# Patient Record
Sex: FEMALE | Race: WHITE | Employment: OTHER | URBAN - METROPOLITAN AREA
[De-identification: names, ages, dates, MRNs, and addresses within clinical notes are randomized per-mention and may not be internally consistent; named-entity substitution may affect disease eponyms.]

---

## 2017-01-05 ENCOUNTER — TELEPHONE (OUTPATIENT)
Dept: FAMILY MEDICINE | Facility: CLINIC | Age: 82
End: 2017-01-05

## 2017-01-05 NOTE — TELEPHONE ENCOUNTER
lasix      Last Written Prescription Date:  9/27/16  Last Fill Quantity: 30,   # refills: 1  Last Office Visit with [facility], [facility] or [facility] prescribing provider: 12/2/16  Future Office visit:    Next 5 appointments (look out 90 days)     Mar 21, 2017  1:00 PM   Return Visit with DEVICE CHECK RN CARD   [facility] PHYSICIANS HEART AT [facility] ([facility])    [address] [place] [id number] [phone]                   Routing refill request to provider for review/approval because:  Drug not active on patient's medication list

## 2017-01-05 NOTE — TELEPHONE ENCOUNTER
Please call patient and find out how/why she is needing this medication  I do not see it active on her med list and know that she just saw cardiology on 12/6/16 and they also made no mention that she was on it.   Kelly

## 2017-01-05 NOTE — TELEPHONE ENCOUNTER
PAtient thought that she was suppose to be on this medication. She was prescribed this on 9/27/16 and DC'd on 10/3/16. She did not know that it was dc'd.   Charmaine Kerr RN

## 2017-01-09 ENCOUNTER — TRANSFERRED RECORDS (OUTPATIENT)
Dept: HEALTH INFORMATION MANAGEMENT | Facility: CLINIC | Age: 82
End: 2017-01-09

## 2017-01-10 DIAGNOSIS — I25.10 CAD S/P PERCUTANEOUS CORONARY ANGIOPLASTY: Primary | ICD-10-CM

## 2017-01-10 DIAGNOSIS — Z98.61 CAD S/P PERCUTANEOUS CORONARY ANGIOPLASTY: Primary | ICD-10-CM

## 2017-01-10 RX ORDER — ATORVASTATIN CALCIUM 40 MG/1
40 TABLET, FILM COATED ORAL DAILY
Qty: 90 TABLET | Refills: 3 | Status: SHIPPED | OUTPATIENT
Start: 2017-01-10 | End: 2017-12-28

## 2017-01-10 NOTE — TELEPHONE ENCOUNTER
atorvastatin     Last Written Prescription Date: 11/28/16  Last Fill Quantity: 30, # refills: 0  Last Office Visit with [facility], [facility] or [facility] prescribing provider: 12/2/16   Next 5 appointments (look out 90 days)     Mar 21, 2017  1:00 PM   Return Visit with DEVICE CHECK RN CARD   [facility] PHYSICIANS HEART AT [facility] ([facility])    [address] [place] [id number] [phone]                   CHOL      162   12/9/2016  HDL       57   12/9/2016  LDL       92   12/9/2016  TRIG       63   12/9/2016  CHOLHDLRATIO      3.7   3/17/2014

## 2017-01-16 DIAGNOSIS — I25.10 CAD S/P PERCUTANEOUS CORONARY ANGIOPLASTY: Primary | ICD-10-CM

## 2017-01-16 DIAGNOSIS — Z98.61 CAD S/P PERCUTANEOUS CORONARY ANGIOPLASTY: Primary | ICD-10-CM

## 2017-01-25 DIAGNOSIS — I25.10 CAD S/P PERCUTANEOUS CORONARY ANGIOPLASTY: Primary | ICD-10-CM

## 2017-01-25 DIAGNOSIS — Z98.61 CAD S/P PERCUTANEOUS CORONARY ANGIOPLASTY: Primary | ICD-10-CM

## 2017-01-31 ENCOUNTER — OFFICE VISIT (OUTPATIENT)
Dept: FAMILY MEDICINE | Facility: CLINIC | Age: 82
End: 2017-01-31
Payer: COMMERCIAL

## 2017-01-31 VITALS
SYSTOLIC BLOOD PRESSURE: 134 MMHG | BODY MASS INDEX: 27.48 KG/M2 | WEIGHT: 171 LBS | DIASTOLIC BLOOD PRESSURE: 72 MMHG | HEIGHT: 66 IN | TEMPERATURE: 97.9 F | HEART RATE: 70 BPM

## 2017-01-31 DIAGNOSIS — M76.891 ENTHESOPATHY OF RIGHT HIP REGION: ICD-10-CM

## 2017-01-31 DIAGNOSIS — K21.9 LPRD (LARYNGOPHARYNGEAL REFLUX DISEASE): ICD-10-CM

## 2017-01-31 DIAGNOSIS — I10 ESSENTIAL HYPERTENSION, BENIGN: Primary | ICD-10-CM

## 2017-01-31 DIAGNOSIS — E03.9 HYPOTHYROIDISM, UNSPECIFIED TYPE: ICD-10-CM

## 2017-01-31 DIAGNOSIS — R63.5 WEIGHT GAIN: ICD-10-CM

## 2017-01-31 PROCEDURE — 99214 OFFICE O/P EST MOD 30 MIN: CPT | Mod: 24 | Performed by: PHYSICIAN ASSISTANT

## 2017-01-31 RX ORDER — LEVOTHYROXINE SODIUM 75 UG/1
75 TABLET ORAL DAILY
Qty: 90 TABLET | Refills: 1 | Status: SHIPPED | OUTPATIENT
Start: 2017-01-31 | End: 2017-03-14

## 2017-01-31 RX ORDER — LANSOPRAZOLE 30 MG/1
30 CAPSULE, DELAYED RELEASE ORAL DAILY
Qty: 90 CAPSULE | Refills: 1 | Status: SHIPPED | OUTPATIENT
Start: 2017-01-31 | End: 2017-07-24

## 2017-01-31 NOTE — PROGRESS NOTES
"  SUBJECTIVE:                                                    Christiana Sal is a 85 year old female who presents to clinic today for the following health issues:      Patient is here today with concerns of high blood pressure. She has been checking her blood pressure over the past 2 weeks. All results have been high.   She is also having right leg swelling. Also pain in her hip too. She has been having trouble with it and wants to figure out what she can do to make it better.  She is concerned abut weight gain also. She said she has never weighed this much before and is wondering if it is from all the medications he takes.     Last visit in early December   Was having right foot pain with unclear etiology  Was given referral to ortho but says the day after her appt the pain resolved so when they called to schedule the appt she said she didn't need it  About a week later the pain returned but was gone again within a week  Now in the last 2 days she has been having some pain in her right hip  Said it was bad enough last night that she couldn't get comfortable and tylenol was not helping  Pain over the outside of her left hip    Also notes occasionally swelling in both lower legs  Not daily  Never swollen in the morning - notices it more later in the day  Does seem to improve again with elevation  Not painful   No cough or SOB  Not taking lasix anymore  Does not have any ROSIE or compression stockings - was given DME order awhile ago but was still very expensive    Gaining weight  Says she is not eating healthy because the facility she is at has a lot of high calorie foods  She has tried to eat more salads but will still have a big dinner and if there are snack foods or pastries around she will eat them  Does not think they have a \"healthy or low rola\" menu   Admits it has been a stressful year with her  and she feels she is a \"Stress eater\"     Blood pressures she has been checking with her cuff have " been running high  She brings with her a list today - numbers consistently 160-170's/80's  She did haver her cuff tested several weeks ago and it was supposedly accurate  Denies any symptoms with the high blood pressure but the numbers do concern her      Problem list and histories reviewed & adjusted, as indicated.  Additional history: as documented    Current Outpatient Prescriptions   Medication Sig Dispense Refill     LANsoprazole (PREVACID) 30 MG CR capsule Take 1 capsule (30 mg) by mouth daily Take 30-60 minutes before a meal. 90 capsule 1     levothyroxine (SYNTHROID/LEVOTHROID) 75 MCG tablet Take 1 tablet (75 mcg) by mouth daily 90 tablet 1     atorvastatin (LIPITOR) 40 MG tablet Take 1 tablet (40 mg) by mouth daily 90 tablet 3     Caffeine (NO DOZ OR) Take by mouth daily as needed       diclofenac (VOLTAREN) 1 % GEL topical gel Apply 2 grams to foot up to four times daily using enclosed dosing card. 100 g 1     diclofenac (VOLTAREN) 1 % GEL topical gel Apply  2 grams to foot up to four times daily using enclosed dosing card. 100 g 1     acetaminophen-codeine (TYLENOL #3) 300-30 MG per tablet Take 1-2 tablets by mouth every 4 hours as needed for other (mild or moderate pain) 10 tablet 0     acetaminophen-codeine (TYLENOL W/CODEINE NO. 3) 300-30 MG per tablet Take 1-2 tablets by mouth every 4 hours as needed for mild pain (mild or moderate pain) 30 tablet 0     clopidogrel (PLAVIX) 75 MG tablet Take 1 tablet (75 mg) by mouth daily 90 tablet 3     losartan (COZAAR) 25 MG tablet Take 1 tablet (25 mg) by mouth daily 90 tablet 3     metoprolol (TOPROL-XL) 25 MG 24 hr tablet Take 0.5 tablets (12.5 mg) by mouth daily 90 tablet 3     RaNITidine HCl (RANITIDINE 75 PO)        meclizine (ANTIVERT) 25 MG tablet Take 1 tablet (25 mg) by mouth every 6 hours as needed for dizziness 30 tablet 3     ondansetron (ZOFRAN) 4 MG tablet Take 1 tablet (4 mg) by mouth every 6 hours as needed for nausea 18 tablet 3     nitroglycerin  "(NITROSTAT) 0.4 MG SL tablet Place 1 tablet (0.4 mg) under the tongue every 5 minutes as needed for chest pain (call your doctor if you take a third dose) 25 tablet 3     citalopram (CELEXA) 10 MG tablet Take 1 tablet (10 mg) by mouth daily for depression. This medication should be taken every day to be effective. 90 tablet 1     Multiple Vitamins-Minerals (OCUVITE PO) Take 1 tablet by mouth daily.       ASPIRIN 81 MG PO TABS Take 2 tablets by mouth every evening. *.        CALCIUM 600 + D OR 1 tablet 2 times per day       FISH OIL OR 1 daily       [DISCONTINUED] levothyroxine (SYNTHROID, LEVOTHROID) 75 MCG tablet Take 1 tablet (75 mcg) by mouth daily 90 tablet 1     Allergies   Allergen Reactions     Demerol      Nausea     Lisinopril Cough     Sulfa Drugs      Questionable itching reported by patient       ROS:  Remainder of ROS obtained and found to be negative other than that which was documented above      OBJECTIVE:                                                    /72 mmHg  Pulse 70  Temp(Src) 97.9  F (36.6  C) (Tympanic)  Ht 5' 6\" (1.676 m)  Wt 171 lb (77.565 kg)  BMI 27.61 kg/m2  Body mass index is 27.61 kg/(m^2).  GENERAL: healthy, alert and no distress  EYES: Eyes grossly normal to inspection, PERRL and conjunctivae and sclerae normal  RESP: lungs clear to auscultation - no rales, rhonchi or wheezes  CV: regular rates and rhythm, normal S1 S2, no S3 or S4, no murmur, click or rub and no peripheral edema noted on exam today  HIP, right:   No pain in groin. Full ROM in hip  Tender to touch with applied pressure over greater trochanter to area just below     Diagnostic Test Results:  none      ASSESSMENT/PLAN:                                                    (I10) Essential hypertension, benign  (primary encounter diagnosis)  Comment: blood pressures in clinic well controlled but readings outside of clinic elevated. Patient assymptomatic  Plan: No change in dosages of medications today  Would " like her to bring her cuff back to clinic so we can again compare readings to verify true elevations  If cuff seems accurate and she continues to get elevated readings would then consider a 24 or 48 hour blood pressure monitor  Do not want to change medications or dosages right now and risk dropping her too low    (M76.891) Enthesopathy of right hip region  Comment: normal ROM and no pain in actual hip joint but rather over outer hip. Suspect bursitis or IT band irritation  Plan: Discussed stretching and icing/conservative therapies she could try first  If not improvement could consider steroid injection    (J38.7) LPRD (laryngopharyngeal reflux disease)  Comment: refill needed  Plan: LANsoprazole (PREVACID) 30 MG CR capsule            (E03.9) Hypothyroidism, unspecified type  Comment: refill needed  Plan: levothyroxine (SYNTHROID/LEVOTHROID) 75 MCG         tablet            (R63.5) Weight gain  Comment: gradual increase in weight consistent with diet changes and decreased exercise  Plan: Suggested she talk with the manager at her facility to see if they offer a modified or healthier menu option  Work on smaller portion sizes and limiting the butters/gravys/dressings or asking for them on the side  Set a goal to get back to exercising or trying some of the exercise classes they offer there     For rao swelling (which was not present today) will have patient get some compressions stockings at the pharmacy and see if that helps minimize or reduce the swelling        Kelly Matthew PA-C  [facility]

## 2017-01-31 NOTE — MR AVS SNAPSHOT
After Visit Summary   1/31/2017    Christiana Sal    MRN: [id number]           Patient Information     Date Of Birth          10/25/1931        Visit Information        Provider Department      1/31/2017 9:00 AM Kelly Matthew PA-C [facility]        Today's Diagnoses     Cough    -  1     LPRD (laryngopharyngeal reflux disease)         Hypothyroidism, unspecified type            Follow-ups after your visit        Your next 10 appointments already scheduled     Mar 21, 2017  1:00 PM   Return Visit with DEVICE CHECK RN CARD   [facility] PHYSICIANS HEART AT [facility] ([facility])    [address] [place] [id number] [phone]              Who to contact     Normal or non-critical lab and imaging results will be communicated to you by [employer], letter or phone within 4 business days after the clinic has received the results. If you do not hear from us within 7 days, please contact the clinic through [employer] or phone. If you have a critical or abnormal lab result, we will notify you by phone as soon as possible.  Submit refill requests through [employer] or call your pharmacy and they will forward the refill request to us. Please allow 3 business days for your refill to be completed.          If you need to speak with a  for additional information , please call: [phone]             Additional Information About Your Visit        G[employer]Time Information     G[employer] gives you secure access to your electronic health record. If you see a primary care provider, you can also send messages to your care team and make appointments. If you have questions, please call your primary care clinic.  If you do not have a primary care provider, please call [phone] and they will assist you.        Care EveryWhere ID     This is your Care EveryWhere ID. This could be used by other organizations to access your [facility]  "records  [id number]        Your Vitals Were     Pulse Height BMI (Body Mass Index)             70 5' 6\" (1.676 m) 27.61 kg/m2          Blood Pressure from Last 3 Encounters:   01/31/17 134/72   12/06/16 134/83   12/02/16 136/72    Weight from Last 3 Encounters:   01/31/17 171 lb (77.565 kg)   12/06/16 165 lb 6 oz (75.014 kg)   12/02/16 169 lb (76.658 kg)              Today, you had the following     No orders found for display         Where to get your medicines      These medications were sent to [id number] - [place], [place] - [address]  [address] [facility] [address]     Phone:  [phone]    - LANsoprazole 30 MG CR capsule  - levothyroxine 75 MCG tablet       Primary Care Provider Office Phone # Fax #    Mrinalini Balbinaa [phone]       [facility] [address] [place] [facility] [address]        Thank you!     Thank you for choosing [facility]  for your care. Our goal is always to provide you with excellent care. Hearing back from our patients is one way we can continue to improve our services. Please take a few minutes to complete the written survey that you may receive in the mail after your visit with us. Thank you!             Your Updated Medication List - Protect others around you: Learn how to safely use, store and throw away your medicines at www.disposemymeds.org.          This list is accurate as of: 1/31/17  9:25 AM.  Always use your most recent med list.                   Brand Name Dispense Instructions for use    * acetaminophen-codeine 300-30 MG per tablet    TYLENOL w/CODEINE No. 3    30 tablet    Take 1-2 tablets by mouth every 4 hours as needed for mild pain (mild or moderate pain)       * acetaminophen-codeine 300-30 MG per tablet    TYLENOL #3    10 tablet    Take 1-2 tablets by mouth every 4 hours as needed for other (mild or moderate pain)       aspirin 81 MG tablet      Take 2 tablets by mouth every evening. *. "       atorvastatin 40 MG tablet    LIPITOR    90 tablet    Take 1 tablet (40 mg) by mouth daily       CALCIUM 600 + D PO      1 tablet 2 times per day       citalopram 10 MG tablet    celeXA    90 tablet    Take 1 tablet (10 mg) by mouth daily for depression. This medication should be taken every day to be effective.       clopidogrel 75 MG tablet    PLAVIX    90 tablet    Take 1 tablet (75 mg) by mouth daily       * diclofenac 1 % Gel topical gel    VOLTAREN    100 g    Apply 2 grams to foot up to four times daily using enclosed dosing card.       * diclofenac 1 % Gel topical gel    VOLTAREN    100 g    Apply  2 grams to foot up to four times daily using enclosed dosing card.       FISH OIL PO      1 daily       LANsoprazole 30 MG CR capsule    PREVACID    90 capsule    Take 1 capsule (30 mg) by mouth daily Take 30-60 minutes before a meal.       levothyroxine 75 MCG tablet    SYNTHROID/LEVOTHROID    90 tablet    Take 1 tablet (75 mcg) by mouth daily       losartan 25 MG tablet    COZAAR    90 tablet    Take 1 tablet (25 mg) by mouth daily       meclizine 25 MG tablet    ANTIVERT    30 tablet    Take 1 tablet (25 mg) by mouth every 6 hours as needed for dizziness       metoprolol 25 MG 24 hr tablet    TOPROL-XL    90 tablet    Take 0.5 tablets (12.5 mg) by mouth daily       nitroglycerin 0.4 MG sublingual tablet    NITROSTAT    25 tablet    Place 1 tablet (0.4 mg) under the tongue every 5 minutes as needed for chest pain (call your doctor if you take a third dose)       NO DOZ OR      Take by mouth daily as needed       OCUVITE PO      Take 1 tablet by mouth daily.       ondansetron 4 MG tablet    ZOFRAN    18 tablet    Take 1 tablet (4 mg) by mouth every 6 hours as needed for nausea       RANITIDINE 75 PO          * Notice:  This list has 4 medication(s) that are the same as other medications prescribed for you. Read the directions carefully, and ask your doctor or other care provider to review them with you.

## 2017-01-31 NOTE — NURSING NOTE
"Chief Complaint   Patient presents with     Leg Swelling     Other     High BP        Initial /72 mmHg  Pulse 70  Ht 5' 6\" (1.676 m)  Wt 171 lb (77.565 kg)  BMI 27.61 kg/m2 Estimated body mass index is 27.61 kg/(m^2) as calculated from the following:    Height as of this encounter: 5' 6\" (1.676 m).    Weight as of this encounter: 171 lb (77.565 kg).  BP completed using cuff size: evangelist Lofton CMA    "

## 2017-02-01 DIAGNOSIS — I10 ESSENTIAL HYPERTENSION WITH GOAL BLOOD PRESSURE LESS THAN 140/90: Primary | ICD-10-CM

## 2017-02-01 RX ORDER — LOSARTAN POTASSIUM 25 MG/1
25 TABLET ORAL DAILY
Qty: 90 TABLET | Refills: 3 | Status: SHIPPED | OUTPATIENT
Start: 2017-02-01 | End: 2018-02-01

## 2017-02-01 NOTE — TELEPHONE ENCOUNTER
losartan      Last Written Prescription Date:  10/11/16  Last Fill Quantity: 90, # refills: 3  Last Office Visit with [facility], [facility] or [facility] prescribing provider: 1/31/17     Next 5 appointments (look out 90 days)     Mar 21, 2017  1:00 PM   Return Visit with DEVICE CHECK RN CARD   [facility] PHYSICIANS HEART AT [facility] ([facility])    [address] [place] [id number] [phone]                   POTASSIUM   Date Value Ref Range Status   11/09/2016 3.8 3.4 - 5.3 mmol/L Final     CREATININE   Date Value Ref Range Status   11/09/2016 0.77 0.52 - 1.04 mg/dL Final     BP Readings from Last 3 Encounters:   01/31/17 134/72   12/06/16 134/83   12/02/16 136/72

## 2017-02-03 ENCOUNTER — TELEPHONE (OUTPATIENT)
Dept: FAMILY MEDICINE | Facility: CLINIC | Age: 82
End: 2017-02-03

## 2017-02-03 DIAGNOSIS — K21.9 GASTROESOPHAGEAL REFLUX DISEASE, ESOPHAGITIS PRESENCE NOT SPECIFIED: Primary | ICD-10-CM

## 2017-02-03 NOTE — TELEPHONE ENCOUNTER
Kelly -  This medication has not been ordered by you before. Please advise. Thank you.  Charmaine Kerr RN

## 2017-02-03 NOTE — TELEPHONE ENCOUNTER
Christiana calling for her refill on ranitidine.  Has not been ordered by us previously.    ranitidine      Last Written Prescription Date: reported by patient  Last Fill Quantity: reported by patient,  # refills: reported by patient   Last Office Visit with [facility] or [facility] prescribing provider: 1/31/17                                         Next 5 appointments (look out 90 days)     Mar 21, 2017  1:00 PM   Return Visit with DEVICE CHECK RN CARD   [facility] PHYSICIANS HEART AT [facility] ([facility])    [address] [place] [id number] [phone]

## 2017-02-10 ENCOUNTER — TELEPHONE (OUTPATIENT)
Dept: FAMILY MEDICINE | Facility: CLINIC | Age: 82
End: 2017-02-10

## 2017-02-10 NOTE — TELEPHONE ENCOUNTER
"Christiana states that she need a \"shot\" for her right leg and would like to know the procedure how to schedule. Had appointment on 1/31/17 and she had right leg swelling so not sure if that is what she is referring to.  Please call and assess.  Thank you..Consuelo Hawthorne    "

## 2017-02-10 NOTE — TELEPHONE ENCOUNTER
Have her rest over the weekend, elevate the leg as much as possible   If she would like to return next week we can recheck things and consider an injection at that time  If she makes an appt for Monday please allow for 40 min  Kelly

## 2017-02-10 NOTE — TELEPHONE ENCOUNTER
Patient is still having a hard time walking. She states that her right leg is still slightly swollen. She has not been able to get the compression stockings yet. She is not sleeping well due to the pain. She is wondering if she can get the cortisone injection that was discussed at 1/31/17 visit. Advised patient to get the stocking and elevate legs when she can. Please advise. Thank you.  Charmaine Kerr RN

## 2017-02-14 NOTE — TELEPHONE ENCOUNTER
Christiana notified and she is going to put her legs up for the next few days and get back to us how they are doing..Consuelo Hawthorne

## 2017-02-20 ENCOUNTER — TELEPHONE (OUTPATIENT)
Dept: FAMILY MEDICINE | Facility: CLINIC | Age: 82
End: 2017-02-20

## 2017-02-20 NOTE — TELEPHONE ENCOUNTER
Christiana LEFT MESSAGE:    She states that she has had a cold / URI and she wanted to come in to have her lungs checked as she doesn't want it to go into her chest/lungs.  Please call and assess. Thank you..Consuelo Hawthorne

## 2017-03-02 ENCOUNTER — OFFICE VISIT (OUTPATIENT)
Dept: FAMILY MEDICINE | Facility: CLINIC | Age: 82
End: 2017-03-02
Payer: COMMERCIAL

## 2017-03-02 ENCOUNTER — TELEPHONE (OUTPATIENT)
Dept: CARDIOLOGY | Facility: CLINIC | Age: 82
End: 2017-03-02

## 2017-03-02 ENCOUNTER — ALLIED HEALTH/NURSE VISIT (OUTPATIENT)
Dept: CARDIOLOGY | Facility: CLINIC | Age: 82
End: 2017-03-02
Attending: INTERNAL MEDICINE
Payer: MEDICARE

## 2017-03-02 VITALS
DIASTOLIC BLOOD PRESSURE: 66 MMHG | OXYGEN SATURATION: 93 % | BODY MASS INDEX: 26.84 KG/M2 | WEIGHT: 167 LBS | TEMPERATURE: 98 F | HEART RATE: 71 BPM | HEIGHT: 66 IN | SYSTOLIC BLOOD PRESSURE: 128 MMHG

## 2017-03-02 DIAGNOSIS — I49.5 SINOATRIAL NODE DYSFUNCTION (H): Primary | ICD-10-CM

## 2017-03-02 DIAGNOSIS — M70.61 TROCHANTERIC BURSITIS OF RIGHT HIP: ICD-10-CM

## 2017-03-02 DIAGNOSIS — J20.9 ACUTE BRONCHITIS WITH SYMPTOMS > 10 DAYS: Primary | ICD-10-CM

## 2017-03-02 PROBLEM — Z95.0 CARDIAC PACEMAKER IN SITU: Status: ACTIVE | Noted: 2017-03-02

## 2017-03-02 PROCEDURE — 99213 OFFICE O/P EST LOW 20 MIN: CPT | Performed by: PHYSICIAN ASSISTANT

## 2017-03-02 PROCEDURE — 93296 REM INTERROG EVL PM/IDS: CPT | Mod: ZF

## 2017-03-02 PROCEDURE — 93294 REM INTERROG EVL PM/LDLS PM: CPT | Performed by: INTERNAL MEDICINE

## 2017-03-02 RX ORDER — ALBUTEROL SULFATE 90 UG/1
2 AEROSOL, METERED RESPIRATORY (INHALATION) EVERY 6 HOURS PRN
Qty: 1 INHALER | Refills: 0 | Status: SHIPPED | OUTPATIENT
Start: 2017-03-02 | End: 2018-09-06

## 2017-03-02 RX ORDER — ALBUTEROL SULFATE 90 UG/1
2 AEROSOL, METERED RESPIRATORY (INHALATION) EVERY 6 HOURS PRN
Qty: 1 INHALER | Refills: 0 | Status: SHIPPED | OUTPATIENT
Start: 2017-03-02 | End: 2019-04-12

## 2017-03-02 ASSESSMENT — ANXIETY QUESTIONNAIRES
GAD7 TOTAL SCORE: 2
IF YOU CHECKED OFF ANY PROBLEMS ON THIS QUESTIONNAIRE, HOW DIFFICULT HAVE THESE PROBLEMS MADE IT FOR YOU TO DO YOUR WORK, TAKE CARE OF THINGS AT HOME, OR GET ALONG WITH OTHER PEOPLE: NOT DIFFICULT AT ALL
6. BECOMING EASILY ANNOYED OR IRRITABLE: NOT AT ALL
1. FEELING NERVOUS, ANXIOUS, OR ON EDGE: SEVERAL DAYS
7. FEELING AFRAID AS IF SOMETHING AWFUL MIGHT HAPPEN: NOT AT ALL
2. NOT BEING ABLE TO STOP OR CONTROL WORRYING: NOT AT ALL
3. WORRYING TOO MUCH ABOUT DIFFERENT THINGS: NOT AT ALL
5. BEING SO RESTLESS THAT IT IS HARD TO SIT STILL: SEVERAL DAYS

## 2017-03-02 ASSESSMENT — PATIENT HEALTH QUESTIONNAIRE - PHQ9: 5. POOR APPETITE OR OVEREATING: NOT AT ALL

## 2017-03-02 NOTE — NURSING NOTE
"Chief Complaint   Patient presents with     Cough       Initial /66 (BP Location: Right arm, Cuff Size: Adult Regular)  Pulse 71  Temp 98  F (36.7  C) (Tympanic)  Ht 5' 6\" (1.676 m)  Wt 167 lb (75.8 kg)  SpO2 93%  BMI 26.95 kg/m2 Estimated body mass index is 26.95 kg/(m^2) as calculated from the following:    Height as of this encounter: 5' 6\" (1.676 m).    Weight as of this encounter: 167 lb (75.8 kg).  Medication Reconciliation: complete     Viraj Lofton, SISSY    "

## 2017-03-02 NOTE — PROGRESS NOTES
"  SUBJECTIVE:                                                    Christiana Sal is a 85 year old female who presents to clinic today for the following health issues:      Patient is here today to get an injection in her right thigh/hip area. She said the pain is getting better through out the day and is wondering if she should still get it. It is only bad at night.     She also has concerns with the bronchitis that she has, it is no going away. She was seen in [facility] in [place] on 2/20/17. She is feeling very fatigued and lightheaded at times.     Was seen lats week in urgent care and dx with bronchitis  CXR at that time was negative  Given prednisone which she took  Does feel like cough is better but still feeling very fatigued and \"worn out\"   Denies feeling like she is going to pass out    Hip pain on right has gotten better  No longer bothering her during the day  Does notice it at night if trying to lie on that side or turn over in bed    Problem list and histories reviewed & adjusted, as indicated.  Additional history: as documented    Current Outpatient Prescriptions   Medication Sig Dispense Refill     ranitidine (ZANTAC) 150 MG tablet Take 1 tablet (150 mg) by mouth 2 times daily 60 tablet 1     losartan (COZAAR) 25 MG tablet Take 1 tablet (25 mg) by mouth daily 90 tablet 3     LANsoprazole (PREVACID) 30 MG CR capsule Take 1 capsule (30 mg) by mouth daily Take 30-60 minutes before a meal. 90 capsule 1     levothyroxine (SYNTHROID/LEVOTHROID) 75 MCG tablet Take 1 tablet (75 mcg) by mouth daily 90 tablet 1     atorvastatin (LIPITOR) 40 MG tablet Take 1 tablet (40 mg) by mouth daily 90 tablet 3     Caffeine (NO DOZ OR) Take by mouth daily as needed       diclofenac (VOLTAREN) 1 % GEL topical gel Apply 2 grams to foot up to four times daily using enclosed dosing card. 100 g 1     diclofenac (VOLTAREN) 1 % GEL topical gel Apply  2 grams to foot up to four times daily using enclosed " "dosing card. 100 g 1     acetaminophen-codeine (TYLENOL #3) 300-30 MG per tablet Take 1-2 tablets by mouth every 4 hours as needed for other (mild or moderate pain) 10 tablet 0     acetaminophen-codeine (TYLENOL W/CODEINE NO. 3) 300-30 MG per tablet Take 1-2 tablets by mouth every 4 hours as needed for mild pain (mild or moderate pain) 30 tablet 0     clopidogrel (PLAVIX) 75 MG tablet Take 1 tablet (75 mg) by mouth daily 90 tablet 3     metoprolol (TOPROL-XL) 25 MG 24 hr tablet Take 0.5 tablets (12.5 mg) by mouth daily 90 tablet 3     RaNITidine HCl (RANITIDINE 75 PO)        meclizine (ANTIVERT) 25 MG tablet Take 1 tablet (25 mg) by mouth every 6 hours as needed for dizziness 30 tablet 3     ondansetron (ZOFRAN) 4 MG tablet Take 1 tablet (4 mg) by mouth every 6 hours as needed for nausea 18 tablet 3     nitroglycerin (NITROSTAT) 0.4 MG SL tablet Place 1 tablet (0.4 mg) under the tongue every 5 minutes as needed for chest pain (call your doctor if you take a third dose) 25 tablet 3     citalopram (CELEXA) 10 MG tablet Take 1 tablet (10 mg) by mouth daily for depression. This medication should be taken every day to be effective. 90 tablet 1     Multiple Vitamins-Minerals (OCUVITE PO) Take 1 tablet by mouth daily.       ASPIRIN 81 MG PO TABS Take 2 tablets by mouth every evening. *.        CALCIUM 600 + D OR 1 tablet 2 times per day       FISH OIL OR 1 daily       Allergies   Allergen Reactions     Demerol      Nausea     Lisinopril Cough     Sulfa Drugs      Questionable itching reported by patient       Reviewed and updated as needed this visit by clinical staff       Reviewed and updated as needed this visit by Provider         ROS:  Remainder of ROS obtained and found to be negative other than that which was documented above      OBJECTIVE:                                                    /66 (BP Location: Right arm, Cuff Size: Adult Regular)  Pulse 71  Temp 98  F (36.7  C) (Tympanic)  Ht 5' 6\" (1.676 m)  " Wt 167 lb (75.8 kg)  SpO2 93%  BMI 26.95 kg/m2  Body mass index is 26.95 kg/(m^2).  GENERAL: healthy, alert and no distress  EYES: Eyes grossly normal to inspection  RESP: lungs clear to auscultation - no rales, rhonchi or wheezes  CV: regular rates and rhythm, normal S1 S2, no S3 or S4, no murmur, click or rub, peripheral pulses strong and no peripheral edema    Diagnostic Test Results:  none      ASSESSMENT/PLAN:                                                    (J20.9) Acute bronchitis with symptoms > 10 days  (primary encounter diagnosis)  Comment: Overall symptosm are improving. Lungs sound okay on exam. Will have her use an inhaler over the next few days. Discussed that in terms of fatigue it can take some time to recover but would expect things gradually to improve. If any worsening or failure to improve furthe rI would like to see her again  Plan: albuterol (PROAIR HFA/PROVENTIL HFA/VENTOLIN         HFA) 108 (90 BASE) MCG/ACT Inhaler, albuterol         (PROAIR HFA/PROVENTIL HFA/VENTOLIN HFA) 108 (90        BASE) MCG/ACT Inhaler            (M70.61) Trochanteric bursitis of right hip  Comment: has improved  Plan: Will hold of on an injection at this time, have her get through the above noted cough, and if pain worsens or continues to bother her at night will have her come back for an injection        Kelly Matthew PA-C  [facility]

## 2017-03-02 NOTE — MR AVS SNAPSHOT
After Visit Summary   3/2/2017    Christiana Sal    MRN: [id number]           Patient Information     Date Of Birth          10/25/1931        Visit Information        Provider Department      3/2/2017 2:00 PM Kelly Matthew PA-C [facility]        Today's Diagnoses     Acute bronchitis with symptoms > 10 days    -  1      Care Instructions    Use the inhaler two times daily (morning and night)     -- Can use up to every 4 hours if needed      Come back next week if not any better, or sooner if things get worse        Follow-ups after your visit        Your next 10 appointments already scheduled     Mar 21, 2017  1:00 PM CDT   Return Visit with DEVICE CHECK RN CARD   [facility] PHYS[facility] AT [facility] ([facility])    [address] [place] [id number] [phone]              Who to contact     Normal or non-critical lab and imaging results will be communicated to you by Bon-Priv[employer]©hart, letter or phone within 4 business days after the clinic has received the results. If you do not hear from us within 7 days, please contact the clinic through Bon-Priv[employer]Â©hart or phone. If you have a critical or abnormal lab result, we will notify you by phone as soon as possible.  Submit refill requests through ShareMagnet or call your pharmacy and they will forward the refill request to us. Please allow 3 business days for your refill to be completed.          If you need to speak with a  for additional information , please call: [phone]             Additional Information About Your Visit        Bon-PrivÃ[employer]Â©harEasiaid Information     ShareMagnet gives you secure access to your electronic health record. If you see a primary care provider, you can also send messages to your care team and make appointments. If you have questions, please call your primary care clinic.  If you do not have a primary care provider, please call [phone] and they will  "assist you.        Care EveryWhere ID     This is your Care EveryWhere ID. This could be used by other organizations to access your [place] medical records  [id number]        Your Vitals Were     Pulse Temperature Height Pulse Oximetry BMI (Body Mass Index)       71 98  F (36.7  C) (Tympanic) 5' 6\" (1.676 m) 93% 26.95 kg/m2        Blood Pressure from Last 3 Encounters:   03/02/17 128/66   01/31/17 134/72   12/06/16 134/83    Weight from Last 3 Encounters:   03/02/17 167 lb (75.8 kg)   01/31/17 171 lb (77.6 kg)   12/06/16 165 lb 6 oz (75 kg)              Today, you had the following     No orders found for display         Today's Medication Changes          These changes are accurate as of: 3/2/17  2:52 PM.  If you have any questions, ask your nurse or doctor.               Start taking these medicines.        Dose/Directions    albuterol 108 (90 BASE) MCG/ACT Inhaler   Commonly known as:  PROAIR HFA/PROVENTIL HFA/VENTOLIN HFA   Used for:  Acute bronchitis with symptoms > 10 days   Started by:  Kelly Matthew PA-C        Dose:  2 puff   Inhale 2 puffs into the lungs every 6 hours as needed for shortness of breath / dyspnea or wheezing   Quantity:  1 Inhaler   Refills:  0            Where to get your medicines      These medications were sent to [facility] [address] [place] [address] [address]   [address] [facility] [address]     Phone:  [phone]     albuterol 108 (90 BASE) MCG/ACT Inhaler                Primary Care Provider Office Phone # Fax #    Mrinalini Mudkanna [phone]       [facility] [address] [place] [address]        Thank you!     Thank you for choosing [facility]  for your care. Our goal is always to provide you with excellent care. Hearing back from our patients is one way we can continue to improve our services. Please take a few minutes to complete the written survey that you may receive in the mail after " your visit with us. Thank you!             Your Updated Medication List - Protect others around you: Learn how to safely use, store and throw away your medicines at www.disposemymeds.org.          This list is accurate as of: 3/2/17  2:52 PM.  Always use your most recent med list.                   Brand Name Dispense Instructions for use    * acetaminophen-codeine 300-30 MG per tablet    TYLENOL w/CODEINE No. 3    30 tablet    Take 1-2 tablets by mouth every 4 hours as needed for mild pain (mild or moderate pain)       * acetaminophen-codeine 300-30 MG per tablet    TYLENOL #3    10 tablet    Take 1-2 tablets by mouth every 4 hours as needed for other (mild or moderate pain)       albuterol 108 (90 BASE) MCG/ACT Inhaler    PROAIR HFA/PROVENTIL HFA/VENTOLIN HFA    1 Inhaler    Inhale 2 puffs into the lungs every 6 hours as needed for shortness of breath / dyspnea or wheezing       aspirin 81 MG tablet      Take 2 tablets by mouth every evening. *.       atorvastatin 40 MG tablet    LIPITOR    90 tablet    Take 1 tablet (40 mg) by mouth daily       CALCIUM 600 + D PO      1 tablet 2 times per day       citalopram 10 MG tablet    celeXA    90 tablet    Take 1 tablet (10 mg) by mouth daily for depression. This medication should be taken every day to be effective.       clopidogrel 75 MG tablet    PLAVIX    90 tablet    Take 1 tablet (75 mg) by mouth daily       * diclofenac 1 % Gel topical gel    VOLTAREN    100 g    Apply 2 grams to foot up to four times daily using enclosed dosing card.       * diclofenac 1 % Gel topical gel    VOLTAREN    100 g    Apply  2 grams to foot up to four times daily using enclosed dosing card.       FISH OIL PO      1 daily       LANsoprazole 30 MG CR capsule    PREVACID    90 capsule    Take 1 capsule (30 mg) by mouth daily Take 30-60 minutes before a meal.       levothyroxine 75 MCG tablet    SYNTHROID/LEVOTHROID    90 tablet    Take 1 tablet (75 mcg) by mouth daily       losartan 25 MG  tablet    COZAAR    90 tablet    Take 1 tablet (25 mg) by mouth daily       meclizine 25 MG tablet    ANTIVERT    30 tablet    Take 1 tablet (25 mg) by mouth every 6 hours as needed for dizziness       metoprolol 25 MG 24 hr tablet    TOPROL-XL    90 tablet    Take 0.5 tablets (12.5 mg) by mouth daily       nitroglycerin 0.4 MG sublingual tablet    NITROSTAT    25 tablet    Place 1 tablet (0.4 mg) under the tongue every 5 minutes as needed for chest pain (call your doctor if you take a third dose)       NO DOZ OR      Take by mouth daily as needed       OCUVITE PO      Take 1 tablet by mouth daily.       ondansetron 4 MG tablet    ZOFRAN    18 tablet    Take 1 tablet (4 mg) by mouth every 6 hours as needed for nausea       * RANITIDINE 75 PO          * ranitidine 150 MG tablet    ZANTAC    60 tablet    Take 1 tablet (150 mg) by mouth 2 times daily       * Notice:  This list has 6 medication(s) that are the same as other medications prescribed for you. Read the directions carefully, and ask your doctor or other care provider to review them with you.

## 2017-03-02 NOTE — PROGRESS NOTES
Madelaine Marcano, device technician spoke with patient who reports that she was standing in the hallway yesterday afternoon around 3 pm and started to feel faint and unsteady.  She reports that the episode lasted ~ 10-15 minutes.  After the episode she felt sleepy.  Patient reports that she has a MD appt this afternoon.  Remote pacemaker transmission received and reviewed.  Device transmission sent per MD orders.  Patient has a [employer] dual lead pacemaker.  No arrhythmias recorded on 3/1/17 at ~ 1500.  Normal pacemaker function.  5 ATR episodes recorded - 1 sec - 1 min 13 sec in duration.  Presenting EGM = NSR @ 62 bpm.  AP = 2%.   = 3%.  Estimated battery longevity to MIGDALIA = 15 years.  Patient notified of interrogation results via voicemail.  Requested that patient call device RN with update.  Plan for patient to return to clinic in 1 month as scheduled.    Remote pacemaker transmission

## 2017-03-02 NOTE — TELEPHONE ENCOUNTER
Per device clinic - pacemaker check ok, showed no arrhythmias.  Pt is also seeing her primary today so she can discuss w/ him/her at that time also.

## 2017-03-02 NOTE — MR AVS SNAPSHOT
After Visit Summary   3/2/2017    Christiana Sal    MRN: [id number]           Patient Information     Date Of Birth          10/25/1931        Visit Information        Provider Department      3/2/2017 6:00 AM  ICD REMOTE [facility]        Today's Diagnoses     Sinoatrial node dysfunction (H)    -  1       Follow-ups after your visit        Your next 10 appointments already scheduled     Mar 21, 2017  1:00 PM CDT   Return Visit with DEVICE CHECK RN CARD   [facility] PHYSICIANS HEART AT [facility] ([facility])    [address] [place] [id number] [phone]              Who to contact     If you have questions or need follow up information about today's clinic visit or your schedule please contact [facility] directly at [phone].  Normal or non-critical lab and imaging results will be communicated to you by DrawQuesthart, letter or phone within 4 business days after the clinic has received the results. If you do not hear from us within 7 days, please contact the clinic through DrawQuesthart or phone. If you have a critical or abnormal lab result, we will notify you by phone as soon as possible.  Submit refill requests through Contently or call your pharmacy and they will forward the refill request to us. Please allow 3 business days for your refill to be completed.          Additional Information About Your Visit        MyChart Information     Contently gives you secure access to your electronic health record. If you see a primary care provider, you can also send messages to your care team and make appointments. If you have questions, please call your primary care clinic.  If you do not have a primary care provider, please call [phone] and they will assist you.        Care EveryWhere ID     This is your Care EveryWhere ID. This could be used by other organizations to access your [place] medical records  [id number]         Blood  Pressure from Last 3 Encounters:   03/02/17 128/66   01/31/17 134/72   12/06/16 134/83    Weight from Last 3 Encounters:   03/02/17 75.8 kg (167 lb)   01/31/17 77.6 kg (171 lb)   12/06/16 75 kg (165 lb 6 oz)              We Performed the Following     INTERROGATION DEVICE EVAL REMOTE, PACER/ICD          Today's Medication Changes          These changes are accurate as of: 3/2/17  4:49 PM.  If you have any questions, ask your nurse or doctor.               Start taking these medicines.        Dose/Directions    * albuterol 108 (90 BASE) MCG/ACT Inhaler   Commonly known as:  PROAIR HFA/PROVENTIL HFA/VENTOLIN HFA   Used for:  Acute bronchitis with symptoms > 10 days   Started by:  Kelly Matthew PA-C        Dose:  2 puff   Inhale 2 puffs into the lungs every 6 hours as needed for shortness of breath / dyspnea or wheezing   Quantity:  1 Inhaler   Refills:  0       * albuterol 108 (90 BASE) MCG/ACT Inhaler   Commonly known as:  PROAIR HFA/PROVENTIL HFA/VENTOLIN HFA   Used for:  Acute bronchitis with symptoms > 10 days   Started by:  Kelly Matthew PA-C        Dose:  2 puff   Inhale 2 puffs into the lungs every 6 hours as needed for shortness of breath / dyspnea or wheezing   Quantity:  1 Inhaler   Refills:  0       * Notice:  This list has 2 medication(s) that are the same as other medications prescribed for you. Read the directions carefully, and ask your doctor or other care provider to review them with you.         Where to get your medicines      These medications were sent to [place], [place] - [address] [facility]  [address] [facility] [place] [facility] [address]     Phone:  [phone]     albuterol 108 (90 BASE) MCG/ACT Inhaler         These medications were sent to [facility] [place], [place] - [address]  [address] [facility] [address]     Phone:  [phone]     albuterol 108 (90 BASE) MCG/ACT Inhaler                Primary Care Provider Office  Phone # Fax #    Mrinalini Otilia [phone]       [facility] [address] [place] [address]        Thank you!     Thank you for choosing [facility]  for your care. Our goal is always to provide you with excellent care. Hearing back from our patients is one way we can continue to improve our services. Please take a few minutes to complete the written survey that you may receive in the mail after your visit with us. Thank you!             Your Updated Medication List - Protect others around you: Learn how to safely use, store and throw away your medicines at www.disposemymeds.org.          This list is accurate as of: 3/2/17  4:49 PM.  Always use your most recent med list.                   Brand Name Dispense Instructions for use    * acetaminophen-codeine 300-30 MG per tablet    TYLENOL w/CODEINE No. 3    30 tablet    Take 1-2 tablets by mouth every 4 hours as needed for mild pain (mild or moderate pain)       * acetaminophen-codeine 300-30 MG per tablet    TYLENOL #3    10 tablet    Take 1-2 tablets by mouth every 4 hours as needed for other (mild or moderate pain)       * albuterol 108 (90 BASE) MCG/ACT Inhaler    PROAIR HFA/PROVENTIL HFA/VENTOLIN HFA    1 Inhaler    Inhale 2 puffs into the lungs every 6 hours as needed for shortness of breath / dyspnea or wheezing       * albuterol 108 (90 BASE) MCG/ACT Inhaler    PROAIR HFA/PROVENTIL HFA/VENTOLIN HFA    1 Inhaler    Inhale 2 puffs into the lungs every 6 hours as needed for shortness of breath / dyspnea or wheezing       aspirin 81 MG tablet      Take 2 tablets by mouth every evening. *.       atorvastatin 40 MG tablet    LIPITOR    90 tablet    Take 1 tablet (40 mg) by mouth daily       CALCIUM 600 + D PO      1 tablet 2 times per day       citalopram 10 MG tablet    celeXA    90 tablet    Take 1 tablet (10 mg) by mouth daily for depression. This medication should be taken every day to be effective.        clopidogrel 75 MG tablet    PLAVIX    90 tablet    Take 1 tablet (75 mg) by mouth daily       * diclofenac 1 % Gel topical gel    VOLTAREN    100 g    Apply 2 grams to foot up to four times daily using enclosed dosing card.       * diclofenac 1 % Gel topical gel    VOLTAREN    100 g    Apply  2 grams to foot up to four times daily using enclosed dosing card.       FISH OIL PO      1 daily       LANsoprazole 30 MG CR capsule    PREVACID    90 capsule    Take 1 capsule (30 mg) by mouth daily Take 30-60 minutes before a meal.       levothyroxine 75 MCG tablet    SYNTHROID/LEVOTHROID    90 tablet    Take 1 tablet (75 mcg) by mouth daily       losartan 25 MG tablet    COZAAR    90 tablet    Take 1 tablet (25 mg) by mouth daily       meclizine 25 MG tablet    ANTIVERT    30 tablet    Take 1 tablet (25 mg) by mouth every 6 hours as needed for dizziness       metoprolol 25 MG 24 hr tablet    TOPROL-XL    90 tablet    Take 0.5 tablets (12.5 mg) by mouth daily       nitroglycerin 0.4 MG sublingual tablet    NITROSTAT    25 tablet    Place 1 tablet (0.4 mg) under the tongue every 5 minutes as needed for chest pain (call your doctor if you take a third dose)       NO DOZ OR      Take by mouth daily as needed       OCUVITE PO      Take 1 tablet by mouth daily.       ondansetron 4 MG tablet    ZOFRAN    18 tablet    Take 1 tablet (4 mg) by mouth every 6 hours as needed for nausea       * RANITIDINE 75 PO          * ranitidine 150 MG tablet    ZANTAC    60 tablet    Take 1 tablet (150 mg) by mouth 2 times daily       * Notice:  This list has 8 medication(s) that are the same as other medications prescribed for you. Read the directions carefully, and ask your doctor or other care provider to review them with you.

## 2017-03-02 NOTE — PATIENT INSTRUCTIONS
Use the inhaler two times daily (morning and night)     -- Can use up to every 4 hours if needed      Come back next week if not any better, or sooner if things get worse

## 2017-03-02 NOTE — TELEPHONE ENCOUNTER
Reason for call:  Patient reporting a symptom    Symptom or request: Fainted.    Duration (how long have symptoms been present): Yesterday.    Have you been treated for this before? No    Additional comments: Patient states she had an episode yesterday , while she was walking in her hallway , she felt dizzy and fainted. States she is feeling ok this morning, just little tired. Please advise.     Phone Number patient can be reached at:  Home number on file [phone] (home)    Best Time:  any    Can we leave a detailed message on this number:  YES    Call taken on 3/2/2017 at 8:40 AM by Dee Dee Lott

## 2017-03-03 ASSESSMENT — ANXIETY QUESTIONNAIRES: GAD7 TOTAL SCORE: 2

## 2017-03-03 ASSESSMENT — PATIENT HEALTH QUESTIONNAIRE - PHQ9: SUM OF ALL RESPONSES TO PHQ QUESTIONS 1-9: 6

## 2017-03-06 ENCOUNTER — TELEPHONE (OUTPATIENT)
Dept: CARDIOLOGY | Facility: CLINIC | Age: 82
End: 2017-03-06

## 2017-03-06 NOTE — TELEPHONE ENCOUNTER
Reason for Call:  Other call back    Detailed comments: patient states she has started t have Shortness of breath this morning while standing up, when sitting down she does not have this issue.     Phone Number Patient can be reached at: Home number on file [phone] (home)    Best Time: today    Can we leave a detailed message on this number? YES    Call taken on 3/6/2017 at 10:22 AM by Role Mendiola

## 2017-03-06 NOTE — TELEPHONE ENCOUNTER
Called pt and left message for her to call clinic back to discuss symptoms.    Lindsey Walters RN

## 2017-03-06 NOTE — TELEPHONE ENCOUNTER
Spoke to Patient, she reports some SOB with exertion this am while on her way to get the bus to the grocery store. She decided not to got as it was getting to hard to breathe. She feels better when she is sitting down, does not have the SOB.  She said she had some tightness in her chest that did not require Nitroglycerin (Nitrostat).  Denies dizziness, nausea, vomiting.  States some fluttering that is not too unusual and some Left leg edema that is not too unusual either but had been a little swollen lately.\    Please call Patient to discuss at [phone].    Thank you,  Mary Juan CMA.

## 2017-03-08 ENCOUNTER — TELEPHONE (OUTPATIENT)
Dept: FAMILY MEDICINE | Facility: CLINIC | Age: 82
End: 2017-03-08

## 2017-03-08 ENCOUNTER — HOSPITAL ENCOUNTER (EMERGENCY)
Facility: CLINIC | Age: 82
Discharge: HOME | End: 2017-03-08
Attending: EMERGENCY MEDICINE | Admitting: EMERGENCY MEDICINE
Payer: MEDICARE

## 2017-03-08 ENCOUNTER — APPOINTMENT (OUTPATIENT)
Dept: GENERAL RADIOLOGY | Facility: CLINIC | Age: 82
End: 2017-03-08
Attending: EMERGENCY MEDICINE
Payer: MEDICARE

## 2017-03-08 VITALS
WEIGHT: 164 LBS | DIASTOLIC BLOOD PRESSURE: 69 MMHG | HEIGHT: 66 IN | SYSTOLIC BLOOD PRESSURE: 142 MMHG | BODY MASS INDEX: 26.36 KG/M2 | TEMPERATURE: 97.7 F | RESPIRATION RATE: 10 BRPM | OXYGEN SATURATION: 97 %

## 2017-03-08 DIAGNOSIS — R53.83 FATIGUE, UNSPECIFIED TYPE: ICD-10-CM

## 2017-03-08 DIAGNOSIS — R06.02 SHORTNESS OF BREATH: ICD-10-CM

## 2017-03-08 LAB
ALBUMIN SERPL-MCNC: 3.4 G/DL (ref 3.4–5)
ALP SERPL-CCNC: 75 U/L (ref 40–150)
ALT SERPL W P-5'-P-CCNC: 21 U/L (ref 0–50)
ANION GAP SERPL CALCULATED.3IONS-SCNC: 9 MMOL/L (ref 3–14)
AST SERPL W P-5'-P-CCNC: 18 U/L (ref 0–45)
BASOPHILS # BLD AUTO: 0 10E9/L (ref 0–0.2)
BASOPHILS NFR BLD AUTO: 0.7 %
BILIRUB SERPL-MCNC: 0.7 MG/DL (ref 0.2–1.3)
BUN SERPL-MCNC: 15 MG/DL (ref 7–30)
CALCIUM SERPL-MCNC: 8.5 MG/DL (ref 8.5–10.1)
CHLORIDE SERPL-SCNC: 106 MMOL/L (ref 94–109)
CO2 SERPL-SCNC: 25 MMOL/L (ref 20–32)
CREAT SERPL-MCNC: 0.8 MG/DL (ref 0.52–1.04)
DIFFERENTIAL METHOD BLD: NORMAL
EOSINOPHIL # BLD AUTO: 0.4 10E9/L (ref 0–0.7)
EOSINOPHIL NFR BLD AUTO: 6.5 %
ERYTHROCYTE [DISTWIDTH] IN BLOOD BY AUTOMATED COUNT: 14.6 % (ref 10–15)
GFR SERPL CREATININE-BSD FRML MDRD: 68 ML/MIN/1.7M2
GLUCOSE SERPL-MCNC: 107 MG/DL (ref 70–99)
HCT VFR BLD AUTO: 37.7 % (ref 35–47)
HGB BLD-MCNC: 12.3 G/DL (ref 11.7–15.7)
IMM GRANULOCYTES # BLD: 0 10E9/L (ref 0–0.4)
IMM GRANULOCYTES NFR BLD: 0.2 %
LYMPHOCYTES # BLD AUTO: 1.1 10E9/L (ref 0.8–5.3)
LYMPHOCYTES NFR BLD AUTO: 18.2 %
MCH RBC QN AUTO: 30 PG (ref 26.5–33)
MCHC RBC AUTO-ENTMCNC: 32.6 G/DL (ref 31.5–36.5)
MCV RBC AUTO: 92 FL (ref 78–100)
MONOCYTES # BLD AUTO: 0.7 10E9/L (ref 0–1.3)
MONOCYTES NFR BLD AUTO: 12.6 %
NEUTROPHILS # BLD AUTO: 3.6 10E9/L (ref 1.6–8.3)
NEUTROPHILS NFR BLD AUTO: 61.8 %
NT-PROBNP SERPL-MCNC: 460 PG/ML (ref 0–1800)
PLATELET # BLD AUTO: 265 10E9/L (ref 150–450)
POTASSIUM SERPL-SCNC: 3.9 MMOL/L (ref 3.4–5.3)
PROT SERPL-MCNC: 6.7 G/DL (ref 6.8–8.8)
RBC # BLD AUTO: 4.1 10E12/L (ref 3.8–5.2)
SODIUM SERPL-SCNC: 140 MMOL/L (ref 133–144)
TROPONIN I SERPL-MCNC: NORMAL UG/L (ref 0–0.04)
TSH SERPL DL<=0.005 MIU/L-ACNC: 0.67 MU/L (ref 0.4–4)
WBC # BLD AUTO: 5.9 10E9/L (ref 4–11)

## 2017-03-08 PROCEDURE — 83880 ASSAY OF NATRIURETIC PEPTIDE: CPT | Performed by: EMERGENCY MEDICINE

## 2017-03-08 PROCEDURE — 71020 XR CHEST 2 VW: CPT

## 2017-03-08 PROCEDURE — 93010 ELECTROCARDIOGRAM REPORT: CPT | Performed by: EMERGENCY MEDICINE

## 2017-03-08 PROCEDURE — 99285 EMERGENCY DEPT VISIT HI MDM: CPT | Mod: 25

## 2017-03-08 PROCEDURE — 80053 COMPREHEN METABOLIC PANEL: CPT | Performed by: EMERGENCY MEDICINE

## 2017-03-08 PROCEDURE — 84484 ASSAY OF TROPONIN QUANT: CPT | Performed by: EMERGENCY MEDICINE

## 2017-03-08 PROCEDURE — 99284 EMERGENCY DEPT VISIT MOD MDM: CPT | Mod: 25 | Performed by: EMERGENCY MEDICINE

## 2017-03-08 PROCEDURE — 93005 ELECTROCARDIOGRAM TRACING: CPT

## 2017-03-08 PROCEDURE — 84443 ASSAY THYROID STIM HORMONE: CPT | Performed by: EMERGENCY MEDICINE

## 2017-03-08 PROCEDURE — 85025 COMPLETE CBC W/AUTO DIFF WBC: CPT | Performed by: EMERGENCY MEDICINE

## 2017-03-08 NOTE — ED AVS SNAPSHOT
[facility] Emergency Department    [address] [address]    Phone:  [phone]    Fax:  [phone]                                       Christiana Sal   MRN: [id number]    Department:  [facility] Emergency Department   Date of Visit:  3/8/2017           After Visit Summary Signature Page     I have received my discharge instructions, and my questions have been answered. I have discussed any challenges I see with this plan with the nurse or doctor.    ..........................................................................................................................................  Patient/Patient Representative Signature      ..........................................................................................................................................  Patient Representative Print Name and Relationship to Patient    ..................................................               ................................................  Date                                            Time    ..........................................................................................................................................  Reviewed by Signature/Title    ...................................................              ..............................................  Date                                                            Time

## 2017-03-08 NOTE — ED PROVIDER NOTES
History     Chief Complaint   Patient presents with     Shortness of Breath     short of breath this am, has pacemaker, noted increase rate this am 95     HPI  Christiana Sal is a 85 year old female with a history of hyperlipidemia, HTN, CAD, status post coronary artery stenting and cardiac pacemaker, who presents to the ED for shortness of breath and fatigue. Review of previous records shows that she was seen recently, 2/20/17, at [facility] where chest x-ray was negative and patient was diagnosed with bronchitis. She was prescribed prednisone 20 mg bid x 5 days. She then called 3/1/17 and reported that she was feeling faint and unsteady and had a pacemaker interrogation which was unremarkable. She was seen in clinic 3/2/17 for continuing cough and shortness of breath, and was prescribed an albuterol inhaler. Today patient states she has felt increasingly short of breath off and on over the last couple days, usually with exertion but not at rest. Two days ago she noticed some chest tightness as well but this resolved on its own. This morning while taking a shower she felt so short of breath that she had to sit down. She did use her albuterol inhaler but with minimal relief. She also felt that her heart rate was elevated, but it was noted to be 95 bpm at that time. Patient states her cough and URI sx are  improving, essentially resolved. No hemoptysis or leg pain or acute leg swelling.    Previous Records Reviewed:  XR CHEST 2 VIEWS 2/20/2017 10:29 AM  INDICATION: Cough. Wheezing.  COMPARISON: None.    FINDINGS: The lung fields are hyperinflated. Heart size is normal. Biapical   scarring. No confluent infiltrates or effusions. Subclavian pacer with   leads in the right atrium and right ventricle. Atherosclerotic change of   the aorta. Bony demineralization with degenerative change in the spine and   both shoulders.    Nuclear Stress Test 11/28/2016  Remarkable for hypodynamic LV function  EF of 82%  Otherwise  unremarkable.     I have reviewed the Medications, Allergies, Past Medical and Surgical History, and Social History in the Epic system.    Patient Active Problem List   Diagnosis     Coronary atherosclerosis of native coronary artery     Hyperlipidemia LDL goal <100     Essential hypertension, benign     Hypothyroidism     GERD (gastroesophageal reflux disease)     Family history of colon cancer     Hypertension goal BP (blood pressure) < 140/90     Major depressive disorder, recurrent episode, mild (H)     Advanced directives, counseling/discussion     Angina pectoris (H)     Vulvar pruritus     Lichen planus     JENIFER (obstructive sleep apnea)     CAD S/P percutaneous coronary angioplasty     S/P cardiac pacemaker procedure     Sinoatrial node dysfunction (H)     Cardiac pacemaker - [employer] dual lead - Not Dependent       Current Outpatient Prescriptions   Medication Sig Dispense Refill     ranitidine (ZANTAC) 150 MG tablet Take 1 tablet (150 mg) by mouth 2 times daily 60 tablet 1     losartan (COZAAR) 25 MG tablet Take 1 tablet (25 mg) by mouth daily 90 tablet 3     levothyroxine (SYNTHROID/LEVOTHROID) 75 MCG tablet Take 1 tablet (75 mcg) by mouth daily 90 tablet 1     atorvastatin (LIPITOR) 40 MG tablet Take 1 tablet (40 mg) by mouth daily 90 tablet 3     clopidogrel (PLAVIX) 75 MG tablet Take 1 tablet (75 mg) by mouth daily 90 tablet 3     metoprolol (TOPROL-XL) 25 MG 24 hr tablet Take 0.5 tablets (12.5 mg) by mouth daily 90 tablet 3     citalopram (CELEXA) 10 MG tablet Take 1 tablet (10 mg) by mouth daily for depression. This medication should be taken every day to be effective. 90 tablet 1     Multiple Vitamins-Minerals (OCUVITE PO) Take 1 tablet by mouth daily.       ASPIRIN 81 MG PO TABS Take 2 tablets by mouth every evening. *.        CALCIUM 600 + D OR 1 tablet 2 times per day       FISH OIL OR 1 daily       albuterol (PROAIR HFA/PROVENTIL HFA/VENTOLIN HFA) 108 (90 BASE) MCG/ACT Inhaler Inhale 2  puffs into the lungs every 6 hours as needed for shortness of breath / dyspnea or wheezing 1 Inhaler 0     albuterol (PROAIR HFA/PROVENTIL HFA/VENTOLIN HFA) 108 (90 BASE) MCG/ACT Inhaler Inhale 2 puffs into the lungs every 6 hours as needed for shortness of breath / dyspnea or wheezing 1 Inhaler 0     LANsoprazole (PREVACID) 30 MG CR capsule Take 1 capsule (30 mg) by mouth daily Take 30-60 minutes before a meal. 90 capsule 1     Caffeine (NO DOZ OR) Take by mouth daily as needed       diclofenac (VOLTAREN) 1 % GEL topical gel Apply 2 grams to foot up to four times daily using enclosed dosing card. 100 g 1     diclofenac (VOLTAREN) 1 % GEL topical gel Apply  2 grams to foot up to four times daily using enclosed dosing card. 100 g 1     acetaminophen-codeine (TYLENOL #3) 300-30 MG per tablet Take 1-2 tablets by mouth every 4 hours as needed for other (mild or moderate pain) 10 tablet 0     acetaminophen-codeine (TYLENOL W/CODEINE NO. 3) 300-30 MG per tablet Take 1-2 tablets by mouth every 4 hours as needed for mild pain (mild or moderate pain) 30 tablet 0     meclizine (ANTIVERT) 25 MG tablet Take 1 tablet (25 mg) by mouth every 6 hours as needed for dizziness 30 tablet 3     ondansetron (ZOFRAN) 4 MG tablet Take 1 tablet (4 mg) by mouth every 6 hours as needed for nausea 18 tablet 3     nitroglycerin (NITROSTAT) 0.4 MG SL tablet Place 1 tablet (0.4 mg) under the tongue every 5 minutes as needed for chest pain (call your doctor if you take a third dose) 25 tablet 3       Allergies   Allergen Reactions     Demerol      Nausea     Lisinopril Cough     Sulfa Drugs      Questionable itching reported by patient       Past Surgical History   Procedure Laterality Date     Hc transcath stent init vessel,percut       x 2     Appendectomy       Cholecystectomy, open       Fracture tx, ankle rt/lt       LT, 2 screws remain     Hc laryngoscopy direct w vocal cord injection       vocal cord polypectomy     Hc removal of ovarian  "cyst(s)       Colonoscopy  2008     [facility]     Hc excise hand/foot neuroma       RT     Repair hammer toe  9/13/10     multiple + RT great toe tendon release, Dr. Sanchez DPM     Cataract iol, rt/lt  1/26/12     RT, Dr. Wynne     Cardiac catherization  1/15/04     drug-eluding stent RCA, Dr. Pérez, [facility]     Cardiac catherization  2/16/12     diffuse mild/mod disease, no new stent     Stent  5-2016     Cardiac stents 6 placed.       Social History   Substance Use Topics     Smoking status: Never Smoker     Smokeless tobacco: Never Used      Comment: Never smoker; no secondhand smoke exposure     Alcohol use Yes      Comment: social       Review of Systems  As mentioned above in the history present illness. All other systems were reviewed and are negative.    Physical Exam   BP: 113/62  Heart Rate: 94  Temp: 97.7  F (36.5  C)  Height: 167.6 cm (5' 6\")  Weight: 74.4 kg (164 lb)  O2 Sats: 92-97% on RA.  Physical Exam   Constitutional: She is oriented to person, place, and time. She appears well-developed and well-nourished. No distress.   HENT:   Head: Normocephalic and atraumatic.   Mouth/Throat: Oropharynx is clear and moist.   Eyes: Conjunctivae and EOM are normal. No scleral icterus.   Neck: Normal range of motion. Neck supple. No JVD present. No tracheal deviation present. No thyromegaly present.   Cardiovascular: Normal rate, regular rhythm, normal heart sounds and intact distal pulses.  Exam reveals no gallop and no friction rub.    No murmur heard.  Pulmonary/Chest: Effort normal and breath sounds normal. No respiratory distress. She has no wheezes. She has no rales.   Abdominal: Soft. Bowel sounds are normal. She exhibits no distension. There is no tenderness.   Musculoskeletal: Normal range of motion. She exhibits no edema or tenderness.   Neurological: She is alert and oriented to person, place, and time.   Skin: Skin is warm and dry. No rash noted. She is not diaphoretic. No erythema. No " pallor.   Psychiatric: Her behavior is normal.   Anxious affect.   Nursing note and vitals reviewed.      ED Course     ED Course     Procedures             EKG Interpretation:      Interpreted by Beau Allen MD  Time reviewed: 10:02 AM  Symptoms at time of EKG: shortness of breath   Rhythm: normal sinus. First degree A-V block.   Rate: normal, 70 bpm  Axis: left axis  Ectopy: none  Conduction: left bundle branch block  ST Segments/ T Waves: No ST-T wave changes  Q Waves: none  Comparison to prior: Unchanged from 11/09/2016  Clinical Impression: Left axis and left bundle branch block. No significant change from 11/09/2016.         Results for orders placed or performed during the hospital encounter of 03/08/17   XR Chest 2 Views    Narrative    XR CHEST 2 VW 3/8/2017 11:19 AM    HISTORY: Short of breath.    COMPARISON: 11/10/2016.      Impression    IMPRESSION: 2 views of the chest show no acute cardiopulmonary  disease. The transvenous pacer is stable.     RUT HAYS MD   CBC with platelets, differential   Result Value Ref Range    WBC 5.9 4.0 - 11.0 10e9/L    RBC Count 4.10 3.8 - 5.2 10e12/L    Hemoglobin 12.3 11.7 - 15.7 g/dL    Hematocrit 37.7 35.0 - 47.0 %    MCV 92 78 - 100 fl    MCH 30.0 26.5 - 33.0 pg    MCHC 32.6 31.5 - 36.5 g/dL    RDW 14.6 10.0 - 15.0 %    Platelet Count 265 150 - 450 10e9/L    Diff Method Automated Method     % Neutrophils 61.8 %    % Lymphocytes 18.2 %    % Monocytes 12.6 %    % Eosinophils 6.5 %    % Basophils 0.7 %    % Immature Granulocytes 0.2 %    Absolute Neutrophil 3.6 1.6 - 8.3 10e9/L    Absolute Lymphocytes 1.1 0.8 - 5.3 10e9/L    Absolute Monocytes 0.7 0.0 - 1.3 10e9/L    Absolute Eosinophils 0.4 0.0 - 0.7 10e9/L    Absolute Basophils 0.0 0.0 - 0.2 10e9/L    Abs Immature Granulocytes 0.0 0 - 0.4 10e9/L   Comprehensive metabolic panel   Result Value Ref Range    Sodium 140 133 - 144 mmol/L    Potassium 3.9 3.4 - 5.3 mmol/L    Chloride 106 94 - 109 mmol/L    Carbon Dioxide  25 20 - 32 mmol/L    Anion Gap 9 3 - 14 mmol/L    Glucose 107 (H) 70 - 99 mg/dL    Urea Nitrogen 15 7 - 30 mg/dL    Creatinine 0.80 0.52 - 1.04 mg/dL    GFR Estimate 68 >60 mL/min/1.7m2    GFR Estimate If Black 82 >60 mL/min/1.7m2    Calcium 8.5 8.5 - 10.1 mg/dL    Bilirubin Total 0.7 0.2 - 1.3 mg/dL    Albumin 3.4 3.4 - 5.0 g/dL    Protein Total 6.7 (L) 6.8 - 8.8 g/dL    Alkaline Phosphatase 75 40 - 150 U/L    ALT 21 0 - 50 U/L    AST 18 0 - 45 U/L   NT pro BNP   Result Value Ref Range    N-Terminal Pro BNP Inpatient 460 0 - 1800 pg/mL   Troponin I   Result Value Ref Range    Troponin I ES  0.000 - 0.045 ug/L     <0.015  The 99th percentile for upper reference range is 0.045 ug/L.  Troponin values in   the range of 0.045 - 0.120 ug/L may be associated with risks of adverse   clinical events.     TSH with free T4 reflex   Result Value Ref Range    TSH 0.67 0.40 - 4.00 mU/L         I independently reviewed the X-rays: Agree with the Radiologist's interpretation.    Medications - No data to display      Assessments & Plan (with Medical Decision Making)   Elderly female with SOB/dyspnea and fatigue which appears of benign etio. Recent pacer interrogation was unremarkable and CXR recently and today are unremarkable. EKG and labs today are unremarkable. No evid of CHF, PE, MI or pneumonia. Normal TSH. Anxious woman who was concerned about cardiac etio of her sx, which I doubt. Doubt dysrhhymias with unremarkable pacemaker interrogation recently. Will have her recheck in clinic soon. Patient was provided instructions for supportive care and will return as needed for worsened condition or worsening symptoms, or new problems or concerns.      This document serves as a record of the services and decisions personally performed and made by Beau Allen MD. It was created on HIS/HER behalf by Stacie Golden, a trained medical scribe. The creation of this document is based the provider's statements to the medical  rani.  Stacie Golden 10:33 AM 3/8/2017  Provider:   The information in this document, created by the medical scribe for me, accurately reflects the services I personally performed and the decisions made by me. I have reviewed and approved this document for accuracy prior to leaving the patient care area.  Beau Allen MD 10:33 AM 3/8/2017    3/8/2017   [facility] EMERGENCY DEPARTMENT     Beau Allen MD  03/12/17 1714

## 2017-03-08 NOTE — TELEPHONE ENCOUNTER
S-(situation): rapid heart rate, lightheaded, shortness of breath    B-(background): symptoms started an hour ago, patient has extensive heat history.    A-(assessment): Received a warm pass call from   this morning to triage patient:  Patient stated was extremely weak and could not  the shower an hour ago due to shortness of breath and had to sit down. Patient is feeling she has a rapid heart rate.  Has a pacemaker and this morning her B/P was 171/71, pulse 95. Patient is lightheaded now and feels weak.    R-(recommendations):Instructed to go to the ER in [place] now. Patient at first stated she did not want to go in to the ED, but stated to patient that symptoms need evaluation now. Patient agrees and will call her daughter now and go in within the hour. Offered to call an ambulance for patient, but patient declines and will have daughter instead.  Saloni

## 2017-03-08 NOTE — ED AVS SNAPSHOT
[facility] Emergency Department    [address]    [facility] [address]    Phone:  [phone]    Fax:  [phone]                                       Christiana Sal   MRN: [id number]    Department:  [facility] Emergency Department   Date of Visit:  3/8/2017           Patient Information     Date Of Birth          10/25/1931        Your diagnoses for this visit were:     Shortness of breath     Fatigue, unspecified type        You were seen by Beau Allen MD.      Follow-up Information     Schedule an appointment as soon as possible for a visit with Rolando Bingham.    Specialty:  Family Practice    Why:  Follow-up in primary care clinic for recheck next week    Contact information:    [facility]  [address] [place] [address]  [phone]        Future Appointments        Provider Department Dept Phone Center    3/21/2017 1:00 PM Device Check RN Card [facility] AT [facility] [phone] [facility]      24 Hour Appointment Hotline       To make an appointment at any [facility] clinic, call [phone] ([phone]). If you don't have a family doctor or clinic, we will help you find one. [facility] clinics are conveniently located to serve the needs of you and your family.             Review of your medicines      Our records show that you are taking the medicines listed below. If these are incorrect, please call your family doctor or clinic.        Dose / Directions Last dose taken    * acetaminophen-codeine 300-30 MG per tablet   Commonly known as:  TYLENOL w/CODEINE No. 3   Dose:  1-2 tablet   Quantity:  30 tablet        Take 1-2 tablets by mouth every 4 hours as needed for mild pain (mild or moderate pain)   Refills:  0        * acetaminophen-codeine 300-30 MG per tablet   Commonly known as:  TYLENOL #3   Dose:  1-2 tablet   Quantity:  10 tablet        Take 1-2 tablets by mouth every 4 hours as needed for  other (mild or moderate pain)   Refills:  0        * albuterol 108 (90 BASE) MCG/ACT Inhaler   Commonly known as:  PROAIR HFA/PROVENTIL HFA/VENTOLIN HFA   Dose:  2 puff   Quantity:  1 Inhaler        Inhale 2 puffs into the lungs every 6 hours as needed for shortness of breath / dyspnea or wheezing   Refills:  0        * albuterol 108 (90 BASE) MCG/ACT Inhaler   Commonly known as:  PROAIR HFA/PROVENTIL HFA/VENTOLIN HFA   Dose:  2 puff   Quantity:  1 Inhaler        Inhale 2 puffs into the lungs every 6 hours as needed for shortness of breath / dyspnea or wheezing   Refills:  0        aspirin 81 MG tablet   Dose:  2 tablet        Take 2 tablets by mouth every evening. *.   Refills:  0        atorvastatin 40 MG tablet   Commonly known as:  LIPITOR   Dose:  40 mg   Quantity:  90 tablet        Take 1 tablet (40 mg) by mouth daily   Refills:  3        CALCIUM 600 + D PO        1 tablet 2 times per day   Refills:  0        citalopram 10 MG tablet   Commonly known as:  celeXA   Dose:  10 mg   Quantity:  90 tablet        Take 1 tablet (10 mg) by mouth daily for depression. This medication should be taken every day to be effective.   Refills:  1        clopidogrel 75 MG tablet   Commonly known as:  PLAVIX   Dose:  75 mg   Quantity:  90 tablet        Take 1 tablet (75 mg) by mouth daily   Refills:  3        * diclofenac 1 % Gel topical gel   Commonly known as:  VOLTAREN   Quantity:  100 g        Apply 2 grams to foot up to four times daily using enclosed dosing card.   Refills:  1        * diclofenac 1 % Gel topical gel   Commonly known as:  VOLTAREN   Quantity:  100 g        Apply  2 grams to foot up to four times daily using enclosed dosing card.   Refills:  1        FISH OIL PO        1 daily   Refills:  0        LANsoprazole 30 MG CR capsule   Commonly known as:  PREVACID   Dose:  30 mg   Quantity:  90 capsule        Take 1 capsule (30 mg) by mouth daily Take 30-60 minutes before a meal.   Refills:  1        levothyroxine  75 MCG tablet   Commonly known as:  SYNTHROID/LEVOTHROID   Dose:  75 mcg   Quantity:  90 tablet        Take 1 tablet (75 mcg) by mouth daily   Refills:  1        losartan 25 MG tablet   Commonly known as:  COZAAR   Dose:  25 mg   Quantity:  90 tablet        Take 1 tablet (25 mg) by mouth daily   Refills:  3        meclizine 25 MG tablet   Commonly known as:  ANTIVERT   Dose:  25 mg   Quantity:  30 tablet        Take 1 tablet (25 mg) by mouth every 6 hours as needed for dizziness   Refills:  3        metoprolol 25 MG 24 hr tablet   Commonly known as:  TOPROL-XL   Dose:  12.5 mg   Quantity:  90 tablet        Take 0.5 tablets (12.5 mg) by mouth daily   Refills:  3        nitroglycerin 0.4 MG sublingual tablet   Commonly known as:  NITROSTAT   Dose:  0.4 mg   Quantity:  25 tablet        Place 1 tablet (0.4 mg) under the tongue every 5 minutes as needed for chest pain (call your doctor if you take a third dose)   Refills:  3        NO DOZ OR        Take by mouth daily as needed   Refills:  0        OCUVITE PO   Dose:  1 tablet        Take 1 tablet by mouth daily.   Refills:  0        ondansetron 4 MG tablet   Commonly known as:  ZOFRAN   Dose:  4 mg   Quantity:  18 tablet        Take 1 tablet (4 mg) by mouth every 6 hours as needed for nausea   Refills:  3        ranitidine 150 MG tablet   Commonly known as:  ZANTAC   Dose:  150 mg   Quantity:  60 tablet        Take 1 tablet (150 mg) by mouth 2 times daily   Refills:  1        * Notice:  This list has 6 medication(s) that are the same as other medications prescribed for you. Read the directions carefully, and ask your doctor or other care provider to review them with you.            Procedures and tests performed during your visit     CBC with platelets, differential    Comprehensive metabolic panel    EKG 12-lead, tracing only    NT pro BNP    TSH with free T4 reflex    Troponin I    XR Chest 2 Views      Orders Needing Specimen Collection     None      Pending Results      Date and Time Order Name Status Description    3/8/2017 1220 TSH with free T4 reflex In process             Pending Culture Results     No orders found from 3/6/2017 to 3/9/2017.             Test Results from your hospital stay     3/8/2017 11:02 AM - Interface, Flexilab Results      Component Results     Component Value Ref Range & Units Status    WBC 5.9 4.0 - 11.0 10e9/L Final    RBC Count 4.10 3.8 - 5.2 10e12/L Final    Hemoglobin 12.3 11.7 - 15.7 g/dL Final    Hematocrit 37.7 35.0 - 47.0 % Final    MCV 92 78 - 100 fl Final    MCH 30.0 26.5 - 33.0 pg Final    MCHC 32.6 31.5 - 36.5 g/dL Final    RDW 14.6 10.0 - 15.0 % Final    Platelet Count 265 150 - 450 10e9/L Final    Diff Method Automated Method  Final    % Neutrophils 61.8 % Final    % Lymphocytes 18.2 % Final    % Monocytes 12.6 % Final    % Eosinophils 6.5 % Final    % Basophils 0.7 % Final    % Immature Granulocytes 0.2 % Final    Absolute Neutrophil 3.6 1.6 - 8.3 10e9/L Final    Absolute Lymphocytes 1.1 0.8 - 5.3 10e9/L Final    Absolute Monocytes 0.7 0.0 - 1.3 10e9/L Final    Absolute Eosinophils 0.4 0.0 - 0.7 10e9/L Final    Absolute Basophils 0.0 0.0 - 0.2 10e9/L Final    Abs Immature Granulocytes 0.0 0 - 0.4 10e9/L Final         3/8/2017 11:18 AM - Interface, Flexilab Results      Component Results     Component Value Ref Range & Units Status    Sodium 140 133 - 144 mmol/L Final    Potassium 3.9 3.4 - 5.3 mmol/L Final    Chloride 106 94 - 109 mmol/L Final    Carbon Dioxide 25 20 - 32 mmol/L Final    Anion Gap 9 3 - 14 mmol/L Final    Glucose 107 (H) 70 - 99 mg/dL Final    Urea Nitrogen 15 7 - 30 mg/dL Final    Creatinine 0.80 0.52 - 1.04 mg/dL Final    GFR Estimate 68 >60 mL/min/1.7m2 Final    Non  GFR Calc    GFR Estimate If Black 82 >60 mL/min/1.7m2 Final    African American GFR Calc    Calcium 8.5 8.5 - 10.1 mg/dL Final    Bilirubin Total 0.7 0.2 - 1.3 mg/dL Final    Albumin 3.4 3.4 - 5.0 g/dL Final    Protein Total 6.7 (L) 6.8  - 8.8 g/dL Final    Alkaline Phosphatase 75 40 - 150 U/L Final    ALT 21 0 - 50 U/L Final    AST 18 0 - 45 U/L Final         3/8/2017 11:18 AM - Interface, Flexilab Results      Component Results     Component Value Ref Range & Units Status    N-Terminal Pro BNP Inpatient 460 0 - 1800 pg/mL Final    Reference range shown and results flagged as abnormal are suggested inpatient   cut points for confirming diagnosis if CHF in an acute setting. Establishing   a   baseline value for each individual patient is useful for follow-up. An   inpatient or emergency department NT-proPBNP <300 pg/mL effectively rules out   acute CHF, with 99% negative predictive value.  The outpatient non-acute reference range for ruling out CHF is:   0-125 pg/mL (age 18 to less than 75)   0-450 pg/mL (age 75 yrs and older)           3/8/2017 11:18 AM - Interface, Flexilab Results      Component Results     Component Value Ref Range & Units Status    Troponin I ES  0.000 - 0.045 ug/L Final    <0.015  The 99th percentile for upper reference range is 0.045 ug/L.  Troponin values in   the range of 0.045 - 0.120 ug/L may be associated with risks of adverse   clinical events.           3/8/2017 11:31 AM - Interface, Radiant Ib      Narrative     XR CHEST 2 VW 3/8/2017 11:19 AM    HISTORY: Short of breath.    COMPARISON: 11/10/2016.        Impression     IMPRESSION: 2 views of the chest show no acute cardiopulmonary  disease. The transvenous pacer is stable.     RTU HAYS MD         3/8/2017 12:31 PM - Interface, Flexilab Results                Thank you for choosing [facility]       Thank you for choosing [facility] for your care. Our goal is always to provide you with excellent care. Hearing back from our patients is one way we can continue to improve our services. Please take a few minutes to complete the written survey that you may receive in the mail after you visit with us. Thank you!        [facility] Information     [facility] gives you secure access  to your electronic health record. If you see a primary care provider, you can also send messages to your care team and make appointments. If you have questions, please call your primary care clinic.  If you do not have a primary care provider, please call [phone] and they will assist you.        Care EveryWhere ID     This is your Care EveryWhere ID. This could be used by other organizations to access your [facility] medical records  [id number]        After Visit Summary       This is your record. Keep this with you and show to your community pharmacist(s) and doctor(s) at your next visit.

## 2017-03-13 ENCOUNTER — MYC MEDICAL ADVICE (OUTPATIENT)
Dept: FAMILY MEDICINE | Facility: CLINIC | Age: 82
End: 2017-03-13

## 2017-03-13 ENCOUNTER — MYC MEDICAL ADVICE (OUTPATIENT)
Dept: CARDIOLOGY | Facility: CLINIC | Age: 82
End: 2017-03-13

## 2017-03-13 DIAGNOSIS — G25.81 RESTLESS LEG SYNDROME: Primary | ICD-10-CM

## 2017-03-13 DIAGNOSIS — E03.9 HYPOTHYROIDISM, UNSPECIFIED TYPE: ICD-10-CM

## 2017-03-14 NOTE — TELEPHONE ENCOUNTER
Christiana calling for refills on levothyroxine and pramipexole.  Pramipexole not seen on current medication list.  Please review and order if appropriate.    levothyroxine     Last Written Prescription Date: 1/31/17  Last Quantity: 90, # refills: 1  Last Office Visit with [facility] or [facility] prescribing provider: 3/2/17     Next 5 appointments (look out 90 days)     Mar 21, 2017  1:00 PM CDT   Return Visit with DEVICE CHECK RN CARD   [facility] HEART AT [facility] ([facility] Clin[facility])    [address] [place] [id number]   [phone]                   TSH   Date Value Ref Range Status   03/08/2017 0.67 0.40 - 4.00 mU/L Final     pramipexole     Last Written Prescription Date: unknown  Last Fill Quantity: unknown, # refills: unknown  Last Office Visit with [facility] or [facility] prescribing provider: 3/2/17     Next 5 appointments (look out 90 days)     Mar 21, 2017  1:00 PM CDT   Return Visit with DEVICE CHECK RN CARD   [facility] HEART AT [facility] ([facility])    [address] [place] [id number]   [phone]                   BP Readings from Last 3 Encounters:   03/08/17 142/69   03/02/17 128/66   01/31/17 134/72

## 2017-03-16 RX ORDER — PRAMIPEXOLE DIHYDROCHLORIDE 0.12 MG/1
0.5 TABLET ORAL
Qty: 90 TABLET | Status: CANCELLED | OUTPATIENT
Start: 2017-03-16

## 2017-03-16 RX ORDER — LEVOTHYROXINE SODIUM 75 UG/1
75 TABLET ORAL DAILY
Qty: 90 TABLET | Refills: 1 | Status: SHIPPED | OUTPATIENT
Start: 2017-03-16 | End: 2017-06-30

## 2017-03-16 RX ORDER — PRAMIPEXOLE DIHYDROCHLORIDE 0.5 MG/1
0.5 TABLET ORAL AT BEDTIME
Qty: 90 TABLET | Refills: 3 | Status: SHIPPED | OUTPATIENT
Start: 2017-03-16 | End: 2018-02-14

## 2017-03-16 NOTE — TELEPHONE ENCOUNTER
Patient reports that dose is 0.5 mg. She takes 1 tablet every night. On nights when her legs are really bad, she will take 2 tablet. She tries not to though.  Charmaine Kerr RN

## 2017-03-16 NOTE — TELEPHONE ENCOUNTER
Please call patient in regards to the pramipexole and find out what dose she is on. We don't have this on her current medication list so I want to make sure we get this entered in correctly .  Levothyroxine was refilled  Kelly

## 2017-03-16 NOTE — TELEPHONE ENCOUNTER
"Sorry - can we call one more time and verify that she really has 0.5mg tablets? Also, has she been on this for awhile and who (and why) was she started on this?     The typical dose for restless legs is 0.125mg with a max daily dose of 0.5mg (so if she is taking 2 tablets some nights that would be 1mg)    It's not that higher doses are unheard of and for other conditions are considered \"normal\" I just want to make sure one more time that is really the dose she is on since we have not prescribed that for her.     Thanks  Kelly  "

## 2017-03-16 NOTE — TELEPHONE ENCOUNTER
Patient reports that she takes 2 pills one night and then 1 pill the next night. Then she repeats. She did not have the dosing but she will call back when she gets to her room.  Charmaine Kerr RN

## 2017-03-16 NOTE — TELEPHONE ENCOUNTER
Christiana calling back and states that she takes .5ml - 2 tablets @ night.  Sometimes she takes only 1 tablet so she doesn't run out of them.  Please call and assess. Thank you..Consuelo Hawthorne

## 2017-03-21 ENCOUNTER — OFFICE VISIT (OUTPATIENT)
Dept: CARDIOLOGY | Facility: CLINIC | Age: 82
End: 2017-03-21
Payer: COMMERCIAL

## 2017-03-21 DIAGNOSIS — I49.5 SINOATRIAL NODE DYSFUNCTION (H): Primary | ICD-10-CM

## 2017-03-21 PROCEDURE — 93280 PM DEVICE PROGR EVAL DUAL: CPT | Performed by: INTERNAL MEDICINE

## 2017-03-21 NOTE — PATIENT INSTRUCTIONS
It was a pleasure to see you in clinic today. Please do not hesitate to call with any questions or concerns. You are scheduled for a remote pacemaker transmission on 6/26/17. This will happen automatically in the night. We will call in 1-2 business days to discuss the results with you.     Orly Nichols, RN  Electrophysiology Nurse Clinician  [facility]  During business hours please call [facility]:  [phone]  After business hours please call:  [phone]- select option #4 and ask for job code [phone]  Appointment lines  [facility]:  [phone]  [facility]:  [phone]

## 2017-03-21 NOTE — MR AVS SNAPSHOT
After Visit Summary   3/21/2017    Christiana Sal    MRN: [id number]           Patient Information     Date Of Birth          10/25/1931        Visit Information        Provider Department      3/21/2017 1:00 PM DEVICE CHECK RN CARD [facility] AT [facility]        Today's Diagnoses     Sinoatrial node dysfunction (H)    -  1      Care Instructions    It was a pleasure to see you in clinic today. Please do not hesitate to call with any questions or concerns. You are scheduled for a remote pacemaker transmission on 6/26/17. This will happen automatically in the night. We will call in 1-2 business days to discuss the results with you.     Orly Nichols, RN  Electrophysiology Nurse Clinician  [facility]  During business hours please call [facility]:  [phone]  After business hours please call:  [phone]- select option #4 and ask for job code [phone]  Appointment lines  [facility]:  [phone]  [facility]:  [phone]          Follow-ups after your visit        Your next 10 appointments already scheduled     Mar 23, 2017  9:00 AM CDT   Office Visit with Kelly Matthew PA-C   [facility]    [address] [address] [phone]           Bring a current list of meds and any records pertaining to this visit.  For Physicals, please bring immunization records and any forms needing to be filled out.  Please arrive 10 minutes early to complete paperwork.              Who to contact     If you have questions or need follow up information about today's clinic visit or your schedule please contact [facility] directly at [phone].  Normal or non-critical lab and imaging results will be communicated to you by MyChart, letter or phone within 4 business days after the clinic has received the results. If  you do not hear from us within 7 days, please contact the clinic through [facility] [employer] or phone. If you have a critical or abnormal lab result, we will notify you by phone as soon as possible.  Submit refill requests through [employer] or call your pharmacy and they will forward the refill request to us. Please allow 3 business days for your refill to be completed.          Additional Information About Your Visit        [facility] Information     [employer] gives you secure access to your electronic health record. If you see a primary care provider, you can also send messages to your care team and make appointments. If you have questions, please call your primary care clinic.  If you do not have a primary care provider, please call [phone] and they will assist you.        Care EveryWhere ID     This is your Care EveryWhere ID. This could be used by other organizations to access your [facility] medical records  [id number]         Blood Pressure from Last 3 Encounters:   03/08/17 142/69   03/02/17 128/66   01/31/17 134/72    Weight from Last 3 Encounters:   03/08/17 74.4 kg (164 lb)   03/02/17 75.8 kg (167 lb)   01/31/17 77.6 kg (171 lb)              We Performed the Following     PM DEVICE PROGRAMMING EVAL, DUAL LEAD PACER        Primary Care Provider Office Phone # Fax #    Mrinalini Otilia [phone]       [facility] [place] [address] [place] [address]        Thank you!     Thank you for choosing [facility] AT [facility]  for your care. Our goal is always to provide you with excellent care. Hearing back from our patients is one way we can continue to improve our services. Please take a few minutes to complete the written survey that you may receive in the mail after your visit with us. Thank you!             Your Updated Medication List - Protect others around you: Learn how to safely use, store and throw away your medicines at www.disposemymeds.org.           This list is accurate as of: 3/21/17  1:22 PM.  Always use your most recent med list.                   Brand Name Dispense Instructions for use    * acetaminophen-codeine 300-30 MG per tablet    TYLENOL w/CODEINE No. 3    30 tablet    Take 1-2 tablets by mouth every 4 hours as needed for mild pain (mild or moderate pain)       * acetaminophen-codeine 300-30 MG per tablet    TYLENOL #3    10 tablet    Take 1-2 tablets by mouth every 4 hours as needed for other (mild or moderate pain)       * albuterol 108 (90 BASE) MCG/ACT Inhaler    PROAIR HFA/PROVENTIL HFA/VENTOLIN HFA    1 Inhaler    Inhale 2 puffs into the lungs every 6 hours as needed for shortness of breath / dyspnea or wheezing       * albuterol 108 (90 BASE) MCG/ACT Inhaler    PROAIR HFA/PROVENTIL HFA/VENTOLIN HFA    1 Inhaler    Inhale 2 puffs into the lungs every 6 hours as needed for shortness of breath / dyspnea or wheezing       aspirin 81 MG tablet      Take 2 tablets by mouth every evening. *.       atorvastatin 40 MG tablet    LIPITOR    90 tablet    Take 1 tablet (40 mg) by mouth daily       CALCIUM 600 + D PO      1 tablet 2 times per day       citalopram 10 MG tablet    celeXA    90 tablet    Take 1 tablet (10 mg) by mouth daily for depression. This medication should be taken every day to be effective.       clopidogrel 75 MG tablet    PLAVIX    90 tablet    Take 1 tablet (75 mg) by mouth daily       * diclofenac 1 % Gel topical gel    VOLTAREN    100 g    Apply 2 grams to foot up to four times daily using enclosed dosing card.       * diclofenac 1 % Gel topical gel    VOLTAREN    100 g    Apply  2 grams to foot up to four times daily using enclosed dosing card.       FISH OIL PO      1 daily       LANsoprazole 30 MG CR capsule    PREVACID    90 capsule    Take 1 capsule (30 mg) by mouth daily Take 30-60 minutes before a meal.       levothyroxine 75 MCG tablet    SYNTHROID/LEVOTHROID    90 tablet    Take 1 tablet (75 mcg) by mouth daily        losartan 25 MG tablet    COZAAR    90 tablet    Take 1 tablet (25 mg) by mouth daily       meclizine 25 MG tablet    ANTIVERT    30 tablet    Take 1 tablet (25 mg) by mouth every 6 hours as needed for dizziness       metoprolol 25 MG 24 hr tablet    TOPROL-XL    90 tablet    Take 0.5 tablets (12.5 mg) by mouth daily       nitroglycerin 0.4 MG sublingual tablet    NITROSTAT    25 tablet    Place 1 tablet (0.4 mg) under the tongue every 5 minutes as needed for chest pain (call your doctor if you take a third dose)       NO DOZ OR      Take by mouth daily as needed       OCUVITE PO      Take 1 tablet by mouth daily.       ondansetron 4 MG tablet    ZOFRAN    18 tablet    Take 1 tablet (4 mg) by mouth every 6 hours as needed for nausea       pramipexole 0.5 MG tablet    MIRAPEX    90 tablet    Take 1 tablet (0.5 mg) by mouth At Bedtime       ranitidine 150 MG tablet    ZANTAC    60 tablet    Take 1 tablet (150 mg) by mouth 2 times daily       * Notice:  This list has 6 medication(s) that are the same as other medications prescribed for you. Read the directions carefully, and ask your doctor or other care provider to review them with you.

## 2017-03-22 NOTE — PROGRESS NOTES
"Pt seen in the [facility] for evaluation and iterative programming of a [employer], dual lead pacemaker, per MD orders. Today her intrinsic rhythm is SR 64 bpm. Normal pacemaker function. Numerous \"sudden kamlesh episodes\" recorded. Device does not give a total. Pt states she has been having SOB with and without exertion. Today the accelerometer was turned on at nominal settings. AP= 2% and = 3%. Battery estimates 15 years to MIGDALIA. Plan for pt to send a remote transmission on 6/26/17 and RTC in 6 months.      "

## 2017-03-23 ENCOUNTER — OFFICE VISIT (OUTPATIENT)
Dept: FAMILY MEDICINE | Facility: CLINIC | Age: 82
End: 2017-03-23
Payer: COMMERCIAL

## 2017-03-23 VITALS
SYSTOLIC BLOOD PRESSURE: 130 MMHG | HEART RATE: 60 BPM | HEIGHT: 66 IN | BODY MASS INDEX: 26.84 KG/M2 | TEMPERATURE: 98 F | WEIGHT: 167 LBS | DIASTOLIC BLOOD PRESSURE: 72 MMHG | OXYGEN SATURATION: 97 %

## 2017-03-23 DIAGNOSIS — K21.9 GASTROESOPHAGEAL REFLUX DISEASE, ESOPHAGITIS PRESENCE NOT SPECIFIED: Primary | ICD-10-CM

## 2017-03-23 DIAGNOSIS — Z98.61 CAD S/P PERCUTANEOUS CORONARY ANGIOPLASTY: ICD-10-CM

## 2017-03-23 DIAGNOSIS — I49.5 SINOATRIAL NODE DYSFUNCTION (H): ICD-10-CM

## 2017-03-23 DIAGNOSIS — I25.10 CAD S/P PERCUTANEOUS CORONARY ANGIOPLASTY: ICD-10-CM

## 2017-03-23 PROCEDURE — 99214 OFFICE O/P EST MOD 30 MIN: CPT | Performed by: PHYSICIAN ASSISTANT

## 2017-03-23 RX ORDER — PANTOPRAZOLE SODIUM 20 MG/1
TABLET, DELAYED RELEASE ORAL
Qty: 90 TABLET | Refills: 1 | Status: SHIPPED | OUTPATIENT
Start: 2017-03-23 | End: 2017-09-28

## 2017-03-23 NOTE — MR AVS SNAPSHOT
After Visit Summary   3/23/2017    Christiana Sal    MRN: [id number]           Patient Information     Date Of Birth          10/25/1931        Visit Information        Provider Department      3/23/2017 9:00 AM Kelly Matthew PA-C [facility]        Today's Diagnoses     Gastroesophageal reflux disease, esophagitis presence not specified    -  1      Care Instructions    Go ahead and schedule your yearly follow up with Dr. Meza     When he retires, we can get you established with Dr. Prince who sees patients in [place]      For the cough -     Let's get your restarted on a medication for reflux/heartburn as this can cause a cough  We will switch to protonix as it looks like this will be covered under your insurance  We cannot use prilosec while on the plavix    If in the next week, the cough seems to get more irritating,  some flonase nasal spray at any drugstore and start using this      If the weakness returns or you continue to note episodes of weakness, I want you to notify me right away        Follow-ups after your visit        Who to contact     Normal or non-critical lab and imaging results will be communicated to you by [employer], letter or phone within 4 business days after the clinic has received the results. If you do not hear from us within 7 days, please contact the clinic through [employer] or phone. If you have a critical or abnormal lab result, we will notify you by phone as soon as possible.  Submit refill requests through [employer] or call your pharmacy and they will forward the refill request to us. Please allow 3 business days for your refill to be completed.          If you need to speak with a  for additional information , please call: [phone]             Additional Information About Your Visit        [employer] Information     [employer] gives you secure access to your electronic health record. If you see a primary care provider,  "you can also send messages to your care team and make appointments. If you have questions, please call your primary care clinic.  If you do not have a primary care provider, please call [phone] and they will assist you.        Care EveryWhere ID     This is your Care EveryWhere ID. This could be used by other organizations to access your [place] medical records  [id number]        Your Vitals Were     Pulse Temperature Height Pulse Oximetry BMI (Body Mass Index)       60 98  F (36.7  C) 5' 6\" (1.676 m) 97% 26.95 kg/m2        Blood Pressure from Last 3 Encounters:   03/23/17 130/72   03/08/17 142/69   03/02/17 128/66    Weight from Last 3 Encounters:   03/23/17 167 lb (75.8 kg)   03/08/17 164 lb (74.4 kg)   03/02/17 167 lb (75.8 kg)              Today, you had the following     No orders found for display         Today's Medication Changes          These changes are accurate as of: 3/23/17  9:32 AM.  If you have any questions, ask your nurse or doctor.               Start taking these medicines.        Dose/Directions    pantoprazole 20 MG EC tablet   Commonly known as:  PROTONIX   Used for:  Gastroesophageal reflux disease, esophagitis presence not specified   Started by:  Kelly Matthew PA-C        Take by mouth 30-60 minutes before a meal.   Quantity:  90 tablet   Refills:  1            Where to get your medicines      These medications were sent to [facility] [address] [facility]  [address] [facility] [address]     Phone:  [phone]     pantoprazole 20 MG EC tablet                Primary Care Provider Office Phone # Fax #    Mrinalini Mudkanna [phone]       [facility] [address] [place] [address]        Thank you!     Thank you for choosing [facility]  for your care. Our goal is always to provide you with excellent care. Hearing back from our patients is one way we can continue to improve our " services. Please take a few minutes to complete the written survey that you may receive in the mail after your visit with us. Thank you!             Your Updated Medication List - Protect others around you: Learn how to safely use, store and throw away your medicines at www.disposemymeds.org.          This list is accurate as of: 3/23/17  9:32 AM.  Always use your most recent med list.                   Brand Name Dispense Instructions for use    * acetaminophen-codeine 300-30 MG per tablet    TYLENOL w/CODEINE No. 3    30 tablet    Take 1-2 tablets by mouth every 4 hours as needed for mild pain (mild or moderate pain)       * acetaminophen-codeine 300-30 MG per tablet    TYLENOL #3    10 tablet    Take 1-2 tablets by mouth every 4 hours as needed for other (mild or moderate pain)       * albuterol 108 (90 BASE) MCG/ACT Inhaler    PROAIR HFA/PROVENTIL HFA/VENTOLIN HFA    1 Inhaler    Inhale 2 puffs into the lungs every 6 hours as needed for shortness of breath / dyspnea or wheezing       * albuterol 108 (90 BASE) MCG/ACT Inhaler    PROAIR HFA/PROVENTIL HFA/VENTOLIN HFA    1 Inhaler    Inhale 2 puffs into the lungs every 6 hours as needed for shortness of breath / dyspnea or wheezing       aspirin 81 MG tablet      Take 2 tablets by mouth every evening. *.       atorvastatin 40 MG tablet    LIPITOR    90 tablet    Take 1 tablet (40 mg) by mouth daily       CALCIUM 600 + D PO      1 tablet 2 times per day       citalopram 10 MG tablet    celeXA    90 tablet    Take 1 tablet (10 mg) by mouth daily for depression. This medication should be taken every day to be effective.       clopidogrel 75 MG tablet    PLAVIX    90 tablet    Take 1 tablet (75 mg) by mouth daily       * diclofenac 1 % Gel topical gel    VOLTAREN    100 g    Apply 2 grams to foot up to four times daily using enclosed dosing card.       * diclofenac 1 % Gel topical gel    VOLTAREN    100 g    Apply  2 grams to foot up to four times daily using enclosed  dosing card.       FISH OIL PO      1 daily       LANsoprazole 30 MG CR capsule    PREVACID    90 capsule    Take 1 capsule (30 mg) by mouth daily Take 30-60 minutes before a meal.       levothyroxine 75 MCG tablet    SYNTHROID/LEVOTHROID    90 tablet    Take 1 tablet (75 mcg) by mouth daily       losartan 25 MG tablet    COZAAR    90 tablet    Take 1 tablet (25 mg) by mouth daily       meclizine 25 MG tablet    ANTIVERT    30 tablet    Take 1 tablet (25 mg) by mouth every 6 hours as needed for dizziness       metoprolol 25 MG 24 hr tablet    TOPROL-XL    90 tablet    Take 0.5 tablets (12.5 mg) by mouth daily       nitroglycerin 0.4 MG sublingual tablet    NITROSTAT    25 tablet    Place 1 tablet (0.4 mg) under the tongue every 5 minutes as needed for chest pain (call your doctor if you take a third dose)       NO DOZ OR      Take by mouth daily as needed       OCUVITE PO      Take 1 tablet by mouth daily.       ondansetron 4 MG tablet    ZOFRAN    18 tablet    Take 1 tablet (4 mg) by mouth every 6 hours as needed for nausea       pantoprazole 20 MG EC tablet    PROTONIX    90 tablet    Take by mouth 30-60 minutes before a meal.       pramipexole 0.5 MG tablet    MIRAPEX    90 tablet    Take 1 tablet (0.5 mg) by mouth At Bedtime       ranitidine 150 MG tablet    ZANTAC    60 tablet    Take 1 tablet (150 mg) by mouth 2 times daily       * Notice:  This list has 6 medication(s) that are the same as other medications prescribed for you. Read the directions carefully, and ask your doctor or other care provider to review them with you.

## 2017-03-23 NOTE — PROGRESS NOTES
SUBJECTIVE:                                                    Christiana Sal is a 85 year old female who presents to clinic today for the following health issues:      Patient is here for an overall check up today. Just wants to make sure everything is going okay. She does have a cough but it has gotten better recently. She was seen in the ER on 3/8/17. She was feeling faint and very tired. She is doing much better.     Overall does feel better  No longer having the episodes of weakness that she was prior to her ED visit  Cough was getting better/was nearly gone until this last week where she noticed it has returned slightly  On/off throughout the day, not consistent  Hasn't noticed it is worse in the morning or at night  Still gets a little more easily short of breath - has noticed when she is walking over to visit her  in the memory care unit she will sometimes have to stop and sit on the couch for a minute to catch her breath  Hasn't been happening as much lately  Denies any chest pain or pressure    Scheduled for her yearly cardiology f/u in May with Dr. Meza  She says he will be retiring so she is going to have to find someone else    Taking all medications as listed other than the lansoprazole  Says her insurance stopped covering it so she has been out of it for the last 1-2 months  Has noticed more symptoms of reflux lately    Problem list and histories reviewed & adjusted, as indicated.  Additional history: as documented    Current Outpatient Prescriptions   Medication Sig Dispense Refill     levothyroxine (SYNTHROID/LEVOTHROID) 75 MCG tablet Take 1 tablet (75 mcg) by mouth daily 90 tablet 1     pramipexole (MIRAPEX) 0.5 MG tablet Take 1 tablet (0.5 mg) by mouth At Bedtime 90 tablet 3     albuterol (PROAIR HFA/PROVENTIL HFA/VENTOLIN HFA) 108 (90 BASE) MCG/ACT Inhaler Inhale 2 puffs into the lungs every 6 hours as needed for shortness of breath / dyspnea or wheezing 1 Inhaler 0      albuterol (PROAIR HFA/PROVENTIL HFA/VENTOLIN HFA) 108 (90 BASE) MCG/ACT Inhaler Inhale 2 puffs into the lungs every 6 hours as needed for shortness of breath / dyspnea or wheezing 1 Inhaler 0     ranitidine (ZANTAC) 150 MG tablet Take 1 tablet (150 mg) by mouth 2 times daily 60 tablet 1     losartan (COZAAR) 25 MG tablet Take 1 tablet (25 mg) by mouth daily 90 tablet 3     LANsoprazole (PREVACID) 30 MG CR capsule Take 1 capsule (30 mg) by mouth daily Take 30-60 minutes before a meal. 90 capsule 1     atorvastatin (LIPITOR) 40 MG tablet Take 1 tablet (40 mg) by mouth daily 90 tablet 3     Caffeine (NO DOZ OR) Take by mouth daily as needed       diclofenac (VOLTAREN) 1 % GEL topical gel Apply 2 grams to foot up to four times daily using enclosed dosing card. 100 g 1     diclofenac (VOLTAREN) 1 % GEL topical gel Apply  2 grams to foot up to four times daily using enclosed dosing card. 100 g 1     acetaminophen-codeine (TYLENOL #3) 300-30 MG per tablet Take 1-2 tablets by mouth every 4 hours as needed for other (mild or moderate pain) 10 tablet 0     acetaminophen-codeine (TYLENOL W/CODEINE NO. 3) 300-30 MG per tablet Take 1-2 tablets by mouth every 4 hours as needed for mild pain (mild or moderate pain) 30 tablet 0     clopidogrel (PLAVIX) 75 MG tablet Take 1 tablet (75 mg) by mouth daily 90 tablet 3     metoprolol (TOPROL-XL) 25 MG 24 hr tablet Take 0.5 tablets (12.5 mg) by mouth daily 90 tablet 3     meclizine (ANTIVERT) 25 MG tablet Take 1 tablet (25 mg) by mouth every 6 hours as needed for dizziness 30 tablet 3     ondansetron (ZOFRAN) 4 MG tablet Take 1 tablet (4 mg) by mouth every 6 hours as needed for nausea 18 tablet 3     nitroglycerin (NITROSTAT) 0.4 MG SL tablet Place 1 tablet (0.4 mg) under the tongue every 5 minutes as needed for chest pain (call your doctor if you take a third dose) 25 tablet 3     citalopram (CELEXA) 10 MG tablet Take 1 tablet (10 mg) by mouth daily for depression. This medication should  "be taken every day to be effective. 90 tablet 1     Multiple Vitamins-Minerals (OCUVITE PO) Take 1 tablet by mouth daily.       ASPIRIN 81 MG PO TABS Take 2 tablets by mouth every evening. *.        CALCIUM 600 + D OR 1 tablet 2 times per day       FISH OIL OR 1 daily       Allergies   Allergen Reactions     Demerol      Nausea     Lisinopril Cough     Sulfa Drugs      Questionable itching reported by patient       Reviewed and updated as needed this visit by clinical staff       Reviewed and updated as needed this visit by Provider         ROS:  Remainder of ROS obtained and found to be negative other than that which was documented above      OBJECTIVE:                                                    /72 (BP Location: Right arm, Patient Position: Chair, Cuff Size: Adult Regular)  Pulse 60  Temp 98  F (36.7  C)  Ht 5' 6\" (1.676 m)  Wt 167 lb (75.8 kg)  SpO2 97%  BMI 26.95 kg/m2  Body mass index is 26.95 kg/(m^2).  GENERAL: healthy, alert and no distress  EYES: Eyes grossly normal to inspection  HENT: ear canals and TM's normal, nose and mouth without ulcers or lesions  RESP: lungs clear to auscultation - no rales, rhonchi or wheezes  CV: regular rates and rhythm, normal S1 S2, no S3 or S4, no murmur, click or rub, peripheral pulses strong and no peripheral edema  SKIN: no suspicious lesions or rashes    Diagnostic Test Results:  none      ASSESSMENT/PLAN:                                                    (K21.9) Gastroesophageal reflux disease, esophagitis presence not specified  (primary encounter diagnosis)  Comment: patient has had issues with cough before that improved with initiation of PPI. Unable to use prilosec due to plavix. Had switched to lansoprazole but now not covered. Will try protonix and monitor cough  Plan: pantoprazole (PROTONIX) 20 MG EC tablet            (I49.5) Sinoatrial node dysfunction (H)  Comment: had pacemaker checked yesterday  Plan: recheck in 6 months, sooner if " needed    (I25.10,  Z98.61) CAD S/P percutaneous coronary angioplasty  Comment: followed by Dr. Meza  Plan: Due for yearly f/u in the next couple of months. If SOB persists I would like her to notify me. Last ECHO with nuclear test in 11/2016, okay but would consider repeating. Patient will monitor when if any episodes occur and let me know      Kelly Matthew PA-C  [facility]        Patient Instructions   Go ahead and schedule your yearly follow up with Dr. Meaz     When he retires, we can get you established with Dr. Prince who sees patients in [place]      For the cough -     Let's get your restarted on a medication for reflux/heartburn as this can cause a cough  We will switch to protonix as it looks like this will be covered under your insurance  We cannot use prilosec while on the plavix    If in the next week, the cough seems to get more irritating,  some flonase nasal spray at any drugstore and start using this      If the weakness returns or you continue to note episodes of weakness, I want you to notify me right away

## 2017-03-23 NOTE — PATIENT INSTRUCTIONS
Go ahead and schedule your yearly follow up with Dr. Meza     When he retires, we can get you established with Dr. Prince who sees patients in [place]      For the cough -     Let's get your restarted on a medication for reflux/heartburn as this can cause a cough  We will switch to protonix as it looks like this will be covered under your insurance  We cannot use prilosec while on the plavix    If in the next week, the cough seems to get more irritating,  some flonase nasal spray at any drugstore and start using this      If the weakness returns or you continue to note episodes of weakness, I want you to notify me right away

## 2017-03-23 NOTE — NURSING NOTE
"Chief Complaint   Patient presents with     RECHECK       Initial /72 (BP Location: Right arm, Patient Position: Chair, Cuff Size: Adult Regular)  Pulse 60  Temp 98  F (36.7  C)  Ht 5' 6\" (1.676 m)  Wt 167 lb (75.8 kg)  SpO2 97%  BMI 26.95 kg/m2 Estimated body mass index is 26.95 kg/(m^2) as calculated from the following:    Height as of this encounter: 5' 6\" (1.676 m).    Weight as of this encounter: 167 lb (75.8 kg).  Medication Reconciliation: complete     Viraj Lofton CMA    "

## 2017-03-30 ENCOUNTER — MYC MEDICAL ADVICE (OUTPATIENT)
Dept: CARDIOLOGY | Facility: CLINIC | Age: 82
End: 2017-03-30

## 2017-04-17 ENCOUNTER — OFFICE VISIT (OUTPATIENT)
Dept: FAMILY MEDICINE | Facility: CLINIC | Age: 82
End: 2017-04-17
Payer: COMMERCIAL

## 2017-04-17 VITALS
BODY MASS INDEX: 26.36 KG/M2 | HEART RATE: 74 BPM | DIASTOLIC BLOOD PRESSURE: 74 MMHG | SYSTOLIC BLOOD PRESSURE: 130 MMHG | HEIGHT: 66 IN | OXYGEN SATURATION: 96 % | WEIGHT: 164 LBS | TEMPERATURE: 98 F

## 2017-04-17 DIAGNOSIS — J06.9 UPPER RESPIRATORY TRACT INFECTION, UNSPECIFIED TYPE: Primary | ICD-10-CM

## 2017-04-17 DIAGNOSIS — Z95.0 S/P CARDIAC PACEMAKER PROCEDURE: ICD-10-CM

## 2017-04-17 PROCEDURE — 99213 OFFICE O/P EST LOW 20 MIN: CPT | Performed by: PHYSICIAN ASSISTANT

## 2017-04-17 NOTE — MR AVS SNAPSHOT
After Visit Summary   4/17/2017    Christiana Sal    MRN: [id number]           Patient Information     Date Of Birth          10/25/1931        Visit Information        Provider Department      4/17/2017 9:00 AM Kelly Matthew PA-C [facility]         Follow-ups after your visit        Your next 10 appointments already scheduled     May 16, 2017 11:00 AM CDT   Return Visit with Chuck Meza MD   [facility] ([facility])    [address] [place] [id number] [phone]              Who to contact     Normal or non-critical lab and imaging results will be communicated to you by [employer], letter or phone within 4 business days after the clinic has received the results. If you do not hear from us within 7 days, please contact the clinic through MyChart or phone. If you have a critical or abnormal lab result, we will notify you by phone as soon as possible.  Submit refill requests through [employer] or call your pharmacy and they will forward the refill request to us. Please allow 3 business days for your refill to be completed.          If you need to speak with a  for additional information , please call: [phone]             Additional Information About Your Visit        [employer] Information     [employer] gives you secure access to your electronic health record. If you see a primary care provider, you can also send messages to your care team and make appointments. If you have questions, please call your primary care clinic.  If you do not have a primary care provider, please call [phone] and they will assist you.        Care EveryWhere ID     This is your Care EveryWhere ID. This could be used by other organizations to access your [place] medical records  [id number]        Your Vitals Were     Pulse Temperature Height Pulse Oximetry BMI (Body Mass Index)       74 98  " F (36.7  C) (Tympanic) 5' 6\" (1.676 m) 96% 26.47 kg/m2        Blood Pressure from Last 3 Encounters:   04/17/17 130/74   03/23/17 130/72   03/08/17 142/69    Weight from Last 3 Encounters:   04/17/17 164 lb (74.4 kg)   03/23/17 167 lb (75.8 kg)   03/08/17 164 lb (74.4 kg)              Today, you had the following     No orders found for display       Primary Care Provider Office Phone # Fax #    Mrinalini Balbinaa [phone]       [facility] [address] [place] [address]        Thank you!     Thank you for choosing [facility]  for your care. Our goal is always to provide you with excellent care. Hearing back from our patients is one way we can continue to improve our services. Please take a few minutes to complete the written survey that you may receive in the mail after your visit with us. Thank you!             Your Updated Medication List - Protect others around you: Learn how to safely use, store and throw away your medicines at www.disposemymeds.org.          This list is accurate as of: 4/17/17  9:32 AM.  Always use your most recent med list.                   Brand Name Dispense Instructions for use    * acetaminophen-codeine 300-30 MG per tablet    TYLENOL w/CODEINE No. 3    30 tablet    Take 1-2 tablets by mouth every 4 hours as needed for mild pain (mild or moderate pain)       * acetaminophen-codeine 300-30 MG per tablet    TYLENOL #3    10 tablet    Take 1-2 tablets by mouth every 4 hours as needed for other (mild or moderate pain)       * albuterol 108 (90 BASE) MCG/ACT Inhaler    PROAIR HFA/PROVENTIL HFA/VENTOLIN HFA    1 Inhaler    Inhale 2 puffs into the lungs every 6 hours as needed for shortness of breath / dyspnea or wheezing       * albuterol 108 (90 BASE) MCG/ACT Inhaler    PROAIR HFA/PROVENTIL HFA/VENTOLIN HFA    1 Inhaler    Inhale 2 puffs into the lungs every 6 hours as needed for shortness of breath / dyspnea or wheezing       aspirin " 81 MG tablet      Take 2 tablets by mouth every evening. *.       atorvastatin 40 MG tablet    LIPITOR    90 tablet    Take 1 tablet (40 mg) by mouth daily       CALCIUM 600 + D PO      1 tablet 2 times per day       citalopram 10 MG tablet    celeXA    90 tablet    Take 1 tablet (10 mg) by mouth daily for depression. This medication should be taken every day to be effective.       clopidogrel 75 MG tablet    PLAVIX    90 tablet    Take 1 tablet (75 mg) by mouth daily       * diclofenac 1 % Gel topical gel    VOLTAREN    100 g    Apply 2 grams to foot up to four times daily using enclosed dosing card.       * diclofenac 1 % Gel topical gel    VOLTAREN    100 g    Apply  2 grams to foot up to four times daily using enclosed dosing card.       FISH OIL PO      1 daily       LANsoprazole 30 MG CR capsule    PREVACID    90 capsule    Take 1 capsule (30 mg) by mouth daily Take 30-60 minutes before a meal.       levothyroxine 75 MCG tablet    SYNTHROID/LEVOTHROID    90 tablet    Take 1 tablet (75 mcg) by mouth daily       losartan 25 MG tablet    COZAAR    90 tablet    Take 1 tablet (25 mg) by mouth daily       meclizine 25 MG tablet    ANTIVERT    30 tablet    Take 1 tablet (25 mg) by mouth every 6 hours as needed for dizziness       metoprolol 25 MG 24 hr tablet    TOPROL-XL    90 tablet    Take 0.5 tablets (12.5 mg) by mouth daily       nitroglycerin 0.4 MG sublingual tablet    NITROSTAT    25 tablet    Place 1 tablet (0.4 mg) under the tongue every 5 minutes as needed for chest pain (call your doctor if you take a third dose)       NO DOZ OR      Take by mouth daily as needed       OCUVITE PO      Take 1 tablet by mouth daily.       ondansetron 4 MG tablet    ZOFRAN    18 tablet    Take 1 tablet (4 mg) by mouth every 6 hours as needed for nausea       pantoprazole 20 MG EC tablet    PROTONIX    90 tablet    Take by mouth 30-60 minutes before a meal.       pramipexole 0.5 MG tablet    MIRAPEX    90 tablet    Take 1  tablet (0.5 mg) by mouth At Bedtime       ranitidine 150 MG tablet    ZANTAC    60 tablet    Take 1 tablet (150 mg) by mouth 2 times daily       * Notice:  This list has 6 medication(s) that are the same as other medications prescribed for you. Read the directions carefully, and ask your doctor or other care provider to review them with you.

## 2017-04-17 NOTE — NURSING NOTE
"Chief Complaint   Patient presents with     Sinus Problem     Eye Problem       Initial /74 (BP Location: Right arm, Patient Position: Chair, Cuff Size: Adult Regular)  Pulse 74  Temp 98  F (36.7  C) (Tympanic)  Ht 5' 6\" (1.676 m)  Wt 164 lb (74.4 kg)  SpO2 96%  BMI 26.47 kg/m2 Estimated body mass index is 26.47 kg/(m^2) as calculated from the following:    Height as of this encounter: 5' 6\" (1.676 m).    Weight as of this encounter: 164 lb (74.4 kg).  Medication Reconciliation: complete     Viraj Lofton CMA    "

## 2017-04-17 NOTE — PROGRESS NOTES
"  SUBJECTIVE:                                                    Christiana Sal is a 85 year old female who presents to clinic today for the following health issues:      ENT Symptoms             Symptoms: cc Present Absent Comment   Fever/Chills  x  Mild fever on Saturday    Fatigue  x     Muscle Aches   x    Eye Irritation  x  Right eye started first with being crusty and now the left eye has been itching too   Sneezing  x     Nasal Trav/Drg   x    Sinus Pressure/Pain   x    Loss of smell   x    Dental pain   x    Sore Throat  x  Mild    Swollen Glands   x    Ear Pain/Fullness   x    Cough  x     Wheeze   x    Chest Pain   x    Shortness of breath   x    Rash   x    Other  x  Nausea, yesterday morning, throwing up after drinking a sip of coffee. After throwing up she felt fine and was able to keep food and drink down.      Symptom duration:  Wednesday 4/12/17   Symptom severity:  moderate    Treatments tried:  Essential oils   Contacts:  None       She would like to know if there is anything she can be eating to build up her immune system so she isn't getting sick so much.     Cold/sinus symptoms started last Wednesday  By Saturday, her eyes were red/itchy and \"mattery\"   She started some over the counter allergy eye drops and eucalyptus oil and her symptoms have been improving  Eyes are just a little itchy today but otherwise much better - she just needs to make sure she is not contagious  Sinus pressure also improved  No cough or SOB  No chest pain    She did have 1 episode of vomiting yesterday morning  Woke up and was a little nauseated - took her morning pills and then took one sip of coffee but promptly threw it back up   Felt fine the rest of the day and no issues this morning  Just wanted to mention it because that was how she presented before she had her pacemaker placed 1 year ago  She does have her yearly follow up scheduled with Dr. Meza on 5/16      Problem list and histories reviewed & " adjusted, as indicated.  Additional history: as documented    Current Outpatient Prescriptions   Medication Sig Dispense Refill     pantoprazole (PROTONIX) 20 MG EC tablet Take by mouth 30-60 minutes before a meal. 90 tablet 1     levothyroxine (SYNTHROID/LEVOTHROID) 75 MCG tablet Take 1 tablet (75 mcg) by mouth daily 90 tablet 1     pramipexole (MIRAPEX) 0.5 MG tablet Take 1 tablet (0.5 mg) by mouth At Bedtime 90 tablet 3     albuterol (PROAIR HFA/PROVENTIL HFA/VENTOLIN HFA) 108 (90 BASE) MCG/ACT Inhaler Inhale 2 puffs into the lungs every 6 hours as needed for shortness of breath / dyspnea or wheezing 1 Inhaler 0     albuterol (PROAIR HFA/PROVENTIL HFA/VENTOLIN HFA) 108 (90 BASE) MCG/ACT Inhaler Inhale 2 puffs into the lungs every 6 hours as needed for shortness of breath / dyspnea or wheezing 1 Inhaler 0     ranitidine (ZANTAC) 150 MG tablet Take 1 tablet (150 mg) by mouth 2 times daily 60 tablet 1     losartan (COZAAR) 25 MG tablet Take 1 tablet (25 mg) by mouth daily 90 tablet 3     LANsoprazole (PREVACID) 30 MG CR capsule Take 1 capsule (30 mg) by mouth daily Take 30-60 minutes before a meal. 90 capsule 1     atorvastatin (LIPITOR) 40 MG tablet Take 1 tablet (40 mg) by mouth daily 90 tablet 3     Caffeine (NO DOZ OR) Take by mouth daily as needed       diclofenac (VOLTAREN) 1 % GEL topical gel Apply 2 grams to foot up to four times daily using enclosed dosing card. 100 g 1     diclofenac (VOLTAREN) 1 % GEL topical gel Apply  2 grams to foot up to four times daily using enclosed dosing card. 100 g 1     clopidogrel (PLAVIX) 75 MG tablet Take 1 tablet (75 mg) by mouth daily 90 tablet 3     metoprolol (TOPROL-XL) 25 MG 24 hr tablet Take 0.5 tablets (12.5 mg) by mouth daily 90 tablet 3     meclizine (ANTIVERT) 25 MG tablet Take 1 tablet (25 mg) by mouth every 6 hours as needed for dizziness 30 tablet 3     ondansetron (ZOFRAN) 4 MG tablet Take 1 tablet (4 mg) by mouth every 6 hours as needed for nausea 18 tablet 3  "    nitroglycerin (NITROSTAT) 0.4 MG SL tablet Place 1 tablet (0.4 mg) under the tongue every 5 minutes as needed for chest pain (call your doctor if you take a third dose) 25 tablet 3     citalopram (CELEXA) 10 MG tablet Take 1 tablet (10 mg) by mouth daily for depression. This medication should be taken every day to be effective. 90 tablet 1     Multiple Vitamins-Minerals (OCUVITE PO) Take 1 tablet by mouth daily.       ASPIRIN 81 MG PO TABS Take 2 tablets by mouth every evening. *.        CALCIUM 600 + D OR 1 tablet 2 times per day       FISH OIL OR 1 daily       acetaminophen-codeine (TYLENOL #3) 300-30 MG per tablet Take 1-2 tablets by mouth every 4 hours as needed for other (mild or moderate pain) 10 tablet 0     acetaminophen-codeine (TYLENOL W/CODEINE NO. 3) 300-30 MG per tablet Take 1-2 tablets by mouth every 4 hours as needed for mild pain (mild or moderate pain) 30 tablet 0     Allergies   Allergen Reactions     Demerol      Nausea     Lisinopril Cough     Sulfa Drugs      Questionable itching reported by patient       Reviewed and updated as needed this visit by clinical staff       Reviewed and updated as needed this visit by Provider         ROS:  Remainder of ROS obtained and found to be negative other than that which was documented above      OBJECTIVE:                                                    /74 (BP Location: Right arm, Patient Position: Chair, Cuff Size: Adult Regular)  Pulse 74  Temp 98  F (36.7  C) (Tympanic)  Ht 5' 6\" (1.676 m)  Wt 164 lb (74.4 kg)  SpO2 96%  BMI 26.47 kg/m2  Body mass index is 26.47 kg/(m^2).  GENERAL: healthy, alert and no distress  EYES: Eyes grossly normal to inspection, PERRL and conjunctivae and sclerae normal  HENT: normal cephalic/atraumatic, ear canals and TM's normal, nose and mouth without ulcers or lesions, oropharynx clear and oral mucous membranes moist  NECK: no adenopathy, no asymmetry, masses, or scars and thyroid normal to " palpation  RESP: lungs clear to auscultation - no rales, rhonchi or wheezes  CV: regular rate and rhythm, normal S1 S2, no S3 or S4, no murmur, click or rub, no peripheral edema     Diagnostic Test Results:  none      ASSESSMENT/PLAN:                                                        ICD-10-CM    1. Upper respiratory tract infection, unspecified type J06.9    2. S/P cardiac pacemaker procedure Z95.0      Cold/congestion symptoms that have been improving. Eyes also improved. Continue over the counter treatments as needed. Monitor vomiting episode - if it occurs again, I would like her to let me know. Follow up if symptoms do not improve or if they get worse    Kelly Matthew PA-C  [facility]

## 2017-04-24 DIAGNOSIS — F33.0 MAJOR DEPRESSIVE DISORDER, RECURRENT EPISODE, MILD (H): ICD-10-CM

## 2017-04-24 NOTE — TELEPHONE ENCOUNTER
Citalopram     Last Written Prescription Date: 5/12/14  Last Fill Quantity: 90, # refills: 1  Last Office Visit with [facility] primary care provider:  4/17/17   Next 5 appointments (look out 90 days)     May 16, 2017 11:00 AM CDT   Return Visit with Chuck Meza MD   [facility] ([facility])    [address] [place] [id number] [phone]                   Last PHQ-9 score on record=   PHQ-9 SCORE 3/2/2017   Total Score -   Total Score 6

## 2017-04-25 RX ORDER — CITALOPRAM HYDROBROMIDE 10 MG/1
10 TABLET ORAL DAILY
Qty: 90 TABLET | Refills: 0 | Status: SHIPPED | OUTPATIENT
Start: 2017-04-25 | End: 2017-08-31

## 2017-05-01 ENCOUNTER — RADIANT APPOINTMENT (OUTPATIENT)
Dept: GENERAL RADIOLOGY | Facility: CLINIC | Age: 82
End: 2017-05-01
Attending: PHYSICIAN ASSISTANT
Payer: COMMERCIAL

## 2017-05-01 ENCOUNTER — OFFICE VISIT (OUTPATIENT)
Dept: FAMILY MEDICINE | Facility: CLINIC | Age: 82
End: 2017-05-01
Payer: COMMERCIAL

## 2017-05-01 VITALS
OXYGEN SATURATION: 94 % | WEIGHT: 164 LBS | SYSTOLIC BLOOD PRESSURE: 122 MMHG | TEMPERATURE: 98.2 F | DIASTOLIC BLOOD PRESSURE: 73 MMHG | HEIGHT: 66 IN | HEART RATE: 67 BPM | BODY MASS INDEX: 26.36 KG/M2

## 2017-05-01 DIAGNOSIS — Z01.818 PREOP GENERAL PHYSICAL EXAM: Primary | ICD-10-CM

## 2017-05-01 DIAGNOSIS — R05.9 COUGH: ICD-10-CM

## 2017-05-01 DIAGNOSIS — H02.409 PTOSIS OF EYELID, UNSPECIFIED LATERALITY: ICD-10-CM

## 2017-05-01 PROCEDURE — 99214 OFFICE O/P EST MOD 30 MIN: CPT | Performed by: PHYSICIAN ASSISTANT

## 2017-05-01 PROCEDURE — 71020 XR CHEST 2 VW: CPT

## 2017-05-01 NOTE — MR AVS SNAPSHOT
After Visit Summary   5/1/2017    Christiana Sal    MRN: [id number]           Patient Information     Date Of Birth          10/25/1931        Visit Information        Provider Department      5/1/2017 11:40 AM Kelly Matthew PA-C [facility]        Today's Diagnoses     Preop general physical exam    -  1    Cough          Care Instructions      Before Your Surgery      Call your surgeon if there is any change in your health. This includes signs of a cold or flu (such as a sore throat, runny nose, cough, rash or fever).    Do not smoke, drink alcohol or take over the counter medicine (unless your surgeon or primary care doctor tells you to) for the 24 hours before and after surgery.    If you take prescribed drugs: Follow your doctor s orders about which medicines to take and which to stop until after surgery.    Eating and drinking prior to surgery: follow the instructions from your surgeon    Take a shower or bath the night before surgery. Use the soap your surgeon gave you to gently clean your skin. If you do not have soap from your surgeon, use your regular soap. Do not shave or scrub the surgery site.  Wear clean pajamas and have clean sheets on your bed.         Follow-ups after your visit        Your next 10 appointments already scheduled     May 16, 2017 11:00 AM CDT   Return Visit with Chuck Meza MD   [facility] ([facility])    [address] [place] [id number] [phone]              Who to contact     Normal or non-critical lab and imaging results will be communicated to you by MyChart, letter or phone within 4 business days after the clinic has received the results. If you do not hear from us within 7 days, please contact the clinic through MyChart or phone. If you have a critical or abnormal lab result, we will notify you by phone as soon as possible.  Submit refill  "requests through [employer] or call your pharmacy and they will forward the refill request to us. Please allow 3 business days for your refill to be completed.          If you need to speak with a  for additional information , please call: [phone]             Additional Information About Your Visit        [facility] [employer] Information     [facility] gives you secure access to your electronic health record. If you see a primary care provider, you can also send messages to your care team and make appointments. If you have questions, please call your primary care clinic.  If you do not have a primary care provider, please call [phone] and they will assist you.        Care EveryWhere ID     This is your Care EveryWhere ID. This could be used by other organizations to access your [facility] medical records  [id number]        Your Vitals Were     Pulse Temperature Height Pulse Oximetry BMI (Body Mass Index)       67 98.2  F (36.8  C) (Tympanic) 5' 6\" (1.676 m) 94% 26.47 kg/m2        Blood Pressure from Last 3 Encounters:   05/01/17 122/73   04/17/17 130/74   03/23/17 130/72    Weight from Last 3 Encounters:   05/01/17 164 lb (74.4 kg)   04/17/17 164 lb (74.4 kg)   03/23/17 167 lb (75.8 kg)               Primary Care Provider Office Phone # Fax #    Mrinalini Mudkanna [phone]       [facility] [address] [place] [address]        Thank you!     Thank you for choosing [facility]  for your care. Our goal is always to provide you with excellent care. Hearing back from our patients is one way we can continue to improve our services. Please take a few minutes to complete the written survey that you may receive in the mail after your visit with us. Thank you!             Your Updated Medication List - Protect others around you: Learn how to safely use, store and throw away your medicines at www.disposemymeds.org.          This list is accurate as of: 5/1/17 " 12:14 PM.  Always use your most recent med list.                   Brand Name Dispense Instructions for use    * acetaminophen-codeine 300-30 MG per tablet    TYLENOL w/CODEINE No. 3    30 tablet    Take 1-2 tablets by mouth every 4 hours as needed for mild pain (mild or moderate pain)       * acetaminophen-codeine 300-30 MG per tablet    TYLENOL #3    10 tablet    Take 1-2 tablets by mouth every 4 hours as needed for other (mild or moderate pain)       * albuterol 108 (90 BASE) MCG/ACT Inhaler    PROAIR HFA/PROVENTIL HFA/VENTOLIN HFA    1 Inhaler    Inhale 2 puffs into the lungs every 6 hours as needed for shortness of breath / dyspnea or wheezing       * albuterol 108 (90 BASE) MCG/ACT Inhaler    PROAIR HFA/PROVENTIL HFA/VENTOLIN HFA    1 Inhaler    Inhale 2 puffs into the lungs every 6 hours as needed for shortness of breath / dyspnea or wheezing       aspirin 81 MG tablet      Take 2 tablets by mouth every evening. *.       atorvastatin 40 MG tablet    LIPITOR    90 tablet    Take 1 tablet (40 mg) by mouth daily       CALCIUM 600 + D PO      1 tablet 2 times per day       citalopram 10 MG tablet    celeXA    90 tablet    Take 1 tablet (10 mg) by mouth daily for depression. This medication should be taken every day to be effective.       clopidogrel 75 MG tablet    PLAVIX    90 tablet    Take 1 tablet (75 mg) by mouth daily       * diclofenac 1 % Gel topical gel    VOLTAREN    100 g    Apply 2 grams to foot up to four times daily using enclosed dosing card.       * diclofenac 1 % Gel topical gel    VOLTAREN    100 g    Apply  2 grams to foot up to four times daily using enclosed dosing card.       FISH OIL PO      1 daily       LANsoprazole 30 MG CR capsule    PREVACID    90 capsule    Take 1 capsule (30 mg) by mouth daily Take 30-60 minutes before a meal.       levothyroxine 75 MCG tablet    SYNTHROID/LEVOTHROID    90 tablet    Take 1 tablet (75 mcg) by mouth daily       losartan 25 MG tablet    COZAAR    90  tablet    Take 1 tablet (25 mg) by mouth daily       meclizine 25 MG tablet    ANTIVERT    30 tablet    Take 1 tablet (25 mg) by mouth every 6 hours as needed for dizziness       metoprolol 25 MG 24 hr tablet    TOPROL-XL    90 tablet    Take 0.5 tablets (12.5 mg) by mouth daily       nitroglycerin 0.4 MG sublingual tablet    NITROSTAT    25 tablet    Place 1 tablet (0.4 mg) under the tongue every 5 minutes as needed for chest pain (call your doctor if you take a third dose)       NO DOZ OR      Take by mouth daily as needed       OCUVITE PO      Take 1 tablet by mouth daily.       ondansetron 4 MG tablet    ZOFRAN    18 tablet    Take 1 tablet (4 mg) by mouth every 6 hours as needed for nausea       pantoprazole 20 MG EC tablet    PROTONIX    90 tablet    Take by mouth 30-60 minutes before a meal.       pramipexole 0.5 MG tablet    MIRAPEX    90 tablet    Take 1 tablet (0.5 mg) by mouth At Bedtime       ranitidine 150 MG tablet    ZANTAC    60 tablet    Take 1 tablet (150 mg) by mouth 2 times daily       * Notice:  This list has 6 medication(s) that are the same as other medications prescribed for you. Read the directions carefully, and ask your doctor or other care provider to review them with you.

## 2017-05-01 NOTE — PROGRESS NOTES
[facility]  [address] [facility] [address]  [phone]  Dept: [phone]    PRE-OP EVALUATION:  Today's date: 2017    Christiana Sal (: 10/25/1931) presents for pre-operative evaluation assessment as requested by Dr. Geovanny Grover. He requires evaluation and anesthesia risk assessment prior to undergoing surgery/procedure for treatment of eyelid ptosis .  Proposed procedure: Upper Eyelid Surgery     Date of Surgery/ Procedure: 17   Time of Surgery/ Procedure: 7:30am   Hospital/Surgical Facility: [facility]  Fax number for surgical facility: [phone]   Primary Physician: Rolando Bingham  Type of Anesthesia Anticipated: to be determined    Patient has a Health Care Directive or Living Will: YES     1. YES - Do you have a history of heart attack, stroke, stent, bypass or surgery on an artery in the head, neck, heart or legs? CAD status post RCA PCI in  at [facility] and PCI to the LAD and D1 on 16   2. NO - Do you ever have any pain or discomfort in your chest?  3. NO - Do you have a history of  Heart Failure?  4. YES - Are you troubled by shortness of breath when: walking on the level, up a slight hill or at night? Stable/mild LEIGH  5. YES - Do you currently have a cold, bronchitis or other respiratory infection? Cough and Cold  6. YES - Do you have a cough, shortness of breath or wheezing? Cough   7. NO - Do you sometimes get pains in the calves of your legs when you walk?  8. NO - Do you or anyone in your family have previous history of blood clots?  9. NO - Do you or does anyone in your family have a serious bleeding problem such as prolonged bleeding following surgeries or cuts?  10. NO - Have you ever had problems with anemia or been told to take iron pills?  11. NO - Have you had any abnormal blood loss such as black, tarry or bloody stools, or abnormal vaginal bleeding?  12. YES - Have you ever had a blood  transfusion?  13. NO - Have you or any of your relatives ever had problems with anesthesia?  14. YES - Do you have sleep apnea, excessive snoring or daytime drowsiness?  15. NO - Do you have any prosthetic heart valves?  16. NO - Do you have prosthetic joints?  17. NO - Is there any chance that you may be pregnant?      HPI:                                                      Brief HPI related to upcoming procedure: Edema and drooping of eyelid      See problem list for active medical problems.  Problems all longstanding and stable, except as noted/documented.  See ROS for pertinent symptoms related to these conditions.                                                                                                  .    MEDICAL HISTORY:                                                      Patient Active Problem List    Diagnosis Date Noted     Cardiac pacemaker - [employer] dual lead - Not Dependent 03/02/2017     Priority: Medium     Sinoatrial node dysfunction (H) 11/10/2016     Priority: Medium     S/P cardiac pacemaker procedure 11/09/2016     Priority: Medium     CAD S/P percutaneous coronary angioplasty 05/11/2016     Priority: Medium     Lichen planus 11/19/2013     Priority: Medium     Vulvar pruritus 11/12/2013     Priority: Medium     JENIFER (obstructive sleep apnea) 11/17/2014     Angina pectoris (H) 02/08/2012     Advanced directives, counseling/discussion 06/16/2011     Patient will bring a copy. Chart r/q to make sure no copy.    GUSTAVO Cunningham MA    Advance Directive Problem List Overview:   Name Relationship Phone    Primary Health Care Agent Albaro Sal  [phone]         Alternative Health Care Agent Esther Mulligan  [phone]     Reviewed Health Care Directive dated 05/13/2005, scanned into EPIC 01/2012.  Marivel Villarreal CMA                Hypertension goal BP (blood pressure) < 140/90 03/04/2011     Major depressive disorder, recurrent episode, mild (H) 11/02/2010     Family history of colon  cancer 05/04/2010     Coronary atherosclerosis of native coronary artery      s/p MI ([facility])       Hyperlipidemia LDL goal <100      Essential hypertension, benign      Hypothyroidism      GERD (gastroesophageal reflux disease)       Past Medical History:   Diagnosis Date     Coronary atherosclerosis of native coronary artery     s/p MI ([facility])     Essential hypertension, benign      GERD (gastroesophageal reflux disease)      History of transient ischemic attack (TIA)      Hyperlipidemia LDL goal <100      Hypothyroidism      Major depressive disorder, recurrent episode, mild (H) 5/18/2011     Peptic ulcer disease with hemorrhage     Remote history of stomach ulcer, requiring transfusion after chronic bleeding     Past Surgical History:   Procedure Laterality Date     APPENDECTOMY       CARDIAC CATHERIZATION  1/15/04    drug-eluding stent RCA, Dr. Pérez, [facility]     CARDIAC CATHERIZATION  2/16/12    diffuse mild/mod disease, no new stent     CATARACT IOL, RT/LT  1/26/12    RT, Dr. Wynne     CHOLECYSTECTOMY, OPEN       COLONOSCOPY  2008    [facility]     FRACTURE TX, ANKLE RT/LT      LT, 2 screws remain     HC EXCISE HAND/FOOT NEUROMA      RT     HC LARYNGOSCOPY DIRECT W VOCAL CORD INJECTION      vocal cord polypectomy     HC REMOVAL OF OVARIAN CYST(S)       HC TRANSCATH STENT INIT VESSEL,PERCUT      x 2     REPAIR HAMMER TOE  9/13/10    multiple + RT great toe tendon release, Dr. Sanchez DPM     STENT  5-2016    Cardiac stents 6 placed.     Current Outpatient Prescriptions   Medication Sig Dispense Refill     citalopram (CELEXA) 10 MG tablet Take 1 tablet (10 mg) by mouth daily for depression. This medication should be taken every day to be effective. 90 tablet 0     pantoprazole (PROTONIX) 20 MG EC tablet Take by mouth 30-60 minutes before a meal. 90 tablet 1     levothyroxine (SYNTHROID/LEVOTHROID) 75 MCG tablet Take 1 tablet (75 mcg) by mouth daily 90 tablet 1     pramipexole  (MIRAPEX) 0.5 MG tablet Take 1 tablet (0.5 mg) by mouth At Bedtime 90 tablet 3     albuterol (PROAIR HFA/PROVENTIL HFA/VENTOLIN HFA) 108 (90 BASE) MCG/ACT Inhaler Inhale 2 puffs into the lungs every 6 hours as needed for shortness of breath / dyspnea or wheezing 1 Inhaler 0     albuterol (PROAIR HFA/PROVENTIL HFA/VENTOLIN HFA) 108 (90 BASE) MCG/ACT Inhaler Inhale 2 puffs into the lungs every 6 hours as needed for shortness of breath / dyspnea or wheezing 1 Inhaler 0     ranitidine (ZANTAC) 150 MG tablet Take 1 tablet (150 mg) by mouth 2 times daily 60 tablet 1     losartan (COZAAR) 25 MG tablet Take 1 tablet (25 mg) by mouth daily 90 tablet 3     LANsoprazole (PREVACID) 30 MG CR capsule Take 1 capsule (30 mg) by mouth daily Take 30-60 minutes before a meal. 90 capsule 1     atorvastatin (LIPITOR) 40 MG tablet Take 1 tablet (40 mg) by mouth daily 90 tablet 3     Caffeine (NO DOZ OR) Take by mouth daily as needed       diclofenac (VOLTAREN) 1 % GEL topical gel Apply 2 grams to foot up to four times daily using enclosed dosing card. 100 g 1     diclofenac (VOLTAREN) 1 % GEL topical gel Apply  2 grams to foot up to four times daily using enclosed dosing card. 100 g 1     acetaminophen-codeine (TYLENOL #3) 300-30 MG per tablet Take 1-2 tablets by mouth every 4 hours as needed for other (mild or moderate pain) 10 tablet 0     acetaminophen-codeine (TYLENOL W/CODEINE NO. 3) 300-30 MG per tablet Take 1-2 tablets by mouth every 4 hours as needed for mild pain (mild or moderate pain) 30 tablet 0     clopidogrel (PLAVIX) 75 MG tablet Take 1 tablet (75 mg) by mouth daily 90 tablet 3     metoprolol (TOPROL-XL) 25 MG 24 hr tablet Take 0.5 tablets (12.5 mg) by mouth daily 90 tablet 3     meclizine (ANTIVERT) 25 MG tablet Take 1 tablet (25 mg) by mouth every 6 hours as needed for dizziness 30 tablet 3     ondansetron (ZOFRAN) 4 MG tablet Take 1 tablet (4 mg) by mouth every 6 hours as needed for nausea 18 tablet 3     nitroglycerin  "(NITROSTAT) 0.4 MG SL tablet Place 1 tablet (0.4 mg) under the tongue every 5 minutes as needed for chest pain (call your doctor if you take a third dose) 25 tablet 3     Multiple Vitamins-Minerals (OCUVITE PO) Take 1 tablet by mouth daily.       ASPIRIN 81 MG PO TABS Take 2 tablets by mouth every evening. *.        CALCIUM 600 + D OR 1 tablet 2 times per day       FISH OIL OR 1 daily       OTC products: None, except as noted above    Allergies   Allergen Reactions     Demerol      Nausea     Lisinopril Cough     Sulfa Drugs      Questionable itching reported by patient      Latex Allergy: NO    Social History   Substance Use Topics     Smoking status: Never Smoker     Smokeless tobacco: Never Used      Comment: Never smoker; no secondhand smoke exposure     Alcohol use Yes      Comment: social     History   Drug Use No       REVIEW OF SYSTEMS:                                                    C: NEGATIVE for fever, chills, change in weight  I: NEGATIVE for worrisome rashes, moles or lesions  E: NEGATIVE for vision changes or irritation  E/M: NEGATIVE for ear, mouth and throat problems  R: POSITIVE for cough, improving since it started 3 days ago. No worsening SOB.   B: NEGATIVE for masses, tenderness or discharge  CV: NEGATIVE for chest pain, palpitations or peripheral edema  GI: NEGATIVE for nausea, abdominal pain, heartburn, or change in bowel habits  : NEGATIVE for frequency, dysuria, or hematuria  M: NEGATIVE for significant arthralgias or myalgia  N: NEGATIVE for weakness, dizziness or paresthesias  E: NEGATIVE for temperature intolerance, skin/hair changes  H: NEGATIVE for bleeding problems  P: NEGATIVE for changes in mood or affect    EXAM:                                                    /73 (BP Location: Right arm, Patient Position: Chair, Cuff Size: Adult Regular)  Pulse 67  Temp 98.2  F (36.8  C) (Tympanic)  Ht 5' 6\" (1.676 m)  Wt 164 lb (74.4 kg)  SpO2 94%  BMI 26.47 kg/m2    GENERAL " APPEARANCE: healthy, alert and no distress     EYES: EOMI, PERRL     HENT: ear canals and TM's normal and nose and mouth without ulcers or lesions     NECK: no adenopathy, no asymmetry, masses, or scars and thyroid normal to palpation     RESP: lungs clear to auscultation - no rales, rhonchi or wheezes     CV: regular rates and rhythm, normal S1 S2, no S3 or S4 and no murmur, click or rub     ABDOMEN:  soft, nontender, no HSM or masses and bowel sounds normal     MS: extremities normal- no gross deformities noted, no evidence of inflammation in joints, FROM in all extremities.     SKIN: no suspicious lesions or rashes     NEURO: Normal strength and tone, sensory exam grossly normal, mentation intact and speech normal     PSYCH: mentation appears normal. and affect normal/bright     LYMPHATICS: No axillary, cervical, or supraclavicular nodes    DIAGNOSTICS:                                                    No labs or EKG required for low risk surgery (cataract, skin procedure, breast biopsy, etc)    Recent Labs   Lab Test  03/08/17   0955  11/09/16   0810   05/10/16   1109   02/16/12   1010   HGB  12.3  12.2   < >  12.5   < >   --    PLT  265  249   < >  284   < >   --    INR   --    --    --   0.90   --   0.98   NA  140  140   < >  135   < >   --    POTASSIUM  3.9  3.8   < >  4.0   < >   --    CR  0.80  0.77   < >  0.88   < >   --     < > = values in this interval not displayed.      CHEST TWO VIEWS 5/1/2017 12:10 PM      HISTORY: Cough.     COMPARISON: Chest x-ray 3/8/2017.         IMPRESSION: PA and lateral views of the chest. Lungs are clear. Heart  is normal in size. No effusions are evident. No pneumothorax.  Implantable cardiac device and both leads appear unchanged in  position. Aorta is calcified but otherwise unremarkable.     SHRUTHI ALFARO MD  IMPRESSION:                                                    Reason for surgery/procedure: eyelid ptosis  Diagnosis/reason for consult: pre op    The proposed  surgical procedure is considered LOW risk.    REVISED CARDIAC RISK INDEX  The patient has the following serious cardiovascular risks for perioperative complications such as (MI, PE, VFib and 3  AV Block):  Coronary Artery Disease (MI, positive stress test, angina, Qs on EKG)  INTERPRETATION: 1 risks: Class II (low risk - 0.9% complication rate)    The patient has the following additional risks for perioperative complications:  No identified additional risks      ICD-10-CM    1. Preop general physical exam Z01.818    2. Ptosis of eyelid, unspecified laterality H02.409    3. Cough R05 XR Chest 2 Views       RECOMMENDATIONS:                                                        Cardiovascular Risk  Patient is already on a Beta Blocker. Continue  Scheduled to see cardiology on 5/16 - will be one year after stent so plavix will be discontinued    Cough  CXR negative  URI symptoms improving slowly. Patient understands to follow up between now and surgery if cough/cold symptoms persist or do not improve    APPROVAL GIVEN to proceed with proposed procedure, without further diagnostic evaluation       Signed Electronically by: Kelly Matthew PA-C    Copy of this evaluation report is provided to requesting physician.    [place] Preop Guidelines

## 2017-05-01 NOTE — NURSING NOTE
"Chief Complaint   Patient presents with     Pre-Op Exam       Initial /73 (BP Location: Right arm, Patient Position: Chair, Cuff Size: Adult Regular)  Pulse 67  Temp 98.2  F (36.8  C) (Tympanic)  Ht 5' 6\" (1.676 m)  Wt 164 lb (74.4 kg)  SpO2 94%  BMI 26.47 kg/m2 Estimated body mass index is 26.47 kg/(m^2) as calculated from the following:    Height as of this encounter: 5' 6\" (1.676 m).    Weight as of this encounter: 164 lb (74.4 kg).  Medication Reconciliation: complete     Viraj Lofton CMA    "

## 2017-05-05 ENCOUNTER — TELEPHONE (OUTPATIENT)
Dept: FAMILY MEDICINE | Facility: CLINIC | Age: 82
End: 2017-05-05

## 2017-05-05 NOTE — TELEPHONE ENCOUNTER
Patient reports that she still has a loose cough. Every now and then she has a coughing spell and cannot quit. Advised that she needs to increase fluids, tea with honey and lemon. If cough worsens or she develops other symptoms, she will need to call back and will reassess.  Charmaine Kerr RN

## 2017-05-05 NOTE — TELEPHONE ENCOUNTER
Reason for call:  Patient reporting a symptom    Symptom or request: cough    Duration (how long have symptoms been present): 5/1/17    Have you been treated for this before? No    Additional comments: Christiana was seen on 5/1/17 for pre-op and cough.  She was asked to call at end of week to report how her cough is doing.  She states that her cough is loosing up, but the cough is still there.  She states it's embarrassing for her to go out anywhere because all she does is cough.  Please call and assess. Thank you..Consuelo Hawthorne      Phone Number patient can be reached at:  Home number on file [phone] (home)    Best Time:  anytime    Can we leave a detailed message on this number:  YES    Call taken on 5/5/2017 at 7:46 AM by Consuelo Hawthorne

## 2017-05-09 ENCOUNTER — PRE VISIT (OUTPATIENT)
Dept: CARDIOLOGY | Facility: CLINIC | Age: 82
End: 2017-05-09

## 2017-05-09 NOTE — TELEPHONE ENCOUNTER
5 month cardiac follow up for Hyperlipidemia LDL goal <70, CAD S/P percutaneous coronary angioplasty, Essential hypertension with goal blood pressure less than 140/90 and Coronary artery disease involving native coronary artery of native heart without angina pectoris;     Last Assessment & Plan:     1. CAD s/p PCI to the LAD and D1. No angina; normal LV systolic function              -Lexiscan shows normal LV function with no ischemia  -metoprolol XL 25 mg/day              -NTG 0.4 mg prn for chest pain; may repeat up to 3 times  -ASA 81 mg/day  -may discontinue Plavix after a total of 1 year (on 5/11/17)     2. Syncope; s/p pacemaker with no further symptoms; normal pacer function  -continue regular pacer checks     3. Hypertension - blood pressure controlled                -losartan 25 mg/day     4. Dyslipidemia                -atorvastatin 40 mg/day  -lipids with ALT; she prefers to get this done at her primary clinic ([facility])        Recheck: 1 year for CAD and pacemaker    Chart prep completed; patient is up to date on requested cardiac/lab testing. No further information or testing needed at this time.   Patricia Florez, R.M.A.

## 2017-05-12 ENCOUNTER — OFFICE VISIT (OUTPATIENT)
Dept: FAMILY MEDICINE | Facility: CLINIC | Age: 82
End: 2017-05-12
Payer: COMMERCIAL

## 2017-05-12 ENCOUNTER — RADIANT APPOINTMENT (OUTPATIENT)
Dept: GENERAL RADIOLOGY | Facility: CLINIC | Age: 82
End: 2017-05-12
Attending: FAMILY MEDICINE
Payer: COMMERCIAL

## 2017-05-12 VITALS
WEIGHT: 167.1 LBS | SYSTOLIC BLOOD PRESSURE: 145 MMHG | DIASTOLIC BLOOD PRESSURE: 71 MMHG | HEART RATE: 63 BPM | HEIGHT: 66 IN | OXYGEN SATURATION: 94 % | BODY MASS INDEX: 26.86 KG/M2 | TEMPERATURE: 98.1 F

## 2017-05-12 DIAGNOSIS — M79.602 PAIN OF LEFT UPPER EXTREMITY: ICD-10-CM

## 2017-05-12 DIAGNOSIS — S52.121A CLOSED FRACTURE OF HEAD OF RIGHT RADIUS, INITIAL ENCOUNTER: Primary | ICD-10-CM

## 2017-05-12 PROCEDURE — 99213 OFFICE O/P EST LOW 20 MIN: CPT | Performed by: FAMILY MEDICINE

## 2017-05-12 PROCEDURE — 73060 X-RAY EXAM OF HUMERUS: CPT | Mod: LT

## 2017-05-12 PROCEDURE — 73070 X-RAY EXAM OF ELBOW: CPT | Mod: LT

## 2017-05-12 NOTE — PROGRESS NOTES
"  SUBJECTIVE:                                                    Christiana Sal is a 85 year old female who presents to clinic today for the following health issues:      Chief Complaint   Patient presents with     Fall     Fell last night going into a restaurant last night on the side walk. Left elbow, swelling and limited movement. Bruising on right side of face.    Today she noted left elbow area is sore.  She cannot fully extend or flex at the elbow.  Feels the arm is weak.  No pain at the shoulder or upper arm.  No wrist pain.   No loss of consciousness.  She tripped on the ramp edge and fell backward.  She hit above her eyebrow and had a small gash there which bled.  Today she has bruising around the orbit area.  Eye is not swollen or bothersome.  Vision is normal.  She denies headache.      Problem list and histories reviewed & adjusted, as indicated.  Additional history: as documented      Reviewed and updated as needed this visit by clinical staff  Allergies  Meds       Reviewed and updated as needed this visit by Provider         ROS:  Constitutional, HEENT, cardiovascular, pulmonary, gi and gu systems are negative, except as otherwise noted.    OBJECTIVE:                                                    /71 (BP Location: Right arm, Patient Position: Chair, Cuff Size: Adult Regular)  Pulse 63  Temp 98.1  F (36.7  C) (Tympanic)  Ht 5' 6\" (1.676 m)  Wt 167 lb 1.6 oz (75.8 kg)  SpO2 94%  BMI 26.97 kg/m2  Body mass index is 26.97 kg/(m^2).  GENERAL: healthy, alert and no distress  EYES: PERRL, EOMI, visual fields normal, conjunctivae and sclerae normal and bruising noted around the orbit with small laceration (2cm) with eschar above right eyebrow.  Edema along zygomatic arch on the right.  No step off or crepitice.  Nontender. Eye/eyelids not edematous or bruised  HENT: ear canals and TM's normal, nose and mouth without ulcers or lesions. No hemotympanum.  NECK: no adenopathy, no " asymmetry, masses, or scars and thyroid normal to palpation  RESP: lungs clear to auscultation - no rales, rhonchi or wheezes  CV: regular rate and rhythm, normal S1 S2, no S3 or S4, no murmur, click or rub, no peripheral edema and peripheral pulses strong  ABDOMEN: soft, nontender, no hepatosplenomegaly, no masses and bowel sounds normal  MS: normal muscle tone, peripheral pulses normal and LUE exam shows no deformities, no erythema, induration, or nodules and not able to full extend or flex at the elbow on left.  Tender with palpation at the radial head.  nontender olecranon.  Shoulder with FROM.   NEURO: Normal strength and tone, mentation intact and speech normal    Diagnostic Test Results:  Xray - nondisplaced radial head fracture which is intra-articular     ASSESSMENT/PLAN:                                                        1. Closed fracture of head of right radius, initial encounter  Reviewed with ortho.  Recommend sling x 1 week then PT.  Discussed with patient via phone.  She has a sling.  - XR Elbow Left 2 Views; Future  - XR Humerus Left G/E 2 Views; Future    F/u with Kelly Matthew on 5/16 at 8:20am.    Jade Nguyen MD  [facility]

## 2017-05-12 NOTE — NURSING NOTE
"Chief Complaint   Patient presents with     Fall     Fell last night going into a restaurant last night on the side walk. Left elbow, swelling and limited movement. Brusing on right side of face.        Initial /71 (BP Location: Right arm, Patient Position: Chair, Cuff Size: Adult Regular)  Pulse 63  Temp 98.1  F (36.7  C) (Tympanic)  Ht 5' 6\" (1.676 m)  Wt 167 lb 1.6 oz (75.8 kg)  SpO2 94%  BMI 26.97 kg/m2 Estimated body mass index is 26.97 kg/(m^2) as calculated from the following:    Height as of this encounter: 5' 6\" (1.676 m).    Weight as of this encounter: 167 lb 1.6 oz (75.8 kg).  Medication Reconciliation: complete   Sofi Newell CMA    "

## 2017-05-12 NOTE — PATIENT INSTRUCTIONS
Radial head fracture    Wear sling for 1 week then plan to start physical therapy.  Tylenol for discomfort.     Appt with Kelly Matthew 5/16/17    Return sooner if numbness, weakness, new issues arise

## 2017-05-12 NOTE — MR AVS SNAPSHOT
After Visit Summary   5/12/2017    Christiana Sal    MRN: [id number]           Patient Information     Date Of Birth          10/25/1931        Visit Information        Provider Department      5/12/2017 10:40 AM Jade Nguyen MD [facility]        Today's Diagnoses     Pain of left upper extremity    -  1      Care Instructions    This looks like a muscle strain or possible tear.  Treatment for this is supportive.  Ice packs can be applied 3 times daily for the first few days.  You may take tylenol for discomfort.  Wear a sling for comfort.  Ok to take arm out of sling for gentle range of motion.    If this does not improve over the next week please return for a recheck and further assessment.        Follow-ups after your visit        Who to contact     Normal or non-critical lab and imaging results will be communicated to you by AVdirecthart, letter or phone within 4 business days after the clinic has received the results. If you do not hear from us within 7 days, please contact the clinic through AVdirecthart or phone. If you have a critical or abnormal lab result, we will notify you by phone as soon as possible.  Submit refill requests through [employer] or call your pharmacy and they will forward the refill request to us. Please allow 3 business days for your refill to be completed.          If you need to speak with a  for additional information , please call: [phone]             Additional Information About Your Visit        AVdirectharTarget Data Information     [employer] gives you secure access to your electronic health record. If you see a primary care provider, you can also send messages to your care team and make appointments. If you have questions, please call your primary care clinic.  If you do not have a primary care provider, please call [phone] and they will assist you.        Care EveryWhere ID     This is your Care EveryWhere ID. This could be used by other  "organizations to access your [place] medical records  [id number]        Your Vitals Were     Pulse Temperature Height Pulse Oximetry BMI (Body Mass Index)       63 98.1  F (36.7  C) (Tympanic) 5' 6\" (1.676 m) 94% 26.97 kg/m2        Blood Pressure from Last 3 Encounters:   05/12/17 145/71   05/01/17 122/73   04/17/17 130/74    Weight from Last 3 Encounters:   05/12/17 167 lb 1.6 oz (75.8 kg)   05/01/17 164 lb (74.4 kg)   04/17/17 164 lb (74.4 kg)               Primary Care Provider Office Phone # Fax #    Kelly Matthew PA-C [phone]       [facility] [address] [facility] [address]        Thank you!     Thank you for choosing [facility]  for your care. Our goal is always to provide you with excellent care. Hearing back from our patients is one way we can continue to improve our services. Please take a few minutes to complete the written survey that you may receive in the mail after your visit with us. Thank you!             Your Updated Medication List - Protect others around you: Learn how to safely use, store and throw away your medicines at www.disposemymeds.org.          This list is accurate as of: 5/12/17 11:43 AM.  Always use your most recent med list.                   Brand Name Dispense Instructions for use    * acetaminophen-codeine 300-30 MG per tablet    TYLENOL w/CODEINE No. 3    30 tablet    Take 1-2 tablets by mouth every 4 hours as needed for mild pain (mild or moderate pain)       * acetaminophen-codeine 300-30 MG per tablet    TYLENOL #3    10 tablet    Take 1-2 tablets by mouth every 4 hours as needed for other (mild or moderate pain)       * albuterol 108 (90 BASE) MCG/ACT Inhaler    PROAIR HFA/PROVENTIL HFA/VENTOLIN HFA    1 Inhaler    Inhale 2 puffs into the lungs every 6 hours as needed for shortness of breath / dyspnea or wheezing       * albuterol 108 (90 BASE) MCG/ACT Inhaler    PROAIR HFA/PROVENTIL HFA/VENTOLIN HFA    1 Inhaler "    Inhale 2 puffs into the lungs every 6 hours as needed for shortness of breath / dyspnea or wheezing       aspirin 81 MG tablet      Take 2 tablets by mouth every evening. *.       atorvastatin 40 MG tablet    LIPITOR    90 tablet    Take 1 tablet (40 mg) by mouth daily       CALCIUM 600 + D PO      1 tablet 2 times per day       citalopram 10 MG tablet    celeXA    90 tablet    Take 1 tablet (10 mg) by mouth daily for depression. This medication should be taken every day to be effective.       clopidogrel 75 MG tablet    PLAVIX    90 tablet    Take 1 tablet (75 mg) by mouth daily       * diclofenac 1 % Gel topical gel    VOLTAREN    100 g    Apply 2 grams to foot up to four times daily using enclosed dosing card.       * diclofenac 1 % Gel topical gel    VOLTAREN    100 g    Apply  2 grams to foot up to four times daily using enclosed dosing card.       FISH OIL PO      1 daily       LANsoprazole 30 MG CR capsule    PREVACID    90 capsule    Take 1 capsule (30 mg) by mouth daily Take 30-60 minutes before a meal.       levothyroxine 75 MCG tablet    SYNTHROID/LEVOTHROID    90 tablet    Take 1 tablet (75 mcg) by mouth daily       losartan 25 MG tablet    COZAAR    90 tablet    Take 1 tablet (25 mg) by mouth daily       meclizine 25 MG tablet    ANTIVERT    30 tablet    Take 1 tablet (25 mg) by mouth every 6 hours as needed for dizziness       metoprolol 25 MG 24 hr tablet    TOPROL-XL    90 tablet    Take 0.5 tablets (12.5 mg) by mouth daily       nitroglycerin 0.4 MG sublingual tablet    NITROSTAT    25 tablet    Place 1 tablet (0.4 mg) under the tongue every 5 minutes as needed for chest pain (call your doctor if you take a third dose)       NO DOZ OR      Take by mouth daily as needed       OCUVITE PO      Take 1 tablet by mouth daily.       ondansetron 4 MG tablet    ZOFRAN    18 tablet    Take 1 tablet (4 mg) by mouth every 6 hours as needed for nausea       pantoprazole 20 MG EC tablet    PROTONIX    90  tablet    Take by mouth 30-60 minutes before a meal.       pramipexole 0.5 MG tablet    MIRAPEX    90 tablet    Take 1 tablet (0.5 mg) by mouth At Bedtime       ranitidine 150 MG tablet    ZANTAC    60 tablet    Take 1 tablet (150 mg) by mouth 2 times daily       * Notice:  This list has 6 medication(s) that are the same as other medications prescribed for you. Read the directions carefully, and ask your doctor or other care provider to review them with you.

## 2017-05-16 ENCOUNTER — OFFICE VISIT (OUTPATIENT)
Dept: FAMILY MEDICINE | Facility: CLINIC | Age: 82
End: 2017-05-16
Payer: COMMERCIAL

## 2017-05-16 VITALS
HEART RATE: 70 BPM | DIASTOLIC BLOOD PRESSURE: 76 MMHG | HEIGHT: 66 IN | WEIGHT: 169 LBS | BODY MASS INDEX: 27.16 KG/M2 | SYSTOLIC BLOOD PRESSURE: 134 MMHG

## 2017-05-16 DIAGNOSIS — S52.125D CLOSED NONDISPLACED FRACTURE OF HEAD OF LEFT RADIUS WITH ROUTINE HEALING, SUBSEQUENT ENCOUNTER: Primary | ICD-10-CM

## 2017-05-16 PROCEDURE — 99213 OFFICE O/P EST LOW 20 MIN: CPT | Performed by: PHYSICIAN ASSISTANT

## 2017-05-16 NOTE — PROGRESS NOTES
"  SUBJECTIVE:                                                    Christiana Sal is a 85 year old female who presents to clinic today for the following health issues:      Joint Pain     Onset: 5/11/17     Description:   Location: left elbow  Character: Dull ache, more pain with movement     Intensity: moderate    Progression of Symptoms: better, the pain has gotten better and her range of motion has come back a little bit     Accompanying Signs & Symptoms:  Other symptoms: none, swelling has subsided    History:   Previous similar pain: no       Precipitating factors:   Trauma or overuse: YES- fell on a sidewalk     Alleviating factors:  Improved by: rest/inactivity and immobilization       Therapies Tried and outcome: Wearing a sling helps a lot, tylenol       Fell on 5/11 - denies feeling dizzy or lightheaded prior to falling. No LOC  Says she had actually talked to her daughter that morning and was telling her how well she was feeling and was finally feeling like she was regaining her strength    Was seen in clinic on 5/12 - xray or left arm showed radial head fracture  Ortho phone consult advised to wear sling for a week and then start PT  She has been keeping the sling on, at night as well, and has noticed an improvement  Swelling down and movement is improving   in certain spots but overall better    Also has some bruising on the top of her right shoulder and over her right knee  Denies pain and decreased ROM in those areas    Significant bruising over her right eye - swelling has decreased substantially per patient  Broke the glasses she was wearing at the time so has an old pair on  No pain with eye movements  Vision okay  Can smile, talk without pain although  to touch above right eyebrow and over right cheekbone  She says it has improved \"tremendously\"    Over the weekend on Saturday and Sunday she reported she was very fatigued and by afternoon was feeling nauseated. Also " had a dull headache  She laid down to rest/nap both days and symptoms improved  She is feeling okay this morning    She did have a dentist appt yesterday and they did xrays of her teeth/jaw that were normal per patient    Was scheduled for an eye procedure at the end of the month but plans to postpone this so eye can heal fully    Off plavix per cardiology - was scheduled for appt today but office called and said she didn't need to come in until December.     In terms of follow up from our last visit - cough has improved  As noted above she was feeling much better until this fall    Problem list and histories reviewed & adjusted, as indicated.  Additional history: as documented    Current Outpatient Prescriptions   Medication Sig Dispense Refill     citalopram (CELEXA) 10 MG tablet Take 1 tablet (10 mg) by mouth daily for depression. This medication should be taken every day to be effective. 90 tablet 0     pantoprazole (PROTONIX) 20 MG EC tablet Take by mouth 30-60 minutes before a meal. 90 tablet 1     levothyroxine (SYNTHROID/LEVOTHROID) 75 MCG tablet Take 1 tablet (75 mcg) by mouth daily 90 tablet 1     pramipexole (MIRAPEX) 0.5 MG tablet Take 1 tablet (0.5 mg) by mouth At Bedtime 90 tablet 3     albuterol (PROAIR HFA/PROVENTIL HFA/VENTOLIN HFA) 108 (90 BASE) MCG/ACT Inhaler Inhale 2 puffs into the lungs every 6 hours as needed for shortness of breath / dyspnea or wheezing 1 Inhaler 0     albuterol (PROAIR HFA/PROVENTIL HFA/VENTOLIN HFA) 108 (90 BASE) MCG/ACT Inhaler Inhale 2 puffs into the lungs every 6 hours as needed for shortness of breath / dyspnea or wheezing 1 Inhaler 0     ranitidine (ZANTAC) 150 MG tablet Take 1 tablet (150 mg) by mouth 2 times daily 60 tablet 1     losartan (COZAAR) 25 MG tablet Take 1 tablet (25 mg) by mouth daily 90 tablet 3     LANsoprazole (PREVACID) 30 MG CR capsule Take 1 capsule (30 mg) by mouth daily Take 30-60 minutes before a meal. 90 capsule 1     atorvastatin (LIPITOR) 40  MG tablet Take 1 tablet (40 mg) by mouth daily 90 tablet 3     Caffeine (NO DOZ OR) Take by mouth daily as needed       diclofenac (VOLTAREN) 1 % GEL topical gel Apply 2 grams to foot up to four times daily using enclosed dosing card. 100 g 1     diclofenac (VOLTAREN) 1 % GEL topical gel Apply  2 grams to foot up to four times daily using enclosed dosing card. 100 g 1     acetaminophen-codeine (TYLENOL #3) 300-30 MG per tablet Take 1-2 tablets by mouth every 4 hours as needed for other (mild or moderate pain) 10 tablet 0     acetaminophen-codeine (TYLENOL W/CODEINE NO. 3) 300-30 MG per tablet Take 1-2 tablets by mouth every 4 hours as needed for mild pain (mild or moderate pain) 30 tablet 0     metoprolol (TOPROL-XL) 25 MG 24 hr tablet Take 0.5 tablets (12.5 mg) by mouth daily 90 tablet 3     meclizine (ANTIVERT) 25 MG tablet Take 1 tablet (25 mg) by mouth every 6 hours as needed for dizziness 30 tablet 3     ondansetron (ZOFRAN) 4 MG tablet Take 1 tablet (4 mg) by mouth every 6 hours as needed for nausea 18 tablet 3     nitroglycerin (NITROSTAT) 0.4 MG SL tablet Place 1 tablet (0.4 mg) under the tongue every 5 minutes as needed for chest pain (call your doctor if you take a third dose) 25 tablet 3     Multiple Vitamins-Minerals (OCUVITE PO) Take 1 tablet by mouth daily.       ASPIRIN 81 MG PO TABS Take 2 tablets by mouth every evening. *.        CALCIUM 600 + D OR 1 tablet 2 times per day       FISH OIL OR 1 daily       clopidogrel (PLAVIX) 75 MG tablet Take 1 tablet (75 mg) by mouth daily (Patient not taking: Reported on 5/16/2017) 90 tablet 3     Allergies   Allergen Reactions     Demerol      Nausea     Lisinopril Cough     Sulfa Drugs      Questionable itching reported by patient       Reviewed and updated as needed this visit by clinical staff       Reviewed and updated as needed this visit by Provider         ROS:  Remainder of ROS obtained and found to be negative other than that which was documented  "above      OBJECTIVE:                                                    /76 (BP Location: Right arm, Patient Position: Chair, Cuff Size: Adult Regular)  Pulse 70  Ht 5' 6\" (1.676 m)  Wt 169 lb (76.7 kg)  BMI 27.28 kg/m2  Body mass index is 27.28 kg/(m^2).  GENERAL: pleasant, alert and no distress  EYES: PERRL. EOMI without pain Yellow/purple bruising noted over the right eyelid and underneath the right eye. No step off over cheekbone but tender to touch. nontender over nose  SHOULDER, right: bruising over top of shoulder. Normal ROM without pain  KNEE, right: bruising over top of right knee but full ROM and normal gait  ELBOW, left: trace residual swelling. No bruising  Tender at radial head.   CV: RRR  RESP: CTA b/l      Diagnostic Test Results:  none      ASSESSMENT/PLAN:                                                    (S52.125D) Closed nondisplaced fracture of head of left radius with routine healing, subsequent encounter  (primary encounter diagnosis)  Comment: Overall improving. Continue sling for a full week. Referral to start PT early next week.   Plan: [facility] REFERRAL        Sling for full week (through Thursday of this week)  Start physical therapy Friday or early next week  Follow up prn        Kelly Matthew PA-C  [facility]    "

## 2017-05-16 NOTE — MR AVS SNAPSHOT
After Visit Summary   5/16/2017    Christiana Sal    MRN: [id number]           Patient Information     Date Of Birth          10/25/1931        Visit Information        Provider Department      5/16/2017 8:20 AM Kelly Matthew PA-C [facility]        Today's Diagnoses     Closed nondisplaced fracture of head of left radius with routine healing, subsequent encounter    -  1       Follow-ups after your visit        Additional Services     [facility] HAND, AND CHIR[facility]RACTIC REFERRAL       **This order will print in the [facility] Scheduling Office**    Physical Therapy, Hand Therapy and Chiropractic Care are available through:    *[facility]  *[facility]  *[facility]    Call one number to schedule at any of the above locations: [phone].    Your provider has referred you to: Physical Therapy at [facility] or [facility]    Indication/Reason for Referral: radial head fracture - per ortho, to wear sling for 1 week and then start PT. Earliest PT date would be 5/19, no sooner so time to heal  Onset of Illness: 5/11/17  Therapy Orders: Evaluate and Treat  Special Programs: None  Special Request: None    Ember Pa      Additional Comments for the Therapist or Chiropractor:   Fall on 5/11 - bruise to face and right arm, right knee; fracture of radial head of left arm. Physician that saw her 5/12 spoke with ortho who advised wearing sling for 1 week then starting physical therapy. Patient hoping to meet with PT for 1 or 2 visits and then continue therapy on her own given it is a lot for her to come frequently for visits. She had done therapy on her own in the past and done well    Please be aware that coverage of these services is subject to the terms and limitations of your health insurance plan.  Call member services at your health plan with any benefit or coverage questions.      Please bring the following to your appointment:    *Your  "personal calendar for scheduling future appointments  *Comfortable clothing                  Who to contact     Normal or non-critical lab and imaging results will be communicated to you by MyChart, letter or phone within 4 business days after the clinic has received the results. If you do not hear from us within 7 days, please contact the clinic through MyChart or phone. If you have a critical or abnormal lab result, we will notify you by phone as soon as possible.  Submit refill requests through [employer] or call your pharmacy and they will forward the refill request to us. Please allow 3 business days for your refill to be completed.          If you need to speak with a  for additional information , please call: [phone]             Additional Information About Your Visit        [employer] Information     [employer] gives you secure access to your electronic health record. If you see a primary care provider, you can also send messages to your care team and make appointments. If you have questions, please call your primary care clinic.  If you do not have a primary care provider, please call [phone] and they will assist you.        Care EveryWhere ID     This is your Care EveryWhere ID. This could be used by other organizations to access your [place] medical records  [id number]        Your Vitals Were     Pulse Height BMI (Body Mass Index)             70 5' 6\" (1.676 m) 27.28 kg/m2          Blood Pressure from Last 3 Encounters:   05/16/17 134/76   05/12/17 145/71   05/01/17 122/73    Weight from Last 3 Encounters:   05/16/17 169 lb (76.7 kg)   05/12/17 167 lb 1.6 oz (75.8 kg)   05/01/17 164 lb (74.4 kg)              We Performed the Following     [facility] REFERRAL        Primary Care Provider Office Phone # Fax #    Kelly Matthew PA-C [phone]       [facility] [address] [facility] [address]        Thank you!     Thank you " for choosing [facility]  for your care. Our goal is always to provide you with excellent care. Hearing back from our patients is one way we can continue to improve our services. Please take a few minutes to complete the written survey that you may receive in the mail after your visit with us. Thank you!             Your Updated Medication List - Protect others around you: Learn how to safely use, store and throw away your medicines at www.disposemymeds.org.          This list is accurate as of: 5/16/17  8:51 AM.  Always use your most recent med list.                   Brand Name Dispense Instructions for use    * acetaminophen-codeine 300-30 MG per tablet    TYLENOL w/CODEINE No. 3    30 tablet    Take 1-2 tablets by mouth every 4 hours as needed for mild pain (mild or moderate pain)       * acetaminophen-codeine 300-30 MG per tablet    TYLENOL #3    10 tablet    Take 1-2 tablets by mouth every 4 hours as needed for other (mild or moderate pain)       * albuterol 108 (90 BASE) MCG/ACT Inhaler    PROAIR HFA/PROVENTIL HFA/VENTOLIN HFA    1 Inhaler    Inhale 2 puffs into the lungs every 6 hours as needed for shortness of breath / dyspnea or wheezing       * albuterol 108 (90 BASE) MCG/ACT Inhaler    PROAIR HFA/PROVENTIL HFA/VENTOLIN HFA    1 Inhaler    Inhale 2 puffs into the lungs every 6 hours as needed for shortness of breath / dyspnea or wheezing       aspirin 81 MG tablet      Take 2 tablets by mouth every evening. *.       atorvastatin 40 MG tablet    LIPITOR    90 tablet    Take 1 tablet (40 mg) by mouth daily       CALCIUM 600 + D PO      1 tablet 2 times per day       citalopram 10 MG tablet    celeXA    90 tablet    Take 1 tablet (10 mg) by mouth daily for depression. This medication should be taken every day to be effective.       clopidogrel 75 MG tablet    PLAVIX    90 tablet    Take 1 tablet (75 mg) by mouth daily       * diclofenac 1 % Gel topical gel    VOLTAREN    100 g    Apply 2 grams to  foot up to four times daily using enclosed dosing card.       * diclofenac 1 % Gel topical gel    VOLTAREN    100 g    Apply  2 grams to foot up to four times daily using enclosed dosing card.       FISH OIL PO      1 daily       LANsoprazole 30 MG CR capsule    PREVACID    90 capsule    Take 1 capsule (30 mg) by mouth daily Take 30-60 minutes before a meal.       levothyroxine 75 MCG tablet    SYNTHROID/LEVOTHROID    90 tablet    Take 1 tablet (75 mcg) by mouth daily       losartan 25 MG tablet    COZAAR    90 tablet    Take 1 tablet (25 mg) by mouth daily       meclizine 25 MG tablet    ANTIVERT    30 tablet    Take 1 tablet (25 mg) by mouth every 6 hours as needed for dizziness       metoprolol 25 MG 24 hr tablet    TOPROL-XL    90 tablet    Take 0.5 tablets (12.5 mg) by mouth daily       nitroglycerin 0.4 MG sublingual tablet    NITROSTAT    25 tablet    Place 1 tablet (0.4 mg) under the tongue every 5 minutes as needed for chest pain (call your doctor if you take a third dose)       NO DOZ OR      Take by mouth daily as needed       OCUVITE PO      Take 1 tablet by mouth daily.       ondansetron 4 MG tablet    ZOFRAN    18 tablet    Take 1 tablet (4 mg) by mouth every 6 hours as needed for nausea       pantoprazole 20 MG EC tablet    PROTONIX    90 tablet    Take by mouth 30-60 minutes before a meal.       pramipexole 0.5 MG tablet    MIRAPEX    90 tablet    Take 1 tablet (0.5 mg) by mouth At Bedtime       ranitidine 150 MG tablet    ZANTAC    60 tablet    Take 1 tablet (150 mg) by mouth 2 times daily       * Notice:  This list has 6 medication(s) that are the same as other medications prescribed for you. Read the directions carefully, and ask your doctor or other care provider to review them with you.

## 2017-05-16 NOTE — NURSING NOTE
"Chief Complaint   Patient presents with     Elbow Pain       Initial /76 (BP Location: Right arm, Patient Position: Chair, Cuff Size: Adult Regular)  Pulse 70  Ht 5' 6\" (1.676 m)  Wt 169 lb (76.7 kg)  BMI 27.28 kg/m2 Estimated body mass index is 27.28 kg/(m^2) as calculated from the following:    Height as of this encounter: 5' 6\" (1.676 m).    Weight as of this encounter: 169 lb (76.7 kg).  Medication Reconciliation: complete     Viraj Lofton CMA    "

## 2017-05-25 ENCOUNTER — OFFICE VISIT (OUTPATIENT)
Dept: FAMILY MEDICINE | Facility: CLINIC | Age: 82
End: 2017-05-25
Payer: COMMERCIAL

## 2017-05-25 ENCOUNTER — HOSPITAL ENCOUNTER (OUTPATIENT)
Dept: CT IMAGING | Facility: CLINIC | Age: 82
End: 2017-05-25
Attending: PHYSICIAN ASSISTANT
Payer: MEDICARE

## 2017-05-25 ENCOUNTER — HOSPITAL ENCOUNTER (OUTPATIENT)
Dept: CT IMAGING | Facility: CLINIC | Age: 82
Discharge: HOME | End: 2017-05-25
Attending: PHYSICIAN ASSISTANT | Admitting: PHYSICIAN ASSISTANT
Payer: MEDICARE

## 2017-05-25 VITALS
HEIGHT: 66 IN | WEIGHT: 166 LBS | DIASTOLIC BLOOD PRESSURE: 85 MMHG | TEMPERATURE: 98.1 F | BODY MASS INDEX: 26.68 KG/M2 | SYSTOLIC BLOOD PRESSURE: 125 MMHG | HEART RATE: 58 BPM

## 2017-05-25 DIAGNOSIS — S09.93XD FACIAL INJURY, SUBSEQUENT ENCOUNTER: ICD-10-CM

## 2017-05-25 DIAGNOSIS — R42 DIZZINESS: ICD-10-CM

## 2017-05-25 DIAGNOSIS — R51.9 PRESSURE IN HEAD: ICD-10-CM

## 2017-05-25 DIAGNOSIS — R42 DIZZINESS: Primary | ICD-10-CM

## 2017-05-25 LAB
ALBUMIN SERPL-MCNC: 3.5 G/DL (ref 3.4–5)
ALP SERPL-CCNC: 91 U/L (ref 40–150)
ALT SERPL W P-5'-P-CCNC: 24 U/L (ref 0–50)
ANION GAP SERPL CALCULATED.3IONS-SCNC: 8 MMOL/L (ref 3–14)
AST SERPL W P-5'-P-CCNC: 24 U/L (ref 0–45)
BASOPHILS # BLD AUTO: 0 10E9/L (ref 0–0.2)
BASOPHILS NFR BLD AUTO: 0.4 %
BILIRUB SERPL-MCNC: 0.4 MG/DL (ref 0.2–1.3)
BUN SERPL-MCNC: 20 MG/DL (ref 7–30)
CALCIUM SERPL-MCNC: 8.9 MG/DL (ref 8.5–10.1)
CHLORIDE SERPL-SCNC: 105 MMOL/L (ref 94–109)
CO2 SERPL-SCNC: 24 MMOL/L (ref 20–32)
CREAT SERPL-MCNC: 0.84 MG/DL (ref 0.52–1.04)
DIFFERENTIAL METHOD BLD: NORMAL
EOSINOPHIL # BLD AUTO: 0.6 10E9/L (ref 0–0.7)
EOSINOPHIL NFR BLD AUTO: 7 %
ERYTHROCYTE [DISTWIDTH] IN BLOOD BY AUTOMATED COUNT: 13.6 % (ref 10–15)
GFR SERPL CREATININE-BSD FRML MDRD: 64 ML/MIN/1.7M2
GLUCOSE SERPL-MCNC: 102 MG/DL (ref 70–99)
HCT VFR BLD AUTO: 38.3 % (ref 35–47)
HGB BLD-MCNC: 12.9 G/DL (ref 11.7–15.7)
LYMPHOCYTES # BLD AUTO: 2 10E9/L (ref 0.8–5.3)
LYMPHOCYTES NFR BLD AUTO: 25.1 %
MCH RBC QN AUTO: 30.7 PG (ref 26.5–33)
MCHC RBC AUTO-ENTMCNC: 33.7 G/DL (ref 31.5–36.5)
MCV RBC AUTO: 91 FL (ref 78–100)
MONOCYTES # BLD AUTO: 1 10E9/L (ref 0–1.3)
MONOCYTES NFR BLD AUTO: 12.9 %
NEUTROPHILS # BLD AUTO: 4.4 10E9/L (ref 1.6–8.3)
NEUTROPHILS NFR BLD AUTO: 54.6 %
PLATELET # BLD AUTO: 247 10E9/L (ref 150–450)
POTASSIUM SERPL-SCNC: 4 MMOL/L (ref 3.4–5.3)
PROT SERPL-MCNC: 6.6 G/DL (ref 6.8–8.8)
RBC # BLD AUTO: 4.2 10E12/L (ref 3.8–5.2)
SODIUM SERPL-SCNC: 137 MMOL/L (ref 133–144)
WBC # BLD AUTO: 8.1 10E9/L (ref 4–11)

## 2017-05-25 PROCEDURE — 36415 COLL VENOUS BLD VENIPUNCTURE: CPT | Performed by: PHYSICIAN ASSISTANT

## 2017-05-25 PROCEDURE — 80053 COMPREHEN METABOLIC PANEL: CPT | Performed by: PHYSICIAN ASSISTANT

## 2017-05-25 PROCEDURE — 70450 CT HEAD/BRAIN W/O DYE: CPT

## 2017-05-25 PROCEDURE — 70486 CT MAXILLOFACIAL W/O DYE: CPT

## 2017-05-25 PROCEDURE — 99214 OFFICE O/P EST MOD 30 MIN: CPT | Performed by: PHYSICIAN ASSISTANT

## 2017-05-25 PROCEDURE — 85025 COMPLETE CBC W/AUTO DIFF WBC: CPT | Performed by: PHYSICIAN ASSISTANT

## 2017-05-25 NOTE — NURSING NOTE
"Chief Complaint   Patient presents with     Dizziness       Initial /85 (BP Location: Right arm, Patient Position: Chair, Cuff Size: Adult Regular)  Pulse 58  Temp 98.1  F (36.7  C)  Ht 5' 6\" (1.676 m)  Wt 166 lb (75.3 kg)  BMI 26.79 kg/m2 Estimated body mass index is 26.79 kg/(m^2) as calculated from the following:    Height as of this encounter: 5' 6\" (1.676 m).    Weight as of this encounter: 166 lb (75.3 kg).  Medication Reconciliation: complete     Viraj Lofton, SISSY    "

## 2017-05-25 NOTE — PROGRESS NOTES
"  SUBJECTIVE:                                                    Christiana Sal is a 85 year old female who presents to clinic today for the following health issues:      Dizziness     Onset: 5/19/17    Description:   Do you feel faint:  no   Does it feel like the surroundings (bed, room) are moving: YES- when laying down and moving in bed   Unsteady/off balance: YES, unsteady   Have you passed out or fallen: no     Intensity: moderate    Progression of Symptoms:  intermittent    Accompanying Signs & Symptoms:  Heart palpitations: no   Nausea, vomiting: YES- nauseas   Weakness in arms or legs: YES- sometimes   Fatigue: YES- very tired   Vision or speech changes: no   Ringing in ears (Tinnitus): no   Hearing Loss: no    History:   Head trauma/concussion hx: no, she did fall the other weekend wondering if this could be causing the dizziness    Previous similar symptoms: YES  Recent bleeding history: no     Precipitating factors:   Worse with activity or head movement: YES- at times   Any new medications (BP?): no   Alcohol/drug abuse/withdrawal: no     Alleviating factors:   Does staying in a fixed position give relief:  YES- sometimes        Therapies Tried and outcome: Antivert, is not helping     Her head feels foggy and not right.    She has struggled with similar dizziness in the past  Has been seen by ENT who was told everything was \"okay\"   During one episode went to PT and was instructed on the epley maneuver but she states that is very hard for her to do at her age    Denies \"blacking out\" during the episodes  Seem to occur most often when she is changing positions or turning her head  Does get relief with keeping her head in a fixed position  Antivert doesn't seem to be helping    Is feeling a little better today but over the weekend it was really bad  Also felt nauseated and felt like her head was \"in a fog\"       Problem list and histories reviewed & adjusted, as indicated.  Additional history: as " documented    Current Outpatient Prescriptions   Medication Sig Dispense Refill     citalopram (CELEXA) 10 MG tablet Take 1 tablet (10 mg) by mouth daily for depression. This medication should be taken every day to be effective. 90 tablet 0     pantoprazole (PROTONIX) 20 MG EC tablet Take by mouth 30-60 minutes before a meal. 90 tablet 1     levothyroxine (SYNTHROID/LEVOTHROID) 75 MCG tablet Take 1 tablet (75 mcg) by mouth daily 90 tablet 1     pramipexole (MIRAPEX) 0.5 MG tablet Take 1 tablet (0.5 mg) by mouth At Bedtime 90 tablet 3     albuterol (PROAIR HFA/PROVENTIL HFA/VENTOLIN HFA) 108 (90 BASE) MCG/ACT Inhaler Inhale 2 puffs into the lungs every 6 hours as needed for shortness of breath / dyspnea or wheezing 1 Inhaler 0     albuterol (PROAIR HFA/PROVENTIL HFA/VENTOLIN HFA) 108 (90 BASE) MCG/ACT Inhaler Inhale 2 puffs into the lungs every 6 hours as needed for shortness of breath / dyspnea or wheezing 1 Inhaler 0     ranitidine (ZANTAC) 150 MG tablet Take 1 tablet (150 mg) by mouth 2 times daily 60 tablet 1     losartan (COZAAR) 25 MG tablet Take 1 tablet (25 mg) by mouth daily 90 tablet 3     LANsoprazole (PREVACID) 30 MG CR capsule Take 1 capsule (30 mg) by mouth daily Take 30-60 minutes before a meal. 90 capsule 1     atorvastatin (LIPITOR) 40 MG tablet Take 1 tablet (40 mg) by mouth daily 90 tablet 3     Caffeine (NO DOZ OR) Take by mouth daily as needed       diclofenac (VOLTAREN) 1 % GEL topical gel Apply 2 grams to foot up to four times daily using enclosed dosing card. 100 g 1     diclofenac (VOLTAREN) 1 % GEL topical gel Apply  2 grams to foot up to four times daily using enclosed dosing card. 100 g 1     acetaminophen-codeine (TYLENOL #3) 300-30 MG per tablet Take 1-2 tablets by mouth every 4 hours as needed for other (mild or moderate pain) 10 tablet 0     acetaminophen-codeine (TYLENOL W/CODEINE NO. 3) 300-30 MG per tablet Take 1-2 tablets by mouth every 4 hours as needed for mild pain (mild or  "moderate pain) 30 tablet 0     metoprolol (TOPROL-XL) 25 MG 24 hr tablet Take 0.5 tablets (12.5 mg) by mouth daily 90 tablet 3     meclizine (ANTIVERT) 25 MG tablet Take 1 tablet (25 mg) by mouth every 6 hours as needed for dizziness 30 tablet 3     ondansetron (ZOFRAN) 4 MG tablet Take 1 tablet (4 mg) by mouth every 6 hours as needed for nausea 18 tablet 3     nitroglycerin (NITROSTAT) 0.4 MG SL tablet Place 1 tablet (0.4 mg) under the tongue every 5 minutes as needed for chest pain (call your doctor if you take a third dose) 25 tablet 3     Multiple Vitamins-Minerals (OCUVITE PO) Take 1 tablet by mouth daily.       ASPIRIN 81 MG PO TABS Take 2 tablets by mouth every evening. *.        CALCIUM 600 + D OR 1 tablet 2 times per day       FISH OIL OR 1 daily       clopidogrel (PLAVIX) 75 MG tablet Take 1 tablet (75 mg) by mouth daily (Patient not taking: Reported on 5/16/2017) 90 tablet 3     Allergies   Allergen Reactions     Demerol      Nausea     Lisinopril Cough     Sulfa Drugs      Questionable itching reported by patient       Reviewed and updated as needed this visit by clinical staff       Reviewed and updated as needed this visit by Provider         ROS:  Remainder of ROS obtained and found to be negative other than that which was documented above      OBJECTIVE:                                                    /85 (BP Location: Right arm, Patient Position: Chair, Cuff Size: Adult Regular)  Pulse 58  Temp 98.1  F (36.7  C)  Ht 5' 6\" (1.676 m)  Wt 166 lb (75.3 kg)  BMI 26.79 kg/m2  Body mass index is 26.79 kg/(m^2).  GENERAL: healthy, alert and no distress  EYES: Eyes grossly normal to inspection, PERRL and conjunctivae and sclerae normal  HENT: ear canals and TM's normal, nose and mouth without ulcers or lesions  NECK: no adenopathy, no asymmetry, masses, or scars and thyroid normal to palpation  RESP: lungs clear to auscultation - no rales, rhonchi or wheezes  CV: regular rates and rhythm, " normal S1 S2, no S3 or S4, no murmur, click or rub, peripheral pulses strong and no peripheral edema  NEURO: Normal strength and tone, sensory exam grossly normal, mentation intact, speech normal, cranial nerves 2-12 intact, DTR's normal and symmetric and gait normal     Diagnostic Test Results:  Imaging ordered     ASSESSMENT/PLAN:                                                    (R42) Dizziness  (primary encounter diagnosis)  Comment: 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 Feel it is necessary to rule out any issue in the head before deciding this is BPPV. If imaging and labs all return normal we did discuss referral to the [facility] which she was interested in  Plan: CT Head w/o Contrast, CT Maxillofacial w/o         Contrast, CBC with platelets and differential,         Comprehensive metabolic panel (BMP + Alb, Alk         Phos, ALT, AST, Total. Bili, TP), NEUROLOGY         ADULT REFERRAL            (R51) Pressure in head  Comment: see above  Plan: CT Head w/o Contrast, CT Maxillofacial w/o         Contrast, CBC with platelets and differential,         Comprehensive metabolic panel (BMP + Alb, Alk         Phos, ALT, AST, Total. Bili, TP)            (S09.93XD) Facial injury, subsequent encounter  Comment: outwardly, improving well .Still very tender over the right maxillary bone and just above the right eyebrow  Plan: CT Head w/o Contrast, CT Maxillofacial w/o         Contrast, CBC with platelets and differential,         Comprehensive metabolic panel (BMP + Alb, Alk         Phos, ALT, AST, Total. Bili, TP)                Kelly Matthew PA-C  [facility]

## 2017-05-25 NOTE — MR AVS SNAPSHOT
After Visit Summary   5/25/2017    Christiana Sal    MRN: [id number]           Patient Information     Date Of Birth          10/25/1931        Visit Information        Provider Department      5/25/2017 3:00 PM Kelly Matthew PA-C [facility]        Today's Diagnoses     Dizziness    -  1    Pressure in head        Facial injury, subsequent encounter          Care Instructions    CT today at 4:30 in [place]          Follow-ups after your visit        Additional Services     NEUROLOGY ADULT REFERRAL       Your provider has referred you to: [facility] [phone]   [email or web address]    Reason for Referral: Consult    Please be aware that coverage of these services is subject to the terms and limitations of your health insurance plan.  Call member services at your health plan with any benefit or coverage questions.      Please bring the following with you to your appointment:    (1) Any X-Rays, CTs or MRIs which have been performed.  Contact the facility where they were done to arrange for  prior to your scheduled appointment.    (2) List of current medications  (3) This referral request   (4) Any documents/labs given to you for this referral                  Your next 10 appointments already scheduled     May 25, 2017  4:30 PM CDT   CT HEAD W/O CONTRAST with [place]CT[place]   [facility] CT ([facility])    [address] [address] [phone]           Please bring any scans or X-rays taken at other hospitals, if similar tests were done. Also bring a list of your medicines, including vitamins, minerals and over-the-counter drugs. It is safest to leave personal items at home.  Be sure to tell your doctor:   If you have any allergies.   If there s any chance you are pregnant.   If you are breastfeeding.   If you have any special needs.  You do not need to do anything special  to prepare.  Please wear loose clothing, such as a sweat suit or jogging clothes. Avoid snaps, zippers and other metal. We may ask you to undress and put on a hospital gown.            May 25, 2017  4:45 PM CDT   CT MAXILLOFACIAL W/O CONTRAST with [place]CT1   [facility] CT ([facility])    [address] [place] [address]   [phone]           Please bring any scans or X-rays taken at other hospitals, if similar tests were done. Also bring a list of your medicines, including vitamins, minerals and over-the-counter drugs. It is safest to leave personal items at home.  Be sure to tell your doctor:   If you have any allergies.   If there s any chance you are pregnant.   If you are breastfeeding.   If you have any special needs.  You do not need to do anything special to prepare.  Please wear loose clothing, such as a sweat suit or jogging clothes. Avoid snaps, zippers and other metal. We may ask you to undress and put on a hospital gown.            May 30, 2017 10:10 AM CDT   MARY KAY Extremity with Candice Perez, [facility] ([facility])    [address] [id number] [phone]              Future tests that were ordered for you today     Open Future Orders        Priority Expected Expires Ordered    CT Maxillofacial w/o Contrast Routine  5/25/2018 5/25/2017    CT Head w/o Contrast ASAP  5/25/2018 5/25/2017            Who to contact     Normal or non-critical lab and imaging results will be communicated to you by MyChart, letter or phone within 4 business days after the clinic has received the results. If you do not hear from us within 7 days, please contact the clinic through [employer] or phone. If you have a critical or abnormal lab result, we will notify you by phone as soon as possible.  Submit refill requests through [employer] or call your pharmacy and they will forward the refill request to us. Please allow 3 business days for your refill to be  "completed.          If you need to speak with a  for additional information , please call: [phone]             Additional Information About Your Visit        Glintsharappssavvy Information     [employer] gives you secure access to your electronic health record. If you see a primary care provider, you can also send messages to your care team and make appointments. If you have questions, please call your primary care clinic.  If you do not have a primary care provider, please call [phone] and they will assist you.        Care EveryWhere ID     This is your Care EveryWhere ID. This could be used by other organizations to access your [place] medical records  [id number]        Your Vitals Were     Pulse Temperature Height BMI (Body Mass Index)          58 98.1  F (36.7  C) 5' 6\" (1.676 m) 26.79 kg/m2         Blood Pressure from Last 3 Encounters:   05/25/17 125/85   05/16/17 134/76   05/12/17 145/71    Weight from Last 3 Encounters:   05/25/17 166 lb (75.3 kg)   05/16/17 169 lb (76.7 kg)   05/12/17 167 lb 1.6 oz (75.8 kg)              We Performed the Following     CBC with platelets and differential     Comprehensive metabolic panel (BMP + Alb, Alk Phos, ALT, AST, Total. Bili, TP)     NEUROLOGY ADULT REFERRAL        Primary Care Provider Office Phone # Fax #    Kelly Matthew PA-C [phone]       [facility] [address] [address] [address]        Thank you!     Thank you for choosing [facility]  for your care. Our goal is always to provide you with excellent care. Hearing back from our patients is one way we can continue to improve our services. Please take a few minutes to complete the written survey that you may receive in the mail after your visit with us. Thank you!             Your Updated Medication List - Protect others around you: Learn how to safely use, store and throw away your medicines at www.disposemymeds.org.          This list is " accurate as of: 5/25/17  3:39 PM.  Always use your most recent med list.                   Brand Name Dispense Instructions for use    * acetaminophen-codeine 300-30 MG per tablet    TYLENOL w/CODEINE No. 3    30 tablet    Take 1-2 tablets by mouth every 4 hours as needed for mild pain (mild or moderate pain)       * acetaminophen-codeine 300-30 MG per tablet    TYLENOL #3    10 tablet    Take 1-2 tablets by mouth every 4 hours as needed for other (mild or moderate pain)       * albuterol 108 (90 BASE) MCG/ACT Inhaler    PROAIR HFA/PROVENTIL HFA/VENTOLIN HFA    1 Inhaler    Inhale 2 puffs into the lungs every 6 hours as needed for shortness of breath / dyspnea or wheezing       * albuterol 108 (90 BASE) MCG/ACT Inhaler    PROAIR HFA/PROVENTIL HFA/VENTOLIN HFA    1 Inhaler    Inhale 2 puffs into the lungs every 6 hours as needed for shortness of breath / dyspnea or wheezing       aspirin 81 MG tablet      Take 2 tablets by mouth every evening. *.       atorvastatin 40 MG tablet    LIPITOR    90 tablet    Take 1 tablet (40 mg) by mouth daily       CALCIUM 600 + D PO      1 tablet 2 times per day       citalopram 10 MG tablet    celeXA    90 tablet    Take 1 tablet (10 mg) by mouth daily for depression. This medication should be taken every day to be effective.       clopidogrel 75 MG tablet    PLAVIX    90 tablet    Take 1 tablet (75 mg) by mouth daily       * diclofenac 1 % Gel topical gel    VOLTAREN    100 g    Apply 2 grams to foot up to four times daily using enclosed dosing card.       * diclofenac 1 % Gel topical gel    VOLTAREN    100 g    Apply  2 grams to foot up to four times daily using enclosed dosing card.       FISH OIL PO      1 daily       LANsoprazole 30 MG CR capsule    PREVACID    90 capsule    Take 1 capsule (30 mg) by mouth daily Take 30-60 minutes before a meal.       levothyroxine 75 MCG tablet    SYNTHROID/LEVOTHROID    90 tablet    Take 1 tablet (75 mcg) by mouth daily       losartan 25 MG  tablet    COZAAR    90 tablet    Take 1 tablet (25 mg) by mouth daily       meclizine 25 MG tablet    ANTIVERT    30 tablet    Take 1 tablet (25 mg) by mouth every 6 hours as needed for dizziness       metoprolol 25 MG 24 hr tablet    TOPROL-XL    90 tablet    Take 0.5 tablets (12.5 mg) by mouth daily       nitroglycerin 0.4 MG sublingual tablet    NITROSTAT    25 tablet    Place 1 tablet (0.4 mg) under the tongue every 5 minutes as needed for chest pain (call your doctor if you take a third dose)       NO DOZ OR      Take by mouth daily as needed       OCUVITE PO      Take 1 tablet by mouth daily.       ondansetron 4 MG tablet    ZOFRAN    18 tablet    Take 1 tablet (4 mg) by mouth every 6 hours as needed for nausea       pantoprazole 20 MG EC tablet    PROTONIX    90 tablet    Take by mouth 30-60 minutes before a meal.       pramipexole 0.5 MG tablet    MIRAPEX    90 tablet    Take 1 tablet (0.5 mg) by mouth At Bedtime       ranitidine 150 MG tablet    ZANTAC    60 tablet    Take 1 tablet (150 mg) by mouth 2 times daily       * Notice:  This list has 6 medication(s) that are the same as other medications prescribed for you. Read the directions carefully, and ask your doctor or other care provider to review them with you.

## 2017-05-30 ENCOUNTER — THERAPY VISIT (OUTPATIENT)
Dept: PHYSICAL THERAPY | Facility: CLINIC | Age: 82
End: 2017-05-30
Payer: COMMERCIAL

## 2017-05-30 DIAGNOSIS — S42.409A: Primary | ICD-10-CM

## 2017-05-30 PROCEDURE — 97161 PT EVAL LOW COMPLEX 20 MIN: CPT | Mod: GP | Performed by: PHYSICAL THERAPIST

## 2017-05-30 PROCEDURE — 97110 THERAPEUTIC EXERCISES: CPT | Mod: GP | Performed by: PHYSICAL THERAPIST

## 2017-05-30 NOTE — MR AVS SNAPSHOT
After Visit Summary   5/30/2017    Christiana Sal    MRN: [id number]           Patient Information     Date Of Birth          10/25/1931        Visit Information        Provider Department      5/30/2017 10:10 AM Candice Perez, PT [facility]        Today's Diagnoses     Fracture of elbow    -  1       Follow-ups after your visit        Who to contact     If you have questions or need follow up information about today's clinic visit or your schedule please contact [facility] directly at [phone].  Normal or non-critical lab and imaging results will be communicated to you by [employer], letter or phone within 4 business days after the clinic has received the results. If you do not hear from us within 7 days, please contact the clinic through [employer] or phone. If you have a critical or abnormal lab result, we will notify you by phone as soon as possible.  Submit refill requests through [employer] or call your pharmacy and they will forward the refill request to us. Please allow 3 business days for your refill to be completed.          Additional Information About Your Visit        MyChart Information     [employer] gives you secure access to your electronic health record. If you see a primary care provider, you can also send messages to your care team and make appointments. If you have questions, please call your primary care clinic.  If you do not have a primary care provider, please call [phone] and they will assist you.        Care EveryWhere ID     This is your Care EveryWhere ID. This could be used by other organizations to access your [place] medical records  [id number]         Blood Pressure from Last 3 Encounters:   05/25/17 125/85   05/16/17 134/76   05/12/17 145/71    Weight from Last 3 Encounters:   05/25/17 75.3 kg (166 lb)   05/16/17 76.7 kg (169 lb)   05/12/17 75.8 kg (167 lb 1.6 oz)              We Performed the Following     HC PT  EVAL, LOW COMPLEXITY     MARY KAY INITIAL EVAL REPORT     THERAPEUTIC EXERCISES        Primary Care Provider Office Phone # Fax #    Kelly Matthew PA-C [phone]       [place] [address] [facility] [address]        Thank you!     Thank you for choosing [facility]  for your care. Our goal is always to provide you with excellent care. Hearing back from our patients is one way we can continue to improve our services. Please take a few minutes to complete the written survey that you may receive in the mail after your visit with us. Thank you!             Your Updated Medication List - Protect others around you: Learn how to safely use, store and throw away your medicines at www.disposemymeds.org.          This list is accurate as of: 5/30/17 11:58 AM.  Always use your most recent med list.                   Brand Name Dispense Instructions for use    * acetaminophen-codeine 300-30 MG per tablet    TYLENOL w/CODEINE No. 3    30 tablet    Take 1-2 tablets by mouth every 4 hours as needed for mild pain (mild or moderate pain)       * acetaminophen-codeine 300-30 MG per tablet    TYLENOL #3    10 tablet    Take 1-2 tablets by mouth every 4 hours as needed for other (mild or moderate pain)       * albuterol 108 (90 BASE) MCG/ACT Inhaler    PROAIR HFA/PROVENTIL HFA/VENTOLIN HFA    1 Inhaler    Inhale 2 puffs into the lungs every 6 hours as needed for shortness of breath / dyspnea or wheezing       * albuterol 108 (90 BASE) MCG/ACT Inhaler    PROAIR HFA/PROVENTIL HFA/VENTOLIN HFA    1 Inhaler    Inhale 2 puffs into the lungs every 6 hours as needed for shortness of breath / dyspnea or wheezing       aspirin 81 MG tablet      Take 2 tablets by mouth every evening. *.       atorvastatin 40 MG tablet    LIPITOR    90 tablet    Take 1 tablet (40 mg) by mouth daily       CALCIUM 600 + D PO      1 tablet 2 times per day       citalopram 10 MG tablet    celeXA    90  tablet    Take 1 tablet (10 mg) by mouth daily for depression. This medication should be taken every day to be effective.       clopidogrel 75 MG tablet    PLAVIX    90 tablet    Take 1 tablet (75 mg) by mouth daily       * diclofenac 1 % Gel topical gel    VOLTAREN    100 g    Apply 2 grams to foot up to four times daily using enclosed dosing card.       * diclofenac 1 % Gel topical gel    VOLTAREN    100 g    Apply  2 grams to foot up to four times daily using enclosed dosing card.       FISH OIL PO      1 daily       LANsoprazole 30 MG CR capsule    PREVACID    90 capsule    Take 1 capsule (30 mg) by mouth daily Take 30-60 minutes before a meal.       levothyroxine 75 MCG tablet    SYNTHROID/LEVOTHROID    90 tablet    Take 1 tablet (75 mcg) by mouth daily       losartan 25 MG tablet    COZAAR    90 tablet    Take 1 tablet (25 mg) by mouth daily       meclizine 25 MG tablet    ANTIVERT    30 tablet    Take 1 tablet (25 mg) by mouth every 6 hours as needed for dizziness       metoprolol 25 MG 24 hr tablet    TOPROL-XL    90 tablet    Take 0.5 tablets (12.5 mg) by mouth daily       nitroglycerin 0.4 MG sublingual tablet    NITROSTAT    25 tablet    Place 1 tablet (0.4 mg) under the tongue every 5 minutes as needed for chest pain (call your doctor if you take a third dose)       NO DOZ OR      Take by mouth daily as needed       OCUVITE PO      Take 1 tablet by mouth daily.       ondansetron 4 MG tablet    ZOFRAN    18 tablet    Take 1 tablet (4 mg) by mouth every 6 hours as needed for nausea       pantoprazole 20 MG EC tablet    PROTONIX    90 tablet    Take by mouth 30-60 minutes before a meal.       pramipexole 0.5 MG tablet    MIRAPEX    90 tablet    Take 1 tablet (0.5 mg) by mouth At Bedtime       ranitidine 150 MG tablet    ZANTAC    60 tablet    Take 1 tablet (150 mg) by mouth 2 times daily       * Notice:  This list has 6 medication(s) that are the same as other medications prescribed for you. Read the  directions carefully, and ask your doctor or other care provider to review them with you.

## 2017-05-30 NOTE — PROGRESS NOTES
Subjective:    Patient is a 85 year old female presenting with rehab left elbow hpi.   Christiana Sal is a 85 year old female with a left elbow condition.  Condition occurred with:  A fall.  Condition occurred: in the community.  This is a new condition  May 10. 2017.    Patient reports pain:  Lateral and posterior.    Pain is described as sharp and aching and is intermittent and reported as 5/10 (with movement).  Associated symptoms:  Edema and loss of motion/stiffness. Worse during: activity dependent.  Symptoms are exacerbated by activity and lifting and relieved by rest.  Since onset symptoms are gradually improving.  Special tests:  X-ray (radial head simple fracture).      General health as reported by patient is fair.                                              Objective:    System                        ROM:  AROM:      Flexion Elbow:  Left:125     Extension Elbow:  Left: 15 from neutral      Flexion Wrist:  Left: wnl      Extension Wrist:  Left: wnl      Supination Elbow/Wrist:  Left: 75%  Pronation Elbow/Wrist:  Left: wnl          PROM:      Flexion Elbow:  Left: 135  Extension Elbow: Left: 0      Supination Elbow/Wrist: Left: 95%              Pain: pain at end range    Strength:  not assessed                    Ligament Testing:not assessed (recent fracture)        Special Testing:  not assessed      Palpation:  not assessed      Mobility:  not assessed                                          General     ROS    Assessment/Plan:      Patient is a 85 year old female with left side elbow complaints.    Patient has the following significant findings with corresponding treatment plan.                Diagnosis 1:  Fractured radial head 5/10/17  Pain -  self management, directional preference exercise and home program  Decreased ROM/flexibility - manual therapy and therapeutic exercise    Therapy Evaluation Codes:   1) History comprised of:  2)    Personal factors that impact the plan of care:       Time since onset of symptoms.    Comorbidity factors that impact the plan of care are:      Depression, Dizziness, Heart problems and Sleep disorder/apnea.     Medications impacting care: Anti-depressant, Cardiac and High blood pressure.  3) Examination of Body Systems comprised of:   Body structures and functions that impact the plan of care:      Elbow.   Activity limitations that impact the plan of care are:      Bathing, lifting, carrying,   4) Clinical presentation characteristics are:   Stable/Uncomplicated.  5) Decision-Making    Low complexity using standardized patient assessment instrument and/or measureable assessment of functional outcome.  Cumulative Therapy Evaluation is: Low complexity.    Previous and current functional limitations:  (See Goal Flow Sheet for this information)    Short term and Long term goals: (See Goal Flow Sheet for this information)     Communication ability:  Patient appears to be able to clearly communicate and understand verbal and written communication and follow directions correctly.  Treatment Explanation - The following has been discussed with the patient:   RX ordered/plan of care  Anticipated outcomes  Possible risks and side effects  This patient would benefit from PT intervention to resume normal activities.   Rehab potential is excellent.    Frequency:  1 X week, once daily  Duration:  for 3 weeks  Discharge Plan:  Achieve all LTG.  Independent in home treatment program.  Reach maximal therapeutic benefit.    Please refer to the daily flowsheet for treatment today, total treatment time and time spent performing 1:1 timed codes.

## 2017-06-08 ENCOUNTER — OFFICE VISIT (OUTPATIENT)
Dept: FAMILY MEDICINE | Facility: CLINIC | Age: 82
End: 2017-06-08
Payer: COMMERCIAL

## 2017-06-08 ENCOUNTER — THERAPY VISIT (OUTPATIENT)
Dept: PHYSICAL THERAPY | Facility: CLINIC | Age: 82
End: 2017-06-08
Payer: COMMERCIAL

## 2017-06-08 VITALS
SYSTOLIC BLOOD PRESSURE: 136 MMHG | TEMPERATURE: 97.9 F | HEIGHT: 66 IN | WEIGHT: 162 LBS | DIASTOLIC BLOOD PRESSURE: 68 MMHG | BODY MASS INDEX: 26.03 KG/M2 | HEART RATE: 72 BPM

## 2017-06-08 DIAGNOSIS — Z01.818 PREOP GENERAL PHYSICAL EXAM: Primary | ICD-10-CM

## 2017-06-08 DIAGNOSIS — H02.403 PTOSIS OF EYELID, BILATERAL: ICD-10-CM

## 2017-06-08 DIAGNOSIS — S42.402D: ICD-10-CM

## 2017-06-08 PROCEDURE — 99214 OFFICE O/P EST MOD 30 MIN: CPT | Performed by: PHYSICIAN ASSISTANT

## 2017-06-08 PROCEDURE — 97110 THERAPEUTIC EXERCISES: CPT | Mod: GP | Performed by: PHYSICAL THERAPIST

## 2017-06-08 NOTE — PROGRESS NOTES
[facility]  [address] [address]  [phone]  Dept: [phone]    PRE-OP EVALUATION:  Today's date: 2017    Christiana Sal (: 10/25/1931) presents for pre-operative evaluation assessment as requested by Dr. Geovanny Grover.  She requires evaluation and anesthesia risk assessment prior to undergoing surgery/procedure for treatment of eyelid ptosis .  Proposed procedure: Upper Eyelid Surgery     Date of Surgery/ Procedure: 6/15/17   Time of Surgery/ Procedure: 12:30pm   Hospital/Surgical Facility: [facility]   Fax number for surgical facility: [phone]  Primary Physician: Kelly Matthew  Type of Anesthesia Anticipated: to be determined    Patient has a Health Care Directive or Living Will: YES    1. YES - Do you have a history of heart attack, stroke, stent, bypass or surgery on an artery in the head, neck, heart or legs? CAD S/p RCA PCI in  at [facility] and PCI to LAD and D1 on 16. Was on plavix for 1 year - completed 2017  2. NO - Do you ever have any pain or discomfort in your chest?  3. NO - Do you have a history of  Heart Failure?  4. NO - Are you troubled by shortness of breath when: walking on the level, up a slight hill or at night?  5. NO - Do you currently have a cold, bronchitis or other respiratory infection?  6. NO - Do you have a cough, shortness of breath or wheezing?  7. NO - Do you sometimes get pains in the calves of your legs when you walk?  8. NO - Do you or anyone in your family have previous history of blood clots?  9. NO - Do you or does anyone in your family have a serious bleeding problem such as prolonged bleeding following surgeries or cuts?  10. NO - Have you ever had problems with anemia or been told to take iron pills?  11. NO - Have you had any abnormal blood loss such as black, tarry or bloody stools, or abnormal vaginal bleeding?  12. YES - Have you ever had a blood  transfusion?   13. NO - Have you or any of your relatives ever had problems with anesthesia?  14. YES - Do you have sleep apnea, excessive snoring or daytime drowsiness?  15. NO - Do you have any prosthetic heart valves?  16. NO - Do you have prosthetic joints?  17. NO - Is there any chance that you may be pregnant?      HPI:                                                      Brief HPI related to upcoming procedure: Edema/drooping of eyelid      See problem list for active medical problems.  Problems all longstanding and stable, except as noted/documented.  See ROS for pertinent symptoms related to these conditions.                                                                                                  .    MEDICAL HISTORY:                                                      Patient Active Problem List    Diagnosis Date Noted     Fracture of elbow 05/30/2017     Priority: Medium     Cardiac pacemaker - [employer] dual lead - Not Dependent 03/02/2017     Priority: Medium     Sinoatrial node dysfunction (H) 11/10/2016     Priority: Medium     S/P cardiac pacemaker procedure 11/09/2016     Priority: Medium     CAD S/P percutaneous coronary angioplasty 05/11/2016     Priority: Medium     JENIFER (obstructive sleep apnea) 11/17/2014     Priority: Medium     Lichen planus 11/19/2013     Priority: Medium     Vulvar pruritus 11/12/2013     Priority: Medium     Angina pectoris (H) 02/08/2012     Priority: Medium     Advanced directives, counseling/discussion 06/16/2011     Priority: Medium     Patient will bring a copy. Chart r/q to make sure no copy.    GUSTAVO Cunningham MA    Advance Directive Problem List Overview:   Name Relationship Phone    Primary Health Care Agent Albaro Jonelle  [phone]         Alternative Health Care Agent Esther Aragonon  [phone]     Reviewed Health Care Directive dated 05/13/2005, scanned into EPIC 01/2012.  Marivel Villarreal CMA                Hypertension goal BP (blood pressure) <  140/90 03/04/2011     Priority: Medium     Major depressive disorder, recurrent episode, mild (H) 11/02/2010     Priority: Medium     Family history of colon cancer 05/04/2010     Priority: Medium     Coronary atherosclerosis of native coronary artery      Priority: Medium     s/p MI ([facility])       Hyperlipidemia LDL goal <100      Priority: Medium     Essential hypertension, benign      Priority: Medium     Hypothyroidism      Priority: Medium     GERD (gastroesophageal reflux disease)      Priority: Medium      Past Medical History:   Diagnosis Date     Coronary atherosclerosis of native coronary artery     s/p MI ([facility])     Essential hypertension, benign      GERD (gastroesophageal reflux disease)      History of transient ischemic attack (TIA)      Hyperlipidemia LDL goal <100      Hypothyroidism      Major depressive disorder, recurrent episode, mild (H) 5/18/2011     Peptic ulcer disease with hemorrhage     Remote history of stomach ulcer, requiring transfusion after chronic bleeding     Past Surgical History:   Procedure Laterality Date     APPENDECTOMY       CARDIAC CATHERIZATION  1/15/04    drug-eluding stent RCA, Dr. Pérez, [facility]     CARDIAC CATHERIZATION  2/16/12    diffuse mild/mod disease, no new stent     CATARACT IOL, RT/LT  1/26/12    RT, Dr. Wynne     CHOLECYSTECTOMY, OPEN       COLONOSCOPY  2008    [facility]     FRACTURE TX, ANKLE RT/LT      LT, 2 screws remain     HC EXCISE HAND/FOOT NEUROMA      RT     HC LARYNGOSCOPY DIRECT W VOCAL CORD INJECTION      vocal cord polypectomy     HC REMOVAL OF OVARIAN CYST(S)       HC TRANSCATH STENT INIT VESSEL,PERCUT      x 2     REPAIR HAMMER TOE  9/13/10    multiple + RT great toe tendon release, Dr. Sanchez DPM     STENT  5-2016    Cardiac stents 6 placed.     Current Outpatient Prescriptions   Medication Sig Dispense Refill     citalopram (CELEXA) 10 MG tablet Take 1 tablet (10 mg) by mouth daily for depression. This  medication should be taken every day to be effective. 90 tablet 0     pantoprazole (PROTONIX) 20 MG EC tablet Take by mouth 30-60 minutes before a meal. 90 tablet 1     levothyroxine (SYNTHROID/LEVOTHROID) 75 MCG tablet Take 1 tablet (75 mcg) by mouth daily 90 tablet 1     pramipexole (MIRAPEX) 0.5 MG tablet Take 1 tablet (0.5 mg) by mouth At Bedtime 90 tablet 3     albuterol (PROAIR HFA/PROVENTIL HFA/VENTOLIN HFA) 108 (90 BASE) MCG/ACT Inhaler Inhale 2 puffs into the lungs every 6 hours as needed for shortness of breath / dyspnea or wheezing 1 Inhaler 0     albuterol (PROAIR HFA/PROVENTIL HFA/VENTOLIN HFA) 108 (90 BASE) MCG/ACT Inhaler Inhale 2 puffs into the lungs every 6 hours as needed for shortness of breath / dyspnea or wheezing 1 Inhaler 0     ranitidine (ZANTAC) 150 MG tablet Take 1 tablet (150 mg) by mouth 2 times daily 60 tablet 1     losartan (COZAAR) 25 MG tablet Take 1 tablet (25 mg) by mouth daily 90 tablet 3     LANsoprazole (PREVACID) 30 MG CR capsule Take 1 capsule (30 mg) by mouth daily Take 30-60 minutes before a meal. 90 capsule 1     atorvastatin (LIPITOR) 40 MG tablet Take 1 tablet (40 mg) by mouth daily 90 tablet 3     Caffeine (NO DOZ OR) Take by mouth daily as needed       diclofenac (VOLTAREN) 1 % GEL topical gel Apply 2 grams to foot up to four times daily using enclosed dosing card. 100 g 1     diclofenac (VOLTAREN) 1 % GEL topical gel Apply  2 grams to foot up to four times daily using enclosed dosing card. 100 g 1     acetaminophen-codeine (TYLENOL #3) 300-30 MG per tablet Take 1-2 tablets by mouth every 4 hours as needed for other (mild or moderate pain) 10 tablet 0     acetaminophen-codeine (TYLENOL W/CODEINE NO. 3) 300-30 MG per tablet Take 1-2 tablets by mouth every 4 hours as needed for mild pain (mild or moderate pain) 30 tablet 0     metoprolol (TOPROL-XL) 25 MG 24 hr tablet Take 0.5 tablets (12.5 mg) by mouth daily 90 tablet 3     meclizine (ANTIVERT) 25 MG tablet Take 1 tablet  (25 mg) by mouth every 6 hours as needed for dizziness 30 tablet 3     ondansetron (ZOFRAN) 4 MG tablet Take 1 tablet (4 mg) by mouth every 6 hours as needed for nausea 18 tablet 3     nitroglycerin (NITROSTAT) 0.4 MG SL tablet Place 1 tablet (0.4 mg) under the tongue every 5 minutes as needed for chest pain (call your doctor if you take a third dose) 25 tablet 3     Multiple Vitamins-Minerals (OCUVITE PO) Take 1 tablet by mouth daily.       ASPIRIN 81 MG PO TABS Take 2 tablets by mouth every evening. *.        CALCIUM 600 + D OR 1 tablet 2 times per day       FISH OIL OR 1 daily       OTC products: None, except as noted above    Allergies   Allergen Reactions     Demerol      Nausea     Lisinopril Cough     Sulfa Drugs      Questionable itching reported by patient      Latex Allergy: NO    Social History   Substance Use Topics     Smoking status: Never Smoker     Smokeless tobacco: Never Used      Comment: Never smoker; no secondhand smoke exposure     Alcohol use Yes      Comment: social     History   Drug Use No       REVIEW OF SYSTEMS:                                                    C: NEGATIVE for fever, chills, change in weight  I: NEGATIVE for worrisome rashes, moles or lesions  E: NEGATIVE for vision changes or irritation  E/M: NEGATIVE for ear, mouth and throat problems  R: NEGATIVE for significant cough or SOB  B: NEGATIVE for masses, tenderness or discharge  CV: NEGATIVE for chest pain, palpitations or peripheral edema  GI: NEGATIVE for nausea, abdominal pain, heartburn, or change in bowel habits  : NEGATIVE for frequency, dysuria, or hematuria  M: NEGATIVE for significant arthralgias or myalgia  N: NEGATIVE for weakness, dizziness or paresthesias  E: NEGATIVE for temperature intolerance, skin/hair changes  H: NEGATIVE for bleeding problems  P: NEGATIVE for changes in mood or affect    EXAM:                                                    /68 (BP Location: Right arm, Patient Position: Chair,  "Cuff Size: Adult Regular)  Pulse 72  Temp 97.9  F (36.6  C)  Ht 5' 6\" (1.676 m)  Wt 162 lb (73.5 kg)  BMI 26.15 kg/m2    GENERAL APPEARANCE: healthy, alert and no distress     EYES: EOMI, PERRL     HENT: ear canals and TM's normal and nose and mouth without ulcers or lesions     NECK: no adenopathy, no asymmetry, masses, or scars and thyroid normal to palpation     RESP: lungs clear to auscultation - no rales, rhonchi or wheezes     CV: regular rates and rhythm, normal S1 S2, no S3 or S4 and no murmur, click or rub     ABDOMEN:  soft, nontender, no HSM or masses and bowel sounds normal     MS: extremities normal- no gross deformities noted, no evidence of inflammation in joints, FROM in all extremities.     SKIN: no suspicious lesions or rashes     NEURO: Normal strength and tone, sensory exam grossly normal, mentation intact and speech normal     PSYCH: mentation appears normal. and affect normal/bright     LYMPHATICS: No axillary, cervical, or supraclavicular nodes    DIAGNOSTICS:                                                    No labs or EKG required for low risk surgery (cataract, skin procedure, breast biopsy, etc)    Recent Labs   Lab Test  05/25/17   1540  03/08/17   0955   05/10/16   1109   02/16/12   1010   HGB  12.9  12.3   < >  12.5   < >   --    PLT  247  265   < >  284   < >   --    INR   --    --    --   0.90   --   0.98   NA  137  140   < >  135   < >   --    POTASSIUM  4.0  3.9   < >  4.0   < >   --    CR  0.84  0.80   < >  0.88   < >   --     < > = values in this interval not displayed.        IMPRESSION:                                                    Reason for surgery/procedure: eyelid ptosis  Diagnosis/reason for consult: pre op    The proposed surgical procedure is considered LOW risk.    REVISED CARDIAC RISK INDEX  The patient has the following serious cardiovascular risks for perioperative complications such as (MI, PE, VFib and 3  AV Block):  Coronary Artery Disease (MI, positive " stress test, angina, Qs on EKG)  INTERPRETATION: 1 risks: Class II (low risk - 0.9% complication rate)    The patient has the following additional risks for perioperative complications:  No identified additional risks      ICD-10-CM    1. Preop general physical exam Z01.818    2. 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 Continue  - off plavix 1 year post last stent    APPROVAL GIVEN to proceed with proposed procedure, without further diagnostic evaluation       Signed Electronically by: Kelly Matthew PA-C    Copy of this evaluation report is provided to requesting physician.    [facility] Preop Guidelines

## 2017-06-08 NOTE — NURSING NOTE
"Chief Complaint   Patient presents with     Pre-Op Exam       Initial /68 (BP Location: Right arm, Patient Position: Chair, Cuff Size: Adult Regular)  Pulse 72  Temp 97.9  F (36.6  C)  Ht 5' 6\" (1.676 m)  Wt 162 lb (73.5 kg)  BMI 26.15 kg/m2 Estimated body mass index is 26.15 kg/(m^2) as calculated from the following:    Height as of this encounter: 5' 6\" (1.676 m).    Weight as of this encounter: 162 lb (73.5 kg).  Medication Reconciliation: complete       Viraj Lofton CMA    "

## 2017-06-08 NOTE — MR AVS SNAPSHOT
After Visit Summary   6/8/2017    Christiana Sal    MRN: [id number]           Patient Information     Date Of Birth          10/25/1931        Visit Information        Provider Department      6/8/2017 8:20 AM Kelly Matthew PA-C [facility]        Today's Diagnoses     Preop general physical exam    -  1      Care Instructions      Before Your Surgery      Call your surgeon if there is any change in your health. This includes signs of a cold or flu (such as a sore throat, runny nose, cough, rash or fever).    Do not smoke, drink alcohol or take over the counter medicine (unless your surgeon or primary care doctor tells you to) for the 24 hours before and after surgery.    If you take prescribed drugs: Follow your doctor s orders about which medicines to take and which to stop until after surgery.    Eating and drinking prior to surgery: follow the instructions from your surgeon    Take a shower or bath the night before surgery. Use the soap your surgeon gave you to gently clean your skin. If you do not have soap from your surgeon, use your regular soap. Do not shave or scrub the surgery site.  Wear clean pajamas and have clean sheets on your bed.           Follow-ups after your visit        Your next 10 appointments already scheduled     Jun 08, 2017 10:10 AM CDT   MARY KAY Extremity with Scarlet Preciado, MARIA TERESA   [facility] ([facility])    [address] [address] [phone]              Who to contact     Normal or non-critical lab and imaging results will be communicated to you by MyChart, letter or phone within 4 business days after the clinic has received the results. If you do not hear from us within 7 days, please contact the clinic through MyChart or phone. If you have a critical or abnormal lab result, we will notify you by phone as soon as possible.  Submit refill requests through [employer] or call your pharmacy and they will  "forward the refill request to us. Please allow 3 business days for your refill to be completed.          If you need to speak with a  for additional information , please call: [phone]             Additional Information About Your Visit        [employer] Information     [place] [employer] gives you secure access to your electronic health record. If you see a primary care provider, you can also send messages to your care team and make appointments. If you have questions, please call your primary care clinic.  If you do not have a primary care provider, please call [phone] and they will assist you.        Care EveryWhere ID     This is your Care EveryWhere ID. This could be used by other organizations to access your [facility] medical records  [id number]        Your Vitals Were     Pulse Temperature Height BMI (Body Mass Index)          72 97.9  F (36.6  C) 5' 6\" (1.676 m) 26.15 kg/m2         Blood Pressure from Last 3 Encounters:   06/08/17 136/68   05/25/17 125/85   05/16/17 134/76    Weight from Last 3 Encounters:   06/08/17 162 lb (73.5 kg)   05/25/17 166 lb (75.3 kg)   05/16/17 169 lb (76.7 kg)              Today, you had the following     No orders found for display       Primary Care Provider Office Phone # Fax #    Kelly Matthew PA-C [phone]       [facility] [address] [address]        Thank you!     Thank you for choosing [facility]  for your care. Our goal is always to provide you with excellent care. Hearing back from our patients is one way we can continue to improve our services. Please take a few minutes to complete the written survey that you may receive in the mail after your visit with us. Thank you!             Your Updated Medication List - Protect others around you: Learn how to safely use, store and throw away your medicines at www.disposemymeds.org.          This list is accurate as of: 6/8/17  8:34 AM.  Always " use your most recent med list.                   Brand Name Dispense Instructions for use    * acetaminophen-codeine 300-30 MG per tablet    TYLENOL w/CODEINE No. 3    30 tablet    Take 1-2 tablets by mouth every 4 hours as needed for mild pain (mild or moderate pain)       * acetaminophen-codeine 300-30 MG per tablet    TYLENOL #3    10 tablet    Take 1-2 tablets by mouth every 4 hours as needed for other (mild or moderate pain)       * albuterol 108 (90 BASE) MCG/ACT Inhaler    PROAIR HFA/PROVENTIL HFA/VENTOLIN HFA    1 Inhaler    Inhale 2 puffs into the lungs every 6 hours as needed for shortness of breath / dyspnea or wheezing       * albuterol 108 (90 BASE) MCG/ACT Inhaler    PROAIR HFA/PROVENTIL HFA/VENTOLIN HFA    1 Inhaler    Inhale 2 puffs into the lungs every 6 hours as needed for shortness of breath / dyspnea or wheezing       aspirin 81 MG tablet      Take 2 tablets by mouth every evening. *.       atorvastatin 40 MG tablet    LIPITOR    90 tablet    Take 1 tablet (40 mg) by mouth daily       CALCIUM 600 + D PO      1 tablet 2 times per day       citalopram 10 MG tablet    celeXA    90 tablet    Take 1 tablet (10 mg) by mouth daily for depression. This medication should be taken every day to be effective.       * diclofenac 1 % Gel topical gel    VOLTAREN    100 g    Apply 2 grams to foot up to four times daily using enclosed dosing card.       * diclofenac 1 % Gel topical gel    VOLTAREN    100 g    Apply  2 grams to foot up to four times daily using enclosed dosing card.       FISH OIL PO      1 daily       LANsoprazole 30 MG CR capsule    PREVACID    90 capsule    Take 1 capsule (30 mg) by mouth daily Take 30-60 minutes before a meal.       levothyroxine 75 MCG tablet    SYNTHROID/LEVOTHROID    90 tablet    Take 1 tablet (75 mcg) by mouth daily       losartan 25 MG tablet    COZAAR    90 tablet    Take 1 tablet (25 mg) by mouth daily       meclizine 25 MG tablet    ANTIVERT    30 tablet    Take 1  tablet (25 mg) by mouth every 6 hours as needed for dizziness       metoprolol 25 MG 24 hr tablet    TOPROL-XL    90 tablet    Take 0.5 tablets (12.5 mg) by mouth daily       nitroglycerin 0.4 MG sublingual tablet    NITROSTAT    25 tablet    Place 1 tablet (0.4 mg) under the tongue every 5 minutes as needed for chest pain (call your doctor if you take a third dose)       NO DOZ OR      Take by mouth daily as needed       OCUVITE PO      Take 1 tablet by mouth daily.       ondansetron 4 MG tablet    ZOFRAN    18 tablet    Take 1 tablet (4 mg) by mouth every 6 hours as needed for nausea       pantoprazole 20 MG EC tablet    PROTONIX    90 tablet    Take by mouth 30-60 minutes before a meal.       pramipexole 0.5 MG tablet    MIRAPEX    90 tablet    Take 1 tablet (0.5 mg) by mouth At Bedtime       ranitidine 150 MG tablet    ZANTAC    60 tablet    Take 1 tablet (150 mg) by mouth 2 times daily       * Notice:  This list has 6 medication(s) that are the same as other medications prescribed for you. Read the directions carefully, and ask your doctor or other care provider to review them with you.

## 2017-06-08 NOTE — MR AVS SNAPSHOT
After Visit Summary   6/8/2017    Christiana Sal    MRN: [id number]           Patient Information     Date Of Birth          10/25/1931        Visit Information        Provider Department      6/8/2017 10:10 AM Scarlet Preciado PT [facility]        Today's Diagnoses     Fracture of elbow, left, with routine healing, subsequent encounter           Follow-ups after your visit        Who to contact     If you have questions or need follow up information about today's clinic visit or your schedule please contact [facility] directly at [phone].  Normal or non-critical lab and imaging results will be communicated to you by Voicebasehart, letter or phone within 4 business days after the clinic has received the results. If you do not hear from us within 7 days, please contact the clinic through [employer] or phone. If you have a critical or abnormal lab result, we will notify you by phone as soon as possible.  Submit refill requests through [employer] or call your pharmacy and they will forward the refill request to us. Please allow 3 business days for your refill to be completed.          Additional Information About Your Visit        MyChart Information     [employer] gives you secure access to your electronic health record. If you see a primary care provider, you can also send messages to your care team and make appointments. If you have questions, please call your primary care clinic.  If you do not have a primary care provider, please call [phone] and they will assist you.        Care EveryWhere ID     This is your Care EveryWhere ID. This could be used by other organizations to access your [facility] medical records  [id number]         Blood Pressure from Last 3 Encounters:   06/08/17 136/68   05/25/17 125/85   05/16/17 134/76    Weight from Last 3 Encounters:   06/08/17 73.5 kg (162 lb)   05/25/17 75.3 kg (166 lb)   05/16/17 76.7 kg (169 lb)              We  Performed the Following     THERAPEUTIC EXERCISES        Primary Care Provider Office Phone # Fax #    Kelly Matthew PA-C [phone]       [address] [address]        Thank you!     Thank you for choosing [facility]  for your care. Our goal is always to provide you with excellent care. Hearing back from our patients is one way we can continue to improve our services. Please take a few minutes to complete the written survey that you may receive in the mail after your visit with us. Thank you!             Your Updated Medication List - Protect others around you: Learn how to safely use, store and throw away your medicines at www.disposemymeds.org.          This list is accurate as of: 6/8/17 10:54 AM.  Always use your most recent med list.                   Brand Name Dispense Instructions for use    * acetaminophen-codeine 300-30 MG per tablet    TYLENOL w/CODEINE No. 3    30 tablet    Take 1-2 tablets by mouth every 4 hours as needed for mild pain (mild or moderate pain)       * acetaminophen-codeine 300-30 MG per tablet    TYLENOL #3    10 tablet    Take 1-2 tablets by mouth every 4 hours as needed for other (mild or moderate pain)       * albuterol 108 (90 BASE) MCG/ACT Inhaler    PROAIR HFA/PROVENTIL HFA/VENTOLIN HFA    1 Inhaler    Inhale 2 puffs into the lungs every 6 hours as needed for shortness of breath / dyspnea or wheezing       * albuterol 108 (90 BASE) MCG/ACT Inhaler    PROAIR HFA/PROVENTIL HFA/VENTOLIN HFA    1 Inhaler    Inhale 2 puffs into the lungs every 6 hours as needed for shortness of breath / dyspnea or wheezing       aspirin 81 MG tablet      Take 2 tablets by mouth every evening. *.       atorvastatin 40 MG tablet    LIPITOR    90 tablet    Take 1 tablet (40 mg) by mouth daily       CALCIUM 600 + D PO      1 tablet 2 times per day       citalopram 10 MG tablet    celeXA    90 tablet    Take 1 tablet (10  mg) by mouth daily for depression. This medication should be taken every day to be effective.       * diclofenac 1 % Gel topical gel    VOLTAREN    100 g    Apply 2 grams to foot up to four times daily using enclosed dosing card.       * diclofenac 1 % Gel topical gel    VOLTAREN    100 g    Apply  2 grams to foot up to four times daily using enclosed dosing card.       FISH OIL PO      1 daily       LANsoprazole 30 MG CR capsule    PREVACID    90 capsule    Take 1 capsule (30 mg) by mouth daily Take 30-60 minutes before a meal.       levothyroxine 75 MCG tablet    SYNTHROID/LEVOTHROID    90 tablet    Take 1 tablet (75 mcg) by mouth daily       losartan 25 MG tablet    COZAAR    90 tablet    Take 1 tablet (25 mg) by mouth daily       meclizine 25 MG tablet    ANTIVERT    30 tablet    Take 1 tablet (25 mg) by mouth every 6 hours as needed for dizziness       metoprolol 25 MG 24 hr tablet    TOPROL-XL    90 tablet    Take 0.5 tablets (12.5 mg) by mouth daily       nitroglycerin 0.4 MG sublingual tablet    NITROSTAT    25 tablet    Place 1 tablet (0.4 mg) under the tongue every 5 minutes as needed for chest pain (call your doctor if you take a third dose)       NO DOZ OR      Take by mouth daily as needed       OCUVITE PO      Take 1 tablet by mouth daily.       ondansetron 4 MG tablet    ZOFRAN    18 tablet    Take 1 tablet (4 mg) by mouth every 6 hours as needed for nausea       pantoprazole 20 MG EC tablet    PROTONIX    90 tablet    Take by mouth 30-60 minutes before a meal.       pramipexole 0.5 MG tablet    MIRAPEX    90 tablet    Take 1 tablet (0.5 mg) by mouth At Bedtime       ranitidine 150 MG tablet    ZANTAC    60 tablet    Take 1 tablet (150 mg) by mouth 2 times daily       * Notice:  This list has 6 medication(s) that are the same as other medications prescribed for you. Read the directions carefully, and ask your doctor or other care provider to review them with you.

## 2017-06-26 ENCOUNTER — ALLIED HEALTH/NURSE VISIT (OUTPATIENT)
Dept: CARDIOLOGY | Facility: CLINIC | Age: 82
End: 2017-06-26
Attending: INTERNAL MEDICINE
Payer: MEDICARE

## 2017-06-26 DIAGNOSIS — I49.5 SINOATRIAL NODE DYSFUNCTION (H): Primary | ICD-10-CM

## 2017-06-26 PROCEDURE — 93294 REM INTERROG EVL PM/LDLS PM: CPT | Performed by: INTERNAL MEDICINE

## 2017-06-26 PROCEDURE — 93296 REM INTERROG EVL PM/IDS: CPT | Mod: ZF

## 2017-06-26 NOTE — MR AVS SNAPSHOT
After Visit Summary   6/26/2017    Christiana Sal    MRN: [id number]           Patient Information     Date Of Birth          10/25/1931        Visit Information        Provider Department      6/26/2017 6:00 AM THADDEUS ICD REMOTE [facility]        Today's Diagnoses     Sinoatrial node dysfunction (H)    -  1       Follow-ups after your visit        Who to contact     If you have questions or need follow up information about today's clinic visit or your schedule please contact [facility] directly at [phone].  Normal or non-critical lab and imaging results will be communicated to you by ESILLAGEhart, letter or phone within 4 business days after the clinic has received the results. If you do not hear from us within 7 days, please contact the clinic through [employer] or phone. If you have a critical or abnormal lab result, we will notify you by phone as soon as possible.  Submit refill requests through [employer] or call your pharmacy and they will forward the refill request to us. Please allow 3 business days for your refill to be completed.          Additional Information About Your Visit        MyChart Information     [employer] gives you secure access to your electronic health record. If you see a primary care provider, you can also send messages to your care team and make appointments. If you have questions, please call your primary care clinic.  If you do not have a primary care provider, please call [phone] and they will assist you.        Care EveryWhere ID     This is your Care EveryWhere ID. This could be used by other organizations to access your [facility] medical records  [id number]         Blood Pressure from Last 3 Encounters:   06/08/17 136/68   05/25/17 125/85   05/16/17 134/76    Weight from Last 3 Encounters:   06/08/17 73.5 kg (162 lb)   05/25/17 75.3 kg (166 lb)   05/16/17 76.7 kg (169 lb)              We Performed the Following     INTERROGATION DEVICE ORLIN POPE,  PACER/ICD        Primary Care Provider Office Phone # Fax #    Kelly Zeinab Matthew PA-C [phone]       [address] [address]        Equal Access to Services     SOFIA EDUARDO : Hadii aad ku hadmansio Soomaali, waaxda luqadaha, qaybta kaalmada adeegyada, danya dejesusn ken stanton lashiralaura shanti. So [facility] [phone].    ATENCIÓN: Si habla español, tiene a ozuna disposición servicios gratuitos de asistencia lingüística. [facility] [phone].    We comply with applicable federal civil rights laws and [place] laws. We do not discriminate on the basis of race, color, national origin, age, disability sex, sexual orientation or gender identity.            Thank you!     Thank you for choosing [facility]  for your care. Our goal is always to provide you with excellent care. Hearing back from our patients is one way we can continue to improve our services. Please take a few minutes to complete the written survey that you may receive in the mail after your visit with us. Thank you!             Your Updated Medication List - Protect others around you: Learn how to safely use, store and throw away your medicines at www.disposemymeds.org.          This list is accurate as of: 6/26/17 11:59 PM.  Always use your most recent med list.                   Brand Name Dispense Instructions for use Diagnosis    * acetaminophen-codeine 300-30 MG per tablet    TYLENOL w/CODEINE No. 3    30 tablet    Take 1-2 tablets by mouth every 4 hours as needed for mild pain (mild or moderate pain)    S/P cardiac pacemaker procedure       * acetaminophen-codeine 300-30 MG per tablet    TYLENOL #3    10 tablet    Take 1-2 tablets by mouth every 4 hours as needed for other (mild or moderate pain)    S/P cardiac pacemaker procedure       * albuterol 108 (90 BASE) MCG/ACT Inhaler    PROAIR HFA/PROVENTIL HFA/VENTOLIN HFA    1 Inhaler    Inhale 2 puffs into the lungs every 6 hours as  needed for shortness of breath / dyspnea or wheezing    Acute bronchitis with symptoms > 10 days       * albuterol 108 (90 BASE) MCG/ACT Inhaler    PROAIR HFA/PROVENTIL HFA/VENTOLIN HFA    1 Inhaler    Inhale 2 puffs into the lungs every 6 hours as needed for shortness of breath / dyspnea or wheezing    Acute bronchitis with symptoms > 10 days       aspirin 81 MG tablet      Take 2 tablets by mouth every evening. *.        atorvastatin 40 MG tablet    LIPITOR    90 tablet    Take 1 tablet (40 mg) by mouth daily    CAD S/P percutaneous coronary angioplasty       CALCIUM 600 + D PO      1 tablet 2 times per day        citalopram 10 MG tablet    celeXA    90 tablet    Take 1 tablet (10 mg) by mouth daily for depression. This medication should be taken every day to be effective.    Major depressive disorder, recurrent episode, mild (H)       * diclofenac 1 % Gel topical gel    VOLTAREN    100 g    Apply 2 grams to foot up to four times daily using enclosed dosing card.    Right foot pain       * diclofenac 1 % Gel topical gel    VOLTAREN    100 g    Apply  2 grams to foot up to four times daily using enclosed dosing card.    Right foot pain       FISH OIL PO      1 daily        LANsoprazole 30 MG CR capsule    PREVACID    90 capsule    Take 1 capsule (30 mg) by mouth daily Take 30-60 minutes before a meal.    LPRD (laryngopharyngeal reflux disease)       levothyroxine 75 MCG tablet    SYNTHROID/LEVOTHROID    90 tablet    Take 1 tablet (75 mcg) by mouth daily    Hypothyroidism, unspecified type       losartan 25 MG tablet    COZAAR    90 tablet    Take 1 tablet (25 mg) by mouth daily    Essential hypertension with goal blood pressure less than 140/90       meclizine 25 MG tablet    ANTIVERT    30 tablet    Take 1 tablet (25 mg) by mouth every 6 hours as needed for dizziness    Vertigo       metoprolol 25 MG 24 hr tablet    TOPROL-XL    90 tablet    Take 0.5 tablets (12.5 mg) by mouth daily    Essential hypertension with  goal blood pressure less than 140/90       nitroGLYcerin 0.4 MG sublingual tablet    NITROSTAT    25 tablet    Place 1 tablet (0.4 mg) under the tongue every 5 minutes as needed for chest pain (call your doctor if you take a third dose)    Atherosclerosis of native coronary artery of native heart without angina pectoris       NO DOZ OR      Take by mouth daily as needed        OCUVITE PO      Take 1 tablet by mouth daily.        ondansetron 4 MG tablet    ZOFRAN    18 tablet    Take 1 tablet (4 mg) by mouth every 6 hours as needed for nausea    Nausea       pantoprazole 20 MG EC tablet    PROTONIX    90 tablet    Take by mouth 30-60 minutes before a meal.    Gastroesophageal reflux disease, esophagitis presence not specified       pramipexole 0.5 MG tablet    MIRAPEX    90 tablet    Take 1 tablet (0.5 mg) by mouth At Bedtime    Restless leg syndrome       ranitidine 150 MG tablet    ZANTAC    60 tablet    Take 1 tablet (150 mg) by mouth 2 times daily    Gastroesophageal reflux disease, esophagitis presence not specified       * Notice:  This list has 6 medication(s) that are the same as other medications prescribed for you. Read the directions carefully, and ask your doctor or other care provider to review them with you.

## 2017-06-28 ENCOUNTER — ALLIED HEALTH/NURSE VISIT (OUTPATIENT)
Dept: FAMILY MEDICINE | Facility: CLINIC | Age: 82
End: 2017-06-28
Payer: COMMERCIAL

## 2017-06-28 ENCOUNTER — TELEPHONE (OUTPATIENT)
Dept: FAMILY MEDICINE | Facility: CLINIC | Age: 82
End: 2017-06-28
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
## 2017-06-28 PROCEDURE — 99207 ZZC NO CHARGE NURSE ONLY: CPT

## 2017-06-28 NOTE — MR AVS SNAPSHOT
After Visit Summary   6/28/2017    Christiana Sal    MRN: [id number]           Patient Information     Date Of Birth          10/25/1931        Visit Information        Provider Department      6/28/2017 11:00 AM [place] HU RN [facility]        Today's Diagnoses     Insect bite    -  1       Follow-ups after your visit        Your next 10 appointments already scheduled     Jun 28, 2017 11:00 AM CDT   Nurse Only with [place] HU RN   [facility]    [address] [facility] [id number] [phone]              Who to contact     Normal or non-critical lab and imaging results will be communicated to you by MyChart, letter or phone within 4 business days after the clinic has received the results. If you do not hear from us within 7 days, please contact the clinic through [employer] or phone. If you have a critical or abnormal lab result, we will notify you by phone as soon as possible.  Submit refill requests through [employer] or call your pharmacy and they will forward the refill request to us. Please allow 3 business days for your refill to be completed.          If you need to speak with a  for additional information , please call: [phone]             Additional Information About Your Visit        My[employer] Information     [employer] gives you secure access to your electronic health record. If you see a primary care provider, you can also send messages to your care team and make appointments. If you have questions, please call your primary care clinic.  If you do not have a primary care provider, please call [phone] and they will assist you.        Care EveryWhere ID     This is your Care EveryWhere ID. This could be used by other organizations to access your [facility] medical records  [id number]         Blood Pressure from Last 3 Encounters:   06/08/17 136/68   05/25/17 125/85   05/16/17 134/76    Weight from Last 3 Encounters:    06/08/17 162 lb (73.5 kg)   05/25/17 166 lb (75.3 kg)   05/16/17 169 lb (76.7 kg)              Today, you had the following     No orders found for display       Primary Care Provider Office Phone # Fax #    Kelly Matthew PA-C [phone]       [facility] [id number] [place] [address]        Equal Access to Services     SOFIA EDUARDO : Hadii aad ku hadasho Soomaali, waaxda luqadaha, qaybta kaalmada adeegyada, waxay idiin hayaan adeeg kharacody la'margaret . So [facility] [phone].    ATENCIÓN: Si jorge martin, tiene a ozuna disposición servicios gratuitos de asistencia lingüística. [facility] [phone].    We comply with applicable federal civil rights laws and [place] laws. We do not discriminate on the basis of race, color, national origin, age, disability sex, sexual orientation or gender identity.            Thank you!     Thank you for choosing [facility]  for your care. Our goal is always to provide you with excellent care. Hearing back from our patients is one way we can continue to improve our services. Please take a few minutes to complete the written survey that you may receive in the mail after your visit with us. Thank you!             Your Updated Medication List - Protect others around you: Learn how to safely use, store and throw away your medicines at www.disposemymeds.org.          This list is accurate as of: 6/28/17 10:57 AM.  Always use your most recent med list.                   Brand Name Dispense Instructions for use Diagnosis    * acetaminophen-codeine 300-30 MG per tablet    TYLENOL w/CODEINE No. 3    30 tablet    Take 1-2 tablets by mouth every 4 hours as needed for mild pain (mild or moderate pain)    S/P cardiac pacemaker procedure       * acetaminophen-codeine 300-30 MG per tablet    TYLENOL #3    10 tablet    Take 1-2 tablets by mouth every 4 hours as needed for other (mild or moderate pain)    S/P cardiac pacemaker procedure       *  albuterol 108 (90 BASE) MCG/ACT Inhaler    PROAIR HFA/PROVENTIL HFA/VENTOLIN HFA    1 Inhaler    Inhale 2 puffs into the lungs every 6 hours as needed for shortness of breath / dyspnea or wheezing    Acute bronchitis with symptoms > 10 days       * albuterol 108 (90 BASE) MCG/ACT Inhaler    PROAIR HFA/PROVENTIL HFA/VENTOLIN HFA    1 Inhaler    Inhale 2 puffs into the lungs every 6 hours as needed for shortness of breath / dyspnea or wheezing    Acute bronchitis with symptoms > 10 days       aspirin 81 MG tablet      Take 2 tablets by mouth every evening. *.        atorvastatin 40 MG tablet    LIPITOR    90 tablet    Take 1 tablet (40 mg) by mouth daily    CAD S/P percutaneous coronary angioplasty       CALCIUM 600 + D PO      1 tablet 2 times per day        citalopram 10 MG tablet    celeXA    90 tablet    Take 1 tablet (10 mg) by mouth daily for depression. This medication should be taken every day to be effective.    Major depressive disorder, recurrent episode, mild (H)       * diclofenac 1 % Gel topical gel    VOLTAREN    100 g    Apply 2 grams to foot up to four times daily using enclosed dosing card.    Right foot pain       * diclofenac 1 % Gel topical gel    VOLTAREN    100 g    Apply  2 grams to foot up to four times daily using enclosed dosing card.    Right foot pain       FISH OIL PO      1 daily        LANsoprazole 30 MG CR capsule    PREVACID    90 capsule    Take 1 capsule (30 mg) by mouth daily Take 30-60 minutes before a meal.    LPRD (laryngopharyngeal reflux disease)       levothyroxine 75 MCG tablet    SYNTHROID/LEVOTHROID    90 tablet    Take 1 tablet (75 mcg) by mouth daily    Hypothyroidism, unspecified type       losartan 25 MG tablet    COZAAR    90 tablet    Take 1 tablet (25 mg) by mouth daily    Essential hypertension with goal blood pressure less than 140/90       meclizine 25 MG tablet    ANTIVERT    30 tablet    Take 1 tablet (25 mg) by mouth every 6 hours as needed for dizziness     Vertigo       metoprolol 25 MG 24 hr tablet    TOPROL-XL    90 tablet    Take 0.5 tablets (12.5 mg) by mouth daily    Essential hypertension with goal blood pressure less than 140/90       nitroGLYcerin 0.4 MG sublingual tablet    NITROSTAT    25 tablet    Place 1 tablet (0.4 mg) under the tongue every 5 minutes as needed for chest pain (call your doctor if you take a third dose)    Atherosclerosis of native coronary artery of native heart without angina pectoris       NO DOZ OR      Take by mouth daily as needed        OCUVITE PO      Take 1 tablet by mouth daily.        ondansetron 4 MG tablet    ZOFRAN    18 tablet    Take 1 tablet (4 mg) by mouth every 6 hours as needed for nausea    Nausea       pantoprazole 20 MG EC tablet    PROTONIX    90 tablet    Take by mouth 30-60 minutes before a meal.    Gastroesophageal reflux disease, esophagitis presence not specified       pramipexole 0.5 MG tablet    MIRAPEX    90 tablet    Take 1 tablet (0.5 mg) by mouth At Bedtime    Restless leg syndrome       ranitidine 150 MG tablet    ZANTAC    60 tablet    Take 1 tablet (150 mg) by mouth 2 times daily    Gastroesophageal reflux disease, esophagitis presence not specified       * Notice:  This list has 6 medication(s) that are the same as other medications prescribed for you. Read the directions carefully, and ask your doctor or other care provider to review them with you.

## 2017-06-28 NOTE — TELEPHONE ENCOUNTER
"IZABEL Mendoza:Patient here as a walk in; she said her chiropractor told her to come here. She has a 1.5 cm by 1.5 cm round red flat area with center scab on lateral side of right upper arm.  Looks like an insect bite. She said that she feels\"fine\". Noticed it 2 days ago. She said it looks the same as it did then.  She does not have any spots elsewhere.  Has not seen any ticks. I marked area with a permanent marker and advised her to keep an eye on it. If it grows or worsens or if she develops a fever, achiness, fatigue, to call. Patient agreed with plan.   Roel Garza RN    "

## 2017-06-28 NOTE — PROGRESS NOTES
"Patient here as a walk in; she said her chiropractor told her to come here. She has a 1.5 cm by 1.5 cm round red flat area with center scab on lateral side of right upper arm.  Looks like an insect bite. She said that she feels\"fine\". Noticed it 2 days ago. She does not have any spots elsewhere.  Has not seen any ticks. I marked area with a permanent marker and advised her to keep an eye on it. If it grows or worsens or if she develops a fever, achiness, fatigue, to call. Advised to casey the date on her calendar of when she noticed the bite. Patient agreed with plan. FYI telephone encounter sent to Kelly.     Roel Garza RN    "

## 2017-06-28 NOTE — PROGRESS NOTES
Scheduled [employer] [facility] remote pacemaker transmission received and reviewed. Device transmission sent per MD orders. Her presenting rhythm is SB 58 bpm. 12 AT/AF episodes recorded over the last 3 months, lasting up to 2 hours. AF burden is <1%. Normal device function. AP= 5% and = 6%. Lead trends appear stable. Battery estimates 15 years to MIGDALIA. Pt notified of transmission results. Plan for pt to send another transmission in 3 months as scheduled.  Remote pacemaker transmission

## 2017-06-30 DIAGNOSIS — E03.9 HYPOTHYROIDISM, UNSPECIFIED TYPE: ICD-10-CM

## 2017-06-30 RX ORDER — LEVOTHYROXINE SODIUM 75 UG/1
75 TABLET ORAL DAILY
Qty: 90 TABLET | Refills: 1 | Status: SHIPPED | OUTPATIENT
Start: 2017-06-30 | End: 2018-04-20

## 2017-06-30 NOTE — TELEPHONE ENCOUNTER
Christiana is calling reporting that she accidentally threw away her current bottle of levothyroxine.  She states that she just picked it up 2 days ago.  Could you please order another refill for her?  I did tell her that insurance may not cover and might have to pay out of pocket.  She is ok with that.  Thank you..Consuelo Hawthorne    levothyroxine     Last Written Prescription Date: 3/16/17  Last Quantity: 90, # refills: 1  Last Office Visit with [facility], [facility] or [facility] prescribing provider: 6/8/17        TSH   Date Value Ref Range Status   03/08/2017 0.67 0.40 - 4.00 mU/L Final

## 2017-07-24 ENCOUNTER — OFFICE VISIT (OUTPATIENT)
Dept: FAMILY MEDICINE | Facility: CLINIC | Age: 82
End: 2017-07-24
Payer: COMMERCIAL

## 2017-07-24 VITALS
WEIGHT: 165 LBS | HEART RATE: 76 BPM | BODY MASS INDEX: 26.52 KG/M2 | DIASTOLIC BLOOD PRESSURE: 70 MMHG | HEIGHT: 66 IN | SYSTOLIC BLOOD PRESSURE: 132 MMHG | TEMPERATURE: 98 F

## 2017-07-24 DIAGNOSIS — M25.562 ACUTE PAIN OF LEFT KNEE: Primary | ICD-10-CM

## 2017-07-24 DIAGNOSIS — R51.9 FACIAL PAIN: ICD-10-CM

## 2017-07-24 DIAGNOSIS — K21.9 GASTROESOPHAGEAL REFLUX DISEASE, ESOPHAGITIS PRESENCE NOT SPECIFIED: ICD-10-CM

## 2017-07-24 DIAGNOSIS — N32.81 OVERACTIVE BLADDER: ICD-10-CM

## 2017-07-24 PROCEDURE — 99214 OFFICE O/P EST MOD 30 MIN: CPT | Performed by: PHYSICIAN ASSISTANT

## 2017-07-24 RX ORDER — OXYBUTYNIN CHLORIDE 5 MG/1
5 TABLET, EXTENDED RELEASE ORAL DAILY
Qty: 30 TABLET | Refills: 1 | Status: SHIPPED | OUTPATIENT
Start: 2017-07-24 | End: 2017-09-01

## 2017-07-24 NOTE — PROGRESS NOTES
"  SUBJECTIVE:                                                    Christiana Sal is a 85 year old female who presents to clinic today for the following health issues:      Patient is here today with multiple concerns:     1. She has been having problems with her left leg recently, it will give out randomly and then it will go away. It has been getting increasingly worse, painful and unable to walk. The worst was Saturday, better yesterday and today.   - not always painful but when it does \"ache\" she feels it over the outside of her left knee  - will sometimes \"ache\" at rest  - on Saturday she couldn't even use her cane to get around it was so bad but yesterday and today it has felt fine and doesn't need her cane at all  - denies any injury or fall  - no pain in her calf or foot    2. She has concerns with bladder control. At night she is getting up a lot to go.   - has been dx with overactive bladder in the past  - remembers being on a medication in the past but stopped it due to side effects (she thinks it was dry mouth)  - gets up every 1-2 hours  - is able to get back to sleep right away  - sometimes will not be able to make it in time and will leak a little    3. She has been having trouble with acid reflux in the last few weeks. She will drink water mixed with baking soda and it helps.   - had been doing a lot better on the protonix  - sx worse in the last few weeks  - was on vacation in [place] so not sure if it is due to travel and diet changes  - will drink some water with baking soda and symptoms will resolve immediately    4. Her right eyebrow area is  from her fall back in May.   - tender of she presses directly on it  - vision okay  - no headaches  - had MRI post fall given ongoing issues with dizziness that was negative for fracture      Problem list and histories reviewed & adjusted, as indicated.  Additional history: as documented    Current Outpatient Prescriptions   Medication " Sig Dispense Refill     oxybutynin (DITROPAN XL) 5 MG 24 hr tablet Take 1 tablet (5 mg) by mouth daily 30 tablet 1     levothyroxine (SYNTHROID/LEVOTHROID) 75 MCG tablet Take 1 tablet (75 mcg) by mouth daily 90 tablet 1     citalopram (CELEXA) 10 MG tablet Take 1 tablet (10 mg) by mouth daily for depression. This medication should be taken every day to be effective. 90 tablet 0     pantoprazole (PROTONIX) 20 MG EC tablet Take by mouth 30-60 minutes before a meal. 90 tablet 1     pramipexole (MIRAPEX) 0.5 MG tablet Take 1 tablet (0.5 mg) by mouth At Bedtime 90 tablet 3     albuterol (PROAIR HFA/PROVENTIL HFA/VENTOLIN HFA) 108 (90 BASE) MCG/ACT Inhaler Inhale 2 puffs into the lungs every 6 hours as needed for shortness of breath / dyspnea or wheezing 1 Inhaler 0     albuterol (PROAIR HFA/PROVENTIL HFA/VENTOLIN HFA) 108 (90 BASE) MCG/ACT Inhaler Inhale 2 puffs into the lungs every 6 hours as needed for shortness of breath / dyspnea or wheezing 1 Inhaler 0     losartan (COZAAR) 25 MG tablet Take 1 tablet (25 mg) by mouth daily 90 tablet 3     atorvastatin (LIPITOR) 40 MG tablet Take 1 tablet (40 mg) by mouth daily 90 tablet 3     Caffeine (NO DOZ OR) Take by mouth daily as needed       diclofenac (VOLTAREN) 1 % GEL topical gel Apply 2 grams to foot up to four times daily using enclosed dosing card. 100 g 1     diclofenac (VOLTAREN) 1 % GEL topical gel Apply  2 grams to foot up to four times daily using enclosed dosing card. 100 g 1     acetaminophen-codeine (TYLENOL #3) 300-30 MG per tablet Take 1-2 tablets by mouth every 4 hours as needed for other (mild or moderate pain) 10 tablet 0     acetaminophen-codeine (TYLENOL W/CODEINE NO. 3) 300-30 MG per tablet Take 1-2 tablets by mouth every 4 hours as needed for mild pain (mild or moderate pain) 30 tablet 0     metoprolol (TOPROL-XL) 25 MG 24 hr tablet Take 0.5 tablets (12.5 mg) by mouth daily 90 tablet 3     meclizine (ANTIVERT) 25 MG tablet Take 1 tablet (25 mg) by mouth  "every 6 hours as needed for dizziness 30 tablet 3     ondansetron (ZOFRAN) 4 MG tablet Take 1 tablet (4 mg) by mouth every 6 hours as needed for nausea 18 tablet 3     nitroglycerin (NITROSTAT) 0.4 MG SL tablet Place 1 tablet (0.4 mg) under the tongue every 5 minutes as needed for chest pain (call your doctor if you take a third dose) 25 tablet 3     Multiple Vitamins-Minerals (OCUVITE PO) Take 1 tablet by mouth daily.       ASPIRIN 81 MG PO TABS Take 2 tablets by mouth every evening. *.        CALCIUM 600 + D OR 1 tablet 2 times per day       FISH OIL OR 1 daily       ranitidine (ZANTAC) 150 MG tablet Take 1 tablet (150 mg) by mouth 2 times daily 60 tablet 1     Allergies   Allergen Reactions     Demerol      Nausea     Lisinopril Cough     Sulfa Drugs      Questionable itching reported by patient       Reviewed and updated as needed this visit by clinical staff       Reviewed and updated as needed this visit by Provider         ROS:  Remainder of ROS obtained and found to be negative other than that which was documented above      OBJECTIVE:     /70 (BP Location: Right arm, Patient Position: Chair, Cuff Size: Adult Regular)  Pulse 76  Temp 98  F (36.7  C) (Tympanic)  Ht 5' 6\" (1.676 m)  Wt 165 lb (74.8 kg)  BMI 26.63 kg/m2  Body mass index is 26.63 kg/(m^2).  GENERAL: healthy, alert and no distress  EYES: Eyes grossly normal to inspection. No redness but mild/trace edema over right lateral eyebrow. Tender to pressure over bone. No fluctuance. No crepitus  RESP: lungs clear to auscultation - no rales, rhonchi or wheezes  CV: regular rates and rhythm, normal S1 S2, no S3 or S4, no murmur, click or rub, peripheral pulses strong and no peripheral edema  KNEE, left:   No swelling or redness  Normal ROM today, not limited by pain  Tender to palpation over path of IT band around knee and up bottom 1/3 or leg  nontender in hip area  No popliteal mass or lump  Calf without swelling or tenderness    Diagnostic " "Test Results:  none     ASSESSMENT/PLAN:     (M25.562) Acute pain of left knee  (primary encounter diagnosis)  Comment: Normal gait and knee exam today in clinic but + tenderness over lower IT band just above and around knee. Suspect IT tightness/inflammation causing intermittent pain episodes. Referral to PT. If no improvement or worsening, consider imaging and other work up  Plan: [facility]            (N32.81) Overactive bladder  Comment: has been on ditropan in the past - can't remember if it helped or why she stopped. Remembers being on another medication as well (thought it started with a \"t\" but didn't like the side effects. Trial of ditropan again today.   Plan: oxybutynin (DITROPAN XL) 5 MG 24 hr tablet            (K21.9) Gastroesophageal reflux disease, esophagitis presence not specified  Comment:   Plan: Continue protonix  If episodes of heartburn become more frequent or worsen, I asked that she notify me  Consider adding back zantac (she says she has been off)    (R51) Facial pain  Comment:   Plan: slow healing but no signs of infection or other abnormality.   Continue to monitor for now        Kelly Matthew PA-C  [facility]        Patient Instructions   Give the eye some more time to heal - if pain worsens, let me know      For reflux/heartburn:     Continue the daily protonix    Okay to use baking soda/ water combo for breakthrough symptoms if needed    If, however, symptoms occur with more frequency or the baking soda/water combo doesn't work, let me know      For the leg weakness:     Schedule visit with physical therapy      For the overactive bladder:     Try ditropan - if you have side effects (dry mouth being the most common) let me know          "

## 2017-07-24 NOTE — NURSING NOTE
"Chief Complaint   Patient presents with     Extremity Weakness     Bladder Problems       Initial /70 (BP Location: Right arm, Patient Position: Chair, Cuff Size: Adult Regular)  Pulse 76  Temp 98  F (36.7  C) (Tympanic)  Ht 5' 6\" (1.676 m)  Wt 165 lb (74.8 kg)  BMI 26.63 kg/m2 Estimated body mass index is 26.63 kg/(m^2) as calculated from the following:    Height as of this encounter: 5' 6\" (1.676 m).    Weight as of this encounter: 165 lb (74.8 kg).  Medication Reconciliation: complete     Viraj Lofton CMA    "

## 2017-07-24 NOTE — PATIENT INSTRUCTIONS
Give the eye some more time to heal - if pain worsens, let me know      For reflux/heartburn:     Continue the daily protonix    Okay to use baking soda/ water combo for breakthrough symptoms if needed    If, however, symptoms occur with more frequency or the baking soda/water combo doesn't work, let me know      For the leg weakness:     Schedule visit with physical therapy      For the overactive bladder:     Try ditropan - if you have side effects (dry mouth being the most common) let me know

## 2017-07-24 NOTE — MR AVS SNAPSHOT
"              After Visit Summary   7/24/2017    Christiana Sal    MRN: [id number]           Patient Information     Date Of Birth          10/25/1931        Visit Information        Provider Department      7/24/2017 1:00 PM Kelly Matthew PA-C [facility]        Today's Diagnoses     Overactive bladder    -  1    Acute pain of left knee          Care Instructions    Give the eye some more time to heal - if pain worsens, let me know      For reflux/heartburn:     Continue the daily protonix    Okay to use baking soda/ water combo for breakthrough symptoms if needed    If, however, symptoms occur with more frequency or the baking soda/water combo doesn't work, let me know      For the leg weakness:     Schedule visit with physical therapy      For the overactive bladder:     Try ditropan - if you have side effects (dry mouth being the most common) let me know          Follow-ups after your visit        Additional Services     [facility] REFERR[facility]       **This order will print in the [facility] Scheduling Office**    Physical Therapy, Hand Therapy and Chiropractic Care are available through:    *[facility]  *[facility]  *[facility]    Call one number to schedule at any of the above locations: [phone].    Your provider has referred you to: Physical Therapy at [facility] or [facility]    Indication/Reason for Referral: left leg weakness, pain over outside of left knee - suspect IT band  Onset of Illness: worse in last month  Therapy Orders: Evaluate and Treat  Special Programs: None  Special Request: None    [facility]      Additional Comments for the Therapist or Chiropractor: has had issues with IT band pain in past around hip; now with intermittent days of increased pain and feeling like her knee \"gives out\". Tender over IT band around knee     Please be aware that coverage of these services is subject to the terms and " "limitations of your health insurance plan.  Call member services at your health plan with any benefit or coverage questions.      Please bring the following to your appointment:    *Your personal calendar for scheduling future appointments  *Comfortable clothing                  Who to contact     Normal or non-critical lab and imaging results will be communicated to you by Weizoomhart, letter or phone within 4 business days after the clinic has received the results. If you do not hear from us within 7 days, please contact the clinic through Weizoomhart or phone. If you have a critical or abnormal lab result, we will notify you by phone as soon as possible.  Submit refill requests through [employer] or call your pharmacy and they will forward the refill request to us. Please allow 3 business days for your refill to be completed.          If you need to speak with a  for additional information , please call: [phone]             Additional Information About Your Visit        [employer] Information     [employer] gives you secure access to your electronic health record. If you see a primary care provider, you can also send messages to your care team and make appointments. If you have questions, please call your primary care clinic.  If you do not have a primary care provider, please call [phone] and they will assist you.        Care EveryWhere ID     This is your Care EveryWhere ID. This could be used by other organizations to access your [facility] medical records  [id number]        Your Vitals Were     Pulse Temperature Height BMI (Body Mass Index)          76 98  F (36.7  C) (Tympanic) 5' 6\" (1.676 m) 26.63 kg/m2         Blood Pressure from Last 3 Encounters:   07/24/17 132/70   06/08/17 136/68   05/25/17 125/85    Weight from Last 3 Encounters:   07/24/17 165 lb (74.8 kg)   06/08/17 162 lb (73.5 kg)   05/25/17 166 lb (75.3 kg)              We Performed the Following     [facility] " REFERRAL          Today's Medication Changes          These changes are accurate as of: 7/24/17  1:47 PM.  If you have any questions, ask your nurse or doctor.               Start taking these medicines.        Dose/Directions    oxybutynin 5 MG 24 hr tablet   Commonly known as:  DITROPAN XL   Used for:  Overactive bladder   Started by:  Kelly Matthew PA-C        Dose:  5 mg   Take 1 tablet (5 mg) by mouth daily   Quantity:  30 tablet   Refills:  1         Stop taking these medicines if you haven't already. Please contact your care team if you have questions.     LANsoprazole 30 MG CR capsule   Commonly known as:  PREVACID   Stopped by:  Kelly Matthew PA-C                Where to get your medicines      These medications were sent to [facility] [place] [id number] [facility]  [address] [facility] [address]     Phone:  [phone]     oxybutynin 5 MG 24 hr tablet                Primary Care Provider Office Phone # Fax #    Kelly Matthew PA-C [phone]       [facility] [id number] [place] [address]        Equal Access to Services     [facility] AH: Hadii aad ku hadasho Soomaali, waaxda luqadaha, qaybta kaalmada adeegyada, waxay idiin hayaan adeyocasta nettlesaracody brian . So [facility] [phone].    ATENCIÓN: Si habla español, tiene a ozuna disposición servicios gratuitos de asistencia lingüística. Llame al [phone].    We comply with applicable federal civil rights laws and [place] laws. We do not discriminate on the basis of race, color, national origin, age, disability sex, sexual orientation or gender identity.            Thank you!     Thank you for choosing [facility]  for your care. Our goal is always to provide you with excellent care. Hearing back from our patients is one way we can continue to improve our services. Please take a few minutes to complete the written survey that you may receive in the  mail after your visit with us. Thank you!             Your Updated Medication List - Protect others around you: Learn how to safely use, store and throw away your medicines at www.disposemymeds.org.          This list is accurate as of: 7/24/17  1:47 PM.  Always use your most recent med list.                   Brand Name Dispense Instructions for use Diagnosis    * acetaminophen-codeine 300-30 MG per tablet    TYLENOL w/CODEINE No. 3    30 tablet    Take 1-2 tablets by mouth every 4 hours as needed for mild pain (mild or moderate pain)    S/P cardiac pacemaker procedure       * acetaminophen-codeine 300-30 MG per tablet    TYLENOL #3    10 tablet    Take 1-2 tablets by mouth every 4 hours as needed for other (mild or moderate pain)    S/P cardiac pacemaker procedure       * albuterol 108 (90 BASE) MCG/ACT Inhaler    PROAIR HFA/PROVENTIL HFA/VENTOLIN HFA    1 Inhaler    Inhale 2 puffs into the lungs every 6 hours as needed for shortness of breath / dyspnea or wheezing    Acute bronchitis with symptoms > 10 days       * albuterol 108 (90 BASE) MCG/ACT Inhaler    PROAIR HFA/PROVENTIL HFA/VENTOLIN HFA    1 Inhaler    Inhale 2 puffs into the lungs every 6 hours as needed for shortness of breath / dyspnea or wheezing    Acute bronchitis with symptoms > 10 days       aspirin 81 MG tablet      Take 2 tablets by mouth every evening. *.        atorvastatin 40 MG tablet    LIPITOR    90 tablet    Take 1 tablet (40 mg) by mouth daily    CAD S/P percutaneous coronary angioplasty       CALCIUM 600 + D PO      1 tablet 2 times per day        citalopram 10 MG tablet    celeXA    90 tablet    Take 1 tablet (10 mg) by mouth daily for depression. This medication should be taken every day to be effective.    Major depressive disorder, recurrent episode, mild (H)       * diclofenac 1 % Gel topical gel    VOLTAREN    100 g    Apply 2 grams to foot up to four times daily using enclosed dosing card.    Right foot pain       * diclofenac 1  % Gel topical gel    VOLTAREN    100 g    Apply  2 grams to foot up to four times daily using enclosed dosing card.    Right foot pain       FISH OIL PO      1 daily        levothyroxine 75 MCG tablet    SYNTHROID/LEVOTHROID    90 tablet    Take 1 tablet (75 mcg) by mouth daily    Hypothyroidism, unspecified type       losartan 25 MG tablet    COZAAR    90 tablet    Take 1 tablet (25 mg) by mouth daily    Essential hypertension with goal blood pressure less than 140/90       meclizine 25 MG tablet    ANTIVERT    30 tablet    Take 1 tablet (25 mg) by mouth every 6 hours as needed for dizziness    Vertigo       metoprolol 25 MG 24 hr tablet    TOPROL-XL    90 tablet    Take 0.5 tablets (12.5 mg) by mouth daily    Essential hypertension with goal blood pressure less than 140/90       nitroGLYcerin 0.4 MG sublingual tablet    NITROSTAT    25 tablet    Place 1 tablet (0.4 mg) under the tongue every 5 minutes as needed for chest pain (call your doctor if you take a third dose)    Atherosclerosis of native coronary artery of native heart without angina pectoris       NO DOZ OR      Take by mouth daily as needed        OCUVITE PO      Take 1 tablet by mouth daily.        ondansetron 4 MG tablet    ZOFRAN    18 tablet    Take 1 tablet (4 mg) by mouth every 6 hours as needed for nausea    Nausea       oxybutynin 5 MG 24 hr tablet    DITROPAN XL    30 tablet    Take 1 tablet (5 mg) by mouth daily    Overactive bladder       pantoprazole 20 MG EC tablet    PROTONIX    90 tablet    Take by mouth 30-60 minutes before a meal.    Gastroesophageal reflux disease, esophagitis presence not specified       pramipexole 0.5 MG tablet    MIRAPEX    90 tablet    Take 1 tablet (0.5 mg) by mouth At Bedtime    Restless leg syndrome       ranitidine 150 MG tablet    ZANTAC    60 tablet    Take 1 tablet (150 mg) by mouth 2 times daily    Gastroesophageal reflux disease, esophagitis presence not specified       * Notice:  This list has 6  medication(s) that are the same as other medications prescribed for you. Read the directions carefully, and ask your doctor or other care provider to review them with you.

## 2017-08-02 ENCOUNTER — THERAPY VISIT (OUTPATIENT)
Dept: PHYSICAL THERAPY | Facility: CLINIC | Age: 82
End: 2017-08-02
Payer: COMMERCIAL

## 2017-08-02 DIAGNOSIS — M79.605 PAIN OF LEFT LOWER EXTREMITY: Primary | ICD-10-CM

## 2017-08-02 PROCEDURE — 97162 PT EVAL MOD COMPLEX 30 MIN: CPT | Mod: GP | Performed by: PHYSICAL THERAPIST

## 2017-08-02 PROCEDURE — 97140 MANUAL THERAPY 1/> REGIONS: CPT | Mod: GP | Performed by: PHYSICAL THERAPIST

## 2017-08-02 PROCEDURE — 97110 THERAPEUTIC EXERCISES: CPT | Mod: GP | Performed by: PHYSICAL THERAPIST

## 2017-08-02 ASSESSMENT — ACTIVITIES OF DAILY LIVING (ADL)
STIFFNESS: THE SYMPTOM AFFECTS MY ACTIVITY SLIGHTLY
SWELLING: I HAVE THE SYMPTOM BUT IT DOES NOT AFFECT MY ACTIVITY
KNEEL ON THE FRONT OF YOUR KNEE: ACTIVITY IS FAIRLY DIFFICULT
KNEE_ACTIVITY_OF_DAILY_LIVING_SCORE: 62.86
LIMPING: THE SYMPTOM AFFECTS MY ACTIVITY MODERATELY
WALK: ACTIVITY IS SOMEWHAT DIFFICULT
PAIN: THE SYMPTOM AFFECTS MY ACTIVITY SLIGHTLY
GO UP STAIRS: ACTIVITY IS FAIRLY DIFFICULT
HOW_WOULD_YOU_RATE_THE_OVERALL_FUNCTION_OF_YOUR_KNEE_DURING_YOUR_USUAL_DAILY_ACTIVITIES?: NEARLY NORMAL
GIVING WAY, BUCKLING OR SHIFTING OF KNEE: THE SYMPTOM AFFECTS MY ACTIVITY MODERATELY
RAW_SCORE: 44
KNEE_ACTIVITY_OF_DAILY_LIVING_SUM: 44
RISE FROM A CHAIR: ACTIVITY IS MINIMALLY DIFFICULT
SIT WITH YOUR KNEE BENT: ACTIVITY IS SOMEWHAT DIFFICULT
HOW_WOULD_YOU_RATE_THE_CURRENT_FUNCTION_OF_YOUR_KNEE_DURING_YOUR_USUAL_DAILY_ACTIVITIES_ON_A_SCALE_FROM_0_TO_100_WITH_100_BEING_YOUR_LEVEL_OF_KNEE_FUNCTION_PRIOR_TO_YOUR_INJURY_AND_0_BEING_THE_INABILITY_TO_PERFORM_ANY_OF_YOUR_USUAL_DAILY_ACTIVITIES?: 75
AS_A_RESULT_OF_YOUR_KNEE_INJURY,_HOW_WOULD_YOU_RATE_YOUR_CURRENT_LEVEL_OF_DAILY_ACTIVITY?: NEARLY NORMAL
SQUAT: ACTIVITY IS SOMEWHAT DIFFICULT
STAND: ACTIVITY IS NOT DIFFICULT
WEAKNESS: THE SYMPTOM AFFECTS MY ACTIVITY SLIGHTLY
GO DOWN STAIRS: ACTIVITY IS NOT DIFFICULT

## 2017-08-02 NOTE — MR AVS SNAPSHOT
After Visit Summary   8/2/2017    Christiana Sal    MRN: [id number]           Patient Information     Date Of Birth          10/25/1931        Visit Information        Provider Department      8/2/2017 3:50 PM Tara Montoya, PT [facility]        Today's Diagnoses     Pain of left lower extremity    -  1       Follow-ups after your visit        Your next 10 appointments already scheduled     Aug 09, 2017  3:10 PM CDT   MARY KAY Extremity with Tara Montoya PT   [facility] ([facility])    [address] [facility] [address] [phone]            Aug 16, 2017  8:10 AM CDT   MARY KAY Extremity with Candice Perez PT   [facility]    [address] [facility] [address] [phone]              Who to contact     If you have questions or need follow up information about today's clinic visit or your schedule please contact [facility] directly at [phone].  Normal or non-critical lab and imaging results will be communicated to you by [employer], letter or phone within 4 business days after the clinic has received the results. If you do not hear from us within 7 days, please contact the clinic through [employer]un[employer] or phone. If you have a critical or abnormal lab result, we will notify you by phone as soon as possible.  Submit refill requests through [employer] or call your pharmacy and they will forward the refill request to us. Please allow 3 business days for your refill to be completed.          Additional Information About Your Visit        K[employer]unhart Information     [employer] gives you secure access to your electronic health record. If you see a primary care provider, you can also send messages to your care team and make appointments. If you have questions, please call your primary care clinic.  If you do not have a primary care provider, please call [phone] and they will assist you.        Care  EveryWhere ID     This is your Care EveryWhere ID. This could be used by other organizations to access your [facility] medical records  [id number]         Blood Pressure from Last 3 Encounters:   07/24/17 132/70   06/08/17 136/68   05/25/17 125/85    Weight from Last 3 Encounters:   07/24/17 74.8 kg (165 lb)   06/08/17 73.5 kg (162 lb)   05/25/17 75.3 kg (166 lb)              We Performed the Following     Manual Ther Tech, 1+Regions, EA 15 min     PT Eval, Moderate Complexity (24628)     Therapeutic Exercises        Primary Care Provider Office Phone # Fax #    Kelly Matthew PA-C [phone]       [address] [address]        Equal Access to Services     SOFIA EDUARDO : Hadii aad ku hadasho Soomaali, waaxda luqadaha, qaybta kaalmada adeegyada, danya brian . So [facility] [phone].    ATENCIÓN: Si habla español, tiene a ozuna disposición servicios gratuitos de asistencia lingüística. Moisés al [phone].    We comply with applicable federal civil rights laws and [place] laws. We do not discriminate on the basis of race, color, national origin, age, disability sex, sexual orientation or gender identity.            Thank you!     Thank you for choosing [facility]  for your care. Our goal is always to provide you with excellent care. Hearing back from our patients is one way we can continue to improve our services. Please take a few minutes to complete the written survey that you may receive in the mail after your visit with us. Thank you!             Your Updated Medication List - Protect others around you: Learn how to safely use, store and throw away your medicines at www.disposemymeds.org.          This list is accurate as of: 8/2/17 11:59 PM.  Always use your most recent med list.                   Brand Name Dispense Instructions for use Diagnosis    * acetaminophen-codeine 300-30 MG per tablet     TYLENOL w/CODEINE No. 3    30 tablet    Take 1-2 tablets by mouth every 4 hours as needed for mild pain (mild or moderate pain)    S/P cardiac pacemaker procedure       * acetaminophen-codeine 300-30 MG per tablet    TYLENOL #3    10 tablet    Take 1-2 tablets by mouth every 4 hours as needed for other (mild or moderate pain)    S/P cardiac pacemaker procedure       * albuterol 108 (90 BASE) MCG/ACT Inhaler    PROAIR HFA/PROVENTIL HFA/VENTOLIN HFA    1 Inhaler    Inhale 2 puffs into the lungs every 6 hours as needed for shortness of breath / dyspnea or wheezing    Acute bronchitis with symptoms > 10 days       * albuterol 108 (90 BASE) MCG/ACT Inhaler    PROAIR HFA/PROVENTIL HFA/VENTOLIN HFA    1 Inhaler    Inhale 2 puffs into the lungs every 6 hours as needed for shortness of breath / dyspnea or wheezing    Acute bronchitis with symptoms > 10 days       aspirin 81 MG tablet      Take 2 tablets by mouth every evening. *.        atorvastatin 40 MG tablet    LIPITOR    90 tablet    Take 1 tablet (40 mg) by mouth daily    CAD S/P percutaneous coronary angioplasty       CALCIUM 600 + D PO      1 tablet 2 times per day        citalopram 10 MG tablet    celeXA    90 tablet    Take 1 tablet (10 mg) by mouth daily for depression. This medication should be taken every day to be effective.    Major depressive disorder, recurrent episode, mild (H)       * diclofenac 1 % Gel topical gel    VOLTAREN    100 g    Apply 2 grams to foot up to four times daily using enclosed dosing card.    Right foot pain       * diclofenac 1 % Gel topical gel    VOLTAREN    100 g    Apply  2 grams to foot up to four times daily using enclosed dosing card.    Right foot pain       FISH OIL PO      1 daily        levothyroxine 75 MCG tablet    SYNTHROID/LEVOTHROID    90 tablet    Take 1 tablet (75 mcg) by mouth daily    Hypothyroidism, unspecified type       losartan 25 MG tablet    COZAAR    90 tablet    Take 1 tablet (25 mg) by mouth daily     Essential hypertension with goal blood pressure less than 140/90       meclizine 25 MG tablet    ANTIVERT    30 tablet    Take 1 tablet (25 mg) by mouth every 6 hours as needed for dizziness    Vertigo       metoprolol 25 MG 24 hr tablet    TOPROL-XL    90 tablet    Take 0.5 tablets (12.5 mg) by mouth daily    Essential hypertension with goal blood pressure less than 140/90       nitroGLYcerin 0.4 MG sublingual tablet    NITROSTAT    25 tablet    Place 1 tablet (0.4 mg) under the tongue every 5 minutes as needed for chest pain (call your doctor if you take a third dose)    Atherosclerosis of native coronary artery of native heart without angina pectoris       NO DOZ OR      Take by mouth daily as needed        OCUVITE PO      Take 1 tablet by mouth daily.        ondansetron 4 MG tablet    ZOFRAN    18 tablet    Take 1 tablet (4 mg) by mouth every 6 hours as needed for nausea    Nausea       oxybutynin 5 MG 24 hr tablet    DITROPAN XL    30 tablet    Take 1 tablet (5 mg) by mouth daily    Overactive bladder       pantoprazole 20 MG EC tablet    PROTONIX    90 tablet    Take by mouth 30-60 minutes before a meal.    Gastroesophageal reflux disease, esophagitis presence not specified       pramipexole 0.5 MG tablet    MIRAPEX    90 tablet    Take 1 tablet (0.5 mg) by mouth At Bedtime    Restless leg syndrome       ranitidine 150 MG tablet    ZANTAC    60 tablet    Take 1 tablet (150 mg) by mouth 2 times daily    Gastroesophageal reflux disease, esophagitis presence not specified       * Notice:  This list has 6 medication(s) that are the same as other medications prescribed for you. Read the directions carefully, and ask your doctor or other care provider to review them with you.

## 2017-08-02 NOTE — PROGRESS NOTES
[facility] Initial Evaluation  Subjective:    Patient is a 85 year old female presenting with rehab left knee hpi.   Christiana Sal is a 85 year old female with a left knee (whole left leg) condition.  Condition occurred with:  Insidious onset.  Condition occurred: for unknown reasons.  This is a chronic condition  A few months ago pain worsened, no injury, but tends to intermittently give out with increased pain.  She saw MD for this on 7/24/17..    Patient reports pain:  Lateral.    Pain is described as sharp and aching and is intermittent (constant ache) and reported as 3/10.  Associated symptoms:  Buckling/giving out and loss of strength. Pain is worse during the day.  Symptoms are exacerbated by walking and certain positions and relieved by rest.  Since onset symptoms are unchanged.    Previous treatment includes chiropractic (for low back).  There was moderate improvement following previous treatment.  General health as reported by patient is fair.  Pertinent medical history includes:  Osteoarthritis, high blood pressure, heart problems, implanted device and sleep disorder/apnea.    Other surgeries include:  Heart surgery and orthopedic surgery.  Current medications:  Cardiac medication, high blood pressure medication and anti-depressants.  Current occupation is Retired, does walk for exercise but now cannot walk very far.                                    Objective:    Standing Alignment:        Lumbar:  Convex scoliosis R  Pelvic:  Iliac crest high L              Flexibility/Screens:       Lower Extremity:  Decreased left lower extremity flexibility:IT Band and Quadriceps    Decreased right lower extremity flexibility:  IT Band and Quadriceps                                                      Knee Evaluation:  ROM:  AROM: normal      PROM    Hyperextension: Left: 0   Right:     Flexion: Left: 140   Right:       Strength:     Extension:  Left: 5/5    Pain:-      Right: 5/5     Pain:-  Flexion:  Left: 5/5    Pain:-      Right: 5/5    Pain:-    Quad Set Left: Fair    Pain:   Quad Set Right: Fair    Pain:      Palpation:    Left knee tenderness present at:  IT Band (and quad, SLR is difficult to do)              General     ROS    Assessment/Plan:      Patient is a 85 year old female with left side knee complaints.    Patient has the following significant findings with corresponding treatment plan.                Diagnosis 1:  Left leg pain, ITB syndrome  Pain -  manual therapy  Decreased ROM/flexibility - manual therapy and therapeutic exercise  Impaired muscle performance - neuro re-education    Therapy Evaluation Codes:   1) History comprised of:   Personal factors that impact the plan of care:      Age and Past/current experiences.    Comorbidity factors that impact the plan of care are:      High blood pressure and Osteoarthritis.     Medications impacting care: Anti-depressant, Cardiac and High blood pressure.  2) Examination of Body Systems comprised of:   Body structures and functions that impact the plan of care:      Knee, Lumbar spine and Pelvis.   Activity limitations that impact the plan of care are:      Squatting/kneeling, Stairs, Standing and Walking.  3) Clinical presentation characteristics are:   Unstable/Unpredictable.  4) Decision-Making    Moderate complexity using standardized patient assessment instrument and/or measureable assessment of functional outcome.  Cumulative Therapy Evaluation is: Moderate complexity.    Previous and current functional limitations:  (See Goal Flow Sheet for this information)    Short term and Long term goals: (See Goal Flow Sheet for this information)     Communication ability:  Patient appears to be able to clearly communicate and understand verbal and written communication and follow directions correctly.  Treatment Explanation - The following has been discussed with the patient:   RX ordered/plan of care  Anticipated outcomes  Possible  risks and side effects  This patient would benefit from PT intervention to resume normal activities.   Rehab potential is good.    Frequency:  2 X week, once daily  Duration:  for 4 weeks  Discharge Plan:  Achieve all LTG.  Independent in home treatment program.  Reach maximal therapeutic benefit.    Please refer to the daily flowsheet for treatment today, total treatment time and time spent performing 1:1 timed codes.

## 2017-08-03 PROBLEM — M79.605 PAIN OF LEFT LOWER EXTREMITY: Status: ACTIVE | Noted: 2017-08-03

## 2017-08-10 ENCOUNTER — TELEPHONE (OUTPATIENT)
Dept: FAMILY MEDICINE | Facility: CLINIC | Age: 82
End: 2017-08-10

## 2017-08-10 ENCOUNTER — THERAPY VISIT (OUTPATIENT)
Dept: PHYSICAL THERAPY | Facility: CLINIC | Age: 82
End: 2017-08-10
Payer: COMMERCIAL

## 2017-08-10 DIAGNOSIS — M79.605 PAIN OF LEFT LOWER EXTREMITY: ICD-10-CM

## 2017-08-10 PROCEDURE — 97110 THERAPEUTIC EXERCISES: CPT | Mod: GP | Performed by: PHYSICAL THERAPY ASSISTANT

## 2017-08-10 PROCEDURE — 97140 MANUAL THERAPY 1/> REGIONS: CPT | Mod: GP | Performed by: PHYSICAL THERAPY ASSISTANT

## 2017-08-10 NOTE — TELEPHONE ENCOUNTER
Patient started ditropan 5 mg on 7/24/17. She has not notice much of a difference. She is still up every 1.5 hours during the night. She is wondering if she should be switched to a different medication or should she give it more time. Also she wanted to let you know her cough is starting to come back and thought that it may be related to reflux. Advised patient to drink plenty of water. How else do you advise?    Charmaine Kerr RN

## 2017-08-10 NOTE — TELEPHONE ENCOUNTER
Reason for call:  Patient reporting a symptom    Symptom or request: Christiana stopped @ .  She states that her incontinence medication is not working for her.  She states also that her coughing is returning.  Please call and assess. Thank you..Consuelo Hawthorne    Duration (how long have symptoms been present): did not specify     Have you been treated for this before? Yes    Additional comments: none    Phone Number patient can be reached at:  Home number on file [phone] (home)    Best Time:  anytime    Can we leave a detailed message on this number:  YES    Call taken on 8/10/2017 at 3:13 PM by Consuelo Hawthorne

## 2017-08-13 NOTE — TELEPHONE ENCOUNTER
She is at a very low dose of the ditropan so she can increase to 2 tablets (10mg) and see if that helps. I can also refer her to urology if she would like to discuss with them.     Has she changed any of her medications for reflux to make her think that the cough is from that?     Kelly

## 2017-08-14 NOTE — TELEPHONE ENCOUNTER
"Christiana notified of Kelly's advice to take 2 ditropan. She agreed with plan. She said that she takes the same reflux medications of pantaprazole and ranitidine. She denies fever. She reports that she occasionally coughs up phlegm. She denies \"cold symptoms\". She does report that she takes nasonex to keep her sinuses clear.  Roel Garza RN    "

## 2017-08-16 ENCOUNTER — THERAPY VISIT (OUTPATIENT)
Dept: PHYSICAL THERAPY | Facility: CLINIC | Age: 82
End: 2017-08-16
Payer: COMMERCIAL

## 2017-08-16 DIAGNOSIS — M79.605 PAIN OF LEFT LOWER EXTREMITY: ICD-10-CM

## 2017-08-16 PROCEDURE — 97110 THERAPEUTIC EXERCISES: CPT | Mod: GP | Performed by: PHYSICAL THERAPIST

## 2017-08-16 PROCEDURE — 97140 MANUAL THERAPY 1/> REGIONS: CPT | Mod: GP | Performed by: PHYSICAL THERAPIST

## 2017-08-16 PROCEDURE — 97112 NEUROMUSCULAR REEDUCATION: CPT | Mod: GP | Performed by: PHYSICAL THERAPIST

## 2017-08-16 NOTE — TELEPHONE ENCOUNTER
Message left on answering machine to call the [facility] RN back. We had connected so we had started a conversation and then we got disconnected so I tried calling her again and left her a message to call back.   Roel Garza RN

## 2017-08-16 NOTE — TELEPHONE ENCOUNTER
Please have patient schedule a follow up in 1-2 weeks so we can see if there has been improvement on the increased dose of ditropan.   Also, I would like to follow up with her cough during the visit. If the cough seems to be getting worse, have her follow up sooner.   Thanks  Kelly

## 2017-08-16 NOTE — TELEPHONE ENCOUNTER
"IZABEL Mendoza: Patient said that her cough is \"a little better.\" she started the increase to 2 ditropan yesterday. She said that she would look at her calendar and call back to schedule an appointment in 2 weeks.  Roel Garza RN    "

## 2017-08-17 PROBLEM — S42.409A FRACTURE OF ELBOW: Status: RESOLVED | Noted: 2017-05-30 | Resolved: 2017-08-17

## 2017-08-18 NOTE — PROGRESS NOTES
Discharge Note    Progress reporting period is from initial eval to Jun 8, 2017.     Christiana failed to return for next follow up visit and current status is unknown.  Please see information below for last relevant information on current status.  Patient seen for Rxs Used: 2 visits.  SUBJECTIVE  Subjective changes noted by patient:  Subjective: Patient states things are getting better.  Continues to have occasional pain with certain reaching motions, lifting objects and opening doors.  Able to perform self cares and normal ADLs however.   Would like to trial some time on her own to cont progresing.  Will return if she cont to have pain or limitations  .  Current pain level is Current Pain level: 1/10.     Previous pain level was  Initial Pain level: 5/10.   Changes in function:  Yes (See Goal flowsheet attached for changes in current functional level)  Adverse reaction to treatment or activity: None    OBJECTIVE  Changes noted in objective findings: Objective: elbow AROM:  flex: 143 (min loss compared to (R), ext:  15 from neutral, supination/pronatioin:  wnl, wrist flex/Ext: wnl      ASSESSMENT/PLAN  Diagnosis: fx radial head   DIAGP:  The encounter diagnosis was Fracture of elbow, left, with routine healing, subsequent encounter.  Updated problem list and treatment plan:   unknown  STG/LTGs have been met or progress has been made towards goals:  Yes, please see goal flowsheet for most current information  Assessment of Progress: current status is unknown.  Last current status: Assessment of progress: Pt is progressing well, Pt is progressing as expected   Self Management Plans:  HEP  I have re-evaluated this patient and find that the nature, scope, duration and intensity of the therapy is appropriate for the medical condition of the patient.  Christiana continues to require the following intervention to meet STG and LTG's:  HEP.    Recommendations:  Discharge with current home program.  Patient to follow up with MD  as needed.    Please refer to the daily flowsheet for treatment today, total treatment time and time spent performing 1:1 timed codes.

## 2017-08-24 ENCOUNTER — THERAPY VISIT (OUTPATIENT)
Dept: PHYSICAL THERAPY | Facility: CLINIC | Age: 82
End: 2017-08-24
Payer: COMMERCIAL

## 2017-08-24 DIAGNOSIS — M79.605 PAIN OF LEFT LOWER EXTREMITY: ICD-10-CM

## 2017-08-24 PROCEDURE — 97110 THERAPEUTIC EXERCISES: CPT | Mod: GP | Performed by: PHYSICAL THERAPY ASSISTANT

## 2017-08-24 PROCEDURE — 97140 MANUAL THERAPY 1/> REGIONS: CPT | Mod: GP | Performed by: PHYSICAL THERAPY ASSISTANT

## 2017-08-28 ENCOUNTER — HOSPITAL ENCOUNTER (EMERGENCY)
Facility: CLINIC | Age: 82
Discharge: HOME | End: 2017-08-28
Attending: EMERGENCY MEDICINE | Admitting: EMERGENCY MEDICINE
Payer: MEDICARE

## 2017-08-28 ENCOUNTER — OFFICE VISIT (OUTPATIENT)
Dept: FAMILY MEDICINE | Facility: CLINIC | Age: 82
End: 2017-08-28
Payer: COMMERCIAL

## 2017-08-28 VITALS
HEART RATE: 56 BPM | WEIGHT: 165.3 LBS | TEMPERATURE: 97.8 F | HEIGHT: 66 IN | SYSTOLIC BLOOD PRESSURE: 160 MMHG | BODY MASS INDEX: 26.57 KG/M2 | DIASTOLIC BLOOD PRESSURE: 81 MMHG

## 2017-08-28 VITALS
WEIGHT: 165 LBS | BODY MASS INDEX: 26.63 KG/M2 | RESPIRATION RATE: 17 BRPM | TEMPERATURE: 98.4 F | OXYGEN SATURATION: 96 % | SYSTOLIC BLOOD PRESSURE: 152 MMHG | HEART RATE: 63 BPM | DIASTOLIC BLOOD PRESSURE: 80 MMHG

## 2017-08-28 DIAGNOSIS — I25.10 CAD S/P PERCUTANEOUS CORONARY ANGIOPLASTY: ICD-10-CM

## 2017-08-28 DIAGNOSIS — I25.119 ATHEROSCLEROSIS OF NATIVE CORONARY ARTERY OF NATIVE HEART WITH ANGINA PECTORIS (H): ICD-10-CM

## 2017-08-28 DIAGNOSIS — I25.10 ATHEROSCLEROSIS OF NATIVE CORONARY ARTERY OF NATIVE HEART, ANGINA PRESENCE UNSPECIFIED: ICD-10-CM

## 2017-08-28 DIAGNOSIS — Z98.61 CAD S/P PERCUTANEOUS CORONARY ANGIOPLASTY: ICD-10-CM

## 2017-08-28 DIAGNOSIS — R07.2 PRECORDIAL CHEST PAIN: ICD-10-CM

## 2017-08-28 DIAGNOSIS — R42 DIZZINESS: Primary | ICD-10-CM

## 2017-08-28 DIAGNOSIS — I49.5 SINOATRIAL NODE DYSFUNCTION (H): ICD-10-CM

## 2017-08-28 DIAGNOSIS — R73.09 ELEVATED GLUCOSE LEVEL: ICD-10-CM

## 2017-08-28 DIAGNOSIS — R07.89 ATYPICAL CHEST PAIN: ICD-10-CM

## 2017-08-28 DIAGNOSIS — Z98.61 POSTSURGICAL PERCUTANEOUS TRANSLUMINAL CORONARY ANGIOPLASTY STATUS: ICD-10-CM

## 2017-08-28 DIAGNOSIS — I10 ESSENTIAL HYPERTENSION, BENIGN: ICD-10-CM

## 2017-08-28 LAB
ALBUMIN SERPL-MCNC: 3.5 G/DL (ref 3.4–5)
ALP SERPL-CCNC: 103 U/L (ref 40–150)
ALT SERPL W P-5'-P-CCNC: 25 U/L (ref 0–50)
ANION GAP SERPL CALCULATED.3IONS-SCNC: 8 MMOL/L (ref 3–14)
AST SERPL W P-5'-P-CCNC: 19 U/L (ref 0–45)
BASOPHILS # BLD AUTO: 0 10E9/L (ref 0–0.2)
BASOPHILS NFR BLD AUTO: 0.3 %
BILIRUB SERPL-MCNC: 0.4 MG/DL (ref 0.2–1.3)
BUN SERPL-MCNC: 16 MG/DL (ref 7–30)
CALCIUM SERPL-MCNC: 8.4 MG/DL (ref 8.5–10.1)
CHLORIDE SERPL-SCNC: 107 MMOL/L (ref 94–109)
CO2 SERPL-SCNC: 23 MMOL/L (ref 20–32)
CREAT SERPL-MCNC: 0.9 MG/DL (ref 0.52–1.04)
DIFFERENTIAL METHOD BLD: NORMAL
EOSINOPHIL # BLD AUTO: 0.4 10E9/L (ref 0–0.7)
EOSINOPHIL NFR BLD AUTO: 5.3 %
ERYTHROCYTE [DISTWIDTH] IN BLOOD BY AUTOMATED COUNT: 13.9 % (ref 10–15)
GFR SERPL CREATININE-BSD FRML MDRD: 59 ML/MIN/1.7M2
GLUCOSE SERPL-MCNC: 144 MG/DL (ref 70–99)
HCT VFR BLD AUTO: 39.2 % (ref 35–47)
HGB BLD-MCNC: 13.1 G/DL (ref 11.7–15.7)
IMM GRANULOCYTES # BLD: 0 10E9/L (ref 0–0.4)
IMM GRANULOCYTES NFR BLD: 0.1 %
LYMPHOCYTES # BLD AUTO: 1.1 10E9/L (ref 0.8–5.3)
LYMPHOCYTES NFR BLD AUTO: 15.9 %
MCH RBC QN AUTO: 30.1 PG (ref 26.5–33)
MCHC RBC AUTO-ENTMCNC: 33.4 G/DL (ref 31.5–36.5)
MCV RBC AUTO: 90 FL (ref 78–100)
MONOCYTES # BLD AUTO: 1.1 10E9/L (ref 0–1.3)
MONOCYTES NFR BLD AUTO: 16.6 %
NEUTROPHILS # BLD AUTO: 4.2 10E9/L (ref 1.6–8.3)
NEUTROPHILS NFR BLD AUTO: 61.8 %
PLATELET # BLD AUTO: 229 10E9/L (ref 150–450)
POTASSIUM SERPL-SCNC: 3.4 MMOL/L (ref 3.4–5.3)
PROT SERPL-MCNC: 6.7 G/DL (ref 6.8–8.8)
RBC # BLD AUTO: 4.35 10E12/L (ref 3.8–5.2)
SODIUM SERPL-SCNC: 138 MMOL/L (ref 133–144)
TROPONIN I SERPL-MCNC: <0.015 UG/L (ref 0–0.04)
WBC # BLD AUTO: 6.8 10E9/L (ref 4–11)

## 2017-08-28 PROCEDURE — 93010 ELECTROCARDIOGRAM REPORT: CPT | Mod: Z6 | Performed by: EMERGENCY MEDICINE

## 2017-08-28 PROCEDURE — 84484 ASSAY OF TROPONIN QUANT: CPT | Performed by: EMERGENCY MEDICINE

## 2017-08-28 PROCEDURE — 99215 OFFICE O/P EST HI 40 MIN: CPT | Performed by: PHYSICIAN ASSISTANT

## 2017-08-28 PROCEDURE — 99284 EMERGENCY DEPT VISIT MOD MDM: CPT | Mod: 25

## 2017-08-28 PROCEDURE — 80053 COMPREHEN METABOLIC PANEL: CPT | Performed by: EMERGENCY MEDICINE

## 2017-08-28 PROCEDURE — 93005 ELECTROCARDIOGRAM TRACING: CPT

## 2017-08-28 PROCEDURE — 85025 COMPLETE CBC W/AUTO DIFF WBC: CPT | Performed by: EMERGENCY MEDICINE

## 2017-08-28 PROCEDURE — 99284 EMERGENCY DEPT VISIT MOD MDM: CPT

## 2017-08-28 PROCEDURE — 99284 EMERGENCY DEPT VISIT MOD MDM: CPT | Mod: 25 | Performed by: EMERGENCY MEDICINE

## 2017-08-28 PROCEDURE — 93000 ELECTROCARDIOGRAM COMPLETE: CPT | Performed by: PHYSICIAN ASSISTANT

## 2017-08-28 NOTE — ED PROVIDER NOTES
"  History     Chief Complaint   Patient presents with     Headache     and dizziness     HPI  Christiana Sal is a 85 year old female with 2nd past medical history for known coronary artery disease, deployment of MICHAEL's, prior history of TIA, hypertension, hyperlipidemia, GERD, depression, PUD with prior bleeding, hypothyroidism and sick sinus syndrome requiring pacemaker in situ([employer] dual lead-on-demand).  Presents complaints of fatigue, mild headache, intermittent dizziness-primarily with exertion, some SOB with exertion and substernal chest discomfort which she refers to as her \"reflux pain\".  The patient reports discomfort in his substernal region sometimes radiates up into the neck.  She's had discomfort radiating to her left shoulder and into the left posterior thoracic/left scapular area.  Some correlation with exertion.  Presents symptom-free at this time with her daughter.  Is followed by cardiology at [facility].  Was seen in May 2017.  Interrogation of pacemaker that time showed normal functionality.  November 2016 patient had Lexiscan which showed a favorable ejection fraction with no reversible ischemia.  Patient reports fatigue and decreased stamina as well.  Has had no near-syncope or syncope events.    I have reviewed the Medications, Allergies, Past Medical and Surgical History, and Social History in the Epic system.    Allergies:   Allergies   Allergen Reactions     Demerol Nausea     Lisinopril Cough     Sulfa Drugs Other (See Comments)     Questionable itching reported by patient         No current facility-administered medications on file prior to encounter.   Current Outpatient Prescriptions on File Prior to Encounter:  oxybutynin (DITROPAN XL) 5 MG 24 hr tablet Take 1 tablet (5 mg) by mouth daily   levothyroxine (SYNTHROID/LEVOTHROID) 75 MCG tablet Take 1 tablet (75 mcg) by mouth daily   citalopram (CELEXA) 10 MG tablet Take 1 tablet (10 mg) by mouth daily for depression. This " medication should be taken every day to be effective.   pantoprazole (PROTONIX) 20 MG EC tablet Take by mouth 30-60 minutes before a meal. (Patient taking differently: Take 20 mg by mouth daily Take by mouth 30-60 minutes before a meal.)   pramipexole (MIRAPEX) 0.5 MG tablet Take 1 tablet (0.5 mg) by mouth At Bedtime   albuterol (PROAIR HFA/PROVENTIL HFA/VENTOLIN HFA) 108 (90 BASE) MCG/ACT Inhaler Inhale 2 puffs into the lungs every 6 hours as needed for shortness of breath / dyspnea or wheezing   ranitidine (ZANTAC) 150 MG tablet Take 1 tablet (150 mg) by mouth 2 times daily   losartan (COZAAR) 25 MG tablet Take 1 tablet (25 mg) by mouth daily   atorvastatin (LIPITOR) 40 MG tablet Take 1 tablet (40 mg) by mouth daily   metoprolol (TOPROL-XL) 25 MG 24 hr tablet Take 0.5 tablets (12.5 mg) by mouth daily   ondansetron (ZOFRAN) 4 MG tablet Take 1 tablet (4 mg) by mouth every 6 hours as needed for nausea   nitroglycerin (NITROSTAT) 0.4 MG SL tablet Place 1 tablet (0.4 mg) under the tongue every 5 minutes as needed for chest pain (call your doctor if you take a third dose)   Multiple Vitamins-Minerals (OCUVITE PO) Take 1 tablet by mouth daily.   ASPIRIN 81 MG PO TABS Take 2 tablets by mouth every evening. *.    CALCIUM 600 + D OR 1 tablet by mouth 2 times per day   FISH OIL OR 1 daily   albuterol (PROAIR HFA/PROVENTIL HFA/VENTOLIN HFA) 108 (90 BASE) MCG/ACT Inhaler Inhale 2 puffs into the lungs every 6 hours as needed for shortness of breath / dyspnea or wheezing       Patient Active Problem List   Diagnosis     Coronary atherosclerosis of native coronary artery     Hyperlipidemia LDL goal <100     Essential hypertension, benign     Hypothyroidism     GERD (gastroesophageal reflux disease)     Family history of colon cancer     Hypertension goal BP (blood pressure) < 140/90     Major depressive disorder, recurrent episode, mild (H)     Advanced directives, counseling/discussion     Angina pectoris (H)     Vulvar pruritus  "    Lichen planus     JENIFER (obstructive sleep apnea)     CAD S/P percutaneous coronary angioplasty     S/P cardiac pacemaker procedure     Sinoatrial node dysfunction (H)     Cardiac pacemaker - [employer] dual lead - Not Dependent     Insect bite     Pain of left lower extremity       Past Surgical History:   Procedure Laterality Date     APPENDECTOMY       CARDIAC CATHERIZATION  1/15/04    drug-eluding stent RCA, Dr. Pérez, [facility]     CARDIAC CATHERIZATION  2/16/12    diffuse mild/mod disease, no new stent     CATARACT IOL, RT/LT  1/26/12    RT, Dr. Wynne     CHOLECYSTECTOMY, OPEN       COLONOSCOPY  2008    [facility]     FRACTURE TX, ANKLE RT/LT      LT, 2 screws remain     HC EXCISE HAND/FOOT NEUROMA      RT     HC LARYNGOSCOPY DIRECT W VOCAL CORD INJECTION      vocal cord polypectomy     HC REMOVAL OF OVARIAN CYST(S)       HC TRANSCATH STENT INIT VESSEL,PERCUT      x 2     REPAIR HAMMER TOE  9/13/10    multiple + RT great toe tendon release, Dr. Sanchez DPM     STENT  5-2016    Cardiac stents 6 placed.       Social History   Substance Use Topics     Smoking status: Never Smoker     Smokeless tobacco: Never Used      Comment: Never smoker; no secondhand smoke exposure     Alcohol use Yes      Comment: social       Most Recent Immunizations   Administered Date(s) Administered     Influenza (High Dose) 3 valent vaccine 10/21/2016     Influenza (IIV3) 09/15/2015     Pneumococcal (PCV 13) 12/23/2015     Pneumococcal 23 valent 05/04/2010     TD (ADULT, 7+) 05/04/2010     Zoster vaccine, live 01/15/2013       BMI: Estimated body mass index is 26.63 kg/(m^2) as calculated from the following:    Height as of an earlier encounter on 8/28/17: 1.676 m (5' 6\").    Weight as of this encounter: 74.8 kg (165 lb).      Review of Systems   Constitutional: Positive for activity change and fatigue. Negative for appetite change, fever and unexpected weight change.   HENT: Negative.    Eyes: Negative.  "   Respiratory: Positive for shortness of breath.    Cardiovascular: Positive for chest pain. Negative for palpitations and leg swelling.   Gastrointestinal: Negative.    Endocrine: Negative.    Genitourinary: Positive for frequency.   Musculoskeletal: Positive for neck pain.   Neurological: Positive for dizziness, weakness and light-headedness.   Hematological: Does not bruise/bleed easily.   Psychiatric/Behavioral: Negative.    All other systems reviewed and are negative.      Physical Exam   BP: 163/82  Pulse: 63  Heart Rate: 62  Temp: 97.4  F (36.3  C)  Resp: 18  Weight: 74.8 kg (165 lb)  SpO2: 94 %  Physical Exam   Constitutional: She appears well-developed and well-nourished. No distress.   HENT:   Head: Normocephalic and atraumatic.   Mouth/Throat: No oropharyngeal exudate.   Eyes: Conjunctivae and EOM are normal. Pupils are equal, round, and reactive to light. No scleral icterus.   Mild [place]   Neck: Normal range of motion. Neck supple. No JVD present. No thyromegaly present.   No carotid bruit   Cardiovascular: Normal rate, intact distal pulses and normal pulses.  An irregular rhythm present.   Pulmonary/Chest: Effort normal and breath sounds normal. No respiratory distress. She has no wheezes. She has no rales.   Abdominal: Soft. Bowel sounds are normal. She exhibits no distension. There is no tenderness.   Musculoskeletal: Normal range of motion. She exhibits no edema.   Lymphadenopathy:     She has no cervical adenopathy.   Neurological: She is alert.   Skin: Skin is warm and dry. No erythema.   Psychiatric: Thought content normal.   Nursing note and vitals reviewed.      ED Course     ED Course     Procedures        EKG:   Interpretation by me  Sinus  Rhythm  -First degree A-V block  - occasional PAC   irate signs of scabies per minute  Yang = 288   # PACs = 1.  -Left bundle branch block and left axis.     Cardiac monitor:  Majority of patients underlying rhythm is negative at times pacemaker spikes are  noted following a short episode of bradycardia with a demand pacemaker activity.    ABNORMAL EKG             Results for orders placed or performed during the hospital encounter of 08/28/17   CBC with platelets differential   Result Value Ref Range    WBC 6.8 4.0 - 11.0 10e9/L    RBC Count 4.35 3.8 - 5.2 10e12/L    Hemoglobin 13.1 11.7 - 15.7 g/dL    Hematocrit 39.2 35.0 - 47.0 %    MCV 90 78 - 100 fl    MCH 30.1 26.5 - 33.0 pg    MCHC 33.4 31.5 - 36.5 g/dL    RDW 13.9 10.0 - 15.0 %    Platelet Count 229 150 - 450 10e9/L    Diff Method Automated Method     % Neutrophils 61.8 %    % Lymphocytes 15.9 %    % Monocytes 16.6 %    % Eosinophils 5.3 %    % Basophils 0.3 %    % Immature Granulocytes 0.1 %    Absolute Neutrophil 4.2 1.6 - 8.3 10e9/L    Absolute Lymphocytes 1.1 0.8 - 5.3 10e9/L    Absolute Monocytes 1.1 0.0 - 1.3 10e9/L    Absolute Eosinophils 0.4 0.0 - 0.7 10e9/L    Absolute Basophils 0.0 0.0 - 0.2 10e9/L    Abs Immature Granulocytes 0.0 0 - 0.4 10e9/L   Comprehensive metabolic panel   Result Value Ref Range    Sodium 138 133 - 144 mmol/L    Potassium 3.4 3.4 - 5.3 mmol/L    Chloride 107 94 - 109 mmol/L    Carbon Dioxide 23 20 - 32 mmol/L    Anion Gap 8 3 - 14 mmol/L    Glucose 144 (H) 70 - 99 mg/dL    Urea Nitrogen 16 7 - 30 mg/dL    Creatinine 0.90 0.52 - 1.04 mg/dL    GFR Estimate 59 (L) >60 mL/min/1.7m2    GFR Estimate If Black 72 >60 mL/min/1.7m2    Calcium 8.4 (L) 8.5 - 10.1 mg/dL    Bilirubin Total 0.4 0.2 - 1.3 mg/dL    Albumin 3.5 3.4 - 5.0 g/dL    Protein Total 6.7 (L) 6.8 - 8.8 g/dL    Alkaline Phosphatase 103 40 - 150 U/L    ALT 25 0 - 50 U/L    AST 19 0 - 45 U/L   Troponin I   Result Value Ref Range    Troponin I ES <0.015 0.000 - 0.045 ug/L         Assessments & Plan (with Medical Decision Making)  85-year-old female with known coronary disease presents with fatigue, increased dyspnea along with lightheadedness with exertion.  She has typically correlated the symptoms with reflux though they  are more suggestive for angina and possible advancement of her coronary disease.  EKG is difficult to interpret for any acute repolarization changes due to her left bundle branch block and intermittent pacemaker activity.  Troponin was normal.  Patient presented and remained asymptomatic throughout emergency department stay.  Patient was discharged with instructions to follow-up with cardiology.  She would like to transition her care to the cardiologist at [facility].  Her primary cardiologist at [facility] has retired.  Patient scheduled for Lexiscan.  Requested the patient have her pacemaker interrogated.  Continue current medications.  Patient also noted to have elevated blood glucose = 144 though nonfasting state.  Advised following up with her primary care provider for a fasting glucose or obtaining a hemoglobin A1c.       I have reviewed the nursing notes.    I have reviewed the findings, diagnosis, plan and need for follow up with the patient.      New Prescriptions    No medications on file       Final diagnoses:   CAD S/P percutaneous coronary angioplasty   Precordial chest pain       8/28/2017   [facility] EMERGENCY DEPARTMENT     Victor Hugo Queen, DO  08/29/17 0608

## 2017-08-28 NOTE — MR AVS SNAPSHOT
After Visit Summary   8/28/2017    Christiana Sal    MRN: [id number]           Patient Information     Date Of Birth          10/25/1931        Visit Information        Provider Department      8/28/2017 2:00 PM Kelly Matthew PA-C [facility]        Today's Diagnoses     Dizziness    -  1    Atypical chest pain        Essential hypertension, benign        Atherosclerosis of native coronary artery of native heart with angina pectoris (H)        CAD S/P percutaneous coronary angioplasty        Sinoatrial node dysfunction (H)           Follow-ups after your visit        Your next 10 appointments already scheduled     Sep 06, 2017  8:40 AM CDT   MARY KAY Extremity with Tara Montoya [facility] ([facility])    [address] [facility] [address]   [phone]            Sep 13, 2017  8:40 AM CDT   MARY KAY Extremity with Tara Montoya [facility] ([address] [address]   [phone]            Sep 21, 2017 12:40 PM CDT   MARY KAY Extremity with Cboy Schwartz [facility] ([facility])    [address] [place] [address]   [phone]            Sep 27, 2017  8:40 AM CDT   MARY KAY Extremity with Tara Montoya [facility] ([address] [address]   [phone]              Who to contact     Normal or non-critical lab and imaging results will be communicated to you by MyChart, letter or phone within 4 business days after the clinic has received the results. If you do not hear from us within 7 days, please contact the clinic through MyChart or phone. If you have a critical or abnormal lab result, we will notify you by phone as soon as possible.  Submit refill requests through [employer] or call your pharmacy and they will forward the refill request to us. Please allow 3 business days for your refill to be completed.  "         If you need to speak with a  for additional information , please call: [phone]             Additional Information About Your Visit        [employer] Information     [employer] gives you secure access to your electronic health record. If you see a primary care provider, you can also send messages to your care team and make appointments. If you have questions, please call your primary care clinic.  If you do not have a primary care provider, please call [phone] and they will assist you.        Care EveryWhere ID     This is your Care EveryWhere ID. This could be used by other organizations to access your [facility] medical records  [id number]        Your Vitals Were     Pulse Temperature Height BMI (Body Mass Index)          56 97.8  F (36.6  C) (Tympanic) 5' 6\" (1.676 m) 26.68 kg/m2         Blood Pressure from Last 3 Encounters:   08/28/17 163/82   08/28/17 160/81   07/24/17 132/70    Weight from Last 3 Encounters:   08/28/17 165 lb (74.8 kg)   08/28/17 165 lb 4.8 oz (75 kg)   07/24/17 165 lb (74.8 kg)              We Performed the Following     EKG 12-lead complete w/read - Clinics          Today's Medication Changes          These changes are accurate as of: 8/28/17  3:32 PM.  If you have any questions, ask your nurse or doctor.               Stop taking these medicines if you haven't already. Please contact your care team if you have questions.     meclizine 25 MG tablet   Commonly known as:  ANTIVERT                    Primary Care Provider Office Phone # Fax #    Kelly Matthew PA-C [phone]       [address]  [facility] [address]        Equal Access to Services     [facility]: Hadii shailesh myers Soalvino, waaxda luqadaha, qaybta kaalmada polina, dnaya moser. So [facility] [phone].    ATENCIÓN: Si habla español, tiene a ozuna disposición servicios gratuitos de asistencia lingüística. Llame al [phone].    We " comply with applicable federal civil rights laws and [place] laws. We do not discriminate on the basis of race, color, national origin, age, disability sex, sexual orientation or gender identity.            Thank you!     Thank you for choosing [facility]  for your care. Our goal is always to provide you with excellent care. Hearing back from our patients is one way we can continue to improve our services. Please take a few minutes to complete the written survey that you may receive in the mail after your visit with us. Thank you!             Your Updated Medication List - Protect others around you: Learn how to safely use, store and throw away your medicines at www.disposemymeds.org.          This list is accurate as of: 8/28/17  3:32 PM.  Always use your most recent med list.                   Brand Name Dispense Instructions for use Diagnosis    * albuterol 108 (90 BASE) MCG/ACT Inhaler    PROAIR HFA/PROVENTIL HFA/VENTOLIN HFA    1 Inhaler    Inhale 2 puffs into the lungs every 6 hours as needed for shortness of breath / dyspnea or wheezing    Acute bronchitis with symptoms > 10 days       * albuterol 108 (90 BASE) MCG/ACT Inhaler    PROAIR HFA/PROVENTIL HFA/VENTOLIN HFA    1 Inhaler    Inhale 2 puffs into the lungs every 6 hours as needed for shortness of breath / dyspnea or wheezing    Acute bronchitis with symptoms > 10 days       aspirin 81 MG tablet      Take 2 tablets by mouth every evening. *.        atorvastatin 40 MG tablet    LIPITOR    90 tablet    Take 1 tablet (40 mg) by mouth daily    CAD S/P percutaneous coronary angioplasty       CALCIUM 600 + D PO      1 tablet 2 times per day        citalopram 10 MG tablet    celeXA    90 tablet    Take 1 tablet (10 mg) by mouth daily for depression. This medication should be taken every day to be effective.    Major depressive disorder, recurrent episode, mild (H)       FISH OIL PO      1 daily        levothyroxine 75 MCG tablet     SYNTHROID/LEVOTHROID    90 tablet    Take 1 tablet (75 mcg) by mouth daily    Hypothyroidism, unspecified type       losartan 25 MG tablet    COZAAR    90 tablet    Take 1 tablet (25 mg) by mouth daily    Essential hypertension with goal blood pressure less than 140/90       metoprolol 25 MG 24 hr tablet    TOPROL-XL    90 tablet    Take 0.5 tablets (12.5 mg) by mouth daily    Essential hypertension with goal blood pressure less than 140/90       nitroGLYcerin 0.4 MG sublingual tablet    NITROSTAT    25 tablet    Place 1 tablet (0.4 mg) under the tongue every 5 minutes as needed for chest pain (call your doctor if you take a third dose)    Atherosclerosis of native coronary artery of native heart without angina pectoris       OCUVITE PO      Take 1 tablet by mouth daily.        ondansetron 4 MG tablet    ZOFRAN    18 tablet    Take 1 tablet (4 mg) by mouth every 6 hours as needed for nausea    Nausea       oxybutynin 5 MG 24 hr tablet    DITROPAN XL    30 tablet    Take 1 tablet (5 mg) by mouth daily    Overactive bladder       pantoprazole 20 MG EC tablet    PROTONIX    90 tablet    Take by mouth 30-60 minutes before a meal.    Gastroesophageal reflux disease, esophagitis presence not specified       pramipexole 0.5 MG tablet    MIRAPEX    90 tablet    Take 1 tablet (0.5 mg) by mouth At Bedtime    Restless leg syndrome       ranitidine 150 MG tablet    ZANTAC    60 tablet    Take 1 tablet (150 mg) by mouth 2 times daily    Gastroesophageal reflux disease, esophagitis presence not specified       * Notice:  This list has 2 medication(s) that are the same as other medications prescribed for you. Read the directions carefully, and ask your doctor or other care provider to review them with you.

## 2017-08-28 NOTE — ED NOTES
First contact with PT.   All D/C home info given and all questions answered. PT verbalized understanding. Taken out of department VIA wheelchair.

## 2017-08-28 NOTE — ED NOTES
Bed: IN01  Expected date: 8/28/17  Expected time: 3:30 PM  Means of arrival:   Comments:  Patient name: Christiana Sal  MRN:[id number]    85-year-old female who was referred for concern for burning chest pain and discomfort as concerning for acute coronary syndrome exclude dissection as well as complaint of dizziness and vertigo. Patient was seen by Kristina Matthew in the [facility].  Patient is known to have a history of GERD, she also has a known history of coronary artery disease   and has had 2 prior stents.  Last stent was about a year ago.  She does have a history of BPPV and does have a pacemaker in place.  She has had one-week history of atypical chest pain and discomfort and burning sensation anterior chest radiating to her neck and back.  She's tried over-the-counter remedies including Protonix and Xanax and does not feel any significant improvement.  She did have jimmy  tiginous symptoms last week.  EKG obtained in clinic is reported to not be significant change from comparison EKG.  Due to patient's age, and patient's history of not being a complainer with atypical symptoms of the last week she is referred to the emergency department for further workup for concern for excluding ACS versus dissection.

## 2017-08-28 NOTE — DISCHARGE INSTRUCTIONS
Please call [phone].  This will connected to the cardiac Department scheduling Center.  Please schedule a consultation with cardiologist located at [facility].Mio Richards MD or Amelia Salguero MD  In addition ask scheduling team to facilitate appointment for cardiac stress test/Lexiscan.  This is the same test that she had completed approximately 9 months ago.  Order has been placed but appointment date needs to be confirmed.  Continue with current medications  I contacted the cardiac department to facilitate arranging for pacemaker interrogation.  When you call they will also assist in scheduling-year pacemaker as a [employer] dual lead.    Please follow up with your primary clinic provider to arrange for a fasting blood glucose.

## 2017-08-28 NOTE — PROGRESS NOTES
"SUBJECTIVE:                                                    Christiana Sal is a 85 year old female who presents to clinic today for the following health issues:    Chief Complaint   Patient presents with     RECHECK     acid reflex, home remedies are no longer helping(baking soda & water). 3 days last week the acid reflux increased. Pains in the neck/chest and into the back. Thursday night dizziness started, this morning the dizziness has subsided but still lightheaded.     BP Readings from Last 6 Encounters:   08/28/17 160/81   07/24/17 132/70   06/08/17 136/68   05/25/17 125/85   05/16/17 134/76   05/12/17 145/71     Problem list and histories reviewed & adjusted, as indicated.  Additional history:     H/o reflux - on zantac and protonix  Last week had several episodes of chest burning and tightness - one episode was very severe and went to her back and shoulder   Tried eating a piece of toast and drinking baking soda which has helped before but it didn't seem to make a difference. Eventually the symptoms improved  Still feels like she has a hard time swallowing at times    On Thursday she woke up overnight to go to the bathroom and became very dizzy  Says the dizziness lasted all weekend where she didn't get out of bed much at all  She tried the medication for dizziness but it didn't seem to help  Finally this morning it seemed to pass  Previously when she had episodes of dizziness it would go away as quickly as it started. This time she continues to feel \"off\" - reports ongoing lightheadedness and \"just not feeling right\"     Also developed diarrhea in the last 48 hours  Not getting much warning  No blood that she has noticed    +fatigue. Feeling \"run down\" after this last week  No further chest burning/tightness since Thursday  + 30 min episode of SOB last week. Tried inhalers without improvement or change. Eventually passed  + nausea      ROS:  Remainder of ROS obtained and found to be negative " "other than that which was documented above      OBJECTIVE:                                                    /81 (BP Location: Left arm, Patient Position: Chair, Cuff Size: Adult Regular)  Pulse 56  Temp 97.8  F (36.6  C) (Tympanic)  Ht 5' 6\" (1.676 m)  Wt 165 lb 4.8 oz (75 kg)  BMI 26.68 kg/m2 Body mass index is 26.68 kg/(m^2).   GENERAL: alert, no acute distress but tired appearing  NECK: nontender, no palpable lymphadenopathy  RESP: lungs clear to auscultation - no rales, no rhonchi, no wheezes  CV: regular rates and rhythm, normal S1 S2, no S3 or S4 and no murmur, no click or rub -  ABDOMEN: soft, no tenderness, normal bowel sounds    **patient became extremely dizzy when laying flat on table. Recovered after laying on her right side for awhile but continued to feel even more unsteady afterwards     ASSESSMENT/PLAN:                                                        ICD-10-CM    1. Dizziness R42 EKG 12-lead complete w/read - Clinics   2. Atypical chest pain R07.89 EKG 12-lead complete w/read - Clinics   3. Essential hypertension, benign I10    4. Atherosclerosis of native coronary artery of native heart with angina pectoris (H) I25.119    5. CAD S/P percutaneous coronary angioplasty I25.10     Z98.61    6. Sinoatrial node dysfunction (H) I49.5      Patient does not seem her normal self today on exam. She attributes recent sx to heartburn and h/o dizziness, which is possible but I discussed that I am not comfortable based upon her history and description of symptoms that there isn't something more serious going on. Diff dx includes carotid disease vs dissection vs other cardiac cause. EKG in clinic today unchanged when compared to previous.   Patient also voiced that she is feeling \"uneasy\" and doesn't feel safe going back home which is something she typically does not say.   After discussing my concerns with her and then her daughter (who drove her to clinic today) we agreed upon further " evaluation in the ED.   I called and spoke with ED provider to alert them of patient.   Patient sent to ED, daughter okay to drive    Kelly Matthew PA-C  [facility]

## 2017-08-28 NOTE — ED AVS SNAPSHOT
[facility] Emergency Department    [address] [address]    Phone:  [phone]    Fax:  [phone]                                       Christiana Sal   MRN: [id number]    Department:  [facility] Emergency Department   Date of Visit:  8/28/2017           Patient Information     Date Of Birth          10/25/1931        Your diagnoses for this visit were:     CAD S/P percutaneous coronary angioplasty     Precordial chest pain        You were seen by Victor Hugo Queen DO.        Discharge Instructions       Please call [phone].  This will connected to the cardiac Department scheduling Center.  Please schedule a consultation with cardiologist located at [facility].Mio Richards MD or Amelia Salguero MD  In addition ask scheduling team to facilitate appointment for cardiac stress test/Lexiscan.  This is the same test that she had completed approximately 9 months ago.  Order has been placed but appointment date needs to be confirmed.  Continue with current medications  I contacted the cardiac department to facilitate arranging for pacemaker interrogation.  When you call they will also assist in scheduling-year pacemaker as a [place] scientific dual lead.    Please follow up with your primary clinic provider to arrange for a fasting blood glucose.        Future Appointments        Provider Department Dept Phone Center    9/6/2017 8:40 AM Tara Montoya  [facility] [phone] [facility]    9/13/2017 8:40 AM Tara Montoya PT [facility] [phone] [facility]    9/21/2017 12:40 PM [facility] [phone] [facility]    9/27/2017 8:40 AM Tara Montoya PT [facility] [phone] [facility]      24 Hour Appointment Hotline       To make an appointment at any [facility], call [phone] ([phone]). If you don't have a family doctor or clinic, we will help  you find one. [facility] are conveniently located to serve the needs of you and your family.          ED Discharge Orders     NM Lexiscan stress test       The type of stress to be performed (pharmacologic or physical) will be at the discretion of the supervising physician as per department protocol.                     Review of your medicines      Our records show that you are taking the medicines listed below. If these are incorrect, please call your family doctor or clinic.        Dose / Directions Last dose taken    * albuterol 108 (90 BASE) MCG/ACT Inhaler   Commonly known as:  PROAIR HFA/PROVENTIL HFA/VENTOLIN HFA   Dose:  2 puff   Quantity:  1 Inhaler        Inhale 2 puffs into the lungs every 6 hours as needed for shortness of breath / dyspnea or wheezing   Refills:  0        * albuterol 108 (90 BASE) MCG/ACT Inhaler   Commonly known as:  PROAIR HFA/PROVENTIL HFA/VENTOLIN HFA   Dose:  2 puff   Quantity:  1 Inhaler        Inhale 2 puffs into the lungs every 6 hours as needed for shortness of breath / dyspnea or wheezing   Refills:  0        aspirin 81 MG tablet   Dose:  2 tablet        Take 2 tablets by mouth every evening. *.   Refills:  0        atorvastatin 40 MG tablet   Commonly known as:  LIPITOR   Dose:  40 mg   Quantity:  90 tablet        Take 1 tablet (40 mg) by mouth daily   Refills:  3        CALCIUM 600 + D PO        1 tablet by mouth 2 times per day   Refills:  0        citalopram 10 MG tablet   Commonly known as:  celeXA   Dose:  10 mg   Quantity:  90 tablet        Take 1 tablet (10 mg) by mouth daily for depression. This medication should be taken every day to be effective.   Refills:  0        FISH OIL PO        1 daily   Refills:  0        levothyroxine 75 MCG tablet   Commonly known as:  SYNTHROID/LEVOTHROID   Dose:  75 mcg   Quantity:  90 tablet        Take 1 tablet (75 mcg) by mouth daily   Refills:  1        losartan 25 MG tablet   Commonly known as:  COZAAR   Dose:  25 mg    Quantity:  90 tablet        Take 1 tablet (25 mg) by mouth daily   Refills:  3        metoprolol 25 MG 24 hr tablet   Commonly known as:  TOPROL-XL   Dose:  12.5 mg   Quantity:  90 tablet        Take 0.5 tablets (12.5 mg) by mouth daily   Refills:  3        nitroGLYcerin 0.4 MG sublingual tablet   Commonly known as:  NITROSTAT   Dose:  0.4 mg   Quantity:  25 tablet        Place 1 tablet (0.4 mg) under the tongue every 5 minutes as needed for chest pain (call your doctor if you take a third dose)   Refills:  3        OCUVITE PO   Dose:  1 tablet        Take 1 tablet by mouth daily.   Refills:  0        ondansetron 4 MG tablet   Commonly known as:  ZOFRAN   Dose:  4 mg   Quantity:  18 tablet        Take 1 tablet (4 mg) by mouth every 6 hours as needed for nausea   Refills:  3        oxybutynin 5 MG 24 hr tablet   Commonly known as:  DITROPAN XL   Dose:  5 mg   Quantity:  30 tablet        Take 1 tablet (5 mg) by mouth daily   Refills:  1        pantoprazole 20 MG EC tablet   Commonly known as:  PROTONIX   Quantity:  90 tablet        Take by mouth 30-60 minutes before a meal.   Refills:  1        pramipexole 0.5 MG tablet   Commonly known as:  MIRAPEX   Dose:  0.5 mg   Quantity:  90 tablet        Take 1 tablet (0.5 mg) by mouth At Bedtime   Refills:  3        ranitidine 150 MG tablet   Commonly known as:  ZANTAC   Dose:  150 mg   Quantity:  60 tablet        Take 1 tablet (150 mg) by mouth 2 times daily   Refills:  1        * Notice:  This list has 2 medication(s) that are the same as other medications prescribed for you. Read the directions carefully, and ask your doctor or other care provider to review them with you.            Procedures and tests performed during your visit     CBC with platelets differential    Comprehensive metabolic panel    EKG 12 lead    EKG 12-lead, tracing only    Peripheral IV catheter    Troponin I      Orders Needing Specimen Collection     None      Pending Results     No orders found  from 8/26/2017 to 8/29/2017.            Pending Culture Results     No orders found from 8/26/2017 to 8/29/2017.            Pending Results Instructions     If you had any lab results that were not finalized at the time of your Discharge, you can call the ED Lab Result RN at [phone]. You will be contacted by this team for any positive Lab results or changes in treatment. The nurses are available 7 days a week from 10A to 6:30P.  You can leave a message 24 hours per day and they will return your call.        Test Results From Your Hospital Stay        8/28/2017  3:59 PM      Component Results     Component Value Ref Range & Units Status    WBC 6.8 4.0 - 11.0 10e9/L Final    RBC Count 4.35 3.8 - 5.2 10e12/L Final    Hemoglobin 13.1 11.7 - 15.7 g/dL Final    Hematocrit 39.2 35.0 - 47.0 % Final    MCV 90 78 - 100 fl Final    MCH 30.1 26.5 - 33.0 pg Final    MCHC 33.4 31.5 - 36.5 g/dL Final    RDW 13.9 10.0 - 15.0 % Final    Platelet Count 229 150 - 450 10e9/L Final    Diff Method Automated Method  Final    % Neutrophils 61.8 % Final    % Lymphocytes 15.9 % Final    % Monocytes 16.6 % Final    % Eosinophils 5.3 % Final    % Basophils 0.3 % Final    % Immature Granulocytes 0.1 % Final    Absolute Neutrophil 4.2 1.6 - 8.3 10e9/L Final    Absolute Lymphocytes 1.1 0.8 - 5.3 10e9/L Final    Absolute Monocytes 1.1 0.0 - 1.3 10e9/L Final    Absolute Eosinophils 0.4 0.0 - 0.7 10e9/L Final    Absolute Basophils 0.0 0.0 - 0.2 10e9/L Final    Abs Immature Granulocytes 0.0 0 - 0.4 10e9/L Final         8/28/2017  4:27 PM      Component Results     Component Value Ref Range & Units Status    Sodium 138 133 - 144 mmol/L Final    Potassium 3.4 3.4 - 5.3 mmol/L Final    Chloride 107 94 - 109 mmol/L Final    Carbon Dioxide 23 20 - 32 mmol/L Final    Anion Gap 8 3 - 14 mmol/L Final    Glucose 144 (H) 70 - 99 mg/dL Final    Urea Nitrogen 16 7 - 30 mg/dL Final    Creatinine 0.90 0.52 - 1.04 mg/dL Final    GFR Estimate 59 (L) >60  mL/min/1.7m2 Final    Non  GFR Calc    GFR Estimate If Black 72 >60 mL/min/1.7m2 Final    African American GFR Calc    Calcium 8.4 (L) 8.5 - 10.1 mg/dL Final    Bilirubin Total 0.4 0.2 - 1.3 mg/dL Final    Albumin 3.5 3.4 - 5.0 g/dL Final    Protein Total 6.7 (L) 6.8 - 8.8 g/dL Final    Alkaline Phosphatase 103 40 - 150 U/L Final    ALT 25 0 - 50 U/L Final    AST 19 0 - 45 U/L Final         8/28/2017  4:27 PM      Component Results     Component Value Ref Range & Units Status    Troponin I ES <0.015 0.000 - 0.045 ug/L Final    The 99th percentile for upper reference range is 0.045 ug/L.  Troponin values   in the range of 0.045 - 0.120 ug/L may be associated with risks of adverse   clinical events.                  Thank you for choosing [facility]       Thank you for choosing [facility] for your care. Our goal is always to provide you with excellent care. Hearing back from our patients is one way we can continue to improve our services. Please take a few minutes to complete the written survey that you may receive in the mail after you visit with us. Thank you!        Join [facility] Information     [employer] gives you secure access to your electronic health record. If you see a primary care provider, you can also send messages to your care team and make appointments. If you have questions, please call your primary care clinic.  If you do not have a primary care provider, please call [phone] and they will assist you.        Care EveryWhere ID     This is your Care EveryWhere ID. This could be used by other organizations to access your [facility] medical records  [id number]        Equal Access to Services     SOFIA EDUARDO : Jefferson Davis, rubia hester, danya park. So [facility] [phone].    ATENCIÓN: Si habla español, tiene a ozuna disposición servicios gratuitos de asistencia lingüística. Llame al [phone].    We comply with applicable  federal civil rights laws and [place] laws. We do not discriminate on the basis of race, color, national origin, age, disability sex, sexual orientation or gender identity.            After Visit Summary       This is your record. Keep this with you and show to your community pharmacist(s) and doctor(s) at your next visit.

## 2017-08-28 NOTE — ED NOTES
Patient stated since last Thursday c/o GERD and dizziness.  Dizziness improving but still feels off balanced. No numbness or tingling to extremities.  C/o headache. Mild nausea. No vomiting.  No chest pain.  No SOA.

## 2017-08-28 NOTE — ED AVS SNAPSHOT
[facility] Emergency Department    [address] [address]    Phone:  [phone]    Fax:  [phone]                                       Christiana Sal   MRN: [id number]    Department:  [facility] Emergency Department   Date of Visit:  8/28/2017           After Visit Summary Signature Page     I have received my discharge instructions, and my questions have been answered. I have discussed any challenges I see with this plan with the nurse or doctor.    ..........................................................................................................................................  Patient/Patient Representative Signature      ..........................................................................................................................................  Patient Representative Print Name and Relationship to Patient    ..................................................               ................................................  Date                                            Time    ..........................................................................................................................................  Reviewed by Signature/Title    ...................................................              ..............................................  Date                                                            Time

## 2017-08-29 ASSESSMENT — ENCOUNTER SYMPTOMS
PSYCHIATRIC NEGATIVE: 1
FREQUENCY: 1
PALPITATIONS: 0
WEAKNESS: 1
BRUISES/BLEEDS EASILY: 0
DIZZINESS: 1
FATIGUE: 1
NECK PAIN: 1
APPETITE CHANGE: 0
SHORTNESS OF BREATH: 1
EYES NEGATIVE: 1
UNEXPECTED WEIGHT CHANGE: 0
LIGHT-HEADEDNESS: 1
GASTROINTESTINAL NEGATIVE: 1
ACTIVITY CHANGE: 1
FEVER: 0
ENDOCRINE NEGATIVE: 1

## 2017-08-31 DIAGNOSIS — F33.0 MAJOR DEPRESSIVE DISORDER, RECURRENT EPISODE, MILD (H): ICD-10-CM

## 2017-08-31 RX ORDER — CITALOPRAM HYDROBROMIDE 10 MG/1
TABLET ORAL
Qty: 90 TABLET | Refills: 0 | Status: SHIPPED | OUTPATIENT
Start: 2017-08-31 | End: 2017-12-02

## 2017-08-31 NOTE — TELEPHONE ENCOUNTER
Prescription approved per [facility] Refill Protocol.  Charmaine Kerr RN

## 2017-08-31 NOTE — TELEPHONE ENCOUNTER
CITALOPRAM 10MG TAB       Last Written Prescription Date: 4/25/17  Last Fill Quantity: 90, # refills: 0  Last Office Visit with [facility] primary care provider:  8/28/17        Last PHQ-9 score on record=   PHQ-9 SCORE 3/2/2017   Total Score -   Total Score 6

## 2017-09-01 DIAGNOSIS — N32.81 OVERACTIVE BLADDER: ICD-10-CM

## 2017-09-01 NOTE — TELEPHONE ENCOUNTER
oxybutynin (DITROPAN XL) 5 MG 24 hr tablet      Last Written Prescription Date: 7/24/17  Last Fill Quantity: 30,  # refills: 1   Last Office Visit with [facility], [facility] or [facility] prescribing provider: 8/28/17

## 2017-09-05 DIAGNOSIS — E78.5 HYPERLIPIDEMIA LDL GOAL <100: ICD-10-CM

## 2017-09-05 DIAGNOSIS — I10 ESSENTIAL HYPERTENSION, BENIGN: ICD-10-CM

## 2017-09-05 DIAGNOSIS — K21.9 GASTROESOPHAGEAL REFLUX DISEASE, ESOPHAGITIS PRESENCE NOT SPECIFIED: ICD-10-CM

## 2017-09-05 LAB
CHOLEST SERPL-MCNC: 126 MG/DL
CREAT UR-MCNC: 153 MG/DL
HDLC SERPL-MCNC: 44 MG/DL
LDLC SERPL CALC-MCNC: 57 MG/DL
MICROALBUMIN UR-MCNC: 20 MG/L
MICROALBUMIN/CREAT UR: 13.2 MG/G CR (ref 0–25)
NONHDLC SERPL-MCNC: 82 MG/DL
TRIGL SERPL-MCNC: 123 MG/DL

## 2017-09-05 PROCEDURE — 80061 LIPID PANEL: CPT | Performed by: PHYSICIAN ASSISTANT

## 2017-09-05 PROCEDURE — 82043 UR ALBUMIN QUANTITATIVE: CPT | Performed by: PHYSICIAN ASSISTANT

## 2017-09-05 PROCEDURE — 36415 COLL VENOUS BLD VENIPUNCTURE: CPT | Performed by: PHYSICIAN ASSISTANT

## 2017-09-05 NOTE — TELEPHONE ENCOUNTER
zantac      Last Written Prescription Date: 2/3/17  Last Fill Quantity: 60,  # refills: 1   Last Office Visit with [facility], [facility] or [facility] prescribing provider: 8/28/17  Last date of pharmacy refill:  6/7/17

## 2017-09-06 ENCOUNTER — THERAPY VISIT (OUTPATIENT)
Dept: PHYSICAL THERAPY | Facility: CLINIC | Age: 82
End: 2017-09-06
Payer: COMMERCIAL

## 2017-09-06 ENCOUNTER — HOSPITAL ENCOUNTER (OUTPATIENT)
Dept: NUCLEAR MEDICINE | Facility: CLINIC | Age: 82
Setting detail: NUCLEAR MEDICINE
Discharge: HOME | End: 2017-09-06
Attending: EMERGENCY MEDICINE | Admitting: EMERGENCY MEDICINE
Payer: MEDICARE

## 2017-09-06 DIAGNOSIS — I25.10 CAD S/P PERCUTANEOUS CORONARY ANGIOPLASTY: ICD-10-CM

## 2017-09-06 DIAGNOSIS — R07.2 PRECORDIAL CHEST PAIN: ICD-10-CM

## 2017-09-06 DIAGNOSIS — Z98.61 CAD S/P PERCUTANEOUS CORONARY ANGIOPLASTY: ICD-10-CM

## 2017-09-06 DIAGNOSIS — M79.605 PAIN OF LEFT LOWER EXTREMITY: ICD-10-CM

## 2017-09-06 PROCEDURE — 78452 HT MUSCLE IMAGE SPECT MULT: CPT

## 2017-09-06 PROCEDURE — 97110 THERAPEUTIC EXERCISES: CPT | Mod: GP | Performed by: PHYSICAL THERAPIST

## 2017-09-06 PROCEDURE — 34300033 ZZH RX 343: Performed by: INTERNAL MEDICINE

## 2017-09-06 PROCEDURE — 93018 CV STRESS TEST I&R ONLY: CPT | Performed by: INTERNAL MEDICINE

## 2017-09-06 PROCEDURE — 93017 CV STRESS TEST TRACING ONLY: CPT

## 2017-09-06 PROCEDURE — A9502 TC99M TETROFOSMIN: HCPCS | Performed by: INTERNAL MEDICINE

## 2017-09-06 PROCEDURE — 25000128 H RX IP 250 OP 636: Performed by: EMERGENCY MEDICINE

## 2017-09-06 PROCEDURE — 78452 HT MUSCLE IMAGE SPECT MULT: CPT | Mod: 26 | Performed by: INTERNAL MEDICINE

## 2017-09-06 PROCEDURE — 97140 MANUAL THERAPY 1/> REGIONS: CPT | Mod: GP | Performed by: PHYSICAL THERAPIST

## 2017-09-06 PROCEDURE — 93016 CV STRESS TEST SUPVJ ONLY: CPT | Performed by: INTERNAL MEDICINE

## 2017-09-06 RX ORDER — OXYBUTYNIN CHLORIDE 5 MG/1
TABLET, EXTENDED RELEASE ORAL
Qty: 30 TABLET | Refills: 0 | Status: SHIPPED | OUTPATIENT
Start: 2017-09-06 | End: 2017-09-28

## 2017-09-06 RX ORDER — REGADENOSON 0.08 MG/ML
0.4 INJECTION, SOLUTION INTRAVENOUS ONCE
Status: COMPLETED | OUTPATIENT
Start: 2017-09-06 | End: 2017-09-06

## 2017-09-06 RX ADMIN — REGADENOSON 0.4 MG: 0.08 INJECTION, SOLUTION INTRAVENOUS at 13:45

## 2017-09-06 RX ADMIN — TETROFOSMIN 9.8 MCI.: 1.38 INJECTION, POWDER, LYOPHILIZED, FOR SOLUTION INTRAVENOUS at 12:30

## 2017-09-06 RX ADMIN — TETROFOSMIN 31.5 MCI.: 1.38 INJECTION, POWDER, LYOPHILIZED, FOR SOLUTION INTRAVENOUS at 13:51

## 2017-09-06 NOTE — TELEPHONE ENCOUNTER
Prescription approved per [facility] Refill Protocol.  Charmaine Kerr RN

## 2017-09-06 NOTE — TELEPHONE ENCOUNTER
Prescription approved per [facility] Refill Protocol.  Charmaine Kerr RN

## 2017-09-14 ENCOUNTER — TELEPHONE (OUTPATIENT)
Dept: FAMILY MEDICINE | Facility: CLINIC | Age: 82
End: 2017-09-14

## 2017-09-14 DIAGNOSIS — M25.562 ACUTE PAIN OF LEFT KNEE: Primary | ICD-10-CM

## 2017-09-22 ENCOUNTER — RADIANT APPOINTMENT (OUTPATIENT)
Dept: GENERAL RADIOLOGY | Facility: CLINIC | Age: 82
End: 2017-09-22
Attending: PEDIATRICS
Payer: COMMERCIAL

## 2017-09-22 ENCOUNTER — OFFICE VISIT (OUTPATIENT)
Dept: ORTHOPEDICS | Facility: CLINIC | Age: 82
End: 2017-09-22
Payer: COMMERCIAL

## 2017-09-22 VITALS
BODY MASS INDEX: 26.57 KG/M2 | DIASTOLIC BLOOD PRESSURE: 72 MMHG | SYSTOLIC BLOOD PRESSURE: 142 MMHG | HEIGHT: 66 IN | WEIGHT: 165.3 LBS | HEART RATE: 68 BPM

## 2017-09-22 DIAGNOSIS — M79.605 LEFT LEG PAIN: ICD-10-CM

## 2017-09-22 DIAGNOSIS — M79.605 LEFT LEG PAIN: Primary | ICD-10-CM

## 2017-09-22 DIAGNOSIS — M16.12 PRIMARY OSTEOARTHRITIS OF LEFT HIP: ICD-10-CM

## 2017-09-22 PROCEDURE — 73502 X-RAY EXAM HIP UNI 2-3 VIEWS: CPT

## 2017-09-22 PROCEDURE — 99214 OFFICE O/P EST MOD 30 MIN: CPT | Performed by: PEDIATRICS

## 2017-09-22 PROCEDURE — 73562 X-RAY EXAM OF KNEE 3: CPT

## 2017-09-22 PROCEDURE — 72100 X-RAY EXAM L-S SPINE 2/3 VWS: CPT

## 2017-09-22 NOTE — PROGRESS NOTES
Sports Medicine Clinic Visit    PCP: Kelly Matthew Kim Sal is a 85 year old female who is seen  in consultation at the request of  Kelly Matthew PA-C presenting with left leg pain.    Injury: Patient reports left leg pain for the past 3 months. It started out as trouble walking and has been gradually worsening. She reports that her leg will give out. She has been using a cane within the last month. Her pain is variable, at times in her buttock, thigh, and will extend to her knee and her calf. She denies numbness or tingling.    Location of Pain: buttock, groin, inner thigh, posterior knee, shin  Duration of Pain: 3 month(s)  Rating of Pain at worst: 8/10  Rating of Pain Currently: 3/10  Symptoms are better with: biofreeze, massage lotion, tylenol  Symptoms are worse with: sitting and walking, lying on left side  Additional Features:   Positive: instability of the knee   Negative: swelling, bruising, popping, grinding, catching, locking, paresthesias, numbness, weakness, pain in other joints and systemic symptoms  Other evaluation and/or treatments so far consists of: chiropractor, PT, referred by primary care  Prior History of related problems: low back pain chronically    Social History: retired    Review of Systems  Skin: no bruising, no swelling  Musculoskeletal: as above  Neurologic: no numbness, paresthesias  Remainder of review of systems is negative including constitutional, CV, pulmonary, GI, except as noted in HPI or medical history.    Patient's current problem list, past medical and surgical history, and family history were reviewed.    Patient Active Problem List   Diagnosis     Coronary atherosclerosis of native coronary artery     Hyperlipidemia LDL goal <100     Essential hypertension, benign     Hypothyroidism     GERD (gastroesophageal reflux disease)     Family history of colon cancer     Hypertension goal BP (blood pressure) < 140/90     Major depressive  disorder, recurrent episode, mild (H)     Advanced directives, counseling/discussion     Angina pectoris (H)     Vulvar pruritus     Lichen planus     JENIFER (obstructive sleep apnea)     CAD S/P percutaneous coronary angioplasty     S/P cardiac pacemaker procedure     Sinoatrial node dysfunction (H)     Cardiac pacemaker - [employer] dual lead - Not Dependent     Insect bite     Pain of left lower extremity     Past Medical History:   Diagnosis Date     Coronary atherosclerosis of native coronary artery     s/p MI ([facility])     Essential hypertension, benign      GERD (gastroesophageal reflux disease)      History of transient ischemic attack (TIA)      Hyperlipidemia LDL goal <100      Hypothyroidism      Major depressive disorder, recurrent episode, mild (H) 2011     Peptic ulcer disease with hemorrhage     Remote history of stomach ulcer, requiring transfusion after chronic bleeding     Past Surgical History:   Procedure Laterality Date     APPENDECTOMY       CARDIAC CATHERIZATION  1/15/04    drug-eluding stent RCA, Dr. Pérez, [facility]     CARDIAC CATHERIZATION  12    diffuse mild/mod disease, no new stent     CATARACT IOL, RT/LT  12    RT, Dr. Wynne     CHOLECYSTECTOMY, OPEN       COLONOSCOPY      [facility]     FRACTURE TX, ANKLE RT/LT      LT, 2 screws remain     HC EXCISE HAND/FOOT NEUROMA      RT     HC LARYNGOSCOPY DIRECT W VOCAL CORD INJECTION      vocal cord polypectomy     HC REMOVAL OF OVARIAN CYST(S)       HC TRANSCATH STENT INIT VESSEL,PERCUT      x 2     REPAIR HAMMER TOE  9/13/10    multiple + RT great toe tendon release, Dr. Sanchez DPM     STENT  -    Cardiac stents 6 placed.     Family History   Problem Relation Age of Onset     C.A.D. Father      Hypertension Father      Myocardial Infarction Father 66      from MI     C.A.D. Brother      Myocardial Infarction Brother      CANCER Brother      skin ca     Breast Cancer Sister      Cancer -  "colorectal Sister      HEART DISEASE Sister      Neurologic Disorder Mother      migraine     C.A.D. Mother      Hypertension Mother      Heart Failure Mother      Thyroid Disease Son      cretinism     Hypertension Son      HEART DISEASE Son      Psychotic Disorder Sister      Hypertension Sister      HEART DISEASE Sister      CABG     Hypertension Brother      C.A.D. Brother      Myocardial Infarction Brother      Arthritis Daughter      RA     Hypertension Sister      HEART DISEASE Sister      CANCER Sister      Blood Disease Sister      Neurologic Disorder Child      migraine (2 children)         Objective  /72  Pulse 68  Ht 5' 6\" (1.676 m)  Wt 165 lb 4.8 oz (75 kg)  BMI 26.68 kg/m2    GENERAL APPEARANCE: healthy, alert and no distress   GAIT: antalgic and cane  SKIN: no suspicious lesions or rashes  HEENT: Sclera clear, anicteric  CV: good peripheral pulses  RESP: Breathing not labored  NEURO: Normal strength and tone, mentation intact and speech normal  PSYCH:  mentation appears normal and affect normal/bright    Low back exam:    Inspection:     no visible deformity in the low back       normal skin       normal vascular       normal lymphatic    Posture:      rounded shoulders and upper back       lumbar lordosis diminished    Foot Inspection:     pes planus    Tender:     paraspinal muscles - mild    Non Tender:     remainder of lumbar spine    ROM:      full flexion       limited extension but no pain    Strength:     hip flexion 5/5 bilateral       knee extension 5/5 bilateral       ankle dorsiflexion 5/5 bilateral       ankle plantarflexion 5/5 bilateral       dorsiflexion of the great toe 5/5 bilateral       able to heel and toe walk    Reflexes:     patellar (L3, L4) symmetric normal       achilles tendons (S1) symmetric normal    Sensation:    grossly intact throughout lower extremities    Special tests:      straight leg raise left negative        straight leg raise right " negative    Bilateral hip exam    Inspection:      no edema or ecchymosis in hip area    Tender:      Mild throughout upper leg    Non Tender:      remainder of the hip area bilateral    ROM:     Range of motion limited by pain left    Strength:      flexion 4/5 left       abduction 5/5 bilateral       adduction 5/5 bilateral    Special Tests:      positive (+) CYNDY left in groin       positive (+) FADIR left in groin    Bilateral Knee exam    Inspection:      no effusion, swelling of bruising bilateral    Patella:      Normal patellar tracking noted through range of motion bilateral       Crepitus noted in the patellofemoral joint bilateral    Tender:      none    Knee ROM:      Full active and passive ROM with flexion and extension bilateral    Hip ROM:     Full active and passive ROM bilateral    Strength:      5/5 with knee extension bilateral    Special Tests:     neg (-) Nicky left       neg (-) Lachmans left       neg (-) anterior drawer left       neg (-) posterior drawer left       neg (-) varus at 0 deg and 30 deg left       neg (-) valgus at 0 deg and 30 deg left    Gait:      antalgic gait       using assisted device    Neurovascular:      2+ peripheral pulses bilaterally and brisk capillary refill       sensation grossly intact    Radiology  I ordered, visualized and reviewed these images with the patient  LEFT HIP TWO TO THREE VIEWS   9/22/2017 8:44 AM   HISTORY: Evaluate arthritis. Pain in left leg.  COMPARISON: None.  IMPRESSION: Severe bilateral osteoarthritis of both hips is present.  There is no evidence for fracture or dislocation. Degenerative changes  also noted in spine.    LUMBAR SPINE 2 VIEWS  9/22/2017 8:24 AM   HISTORY: Pain in left leg  COMPARISON: None.  IMPRESSION : Multilevel degenerative change L2-S1 with disc space  narrowing at these levels and anterior L34 spondylolisthesis. There is  posterior facet degenerative change and lumbar scoliosis convex right.  No vertebral  compression or acute appearing fractures. There is  bilateral medial hip joint space narrowing.    KNEE STANDING AP BILATERAL SUNRISE BILATERAL LATERAL LEFT 9/22/2017  8:24 AM  HISTORY: Pain in left leg.  COMPARISON: None.  IMPRESSION:   Right knee; On standing view there is joint space narrowing of the  right lateral compartment. Medial compartment is maintained.  Left knee; Both medial and lateral compartment joint spaces are  normal. No evidence for suprapatellar effusion on the left. There is  mild spurring at the lateral patellofemoral joint on the left.  Bilateral knees;  No acute-appearing abnormality, worrisome focal bone  lesion.    Assessment:  1. Left leg pain    2. Primary osteoarthritis of left hip      Left leg pain. A large portion of the patient's pain is likely from osteoarthritis of the left hip. She may have a lumbar component as well given her calf pain. We discussed a trial of left hip steroid injection along with continued physical therapy. Unlikely knee related given today's exam.  Would consider further workup and treatment pending clinical course.    Discussed nature of degenerative arthritis of the hip. Discussed symptom treatment with over-the-counter medications and rest if needed. Discussed benefits of weight loss and possible physical therapy. Discussed injection therapy. Also briefly discussed future consideration of referral to orthopedic surgery for further evaluation and discussion of arthroplasty.    Plan:  - Today's Plan of Care:  Rehab: [phone]  Steroid injection of the  left hip: intra-articular  performed via ultrasound with Dr. Palafox    Follow Up: 2 weeks after injection    -We also discussed other future treatment options:  Referral to spine surgeon or MRI    Concerning signs and symptoms were reviewed.  The patient expressed understanding of this management plan and all questions were answered at this time.    Thanks for the  opportunity to participate in the care of this patient, I will keep you updated on their progress.    CC: Kelly Page MD CAQ  Primary Care Sports Medicine  [facility]

## 2017-09-22 NOTE — PATIENT INSTRUCTIONS
Plan:  - Today's Plan of Care:  Rehab: Physical [facility]: [facility] - [phone]  Steroid injection of the  left hip: intra-articular  performed via ultrasound with Dr. Palafox    Follow Up: 2 weeks after injection    -We also discussed other future treatment options:  Referral to spine surgeon or MRI

## 2017-09-22 NOTE — MR AVS SNAPSHOT
"              After Visit Summary   9/22/2017    Christiana Sal    MRN: [id number]           Patient Information     Date Of Birth          10/25/1931        Visit Information        Provider Department      9/22/2017 8:00 AM Lu Page MD [facility]        Today's Diagnoses     Primary osteoarthritis of left hip    -  1    Lumbago          Care Instructions    Plan:  - Today's Plan of Care:  Rehab: Physical Therapy: [facility] - [phone]  Steroid injection of the  left hip: intra-articular  performed via ultrasound with Dr. Palafox    Follow Up: 2 weeks after injection    -We also discussed other future treatment options:  Referral to spine surgeon or MRI            Follow-ups after your visit        Additional Services     PHYSICAL THERAPY REFERRAL       *This therapy referral will be filtered to a centralized scheduling office at [facility] and the patient will receive a call to schedule an appointment at a [facility] location most convenient for them. *     [facility] provides Physical Therapy evaluation and treatment and many specialty services across the [facility] system.  If requesting a specialty program, please choose from the list below.    If you have not heard from the scheduling office within 2 business days, please call [phone] for all locations, with the exception of [place], please call [phone].  Treatment: Evaluation & Treatment  Special Instructions/Modalities: eval and treat  Special Programs: None    Please be aware that coverage of these services is subject to the terms and limitations of your health insurance plan.  Call member services at your health plan with any benefit or coverage questions.      **Note to Provider:  If you are referring outside of [facility] for the therapy appointment, please list the name of the location in the \"special instructions\" above, print the referral and give " to the patient to schedule the appointment.                  Your next 10 appointments already scheduled     Sep 27, 2017  8:40 AM CDT   MARY KAY Extremity with Tara Montoya PT   [facility] ([facility] [place])    [address] [place] [address]   [phone]            Oct 03, 2017 10:30 AM CDT   EP NEW with Alex Beverly MD   [facility] PHYSICIAN HEART AT [facility] ([facility])    [address] [address] [phone]            Oct 03, 2017 11:20 AM CDT   Pacemaker Check with Wy Device Rn   [facility] ([facility])    [address] [address] [phone]              Who to contact     If you have questions or need follow up information about today's clinic visit or your schedule please contact [facility] directly at [phone].  Normal or non-critical lab and imaging results will be communicated to you by Mist.iohart, letter or phone within 4 business days after the clinic has received the results. If you do not hear from us within 7 days, please contact the clinic through Yatownt or phone. If you have a critical or abnormal lab result, we will notify you by phone as soon as possible.  Submit refill requests through Apollidon or call your pharmacy and they will forward the refill request to us. Please allow 3 business days for your refill to be completed.          Additional Information About Your Visit        Apollidon Information     Apollidon gives you secure access to your electronic health record. If you see a primary care provider, you can also send messages to your care team and make appointments. If you have questions, please call your primary care clinic.  If you do not have a primary care provider, please call [phone] and they will assist you.        Care EveryWhere ID     This is your Care EveryWhere ID. This could be used by other organizations  "to access your [facility] medical records  [id number]        Your Vitals Were     Pulse Height BMI (Body Mass Index)             68 5' 6\" (1.676 m) 26.68 kg/m2          Blood Pressure from Last 3 Encounters:   09/22/17 142/72   08/28/17 152/80   08/28/17 160/81    Weight from Last 3 Encounters:   09/22/17 165 lb 4.8 oz (75 kg)   08/28/17 165 lb (74.8 kg)   08/28/17 165 lb 4.8 oz (75 kg)              We Performed the Following     PHYSICAL THERAPY REFERRAL          Today's Medication Changes          These changes are accurate as of: 9/22/17  9:08 AM.  If you have any questions, ask your nurse or doctor.               These medicines have changed or have updated prescriptions.        Dose/Directions    pantoprazole 20 MG EC tablet   Commonly known as:  PROTONIX   This may have changed:    - how much to take  - how to take this  - when to take this  - additional instructions   Used for:  Gastroesophageal reflux disease, esophagitis presence not specified        Take by mouth 30-60 minutes before a meal.   Quantity:  90 tablet   Refills:  1                Primary Care Provider Office Phone # Fax #    Kelly Matthew PA-C [phone] [address] [facility] [address]        Equal Access to Services     SOFIA EDUARDO AH: Jefferson mono Soalvino, waaxda luqadaha, qaybta kaalmada adeegyada, danya moser. So [facility] [phone].    ATENCIÓN: Si habla español, tiene a ozuna disposición servicios gratuitos de asistencia lingüística. Llame al [phone].    We comply with applicable federal civil rights laws and [place] laws. We do not discriminate on the basis of race, color, national origin, age, disability sex, sexual orientation or gender identity.            Thank you!     Thank you for choosing [facility]  for your care. Our goal is always to provide you with excellent care. Hearing back from our patients is one way we can " continue to improve our services. Please take a few minutes to complete the written survey that you may receive in the mail after your visit with us. Thank you!             Your Updated Medication List - Protect others around you: Learn how to safely use, store and throw away your medicines at www.disposemymeds.org.          This list is accurate as of: 9/22/17  9:08 AM.  Always use your most recent med list.                   Brand Name Dispense Instructions for use Diagnosis    * albuterol 108 (90 BASE) MCG/ACT Inhaler    PROAIR HFA/PROVENTIL HFA/VENTOLIN HFA    1 Inhaler    Inhale 2 puffs into the lungs every 6 hours as needed for shortness of breath / dyspnea or wheezing    Acute bronchitis with symptoms > 10 days       * albuterol 108 (90 BASE) MCG/ACT Inhaler    PROAIR HFA/PROVENTIL HFA/VENTOLIN HFA    1 Inhaler    Inhale 2 puffs into the lungs every 6 hours as needed for shortness of breath / dyspnea or wheezing    Acute bronchitis with symptoms > 10 days       aspirin 81 MG tablet      Take 2 tablets by mouth every evening. *.        atorvastatin 40 MG tablet    LIPITOR    90 tablet    Take 1 tablet (40 mg) by mouth daily    CAD S/P percutaneous coronary angioplasty       CALCIUM 600 + D PO      1 tablet by mouth 2 times per day        citalopram 10 MG tablet    celeXA    90 tablet    TAKE ONE TABLET BY MOUTH ONCE DAILY FOR DEPRESSION. THIS MEDICATION SHOULD BE TAKEN EVERY DAY TO BE EFFECTIVE    Major depressive disorder, recurrent episode, mild (H)       FISH OIL PO      1 daily        levothyroxine 75 MCG tablet    SYNTHROID/LEVOTHROID    90 tablet    Take 1 tablet (75 mcg) by mouth daily    Hypothyroidism, unspecified type       losartan 25 MG tablet    COZAAR    90 tablet    Take 1 tablet (25 mg) by mouth daily    Essential hypertension with goal blood pressure less than 140/90       metoprolol 25 MG 24 hr tablet    TOPROL-XL    90 tablet    Take 0.5 tablets (12.5 mg) by mouth daily    Essential  hypertension with goal blood pressure less than 140/90       nitroGLYcerin 0.4 MG sublingual tablet    NITROSTAT    25 tablet    Place 1 tablet (0.4 mg) under the tongue every 5 minutes as needed for chest pain (call your doctor if you take a third dose)    Atherosclerosis of native coronary artery of native heart without angina pectoris       OCUVITE PO      Take 1 tablet by mouth daily.        ondansetron 4 MG tablet    ZOFRAN    18 tablet    Take 1 tablet (4 mg) by mouth every 6 hours as needed for nausea    Nausea       oxybutynin 5 MG 24 hr tablet    DITROPAN-XL    30 tablet    TAKE ONE TABLET BY MOUTH EVERY DAY    Overactive bladder       pantoprazole 20 MG EC tablet    PROTONIX    90 tablet    Take by mouth 30-60 minutes before a meal.    Gastroesophageal reflux disease, esophagitis presence not specified       pramipexole 0.5 MG tablet    MIRAPEX    90 tablet    Take 1 tablet (0.5 mg) by mouth At Bedtime    Restless leg syndrome       ranitidine 150 MG tablet    ZANTAC    60 tablet    Take 1 tablet (150 mg) by mouth 2 times daily    Gastroesophageal reflux disease, esophagitis presence not specified       * Notice:  This list has 2 medication(s) that are the same as other medications prescribed for you. Read the directions carefully, and ask your doctor or other care provider to review them with you.

## 2017-09-27 ENCOUNTER — TELEPHONE (OUTPATIENT)
Dept: ORTHOPEDICS | Facility: CLINIC | Age: 82
End: 2017-09-27

## 2017-09-27 ENCOUNTER — OFFICE VISIT (OUTPATIENT)
Dept: ORTHOPEDICS | Facility: CLINIC | Age: 82
End: 2017-09-27
Payer: COMMERCIAL

## 2017-09-27 VITALS
HEIGHT: 66 IN | WEIGHT: 165 LBS | BODY MASS INDEX: 26.52 KG/M2 | SYSTOLIC BLOOD PRESSURE: 140 MMHG | DIASTOLIC BLOOD PRESSURE: 75 MMHG

## 2017-09-27 DIAGNOSIS — M16.12 PRIMARY OSTEOARTHRITIS OF LEFT HIP: Primary | ICD-10-CM

## 2017-09-27 PROCEDURE — 20611 DRAIN/INJ JOINT/BURSA W/US: CPT | Mod: LT | Performed by: FAMILY MEDICINE

## 2017-09-27 NOTE — PROGRESS NOTES
Christiana Sal  :  10/25/1931  DOS: 2017  MRN: [id number]    Sports Medicine Clinic Procedure    Ultrasound Guided Left Intra-Articular Hip Injection    Clinical History: Patient reports left leg pain for the past 3 months. It started out as trouble walking and has been gradually worsening. She reports that her leg will give out. She has been using a cane within the last month. Her pain is variable, at times in her buttock, thigh, and will extend to her knee and her calf. She denies numbness or tingling.    Diagnosis:   1. Primary osteoarthritis of left hip      Referring Physician: Lu Page MD  Technique: The risks of the procedure were explained to the patient.  A consent was signed for the intra-articular hip injection.  The patient was evaluated with a [employer] ultrasound machine using a 12 MHz linear probe.     4 ml of 1% lidocaine was used for local anesthesia. The Left hip was prepped and draped in a sterile manner.  Ultrasound identification of the acetabulum, femoral head, and femoral neck in both long and short axis.  The location of the femoral vessels were noted and marked.  The probe was placed in a oblique-sagittal axis to the Left femoral neck.  A 3.5 inch 22 gauge needle was placed under ultrasound guidance into the anterior hip capsule.  A mixture of 3 ml's of 0.2% ropivacaine and 1 ml kenalog (40mg/ml) was injected without difficulty on oblique-sagittal axis view.  The needle was removed and there was good hemostasis without complications.  There was ultrasound documentation of needle placement and injection.  Pre-procedural pain 7/10.  Post procedural pain 2/10.    Impression:  Successful Left intra-articular hip injection.    Plan:  Follow up PRN as directed by Dr Page  Give update in 2 weeks, especially if getting minimal relief  Expectations and goals of CSI reviewed  Often 2-3 days for steroid effect, and can take up to two weeks for maximum effect  We discussed  modified progressive pain-free activity as tolerated  Do not overuse in first two weeks if feeling better due to concern for vulnerability while steroid is working  Supportive care reviewed  All questions were answered today  Contact us with additional questions or concerns  Signs and sx of concern reviewed      Ricardo Palafox DO, SAMUEL  Primary Care Sports Medicine  [facility]

## 2017-09-27 NOTE — TELEPHONE ENCOUNTER
Reason for Call:  Other appointment    Detailed comments: pt calling stating she is having a lot of hip pain and would like to get in before the weekend for hip injection. She is currently scheduled one Tues 10/3    Phone Number Patient can be reached at: Home number on file [phone] (home)    Best Time: any     Can we leave a detailed message on this number? YES    Call taken on 9/27/2017 at 8:32 AM by Leeanne Madison

## 2017-09-27 NOTE — NURSING NOTE
"Chief Complaint   Patient presents with     Musculoskeletal Problem     left hip - US injection       Initial /75  Ht 5' 6\" (1.676 m)  Wt 165 lb (74.8 kg)  BMI 26.63 kg/m2 Estimated body mass index is 26.63 kg/(m^2) as calculated from the following:    Height as of this encounter: 5' 6\" (1.676 m).    Weight as of this encounter: 165 lb (74.8 kg).  Medication Reconciliation: complete     Antione Stoddard ATC  "

## 2017-09-28 DIAGNOSIS — K21.9 GASTROESOPHAGEAL REFLUX DISEASE, ESOPHAGITIS PRESENCE NOT SPECIFIED: ICD-10-CM

## 2017-09-28 DIAGNOSIS — N32.81 OVERACTIVE BLADDER: ICD-10-CM

## 2017-09-28 RX ORDER — OXYBUTYNIN CHLORIDE 5 MG/1
TABLET, EXTENDED RELEASE ORAL
Qty: 30 TABLET | Refills: 4 | Status: SHIPPED | OUTPATIENT
Start: 2017-09-28 | End: 2017-11-15

## 2017-09-28 RX ORDER — PANTOPRAZOLE SODIUM 20 MG/1
TABLET, DELAYED RELEASE ORAL
Qty: 90 TABLET | Refills: 0 | Status: SHIPPED | OUTPATIENT
Start: 2017-09-28 | End: 2017-10-06

## 2017-09-28 NOTE — TELEPHONE ENCOUNTER
OXYBUTYNIN CHLORIDE ER 5MG TB24        Last Written Prescription Date: 9/6/17  Last Fill Quantity: 30,  # refills: 0   Last Office Visit with [facility] or [facility] prescribing provider: 8/28/17                                         Next 5 appointments (look out 90 days)     Oct 20, 2017  9:00 AM CDT   Return Visit with Lu Page MD   [facility] ([facility])    [address]  [address]  [place] [address]   [phone]                   pantoprazole (PROTONIX) 20 MG EC tablet      Last Written Prescription Date: 3/23/17  Last Fill Quantity: 90,  # refills: 1   Last Office Visit with [facility] or [facility] prescribing provider: 8/28/17                                         Next 5 appointments (look out 90 days)     Oct 20, 2017  9:00 AM CDT   Return Visit with Lu Page MD   [facility] ([facility])    [address]  [address]  [place] [address]   [phone]

## 2017-10-03 ENCOUNTER — HOSPITAL ENCOUNTER (OUTPATIENT)
Dept: CARDIOLOGY | Facility: CLINIC | Age: 82
Discharge: HOME | End: 2017-10-03
Attending: PHYSICIAN ASSISTANT | Admitting: PHYSICIAN ASSISTANT
Payer: MEDICARE

## 2017-10-03 ENCOUNTER — OFFICE VISIT (OUTPATIENT)
Dept: CARDIOLOGY | Facility: CLINIC | Age: 82
End: 2017-10-03
Payer: COMMERCIAL

## 2017-10-03 ENCOUNTER — DOCUMENTATION ONLY (OUTPATIENT)
Dept: CARDIOLOGY | Facility: CLINIC | Age: 82
End: 2017-10-03

## 2017-10-03 VITALS
BODY MASS INDEX: 26.83 KG/M2 | SYSTOLIC BLOOD PRESSURE: 125 MMHG | HEART RATE: 68 BPM | OXYGEN SATURATION: 94 % | DIASTOLIC BLOOD PRESSURE: 72 MMHG | WEIGHT: 166.2 LBS

## 2017-10-03 DIAGNOSIS — I49.5 SINOATRIAL NODE DYSFUNCTION (H): Primary | ICD-10-CM

## 2017-10-03 DIAGNOSIS — I10 ESSENTIAL HYPERTENSION WITH GOAL BLOOD PRESSURE LESS THAN 140/90: ICD-10-CM

## 2017-10-03 DIAGNOSIS — I25.10 CORONARY ARTERY DISEASE INVOLVING NATIVE CORONARY ARTERY OF NATIVE HEART WITHOUT ANGINA PECTORIS: ICD-10-CM

## 2017-10-03 PROCEDURE — 99204 OFFICE O/P NEW MOD 45 MIN: CPT | Mod: 25 | Performed by: INTERNAL MEDICINE

## 2017-10-03 PROCEDURE — 93288 INTERROG EVL PM/LDLS PM IP: CPT

## 2017-10-03 PROCEDURE — 93288 INTERROG EVL PM/LDLS PM IP: CPT | Mod: 26 | Performed by: INTERNAL MEDICINE

## 2017-10-03 NOTE — PROGRESS NOTES
Leonardo Ash DR/NATY Pacemaker Device Check/ Dr Beverly/ Yi  AP: 4 % : 6 %  Mode: DDDR        Underlying Rhythm: SR  Heart Rate: Stable and adequate HR histogram  Sensing: Stable    Pacing Threshold: Stable   Impedance: WNL  Battery Status: 14.5 years estimated longevity    Atrial Arrhythmia: 13 mode switch episodes or <1%. Longest was 1 hour 39 minutes. Max VR was 109 bpm. Most episodes < 1 minute. Tracing shows A>V, suggesting atrial flutter.  Pt is not on OAC.  Ventricular Arrhythmia: NONE  Setting Change: NONE    Care Plan: Remote in 3 months.

## 2017-10-03 NOTE — PROGRESS NOTES
Electrophysiology/ Clinic Note         H&P and Plan:     [id number]  Alex Beverly MD    Physical Exam:  Vitals: /72 (BP Location: Right arm, Patient Position: Sitting, Cuff Size: Adult Regular)  Pulse 68  Wt 75.4 kg (166 lb 3.2 oz)  SpO2 94%  BMI 26.83 kg/m2    Constitutional:  AAO x3.  Pt is in NAD.  HEAD: normocephalic.  SKIN: Skin normal color, texture and turgor with no lesions or eruptions.  Eyes: PERRL, EOMI.  ENT:  Supple, normal JVP. No lymphadenopathy or thyroid enlargement.  Chest:  CTAB.  Cardiac: RRR, normal  S1 and S2.  No murmurs rubs or gallop.    Abdomen:  Normal BS.  Soft, non-tender and non-distended.  No rebound or guarding.    Extremities:  Pedious pulses palpable B/L.  Trace LE edema noticed.   Neurological: Strength and sensation grossly symmetric and intact throughout.       CURRENT MEDICATIONS:  Current Outpatient Prescriptions   Medication Sig Dispense Refill     oxybutynin (DITROPAN-XL) 5 MG 24 hr tablet TAKE ONE TABLET BY MOUTH EVERY DAY 30 tablet 4     pantoprazole (PROTONIX) 20 MG EC tablet TAKE 1 TABLET BY MOUTH DAILY, 30 TO 60 MINUTES BEFORE A MEAL. 90 tablet 0     ranitidine (ZANTAC) 150 MG tablet Take 1 tablet (150 mg) by mouth 2 times daily 60 tablet 0     citalopram (CELEXA) 10 MG tablet TAKE ONE TABLET BY MOUTH ONCE DAILY FOR DEPRESSION. THIS MEDICATION SHOULD BE TAKEN EVERY DAY TO BE EFFECTIVE 90 tablet 0     levothyroxine (SYNTHROID/LEVOTHROID) 75 MCG tablet Take 1 tablet (75 mcg) by mouth daily 90 tablet 1     pramipexole (MIRAPEX) 0.5 MG tablet Take 1 tablet (0.5 mg) by mouth At Bedtime 90 tablet 3     albuterol (PROAIR HFA/PROVENTIL HFA/VENTOLIN HFA) 108 (90 BASE) MCG/ACT Inhaler Inhale 2 puffs into the lungs every 6 hours as needed for shortness of breath / dyspnea or wheezing 1 Inhaler 0     losartan (COZAAR) 25 MG tablet Take 1 tablet (25 mg) by mouth daily 90 tablet 3     atorvastatin (LIPITOR) 40 MG tablet Take 1 tablet (40 mg) by mouth daily 90 tablet 3      metoprolol (TOPROL-XL) 25 MG 24 hr tablet Take 0.5 tablets (12.5 mg) by mouth daily 90 tablet 3     ondansetron (ZOFRAN) 4 MG tablet Take 1 tablet (4 mg) by mouth every 6 hours as needed for nausea 18 tablet 3     nitroglycerin (NITROSTAT) 0.4 MG SL tablet Place 1 tablet (0.4 mg) under the tongue every 5 minutes as needed for chest pain (call your doctor if you take a third dose) 25 tablet 3     Multiple Vitamins-Minerals (OCUVITE PO) Take 1 tablet by mouth daily.       ASPIRIN 81 MG PO TABS Take 2 tablets by mouth every evening. *.        CALCIUM 600 + D OR 1 tablet by mouth 2 times per day       FISH OIL OR 1 daily       albuterol (PROAIR HFA/PROVENTIL HFA/VENTOLIN HFA) 108 (90 BASE) MCG/ACT Inhaler Inhale 2 puffs into the lungs every 6 hours as needed for shortness of breath / dyspnea or wheezing 1 Inhaler 0       ALLERGIES     Allergies   Allergen Reactions     Demerol Nausea     Lisinopril Cough     Sulfa Drugs Other (See Comments)     Questionable itching reported by patient       PAST MEDICAL HISTORY:  Past Medical History:   Diagnosis Date     Coronary atherosclerosis of native coronary artery     s/p MI ([facility])     Essential hypertension, benign      GERD (gastroesophageal reflux disease)      History of transient ischemic attack (TIA)      Hyperlipidemia LDL goal <100      Hypothyroidism      Major depressive disorder, recurrent episode, mild (H) 5/18/2011     Peptic ulcer disease with hemorrhage     Remote history of stomach ulcer, requiring transfusion after chronic bleeding       PAST SURGICAL HISTORY:  Past Surgical History:   Procedure Laterality Date     APPENDECTOMY       CARDIAC CATHERIZATION  1/15/04    drug-eluding stent RCA, Dr. Pérez, [facility]     CARDIAC CATHERIZATION  2/16/12    diffuse mild/mod disease, no new stent     CATARACT IOL, RT/LT  1/26/12    RT, Dr. Wynne     CHOLECYSTECTOMY, OPEN       COLONOSCOPY  2008    [facility]     FRACTURE TX, ANKLE RT/LT      LT, 2  screws remain     HC EXCISE HAND/FOOT NEUROMA      RT     HC LARYNGOSCOPY DIRECT W VOCAL CORD INJECTION      vocal cord polypectomy     HC REMOVAL OF OVARIAN CYST(S)       HC TRANSCATH STENT INIT VESSEL,PERCUT      x 2     REPAIR HAMMER TOE  9/13/10    multiple + RT great toe tendon release, Dr. Sanchez DPM     STENT  -    Cardiac stents 6 placed.       FAMILY HISTORY:  Family History   Problem Relation Age of Onset     C.A.D. Father      Hypertension Father      Myocardial Infarction Father 66      from MI     C.A.D. Brother      Myocardial Infarction Brother      CANCER Brother      skin ca     Breast Cancer Sister      Cancer - colorectal Sister      HEART DISEASE Sister      Neurologic Disorder Mother      migraine     C.A.D. Mother      Hypertension Mother      Heart Failure Mother      Thyroid Disease Son      cretinism     Hypertension Son      HEART DISEASE Son      Psychotic Disorder Sister      Hypertension Sister      HEART DISEASE Sister      CABG     Hypertension Brother      C.A.D. Brother      Myocardial Infarction Brother      Arthritis Daughter      RA     Hypertension Sister      HEART DISEASE Sister      CANCER Sister      Blood Disease Sister      Neurologic Disorder Child      migraine (2 children)       SOCIAL HISTORY:  Social History     Social History     Marital status:      Spouse name: N/A     Number of children: 4     Years of education: some adry     Occupational History      Retired     Social History Main Topics     Smoking status: Never Smoker     Smokeless tobacco: Never Used      Comment: Never smoker; no secondhand smoke exposure     Alcohol use Yes      Comment: social     Drug use: No     Sexual activity: Not Currently     Other Topics Concern     Parent/Sibling W/ Cabg, Mi Or Angioplasty Before 65f 55m? No     Social History Narrative     has Alzheimer's       Review of Systems:  Skin:  Negative     Eyes:  Positive for glasses  ENT:  Negative    Respiratory:   Negative    Cardiovascular:    fatigue;Positive for  Gastroenterology: Negative    Genitourinary:  Negative    Musculoskeletal:  Positive for arthritis;joint pain;joint swelling;joint stiffness  Neurologic:  Negative    Psychiatric:  Positive for depression  Heme/Lymph/Imm:  Negative    Endocrine:  Negative        Recent Lab Results:  LIPID RESULTS:  Lab Results   Component Value Date    CHOL 126 09/05/2017    HDL 44 (L) 09/05/2017    LDL 57 09/05/2017    TRIG 123 09/05/2017    CHOLHDLRATIO 3.7 03/17/2014       LIVER ENZYME RESULTS:  Lab Results   Component Value Date    AST 19 08/28/2017    ALT 25 08/28/2017       CBC RESULTS:  Lab Results   Component Value Date    WBC 6.8 08/28/2017    RBC 4.35 08/28/2017    HGB 13.1 08/28/2017    HCT 39.2 08/28/2017    MCV 90 08/28/2017    MCH 30.1 08/28/2017    MCHC 33.4 08/28/2017    RDW 13.9 08/28/2017     08/28/2017       BMP RESULTS:  Lab Results   Component Value Date     08/28/2017    POTASSIUM 3.4 08/28/2017    CHLORIDE 107 08/28/2017    CO2 23 08/28/2017    ANIONGAP 8 08/28/2017     (H) 08/28/2017    BUN 16 08/28/2017    CR 0.90 08/28/2017    GFRESTIMATED 59 (L) 08/28/2017    GFRESTBLACK 72 08/28/2017    YVONNE 8.4 (L) 08/28/2017        A1C RESULTS:  No results found for: A1C    INR RESULTS:  Lab Results   Component Value Date    INR 0.90 05/10/2016    INR 0.98 02/16/2012         ECHOCARDIOGRAM  No results found for this or any previous visit (from the past 8760 hour(s)).      No orders of the defined types were placed in this encounter.    No orders of the defined types were placed in this encounter.    There are no discontinued medications.      No diagnosis found.      CC  No referring provider defined for this encounter.

## 2017-10-03 NOTE — LETTER
10/3/2017    Kelly Matthew PA-C  [address] [facility] [address]    RE: Christiana Sal       Dear Colleague,    [facility] the [facility] in [facility].    REASON FOR VISIT:  Evaluation of tachybrady syndrome and chest pain.      Ms. Sal is a delightful 85-year-old lady with a history of hypertension, previous TIA (2002), GERD and coronary disease (PCI to RCA in 2004 and PCI to LAD and first diagonal in 05/11/2016 complicated by coronary dissection requiring additional drug-eluting stents) and tachybrady syndrome (pacemaker implantation 11/2016) who is here to establish care.      The patient informs that in August she had several episodes of intermittent chest discomfort associated with shortness of breath.  She informs that some of the episodes she thought were related to GERD.  Others were more midsternal.  The episodes may last for several hours.  She sought medical attention with her PCP and was recommended to have a nuclear stress test and followup with me.      At the moment, she is doing well.  She informs that the episodes of chest discomfort have completely abolished in the last month or so.  She denies any other symptoms such as shortness of breath, lightheadedness, near syncope or syncopal episode.      She had a nuclear stress test done 09/06 which showed small fixed defect in the median basal anterior/anteroseptal territory.  There was normal LV function with EF around 76%.  No reversible defects suggestive of ischemia were seen.  Baseline EKG shows a left bundle branch block.  She is scheduled to have a device check today.     Outpatient Encounter Prescriptions as of 10/3/2017   Medication Sig Dispense Refill     oxybutynin (DITROPAN-XL) 5 MG 24 hr tablet TAKE ONE TABLET BY MOUTH EVERY DAY 30 tablet 4     [DISCONTINUED] pantoprazole (PROTONIX) 20 MG EC tablet TAKE 1 TABLET BY MOUTH DAILY, 30 TO 60 MINUTES BEFORE A  MEAL. 90 tablet 0     [DISCONTINUED] ranitidine (ZANTAC) 150 MG tablet Take 1 tablet (150 mg) by mouth 2 times daily 60 tablet 0     citalopram (CELEXA) 10 MG tablet TAKE ONE TABLET BY MOUTH ONCE DAILY FOR DEPRESSION. THIS MEDICATION SHOULD BE TAKEN EVERY DAY TO BE EFFECTIVE 90 tablet 0     levothyroxine (SYNTHROID/LEVOTHROID) 75 MCG tablet Take 1 tablet (75 mcg) by mouth daily 90 tablet 1     pramipexole (MIRAPEX) 0.5 MG tablet Take 1 tablet (0.5 mg) by mouth At Bedtime 90 tablet 3     albuterol (PROAIR HFA/PROVENTIL HFA/VENTOLIN HFA) 108 (90 BASE) MCG/ACT Inhaler Inhale 2 puffs into the lungs every 6 hours as needed for shortness of breath / dyspnea or wheezing 1 Inhaler 0     losartan (COZAAR) 25 MG tablet Take 1 tablet (25 mg) by mouth daily 90 tablet 3     atorvastatin (LIPITOR) 40 MG tablet Take 1 tablet (40 mg) by mouth daily 90 tablet 3     metoprolol (TOPROL-XL) 25 MG 24 hr tablet Take 0.5 tablets (12.5 mg) by mouth daily 90 tablet 3     ondansetron (ZOFRAN) 4 MG tablet Take 1 tablet (4 mg) by mouth every 6 hours as needed for nausea 18 tablet 3     nitroglycerin (NITROSTAT) 0.4 MG SL tablet Place 1 tablet (0.4 mg) under the tongue every 5 minutes as needed for chest pain (call your doctor if you take a third dose) 25 tablet 3     Multiple Vitamins-Minerals (OCUVITE PO) Take 1 tablet by mouth daily.       ASPIRIN 81 MG PO TABS Take 2 tablets by mouth every evening. *.        CALCIUM 600 + D OR 1 tablet by mouth 2 times per day       FISH OIL OR 1 daily       albuterol (PROAIR HFA/PROVENTIL HFA/VENTOLIN HFA) 108 (90 BASE) MCG/ACT Inhaler Inhale 2 puffs into the lungs every 6 hours as needed for shortness of breath / dyspnea or wheezing 1 Inhaler 0     No facility-administered encounter medications on file as of 10/3/2017.       ASSESSMENT AND PLAN:     1.  Coronary artery disease/chest discomfort.  Nuclear stress test was essentially negative for ischemia and she is asymptomatic at this time.  Her blood  pressure is well controlled.  I recommend continuing current medical therapy with metoprolol, Lipitor, losartan and aspirin.      She was instructed to contact us if she has recurrent symptoms.  At that time, we will consider coronary angiography.        2.  Tachybrady syndrome.  We will check device today.      3.  Hypertension.  Blood pressure is well controlled.        4.  Followup care.  Follow up in clinic in 1 year or earlier as needed.     Again, thank you for allowing me to participate in the care of your patient.      Sincerely,    Alex Beverly MD     [facility]

## 2017-10-03 NOTE — MR AVS SNAPSHOT
After Visit Summary   10/3/2017    Christiana Sal    MRN: [id number]           Patient Information     Date Of Birth          10/25/1931        Visit Information        Provider Department      10/3/2017 10:30 AM Alex Beverly MD [facility] PHYSICIAN HEART AT [facility]        Today's Diagnoses     Sinoatrial node dysfunction (H)    -  1    Essential hypertension with goal blood pressure less than 140/90        Coronary artery disease involving native coronary artery of native heart without angina pectoris           Follow-ups after your visit        Your next 10 appointments already scheduled     Oct 03, 2017 11:20 AM CDT   Pacemaker Check with Wy Device Rn   [facility] ([facility])    [address] [address]   [phone]            Oct 20, 2017  9:00 AM CDT   Return Visit with Lu Page MD   [facility] ([facility])    [address]  [address]  [facility] [place] [address]   [phone]              Who to contact     If you have questions or need follow up information about today's clinic visit or your schedule please contact [facility] PHYSICIAN HEART AT [facility] directly at [phone].  Normal or non-critical lab and imaging results will be communicated to you by MyChart, letter or phone within 4 business days after the clinic has received the results. If you do not hear from us within 7 days, please contact the clinic through MyChart or phone. If you have a critical or abnormal lab result, we will notify you by phone as soon as possible.  Submit refill requests through [facility] or call your pharmacy and they will forward the refill request to us. Please allow 3 business days for your refill to be completed.          Additional Information About Your Visit        [employer] Information     [facility] gives you secure access to your electronic  health record. If you see a primary care provider, you can also send messages to your care team and make appointments. If you have questions, please call your primary care clinic.  If you do not have a primary care provider, please call [phone] and they will assist you.        Care EveryWhere ID     This is your Care EveryWhere ID. This could be used by other organizations to access your [facility] medical records  [id number]        Your Vitals Were     Pulse Pulse Oximetry BMI (Body Mass Index)             68 94% 26.83 kg/m2          Blood Pressure from Last 3 Encounters:   10/03/17 125/72   09/27/17 140/75   09/22/17 142/72    Weight from Last 3 Encounters:   10/03/17 75.4 kg (166 lb 3.2 oz)   09/27/17 74.8 kg (165 lb)   09/22/17 75 kg (165 lb 4.8 oz)              Today, you had the following     No orders found for display       Primary Care Provider Office Phone # Fax #    Kelly Matthew PA-C [phone]       [address] [facility] [address]        Equal Access to Services     [facility] Hospital[facility] : Hadii shailesh mono Soalvino, waaxda lujuan manueladaha, qaybta kaalmada polina, danya brian . So [facility] [phone].    ATENCIÓN: Si habla español, tiene a ozuna disposición servicios gratuitos de asistencia lingüística. [facility] [phone].    We comply with applicable federal civil rights laws and [place] laws. We do not discriminate on the basis of race, color, national origin, age, disability, sex, sexual orientation, or gender identity.            Thank you!     Thank you for choosing [facility] PHYSICIAN HEART AT [facility]  for your care. Our goal is always to provide you with excellent care. Hearing back from our patients is one way we can continue to improve our services. Please take a few minutes to complete the written survey that you may receive in the mail after your visit with us. Thank you!             Your Updated Medication List -  Protect others around you: Learn how to safely use, store and throw away your medicines at www.disposemymeds.org.          This list is accurate as of: 10/3/17 10:32 AM.  Always use your most recent med list.                   Brand Name Dispense Instructions for use Diagnosis    * albuterol 108 (90 BASE) MCG/ACT Inhaler    PROAIR HFA/PROVENTIL HFA/VENTOLIN HFA    1 Inhaler    Inhale 2 puffs into the lungs every 6 hours as needed for shortness of breath / dyspnea or wheezing    Acute bronchitis with symptoms > 10 days       * albuterol 108 (90 BASE) MCG/ACT Inhaler    PROAIR HFA/PROVENTIL HFA/VENTOLIN HFA    1 Inhaler    Inhale 2 puffs into the lungs every 6 hours as needed for shortness of breath / dyspnea or wheezing    Acute bronchitis with symptoms > 10 days       aspirin 81 MG tablet      Take 2 tablets by mouth every evening. *.        atorvastatin 40 MG tablet    LIPITOR    90 tablet    Take 1 tablet (40 mg) by mouth daily    CAD S/P percutaneous coronary angioplasty       CALCIUM 600 + D PO      1 tablet by mouth 2 times per day        citalopram 10 MG tablet    celeXA    90 tablet    TAKE ONE TABLET BY MOUTH ONCE DAILY FOR DEPRESSION. THIS MEDICATION SHOULD BE TAKEN EVERY DAY TO BE EFFECTIVE    Major depressive disorder, recurrent episode, mild (H)       FISH OIL PO      1 daily        levothyroxine 75 MCG tablet    SYNTHROID/LEVOTHROID    90 tablet    Take 1 tablet (75 mcg) by mouth daily    Hypothyroidism, unspecified type       losartan 25 MG tablet    COZAAR    90 tablet    Take 1 tablet (25 mg) by mouth daily    Essential hypertension with goal blood pressure less than 140/90       metoprolol 25 MG 24 hr tablet    TOPROL-XL    90 tablet    Take 0.5 tablets (12.5 mg) by mouth daily    Essential hypertension with goal blood pressure less than 140/90       nitroGLYcerin 0.4 MG sublingual tablet    NITROSTAT    25 tablet    Place 1 tablet (0.4 mg) under the tongue every 5 minutes as needed for chest pain  (call your doctor if you take a third dose)    Atherosclerosis of native coronary artery of native heart without angina pectoris       OCUVITE PO      Take 1 tablet by mouth daily.        ondansetron 4 MG tablet    ZOFRAN    18 tablet    Take 1 tablet (4 mg) by mouth every 6 hours as needed for nausea    Nausea       oxybutynin 5 MG 24 hr tablet    DITROPAN-XL    30 tablet    TAKE ONE TABLET BY MOUTH EVERY DAY    Overactive bladder       pantoprazole 20 MG EC tablet    PROTONIX    90 tablet    TAKE 1 TABLET BY MOUTH DAILY, 30 TO 60 MINUTES BEFORE A MEAL.    Gastroesophageal reflux disease, esophagitis presence not specified       pramipexole 0.5 MG tablet    MIRAPEX    90 tablet    Take 1 tablet (0.5 mg) by mouth At Bedtime    Restless leg syndrome       ranitidine 150 MG tablet    ZANTAC    60 tablet    Take 1 tablet (150 mg) by mouth 2 times daily    Gastroesophageal reflux disease, esophagitis presence not specified       * Notice:  This list has 2 medication(s) that are the same as other medications prescribed for you. Read the directions carefully, and ask your doctor or other care provider to review them with you.

## 2017-10-03 NOTE — PROGRESS NOTES
REASON FOR VISIT:  Evaluation of tachybrady syndrome and chest pain.      HISTORY OF PRESENT ILLNESS:  Ms. Sal is a delightful 85-year-old lady with a history of hypertension, previous TIA (2002), GERD and coronary disease (PCI to RCA in 2004 and PCI to LAD and first diagonal in 05/11/2016 complicated by coronary dissection requiring additional drug-eluting stents) and tachybrady syndrome (pacemaker implantation 11/2016) who is here to establish care.      The patient informs that in August she had several episodes of intermittent chest discomfort associated with shortness of breath.  She informs that some of the episodes she thought were related to GERD.  Others were more midsternal.  The episodes may last for several hours.  She sought medical attention with her PCP and was recommended to have a nuclear stress test and followup with me.      At the moment, she is doing well.  She informs that the episodes of chest discomfort have completely abolished in the last month or so.  She denies any other symptoms such as shortness of breath, lightheadedness, near syncope or syncopal episode.      She had a nuclear stress test done 09/06 which showed small fixed defect in the median basal anterior/anteroseptal territory.  There was normal LV function with EF around 76%.  No reversible defects suggestive of ischemia were seen.  Baseline EKG shows a left bundle branch block.  She is scheduled to have a device check today.      ASSESSMENT AND PLAN:     1.  Coronary artery disease/chest discomfort.  Nuclear stress test was essentially negative for ischemia and she is asymptomatic at this time.  Her blood pressure is well controlled.  I recommend continuing current medical therapy with metoprolol, Lipitor, losartan and aspirin.      She was instructed to contact us if she has recurrent symptoms.  At that time, we will consider coronary angiography.        2.  Tachybrady syndrome.  We will check device today.      3.   Hypertension.  Blood pressure is well controlled.        4.  Followup care.  Follow up in clinic in 1 year or earlier as needed.         JAMES VILLEDA MD             D: 10/03/2017 10:30   T: 10/03/2017 12:27   MT: SALOMON      Name:     SANDIE BERGER   MRN:      [id number]        Account:      [id number]   :      10/25/1931           Service Date: 10/03/2017      Document: [id number]

## 2017-10-06 ENCOUNTER — OFFICE VISIT (OUTPATIENT)
Dept: FAMILY MEDICINE | Facility: CLINIC | Age: 82
End: 2017-10-06
Payer: COMMERCIAL

## 2017-10-06 VITALS
WEIGHT: 167 LBS | DIASTOLIC BLOOD PRESSURE: 78 MMHG | HEIGHT: 66 IN | SYSTOLIC BLOOD PRESSURE: 132 MMHG | HEART RATE: 72 BPM | BODY MASS INDEX: 26.84 KG/M2

## 2017-10-06 DIAGNOSIS — K21.9 GASTROESOPHAGEAL REFLUX DISEASE, ESOPHAGITIS PRESENCE NOT SPECIFIED: ICD-10-CM

## 2017-10-06 PROCEDURE — 99213 OFFICE O/P EST LOW 20 MIN: CPT | Performed by: PHYSICIAN ASSISTANT

## 2017-10-06 RX ORDER — PANTOPRAZOLE SODIUM 20 MG/1
40 TABLET, DELAYED RELEASE ORAL DAILY
Qty: 90 TABLET | Refills: 0 | COMMUNITY
Start: 2017-10-06 | End: 2017-11-15

## 2017-10-06 NOTE — MR AVS SNAPSHOT
After Visit Summary   10/6/2017    Christiana Sal    MRN: [id number]           Patient Information     Date Of Birth          10/25/1931        Visit Information        Provider Department      10/6/2017 2:00 PM Kelly Matthew PA-C [facility]        Today's Diagnoses     Gastroesophageal reflux disease, esophagitis presence not specified          Care Instructions    Increase the ranitidine (also called zantac) to 300mg (2 tablets) at bedtime    Increase the protonix (also known as pantoprazole) to 2 tablets (40mg) in the morning    Okay to take TUMS as needed          Follow-ups after your visit        Your next 10 appointments already scheduled     Oct 20, 2017  9:00 AM CDT   Return Visit with Lu Page MD   [facility] ([facility])    [address]  [address]  [place] [address]   [phone]            Jan 22, 2018 10:00 AM CST   Remote PPM Check with [facility] [facility]   [facility] ([facility])    [address] [address]   [phone]           This appointment is for a remote check of your pacemaker.  This is not an appointment at the office.              Who to contact     Normal or non-critical lab and imaging results will be communicated to you by MyChart, letter or phone within 4 business days after the clinic has received the results. If you do not hear from us within 7 days, please contact the clinic through MyChart or phone. If you have a critical or abnormal lab result, we will notify you by phone as soon as possible.  Submit refill requests through [employer] or call your pharmacy and they will forward the refill request to us. Please allow 3 business days for your refill to be completed.          If you need to speak with a  for additional information , please call: [phone]             Additional Information About Your  "Visit        [employer] Information     [facility] gives you secure access to your electronic health record. If you see a primary care provider, you can also send messages to your care team and make appointments. If you have questions, please call your primary care clinic.  If you do not have a primary care provider, please call [phone] and they will assist you.        Care EveryWhere ID     This is your Care EveryWhere ID. This could be used by other organizations to access your [facility] medical records  [id number]        Your Vitals Were     Pulse Height BMI (Body Mass Index)             72 5' 6\" (1.676 m) 26.95 kg/m2          Blood Pressure from Last 3 Encounters:   10/06/17 132/78   10/03/17 125/72   09/27/17 140/75    Weight from Last 3 Encounters:   10/06/17 167 lb (75.8 kg)   10/03/17 166 lb 3.2 oz (75.4 kg)   09/27/17 165 lb (74.8 kg)              Today, you had the following     No orders found for display         Today's Medication Changes          These changes are accurate as of: 10/6/17  2:48 PM.  If you have any questions, ask your nurse or doctor.               These medicines have changed or have updated prescriptions.        Dose/Directions    pantoprazole 20 MG EC tablet   Commonly known as:  PROTONIX   This may have changed:  See the new instructions.   Used for:  Gastroesophageal reflux disease, esophagitis presence not specified   Changed by:  Kelly Matthew PA-C        Dose:  40 mg   Take 2 tablets (40 mg) by mouth daily   Quantity:  90 tablet   Refills:  0       ranitidine 150 MG tablet   Commonly known as:  ZANTAC   This may have changed:    - how much to take  - when to take this   Used for:  Gastroesophageal reflux disease, esophagitis presence not specified   Changed by:  Kelly Matthew PA-C        Dose:  300 mg   Take 2 tablets (300 mg) by mouth At Bedtime   Quantity:  60 tablet   Refills:  0                Primary Care Provider Office Phone # Fax #    Kelly " Zeinab Matthew PA-C [phone]       [address] [facility] [address]        Equal Access to Services     SOFIA EDUARDO : Jefferson shailesh delong lucia Davis, wasushilda luqlaurent, qabrianata kamaryda polina, danya soloriolaura shanti. So [facility] [phone].    ATENCIÓN: Si habla español, tiene a ozuna disposición servicios gratuitos de asistencia lingüística. Llame al [phone].    We comply with applicable federal civil rights laws and [place] laws. We do not discriminate on the basis of race, color, national origin, age, disability, sex, sexual orientation, or gender identity.            Thank you!     Thank you for choosing [facility]  for your care. Our goal is always to provide you with excellent care. Hearing back from our patients is one way we can continue to improve our services. Please take a few minutes to complete the written survey that you may receive in the mail after your visit with us. Thank you!             Your Updated Medication List - Protect others around you: Learn how to safely use, store and throw away your medicines at www.disposemymeds.org.          This list is accurate as of: 10/6/17  2:48 PM.  Always use your most recent med list.                   Brand Name Dispense Instructions for use Diagnosis    * albuterol 108 (90 BASE) MCG/ACT Inhaler    PROAIR HFA/PROVENTIL HFA/VENTOLIN HFA    1 Inhaler    Inhale 2 puffs into the lungs every 6 hours as needed for shortness of breath / dyspnea or wheezing    Acute bronchitis with symptoms > 10 days       * albuterol 108 (90 BASE) MCG/ACT Inhaler    PROAIR HFA/PROVENTIL HFA/VENTOLIN HFA    1 Inhaler    Inhale 2 puffs into the lungs every 6 hours as needed for shortness of breath / dyspnea or wheezing    Acute bronchitis with symptoms > 10 days       aspirin 81 MG tablet      Take 2 tablets by mouth every evening. *.        atorvastatin 40 MG tablet    LIPITOR    90 tablet    Take 1 tablet (40 mg) by mouth  daily    CAD S/P percutaneous coronary angioplasty       CALCIUM 600 + D PO      1 tablet by mouth 2 times per day        citalopram 10 MG tablet    celeXA    90 tablet    TAKE ONE TABLET BY MOUTH ONCE DAILY FOR DEPRESSION. THIS MEDICATION SHOULD BE TAKEN EVERY DAY TO BE EFFECTIVE    Major depressive disorder, recurrent episode, mild (H)       FISH OIL PO      1 daily        levothyroxine 75 MCG tablet    SYNTHROID/LEVOTHROID    90 tablet    Take 1 tablet (75 mcg) by mouth daily    Hypothyroidism, unspecified type       losartan 25 MG tablet    COZAAR    90 tablet    Take 1 tablet (25 mg) by mouth daily    Essential hypertension with goal blood pressure less than 140/90       metoprolol 25 MG 24 hr tablet    TOPROL-XL    90 tablet    Take 0.5 tablets (12.5 mg) by mouth daily    Essential hypertension with goal blood pressure less than 140/90       nitroGLYcerin 0.4 MG sublingual tablet    NITROSTAT    25 tablet    Place 1 tablet (0.4 mg) under the tongue every 5 minutes as needed for chest pain (call your doctor if you take a third dose)    Atherosclerosis of native coronary artery of native heart without angina pectoris       OCUVITE PO      Take 1 tablet by mouth daily.        ondansetron 4 MG tablet    ZOFRAN    18 tablet    Take 1 tablet (4 mg) by mouth every 6 hours as needed for nausea    Nausea       oxybutynin 5 MG 24 hr tablet    DITROPAN-XL    30 tablet    TAKE ONE TABLET BY MOUTH EVERY DAY    Overactive bladder       pantoprazole 20 MG EC tablet    PROTONIX    90 tablet    Take 2 tablets (40 mg) by mouth daily    Gastroesophageal reflux disease, esophagitis presence not specified       pramipexole 0.5 MG tablet    MIRAPEX    90 tablet    Take 1 tablet (0.5 mg) by mouth At Bedtime    Restless leg syndrome       ranitidine 150 MG tablet    ZANTAC    60 tablet    Take 2 tablets (300 mg) by mouth At Bedtime    Gastroesophageal reflux disease, esophagitis presence not specified       * Notice:  This list has 2  medication(s) that are the same as other medications prescribed for you. Read the directions carefully, and ask your doctor or other care provider to review them with you.

## 2017-10-06 NOTE — NURSING NOTE
"Chief Complaint   Patient presents with     Gastrophageal Reflux       Initial /78 (BP Location: Right arm, Patient Position: Chair, Cuff Size: Adult Regular)  Pulse 72  Ht 5' 6\" (1.676 m)  Wt 167 lb (75.8 kg)  BMI 26.95 kg/m2 Estimated body mass index is 26.95 kg/(m^2) as calculated from the following:    Height as of this encounter: 5' 6\" (1.676 m).    Weight as of this encounter: 167 lb (75.8 kg).  Medication Reconciliation: complete     Viraj Lofton CMA    "

## 2017-10-06 NOTE — PATIENT INSTRUCTIONS
Increase the ranitidine (also called zantac) to 300mg (2 tablets) at bedtime    Increase the protonix (also known as pantoprazole) to 2 tablets (40mg) in the morning    Okay to take TUMS as needed

## 2017-10-06 NOTE — PROGRESS NOTES
SUBJECTIVE:   Christiana Sal is a 85 year old female who presents to clinic today for the following health issues:      GERD/Heartburn  Onset: ongoing, has been bothering her over the last few days     Description:     Burning in chest: YES    Intensity: moderate    Progression of Symptoms: worsening    Accompanying Signs & Symptoms:  Does it feel like food gets stuck: no  Nausea: no  Vomiting (bloody?): no  Abdominal Pain: YES  Black-Tarry stools: no:  Bloody stools: no    History:   Previous ulcers: no    Precipitating factors:   Caffeine use: YES- she usually drinks coffee but she did not have any yesterday   Alcohol use: no  NSAID/Aspirin use: YES- baby aspirin everyday tylenol sometimes  Tobacco use: no  Worse with Coffee.    Alleviating factors:  None    Therapies Tried and outcome:Stopped coffee, wondering if she should change her diet     Still having reflux symptoms - not daily, but certain days are very bad  Has tried altering her diet but not sure what foods she should avoid  Read that bananas are okay to eat but she says bananas make her symptoms worse    Taking protonix 20mg daily  Also taking zantac 150mg once daily at bedtime    Cough comes and goes - worse when her reflux is worse      Problem list and histories reviewed & adjusted, as indicated.  Additional history: as documented    Current Outpatient Prescriptions   Medication Sig Dispense Refill     pantoprazole (PROTONIX) 20 MG EC tablet Take 2 tablets (40 mg) by mouth daily 90 tablet 0     ranitidine (ZANTAC) 150 MG tablet Take 2 tablets (300 mg) by mouth At Bedtime 60 tablet 0     oxybutynin (DITROPAN-XL) 5 MG 24 hr tablet TAKE ONE TABLET BY MOUTH EVERY DAY 30 tablet 4     citalopram (CELEXA) 10 MG tablet TAKE ONE TABLET BY MOUTH ONCE DAILY FOR DEPRESSION. THIS MEDICATION SHOULD BE TAKEN EVERY DAY TO BE EFFECTIVE 90 tablet 0     levothyroxine (SYNTHROID/LEVOTHROID) 75 MCG tablet Take 1 tablet (75 mcg) by mouth daily 90 tablet 1      "pramipexole (MIRAPEX) 0.5 MG tablet Take 1 tablet (0.5 mg) by mouth At Bedtime 90 tablet 3     albuterol (PROAIR HFA/PROVENTIL HFA/VENTOLIN HFA) 108 (90 BASE) MCG/ACT Inhaler Inhale 2 puffs into the lungs every 6 hours as needed for shortness of breath / dyspnea or wheezing 1 Inhaler 0     albuterol (PROAIR HFA/PROVENTIL HFA/VENTOLIN HFA) 108 (90 BASE) MCG/ACT Inhaler Inhale 2 puffs into the lungs every 6 hours as needed for shortness of breath / dyspnea or wheezing 1 Inhaler 0     losartan (COZAAR) 25 MG tablet Take 1 tablet (25 mg) by mouth daily 90 tablet 3     atorvastatin (LIPITOR) 40 MG tablet Take 1 tablet (40 mg) by mouth daily 90 tablet 3     metoprolol (TOPROL-XL) 25 MG 24 hr tablet Take 0.5 tablets (12.5 mg) by mouth daily 90 tablet 3     ondansetron (ZOFRAN) 4 MG tablet Take 1 tablet (4 mg) by mouth every 6 hours as needed for nausea 18 tablet 3     nitroglycerin (NITROSTAT) 0.4 MG SL tablet Place 1 tablet (0.4 mg) under the tongue every 5 minutes as needed for chest pain (call your doctor if you take a third dose) 25 tablet 3     Multiple Vitamins-Minerals (OCUVITE PO) Take 1 tablet by mouth daily.       ASPIRIN 81 MG PO TABS Take 2 tablets by mouth every evening. *.        CALCIUM 600 + D OR 1 tablet by mouth 2 times per day       FISH OIL OR 1 daily       Allergies   Allergen Reactions     Demerol Nausea     Lisinopril Cough     Sulfa Drugs Other (See Comments)     Questionable itching reported by patient       Reviewed and updated as needed this visit by clinical staff       Reviewed and updated as needed this visit by Provider         ROS:  Remainder of ROS obtained and found to be negative other than that which was documented above    OBJECTIVE:     /78 (BP Location: Right arm, Patient Position: Chair, Cuff Size: Adult Regular)  Pulse 72  Ht 5' 6\" (1.676 m)  Wt 167 lb (75.8 kg)  BMI 26.95 kg/m2  Body mass index is 26.95 kg/(m^2).  GENERAL: healthy, alert and no distress  EYES: Eyes " grossly normal to inspection  RESP: lungs clear to auscultation - no rales, rhonchi or wheezes  CV: regular rates and rhythm, normal S1 S2, no S3 or S4 and no murmur, click or rub    Diagnostic Test Results:  none     ASSESSMENT/PLAN:     (K21.9) Gastroesophageal reflux disease, esophagitis presence not specified  Comment: Discussed diet recommendations. Suggested that patient keep a food diary so that she can see what foods may be triggering her symptoms. Will also adjust medications - will increase the protonix to 40mg daily and would like her to increase the zantac to 300mg at bedtime  Plan: pantoprazole (PROTONIX) 20 MG EC tablet,         ranitidine (ZANTAC) 150 MG tablet                Kelly Matthew PA-C  [facility]

## 2017-10-09 RX ORDER — TRIAMCINOLONE ACETONIDE 40 MG/ML
40 INJECTION, SUSPENSION INTRA-ARTICULAR; INTRAMUSCULAR ONCE
Qty: 1 ML | Refills: 0 | OUTPATIENT
Start: 2017-10-09 | End: 2017-10-09

## 2017-10-17 DIAGNOSIS — J20.9 ACUTE BRONCHITIS WITH SYMPTOMS > 10 DAYS: ICD-10-CM

## 2017-10-18 NOTE — TELEPHONE ENCOUNTER
VENTOLIN HFA AER         Last Written Prescription Date: 3/2/17  Last Fill Quantity: 1, # refills: 0    Last Office Visit with G, P or [facility] prescribing provider:  10/6/17   Future Office Visit:    Next 5 appointments (look out 90 days)     Oct 20, 2017  9:00 AM CDT   Return Visit with Lu Page MD   [facility] ([facility])    [address]  [place] [facility] [place] [address]   [phone]                   Date of Last Asthma Action Plan Letter:   There are no preventive care reminders to display for this patient.   Asthma Control Test: No flowsheet data found.    Date of Last Spirometry Test:   No results found for this or any previous visit.

## 2017-10-19 RX ORDER — ALBUTEROL SULFATE 90 UG/1
AEROSOL, METERED RESPIRATORY (INHALATION)
Refills: 0 | OUTPATIENT
Start: 2017-10-19

## 2017-10-19 NOTE — TELEPHONE ENCOUNTER
Patient did not request this. She is doing well. She thinks that it might be an automatic refill from the pharmacy. Will decline request.  Charmaine Krer RN

## 2017-10-22 ENCOUNTER — TRANSFERRED RECORDS (OUTPATIENT)
Dept: HEALTH INFORMATION MANAGEMENT | Facility: CLINIC | Age: 82
End: 2017-10-22

## 2017-10-23 DIAGNOSIS — K21.9 GASTROESOPHAGEAL REFLUX DISEASE, ESOPHAGITIS PRESENCE NOT SPECIFIED: ICD-10-CM

## 2017-10-24 NOTE — TELEPHONE ENCOUNTER
Prescription approved per [facility] Refill Protocol.  Roel Garza RN

## 2017-11-01 ENCOUNTER — OFFICE VISIT (OUTPATIENT)
Dept: ORTHOPEDICS | Facility: CLINIC | Age: 82
End: 2017-11-01
Payer: COMMERCIAL

## 2017-11-01 VITALS
DIASTOLIC BLOOD PRESSURE: 85 MMHG | WEIGHT: 168 LBS | SYSTOLIC BLOOD PRESSURE: 142 MMHG | HEIGHT: 66 IN | BODY MASS INDEX: 27 KG/M2

## 2017-11-01 DIAGNOSIS — G89.29 CHRONIC BILATERAL LOW BACK PAIN, WITH SCIATICA PRESENCE UNSPECIFIED: ICD-10-CM

## 2017-11-01 DIAGNOSIS — M16.12 PRIMARY OSTEOARTHRITIS OF LEFT HIP: Primary | ICD-10-CM

## 2017-11-01 DIAGNOSIS — M54.5 CHRONIC BILATERAL LOW BACK PAIN, WITH SCIATICA PRESENCE UNSPECIFIED: ICD-10-CM

## 2017-11-01 PROCEDURE — 99213 OFFICE O/P EST LOW 20 MIN: CPT | Performed by: PEDIATRICS

## 2017-11-01 NOTE — NURSING NOTE
"Chief Complaint   Patient presents with     RECHECK     f/u L hip injection       Initial /85  Ht 5' 6\" (1.676 m)  Wt 168 lb (76.2 kg)  BMI 27.12 kg/m2 Estimated body mass index is 27.12 kg/(m^2) as calculated from the following:    Height as of this encounter: 5' 6\" (1.676 m).    Weight as of this encounter: 168 lb (76.2 kg).  Medication Reconciliation: complete     Manav Nicole, ATC    "

## 2017-11-01 NOTE — PATIENT INSTRUCTIONS
Plan:  - Today's Plan of Care:  Rehab: Physical Therapy: [facility] - [phone]    -We also discussed other future treatment options:  Surgical referral for hip  Physical medicine and rehab referral for back    Follow Up: as needed

## 2017-11-01 NOTE — PROGRESS NOTES
Sports Medicine Clinic Visit - Interim History November 1, 2017    Initial Visit Date 9/27/2017    PCP: Kelly Matthew Kim Sal is a 86 year old female who is seen in f/u up for left leg pain. Since last visit on 9/27/2017 patient has had ~ 60% improvement in hip pain from the ultrasound guided hip injection. She no longer has to use her walker. She is still having some low back pain and can still cannot lay on her left side.  She will have occasional pain into her legs, though this is not consistent (sometimes lateral leg, sometimes calf).  Overall happy where she's at.    Initial Injury History 9/22/2017: Patient reports left leg pain for the past 3 months. It started out as trouble walking and has been gradually worsening. She reports that her leg will give out. She has been using a cane within the last month. Her pain is variable, at times in her buttock, thigh, and will extend to her knee and her calf. She denies numbness or tingling.    Symptoms are better with: Tylenol  Symptoms are worse with: Stairs, prolonged sitting, laying on the left side  Additional Features:   Positive: pain in other joints--lower back   Negative: popping, grinding, catching, locking, instability, paresthesias, numbness and weakness    Social History: retired    Review of Systems  Skin: no bruising, no swelling  Musculoskeletal: as above  Neurologic: no numbness, paresthesias  Remainder of review of systems is negative including constitutional, CV, pulmonary, GI, except as noted in HPI or medical history.    Patient's current problem list, past medical and surgical history, and family history were reviewed.    Patient Active Problem List   Diagnosis     Coronary atherosclerosis of native coronary artery     Hyperlipidemia LDL goal <100     Essential hypertension, benign     Hypothyroidism     GERD (gastroesophageal reflux disease)     Family history of colon cancer     Hypertension goal BP (blood pressure)  < 140/90     Major depressive disorder, recurrent episode, mild (H)     Advanced directives, counseling/discussion     Angina pectoris (H)     Vulvar pruritus     Lichen planus     JENIFER (obstructive sleep apnea)     CAD S/P percutaneous coronary angioplasty     S/P cardiac pacemaker procedure     Sinoatrial node dysfunction (H)     Cardiac pacemaker - [employer] dual lead - Not Dependent     Insect bite     Pain of left lower extremity     Past Medical History:   Diagnosis Date     Coronary atherosclerosis of native coronary artery     s/p MI ([facility])     Essential hypertension, benign      GERD (gastroesophageal reflux disease)      History of transient ischemic attack (TIA)      Hyperlipidemia LDL goal <100      Hypothyroidism      Major depressive disorder, recurrent episode, mild (H) 2011     Peptic ulcer disease with hemorrhage     Remote history of stomach ulcer, requiring transfusion after chronic bleeding     Past Surgical History:   Procedure Laterality Date     APPENDECTOMY       CARDIAC CATHERIZATION  1/15/04    drug-eluding stent RCA, Dr. Pérez, [facility]     CARDIAC CATHERIZATION  12    diffuse mild/mod disease, no new stent     CATARACT IOL, RT/LT  12    RT, Dr. Wynne     CHOLECYSTECTOMY, OPEN       COLONOSCOPY      [facility]     FRACTURE TX, ANKLE RT/LT      LT, 2 screws remain     HC EXCISE HAND/FOOT NEUROMA      RT     HC LARYNGOSCOPY DIRECT W VOCAL CORD INJECTION      vocal cord polypectomy     HC REMOVAL OF OVARIAN CYST(S)       HC TRANSCATH STENT INIT VESSEL,PERCUT      x 2     REPAIR HAMMER TOE  9/13/10    multiple + RT great toe tendon release, Dr. Sanchez DPM     STENT  -    Cardiac stents 6 placed.     Family History   Problem Relation Age of Onset     C.A.D. Father      Hypertension Father      Myocardial Infarction Father 66      from [place]     C.A.D. Brother      Myocardial Infarction Brother      CANCER Brother      skin ca     Breast  "Cancer Sister      Cancer - colorectal Sister      HEART DISEASE Sister      Neurologic Disorder Mother      migraine     C.A.D. Mother      Hypertension Mother      Heart Failure Mother      Thyroid Disease Son      cretinism     Hypertension Son      HEART DISEASE Son      Psychotic Disorder Sister      Hypertension Sister      HEART DISEASE Sister      CABG     Hypertension Brother      C.A.D. Brother      Myocardial Infarction Brother      Arthritis Daughter      RA     Hypertension Sister      HEART DISEASE Sister      CANCER Sister      Blood Disease Sister      Neurologic Disorder Child      migraine (2 children)       Objective  /85  Ht 5' 6\" (1.676 m)  Wt 168 lb (76.2 kg)  BMI 27.12 kg/m2    GENERAL APPEARANCE: healthy, alert and no distress   GAIT: NORMAL  SKIN: no suspicious lesions or rashes  HEENT: Sclera clear, anicteric  CV: good peripheral pulses  RESP: Breathing not labored  NEURO: Normal strength and tone, mentation intact and speech normal  PSYCH:  mentation appears normal and affect normal/bright    Low back exam:  Inspection:     no visible deformity in the low back       normal skin       normal vascular       normal lymphatic     Posture:      rounded shoulders and upper back       lumbar lordosis diminished     Foot Inspection:     pes planus     Tender:     paraspinal muscles - mild     Non Tender:     remainder of lumbar spine     ROM:      full flexion       limited extension but no pain     Strength:     hip flexion 5/5 bilateral       knee extension 5/5 bilateral       ankle dorsiflexion 5/5 bilateral       ankle plantarflexion 5/5 bilateral       dorsiflexion of the great toe 5/5 bilateral       able to heel and toe walk     Reflexes:     patellar (L3, L4) symmetric normal       achilles tendons (S1) symmetric normal     Sensation:    grossly intact throughout lower extremities     Special tests:      straight leg raise left negative        straight leg raise right " negative     Bilateral hip exam  Inspection:      no edema or ecchymosis in hip area     Tender:      none currently     Non Tender:      remainder of the hip area bilateral     ROM:     Range of motion limited by pain left     Strength:      flexion 4/5 left       abduction 5/5 bilateral       adduction 5/5 bilateral     Special Tests:      positive (+) CYNDY left in groin       positive (+) FADIR left in groin       Positive (+) log roll    Radiology  I visualized and reviewed these images with the patient  LEFT HIP TWO TO THREE VIEWS   9/22/2017 8:44 AM   HISTORY: Evaluate arthritis. Pain in left leg.  COMPARISON: None.  IMPRESSION: Severe bilateral osteoarthritis of both hips is present.  There is no evidence for fracture or dislocation. Degenerative changes  also noted in spine.     LUMBAR SPINE 2 VIEWS  9/22/2017 8:24 AM   HISTORY: Pain in left leg  COMPARISON: None.  IMPRESSION : Multilevel degenerative change L2-S1 with disc space  narrowing at these levels and anterior L34 spondylolisthesis. There is  posterior facet degenerative change and lumbar scoliosis convex right.  No vertebral compression or acute appearing fractures. There is  bilateral medial hip joint space narrowing.     KNEE STANDING AP BILATERAL SUNRISE BILATERAL LATERAL LEFT 9/22/2017  8:24 AM  HISTORY: Pain in left leg.  COMPARISON: None.  IMPRESSION:   Right knee; On standing view there is joint space narrowing of the  right lateral compartment. Medial compartment is maintained.  Left knee; Both medial and lateral compartment joint spaces are  normal. No evidence for suprapatellar effusion on the left. There is  mild spurring at the lateral patellofemoral joint on the left.  Bilateral knees;  No acute-appearing abnormality, worrisome focal bone  lesion.    Assessment:  1. Primary osteoarthritis of left hip    2. Chronic bilateral low back pain, with sciatica presence unspecified      Left leg pain. A large portion of the patient's pain is  likely from osteoarthritis of the left hip and has improved after her injection.  She likely still has a lumbar component as well given her calf pain. We discussed further treatment for her pain including PT - which she was in favor of, surgical referral for hip or PM&R referral for low back.  She will consider these other options in the future, however, is happy with her progress thus far.     Discussed nature of degenerative arthritis of the hip. Discussed symptom treatment with over-the-counter medications and rest if needed. Discussed benefits of weight loss and possible physical therapy. Discussed injection therapy. Also briefly discussed future consideration of referral to orthopedic surgery for further evaluation and discussion of arthroplasty.    Plan:  - Today's Plan of Care:  Rehab: Physical Therapy: [facility] - [phone]    -We also discussed other future treatment options:  Surgical referral for hip  Physical medicine and rehab referral for back    Follow Up: as needed    Concerning signs and symptoms were reviewed.  The patient expressed understanding of this management plan and all questions were answered at this time.    Lu Page MD CAQ  Primary Care Sports Medicine  [facility]

## 2017-11-01 NOTE — LETTER
11/1/2017         RE: Christiana Sal  [id number] [address] [id number]        Dear Colleague,    Thank you for referring your patient, Christiana Sal, to the [facility]. Please see a copy of my visit note below.    Sports Medicine Clinic Visit - Interim History November 1, 2017    Initial Visit Date 9/27/2017    PCP: Kelly Matthew    Christiana Sal is a 86 year old female who is seen in f/u up for left leg pain. Since last visit on 9/27/2017 patient has had ~ 60% improvement in hip pain from the ultrasound guided hip injection. She no longer has to use her walker. She is still having some low back pain and can still cannot lay on her left side.  She will have occasional pain into her legs, though this is not consistent (sometimes lateral leg, sometimes calf).  Overall happy where she's at.    Initial Injury History 9/22/2017: Patient reports left leg pain for the past 3 months. It started out as trouble walking and has been gradually worsening. She reports that her leg will give out. She has been using a cane within the last month. Her pain is variable, at times in her buttock, thigh, and will extend to her knee and her calf. She denies numbness or tingling.    Symptoms are better with: Tylenol  Symptoms are worse with: Stairs, prolonged sitting, laying on the left side  Additional Features:   Positive: pain in other joints--lower back   Negative: popping, grinding, catching, locking, instability, paresthesias, numbness and weakness    Social History: retired    Review of Systems  Skin: no bruising, no swelling  Musculoskeletal: as above  Neurologic: no numbness, paresthesias  Remainder of review of systems is negative including constitutional, CV, pulmonary, GI, except as noted in HPI or medical history.    Patient's current problem list, past medical and surgical history, and family history were reviewed.    Patient Active  Problem List   Diagnosis     Coronary atherosclerosis of native coronary artery     Hyperlipidemia LDL goal <100     Essential hypertension, benign     Hypothyroidism     GERD (gastroesophageal reflux disease)     Family history of colon cancer     Hypertension goal BP (blood pressure) < 140/90     Major depressive disorder, recurrent episode, mild (H)     Advanced directives, counseling/discussion     Angina pectoris (H)     Vulvar pruritus     Lichen planus     JENIFER (obstructive sleep apnea)     CAD S/P percutaneous coronary angioplasty     S/P cardiac pacemaker procedure     Sinoatrial node dysfunction (H)     Cardiac pacemaker - [employer] dual lead - Not Dependent     Insect bite     Pain of left lower extremity     Past Medical History:   Diagnosis Date     Coronary atherosclerosis of native coronary artery     s/p MI ([facility])     Essential hypertension, benign      GERD (gastroesophageal reflux disease)      History of transient ischemic attack (TIA)      Hyperlipidemia LDL goal <100      Hypothyroidism      Major depressive disorder, recurrent episode, mild (H) 5/18/2011     Peptic ulcer disease with hemorrhage     Remote history of stomach ulcer, requiring transfusion after chronic bleeding     Past Surgical History:   Procedure Laterality Date     APPENDECTOMY       CARDIAC CATHERIZATION  1/15/04    drug-eluding stent RCA, Dr. Pérez, [facility]     CARDIAC CATHERIZATION  2/16/12    diffuse mild/mod disease, no new stent     CATARACT IOL, RT/LT  1/26/12    RT, Dr. Wynne     CHOLECYSTECTOMY, OPEN       COLONOSCOPY  2008    [facility]     FRACTURE TX, ANKLE RT/LT      LT, 2 screws remain     HC EXCISE HAND/FOOT NEUROMA      RT     HC LARYNGOSCOPY DIRECT W VOCAL CORD INJECTION      vocal cord polypectomy     HC REMOVAL OF OVARIAN CYST(S)       HC TRANSCATH STENT INIT VESSEL,PERCUT      x 2     REPAIR HAMMER TOE  9/13/10    multiple + RT great toe tendon release, Dr. Sanchez DPM      "STENT  -    Cardiac stents 6 placed.     Family History   Problem Relation Age of Onset     C.A.D. Father      Hypertension Father      Myocardial Infarction Father 66      from [place]     C.A.D. Brother      Myocardial Infarction Brother      CANCER Brother      skin ca     Breast Cancer Sister      Cancer - colorectal Sister      HEART DISEASE Sister      Neurologic Disorder Mother      migraine     C.A.D. Mother      Hypertension Mother      Heart Failure Mother      Thyroid Disease Son      cretinism     Hypertension Son      HEART DISEASE Son      Psychotic Disorder Sister      Hypertension Sister      HEART DISEASE Sister      CABG     Hypertension Brother      C.A.D. Brother      Myocardial Infarction Brother      Arthritis Daughter      RA     Hypertension Sister      HEART DISEASE Sister      CANCER Sister      Blood Disease Sister      Neurologic Disorder Child      migraine (2 children)       Objective  /85  Ht 5' 6\" (1.676 m)  Wt 168 lb (76.2 kg)  BMI 27.12 kg/m2    GENERAL APPEARANCE: healthy, alert and no distress   GAIT: NORMAL  SKIN: no suspicious lesions or rashes  HEENT: Sclera clear, anicteric  CV: good peripheral pulses  RESP: Breathing not labored  NEURO: Normal strength and tone, mentation intact and speech normal  PSYCH:  mentation appears normal and affect normal/bright    Low back exam:  Inspection:     no visible deformity in the low back       normal skin       normal vascular       normal lymphatic     Posture:      rounded shoulders and upper back       lumbar lordosis diminished     Foot Inspection:     pes planus     Tender:     paraspinal muscles - mild     Non Tender:     remainder of lumbar spine     ROM:      full flexion       limited extension but no pain     Strength:     hip flexion 5/5 bilateral       knee extension 5/5 bilateral       ankle dorsiflexion 5/5 bilateral       ankle plantarflexion 5/5 bilateral       dorsiflexion of the great toe 5/5 bilateral       " able to heel and toe walk     Reflexes:     patellar (L3, L4) symmetric normal       achilles tendons (S1) symmetric normal     Sensation:    grossly intact throughout lower extremities     Special tests:      straight leg raise left negative        straight leg raise right negative     Bilateral hip exam  Inspection:      no edema or ecchymosis in hip area     Tender:       none currently     Non Tender:      remainder of the hip area bilateral     ROM:     Range of motion limited by pain left     Strength:      flexion 4/5 left       abduction 5/5 bilateral       adduction 5/5 bilateral     Special Tests:      positive (+) CYNDY left in groin       positive (+) FADIR left in groin       Positive (+) log roll    Radiology  I visualized and reviewed these images with the patient  LEFT HIP TWO TO THREE VIEWS   9/22/2017 8:44 AM   HISTORY: Evaluate arthritis. Pain in left leg.  COMPARISON: None.  IMPRESSION: Severe bilateral osteoarthritis of both hips is present.  There is no evidence for fracture or dislocation. Degenerative changes  also noted in spine.     LUMBAR SPINE 2 VIEWS  9/22/2017 8:24 AM   HISTORY: Pain in left leg  COMPARISON: None.  IMPRESSION : Multilevel degenerative change L2-S1 with disc space  narrowing at these levels and anterior L34 spondylolisthesis. There is  posterior facet degenerative change and lumbar scoliosis convex right.  No vertebral compression or acute appearing fractures. There is  bilateral medial hip joint space narrowing.     KNEE STANDING AP BILATERAL SUNRISE BILATERAL LATERAL LEFT 9/22/2017  8:24 AM  HISTORY: Pain in left leg.  COMPARISON: None.  IMPRESSION:   Right knee; On standing view there is joint space narrowing of the  right lateral compartment. Medial compartment is maintained.  Left knee; Both medial and lateral compartment joint spaces are  normal. No evidence for suprapatellar effusion on the left. There is  mild spurring at the lateral patellofemoral joint on the  left.  Bilateral knees;  No acute-appearing abnormality, worrisome focal bone  lesion.    Assessment:  1. Primary osteoarthritis of left hip    2. Chronic bilateral low back pain, with sciatica presence unspecified      Left leg pain. A large portion of the patient's pain is likely from osteoarthritis of the left hip and has improved after her injection.  She likely still has a lumbar component as well given her calf pain. We discussed further treatment for her pain including PT - which she was in favor of, surgical referral for hip or PM&R referral for low back.  She will consider these other options in the future, however, is happy with her progress thus far.     Discussed nature of degenerative arthritis of the hip. Discussed symptom treatment with over-the-counter medications and rest if needed. Discussed benefits of weight loss and possible physical therapy. Discussed injection therapy. Also briefly discussed future consideration of referral to orthopedic surgery for further evaluation and discussion of arthroplasty.    Plan:  - Today's Plan of Care:  Rehab: Physical Therapy: [facility] - [phone]    -We also discussed other future treatment options:  Surgical referral for hip  Physical medicine and rehab referral for back    Follow Up: as needed    Concerning signs and symptoms were reviewed.  The patient expressed understanding of this management plan and all questions were answered at this time.    Lu Page MD [facility]  Primary Care Sports Medicine  [facility]    Again, thank you for allowing me to participate in the care of your patient.        Sincerely,        Lu Page MD

## 2017-11-01 NOTE — MR AVS SNAPSHOT
After Visit Summary   11/1/2017    Christiana Sal    MRN: [id number]           Patient Information     Date Of Birth          10/25/1931        Visit Information        Provider Department      11/1/2017 10:20 AM Lu Page MD [facility] [place]        Today's Diagnoses     Primary osteoarthritis of left hip    -  1    Chronic bilateral low back pain, with sciatica presence unspecified          Care Instructions    Plan:  - Today's Plan of Care:  Rehab: Physical Therapy: [facility] - [phone]    -We also discussed other future treatment options:  Surgical referral for hip  Physical medicine and rehab referral for back    Follow Up: as needed              Follow-ups after your visit        Additional Services     MARY KAY PT, HAND, AND CHIROPRACTIC REFERRAL       **This order will print in the [facility] Scheduling Office**    Physical Therapy, Hand Therapy and Chiropractic Care are available through:    *[facility]  *[facility]  *[facility]    Call one number to schedule at any of the above locations: [phone].    Your provider has referred you to: Physical Therapy at [facility] or [facility]    Indication/Reason for Referral: Hip Pain and Low Back Pain  Therapy Orders: Evaluate and Treat  Special Programs: None  Special Request: None    Please be aware that coverage of these services is subject to the terms and limitations of your health insurance plan.  Call member services at your health plan with any benefit or coverage questions.      Please bring the following to your appointment:    *Your personal calendar for scheduling future appointments  *Comfortable clothing                  Your next 10 appointments already scheduled     Jan 22, 2018 10:00 AM CST   Remote PPM Check with [place] [facility]   [facility] ([facility])    [address]  "[id number] [phone]           This appointment is for a remote check of your pacemaker.  This is not an appointment at the office.              Who to contact     If you have questions or need follow up information about today's clinic visit or your schedule please contact [facility] directly at [phone].  Normal or non-critical lab and imaging results will be communicated to you by MyChart, letter or phone within 4 business days after the clinic has received the results. If you do not hear from us within 7 days, please contact the clinic through [employer] or phone. If you have a critical or abnormal lab result, we will notify you by phone as soon as possible.  Submit refill requests through [employer] or call your pharmacy and they will forward the refill request to us. Please allow 3 business days for your refill to be completed.          Additional Information About Your Visit        [employer] Information     [employer] gives you secure access to your electronic health record. If you see a primary care provider, you can also send messages to your care team and make appointments. If you have questions, please call your primary care clinic.  If you do not have a primary care provider, please call [phone] and they will assist you.        Care EveryWhere ID     This is your Care EveryWhere ID. This could be used by other organizations to access your [facility] medical records  [id number]        Your Vitals Were     Height BMI (Body Mass Index)                5' 6\" (1.676 m) 27.12 kg/m2           Blood Pressure from Last 3 Encounters:   11/01/17 142/85   10/06/17 132/78   10/03/17 125/72    Weight from Last 3 Encounters:   11/01/17 168 lb (76.2 kg)   10/06/17 167 lb (75.8 kg)   10/03/17 166 lb 3.2 oz (75.4 kg)              We Performed the Following     MARY KAY PT, HAND, AND CHIROPRACTIC REFERRAL        Primary Care Provider Office Phone # Fax #    Kelly Matthew PA-C " [phone]       [address]  [facility] [address]        Equal Access to Services     SOFIA EDUARDO : Hadii shailesh delong lucia Davis, wasushilda luqadaha, qaybta kaalmada polina, danya porras dianyocasta stanton lashiralaura moser. So [facility] [phone].    ATENCIÓN: Si habla español, tiene a ozuna disposición servicios gratuitos de asistencia lingüística. Moisés al [phone].    We comply with applicable federal civil rights laws and [place] laws. We do not discriminate on the basis of race, color, national origin, age, disability, sex, sexual orientation, or gender identity.            Thank you!     Thank you for choosing [facility]  for your care. Our goal is always to provide you with excellent care. Hearing back from our patients is one way we can continue to improve our services. Please take a few minutes to complete the written survey that you may receive in the mail after your visit with us. Thank you!             Your Updated Medication List - Protect others around you: Learn how to safely use, store and throw away your medicines at www.disposemymeds.org.          This list is accurate as of: 11/1/17 10:31 AM.  Always use your most recent med list.                   Brand Name Dispense Instructions for use Diagnosis    * albuterol 108 (90 BASE) MCG/ACT Inhaler    PROAIR HFA/PROVENTIL HFA/VENTOLIN HFA    1 Inhaler    Inhale 2 puffs into the lungs every 6 hours as needed for shortness of breath / dyspnea or wheezing    Acute bronchitis with symptoms > 10 days       * albuterol 108 (90 BASE) MCG/ACT Inhaler    PROAIR HFA/PROVENTIL HFA/VENTOLIN HFA    1 Inhaler    Inhale 2 puffs into the lungs every 6 hours as needed for shortness of breath / dyspnea or wheezing    Acute bronchitis with symptoms > 10 days       aspirin 81 MG tablet      Take 2 tablets by mouth every evening. *.        atorvastatin 40 MG tablet    LIPITOR    90 tablet    Take 1 tablet (40 mg) by mouth  daily    CAD S/P percutaneous coronary angioplasty       CALCIUM 600 + D PO      1 tablet by mouth 2 times per day        citalopram 10 MG tablet    celeXA    90 tablet    TAKE ONE TABLET BY MOUTH ONCE DAILY FOR DEPRESSION. THIS MEDICATION SHOULD BE TAKEN EVERY DAY TO BE EFFECTIVE    Major depressive disorder, recurrent episode, mild (H)       FISH OIL PO      1 daily        levothyroxine 75 MCG tablet    SYNTHROID/LEVOTHROID    90 tablet    Take 1 tablet (75 mcg) by mouth daily    Hypothyroidism, unspecified type       losartan 25 MG tablet    COZAAR    90 tablet    Take 1 tablet (25 mg) by mouth daily    Essential hypertension with goal blood pressure less than 140/90       metoprolol 25 MG 24 hr tablet    TOPROL-XL    90 tablet    Take 0.5 tablets (12.5 mg) by mouth daily    Essential hypertension with goal blood pressure less than 140/90       nitroGLYcerin 0.4 MG sublingual tablet    NITROSTAT    25 tablet    Place 1 tablet (0.4 mg) under the tongue every 5 minutes as needed for chest pain (call your doctor if you take a third dose)    Atherosclerosis of native coronary artery of native heart without angina pectoris       OCUVITE PO      Take 1 tablet by mouth daily.        ondansetron 4 MG tablet    ZOFRAN    18 tablet    Take 1 tablet (4 mg) by mouth every 6 hours as needed for nausea    Nausea       oxybutynin 5 MG 24 hr tablet    DITROPAN-XL    30 tablet    TAKE ONE TABLET BY MOUTH EVERY DAY    Overactive bladder       pantoprazole 20 MG EC tablet    PROTONIX    90 tablet    Take 2 tablets (40 mg) by mouth daily    Gastroesophageal reflux disease, esophagitis presence not specified       pramipexole 0.5 MG tablet    MIRAPEX    90 tablet    Take 1 tablet (0.5 mg) by mouth At Bedtime    Restless leg syndrome       * ranitidine 150 MG tablet    ZANTAC    60 tablet    Take 2 tablets (300 mg) by mouth At Bedtime    Gastroesophageal reflux disease, esophagitis presence not specified       * ranitidine 150 MG  tablet    ZANTAC    180 tablet    Take 2 tablets (300 mg) by mouth At Bedtime    Gastroesophageal reflux disease, esophagitis presence not specified       * Notice:  This list has 4 medication(s) that are the same as other medications prescribed for you. Read the directions carefully, and ask your doctor or other care provider to review them with you.

## 2017-11-09 ENCOUNTER — THERAPY VISIT (OUTPATIENT)
Dept: PHYSICAL THERAPY | Facility: CLINIC | Age: 82
End: 2017-11-09
Payer: COMMERCIAL

## 2017-11-09 DIAGNOSIS — M54.50 BILATERAL LOW BACK PAIN WITHOUT SCIATICA: Primary | ICD-10-CM

## 2017-11-09 PROCEDURE — 97161 PT EVAL LOW COMPLEX 20 MIN: CPT | Mod: GP | Performed by: PHYSICAL THERAPIST

## 2017-11-09 PROCEDURE — 97110 THERAPEUTIC EXERCISES: CPT | Mod: GP | Performed by: PHYSICAL THERAPIST

## 2017-11-09 NOTE — PROGRESS NOTES
Subjective:    Patient is a 86 year old female presenting with rehab back hpi. The history is provided by the patient.   Christiana Sal is a 86 year old female with a lumbar condition.  Condition occurred with:  A fall/slip.  Condition occurred: at home.  This is a new condition     Patient reports pain:  Lower lumbar spine.  Radiates to:  Gluteals right.  Pain is described as shooting and aching and is constant and reported as 5/10.  Associated symptoms:  Fatigue, loss of strength, loss of motion and loss of motion/stiffness. Pain is the same all the time.  Symptoms are exacerbated by standing, sitting and walking and relieved by rest and ice.  Since onset symptoms are unchanged.  Special tests:  X-ray (negative for fx).  Previous treatment: none.    General health as reported by patient is good.  Pertinent medical history includes:  High blood pressure, overweight, osteoarthritis, implanted device and heart problems.    Surgical history: gall bladder, pacemaker.  Current medications:  Anti-depressants and cardiac medication.  Current occupation is retired.        Barriers include:  None as reported by the patient.    Red flags:  None as reported by the patient.                        Objective:    System         Lumbar/SI Evaluation  ROM:  AROM Lumbar: normal      Lumbar Myotomes:  normal                Lumbar Dermtomes:  normal                                                            Hip Evaluation  HIP AROM:  AROM:    Left Hip:     Normal    Right Hip:   Normal                    Hip Strength:  : Global 4/5 hip/quad/hams/calf strength B.                                         General     ROS    Assessment/Plan:      Patient is a 86 year old female with lumbar complaints.    Patient has the following significant findings with corresponding treatment plan.                Diagnosis 1:  LBP  Pain -  hot/cold therapy, US, manual therapy, splint/taping/bracing/orthotics, self management, education,  directional preference exercise and home program  Decreased ROM/flexibility - manual therapy and therapeutic exercise  Decreased strength - therapeutic exercise and therapeutic activities  Impaired balance - neuro re-education and therapeutic activities  Impaired gait - gait training  Impaired posture - neuro re-education    Therapy Evaluation Codes:   1) History comprised of:   Personal factors that impact the plan of care:      Age.    Comorbidity factors that impact the plan of care are:      High blood pressure, Implanted device, Osteoarthritis and Overweight.     Medications impacting care: Cardiac and Pain.  2) Examination of Body Systems comprised of:   Body structures and functions that impact the plan of care:      Lumbar spine.   Activity limitations that impact the plan of care are:      Bending, Dressing, Stairs, Walking and Sleeping.  3) Clinical presentation characteristics are:   Stable/Uncomplicated.  4) Decision-Making    Low complexity using standardized patient assessment instrument and/or measureable assessment of functional outcome.  Cumulative Therapy Evaluation is: Low complexity.    Previous and current functional limitations:  (See Goal Flow Sheet for this information)    Short term and Long term goals: (See Goal Flow Sheet for this information)     Communication ability:  Patient appears to be able to clearly communicate and understand verbal and written communication and follow directions correctly.  Treatment Explanation - The following has been discussed with the patient:   RX ordered/plan of care  Anticipated outcomes  Possible risks and side effects  This patient would benefit from PT intervention to resume normal activities.   Rehab potential is fair.    Frequency:  1 X week, once daily  Duration:  for 6 weeks  Discharge Plan:  Achieve all LTG.  Independent in home treatment program.  Reach maximal therapeutic benefit.    Please refer to the daily flowsheet for treatment today, total  treatment time and time spent performing 1:1 timed codes.

## 2017-11-09 NOTE — MR AVS SNAPSHOT
After Visit Summary   11/9/2017    Christiana Sal    MRN: [id number]           Patient Information     Date Of Birth          10/25/1931        Visit Information        Provider Department      11/9/2017 9:30 AM Jas Mendenhall PT [facility]        Today's Diagnoses     Bilateral low back pain without sciatica    -  1       Follow-ups after your visit        Your next 10 appointments already scheduled     Nov 13, 2017  9:00 AM CST   MARY KAY Spine with Tara Montoya PT   [facility] ([facility])    [address] [id number] [phone]            Nov 20, 2017  9:00 AM CST   MARY KAY Spine with Tara Montoya PT   [facility] ([facility])    [address] [id number] [phone]            Jan 22, 2018 10:00 AM CST   Remote PPM Check with [place] [facility]   [facility] ([facility])    [address] [address] [phone]           This appointment is for a remote check of your pacemaker.  This is not an appointment at the office.              Who to contact     If you have questions or need follow up information about today's clinic visit or your schedule please contact [facility] directly at [phone].  Normal or non-critical lab and imaging results will be communicated to you by Mertadohart, letter or phone within 4 business days after the clinic has received the results. If you do not hear from us within 7 days, please contact the clinic through MyChart or phone. If you have a critical or abnormal lab result, we will notify you by phone as soon as possible.  Submit refill requests through [employer] or call your pharmacy and they will forward the refill request to us. Please allow 3 business days for your refill to be completed.          Additional Information About Your Visit        [employer] Information     [employer] gives you secure  access to your electronic health record. If you see a primary care provider, you can also send messages to your care team and make appointments. If you have questions, please call your primary care clinic.  If you do not have a primary care provider, please call [phone] and they will assist you.        Care EveryWhere ID     This is your Care EveryWhere ID. This could be used by other organizations to access your [facility] medical records  [id number]         Blood Pressure from Last 3 Encounters:   11/01/17 142/85   10/06/17 132/78   10/03/17 125/72    Weight from Last 3 Encounters:   11/01/17 76.2 kg (168 lb)   10/06/17 75.8 kg (167 lb)   10/03/17 75.4 kg (166 lb 3.2 oz)              We Performed the Following     HC PT EVAL, LOW COMPLEXITY     MARY KAY INITIAL EVAL REPORT     THERAPEUTIC EXERCISES        Primary Care Provider Office Phone # Fax #    Kelly Matthew PA-C [phone]       [address] [facility] [address]        Equal Access to Services     [facility]: Hadii aad ku hadasho Soomaali, waaxda luqadaha, qaybta kaalmada adeegyada, waxay sudarshan haymargaret brian . So [facility] [phone].    ATENCIÓN: Si habla español, tiene a ozuna disposición servicios gratuitos de asistencia lingüística. [facility] [phone].    We comply with applicable federal civil rights laws and [place] laws. We do not discriminate on the basis of race, color, national origin, age, disability, sex, sexual orientation, or gender identity.            Thank you!     Thank you for choosing [facility]  for your care. Our goal is always to provide you with excellent care. Hearing back from our patients is one way we can continue to improve our services. Please take a few minutes to complete the written survey that you may receive in the mail after your visit with us. Thank you!             Your Updated Medication List - Protect others around you: Learn how to safely use,  store and throw away your medicines at www.disposemymeds.org.          This list is accurate as of: 11/9/17 11:59 PM.  Always use your most recent med list.                   Brand Name Dispense Instructions for use Diagnosis    * albuterol 108 (90 BASE) MCG/ACT Inhaler    PROAIR HFA/PROVENTIL HFA/VENTOLIN HFA    1 Inhaler    Inhale 2 puffs into the lungs every 6 hours as needed for shortness of breath / dyspnea or wheezing    Acute bronchitis with symptoms > 10 days       * albuterol 108 (90 BASE) MCG/ACT Inhaler    PROAIR HFA/PROVENTIL HFA/VENTOLIN HFA    1 Inhaler    Inhale 2 puffs into the lungs every 6 hours as needed for shortness of breath / dyspnea or wheezing    Acute bronchitis with symptoms > 10 days       aspirin 81 MG tablet      Take 2 tablets by mouth every evening. *.        atorvastatin 40 MG tablet    LIPITOR    90 tablet    Take 1 tablet (40 mg) by mouth daily    CAD S/P percutaneous coronary angioplasty       CALCIUM 600 + D PO      1 tablet by mouth 2 times per day        citalopram 10 MG tablet    celeXA    90 tablet    TAKE ONE TABLET BY MOUTH ONCE DAILY FOR DEPRESSION. THIS MEDICATION SHOULD BE TAKEN EVERY DAY TO BE EFFECTIVE    Major depressive disorder, recurrent episode, mild (H)       FISH OIL PO      1 daily        levothyroxine 75 MCG tablet    SYNTHROID/LEVOTHROID    90 tablet    Take 1 tablet (75 mcg) by mouth daily    Hypothyroidism, unspecified type       losartan 25 MG tablet    COZAAR    90 tablet    Take 1 tablet (25 mg) by mouth daily    Essential hypertension with goal blood pressure less than 140/90       metoprolol 25 MG 24 hr tablet    TOPROL-XL    90 tablet    Take 0.5 tablets (12.5 mg) by mouth daily    Essential hypertension with goal blood pressure less than 140/90       nitroGLYcerin 0.4 MG sublingual tablet    NITROSTAT    25 tablet    Place 1 tablet (0.4 mg) under the tongue every 5 minutes as needed for chest pain (call your doctor if you take a third dose)     Atherosclerosis of native coronary artery of native heart without angina pectoris       OCUVITE PO      Take 1 tablet by mouth daily.        ondansetron 4 MG tablet    ZOFRAN    18 tablet    Take 1 tablet (4 mg) by mouth every 6 hours as needed for nausea    Nausea       oxybutynin 5 MG 24 hr tablet    DITROPAN-XL    30 tablet    TAKE ONE TABLET BY MOUTH EVERY DAY    Overactive bladder       pantoprazole 20 MG EC tablet    PROTONIX    90 tablet    Take 2 tablets (40 mg) by mouth daily    Gastroesophageal reflux disease, esophagitis presence not specified       pramipexole 0.5 MG tablet    MIRAPEX    90 tablet    Take 1 tablet (0.5 mg) by mouth At Bedtime    Restless leg syndrome       * ranitidine 150 MG tablet    ZANTAC    60 tablet    Take 2 tablets (300 mg) by mouth At Bedtime    Gastroesophageal reflux disease, esophagitis presence not specified       * ranitidine 150 MG tablet    ZANTAC    180 tablet    Take 2 tablets (300 mg) by mouth At Bedtime    Gastroesophageal reflux disease, esophagitis presence not specified       * Notice:  This list has 4 medication(s) that are the same as other medications prescribed for you. Read the directions carefully, and ask your doctor or other care provider to review them with you.

## 2017-11-10 PROBLEM — M54.50 BILATERAL LOW BACK PAIN WITHOUT SCIATICA: Status: ACTIVE | Noted: 2017-11-10

## 2017-11-13 ENCOUNTER — THERAPY VISIT (OUTPATIENT)
Dept: PHYSICAL THERAPY | Facility: CLINIC | Age: 82
End: 2017-11-13
Payer: COMMERCIAL

## 2017-11-13 DIAGNOSIS — M54.50 ACUTE BILATERAL LOW BACK PAIN WITHOUT SCIATICA: ICD-10-CM

## 2017-11-13 PROCEDURE — 97112 NEUROMUSCULAR REEDUCATION: CPT | Mod: GP | Performed by: PHYSICAL THERAPIST

## 2017-11-13 PROCEDURE — 97110 THERAPEUTIC EXERCISES: CPT | Mod: GP | Performed by: PHYSICAL THERAPIST

## 2017-11-15 DIAGNOSIS — K21.9 GASTROESOPHAGEAL REFLUX DISEASE, ESOPHAGITIS PRESENCE NOT SPECIFIED: ICD-10-CM

## 2017-11-15 DIAGNOSIS — N32.81 OVERACTIVE BLADDER: ICD-10-CM

## 2017-11-15 RX ORDER — PANTOPRAZOLE SODIUM 20 MG/1
40 TABLET, DELAYED RELEASE ORAL DAILY
Qty: 180 TABLET | Refills: 2 | Status: SHIPPED | OUTPATIENT
Start: 2017-11-15 | End: 2018-10-01

## 2017-11-15 NOTE — TELEPHONE ENCOUNTER
Routing refill request to provider for review/approval because:  Please review 8/27/17 [facility] lab .  Thank you.  Roel Garza RN

## 2017-11-15 NOTE — TELEPHONE ENCOUNTER
Reason for Call:  Medication or medication refill:    Do you use a [facility]?  Name of the pharmacy and phone number for the current request:  [facility] - [phone]    Name of the medication requested: Pantoprazole; oxybutynin    Other [facility] [place]      Can we leave a detailed message on this number? YES    Phone number patient can be reached at: Home number on file [phone] (home)    Best Time: any time    Call taken on 11/15/2017 at 11:03 AM by Consuelo Hawthorne

## 2017-11-16 RX ORDER — OXYBUTYNIN CHLORIDE 5 MG/1
5 TABLET, EXTENDED RELEASE ORAL DAILY
Qty: 30 TABLET | Refills: 4 | Status: SHIPPED | OUTPATIENT
Start: 2017-11-16 | End: 2018-06-08

## 2017-11-20 ENCOUNTER — OFFICE VISIT (OUTPATIENT)
Dept: FAMILY MEDICINE | Facility: CLINIC | Age: 82
End: 2017-11-20
Payer: COMMERCIAL

## 2017-11-20 ENCOUNTER — RADIANT APPOINTMENT (OUTPATIENT)
Dept: GENERAL RADIOLOGY | Facility: CLINIC | Age: 82
End: 2017-11-20
Attending: PHYSICIAN ASSISTANT
Payer: COMMERCIAL

## 2017-11-20 VITALS
BODY MASS INDEX: 26.36 KG/M2 | WEIGHT: 164 LBS | HEIGHT: 66 IN | HEART RATE: 79 BPM | DIASTOLIC BLOOD PRESSURE: 76 MMHG | OXYGEN SATURATION: 92 % | TEMPERATURE: 101 F | SYSTOLIC BLOOD PRESSURE: 134 MMHG

## 2017-11-20 DIAGNOSIS — H10.33 ACUTE BACTERIAL CONJUNCTIVITIS OF BOTH EYES: ICD-10-CM

## 2017-11-20 DIAGNOSIS — Z98.61 CAD S/P PERCUTANEOUS CORONARY ANGIOPLASTY: ICD-10-CM

## 2017-11-20 DIAGNOSIS — R05.9 COUGH: ICD-10-CM

## 2017-11-20 DIAGNOSIS — R50.9 FEVER, UNSPECIFIED FEVER CAUSE: ICD-10-CM

## 2017-11-20 DIAGNOSIS — J01.90 ACUTE SINUSITIS WITH SYMPTOMS > 10 DAYS: Primary | ICD-10-CM

## 2017-11-20 DIAGNOSIS — I25.10 CAD S/P PERCUTANEOUS CORONARY ANGIOPLASTY: ICD-10-CM

## 2017-11-20 PROCEDURE — 71020 XR CHEST 2 VW: CPT

## 2017-11-20 PROCEDURE — 99214 OFFICE O/P EST MOD 30 MIN: CPT | Performed by: PHYSICIAN ASSISTANT

## 2017-11-20 RX ORDER — DOXYCYCLINE 100 MG/1
100 CAPSULE ORAL 2 TIMES DAILY
Qty: 20 CAPSULE | Refills: 0 | Status: SHIPPED | OUTPATIENT
Start: 2017-11-20 | End: 2018-03-02

## 2017-11-20 RX ORDER — POLYMYXIN B SULFATE AND TRIMETHOPRIM 1; 10000 MG/ML; [USP'U]/ML
1 SOLUTION OPHTHALMIC
Qty: 1 BOTTLE | Refills: 0 | Status: SHIPPED | OUTPATIENT
Start: 2017-11-20 | End: 2017-11-27

## 2017-11-20 NOTE — PROGRESS NOTES
"  SUBJECTIVE:   Christiana Sal is a 86 year old female who presents to clinic today for the following health issues:      ENT Symptoms             Symptoms: cc Present Absent Comment   Fever/Chills  x  Both, high fever for about 1 week   Fatigue  x  Feeling weak as well    Muscle Aches   x    Eye Irritation  x  Both eyes, redness, drainage, goopy    Sneezing   x    Nasal Trav/Drg  x     Sinus Pressure/Pain  x     Loss of smell   x    Dental pain   x    Sore Throat  x  Scratchy and losing her voice    Swollen Glands   x    Ear Pain/Fullness   x    Cough  x     Wheeze   x    Chest Pain   x    Shortness of breath   x    Rash   x    Other  x  Headache      Symptom duration:  1-2 weeks    Symptom severity:  moderate   Treatments tried:  Antibiotic skin wash for her eyes, Nyquil    Contacts:  None that she knows of        Started 2 weeks ago with cough/congestion  Seemed to \"peak\" last Wednesday - cough was very severe, not sleeping at night  Lost her voice on Wednesday - just started coming back on Friday  Cough still present but not as severe as it was  Coughing up thick yellow/green phlegm  Thick drainage from both her eyes  Temp has been consistently >100  Had her daughter stay with her on Saturday night because she was feeling so poorly  A nurse at her facility came to check on her yesterday   Admits she is feeling better when compared to last Wed but still very weak and tired        Problem list and histories reviewed & adjusted, as indicated.  Additional history: as documented    Current Outpatient Prescriptions   Medication Sig Dispense Refill     oxybutynin (DITROPAN-XL) 5 MG 24 hr tablet Take 1 tablet (5 mg) by mouth daily 30 tablet 4     pantoprazole (PROTONIX) 20 MG EC tablet Take 2 tablets (40 mg) by mouth daily 180 tablet 2     ranitidine (ZANTAC) 150 MG tablet Take 2 tablets (300 mg) by mouth At Bedtime 180 tablet 3     ranitidine (ZANTAC) 150 MG tablet Take 2 tablets (300 mg) by mouth At Bedtime 60 " tablet 0     citalopram (CELEXA) 10 MG tablet TAKE ONE TABLET BY MOUTH ONCE DAILY FOR DEPRESSION. THIS MEDICATION SHOULD BE TAKEN EVERY DAY TO BE EFFECTIVE 90 tablet 0     levothyroxine (SYNTHROID/LEVOTHROID) 75 MCG tablet Take 1 tablet (75 mcg) by mouth daily 90 tablet 1     pramipexole (MIRAPEX) 0.5 MG tablet Take 1 tablet (0.5 mg) by mouth At Bedtime 90 tablet 3     albuterol (PROAIR HFA/PROVENTIL HFA/VENTOLIN HFA) 108 (90 BASE) MCG/ACT Inhaler Inhale 2 puffs into the lungs every 6 hours as needed for shortness of breath / dyspnea or wheezing 1 Inhaler 0     albuterol (PROAIR HFA/PROVENTIL HFA/VENTOLIN HFA) 108 (90 BASE) MCG/ACT Inhaler Inhale 2 puffs into the lungs every 6 hours as needed for shortness of breath / dyspnea or wheezing 1 Inhaler 0     losartan (COZAAR) 25 MG tablet Take 1 tablet (25 mg) by mouth daily 90 tablet 3     atorvastatin (LIPITOR) 40 MG tablet Take 1 tablet (40 mg) by mouth daily 90 tablet 3     metoprolol (TOPROL-XL) 25 MG 24 hr tablet Take 0.5 tablets (12.5 mg) by mouth daily 90 tablet 3     ondansetron (ZOFRAN) 4 MG tablet Take 1 tablet (4 mg) by mouth every 6 hours as needed for nausea 18 tablet 3     nitroglycerin (NITROSTAT) 0.4 MG SL tablet Place 1 tablet (0.4 mg) under the tongue every 5 minutes as needed for chest pain (call your doctor if you take a third dose) 25 tablet 3     Multiple Vitamins-Minerals (OCUVITE PO) Take 1 tablet by mouth daily.       ASPIRIN 81 MG PO TABS Take 2 tablets by mouth every evening. *.        CALCIUM 600 + D OR 1 tablet by mouth 2 times per day       FISH OIL OR 1 daily       Allergies   Allergen Reactions     Demerol Nausea     Lisinopril Cough     Sulfa Drugs Other (See Comments)     Questionable itching reported by patient       Reviewed and updated as needed this visit by clinical staff   x  Reviewed and updated as needed this visit by Provider         ROS:  Remainder of ROS obtained and found to be negative other than that which was documented  "above      OBJECTIVE:     /76  Pulse 79  Temp 101  F (38.3  C) (Tympanic)  Ht 5' 6\" (1.676 m)  Wt 164 lb (74.4 kg)  SpO2 92%  BMI 26.47 kg/m2  Body mass index is 26.47 kg/(m^2).  GENERAL: healthy, alert and no distress  EYES: yellow/green drainage from inner eyes, Conjunctiva and sclera red  HENT: ear canals and TM's normal, nose and mouth without ulcers or lesions  NECK: no adenopathy  RESP: lungs clear to auscultation - fair breath sounds throughout, no wheezes, no obvious crackles noted  CV: regular rates and rhythm, normal S1 S2, no S3 or S4, no murmur, click or rub and no peripheral edema    Diagnostic Test Results:  Xray: no infiltrate noted on preliminary read     ASSESSMENT/PLAN:     ASSESSMENT/PLAN:      ICD-10-CM    1. Acute sinusitis with symptoms > 10 days J01.90 doxycycline (VIBRAMYCIN) 100 MG capsule   2. Cough R05 XR Chest 2 Views     doxycycline (VIBRAMYCIN) 100 MG capsule   3. Fever, unspecified fever cause R50.9 XR Chest 2 Views     doxycycline (VIBRAMYCIN) 100 MG capsule   4. Acute bacterial conjunctivitis of both eyes H10.33 trimethoprim-polymyxin b (POLYTRIM) ophthalmic solution   5. CAD S/P percutaneous coronary angioplasty I25.10     Z98.61      Sx for over a week with fever up to 101 in clinic today. Xray okay but cough still concerning. Treat given high fever. Instructions per below    Patient Instructions   REST! REST! And more REST!    Call your daughter and have her check in on you. Make sure the nurse at your facility checks in on you .      Start the antibiotic - take it two times daily for 10 days    Stay well hydrated - drink plenty of water    Use the eye drops in the eyes - 1-2 drops every 3 hours while awake. Continue for 2 days beyond resolution of symptoms      IF fever does not break or anything gets worse versus better I want you to come back in            Kelly Matthew PA-C  [facility]    "

## 2017-11-20 NOTE — NURSING NOTE
"Chief Complaint   Patient presents with     URI       Initial /76  Pulse 79  Temp 101  F (38.3  C) (Tympanic)  Ht 5' 6\" (1.676 m)  Wt 164 lb (74.4 kg)  SpO2 92%  BMI 26.47 kg/m2 Estimated body mass index is 26.47 kg/(m^2) as calculated from the following:    Height as of this encounter: 5' 6\" (1.676 m).    Weight as of this encounter: 164 lb (74.4 kg).  Medication Reconciliation: complete     iVraj Lofton CMA    "

## 2017-11-20 NOTE — PATIENT INSTRUCTIONS
REST! REST! And more REST!    Call your daughter and have her check in on you. Make sure the nurse at your facility checks in on you .      Start the antibiotic - take it two times daily for 10 days    Stay well hydrated - drink plenty of water    Use the eye drops in the eyes - 1-2 drops every 3 hours while awake. Continue for 2 days beyond resolution of symptoms      IF fever does not break or anything gets worse versus better I want you to come back in

## 2017-11-20 NOTE — MR AVS SNAPSHOT
After Visit Summary   11/20/2017    Christiana Sal    MRN: [id number]           Patient Information     Date Of Birth          10/25/1931        Visit Information        Provider Department      11/20/2017 3:40 PM Kelly Matthew PA-C [facility]        Today's Diagnoses     Cough    -  1    Fever, unspecified fever cause        Allergic conjunctivitis, bilateral          Care Instructions    REST! REST! And more REST!    Call your daughter and have her check in on you. Make sure the nurse at your facility checks in on you .      Start the antibiotic - take it two times daily for 10 days    Stay well hydrated - drink plenty of water    Use the eye drops in the eyes - 1-2 drops every 3 hours while awake. Continue for 2 days beyond resolution of symptoms      IF fever does not break or anything gets worse versus better I want you to come back in          Follow-ups after your visit        Your next 10 appointments already scheduled     Jan 22, 2018 10:00 AM CST   Remote PPM Check with [facility]   [facility] ([facility])    [address] [id number] [phone]           This appointment is for a remote check of your pacemaker.  This is not an appointment at the office.              Who to contact     Normal or non-critical lab and imaging results will be communicated to you by MyChart, letter or phone within 4 business days after the clinic has received the results. If you do not hear from us within 7 days, please contact the clinic through MyChart or phone. If you have a critical or abnormal lab result, we will notify you by phone as soon as possible.  Submit refill requests through [employer] or call your pharmacy and they will forward the refill request to us. Please allow 3 business days for your refill to be completed.          If you need to speak with a  for additional information , please  "call: [phone]             Additional Information About Your Visit        [employer] Information     [email or web address] gives you secure access to your electronic health record. If you see a primary care provider, you can also send messages to your care team and make appointments. If you have questions, please call your primary care clinic.  If you do not have a primary care provider, please call [phone] and they will assist you.        Care EveryWhere ID     This is your Care EveryWhere ID. This could be used by other organizations to access your [facility] records  [id number]        Your Vitals Were     Pulse Temperature Height Pulse Oximetry BMI (Body Mass Index)       79 101  F (38.3  C) (Tympanic) 5' 6\" (1.676 m) 92% 26.47 kg/m2        Blood Pressure from Last 3 Encounters:   11/20/17 134/76   11/01/17 142/85   10/06/17 132/78    Weight from Last 3 Encounters:   11/20/17 164 lb (74.4 kg)   11/01/17 168 lb (76.2 kg)   10/06/17 167 lb (75.8 kg)                 Today's Medication Changes          These changes are accurate as of: 11/20/17  3:58 PM.  If you have any questions, ask your nurse or doctor.               Start taking these medicines.        Dose/Directions    doxycycline 100 MG capsule   Commonly known as:  VIBRAMYCIN   Used for:  Fever, unspecified fever cause, Cough   Started by:  Kelly Mtathew PA-C        Dose:  100 mg   Take 1 capsule (100 mg) by mouth 2 times daily   Quantity:  20 capsule   Refills:  0       trimethoprim-polymyxin b ophthalmic solution   Commonly known as:  POLYTRIM   Used for:  Allergic conjunctivitis, bilateral   Started by:  Kelly Matthew PA-C        Dose:  1 drop   Apply 1 drop to eye every 3 hours for 7 days   Quantity:  1 Bottle   Refills:  0            Where to get your medicines      These medications were sent to [facility] - [address]  [address] [address]     Phone:  [phone]     " doxycycline 100 MG capsule    trimethoprim-polymyxin b ophthalmic solution                Primary Care Provider Office Phone # Fax #    Kelly Matthew PA-C [phone] [address] [facility] [address]        Equal Access to Services     SOFIA EDUARDO : Hadii shailesh ku hadmansio Sokbali, waaxda luqadaha, qaybta kaalmada adeyocastayada, danya dejesusn ken stanton laComfortmargaret moser. So [facility] [phone].    ATENCIÓN: Si habla español, tiene a ozuna disposición servicios gratuitos de asistencia lingüística. Llame al [phone].    We comply with applicable federal civil rights laws and [place] laws. We do not discriminate on the basis of race, color, national origin, age, disability, sex, sexual orientation, or gender identity.            Thank you!     Thank you for choosing [facility]  for your care. Our goal is always to provide you with excellent care. Hearing back from our patients is one way we can continue to improve our services. Please take a few minutes to complete the written survey that you may receive in the mail after your visit with us. Thank you!             Your Updated Medication List - Protect others around you: Learn how to safely use, store and throw away your medicines at www.disposemymeds.org.          This list is accurate as of: 11/20/17  3:58 PM.  Always use your most recent med list.                   Brand Name Dispense Instructions for use Diagnosis    * albuterol 108 (90 BASE) MCG/ACT Inhaler    PROAIR HFA/PROVENTIL HFA/VENTOLIN HFA    1 Inhaler    Inhale 2 puffs into the lungs every 6 hours as needed for shortness of breath / dyspnea or wheezing    Acute bronchitis with symptoms > 10 days       * albuterol 108 (90 BASE) MCG/ACT Inhaler    PROAIR HFA/PROVENTIL HFA/VENTOLIN HFA    1 Inhaler    Inhale 2 puffs into the lungs every 6 hours as needed for shortness of breath / dyspnea or wheezing    Acute bronchitis with symptoms > 10 days       aspirin 81 MG  tablet      Take 2 tablets by mouth every evening. *.        atorvastatin 40 MG tablet    LIPITOR    90 tablet    Take 1 tablet (40 mg) by mouth daily    CAD S/P percutaneous coronary angioplasty       CALCIUM 600 + D PO      1 tablet by mouth 2 times per day        citalopram 10 MG tablet    celeXA    90 tablet    TAKE ONE TABLET BY MOUTH ONCE DAILY FOR DEPRESSION. THIS MEDICATION SHOULD BE TAKEN EVERY DAY TO BE EFFECTIVE    Major depressive disorder, recurrent episode, mild (H)       doxycycline 100 MG capsule    VIBRAMYCIN    20 capsule    Take 1 capsule (100 mg) by mouth 2 times daily    Fever, unspecified fever cause, Cough       FISH OIL PO      1 daily        levothyroxine 75 MCG tablet    SYNTHROID/LEVOTHROID    90 tablet    Take 1 tablet (75 mcg) by mouth daily    Hypothyroidism, unspecified type       losartan 25 MG tablet    COZAAR    90 tablet    Take 1 tablet (25 mg) by mouth daily    Essential hypertension with goal blood pressure less than 140/90       metoprolol 25 MG 24 hr tablet    TOPROL-XL    90 tablet    Take 0.5 tablets (12.5 mg) by mouth daily    Essential hypertension with goal blood pressure less than 140/90       nitroGLYcerin 0.4 MG sublingual tablet    NITROSTAT    25 tablet    Place 1 tablet (0.4 mg) under the tongue every 5 minutes as needed for chest pain (call your doctor if you take a third dose)    Atherosclerosis of native coronary artery of native heart without angina pectoris       OCUVITE PO      Take 1 tablet by mouth daily.        ondansetron 4 MG tablet    ZOFRAN    18 tablet    Take 1 tablet (4 mg) by mouth every 6 hours as needed for nausea    Nausea       oxybutynin 5 MG 24 hr tablet    DITROPAN-XL    30 tablet    Take 1 tablet (5 mg) by mouth daily    Overactive bladder       pantoprazole 20 MG EC tablet    PROTONIX    180 tablet    Take 2 tablets (40 mg) by mouth daily    Gastroesophageal reflux disease, esophagitis presence not specified       pramipexole 0.5 MG tablet     MIRAPEX    90 tablet    Take 1 tablet (0.5 mg) by mouth At Bedtime    Restless leg syndrome       * ranitidine 150 MG tablet    ZANTAC    60 tablet    Take 2 tablets (300 mg) by mouth At Bedtime    Gastroesophageal reflux disease, esophagitis presence not specified       * ranitidine 150 MG tablet    ZANTAC    180 tablet    Take 2 tablets (300 mg) by mouth At Bedtime    Gastroesophageal reflux disease, esophagitis presence not specified       trimethoprim-polymyxin b ophthalmic solution    POLYTRIM    1 Bottle    Apply 1 drop to eye every 3 hours for 7 days    Allergic conjunctivitis, bilateral       * Notice:  This list has 4 medication(s) that are the same as other medications prescribed for you. Read the directions carefully, and ask your doctor or other care provider to review them with you.

## 2017-12-02 DIAGNOSIS — F33.0 MAJOR DEPRESSIVE DISORDER, RECURRENT EPISODE, MILD (H): ICD-10-CM

## 2017-12-04 RX ORDER — CITALOPRAM HYDROBROMIDE 10 MG/1
TABLET ORAL
Qty: 90 TABLET | Refills: 0 | Status: SHIPPED | OUTPATIENT
Start: 2017-12-04 | End: 2018-02-22

## 2017-12-06 ENCOUNTER — TELEPHONE (OUTPATIENT)
Dept: FAMILY MEDICINE | Facility: CLINIC | Age: 82
End: 2017-12-06

## 2017-12-06 DIAGNOSIS — R05.9 COUGH: Primary | ICD-10-CM

## 2017-12-06 NOTE — TELEPHONE ENCOUNTER
Reason for call:  Patient reporting a symptom    Symptom or request: Coughing.  Christiana was seen on 11/20 for sinus, fever and cough and was prescribed doxycycline.  She states that her cough has returned.  Wondering if there is something else she could take?  Please call and assess. Thank you..Consuelo Hawthorne    Duration (how long have symptoms been present): for the past few days    Have you been treated for this before? Yes on 11/20/17    Additional comments: none    Phone Number patient can be reached at:  Home number on file [phone] (home)    Best Time:  Any time    Can we leave a detailed message on this number:  YES    Call taken on 12/6/2017 at 9:37 AM by Consuelo Hawthorne

## 2017-12-06 NOTE — TELEPHONE ENCOUNTER
Kelly: Christiana reports that her sinus symptoms totally resolved with the 10 days of doxycycline. She felt good. Starting 2 days ago started having a cough. Coughing up clear phlegm. Has coughing spells ---about one every 2 hours. Kept her awake last night. Denies fever. No breathing problems. No wheezing. No sore throat or ear ache. No runny nose . No nasal congestion. She has not taken anything for this cough. I advised her to drink lots of water; drink hot tea with honey and lemon juice; eat oatmeal for breakfast; eat chicken noodle soup. She wanted to know your advice. Thank you.  Roel Garza RN

## 2017-12-07 RX ORDER — BENZONATATE 200 MG/1
200 CAPSULE ORAL 3 TIMES DAILY PRN
Qty: 21 CAPSULE | Refills: 0 | Status: CANCELLED | OUTPATIENT
Start: 2017-12-07

## 2017-12-07 RX ORDER — BENZONATATE 100 MG/1
100 CAPSULE ORAL 3 TIMES DAILY PRN
Qty: 42 CAPSULE | Refills: 0 | Status: SHIPPED | OUTPATIENT
Start: 2017-12-07 | End: 2018-05-30

## 2017-12-07 NOTE — TELEPHONE ENCOUNTER
Patient notified of recommendations. She would like the tessalon pearls. Please send to [facility] in [place]. Script pended. Thank you.  Charmaine Kerr RN

## 2017-12-07 NOTE — TELEPHONE ENCOUNTER
Sometimes the cough can linger for awhile after the infection has cleared .  I agree with the symptomatic management discussed with patient in the previous message.   We could try some tessalon for the cough so that hopefully she can get some relief and sleep  If the above doesn't seem to help or cough persists/worsens, I would like her to be seen  Kelly

## 2017-12-11 ENCOUNTER — OFFICE VISIT (OUTPATIENT)
Dept: FAMILY MEDICINE | Facility: CLINIC | Age: 82
End: 2017-12-11
Payer: COMMERCIAL

## 2017-12-11 VITALS
BODY MASS INDEX: 26.66 KG/M2 | OXYGEN SATURATION: 95 % | WEIGHT: 165.2 LBS | DIASTOLIC BLOOD PRESSURE: 86 MMHG | TEMPERATURE: 97.6 F | HEART RATE: 66 BPM | SYSTOLIC BLOOD PRESSURE: 138 MMHG

## 2017-12-11 DIAGNOSIS — J01.90 ACUTE SINUSITIS WITH SYMPTOMS > 10 DAYS: Primary | ICD-10-CM

## 2017-12-11 PROCEDURE — 99213 OFFICE O/P EST LOW 20 MIN: CPT | Performed by: PHYSICIAN ASSISTANT

## 2017-12-11 RX ORDER — CEFPROZIL 500 MG/1
500 TABLET, FILM COATED ORAL 2 TIMES DAILY
Qty: 20 TABLET | Refills: 0 | Status: SHIPPED | OUTPATIENT
Start: 2017-12-11 | End: 2018-03-02

## 2017-12-11 NOTE — NURSING NOTE
"Chief Complaint   Patient presents with     Cough       Initial Wt 165 lb 3.2 oz (74.9 kg)  BMI 26.66 kg/m2 Estimated body mass index is 26.66 kg/(m^2) as calculated from the following:    Height as of 11/20/17: 5' 6\" (1.676 m).    Weight as of this encounter: 165 lb 3.2 oz (74.9 kg).  Medication Reconciliation: complete   Madelaine Kapadia CMA      "

## 2017-12-11 NOTE — PATIENT INSTRUCTIONS
Restart flonase nasal spray and use once daily    Try a daily zyrtec (certirizine)     Take 10 days of antibiotic    Call me if not any better or if getting worse

## 2017-12-11 NOTE — MR AVS SNAPSHOT
After Visit Summary   12/11/2017    Christiana Sal    MRN: [id number]           Patient Information     Date Of Birth          10/25/1931        Visit Information        Provider Department      12/11/2017 1:40 PM Kelly Matthew PA-C [facility] [place]        Today's Diagnoses     Acute sinusitis with symptoms > 10 days    -  1      Care Instructions    Restart flonase nasal spray and use once daily    Try a daily zyrtec (certirizine)     Take 10 days of antibiotic    Call me if not any better or if getting worse          Follow-ups after your visit        Your next 10 appointments already scheduled     Jan 22, 2018 10:00 AM CST   Remote PPM Check with [facility]   [facility] ([facility])    [address] [facility] [address] [phone]           This appointment is for a remote check of your pacemaker.  This is not an appointment at the office.              Who to contact     Normal or non-critical lab and imaging results will be communicated to you by StarGreetzhart, letter or phone within 4 business days after the clinic has received the results. If you do not hear from us within 7 days, please contact the clinic through StarGreetzhart or phone. If you have a critical or abnormal lab result, we will notify you by phone as soon as possible.  Submit refill requests through Vsnap or call your pharmacy and they will forward the refill request to us. Please allow 3 business days for your refill to be completed.          If you need to speak with a  for additional information , please call: [phone]             Additional Information About Your Visit        Vsnap Information     Vsnap gives you secure access to your electronic health record. If you see a primary care provider, you can also send messages to your care team and make appointments. If you have questions, please call your primary care clinic.  If you do  not have a primary care provider, please call [phone] and they will assist you.        Care EveryWhere ID     This is your Care EveryWhere ID. This could be used by other organizations to access your [facility] medical records  [id number]        Your Vitals Were     Pulse Temperature Pulse Oximetry BMI (Body Mass Index)          66 97.6  F (36.4  C) (Tympanic) 95% 26.66 kg/m2         Blood Pressure from Last 3 Encounters:   12/11/17 138/86   11/20/17 134/76   11/01/17 142/85    Weight from Last 3 Encounters:   12/11/17 165 lb 3.2 oz (74.9 kg)   11/20/17 164 lb (74.4 kg)   11/01/17 168 lb (76.2 kg)              Today, you had the following     No orders found for display         Today's Medication Changes          These changes are accurate as of: 12/11/17  2:24 PM.  If you have any questions, ask your nurse or doctor.               Start taking these medicines.        Dose/Directions    cefPROZIL 500 MG tablet   Commonly known as:  CEFZIL   Used for:  Acute sinusitis with symptoms > 10 days   Started by:  Kelly Matthew PA-C        Dose:  500 mg   Take 1 tablet (500 mg) by mouth 2 times daily   Quantity:  20 tablet   Refills:  0            Where to get your medicines      These medications were sent to [facility] - [address]  [address], [place] [address]     Phone:  [phone]     cefPROZIL 500 MG tablet                Primary Care Provider Office Phone # Fax #    Kelly Matthew PA-C [phone] [address] [place] [address]        Equal Access to Services     [facility] AH: Hadii shailesh myers Soalvino, waaxda luqadaha, qaybta kaalmada adeyocastayajoytsna, danya moser. So [facility] [phone].    ATENCIÓN: Si habla español, tiene a ozuna disposición servicios gratuitos de asistencia lingüística. Llame al [phone].    We comply with applicable federal civil rights laws and [place] laws.  We do not discriminate on the basis of race, color, national origin, age, disability, sex, sexual orientation, or gender identity.            Thank you!     Thank you for choosing [facility]  for your care. Our goal is always to provide you with excellent care. Hearing back from our patients is one way we can continue to improve our services. Please take a few minutes to complete the written survey that you may receive in the mail after your visit with us. Thank you!             Your Updated Medication List - Protect others around you: Learn how to safely use, store and throw away your medicines at www.disposemymeds.org.          This list is accurate as of: 12/11/17  2:24 PM.  Always use your most recent med list.                   Brand Name Dispense Instructions for use Diagnosis    * albuterol 108 (90 BASE) MCG/ACT Inhaler    PROAIR HFA/PROVENTIL HFA/VENTOLIN HFA    1 Inhaler    Inhale 2 puffs into the lungs every 6 hours as needed for shortness of breath / dyspnea or wheezing    Acute bronchitis with symptoms > 10 days       * albuterol 108 (90 BASE) MCG/ACT Inhaler    PROAIR HFA/PROVENTIL HFA/VENTOLIN HFA    1 Inhaler    Inhale 2 puffs into the lungs every 6 hours as needed for shortness of breath / dyspnea or wheezing    Acute bronchitis with symptoms > 10 days       aspirin 81 MG tablet      Take 2 tablets by mouth every evening. *.        atorvastatin 40 MG tablet    LIPITOR    90 tablet    Take 1 tablet (40 mg) by mouth daily    CAD S/P percutaneous coronary angioplasty       benzonatate 100 MG capsule    TESSALON    42 capsule    Take 1 capsule (100 mg) by mouth 3 times daily as needed for cough    Cough       CALCIUM 600 + D PO      1 tablet by mouth 2 times per day        cefPROZIL 500 MG tablet    CEFZIL    20 tablet    Take 1 tablet (500 mg) by mouth 2 times daily    Acute sinusitis with symptoms > 10 days       citalopram 10 MG tablet    celeXA    90 tablet    TAKE ONE TABLET BY MOUTH ONCE  DAILY    Major depressive disorder, recurrent episode, mild (H)       doxycycline 100 MG capsule    VIBRAMYCIN    20 capsule    Take 1 capsule (100 mg) by mouth 2 times daily    Fever, unspecified fever cause, Cough, Acute sinusitis with symptoms > 10 days       FISH OIL PO      1 daily        levothyroxine 75 MCG tablet    SYNTHROID/LEVOTHROID    90 tablet    Take 1 tablet (75 mcg) by mouth daily    Hypothyroidism, unspecified type       losartan 25 MG tablet    COZAAR    90 tablet    Take 1 tablet (25 mg) by mouth daily    Essential hypertension with goal blood pressure less than 140/90       metoprolol 25 MG 24 hr tablet    TOPROL-XL    90 tablet    Take 0.5 tablets (12.5 mg) by mouth daily    Essential hypertension with goal blood pressure less than 140/90       nitroGLYcerin 0.4 MG sublingual tablet    NITROSTAT    25 tablet    Place 1 tablet (0.4 mg) under the tongue every 5 minutes as needed for chest pain (call your doctor if you take a third dose)    Atherosclerosis of native coronary artery of native heart without angina pectoris       OCUVITE PO      Take 1 tablet by mouth daily.        ondansetron 4 MG tablet    ZOFRAN    18 tablet    Take 1 tablet (4 mg) by mouth every 6 hours as needed for nausea    Nausea       oxybutynin 5 MG 24 hr tablet    DITROPAN-XL    30 tablet    Take 1 tablet (5 mg) by mouth daily    Overactive bladder       pantoprazole 20 MG EC tablet    PROTONIX    180 tablet    Take 2 tablets (40 mg) by mouth daily    Gastroesophageal reflux disease, esophagitis presence not specified       pramipexole 0.5 MG tablet    MIRAPEX    90 tablet    Take 1 tablet (0.5 mg) by mouth At Bedtime    Restless leg syndrome       * ranitidine 150 MG tablet    ZANTAC    60 tablet    Take 2 tablets (300 mg) by mouth At Bedtime    Gastroesophageal reflux disease, esophagitis presence not specified       * ranitidine 150 MG tablet    ZANTAC    180 tablet    Take 2 tablets (300 mg) by mouth At Bedtime     Gastroesophageal reflux disease, esophagitis presence not specified       * Notice:  This list has 4 medication(s) that are the same as other medications prescribed for you. Read the directions carefully, and ask your doctor or other care provider to review them with you.

## 2017-12-11 NOTE — PROGRESS NOTES
SUBJECTIVE:   Christiana Sal is a 86 year old female who presents to clinic today for the following health issues:    ENT Symptoms             Symptoms: cc Present Absent Comment   Fever/Chills   x    Fatigue  x     Muscle Aches  x     Eye Irritation  x     Sneezing   x    Nasal Trav/Drg   x    Sinus Pressure/Pain   x    Loss of smell   x    Dental pain   x    Sore Throat   x    Swollen Glands   x    Ear Pain/Fullness   x    Cough  x     Wheeze   x    Chest Pain  x     Shortness of breath  x     Rash   x    Other   x      Symptom duration:  3 weeks   Symptom severity:  moderate/severe   Treatments tried:     Contacts:  no known       Was put on doxy on 11/20/17 for sinus symptoms and cough  Felt like her symptoms did improve, cough seemed to be getting better but then everything got worse again  Cough seems worse later in the day  Last night was coughing all night and was unable to sleep  Has tried tessalon with little relief  Coughing up a lot of phlegm and mucous - feels like it must be draining from her sinuses  No fevers - they resolved during the first round of antibiotics  Not feeling SOB  No chest pain      Problem list and histories reviewed & adjusted, as indicated.  Additional history: as documented    Current Outpatient Prescriptions   Medication Sig Dispense Refill     cefPROZIL (CEFZIL) 500 MG tablet Take 1 tablet (500 mg) by mouth 2 times daily 20 tablet 0     benzonatate (TESSALON) 100 MG capsule Take 1 capsule (100 mg) by mouth 3 times daily as needed for cough 42 capsule 0     citalopram (CELEXA) 10 MG tablet TAKE ONE TABLET BY MOUTH ONCE DAILY 90 tablet 0     doxycycline (VIBRAMYCIN) 100 MG capsule Take 1 capsule (100 mg) by mouth 2 times daily 20 capsule 0     oxybutynin (DITROPAN-XL) 5 MG 24 hr tablet Take 1 tablet (5 mg) by mouth daily 30 tablet 4     pantoprazole (PROTONIX) 20 MG EC tablet Take 2 tablets (40 mg) by mouth daily 180 tablet 2     ranitidine (ZANTAC) 150 MG tablet Take 2  tablets (300 mg) by mouth At Bedtime 180 tablet 3     ranitidine (ZANTAC) 150 MG tablet Take 2 tablets (300 mg) by mouth At Bedtime 60 tablet 0     levothyroxine (SYNTHROID/LEVOTHROID) 75 MCG tablet Take 1 tablet (75 mcg) by mouth daily 90 tablet 1     pramipexole (MIRAPEX) 0.5 MG tablet Take 1 tablet (0.5 mg) by mouth At Bedtime 90 tablet 3     albuterol (PROAIR HFA/PROVENTIL HFA/VENTOLIN HFA) 108 (90 BASE) MCG/ACT Inhaler Inhale 2 puffs into the lungs every 6 hours as needed for shortness of breath / dyspnea or wheezing 1 Inhaler 0     albuterol (PROAIR HFA/PROVENTIL HFA/VENTOLIN HFA) 108 (90 BASE) MCG/ACT Inhaler Inhale 2 puffs into the lungs every 6 hours as needed for shortness of breath / dyspnea or wheezing 1 Inhaler 0     losartan (COZAAR) 25 MG tablet Take 1 tablet (25 mg) by mouth daily 90 tablet 3     atorvastatin (LIPITOR) 40 MG tablet Take 1 tablet (40 mg) by mouth daily 90 tablet 3     metoprolol (TOPROL-XL) 25 MG 24 hr tablet Take 0.5 tablets (12.5 mg) by mouth daily 90 tablet 3     ondansetron (ZOFRAN) 4 MG tablet Take 1 tablet (4 mg) by mouth every 6 hours as needed for nausea 18 tablet 3     nitroglycerin (NITROSTAT) 0.4 MG SL tablet Place 1 tablet (0.4 mg) under the tongue every 5 minutes as needed for chest pain (call your doctor if you take a third dose) 25 tablet 3     Multiple Vitamins-Minerals (OCUVITE PO) Take 1 tablet by mouth daily.       ASPIRIN 81 MG PO TABS Take 2 tablets by mouth every evening. *.        CALCIUM 600 + D OR 1 tablet by mouth 2 times per day       FISH OIL OR 1 daily       Allergies   Allergen Reactions     Demerol Nausea     Lisinopril Cough     Sulfa Drugs Other (See Comments)     Questionable itching reported by patient     BP Readings from Last 3 Encounters:   12/11/17 138/86   11/20/17 134/76   11/01/17 142/85    Wt Readings from Last 3 Encounters:   12/11/17 165 lb 3.2 oz (74.9 kg)   11/20/17 164 lb (74.4 kg)   11/01/17 168 lb (76.2 kg)                       Reviewed and updated as needed this visit by clinical staffTobacco  Allergies  Meds  Med Hx  Surg Hx  Fam Hx  Soc Hx      Reviewed and updated as needed this visit by Provider         ROS:  Remainder of ROS obtained and found to be negative other than that which was documented above      OBJECTIVE:     /86  Pulse 66  Temp 97.6  F (36.4  C) (Tympanic)  Wt 165 lb 3.2 oz (74.9 kg)  SpO2 95%  BMI 26.66 kg/m2  Body mass index is 26.66 kg/(m^2).  GENERAL: healthy, alert and no distress  EYES: Eyes grossly normal to inspection  HENT: ear canals and TM's normal, nose and mouth without ulcers or lesions  NECK: no adenopathy  RESP: lungs clear to auscultation - no rales, rhonchi or wheezes  CV: regular rates and rhythm, normal S1 S2, no S3 or S4 and no murmur, click or rub    Diagnostic Test Results:  none     ASSESSMENT/PLAN:     ASSESSMENT/PLAN:      ICD-10-CM    1. Acute sinusitis with symptoms > 10 days J01.90 cefPROZIL (CEFZIL) 500 MG tablet     Symptoms were improving and then worse again  Repeat course of antibiotics  Would like her to restart flonase as I suspect she has a lot of post nasal drainage  If persistent symptoms despite treatment consider additional imaging (sinus CT?)                 Kelly Matthew PA-C  [facility]

## 2017-12-26 DIAGNOSIS — I10 ESSENTIAL HYPERTENSION WITH GOAL BLOOD PRESSURE LESS THAN 140/90: ICD-10-CM

## 2017-12-26 RX ORDER — METOPROLOL SUCCINATE 25 MG/1
12.5 TABLET, EXTENDED RELEASE ORAL DAILY
Qty: 90 TABLET | Refills: 3 | Status: SHIPPED | OUTPATIENT
Start: 2017-12-26 | End: 2018-12-07

## 2017-12-26 NOTE — TELEPHONE ENCOUNTER
Pending Prescriptions:                       Disp   Refills    metoprolol (TOPROL-XL) 25 MG 24 hr tablet 90 tab*3            Sig: Take 0.5 tablets (12.5 mg) by mouth daily    Last Visit 10/3/17 with Alex Beverly MD  Last Filled 9/21/17  Quantity 45  Req. Received 12/26/17    Mary Juan CMA.

## 2017-12-28 DIAGNOSIS — I25.10 CAD S/P PERCUTANEOUS CORONARY ANGIOPLASTY: ICD-10-CM

## 2017-12-28 DIAGNOSIS — Z98.61 CAD S/P PERCUTANEOUS CORONARY ANGIOPLASTY: ICD-10-CM

## 2018-01-03 RX ORDER — ATORVASTATIN CALCIUM 40 MG/1
TABLET, FILM COATED ORAL
Qty: 90 TABLET | Refills: 3 | Status: SHIPPED | OUTPATIENT
Start: 2018-01-03 | End: 2019-01-11

## 2018-01-03 NOTE — TELEPHONE ENCOUNTER
Prescription approved per [facility] Refill Protocol.  Roel Garza RN

## 2018-01-22 ENCOUNTER — DOCUMENTATION ONLY (OUTPATIENT)
Dept: CARDIOLOGY | Facility: CLINIC | Age: 83
End: 2018-01-22

## 2018-01-22 ENCOUNTER — HOSPITAL ENCOUNTER (OUTPATIENT)
Dept: CARDIOLOGY | Facility: CLINIC | Age: 83
Discharge: HOME | End: 2018-01-22
Attending: INTERNAL MEDICINE | Admitting: INTERNAL MEDICINE
Payer: MEDICARE

## 2018-01-22 PROCEDURE — 93296 REM INTERROG EVL PM/IDS: CPT

## 2018-01-22 PROCEDURE — 93294 REM INTERROG EVL PM/LDLS PM: CPT | Performed by: INTERNAL MEDICINE

## 2018-01-22 NOTE — PROGRESS NOTES
Latitude NXT Remote PPM Device Check  AP: 2 %  : 3 %  Mode: DDDR        Presenting Rhythm: AS/VS (68 bpm)  Heart Rate: Adequate per the histograms.  Sensing: Stable    Pacing Threshold: Stable    Impedance: Stable  Battery Status: 15 yrs remain.  Atrial Arrhythmia: 2 mode switch episodes comprising of <0.1 %. Pt is on ASA 81 mg q d. Pt has SBR (Sudden Marcos Response) which is triggered if the rate falls below 60 bpm with a disruption which could be a PAC or PVC. It will pace the atrium for 2 mins. at the rate of 70 ppm. Pt base rate is set at 50 ppm. In the last 3 months pt  has had 32 SBR episodes. Since implant Pt has had 2834 of the SBR episodes.  Pt is symptomatic.    Ventricular Arrhythmia: None.     Care Plan: Latitude NXT q 3 months. Pt called w/ results and next remote appt date.     I have reviewed and interpreted the device interrogation, settings, programming and summary. The device is functioning within normal device parameters. I agree with the current findings, assessment and plan.

## 2018-02-01 DIAGNOSIS — I10 ESSENTIAL HYPERTENSION WITH GOAL BLOOD PRESSURE LESS THAN 140/90: ICD-10-CM

## 2018-02-01 RX ORDER — LOSARTAN POTASSIUM 25 MG/1
TABLET ORAL
Qty: 90 TABLET | Refills: 2 | Status: SHIPPED | OUTPATIENT
Start: 2018-02-01 | End: 2018-10-25

## 2018-02-01 NOTE — TELEPHONE ENCOUNTER
"LOSARTAN 25MG TAB        Last Written Prescription Date:  2/1/17  Last Fill Quantity: 90,   # refills: 3  Last Office Visit: 12/11/17  Future Office visit:       Requested Prescriptions   Pending Prescriptions Disp Refills     losartan (COZAAR) 25 MG tablet [Pharmacy Med Name: LOSARTAN 25MG   TAB] 90 tablet 3     Sig: TAKE ONE TABLET BY MOUTH ONCE DAILY    Angiotensin-II Receptors Passed    2/1/2018  9:50 AM       Passed - Blood pressure under 140/90 in past 12 months.    BP Readings from Last 3 Encounters:   12/11/17 138/86   11/20/17 134/76   11/01/17 142/85                Passed - Recent or future visit with authorizing provider's specialty    Patient had office visit in the last year or has a visit in the next 30 days with authorizing provider.  See \"Patient Info\" tab in inbasket, or \"Choose Columns\" in Meds & Orders section of the refill encounter.            Passed - Patient is age 18 or older       Passed - No active pregnancy on record       Passed - Normal serum creatinine on file in past 12 months    Recent Labs   Lab Test  08/28/17   1536   CR  0.90            Passed - Normal serum potassium on file in past 12 months    Recent Labs   Lab Test  08/28/17   1536   POTASSIUM  3.4                   Passed - No positive pregnancy test in past 12 months          "

## 2018-02-01 NOTE — TELEPHONE ENCOUNTER
Prescription approved per [facility] Refill Protocol.  Roel Garza RN

## 2018-02-14 DIAGNOSIS — G25.81 RESTLESS LEG SYNDROME: ICD-10-CM

## 2018-02-15 RX ORDER — PRAMIPEXOLE DIHYDROCHLORIDE 0.5 MG/1
TABLET ORAL
Qty: 90 TABLET | Refills: 2 | Status: SHIPPED | OUTPATIENT
Start: 2018-02-15 | End: 2019-04-12

## 2018-02-15 NOTE — TELEPHONE ENCOUNTER
"PRAMIPEXOLE 0.5MG TAB        Last Written Prescription Date:  3/16/17  Last Fill Quantity: 90,   # refills: 3  Last Office Visit: 12/11/17  Future Office visit:       Requested Prescriptions   Pending Prescriptions Disp Refills     pramipexole (MIRAPEX) 0.5 MG tablet [Pharmacy Med Name: PRAMIPEXOLE 0.5MG   TAB] 90 tablet 3     Sig: TAKE ONE TABLET BY MOUTH AT BEDTIME    Antiparkinson's Agents Protocol Passed    2/14/2018  6:59 PM       Passed - Blood pressure under 140/90 in past 12 months    BP Readings from Last 3 Encounters:   12/11/17 138/86   11/20/17 134/76   11/01/17 142/85                Passed - Recent or future visit with authorizing provider's specialty    Patient had office visit in the last year or has a visit in the next 30 days with authorizing provider.  See \"Patient Info\" tab in inbasket, or \"Choose Columns\" in Meds & Orders section of the refill encounter.            Passed - Patient is age 18 or older       Passed - No active pregnancy on record       Passed - No positive pregnancy test in the past 12 months          "

## 2018-02-22 DIAGNOSIS — F33.0 MAJOR DEPRESSIVE DISORDER, RECURRENT EPISODE, MILD (H): ICD-10-CM

## 2018-02-22 RX ORDER — CITALOPRAM HYDROBROMIDE 10 MG/1
TABLET ORAL
Qty: 90 TABLET | Refills: 0 | Status: SHIPPED | OUTPATIENT
Start: 2018-02-22 | End: 2018-03-02

## 2018-02-22 NOTE — TELEPHONE ENCOUNTER
"CITALOPRAM 10MG TAB        Last Written Prescription Date:  12/4/17  Last Fill Quantity: 90,   # refills: 0  Last Office Visit: 12/11/17  Future Office visit:       Requested Prescriptions   Pending Prescriptions Disp Refills     citalopram (CELEXA) 10 MG tablet [Pharmacy Med Name: CITALOPRAM 10MG     TAB] 90 tablet 0     Sig: TAKE ONE TABLET BY MOUTH ONCE DAILY    SSRIs Protocol Failed    2/22/2018  9:09 AM       Failed - PHQ-9 score less than 5 in past 6 months    The PHQ-9 criteria is meant to fail. It requires a PHQ-9 score review  PHQ-9 SCORE 11/17/2014 8/8/2016 3/2/2017   Total Score 4 - -   Total Score - 4 6            Passed - Patient is age 18 or older       Passed - No active pregnancy on record       Passed - No positive pregnancy test in last 12 months       Passed - Recent (6 mo) or future visit with authorizing provider's specialty    Patient had office visit in the last 6 months or has a visit in the next 30 days with authorizing provider.  See \"Patient Info\" tab in inbasket, or \"Choose Columns\" in Meds & Orders section of the refill encounter.              "

## 2018-02-22 NOTE — TELEPHONE ENCOUNTER
**This refill requires provider completion and is not appropriate for RN review per RN refill guidelines.**  PH-Q9 needs to be less than 5 to approve medication on RN Refill protocol pt's score is 6.  Angelic Tran RN

## 2018-03-02 ENCOUNTER — OFFICE VISIT (OUTPATIENT)
Dept: FAMILY MEDICINE | Facility: CLINIC | Age: 83
End: 2018-03-02
Payer: COMMERCIAL

## 2018-03-02 VITALS
TEMPERATURE: 97 F | HEIGHT: 66 IN | HEART RATE: 70 BPM | WEIGHT: 168 LBS | BODY MASS INDEX: 27 KG/M2 | DIASTOLIC BLOOD PRESSURE: 76 MMHG | SYSTOLIC BLOOD PRESSURE: 132 MMHG

## 2018-03-02 DIAGNOSIS — N32.81 OVERACTIVE BLADDER: ICD-10-CM

## 2018-03-02 DIAGNOSIS — M16.12 PRIMARY OSTEOARTHRITIS OF LEFT HIP: ICD-10-CM

## 2018-03-02 DIAGNOSIS — F33.0 MAJOR DEPRESSIVE DISORDER, RECURRENT EPISODE, MILD (H): ICD-10-CM

## 2018-03-02 DIAGNOSIS — M70.62 TROCHANTERIC BURSITIS OF LEFT HIP: Primary | ICD-10-CM

## 2018-03-02 PROCEDURE — 99214 OFFICE O/P EST MOD 30 MIN: CPT | Mod: 25 | Performed by: PHYSICIAN ASSISTANT

## 2018-03-02 PROCEDURE — 20610 DRAIN/INJ JOINT/BURSA W/O US: CPT | Mod: LT | Performed by: PHYSICIAN ASSISTANT

## 2018-03-02 RX ORDER — CITALOPRAM HYDROBROMIDE 10 MG/1
10 TABLET ORAL DAILY
Qty: 90 TABLET | Refills: 1 | Status: SHIPPED | OUTPATIENT
Start: 2018-03-02 | End: 2018-03-02

## 2018-03-02 RX ORDER — CITALOPRAM HYDROBROMIDE 20 MG/1
20 TABLET ORAL DAILY
Qty: 90 TABLET | Refills: 1 | Status: SHIPPED | OUTPATIENT
Start: 2018-03-02 | End: 2018-10-25

## 2018-03-02 RX ORDER — OXYBUTYNIN CHLORIDE 10 MG/1
10 TABLET, EXTENDED RELEASE ORAL DAILY
Qty: 90 TABLET | Refills: 1 | Status: SHIPPED | OUTPATIENT
Start: 2018-03-02 | End: 2018-06-08

## 2018-03-02 ASSESSMENT — ANXIETY QUESTIONNAIRES
GAD7 TOTAL SCORE: 1
IF YOU CHECKED OFF ANY PROBLEMS ON THIS QUESTIONNAIRE, HOW DIFFICULT HAVE THESE PROBLEMS MADE IT FOR YOU TO DO YOUR WORK, TAKE CARE OF THINGS AT HOME, OR GET ALONG WITH OTHER PEOPLE: NOT DIFFICULT AT ALL
7. FEELING AFRAID AS IF SOMETHING AWFUL MIGHT HAPPEN: NOT AT ALL
3. WORRYING TOO MUCH ABOUT DIFFERENT THINGS: NOT AT ALL
5. BEING SO RESTLESS THAT IT IS HARD TO SIT STILL: NOT AT ALL
2. NOT BEING ABLE TO STOP OR CONTROL WORRYING: NOT AT ALL
1. FEELING NERVOUS, ANXIOUS, OR ON EDGE: NOT AT ALL
6. BECOMING EASILY ANNOYED OR IRRITABLE: SEVERAL DAYS

## 2018-03-02 ASSESSMENT — PATIENT HEALTH QUESTIONNAIRE - PHQ9: 5. POOR APPETITE OR OVEREATING: NOT AT ALL

## 2018-03-02 NOTE — PATIENT INSTRUCTIONS
Understanding Trochanteric Bursitis    A bursa is a thin, slippery, sac-like film. It contains a small amount of fluid. This structure is found between bones and soft tissues in and around joints. A bursa cushions and protects a joint. It keeps parts of a joint from rubbing against each other. If a bursa becomes inflamed and irritated, it is known as bursitis.  The trochanteric bursa is found on the hip joint. It lies on top of the bump at the top of the thighbone called the greater trochanter. Inflammation of this bursa is called trochanteric bursitis.     How to say it  [id number]   Causes of trochanteric bursitis  Causes may include:    Overuse of the hip during running or other sports, dance, or work    Falling on or irritation to the side of the hip  This condition may occur along with other problems, such as osteoarthritis of the hip or knee, or low back problems. In rare cases, it may occur after hip surgery.  Symptoms of trochanteric bursitis    Pain or aching on the side of the hip. The pain may travel down the leg.    Swelling, tenderness, or warmth on the side of the hip at the bony bump at the top of the thigh  Treatment for trochanteric bursitis  These may include:    Resting the hip. This allows the bursa to heal.    Prescription or over-the-counter pain medicines. These help reduce inflammation, swelling, and pain.    Cold packs and heat packs. These help reduce pain and swelling.    Stretching and strengthening exercises. These improve flexibility and strength around the hip.    Physical therapy. This includes exercises or other treatments.    Injections of medicine into the bursa. This may help reduce inflammation and relieve symptoms.  Possible complications  If you don t give your hip time to heal, the problem may not go away, may return, or may get worse. Rest and treat your hip as directed.     When to call your healthcare provider  Call your healthcare provider right away if you have  any of these:    Fever of 100.4 F (38 C) or higher, or as directed    Redness, swelling, or warmth that gets worse    Symptoms that don t get better with prescribed medicines, or get worse    New symptoms   Date Last Reviewed: 3/29/2016    [id number] [employer]. [address] [place] [id number]. All rights reserved. This information is not intended as a substitute for professional medical care. Always follow your healthcare professional's instructions.

## 2018-03-02 NOTE — NURSING NOTE
"Chief Complaint   Patient presents with     Incontinence     Leg Problems       Initial /76  Pulse 70  Temp 97  F (36.1  C)  Ht 5' 6\" (1.676 m)  Wt 168 lb (76.2 kg)  BMI 27.12 kg/m2 Estimated body mass index is 27.12 kg/(m^2) as calculated from the following:    Height as of this encounter: 5' 6\" (1.676 m).    Weight as of this encounter: 168 lb (76.2 kg).  Medication Reconciliation: complete     Viraj Lofton, SISSY    "

## 2018-03-02 NOTE — PROGRESS NOTES
"  SUBJECTIVE:   Christiana Sal is a 86 year old female who presents to clinic today for the following health issues:      Patient is here today with a few concerns:     -She has been having trouble with incontinence recently, especially at night. Having to get up about once an hour to go.  Not currently taking any medications  Had tried oxybutynin before but says that she didn't notice an improvement or change so she stopped it    -She has been ongoing trouble with her legs for a while. Right now her left leg and left hip are bothering her the most. Sharp pains.   Was seen by sports med and has had an injection in the hip  Was told she had severe osteoarthritis in the left hip and was offered a referral to a surgeon   She says her pains have been hard to explain so she actually brought 4 pages of journal entries she has made explaining the day to day pains  In reviewing her notes it seems she has some migratory pains where it will sometimes be in her hips or legs and they will hurt or feel weak. On a few days she has felt like her left leg \"gives out\" on her so she has been using a cane to walk.   A few other days she notes pains in both shins and her ankles. Left ankle always more than right  Today actually feeling okay but did bring the cane with her as she is worried about the legs giving out    Also needs a refill on celexa  Feels it has helped but still has noticed that she is more \"short\" with her  and still a little more irritable than she would like    Problem list and histories reviewed & adjusted, as indicated.  Additional history: as documented    Current Outpatient Prescriptions   Medication Sig Dispense Refill     oxybutynin (DITROPAN XL) 10 MG 24 hr tablet Take 1 tablet (10 mg) by mouth daily 90 tablet 1     citalopram (CELEXA) 20 MG tablet Take 1 tablet (20 mg) by mouth daily 90 tablet 1     pramipexole (MIRAPEX) 0.5 MG tablet TAKE ONE TABLET BY MOUTH AT BEDTIME 90 tablet 2     losartan " (COZAAR) 25 MG tablet TAKE ONE TABLET BY MOUTH ONCE DAILY 90 tablet 2     atorvastatin (LIPITOR) 40 MG tablet TAKE ONE TABLET BY MOUTH EVERY DAY 90 tablet 3     metoprolol (TOPROL-XL) 25 MG 24 hr tablet Take 0.5 tablets (12.5 mg) by mouth daily 90 tablet 3     oxybutynin (DITROPAN-XL) 5 MG 24 hr tablet Take 1 tablet (5 mg) by mouth daily 30 tablet 4     pantoprazole (PROTONIX) 20 MG EC tablet Take 2 tablets (40 mg) by mouth daily 180 tablet 2     ranitidine (ZANTAC) 150 MG tablet Take 2 tablets (300 mg) by mouth At Bedtime 180 tablet 3     ranitidine (ZANTAC) 150 MG tablet Take 2 tablets (300 mg) by mouth At Bedtime 60 tablet 0     levothyroxine (SYNTHROID/LEVOTHROID) 75 MCG tablet Take 1 tablet (75 mcg) by mouth daily 90 tablet 1     ondansetron (ZOFRAN) 4 MG tablet Take 1 tablet (4 mg) by mouth every 6 hours as needed for nausea 18 tablet 3     Multiple Vitamins-Minerals (OCUVITE PO) Take 1 tablet by mouth daily.       ASPIRIN 81 MG PO TABS Take 2 tablets by mouth every evening. *.        CALCIUM 600 + D OR 1 tablet by mouth 2 times per day       FISH OIL OR 1 daily       benzonatate (TESSALON) 100 MG capsule Take 1 capsule (100 mg) by mouth 3 times daily as needed for cough (Patient not taking: Reported on 3/2/2018) 42 capsule 0     albuterol (PROAIR HFA/PROVENTIL HFA/VENTOLIN HFA) 108 (90 BASE) MCG/ACT Inhaler Inhale 2 puffs into the lungs every 6 hours as needed for shortness of breath / dyspnea or wheezing (Patient not taking: Reported on 3/2/2018) 1 Inhaler 0     albuterol (PROAIR HFA/PROVENTIL HFA/VENTOLIN HFA) 108 (90 BASE) MCG/ACT Inhaler Inhale 2 puffs into the lungs every 6 hours as needed for shortness of breath / dyspnea or wheezing (Patient not taking: Reported on 3/2/2018) 1 Inhaler 0     nitroglycerin (NITROSTAT) 0.4 MG SL tablet Place 1 tablet (0.4 mg) under the tongue every 5 minutes as needed for chest pain (call your doctor if you take a third dose) (Patient not taking: Reported on 3/2/2018) 25  "tablet 3     Allergies   Allergen Reactions     Demerol Nausea     Lisinopril Cough     Sulfa Drugs Other (See Comments)     Questionable itching reported by patient     BP Readings from Last 3 Encounters:   03/02/18 132/76   12/11/17 138/86   11/20/17 134/76    Wt Readings from Last 3 Encounters:   03/02/18 168 lb (76.2 kg)   12/11/17 165 lb 3.2 oz (74.9 kg)   11/20/17 164 lb (74.4 kg)                    Reviewed and updated as needed this visit by clinical staff       Reviewed and updated as needed this visit by Provider         ROS:  Remainder of ROS obtained and found to be negative other than that which was documented above      OBJECTIVE:     /76  Pulse 70  Temp 97  F (36.1  C)  Ht 5' 6\" (1.676 m)  Wt 168 lb (76.2 kg)  BMI 27.12 kg/m2  Body mass index is 27.12 kg/(m^2).  GENERAL: healthy, alert and no distress  RESP: lungs clear to auscultation - no rales, rhonchi or wheezes  CV: regular rates and rhythm, normal S1 S2, no S3 or S4, no murmur, click or rub and no peripheral edema  MS: Normal gait today. Nontender around her ankles and not noticeable swelling in her ankles or knees  SHe has pretty focal tenderness on palpation of her left greater trochanter with tenderness to palpation down her left outer thigh/upper leg    Diagnostic Test Results:  none     ASSESSMENT/PLAN:     (M70.62) Trochanteric bursitis of left hip  (primary encounter diagnosis)  Comment: I think there are possible multiple components of her leg pains. WE know she has severe osteoarthritis of her left hip. I wonder if she doesn't also have some irritation coming from her low back. Both these together could be contributing to some of her symptoms. I also suspect that she has some bursitis given the location of her pain today and pain down the outer leg. We discussed trying a trochanteric injection today to see how much of her symptoms we can alter if this is effective  Plan: triamcinolone acetonide (KENALOG-40) 40 MG/ML         " injection, DRAIN/INJECT LARGE JOINT/BURSA         PROCEDURE: Injection  REASON: Trochanteric bursitis  PERFORMED BY: Kelly Matthew PA-C  CONSENT: reviewed risks/benefits of procedure. Consent form signed - will be scanned into patient's chart  DESCRIPTION: Area cleaned and prepped in a sterile fashion. A combination of lidocaine (1cc of 2% lidocaine) and kenalog (1cc of 40mg/mL) for a total of 2cc was injected into the area of most tenderness over the greater trochanter  COMPLICATIONS: none. Patient tolerated the procedure well      (M16.12) Primary osteoarthritis of left hip  Comment: injection into bursa as noted above. Known arthritis of hip. 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 Try increase to 20mg  Plan: citalopram (CELEXA) 20 MG tablet, DISCONTINUED:        citalopram (CELEXA) 10 MG tablet            (N32.81) Overactive bladder  Comment: didn't notice benefit but was at low dose. Will try higher dose  Plan: oxybutynin (DITROPAN XL) 10 MG 24 hr tablet                          Kelly Matthew PA-C  [facility]

## 2018-03-02 NOTE — MR AVS SNAPSHOT
After Visit Summary   3/2/2018    Christiana Sal    MRN: [id number]           Patient Information     Date Of Birth          10/25/1931        Visit Information        Provider Department      3/2/2018 9:00 AM Kelly Matthew PA-C [facility]        Today's Diagnoses     Overactive bladder    -  1    Major depressive disorder, recurrent episode, mild (H)        Trochanteric bursitis of left hip          Care Instructions      Understanding Trochanteric Bursitis    A bursa is a thin, slippery, sac-like film. It contains a small amount of fluid. This structure is found between bones and soft tissues in and around joints. A bursa cushions and protects a joint. It keeps parts of a joint from rubbing against each other. If a bursa becomes inflamed and irritated, it is known as bursitis.  The trochanteric bursa is found on the hip joint. It lies on top of the bump at the top of the thighbone called the greater trochanter. Inflammation of this bursa is called trochanteric bursitis.     How to say it  [id number]   Causes of trochanteric bursitis  Causes may include:    Overuse of the hip during running or other sports, dance, or work    Falling on or irritation to the side of the hip  This condition may occur along with other problems, such as osteoarthritis of the hip or knee, or low back problems. In rare cases, it may occur after hip surgery.  Symptoms of trochanteric bursitis    Pain or aching on the side of the hip. The pain may travel down the leg.    Swelling, tenderness, or warmth on the side of the hip at the bony bump at the top of the thigh  Treatment for trochanteric bursitis  These may include:    Resting the hip. This allows the bursa to heal.    Prescription or over-the-counter pain medicines. These help reduce inflammation, swelling, and pain.    Cold packs and heat packs. These help reduce pain and swelling.    Stretching and strengthening exercises. These  improve flexibility and strength around the hip.    Physical therapy. This includes exercises or other treatments.    Injections of medicine into the bursa. This may help reduce inflammation and relieve symptoms.  Possible complications  If you don t give your hip time to heal, the problem may not go away, may return, or may get worse. Rest and treat your hip as directed.     When to call your healthcare provider  Call your healthcare provider right away if you have any of these:    Fever of 100.4 F (38 C) or higher, or as directed    Redness, swelling, or warmth that gets worse    Symptoms that don t get better with prescribed medicines, or get worse    New symptoms   Date Last Reviewed: 3/29/2016    [id number] [employer]. [address] [place] [address], [place] [address]. All rights reserved. This information is not intended as a substitute for professional medical care. Always follow your healthcare professional's instructions.                Follow-ups after your visit        Your next 10 appointments already scheduled     May 07, 2018  9:30 AM CDT   Remote PPM Check with [facility]   [facility] ([facility])    [address] [place] [id number] [phone]           This appointment is for a remote check of your pacemaker.  This is not an appointment at the office.              Who to contact     Normal or non-critical lab and imaging results will be communicated to you by [employer], letter or phone within 4 business days after the clinic has received the results. If you do not hear from us within 7 days, please contact the clinic through MyChart or phone. If you have a critical or abnormal lab result, we will notify you by phone as soon as possible.  Submit refill requests through [employer] or call your pharmacy and they will forward the refill request to us. Please allow 3 business days for your refill to be completed.          If you need to speak with  "a  for additional information , please call: [phone]             Additional Information About Your Visit        [employer] Information     [employer] gives you secure access to your electronic health record. If you see a primary care provider, you can also send messages to your care team and make appointments. If you have questions, please call your primary care clinic.  If you do not have a primary care provider, please call [phone] and they will assist you.        Care EveryWhere ID     This is your Care EveryWhere ID. This could be used by other organizations to access your [place] medical records  [id number]        Your Vitals Were     Pulse Temperature Height BMI (Body Mass Index)          70 97  F (36.1  C) 5' 6\" (1.676 m) 27.12 kg/m2         Blood Pressure from Last 3 Encounters:   03/02/18 132/76   12/11/17 138/86   11/20/17 134/76    Weight from Last 3 Encounters:   03/02/18 168 lb (76.2 kg)   12/11/17 165 lb 3.2 oz (74.9 kg)   11/20/17 164 lb (74.4 kg)              Today, you had the following     No orders found for display         Today's Medication Changes          These changes are accurate as of 3/2/18  9:49 AM.  If you have any questions, ask your nurse or doctor.               These medicines have changed or have updated prescriptions.        Dose/Directions    citalopram 10 MG tablet   Commonly known as:  celeXA   This may have changed:  See the new instructions.   Used for:  Major depressive disorder, recurrent episode, mild (H)   Changed by:  Kelly Matthew PA-C        Dose:  10 mg   Take 1 tablet (10 mg) by mouth daily   Quantity:  90 tablet   Refills:  1       * oxybutynin 5 MG 24 hr tablet   Commonly known as:  DITROPAN-XL   This may have changed:  Another medication with the same name was added. Make sure you understand how and when to take each.   Used for:  Overactive bladder   Changed by:  Kelly Matthew PA-C        Dose:  5 mg   Take 1 " tablet (5 mg) by mouth daily   Quantity:  30 tablet   Refills:  4       * oxybutynin 10 MG 24 hr tablet   Commonly known as:  DITROPAN XL   This may have changed:  You were already taking a medication with the same name, and this prescription was added. Make sure you understand how and when to take each.   Used for:  Overactive bladder   Changed by:  Kelly Matthew PA-C        Dose:  10 mg   Take 1 tablet (10 mg) by mouth daily   Quantity:  90 tablet   Refills:  1       * Notice:  This list has 2 medication(s) that are the same as other medications prescribed for you. Read the directions carefully, and ask your doctor or other care provider to review them with you.         Where to get your medicines      These medications were sent to [facility] [address]  [address] [facility] [address]     Phone:  [phone]     citalopram 10 MG tablet    oxybutynin 10 MG 24 hr tablet                Primary Care Provider Office Phone # Fax #    Kelly Matthew PA-C [phone]       [address]  [facility] [id number]        Equal Access to Services     [facility] CampusZAIDA AH: Hadii aad ku hadasho Soomaali, waaxda luqadaha, qaybta kaalmada adeegyada, waxay idiin hayaan ken brian . So [place] [phone].    ATENCIÓN: Si habla español, tiene a ozuna disposición servicios gratuitos de asistencia lingüística. Llame al [phone].    We comply with applicable federal civil rights laws and [place] laws. We do not discriminate on the basis of race, color, national origin, age, disability, sex, sexual orientation, or gender identity.            Thank you!     Thank you for choosing [facility]  for your care. Our goal is always to provide you with excellent care. Hearing back from our patients is one way we can continue to improve our services. Please take a few minutes to complete the written survey that you may receive in the mail  after your visit with us. Thank you!             Your Updated Medication List - Protect others around you: Learn how to safely use, store and throw away your medicines at www.disposemymeds.org.          This list is accurate as of 3/2/18  9:49 AM.  Always use your most recent med list.                   Brand Name Dispense Instructions for use Diagnosis    * albuterol 108 (90 BASE) MCG/ACT Inhaler    PROAIR HFA/PROVENTIL HFA/VENTOLIN HFA    1 Inhaler    Inhale 2 puffs into the lungs every 6 hours as needed for shortness of breath / dyspnea or wheezing    Acute bronchitis with symptoms > 10 days       * albuterol 108 (90 BASE) MCG/ACT Inhaler    PROAIR HFA/PROVENTIL HFA/VENTOLIN HFA    1 Inhaler    Inhale 2 puffs into the lungs every 6 hours as needed for shortness of breath / dyspnea or wheezing    Acute bronchitis with symptoms > 10 days       aspirin 81 MG tablet      Take 2 tablets by mouth every evening. *.        atorvastatin 40 MG tablet    LIPITOR    90 tablet    TAKE ONE TABLET BY MOUTH EVERY DAY    CAD S/P percutaneous coronary angioplasty       benzonatate 100 MG capsule    TESSALON    42 capsule    Take 1 capsule (100 mg) by mouth 3 times daily as needed for cough    Cough       CALCIUM 600 + D PO      1 tablet by mouth 2 times per day        citalopram 10 MG tablet    celeXA    90 tablet    Take 1 tablet (10 mg) by mouth daily    Major depressive disorder, recurrent episode, mild (H)       FISH OIL PO      1 daily        levothyroxine 75 MCG tablet    SYNTHROID/LEVOTHROID    90 tablet    Take 1 tablet (75 mcg) by mouth daily    Hypothyroidism, unspecified type       losartan 25 MG tablet    COZAAR    90 tablet    TAKE ONE TABLET BY MOUTH ONCE DAILY    Essential hypertension with goal blood pressure less than 140/90       metoprolol succinate 25 MG 24 hr tablet    TOPROL-XL    90 tablet    Take 0.5 tablets (12.5 mg) by mouth daily    Essential hypertension with goal blood pressure less than 140/90        nitroGLYcerin 0.4 MG sublingual tablet    NITROSTAT    25 tablet    Place 1 tablet (0.4 mg) under the tongue every 5 minutes as needed for chest pain (call your doctor if you take a third dose)    Atherosclerosis of native coronary artery of native heart without angina pectoris       OCUVITE PO      Take 1 tablet by mouth daily.        ondansetron 4 MG tablet    ZOFRAN    18 tablet    Take 1 tablet (4 mg) by mouth every 6 hours as needed for nausea    Nausea       * oxybutynin 5 MG 24 hr tablet    DITROPAN-XL    30 tablet    Take 1 tablet (5 mg) by mouth daily    Overactive bladder       * oxybutynin 10 MG 24 hr tablet    DITROPAN XL    90 tablet    Take 1 tablet (10 mg) by mouth daily    Overactive bladder       pantoprazole 20 MG EC tablet    PROTONIX    180 tablet    Take 2 tablets (40 mg) by mouth daily    Gastroesophageal reflux disease, esophagitis presence not specified       pramipexole 0.5 MG tablet    MIRAPEX    90 tablet    TAKE ONE TABLET BY MOUTH AT BEDTIME    Restless leg syndrome       * ranitidine 150 MG tablet    ZANTAC    60 tablet    Take 2 tablets (300 mg) by mouth At Bedtime    Gastroesophageal reflux disease, esophagitis presence not specified       * ranitidine 150 MG tablet    ZANTAC    180 tablet    Take 2 tablets (300 mg) by mouth At Bedtime    Gastroesophageal reflux disease, esophagitis presence not specified       * Notice:  This list has 6 medication(s) that are the same as other medications prescribed for you. Read the directions carefully, and ask your doctor or other care provider to review them with you.

## 2018-03-02 NOTE — NURSING NOTE
The following medication was given:     MEDICATION: Kenalog 40 mg  ROUTE: IM  SITE: Left Hip  DOSE: 1ML  LOT #: [id number]  :  [employer]  EXPIRATION DATE: 2/2019  NDC#: 8736-6445-92   Med was drawn up and given by CAREY Adrian CMA         The following medication was given:     MEDICATION: 2% Lidocaine   ROUTE: IM  SITE: Left Hip   DOSE: 1 cc  LOT #: [id number]   :  [employer]  EXPIRATION DATE: 2/1/2019  NDC#: 6918-2324-34   Med was drawn up and given by CAREY Adrian CMA

## 2018-03-03 ASSESSMENT — ANXIETY QUESTIONNAIRES: GAD7 TOTAL SCORE: 1

## 2018-03-03 ASSESSMENT — PATIENT HEALTH QUESTIONNAIRE - PHQ9: SUM OF ALL RESPONSES TO PHQ QUESTIONS 1-9: 4

## 2018-03-07 ENCOUNTER — TELEPHONE (OUTPATIENT)
Dept: FAMILY MEDICINE | Facility: CLINIC | Age: 83
End: 2018-03-07

## 2018-03-07 RX ORDER — TRIAMCINOLONE ACETONIDE 40 MG/ML
40 INJECTION, SUSPENSION INTRA-ARTICULAR; INTRAMUSCULAR ONCE
Qty: 1 ML | Refills: 0 | OUTPATIENT
Start: 2018-03-07 | End: 2018-03-07

## 2018-03-07 NOTE — TELEPHONE ENCOUNTER
Reason for Call:  Other call back    Detailed comments: Christiana LEFT MESSAGE:  She is reporting that she is having negative response to the medication (not sure if she is speaking about oxybutynin or citalopram).  Increase in mg for oxybutynin to 10mg on 3/2/18.  Please call and assess.  Thank you..Consuelo Hawthorne    Phone Number Patient can be reached at: Home number on file [phone] (home)      Call taken on 3/7/2018 at 9:39 AM by Consuelo Hawthorne

## 2018-03-07 NOTE — TELEPHONE ENCOUNTER
Patient states that Kelly had increased her citalopram from 10 mg to 20 mg on Friday at her office visit. The day she increased she was not able to sleep at all, next night took a sleep-aid to help her sleep and she was able to. Yesterday was feeling more depressed and getting angry at her . States she is going to go back down to the 10 mg today and Dr. Martinez agreed with this plan until Kelly is back in office.     Angelic Tran RN

## 2018-03-12 ENCOUNTER — OFFICE VISIT (OUTPATIENT)
Dept: FAMILY MEDICINE | Facility: CLINIC | Age: 83
End: 2018-03-12
Payer: COMMERCIAL

## 2018-03-12 VITALS
HEIGHT: 66 IN | WEIGHT: 164 LBS | BODY MASS INDEX: 26.36 KG/M2 | SYSTOLIC BLOOD PRESSURE: 122 MMHG | TEMPERATURE: 97 F | DIASTOLIC BLOOD PRESSURE: 74 MMHG | HEART RATE: 49 BPM

## 2018-03-12 DIAGNOSIS — M16.12 PRIMARY OSTEOARTHRITIS OF LEFT HIP: Primary | ICD-10-CM

## 2018-03-12 DIAGNOSIS — R29.898 WEAKNESS OF LEFT LOWER EXTREMITY: ICD-10-CM

## 2018-03-12 DIAGNOSIS — R39.15 URINARY URGENCY: ICD-10-CM

## 2018-03-12 DIAGNOSIS — R07.0 THROAT PAIN: ICD-10-CM

## 2018-03-12 LAB
ALBUMIN UR-MCNC: NEGATIVE MG/DL
APPEARANCE UR: CLEAR
BACTERIA #/AREA URNS HPF: ABNORMAL /HPF
BILIRUB UR QL STRIP: NEGATIVE
COLOR UR AUTO: YELLOW
DEPRECATED S PYO AG THROAT QL EIA: NORMAL
GLUCOSE UR STRIP-MCNC: NEGATIVE MG/DL
HGB UR QL STRIP: ABNORMAL
KETONES UR STRIP-MCNC: NEGATIVE MG/DL
LEUKOCYTE ESTERASE UR QL STRIP: ABNORMAL
MUCOUS THREADS #/AREA URNS LPF: PRESENT /LPF
NITRATE UR QL: NEGATIVE
NON-SQ EPI CELLS #/AREA URNS LPF: ABNORMAL /LPF
PH UR STRIP: 5.5 PH (ref 5–7)
RBC #/AREA URNS AUTO: ABNORMAL /HPF
SOURCE: ABNORMAL
SP GR UR STRIP: 1.01 (ref 1–1.03)
SPECIMEN SOURCE: NORMAL
UROBILINOGEN UR STRIP-ACNC: 0.2 EU/DL (ref 0.2–1)
WBC #/AREA URNS AUTO: ABNORMAL /HPF

## 2018-03-12 PROCEDURE — 99214 OFFICE O/P EST MOD 30 MIN: CPT | Performed by: PHYSICIAN ASSISTANT

## 2018-03-12 PROCEDURE — 87086 URINE CULTURE/COLONY COUNT: CPT | Performed by: PHYSICIAN ASSISTANT

## 2018-03-12 PROCEDURE — 87880 STREP A ASSAY W/OPTIC: CPT | Performed by: PHYSICIAN ASSISTANT

## 2018-03-12 PROCEDURE — 81001 URINALYSIS AUTO W/SCOPE: CPT | Performed by: PHYSICIAN ASSISTANT

## 2018-03-12 PROCEDURE — 87081 CULTURE SCREEN ONLY: CPT | Performed by: PHYSICIAN ASSISTANT

## 2018-03-12 NOTE — MR AVS SNAPSHOT
After Visit Summary   3/12/2018    Christiana Sal    MRN: [id number]           Patient Information     Date Of Birth          10/25/1931        Visit Information        Provider Department      3/12/2018 3:00 PM Kelly Matthew PA-C [facility]        Today's Diagnoses     Urinary urgency    -  1    Throat pain        Primary osteoarthritis of left hip          Care Instructions    Call [facility] and schedule an appointment with Dr. Cifuentes    Call clinic and let us know when that appointment is scheduled for - I can get your records sent to them and discuss whether or not they want any imaging done before the visit          Follow-ups after your visit        Additional Services     ORTHOPEDICS ADULT REFERRAL       Your provider has referred you to: [facility] - Dr. Cifuentes [phone]    Please be aware that coverage of these services is subject to the terms and limitations of your health insurance plan.  Call member services at your health plan with any benefit or coverage questions.      Please bring the following to your appointment:    >>   Any x-rays, CTs or MRIs which have been performed.  Contact the facility where they were done to arrange for  prior to your scheduled appointment.    >>   List of current medications   >>   This referral request   >>   Any documents/labs given to you for this referral                  Your next 10 appointments already scheduled     May 07, 2018  9:30 AM CDT   Remote PPM Check with [facility]   [facility] ([facility])    [address] [address] [phone]           This appointment is for a remote check of your pacemaker.  This is not an appointment at the office.              Who to contact     Normal or non-critical lab and imaging results will be communicated to you by MyChart, letter or phone within 4 business days after the clinic has  "received the results. If you do not hear from us within 7 days, please contact the clinic through [facility] or phone. If you have a critical or abnormal lab result, we will notify you by phone as soon as possible.  Submit refill requests through [employer] or call your pharmacy and they will forward the refill request to us. Please allow 3 business days for your refill to be completed.          If you need to speak with a  for additional information , please call: [phone]             Additional Information About Your Visit        [employer] Information     [employer] gives you secure access to your electronic health record. If you see a primary care provider, you can also send messages to your care team and make appointments. If you have questions, please call your primary care clinic.  If you do not have a primary care provider, please call [phone] and they will assist you.        Care EveryWhere ID     This is your Care EveryWhere ID. This could be used by other organizations to access your [facility] medical records  [id number]        Your Vitals Were     Pulse Temperature Height BMI (Body Mass Index)          49 97  F (36.1  C) 5' 6\" (1.676 m) 26.47 kg/m2         Blood Pressure from Last 3 Encounters:   03/12/18 122/74   03/02/18 132/76   12/11/17 138/86    Weight from Last 3 Encounters:   03/12/18 164 lb (74.4 kg)   03/02/18 168 lb (76.2 kg)   12/11/17 165 lb 3.2 oz (74.9 kg)              We Performed the Following     ORTHOPEDICS ADULT REFERRAL     Strep, Rapid Screen     UA with Microscopic reflex to Culture        Primary Care Provider Office Phone # Fax #    Kelly Matthew PA-C [phone] [address] [facility] [address]        Equal Access to Services     SOLITARIO EDUARDO : [place], rubia hester, danya park. So [facility] [phone].    ATENCIÓN: Si elham viramontes " disposición servicios gratuitos de asistencia lingüística. Moisés álvarez [phone].    We comply with applicable federal civil rights laws and [place] laws. We do not discriminate on the basis of race, color, national origin, age, disability, sex, sexual orientation, or gender identity.            Thank you!     Thank you for choosing [facility]  for your care. Our goal is always to provide you with excellent care. Hearing back from our patients is one way we can continue to improve our services. Please take a few minutes to complete the written survey that you may receive in the mail after your visit with us. Thank you!             Your Updated Medication List - Protect others around you: Learn how to safely use, store and throw away your medicines at www.disposemymeds.org.          This list is accurate as of 3/12/18  3:25 PM.  Always use your most recent med list.                   Brand Name Dispense Instructions for use Diagnosis    * albuterol 108 (90 BASE) MCG/ACT Inhaler    PROAIR HFA/PROVENTIL HFA/VENTOLIN HFA    1 Inhaler    Inhale 2 puffs into the lungs every 6 hours as needed for shortness of breath / dyspnea or wheezing    Acute bronchitis with symptoms > 10 days       * albuterol 108 (90 BASE) MCG/ACT Inhaler    PROAIR HFA/PROVENTIL HFA/VENTOLIN HFA    1 Inhaler    Inhale 2 puffs into the lungs every 6 hours as needed for shortness of breath / dyspnea or wheezing    Acute bronchitis with symptoms > 10 days       aspirin 81 MG tablet      Take 2 tablets by mouth every evening. *.        atorvastatin 40 MG tablet    LIPITOR    90 tablet    TAKE ONE TABLET BY MOUTH EVERY DAY    CAD S/P percutaneous coronary angioplasty       benzonatate 100 MG capsule    TESSALON    42 capsule    Take 1 capsule (100 mg) by mouth 3 times daily as needed for cough    Cough       CALCIUM 600 + D PO      1 tablet by mouth 2 times per day        citalopram 20 MG tablet    celeXA    90 tablet    Take 1 tablet (20 mg)  by mouth daily    Major depressive disorder, recurrent episode, mild (H)       FISH OIL PO      1 daily        levothyroxine 75 MCG tablet    SYNTHROID/LEVOTHROID    90 tablet    Take 1 tablet (75 mcg) by mouth daily    Hypothyroidism, unspecified type       losartan 25 MG tablet    COZAAR    90 tablet    TAKE ONE TABLET BY MOUTH ONCE DAILY    Essential hypertension with goal blood pressure less than 140/90       metoprolol succinate 25 MG 24 hr tablet    TOPROL-XL    90 tablet    Take 0.5 tablets (12.5 mg) by mouth daily    Essential hypertension with goal blood pressure less than 140/90       nitroGLYcerin 0.4 MG sublingual tablet    NITROSTAT    25 tablet    Place 1 tablet (0.4 mg) under the tongue every 5 minutes as needed for chest pain (call your doctor if you take a third dose)    Atherosclerosis of native coronary artery of native heart without angina pectoris       OCUVITE PO      Take 1 tablet by mouth daily.        ondansetron 4 MG tablet    ZOFRAN    18 tablet    Take 1 tablet (4 mg) by mouth every 6 hours as needed for nausea    Nausea       * oxybutynin 5 MG 24 hr tablet    DITROPAN-XL    30 tablet    Take 1 tablet (5 mg) by mouth daily    Overactive bladder       * oxybutynin 10 MG 24 hr tablet    DITROPAN XL    90 tablet    Take 1 tablet (10 mg) by mouth daily    Overactive bladder       pantoprazole 20 MG EC tablet    PROTONIX    180 tablet    Take 2 tablets (40 mg) by mouth daily    Gastroesophageal reflux disease, esophagitis presence not specified       pramipexole 0.5 MG tablet    MIRAPEX    90 tablet    TAKE ONE TABLET BY MOUTH AT BEDTIME    Restless leg syndrome       * ranitidine 150 MG tablet    ZANTAC    60 tablet    Take 2 tablets (300 mg) by mouth At Bedtime    Gastroesophageal reflux disease, esophagitis presence not specified       * ranitidine 150 MG tablet    ZANTAC    180 tablet    Take 2 tablets (300 mg) by mouth At Bedtime    Gastroesophageal reflux disease, esophagitis presence  not specified       * Notice:  This list has 6 medication(s) that are the same as other medications prescribed for you. Read the directions carefully, and ask your doctor or other care provider to review them with you.

## 2018-03-12 NOTE — PROGRESS NOTES
"  SUBJECTIVE:   Christiana Sal is a 86 year old female who presents to clinic today for the following health issues:      Patient is here today to follow up with the leg issues she has been having. Her legs have been giving out on her and she will feel like she is going to collapse. On Friday and Saturday she was having some pain in her buttock area on the left side. She had to use a walker otherwise she couldn't walk.     -She also thinks the Oxybutynin isnt really helping much. Has still been getting up to go to the bathroom every hour in the middle of the night.     On Friday and Saturday she had significant pain in the middle of her left butt cheek - hard to walk on it. Had to use a cane  That has resolved and has not returned  Continues to have weakness in her left leg - will feel like her entire left leg will give out. Not walking without a cane anymore  Pain on the outside of her hip has improved with the injection    Also has a sore throat  Coughing in \"spurts\"   No sinus pressure/congestion  No fevers    Has not noticed any improvement on the oxybutynin  Still getting up frequently overnight to urinate  Denies any issues during the day as she says she can usually sense when she has to go so doesn't have to \"rush\" as much as overnight          Problem list and histories reviewed & adjusted, as indicated.  Additional history: as documented    Current Outpatient Prescriptions   Medication Sig Dispense Refill     oxybutynin (DITROPAN XL) 10 MG 24 hr tablet Take 1 tablet (10 mg) by mouth daily 90 tablet 1     citalopram (CELEXA) 20 MG tablet Take 1 tablet (20 mg) by mouth daily 90 tablet 1     pramipexole (MIRAPEX) 0.5 MG tablet TAKE ONE TABLET BY MOUTH AT BEDTIME 90 tablet 2     losartan (COZAAR) 25 MG tablet TAKE ONE TABLET BY MOUTH ONCE DAILY 90 tablet 2     atorvastatin (LIPITOR) 40 MG tablet TAKE ONE TABLET BY MOUTH EVERY DAY 90 tablet 3     metoprolol (TOPROL-XL) 25 MG 24 hr tablet Take 0.5 tablets " (12.5 mg) by mouth daily 90 tablet 3     oxybutynin (DITROPAN-XL) 5 MG 24 hr tablet Take 1 tablet (5 mg) by mouth daily 30 tablet 4     pantoprazole (PROTONIX) 20 MG EC tablet Take 2 tablets (40 mg) by mouth daily 180 tablet 2     ranitidine (ZANTAC) 150 MG tablet Take 2 tablets (300 mg) by mouth At Bedtime 180 tablet 3     ranitidine (ZANTAC) 150 MG tablet Take 2 tablets (300 mg) by mouth At Bedtime 60 tablet 0     levothyroxine (SYNTHROID/LEVOTHROID) 75 MCG tablet Take 1 tablet (75 mcg) by mouth daily 90 tablet 1     ondansetron (ZOFRAN) 4 MG tablet Take 1 tablet (4 mg) by mouth every 6 hours as needed for nausea 18 tablet 3     Multiple Vitamins-Minerals (OCUVITE PO) Take 1 tablet by mouth daily.       ASPIRIN 81 MG PO TABS Take 2 tablets by mouth every evening. *.        CALCIUM 600 + D OR 1 tablet by mouth 2 times per day       FISH OIL OR 1 daily       benzonatate (TESSALON) 100 MG capsule Take 1 capsule (100 mg) by mouth 3 times daily as needed for cough (Patient not taking: Reported on 3/2/2018) 42 capsule 0     albuterol (PROAIR HFA/PROVENTIL HFA/VENTOLIN HFA) 108 (90 BASE) MCG/ACT Inhaler Inhale 2 puffs into the lungs every 6 hours as needed for shortness of breath / dyspnea or wheezing (Patient not taking: Reported on 3/2/2018) 1 Inhaler 0     albuterol (PROAIR HFA/PROVENTIL HFA/VENTOLIN HFA) 108 (90 BASE) MCG/ACT Inhaler Inhale 2 puffs into the lungs every 6 hours as needed for shortness of breath / dyspnea or wheezing (Patient not taking: Reported on 3/2/2018) 1 Inhaler 0     nitroglycerin (NITROSTAT) 0.4 MG SL tablet Place 1 tablet (0.4 mg) under the tongue every 5 minutes as needed for chest pain (call your doctor if you take a third dose) (Patient not taking: Reported on 3/2/2018) 25 tablet 3     Allergies   Allergen Reactions     Demerol Nausea     Lisinopril Cough     Sulfa Drugs Other (See Comments)     Questionable itching reported by patient       Reviewed and updated as needed this visit by  "clinical staff       Reviewed and updated as needed this visit by Provider         ROS:  Remainder of ROS obtained and found to be negative other than that which was documented above      OBJECTIVE:     /74  Pulse (!) 49  Temp 97  F (36.1  C)  Ht 5' 6\" (1.676 m)  Wt 164 lb (74.4 kg)  BMI 26.47 kg/m2  Body mass index is 26.47 kg/(m^2).  GENERAL: healthy, alert and no distress  BACK: nontender to palpation, normal ROM  EYES: PERRL  ENT: ears normal, oropharynx without erythema or exudate  CV: RRR  LUNGS: CTA b/l    Diagnostic Test Results:  UA RESULTS:  Recent Labs   Lab Test  03/12/18   1531   COLOR  Yellow   APPEARANCE  Clear   URINEGLC  Negative   URINEBILI  Negative   URINEKETONE  Negative   SG  1.015   UBLD  Small*   URINEPH  5.5   PROTEIN  Negative   UROBILINOGEN  0.2   NITRITE  Negative   LEUKEST  Small*   RBCU  O - 2   WBCU  0 - 5     Rapid strep: negative    ASSESSMENT/PLAN:     (M16.12) Primary osteoarthritis of left hip  (primary encounter diagnosis)  Comment: b/l osteoarthritis. Previous injection in left hip but advised to consider surgery referral. Patient would like to see a Dr. Cifuentes with [facility] as he was recommended by several people she knows  Plan: ORTHOPEDICS ADULT REFERRAL            (R29.492) Weakness of left lower extremity  Comment: ongoing. She has known osteo of both hips and cannot exclude that the issue isn't arising from her spine. Also had trochanteric bursitis although that has seemingly improved since the injection.  Plan: Would like her to see the surgeon to discuss hip. I am considering an MRI of her back as well but can see when she is scheduled with the surgeon and perhaps discuss with them if they would also want an MRI of her hip  Patient will schedule the surgery consult and notify me when it has been made    (R39.15) Urinary urgency  Comment: consider referral to urology. Patient would like to wait until she figures out leg issues first  Plan: UA with " Microscopic reflex to Culture, Urine         Culture Aerobic Bacterial            (R07.0) Throat pain  Comment: rapid negative. Continue symptomatic management for now  Plan: Strep, Rapid Screen, Beta strep group A culture                Kelly Matthew PA-C  [facility]

## 2018-03-12 NOTE — PATIENT INSTRUCTIONS
Call [facility] and schedule an appointment with Dr. Cifuentes    Call clinic and let us know when that appointment is scheduled for - I can get your records sent to them and discuss whether or not they want any imaging done before the visit

## 2018-03-12 NOTE — NURSING NOTE
"Chief Complaint   Patient presents with     Leg Pain       Initial /74  Pulse (!) 49  Temp 97  F (36.1  C)  Ht 5' 6\" (1.676 m)  Wt 164 lb (74.4 kg)  BMI 26.47 kg/m2 Estimated body mass index is 26.47 kg/(m^2) as calculated from the following:    Height as of this encounter: 5' 6\" (1.676 m).    Weight as of this encounter: 164 lb (74.4 kg).  Medication Reconciliation: complete     Viraj Lofton, SISSY    "

## 2018-03-13 ENCOUNTER — TELEPHONE (OUTPATIENT)
Dept: FAMILY MEDICINE | Facility: CLINIC | Age: 83
End: 2018-03-13

## 2018-03-13 LAB
BACTERIA SPEC CULT: NORMAL
Lab: NORMAL
SPECIMEN SOURCE: NORMAL
SPECIMEN SOURCE: NORMAL

## 2018-03-13 NOTE — TELEPHONE ENCOUNTER
Reason for Call:  Other appointment    Detailed comments: Christiana LEFT MESSAGE:  She has an appointment with Dr. Mckay on Wednesday March 28 @ 1:30pm.  Wanted to let you know.  If you have questions please call. Thank you..Consuelo Hawthorne    Phone Number Patient can be reached at: Home number on file [phone] (home)    Best Time: any time    Can we leave a detailed message on this number? YES    Call taken on 3/13/2018 at 8:00 AM by Consuelo Hawthorne

## 2018-03-28 ENCOUNTER — TRANSFERRED RECORDS (OUTPATIENT)
Dept: HEALTH INFORMATION MANAGEMENT | Facility: CLINIC | Age: 83
End: 2018-03-28

## 2018-04-06 ENCOUNTER — THERAPY VISIT (OUTPATIENT)
Dept: PHYSICAL THERAPY | Facility: CLINIC | Age: 83
End: 2018-04-06
Payer: MEDICARE

## 2018-04-06 DIAGNOSIS — M25.552 HIP PAIN, LEFT: Primary | ICD-10-CM

## 2018-04-06 PROBLEM — M79.605 PAIN OF LEFT LOWER EXTREMITY: Status: RESOLVED | Noted: 2017-08-03 | Resolved: 2018-04-06

## 2018-04-06 PROCEDURE — 97140 MANUAL THERAPY 1/> REGIONS: CPT | Mod: GP | Performed by: PHYSICAL THERAPIST

## 2018-04-06 PROCEDURE — G8979 MOBILITY GOAL STATUS: HCPCS | Mod: GP | Performed by: PHYSICAL THERAPIST

## 2018-04-06 PROCEDURE — 97162 PT EVAL MOD COMPLEX 30 MIN: CPT | Mod: GP | Performed by: PHYSICAL THERAPIST

## 2018-04-06 PROCEDURE — 97110 THERAPEUTIC EXERCISES: CPT | Mod: GP | Performed by: PHYSICAL THERAPIST

## 2018-04-06 PROCEDURE — G8978 MOBILITY CURRENT STATUS: HCPCS | Mod: GP | Performed by: PHYSICAL THERAPIST

## 2018-04-06 NOTE — PROGRESS NOTES
[facility] Initial Evaluation  Subjective:  Patient is a 86 year old female presenting with rehab left hip hpi. The history is provided by the patient.   Christiana Sal is a 86 year old female with a left hip condition.  Condition occurred with:  Insidious onset.  Condition occurred: for unknown reasons.  This is a recurrent condition  Worsened a few months ago but has had over a year ago. Saw MD most recently for this on 3/2/18.  Her left hip and leg have been increasingly painful.  She notices her left leg can give out when she gets up to walk but the pain can come at any time.  Sometimes it is really bad in shin area but not as much today..    Patient reports pain:  Joint.  Radiates to:  Thigh and lower leg (knee and shin radiating from hip).  Pain is described as sharp and aching and is intermittent and reported as 3/10.  Associated symptoms:  Buckling/giving out (left leg gives out, sometimes with no pain). Pain is worse during the day.  Symptoms are exacerbated by weight bearing, sitting and walking and relieved by rest.  Since onset symptoms are gradually worsening.  Special tests:  X-ray.  Previous treatment includes physical therapy (also had injection that helpe a few weeks).  There was moderate improvement following previous treatment.  General health as reported by patient is fair.  Pertinent medical history includes:  High blood pressure, heart problems and osteoarthritis.    Other surgeries include:  Heart surgery.  Current medications:  Cardiac medication, thyroid medication, pain medication and anti-depressants.  Current occupation is Retired, uses cane or walker for walking and is doing less because of the pain.            Red flags:  None as reported by the patient.                        Objective:    Gait:    Gait Type:  Antalgic   Assistive Devices:  Cane      Flexibility/Screens:   Positive screens:  Lumbar (flexion exercises feel good but left hip flexion is  limited)                                                     Hip Evaluation  Hip PROM:    Flexion: Left: 90 deg, pain in posterior hip - severe   Right: 100 deg          External Rotation: Left: 45 deg, can put foot over opposite knee in sitting without pain   Right:              Hip Strength:    Flexion:   Left: 5/5   Pain:                      Extension:  Left: 4+/5  +  Pain:  Abduction:  Left: 4/5    +   Pain:        Knee Flexion:  Left: 5/5   Pain:  Knee Extension:  Left: 5/5   Pain:          Hip Palpation:    Left hip tenderness present at:   Ischial Tuberosity (and hamstring); Greater Trachanter; Adductors and Gluteus Medius             Has normal strength and ROM of left ankle dorsiflexion and plantarflexion  General     ROS    Assessment/Plan:    Patient is a 86 year old female with left side hip complaints.    Patient has the following significant findings with corresponding treatment plan.                Diagnosis 1:  Left hip/leg pain  Pain -  manual therapy  Decreased ROM/flexibility - manual therapy and therapeutic exercise  Decreased strength - therapeutic exercise and therapeutic activities  Impaired muscle performance - neuro re-education    Therapy Evaluation Codes:   1) History comprised of:   Personal factors that impact the plan of care:      Age, Past/current experiences and Time since onset of symptoms.    Comorbidity factors that impact the plan of care are:      Heart problems, Osteoarthritis, Pain at night/rest and Weakness.     Medications impacting care: None.  2) Examination of Body Systems comprised of:   Body structures and functions that impact the plan of care:      Hip and Lumbar spine.   Activity limitations that impact the plan of care are:      Bending, Cooking, Sitting, Walking and Laying down.  3) Clinical presentation characteristics are:   Unstable/Unpredictable.  4) Decision-Making    Moderate complexity using standardized patient assessment instrument and/or measureable  assessment of functional outcome.  Cumulative Therapy Evaluation is: Moderate complexity.    Previous and current functional limitations:  (See Goal Flow Sheet for this information)    Short term and Long term goals: (See Goal Flow Sheet for this information)     Communication ability:  Patient appears to be able to clearly communicate and understand verbal and written communication and follow directions correctly.  Treatment Explanation - The following has been discussed with the patient:   RX ordered/plan of care  Anticipated outcomes  Possible risks and side effects  This patient would benefit from PT intervention to resume normal activities.   Rehab potential is good.    Frequency:  1 X week, once daily  Duration:  for 6 weeks  Discharge Plan:  Achieve all LTG.  Independent in home treatment program.  Reach maximal therapeutic benefit.    Please refer to the daily flowsheet for treatment today, total treatment time and time spent performing 1:1 timed codes.

## 2018-04-06 NOTE — MR AVS SNAPSHOT
After Visit Summary   4/6/2018    Christiana Sal    MRN: [id number]           Patient Information     Date Of Birth          10/25/1931        Visit Information        Provider Department      4/6/2018 8:50 AM Tara Montoya, PT [facility]        Today's Diagnoses     Hip pain, left    -  1       Follow-ups after your visit        Your next 10 appointments already scheduled     Apr 16, 2018 12:55 PM CDT   AMRY KAY Spine with Queenie Meehan PTA   [facility] ([facility])    [address] [address]   [phone]            Apr 30, 2018  8:20 AM CDT   MARY KAY Spine with Tara Montoya PT   [facility] ([facility])    [address] [address] [phone]            May 07, 2018  9:30 AM CDT   Remote PPM Check with [facility]   [facility] ([facility])    [address] [address] [phone]           This appointment is for a remote check of your pacemaker.  This is not an appointment at the office.              Who to contact     If you have questions or need follow up information about today's clinic visit or your schedule please contact [facility] directly at [phone].  Normal or non-critical lab and imaging results will be communicated to you by Wheretogethart, letter or phone within 4 business days after the clinic has received the results. If you do not hear from us within 7 days, please contact the clinic through Wheretogethart or phone. If you have a critical or abnormal lab result, we will notify you by phone as soon as possible.  Submit refill requests through Decision Lens or call your pharmacy and they will forward the refill request to us. Please allow 3 business days for your refill to be completed.          Additional Information About Your Visit        WheretogetharApplits Information     [employer] Lens gives you secure access to your electronic  health record. If you see a primary care provider, you can also send messages to your care team and make appointments. If you have questions, please call your primary care clinic.  If you do not have a primary care provider, please call [phone] and they will assist you.        Care EveryWhere ID     This is your Care EveryWhere ID. This could be used by other organizations to access your [facility] medical records  [id number]         Blood Pressure from Last 3 Encounters:   03/12/18 122/74   03/02/18 132/76   12/11/17 138/86    Weight from Last 3 Encounters:   03/12/18 74.4 kg (164 lb)   03/02/18 76.2 kg (168 lb)   12/11/17 74.9 kg (165 lb 3.2 oz)              We Performed the Following     MARY KAY CERT REPORT     Manual Ther Tech, 1+Regions, EA 15 min     PT Eval, Moderate Complexity (85632)     Therapeutic Exercises        Primary Care Provider Office Phone # Fax #    Kelly Matthew PA-C [phone] [address] [facility] [address]        Equal Access to Services     [facility]: Hadii aad ku hadasho Soomaali, waaxda luqadaha, qaybta kaalmada polina, danya brian . So [facility] [phone].    ATENCIÓN: Si habla español, tiene a ozuna disposición servicios gratuitos de asistencia lingüística. Moisés al [phone].    We comply with applicable federal civil rights laws and [place] laws. We do not discriminate on the basis of race, color, national origin, age, disability, sex, sexual orientation, or gender identity.            Thank you!     Thank you for choosing [facility]  for your care. Our goal is always to provide you with excellent care. Hearing back from our patients is one way we can continue to improve our services. Please take a few minutes to complete the written survey that you may receive in the mail after your visit with us. Thank you!             Your Updated Medication List - Protect others around you: Learn how  to safely use, store and throw away your medicines at www.disposemymeds.org.          This list is accurate as of 4/6/18  1:27 PM.  Always use your most recent med list.                   Brand Name Dispense Instructions for use Diagnosis    * albuterol 108 (90 BASE) MCG/ACT Inhaler    PROAIR HFA/PROVENTIL HFA/VENTOLIN HFA    1 Inhaler    Inhale 2 puffs into the lungs every 6 hours as needed for shortness of breath / dyspnea or wheezing    Acute bronchitis with symptoms > 10 days       * albuterol 108 (90 BASE) MCG/ACT Inhaler    PROAIR HFA/PROVENTIL HFA/VENTOLIN HFA    1 Inhaler    Inhale 2 puffs into the lungs every 6 hours as needed for shortness of breath / dyspnea or wheezing    Acute bronchitis with symptoms > 10 days       aspirin 81 MG tablet      Take 2 tablets by mouth every evening. *.        atorvastatin 40 MG tablet    LIPITOR    90 tablet    TAKE ONE TABLET BY MOUTH EVERY DAY    CAD S/P percutaneous coronary angioplasty       benzonatate 100 MG capsule    TESSALON    42 capsule    Take 1 capsule (100 mg) by mouth 3 times daily as needed for cough    Cough       CALCIUM 600 + D PO      1 tablet by mouth 2 times per day        citalopram 20 MG tablet    celeXA    90 tablet    Take 1 tablet (20 mg) by mouth daily    Major depressive disorder, recurrent episode, mild (H)       FISH OIL PO      1 daily        levothyroxine 75 MCG tablet    SYNTHROID/LEVOTHROID    90 tablet    Take 1 tablet (75 mcg) by mouth daily    Hypothyroidism, unspecified type       losartan 25 MG tablet    COZAAR    90 tablet    TAKE ONE TABLET BY MOUTH ONCE DAILY    Essential hypertension with goal blood pressure less than 140/90       metoprolol succinate 25 MG 24 hr tablet    TOPROL-XL    90 tablet    Take 0.5 tablets (12.5 mg) by mouth daily    Essential hypertension with goal blood pressure less than 140/90       nitroGLYcerin 0.4 MG sublingual tablet    NITROSTAT    25 tablet    Place 1 tablet (0.4 mg) under the tongue every 5  minutes as needed for chest pain (call your doctor if you take a third dose)    Atherosclerosis of native coronary artery of native heart without angina pectoris       OCUVITE PO      Take 1 tablet by mouth daily.        ondansetron 4 MG tablet    ZOFRAN    18 tablet    Take 1 tablet (4 mg) by mouth every 6 hours as needed for nausea    Nausea       * oxybutynin 5 MG 24 hr tablet    DITROPAN-XL    30 tablet    Take 1 tablet (5 mg) by mouth daily    Overactive bladder       * oxybutynin 10 MG 24 hr tablet    DITROPAN XL    90 tablet    Take 1 tablet (10 mg) by mouth daily    Overactive bladder       pantoprazole 20 MG EC tablet    PROTONIX    180 tablet    Take 2 tablets (40 mg) by mouth daily    Gastroesophageal reflux disease, esophagitis presence not specified       pramipexole 0.5 MG tablet    MIRAPEX    90 tablet    TAKE ONE TABLET BY MOUTH AT BEDTIME    Restless leg syndrome       * ranitidine 150 MG tablet    ZANTAC    60 tablet    Take 2 tablets (300 mg) by mouth At Bedtime    Gastroesophageal reflux disease, esophagitis presence not specified       * ranitidine 150 MG tablet    ZANTAC    180 tablet    Take 2 tablets (300 mg) by mouth At Bedtime    Gastroesophageal reflux disease, esophagitis presence not specified       * Notice:  This list has 6 medication(s) that are the same as other medications prescribed for you. Read the directions carefully, and ask your doctor or other care provider to review them with you.

## 2018-04-06 NOTE — LETTER
DEPARTMENT OF HEALTH AND HUMAN SERVICES  CENTERS FOR MEDICARE & MEDICAID SERVICES    PLAN/UPDATED PLAN OF PROGRESS FOR OUTPATIENT REHABILITATION    PATIENTS NAME:  Christiana Sal     : 10/25/1931    PROVIDER NUMBER:    [id number]    [id number]    PROVIDER NAME: [facility] (f. [employer]) [facility]    MEDICAL RECORD NUMBER: [id number]     START OF CARE DATE:  SOC Date: 18   TYPE:  PT    PRIMARY/TREATMENT DIAGNOSIS: (Pertinent Medical Diagnosis)  Hip pain, left    VISITS FROM START OF CARE:  Rxs Used: 1     [facility] Initial Evaluation  Subjective:  Patient is a 86 year old female presenting with rehab left hip hpi. The history is provided by the patient.   Christiana Sal is a 86 year old female with a left hip condition.  Condition occurred with:  Insidious onset.  Condition occurred: for unknown reasons.  This is a recurrent condition  Worsened a few months ago but has had over a year ago. Saw MD most recently for this on 3/2/18.  Her left hip and leg have been increasingly painful.  She notices her left leg can give out when she gets up to walk but the pain can come at any time.  Sometimes it is really bad in shin area but not as much today..    Patient reports pain:  Joint.  Radiates to:  Thigh and lower leg (knee and shin radiating from hip).  Pain is described as sharp and aching and is intermittent and reported as 3/10.  Associated symptoms:  Buckling/giving out (left leg gives out, sometimes with no pain). Pain is worse during the day.  Symptoms are exacerbated by weight bearing, sitting and walking and relieved by rest.  Since onset symptoms are gradually worsening.  Special tests:  X-ray.  Previous treatment includes physical therapy (also had injection that helpe a few weeks).  There was moderate improvement following previous treatment.  General health as reported by patient is fair.  Pertinent medical history includes:  High blood pressure, heart problems and  osteoarthritis.    Other surgeries include:  Heart surgery.  Current medications:  Cardiac medication, thyroid medication, pain medication and anti-depressants.  Current occupation is Retired, uses cane or walker for walking and is doing less because of the pain. Red flags:  None as reported by the patient.           Objective:    Gait:    Gait Type:  Antalgic   Assistive Devices:  Cane  Flexibility/Screens:   Positive screens:  Lumbar (flexion exercises feel good but left hip flexion is limited)  Hip Evaluation  Hip PROM:    Flexion: Left: 90 deg, pain in posterior hip - severe   Right: 100 deg  External Rotation: Left: 45 deg, can put foot over opposite knee in sitting without pain   Right:  Hip Strength:    Flexion:   Left: 5/5   Pain:                   Extension:  Left: 4+/5  +  Pain:  Abduction:  Left: 4/5    +   Pain:  Knee Flexion:  Left: 5/5   Pain:  Knee Extension:  Left: 5/5   Pain:  Hip Palpation:    Left hip tenderness present at:   Ischial Tuberosity (and hamstring); Greater Trachanter; Adductors and Gluteus Medius    Has normal strength and ROM of left ankle dorsiflexion and plantarflexion  General     Assessment/Plan:    Patient is a 86 year old female with left side hip complaints.    Patient has the following significant findings with corresponding treatment plan.                Diagnosis 1:  Left hip/leg pain  Pain -  manual therapy  Decreased ROM/flexibility - manual therapy and therapeutic exercise  Decreased strength - therapeutic exercise and therapeutic activities  Impaired muscle performance - neuro re-education    Therapy Evaluation Codes:   1) History comprised of:   Personal factors that impact the plan of care:      Age, Past/current experiences and Time since onset of symptoms.    Comorbidity factors that impact the plan of care are:      Heart problems, Osteoarthritis, Pain at night/rest and Weakness.     Medications impacting care: None.  2) Examination of Body Systems comprised  "of:   Body structures and functions that impact the plan of care:      Hip and Lumbar spine.   Activity limitations that impact the plan of care are:      Bending, Cooking, Sitting, Walking and Laying down.  3) Clinical presentation characteristics are:   Unstable/Unpredictable.  4) Decision-Making    Moderate complexity using standardized patient assessment instrument and/or measureable assessment of functional outcome.  Cumulative Therapy Evaluation is: Moderate complexity.    Previous and current functional limitations:  (See Goal Flow Sheet for this information)    Short term and Long term goals: (See Goal Flow Sheet for this information)     Communication ability:  Patient appears to be able to clearly communicate and understand verbal and written communication and follow directions correctly.  Treatment Explanation - The following has been discussed with the patient:   RX ordered/plan of care  Anticipated outcomes  Possible risks and side effects  This patient would benefit from PT intervention to resume normal activities.   Rehab potential is good.    Frequency:  1 X week, once daily  Duration:  for 6 weeks  Discharge Plan:  Achieve all LTG.  Independent in home treatment program.  Reach maximal therapeutic benefit.    Please refer to the daily flowsheet for treatment today, total treatment time and time spent performing 1:1 timed codes.         Caregiver Signature/Credentials _____________________________ Date ________       Treating Provider: Tara Montoya PT   I have reviewed and certified the need for these services and plan of treatment while under my care.        PHYSICIAN'S SIGNATURE:   _________________________________________  Date___________   Russell Cifuentes    Certification period:  Beginning of Cert date period: 04/06/18 to  End of Cert period date: 07/04/18     Functional Level Progress Report: Please see attached \"Goal Flow sheet for Functional level.\"    ____X____ Continue Services or       " ________ DC Services                Service dates: From  SOC Date: 04/06/18 date to present

## 2018-04-10 ENCOUNTER — HOSPITAL ENCOUNTER (EMERGENCY)
Facility: CLINIC | Age: 83
Discharge: HOME | End: 2018-04-10
Attending: EMERGENCY MEDICINE | Admitting: EMERGENCY MEDICINE
Payer: MEDICARE

## 2018-04-10 VITALS
TEMPERATURE: 98 F | RESPIRATION RATE: 16 BRPM | HEIGHT: 66 IN | DIASTOLIC BLOOD PRESSURE: 77 MMHG | HEART RATE: 64 BPM | OXYGEN SATURATION: 97 % | SYSTOLIC BLOOD PRESSURE: 183 MMHG

## 2018-04-10 DIAGNOSIS — M79.605 PAIN OF LEFT LOWER EXTREMITY: ICD-10-CM

## 2018-04-10 DIAGNOSIS — M16.10 ARTHRITIS PAIN OF HIP: ICD-10-CM

## 2018-04-10 PROCEDURE — A9270 NON-COVERED ITEM OR SERVICE: HCPCS | Mod: GY | Performed by: EMERGENCY MEDICINE

## 2018-04-10 PROCEDURE — 25000132 ZZH RX MED GY IP 250 OP 250 PS 637: Mod: GY | Performed by: EMERGENCY MEDICINE

## 2018-04-10 PROCEDURE — 99283 EMERGENCY DEPT VISIT LOW MDM: CPT | Mod: Z6 | Performed by: EMERGENCY MEDICINE

## 2018-04-10 PROCEDURE — 99283 EMERGENCY DEPT VISIT LOW MDM: CPT

## 2018-04-10 RX ORDER — ACETAMINOPHEN 500 MG
1000 TABLET ORAL EVERY 8 HOURS PRN
Qty: 30 TABLET | Refills: 0 | COMMUNITY
Start: 2018-04-10 | End: 2019-05-07 | Stop reason: DRUGHIGH

## 2018-04-10 RX ORDER — IBUPROFEN 400 MG/1
400 TABLET, FILM COATED ORAL ONCE
Status: COMPLETED | OUTPATIENT
Start: 2018-04-10 | End: 2018-04-10

## 2018-04-10 RX ORDER — IBUPROFEN 200 MG
400 TABLET ORAL EVERY 8 HOURS PRN
Qty: 30 TABLET | Refills: 0 | COMMUNITY
Start: 2018-04-10 | End: 2018-04-12

## 2018-04-10 RX ADMIN — IBUPROFEN 400 MG: 400 TABLET ORAL at 02:00

## 2018-04-10 ASSESSMENT — ENCOUNTER SYMPTOMS
ABDOMINAL PAIN: 0
ARTHRALGIAS: 1
CHEST TIGHTNESS: 0
SHORTNESS OF BREATH: 0
WOUND: 0
APPETITE CHANGE: 0
FATIGUE: 0
ACTIVITY CHANGE: 1
HEADACHES: 0
WEAKNESS: 0
COUGH: 0
NUMBNESS: 0
FEVER: 0
LIGHT-HEADEDNESS: 0

## 2018-04-10 NOTE — DISCHARGE INSTRUCTIONS
What Is Arthritis?  Arthritis is a disease that affects the joints. Joints are the parts where bones meet and move. It can affect any joint in your body. There are many types of arthritis, including osteoarthritis, rheumatoid arthritis, gout and lupus. If your symptoms are mild, medicines may be enough to ease pain and swelling. For more severe arthritis, you may need surgery to improve the condition of the joint or replace part or all of the joint.                  What causes arthritis?  Cartilage is a smooth substance that protects the ends of your bones and provides cushioning. When you have arthritis, this cartilage breaks down and can no longer protect your bones. This can happen from an autoimmune disease. Or it can happen from wear and tear, infections, or trauma. The bones rub against each other, causing pain and swelling. Over time, small pieces of rough or splintered bone (bone spurs) may develop. The joint's range of motion can become limited.  Symptoms  Some of the more common symptoms of arthritis include:    Joint pain and stiffness. Pain and stiffness get worse with long periods of rest or using a joint too long or too hard.    Joints that have lost normal shape and motion    Tender, inflamed joints. They may look red and feel warm.    Grinding or popping noise with joint movement    Feeling tired all the time  Reducing symptoms  Following a healthy lifestyle by losing weight and exercising can help ease symptoms of osteoarthritis. Strengthening muscles around the affected joint may will reduce the strain on the joint. Hot and cold packs may help. Over-the-counter and prescription medicines can be very helpful for arthritis. Talk with your healthcare provider about the best treatments for your condition.   Date Last Reviewed: 5/1/2017 2000-2017 [employer]. [address] [place] [address], [place] [address]. All rights reserved. This information is not intended as a substitute for  professional medical care. Always follow your healthcare professional's instructions.

## 2018-04-10 NOTE — ED NOTES
Patient presents via private vehicle for complaints of left lower back, hip and leg pain.  Pt has been having problems for the past 2-3 months.  She has seen her primary MD who ordered a MRI.  This has not been done.  She followed up with a specialist who reviewed her x-rays and had her start PT.  She has had one evaluation and given some take home exercises.  Pt presents tonight with sudden increase pain while sitting.  Pt does state that she has been up running errands all day long with no issue.  Patient is requesting pain meds to help with the pain.  She has taken tylenol at home.

## 2018-04-10 NOTE — ED AVS SNAPSHOT
[facility] Emergency Department    [address] [address]    Phone:  [phone]    Fax:  [phone]                                       Christiana Sal   MRN: [id number]    Department:  [facility] Emergency Department   Date of Visit:  4/10/2018           After Visit Summary Signature Page     I have received my discharge instructions, and my questions have been answered. I have discussed any challenges I see with this plan with the nurse or doctor.    ..........................................................................................................................................  Patient/Patient Representative Signature      ..........................................................................................................................................  Patient Representative Print Name and Relationship to Patient    ..................................................               ................................................  Date                                            Time    ..........................................................................................................................................  Reviewed by Signature/Title    ...................................................              ..............................................  Date                                                            Time

## 2018-04-10 NOTE — ED AVS SNAPSHOT
[facility] Emergency Department    [address] [place] [address]    Phone:  [phone]    Fax:  [phone]                                       Christiana Sal   MRN: [id number]    Department:  [facility] Emergency Department   Date of Visit:  4/10/2018           Patient Information     Date Of Birth          10/25/1931        Your diagnoses for this visit were:     Arthritis pain of hip     Pain of left lower extremity        You were seen by Mayo Cordova MD.      Follow-up Information     Follow up with Kelly Matthew PA-C. Schedule an appointment as soon as possible for a visit in 2 days.    Specialty:  Physician Assistant    Why:  For follow up    Contact information:    [address] [facility] [address]  [phone]          Discharge Instructions         What Is Arthritis?  Arthritis is a disease that affects the joints. Joints are the parts where bones meet and move. It can affect any joint in your body. There are many types of arthritis, including osteoarthritis, rheumatoid arthritis, gout and lupus. If your symptoms are mild, medicines may be enough to ease pain and swelling. For more severe arthritis, you may need surgery to improve the condition of the joint or replace part or all of the joint.                  What causes arthritis?  Cartilage is a smooth substance that protects the ends of your bones and provides cushioning. When you have arthritis, this cartilage breaks down and can no longer protect your bones. This can happen from an autoimmune disease. Or it can happen from wear and tear, infections, or trauma. The bones rub against each other, causing pain and swelling. Over time, small pieces of rough or splintered bone (bone spurs) may develop. The joint's range of motion can become limited.  Symptoms  Some of the more common symptoms of arthritis include:    Joint pain and stiffness. Pain and stiffness get worse with long periods  of rest or using a joint too long or too hard.    Joints that have lost normal shape and motion    Tender, inflamed joints. They may look red and feel warm.    Grinding or popping noise with joint movement    Feeling tired all the time  Reducing symptoms  Following a healthy lifestyle by losing weight and exercising can help ease symptoms of osteoarthritis. Strengthening muscles around the affected joint may will reduce the strain on the joint. Hot and cold packs may help. Over-the-counter and prescription medicines can be very helpful for arthritis. Talk with your healthcare provider about the best treatments for your condition.   Date Last Reviewed: 5/1/2017 2000-2017 [employer]. [address] [place] [address]. All rights reserved. This information is not intended as a substitute for professional medical care. Always follow your healthcare professional's instructions.          Discharge References/Attachments     OSTEOARTHRITIS (ENGLISH)    OSTEOARTHRITIS: TIPS FOR DAILY LIVING (ENGLISH)      Your next 10 appointments already scheduled     Apr 16, 2018 12:55 PM CDT   [facility]   [facility] ([facility])    [address] [facility] [address]   [phone]            Apr 30, 2018  8:20 AM CDT   MARY KAY Spine with Tara Montoya PT   [facility] ([facility])    [address] [facility] [address]   [phone]            May 07, 2018  9:30 AM CDT   Remote PPM Check with [facility]   [facility] ([facility])    [address] [facility] [address] [phone]           This appointment is for a remote check of your pacemaker.  This is not an appointment at the office.              24 Hour Appointment Hotline       To make an appointment at any [facility] clinic, call [phone] ([phone]). If you don't have a family doctor or clinic, we will help you find one.  [facility] are conveniently located to serve the needs of you and your family.             Review of your medicines      START taking        Dose / Directions Last dose taken    acetaminophen 500 MG tablet   Commonly known as:  TYLENOL   Dose:  1000 mg   Quantity:  30 tablet        Take 2 tablets (1,000 mg) by mouth every 8 hours as needed for mild pain   Refills:  0        ibuprofen 200 MG tablet   Commonly known as:  ADVIL/MOTRIN   Dose:  400 mg   Quantity:  30 tablet        Take 2 tablets (400 mg) by mouth every 8 hours as needed for mild pain   Refills:  0          Our records show that you are taking the medicines listed below. If these are incorrect, please call your family doctor or clinic.        Dose / Directions Last dose taken    * albuterol 108 (90 BASE) MCG/ACT Inhaler   Commonly known as:  PROAIR HFA/PROVENTIL HFA/VENTOLIN HFA   Dose:  2 puff   Quantity:  1 Inhaler        Inhale 2 puffs into the lungs every 6 hours as needed for shortness of breath / dyspnea or wheezing   Refills:  0        * albuterol 108 (90 BASE) MCG/ACT Inhaler   Commonly known as:  PROAIR HFA/PROVENTIL HFA/VENTOLIN HFA   Dose:  2 puff   Quantity:  1 Inhaler        Inhale 2 puffs into the lungs every 6 hours as needed for shortness of breath / dyspnea or wheezing   Refills:  0        aspirin 81 MG tablet   Dose:  2 tablet        Take 2 tablets by mouth every evening. *.   Refills:  0        atorvastatin 40 MG tablet   Commonly known as:  LIPITOR   Quantity:  90 tablet        TAKE ONE TABLET BY MOUTH EVERY DAY   Refills:  3        benzonatate 100 MG capsule   Commonly known as:  TESSALON   Dose:  100 mg   Quantity:  42 capsule        Take 1 capsule (100 mg) by mouth 3 times daily as needed for cough   Refills:  0        CALCIUM 600 + D PO        1 tablet by mouth 2 times per day   Refills:  0        citalopram 20 MG tablet   Commonly known as:  celeXA   Dose:  20 mg   Quantity:  90 tablet        Take 1 tablet (20 mg) by mouth  daily   Refills:  1        FISH OIL PO        1 daily   Refills:  0        levothyroxine 75 MCG tablet   Commonly known as:  SYNTHROID/LEVOTHROID   Dose:  75 mcg   Quantity:  90 tablet        Take 1 tablet (75 mcg) by mouth daily   Refills:  1        losartan 25 MG tablet   Commonly known as:  COZAAR   Quantity:  90 tablet        TAKE ONE TABLET BY MOUTH ONCE DAILY   Refills:  2        metoprolol succinate 25 MG 24 hr tablet   Commonly known as:  TOPROL-XL   Dose:  12.5 mg   Quantity:  90 tablet        Take 0.5 tablets (12.5 mg) by mouth daily   Refills:  3        nitroGLYcerin 0.4 MG sublingual tablet   Commonly known as:  NITROSTAT   Dose:  0.4 mg   Quantity:  25 tablet        Place 1 tablet (0.4 mg) under the tongue every 5 minutes as needed for chest pain (call your doctor if you take a third dose)   Refills:  3        OCUVITE PO   Dose:  1 tablet        Take 1 tablet by mouth daily.   Refills:  0        ondansetron 4 MG tablet   Commonly known as:  ZOFRAN   Dose:  4 mg   Quantity:  18 tablet        Take 1 tablet (4 mg) by mouth every 6 hours as needed for nausea   Refills:  3        * oxybutynin 5 MG 24 hr tablet   Commonly known as:  DITROPAN-XL   Dose:  5 mg   Quantity:  30 tablet        Take 1 tablet (5 mg) by mouth daily   Refills:  4        * oxybutynin 10 MG 24 hr tablet   Commonly known as:  DITROPAN XL   Dose:  10 mg   Quantity:  90 tablet        Take 1 tablet (10 mg) by mouth daily   Refills:  1        pantoprazole 20 MG EC tablet   Commonly known as:  PROTONIX   Dose:  40 mg   Quantity:  180 tablet        Take 2 tablets (40 mg) by mouth daily   Refills:  2        pramipexole 0.5 MG tablet   Commonly known as:  MIRAPEX   Quantity:  90 tablet        TAKE ONE TABLET BY MOUTH AT BEDTIME   Refills:  2        * ranitidine 150 MG tablet   Commonly known as:  ZANTAC   Dose:  300 mg   Quantity:  60 tablet        Take 2 tablets (300 mg) by mouth At Bedtime   Refills:  0        * ranitidine 150 MG tablet    Commonly known as:  ZANTAC   Dose:  300 mg   Quantity:  180 tablet        Take 2 tablets (300 mg) by mouth At Bedtime   Refills:  3        * Notice:  This list has 6 medication(s) that are the same as other medications prescribed for you. Read the directions carefully, and ask your doctor or other care provider to review them with you.            Prescriptions were sent or printed at these locations (2 Prescriptions)                   Other Prescriptions                Not Printed or Sent (2 of 2)         acetaminophen (TYLENOL) 500 MG tablet               ibuprofen (ADVIL/MOTRIN) 200 MG tablet                Orders Needing Specimen Collection     None      Pending Results     No orders found from 4/8/2018 to 4/11/2018.            Pending Culture Results     No orders found from 4/8/2018 to 4/11/2018.            Pending Results Instructions     If you had any lab results that were not finalized at the time of your Discharge, you can call the ED Lab Result RN at [phone]. You will be contacted by this team for any positive Lab results or changes in treatment. The nurses are available 7 days a week from 10A to 6:30P.  You can leave a message 24 hours per day and they will return your call.        Test Results From Your Hospital Stay               Thank you for choosing [facility]       Thank you for choosing [facility] for your care. Our goal is always to provide you with excellent care. Hearing back from our patients is one way we can continue to improve our services. Please take a few minutes to complete the written survey that you may receive in the mail after you visit with us. Thank you!        [employer] Information     [employer] gives you secure access to your electronic health record. If you see a primary care provider, you can also send messages to your care team and make appointments. If you have questions, please call your primary care clinic.  If you do not have a primary care provider, please call  [phone] and they will assist you.        Care EveryWhere ID     This is your Care EveryWhere ID. This could be used by other organizations to access your [facility] medical records  [id number]        Equal Access to Services     SOFIA EDUARDO : Jefferson Davis, rubia hester, hinajanessa calvertalmajyotsna fish, danya moser. So [facility] [phone].    ATENCIÓN: Si habla español, tiene a ozuna disposición servicios gratuitos de asistencia lingüística. Llame al [phone].    We comply with applicable federal civil rights laws and [place] laws. We do not discriminate on the basis of race, color, national origin, age, disability, sex, sexual orientation, or gender identity.            After Visit Summary       This is your record. Keep this with you and show to your community pharmacist(s) and doctor(s) at your next visit.

## 2018-04-10 NOTE — ED PROVIDER NOTES
History     Chief Complaint   Patient presents with     Hip Pain     pt has a hx of hip pain and has had imaging done, is in PT now. suddenly got worse tonight     ELIANE Sal is a 86 year old female with history of osteoarthritis and chronic back and hip pain presenting for evaluation of worsening pain in her left hip, leg, and knee.  She has had similar pains in the past and has followed with her primary care provider as well as with orthopedics.  She was recently doing physical therapy for this as well.  Today she had multiple errands to run so was out and about all day, more than usual.  Tonight she began to develop severe pain in her hip and knee.  She reports pain was excruciating and so she did take 1500 mg of acetaminophen.  Upon the time of my evaluation in the ED her pain had significantly subsided.  She reports she is now able to move her hip and knee much more easily but they are still sore.  Patient denies any injury or fall.  Denies any new numbness, tingling, or weakness.    Problem List:    Patient Active Problem List    Diagnosis Date Noted     Hip pain, left 04/06/2018     Priority: Medium     Bilateral low back pain without sciatica 11/10/2017     Priority: Medium     Insect bite 06/28/2017     Priority: Medium     Cardiac pacemaker - [employer] dual lead - Not Dependent 03/02/2017     Priority: Medium     Sinoatrial node dysfunction (H) 11/10/2016     Priority: Medium     S/P cardiac pacemaker procedure 11/09/2016     Priority: Medium     CAD S/P percutaneous coronary angioplasty 05/11/2016     Priority: Medium     JENIFER (obstructive sleep apnea) 11/17/2014     Priority: Medium     Lichen planus 11/19/2013     Priority: Medium     Vulvar pruritus 11/12/2013     Priority: Medium     Angina pectoris (H) 02/08/2012     Priority: Medium     Advanced directives, counseling/discussion 06/16/2011     Priority: Medium     Patient will bring a copy. Chart r/q to make sure no  jen Cunningham MA    Advance Directive Problem List Overview:   Name Relationship Phone    Primary Health Care Agent Albaro Sal  [phone]         Alternative Health Care Agent Esther Mulligan  [phone]     Reviewed Health Care Directive dated 05/13/2005, scanned into EPIC 01/2012.  Marivel Villarreal CMA                Hypertension goal BP (blood pressure) < 140/90 03/04/2011     Priority: Medium     Major depressive disorder, recurrent episode, mild (H) 11/02/2010     Priority: Medium     Family history of colon cancer 05/04/2010     Priority: Medium     Coronary atherosclerosis of native coronary artery      Priority: Medium     s/p MI ([facility])       Hyperlipidemia LDL goal <100      Priority: Medium     Essential hypertension, benign      Priority: Medium     Hypothyroidism      Priority: Medium     GERD (gastroesophageal reflux disease)      Priority: Medium        Past Medical History:    Past Medical History:   Diagnosis Date     Coronary atherosclerosis of native coronary artery      Essential hypertension, benign      GERD (gastroesophageal reflux disease)      History of transient ischemic attack (TIA)      Hyperlipidemia LDL goal <100      Hypothyroidism      Major depressive disorder, recurrent episode, mild (H) 5/18/2011     Peptic ulcer disease with hemorrhage        Past Surgical History:    Past Surgical History:   Procedure Laterality Date     APPENDECTOMY       CARDIAC CATHERIZATION  1/15/04    drug-eluding stent RCA, Dr. Pérez, [facility]     CARDIAC CATHERIZATION  2/16/12    diffuse mild/mod disease, no new stent     CATARACT IOL, RT/LT  1/26/12    RT, Dr. Wynne     CHOLECYSTECTOMY, OPEN       COLONOSCOPY  2008    \[facility]\""     FRACTURE TX, ANKLE RT/LT      LT, 2 screws remain     HC EXCISE HAND/FOOT NEUROMA      RT     HC LARYNGOSCOPY DIRECT W VOCAL CORD INJECTION      vocal cord polypectomy     HC REMOVAL OF OVARIAN CYST(S)       HC TRANSCATH STENT INIT  VESSEL,PERCUT      x 2     REPAIR HAMMER TOE  9/13/10    multiple + RT great toe tendon release, Dr. Sanchez DPM     STENT  -    Cardiac stents 6 placed.       Family History:    Family History   Problem Relation Age of Onset     C.A.D. Father      Hypertension Father      Myocardial Infarction Father 66      from MI     C.A.D. Brother      Myocardial Infarction Brother      CANCER Brother      skin ca     Breast Cancer Sister      Cancer - colorectal Sister      HEART DISEASE Sister      Neurologic Disorder Mother      migraine     C.A.D. Mother      Hypertension Mother      Heart Failure Mother      Thyroid Disease Son      cretinism     Hypertension Son      HEART DISEASE Son      Psychotic Disorder Sister      Hypertension Sister      HEART DISEASE Sister      CABG     Hypertension Brother      C.A.D. Brother      Myocardial Infarction Brother      Arthritis Daughter      RA     Hypertension Sister      HEART DISEASE Sister      CANCER Sister      Blood Disease Sister      Neurologic Disorder Child      migraine (2 children)       Social History:  Marital Status:   [2]  Social History   Substance Use Topics     Smoking status: Never Smoker     Smokeless tobacco: Never Used      Comment: Never smoker; no secondhand smoke exposure     Alcohol use Yes      Comment: social        Medications:      acetaminophen (TYLENOL) 500 MG tablet   ibuprofen (ADVIL/MOTRIN) 200 MG tablet   oxybutynin (DITROPAN XL) 10 MG 24 hr tablet   citalopram (CELEXA) 20 MG tablet   pramipexole (MIRAPEX) 0.5 MG tablet   losartan (COZAAR) 25 MG tablet   atorvastatin (LIPITOR) 40 MG tablet   metoprolol (TOPROL-XL) 25 MG 24 hr tablet   benzonatate (TESSALON) 100 MG capsule   oxybutynin (DITROPAN-XL) 5 MG 24 hr tablet   pantoprazole (PROTONIX) 20 MG EC tablet   ranitidine (ZANTAC) 150 MG tablet   ranitidine (ZANTAC) 150 MG tablet   levothyroxine (SYNTHROID/LEVOTHROID) 75 MCG tablet   albuterol (PROAIR HFA/PROVENTIL HFA/VENTOLIN HFA)  "108 (90 BASE) MCG/ACT Inhaler   albuterol (PROAIR HFA/PROVENTIL HFA/VENTOLIN HFA) 108 (90 BASE) MCG/ACT Inhaler   ondansetron (ZOFRAN) 4 MG tablet   nitroglycerin (NITROSTAT) 0.4 MG SL tablet   Multiple Vitamins-Minerals (OCUVITE PO)   ASPIRIN 81 MG PO TABS   CALCIUM 600 + D OR   FISH OIL OR         Review of Systems   Constitutional: Positive for activity change (Increased activity today from baseline). Negative for appetite change, fatigue and fever.   Respiratory: Negative for cough, chest tightness and shortness of breath.    Cardiovascular: Negative for chest pain and leg swelling.   Gastrointestinal: Negative for abdominal pain.   Musculoskeletal: Positive for arthralgias.        Left hip, thigh, and knee pain   Skin: Negative for rash and wound.   Neurological: Negative for weakness, light-headedness, numbness and headaches.   All other systems reviewed and are negative.      Physical Exam   BP: 183/77  Pulse: 64  Temp: 98  F (36.7  C)  Resp: 16  Height: 167.6 cm (5' 6\")  SpO2: 97 %      Physical Exam   Constitutional: She is oriented to person, place, and time. She appears well-developed and well-nourished. No distress.   HENT:   Head: Normocephalic and atraumatic.   Eyes: Conjunctivae are normal.   Cardiovascular: Normal rate and regular rhythm.    Pulmonary/Chest: Effort normal and breath sounds normal.   Musculoskeletal:        Left hip: She exhibits decreased range of motion and tenderness (Mild lateral tenderness, likely muscular). She exhibits normal strength and no deformity.        Left knee: She exhibits decreased range of motion (Slightly decreased but able to flex to 90 ) and swelling (Possible small effusion). She exhibits no deformity. No tenderness found.        Left upper leg: She exhibits tenderness (Lateral muscular tenderness of the left proximal thigh). She exhibits no deformity.   Neurological: She is alert and oriented to person, place, and time.   Skin: Skin is warm and dry. "   Psychiatric: She has a normal mood and affect.   Nursing note and vitals reviewed.      ED Course     ED Course     Procedures           No results found for this or any previous visit (from the past 24 hour(s)).    Medications   ibuprofen (ADVIL/MOTRIN) tablet 400 mg (400 mg Oral Given 4/10/18 0200)       Assessments & Plan (with Medical Decision Making)  Well-appearing 86-year-old female presented for evaluation of left hip, thigh, knee pain.  She does have documented arthritis and has had pain in these joints in the past.  She reports that she was more active than normal today and began have pain this evening.  Likely secondary to overuse as there is no history of fall or any other evidence of acute abnormality.  Symptoms already improving after acetaminophen and heat.  Patient counseled regarding her dosing of acetaminophen that she took more than his recommended.  Counseled to have no more than 1000 mg at a time and no more than 3 doses per day.  Did recommend a short-term use of ibuprofen as she has no documented history of renal insufficiency and has no recent GI issues such as stomach ulcers.  This will likely give some added benefit if used in the short-term for anti-inflammatory pain relief.  Recommended following up with her orthopedic surgeon to discuss her ongoing pain symptoms to assess for additional therapeutic interventions for her.     I have reviewed the nursing notes.    I have reviewed the findings, diagnosis, plan and need for follow up with the patient.       Discharge Medication List as of 4/10/2018  2:05 AM      START taking these medications    Details   acetaminophen (TYLENOL) 500 MG tablet Take 2 tablets (1,000 mg) by mouth every 8 hours as needed for mild pain, Disp-30 tablet, R-0, OTC      ibuprofen (ADVIL/MOTRIN) 200 MG tablet Take 2 tablets (400 mg) by mouth every 8 hours as needed for mild pain, Disp-30 tablet, R-0, OTC             Final diagnoses:   Arthritis pain of hip   Pain of  left lower extremity       4/10/2018   [facility] EMERGENCY DEPARTMENT     [place], Mayo Underwood MD  04/10/18 0435

## 2018-04-16 ENCOUNTER — THERAPY VISIT (OUTPATIENT)
Dept: PHYSICAL THERAPY | Facility: CLINIC | Age: 83
End: 2018-04-16
Payer: MEDICARE

## 2018-04-16 DIAGNOSIS — M25.552 HIP PAIN, LEFT: ICD-10-CM

## 2018-04-16 PROCEDURE — 97110 THERAPEUTIC EXERCISES: CPT | Mod: GP | Performed by: PHYSICAL THERAPY ASSISTANT

## 2018-04-16 PROCEDURE — 97140 MANUAL THERAPY 1/> REGIONS: CPT | Mod: GP | Performed by: PHYSICAL THERAPY ASSISTANT

## 2018-04-18 ENCOUNTER — TELEPHONE (OUTPATIENT)
Dept: FAMILY MEDICINE | Facility: CLINIC | Age: 83
End: 2018-04-18

## 2018-04-18 NOTE — TELEPHONE ENCOUNTER
Reason for call:  Patient reporting a symptom    Symptom or request: Christiana has been having back pain for about a month, but for about a week she is having severe leg pain - to the point that it's difficult to walk.  Her hip, thigh and shin bones hurt.  She took aleve about 1 hour ago (9 a.m.). She is wondering if there is something other than prescribed pain medication that would relieve her of pain.  She is scheduled for MRI on the 24th.  Please call and assess. Thank you..Consuelo Hawthorne    Duration (how long have symptoms been present): about a week of the severe pain    Have you been treated for this before? Not known    Phone Number patient can be reached at:  Home number on file [phone] (home)    Best Time:  Any time    Can we leave a detailed message on this number:  YES    Call taken on 4/18/2018 at 10:13 AM by Consuelo Hawthorne

## 2018-04-18 NOTE — TELEPHONE ENCOUNTER
"Reports intermittent ongoing hip, thigh, and shin pain. Back does not hurt. She has MRI scheduled for 4/24/18; \"the soonest that it can be done because I need to get it done when they can do it because I have a pacemaker.\"  She wants to know if there is anything ---topical or oral to relieve the pain. Taking advil 200 mg 3-4  Hours. Advised her to call Dr. Cifuentes's office (orthoedic) for advice. She said that she would do that today. She would also like to know what Kelly would suggest. Christiana knows that Kelly is out of the office until tomorrow.   Roel Garza, RN    "

## 2018-04-19 NOTE — TELEPHONE ENCOUNTER
I see she is doing advil but is she also doing tylenol?   I would have her take 2 tylenol in between the doses of advil to see if she gets more relief.   She could try some of the patches that are over the counter but I'm not sure given the widespread area if there is going to be a good topical for that.   If the tylenol and advil are not helping then we may need to try a stronger medication (ie vicodon or percocet)    Kelly

## 2018-04-20 DIAGNOSIS — E03.9 HYPOTHYROIDISM, UNSPECIFIED TYPE: ICD-10-CM

## 2018-04-20 NOTE — TELEPHONE ENCOUNTER
"Requested Prescriptions   Pending Prescriptions Disp Refills     levothyroxine (SYNTHROID/LEVOTHROID) 75 MCG tablet [Pharmacy Med Name: LEVOTHYROXIN 75MCG  TAB] 90 tablet 1    Last Written Prescription Date:  6/30/17  Last Fill Quantity: 90,  # refills: 1   Last office visit: 3/12/2018 with prescribing provider:  3/12/18   Future Office Visit:     Sig: TAKE ONE TABLET BY MOUTH ONCE DAILY    Thyroid Protocol Failed    4/20/2018 10:40 AM       Failed - Normal TSH on file in past 12 months    Recent Labs   Lab Test  03/08/17   1059   TSH  0.67             Passed - Patient is 12 years or older       Passed - Recent (12 mo) or future (30 days) visit within the authorizing provider's specialty    Patient had office visit in the last 12 months or has a visit in the next 30 days with authorizing provider or within the authorizing provider's specialty.  See \"Patient Info\" tab in inbasket, or \"Choose Columns\" in Meds & Orders section of the refill encounter.           Passed - No active pregnancy on record    If patient is pregnant or has had a positive pregnancy test, please check TSH.         Passed - No positive pregnancy test in past 12 months    If patient is pregnant or has had a positive pregnancy test, please check TSH.            "

## 2018-04-23 RX ORDER — LEVOTHYROXINE SODIUM 75 UG/1
75 TABLET ORAL DAILY
Qty: 90 TABLET | Refills: 0 | Status: SHIPPED | OUTPATIENT
Start: 2018-04-23 | End: 2018-07-17

## 2018-04-23 NOTE — TELEPHONE ENCOUNTER
Medication is being filled for 1 time refill only due to:  Patient needs labs before next refill.  Charmaine Kerr RN

## 2018-04-24 ENCOUNTER — HOSPITAL ENCOUNTER (OUTPATIENT)
Dept: MRI IMAGING | Facility: CLINIC | Age: 83
Discharge: HOME | End: 2018-04-24
Attending: ORTHOPAEDIC SURGERY | Admitting: ORTHOPAEDIC SURGERY
Payer: MEDICARE

## 2018-04-24 DIAGNOSIS — M54.50 LUMBAR PAIN: ICD-10-CM

## 2018-04-24 DIAGNOSIS — Z00.00 HEALTH MAINTENANCE EXAMINATION: ICD-10-CM

## 2018-04-24 DIAGNOSIS — M54.10 RADICULOPATHY: ICD-10-CM

## 2018-04-24 DIAGNOSIS — R53.1 WEAKNESS: ICD-10-CM

## 2018-04-24 PROCEDURE — 72148 MRI LUMBAR SPINE W/O DYE: CPT

## 2018-04-30 ENCOUNTER — TRANSFERRED RECORDS (OUTPATIENT)
Dept: HEALTH INFORMATION MANAGEMENT | Facility: CLINIC | Age: 83
End: 2018-04-30

## 2018-04-30 ENCOUNTER — OFFICE VISIT (OUTPATIENT)
Dept: FAMILY MEDICINE | Facility: CLINIC | Age: 83
End: 2018-04-30
Payer: COMMERCIAL

## 2018-04-30 VITALS
HEART RATE: 63 BPM | OXYGEN SATURATION: 93 % | BODY MASS INDEX: 26.36 KG/M2 | HEIGHT: 66 IN | TEMPERATURE: 98.2 F | WEIGHT: 164 LBS | SYSTOLIC BLOOD PRESSURE: 126 MMHG | DIASTOLIC BLOOD PRESSURE: 74 MMHG

## 2018-04-30 DIAGNOSIS — M25.552 HIP PAIN, LEFT: ICD-10-CM

## 2018-04-30 DIAGNOSIS — M54.50 CHRONIC BILATERAL LOW BACK PAIN WITHOUT SCIATICA: ICD-10-CM

## 2018-04-30 DIAGNOSIS — G89.29 CHRONIC BILATERAL LOW BACK PAIN WITHOUT SCIATICA: ICD-10-CM

## 2018-04-30 DIAGNOSIS — J01.90 ACUTE SINUSITIS WITH SYMPTOMS > 10 DAYS: Primary | ICD-10-CM

## 2018-04-30 PROCEDURE — 99214 OFFICE O/P EST MOD 30 MIN: CPT | Performed by: PHYSICIAN ASSISTANT

## 2018-04-30 RX ORDER — DOXYCYCLINE 100 MG/1
100 CAPSULE ORAL 2 TIMES DAILY
Qty: 20 CAPSULE | Refills: 0 | Status: SHIPPED | OUTPATIENT
Start: 2018-04-30 | End: 2018-09-06

## 2018-04-30 NOTE — PROGRESS NOTES
SUBJECTIVE:   Christiana Sal is a 86 year old female who presents to clinic today for the following health issues:      ENT Symptoms             Symptoms: cc Present Absent Comment   Fever/Chills   x    Fatigue  x     Muscle Aches   x    Eye Irritation  x  Trouble keeping them open    Sneezing   x    Nasal Trav/Drg  x     Sinus Pressure/Pain  x     Loss of smell   x    Dental pain   x    Sore Throat  x  Painful from coughing    Swollen Glands   x    Ear Pain/Fullness   x    Cough  x     Wheeze   x    Chest Pain   x    Shortness of breath   x    Rash   x    Other  x  Headache      Symptom duration:  Cough has been 1 week, throat pain and sleepiness started yesterday    Symptom severity:  moderate    Treatments tried:  Cough drops    Contacts:  None       Cold/URI sx over a week ago - cough getting worse over the last week  Coughing hard - like she needs to cough something up - but very little coming up.  No fevers  Feeling very tired, hard to keep her eyes open  Slight headache although better today  Throat sore - especially with coughing    Had MRI of back last week  Dr. Cifuentes referred her to Dr Bowser in [place]  He feels it could still be her hip causing the problems so advised she get a cortisone injection  Has had a cortisone injection before htat didn't help  He said if it doesn't help this time she may need surgery    Put her  on hospice within the last week  He has had a series of falls recently - she was in the ED with him last week for 2 days and doctor finally said there wasn't much to be done and this wasn't a good quality of life for him    She is hoping family can step in and help out with him while she is sick  Does have some friends coming this weekend to visit      Problem list and histories reviewed & adjusted, as indicated.  Additional history: as documented    Current Outpatient Prescriptions   Medication Sig Dispense Refill     acetaminophen (TYLENOL) 500 MG tablet Take 2 tablets  (1,000 mg) by mouth every 8 hours as needed for mild pain 30 tablet 0     ASPIRIN 81 MG PO TABS Take 2 tablets by mouth every evening. *.        atorvastatin (LIPITOR) 40 MG tablet TAKE ONE TABLET BY MOUTH EVERY DAY 90 tablet 3     CALCIUM 600 + D OR 1 tablet by mouth 2 times per day       citalopram (CELEXA) 20 MG tablet Take 1 tablet (20 mg) by mouth daily 90 tablet 1     doxycycline (VIBRAMYCIN) 100 MG capsule Take 1 capsule (100 mg) by mouth 2 times daily 20 capsule 0     FISH OIL OR 1 daily       levothyroxine (SYNTHROID/LEVOTHROID) 75 MCG tablet Take 1 tablet (75 mcg) by mouth daily Due for labs. 90 tablet 0     losartan (COZAAR) 25 MG tablet TAKE ONE TABLET BY MOUTH ONCE DAILY 90 tablet 2     metoprolol (TOPROL-XL) 25 MG 24 hr tablet Take 0.5 tablets (12.5 mg) by mouth daily 90 tablet 3     Multiple Vitamins-Minerals (OCUVITE PO) Take 1 tablet by mouth daily.       ondansetron (ZOFRAN) 4 MG tablet Take 1 tablet (4 mg) by mouth every 6 hours as needed for nausea 18 tablet 3     oxybutynin (DITROPAN XL) 10 MG 24 hr tablet Take 1 tablet (10 mg) by mouth daily 90 tablet 1     oxybutynin (DITROPAN-XL) 5 MG 24 hr tablet Take 1 tablet (5 mg) by mouth daily 30 tablet 4     pantoprazole (PROTONIX) 20 MG EC tablet Take 2 tablets (40 mg) by mouth daily 180 tablet 2     pramipexole (MIRAPEX) 0.5 MG tablet TAKE ONE TABLET BY MOUTH AT BEDTIME 90 tablet 2     ranitidine (ZANTAC) 150 MG tablet Take 2 tablets (300 mg) by mouth At Bedtime 60 tablet 0     ranitidine (ZANTAC) 150 MG tablet Take 2 tablets (300 mg) by mouth At Bedtime 180 tablet 3     albuterol (PROAIR HFA/PROVENTIL HFA/VENTOLIN HFA) 108 (90 BASE) MCG/ACT Inhaler Inhale 2 puffs into the lungs every 6 hours as needed for shortness of breath / dyspnea or wheezing (Patient not taking: Reported on 3/2/2018) 1 Inhaler 0     albuterol (PROAIR HFA/PROVENTIL HFA/VENTOLIN HFA) 108 (90 BASE) MCG/ACT Inhaler Inhale 2 puffs into the lungs every 6 hours as needed for shortness  "of breath / dyspnea or wheezing (Patient not taking: Reported on 3/2/2018) 1 Inhaler 0     benzonatate (TESSALON) 100 MG capsule Take 1 capsule (100 mg) by mouth 3 times daily as needed for cough (Patient not taking: Reported on 3/2/2018) 42 capsule 0     nitroglycerin (NITROSTAT) 0.4 MG SL tablet Place 1 tablet (0.4 mg) under the tongue every 5 minutes as needed for chest pain (call your doctor if you take a third dose) (Patient not taking: Reported on 3/2/2018) 25 tablet 3     Allergies   Allergen Reactions     Demerol Nausea     Lisinopril Cough     Sulfa Drugs Other (See Comments)     Questionable itching reported by patient     BP Readings from Last 3 Encounters:   04/30/18 126/74   04/10/18 183/77   03/12/18 122/74    Wt Readings from Last 3 Encounters:   04/30/18 164 lb (74.4 kg)   03/12/18 164 lb (74.4 kg)   03/02/18 168 lb (76.2 kg)                    Reviewed and updated as needed this visit by clinical staff       Reviewed and updated as needed this visit by Provider         ROS:  Remainder of ROS obtained and found to be negative other than that which was documented above      OBJECTIVE:     /74  Pulse 63  Temp 98.2  F (36.8  C) (Tympanic)  Ht 5' 6\" (1.676 m)  Wt 164 lb (74.4 kg)  SpO2 93%  BMI 26.47 kg/m2  Body mass index is 26.47 kg/(m^2).  GENERAL: healthy, alert and no distress  EYES: Eyes grossly normal to inspection  HENT: ear canals and TM's normal, nose and mouth without ulcers or lesions  NECK: no adenopathy  RESP: lungs clear to auscultation - no rales, rhonchi or wheezes  CV: regular rates and rhythm, normal S1 S2, no S3 or S4, no murmur, click or rub and no peripheral edema    Diagnostic Test Results:  none     ASSESSMENT/PLAN:     (J01.90) Acute sinusitis with symptoms > 10 days  (primary encounter diagnosis)  Comment: symptoms over a week with worsening. In no acute distress on exam today - lungs actually very clear but forceful coughing on exam until she is able to produce some " yellow thick mucous. Plan to treat for sinus symptoms and encouraged her to follow up if not improving  Plan: doxycycline (VIBRAMYCIN) 100 MG capsule            (M54.5,  G89.29) Chronic bilateral low back pain without sciatica  Comment: per patient, surgeon felt it was still the hip that was causing more of the issues  Plan: plan is to have a hip injection and if no improvement, consider hip replacement surgery    (M25.412) Hip pain, left  Comment:   Plan: see comment/plan above            Kelly Matthew PA-C  [facility]3]

## 2018-04-30 NOTE — MR AVS SNAPSHOT
After Visit Summary   4/30/2018    Christiana Sal    MRN: [id number]           Patient Information     Date Of Birth          10/25/1931        Visit Information        Provider Department      4/30/2018 2:40 PM Kelly Matthew PA-C [facility]        Today's Diagnoses     Acute sinusitis with symptoms > 10 days    -  1      Care Instructions    START doxycycline and take for the full 10 days    Work on staying hydrated (drinking fluids) and getting lots of rest    Okay to take ibuprofen or tylenol if you feel that helps     Follow up if things are not getting better            Follow-ups after your visit        Your next 10 appointments already scheduled     May 07, 2018  9:30 AM CDT   Remote PPM Check with [place] [facility]   [facility] ([facility])    [address] [address] [phone]           This appointment is for a remote check of your pacemaker.  This is not an appointment at the office.            May 11, 2018  9:00 AM CDT   New Visit with Lu Page MD   [facility] [place] ([facility]    [address]  [address]  [place] [address]   [phone]              Who to contact     Normal or non-critical lab and imaging results will be communicated to you by MyChart, letter or phone within 4 business days after the clinic has received the results. If you do not hear from us within 7 days, please contact the clinic through MyChart or phone. If you have a critical or abnormal lab result, we will notify you by phone as soon as possible.  Submit refill requests through [employer] or call your pharmacy and they will forward the refill request to us. Please allow 3 business days for your refill to be completed.          If you need to speak with a  for additional information , please call: [phone]             Additional Information About  "Your Visit        MyChart Information     [employer] gives you secure access to your electronic health record. If you see a primary care provider, you can also send messages to your care team and make appointments. If you have questions, please call your primary care clinic.  If you do not have a primary care provider, please call [phone] and they will assist you.        Care EveryWhere ID     This is your Care EveryWhere ID. This could be used by other organizations to access your [place] medical records  [id number]        Your Vitals Were     Pulse Temperature Height Pulse Oximetry BMI (Body Mass Index)       63 98.2  F (36.8  C) (Tympanic) 5' 6\" (1.676 m) 93% 26.47 kg/m2        Blood Pressure from Last 3 Encounters:   04/30/18 126/74   04/10/18 183/77   03/12/18 122/74    Weight from Last 3 Encounters:   04/30/18 164 lb (74.4 kg)   03/12/18 164 lb (74.4 kg)   03/02/18 168 lb (76.2 kg)              Today, you had the following     No orders found for display         Today's Medication Changes          These changes are accurate as of 4/30/18  2:50 PM.  If you have any questions, ask your nurse or doctor.               Start taking these medicines.        Dose/Directions    doxycycline 100 MG capsule   Commonly known as:  VIBRAMYCIN   Used for:  Acute sinusitis with symptoms > 10 days   Started by:  Kelly Matthew PA-C        Dose:  100 mg   Take 1 capsule (100 mg) by mouth 2 times daily   Quantity:  20 capsule   Refills:  0            Where to get your medicines      These medications were sent to [facility]  [address]  [address] [place] [address]     Phone:  [phone]     doxycycline 100 MG capsule                Primary Care Provider Office Phone # Fax #    Kelly Matthew PA-C [phone] [address] [place] [address]        Equal Access to Services     SOFIA EDUARDO AH: Hadii shailesh Davis, " wasushilda derekesha, qaybta kaflorinda fish, danya moodyaan ah. So [place] -[phone].    ATENCIÓN: Si jorge martin, tiene a ozuna disposición servicios gratuitos de asistencia lingüística. Moisés al [phone].    We comply with applicable federal civil rights laws and [place] laws. We do not discriminate on the basis of race, color, national origin, age, disability, sex, sexual orientation, or gender identity.            Thank you!     Thank you for choosing [facility]  for your care. Our goal is always to provide you with excellent care. Hearing back from our patients is one way we can continue to improve our services. Please take a few minutes to complete the written survey that you may receive in the mail after your visit with us. Thank you!             Your Updated Medication List - Protect others around you: Learn how to safely use, store and throw away your medicines at www.disposemymeds.org.          This list is accurate as of 4/30/18  2:50 PM.  Always use your most recent med list.                   Brand Name Dispense Instructions for use Diagnosis    acetaminophen 500 MG tablet    TYLENOL    30 tablet    Take 2 tablets (1,000 mg) by mouth every 8 hours as needed for mild pain        * albuterol 108 (90 Base) MCG/ACT Inhaler    PROAIR HFA/PROVENTIL HFA/VENTOLIN HFA    1 Inhaler    Inhale 2 puffs into the lungs every 6 hours as needed for shortness of breath / dyspnea or wheezing    Acute bronchitis with symptoms > 10 days       * albuterol 108 (90 Base) MCG/ACT Inhaler    PROAIR HFA/PROVENTIL HFA/VENTOLIN HFA    1 Inhaler    Inhale 2 puffs into the lungs every 6 hours as needed for shortness of breath / dyspnea or wheezing    Acute bronchitis with symptoms > 10 days       aspirin 81 MG tablet      Take 2 tablets by mouth every evening. *.        atorvastatin 40 MG tablet    LIPITOR    90 tablet    TAKE ONE TABLET BY MOUTH EVERY DAY    CAD S/P percutaneous coronary  angioplasty       benzonatate 100 MG capsule    TESSALON    42 capsule    Take 1 capsule (100 mg) by mouth 3 times daily as needed for cough    Cough       CALCIUM 600 + D PO      1 tablet by mouth 2 times per day        citalopram 20 MG tablet    celeXA    90 tablet    Take 1 tablet (20 mg) by mouth daily    Major depressive disorder, recurrent episode, mild (H)       doxycycline 100 MG capsule    VIBRAMYCIN    20 capsule    Take 1 capsule (100 mg) by mouth 2 times daily    Acute sinusitis with symptoms > 10 days       FISH OIL PO      1 daily        levothyroxine 75 MCG tablet    SYNTHROID/LEVOTHROID    90 tablet    Take 1 tablet (75 mcg) by mouth daily Due for labs.    Hypothyroidism, unspecified type       losartan 25 MG tablet    COZAAR    90 tablet    TAKE ONE TABLET BY MOUTH ONCE DAILY    Essential hypertension with goal blood pressure less than 140/90       metoprolol succinate 25 MG 24 hr tablet    TOPROL-XL    90 tablet    Take 0.5 tablets (12.5 mg) by mouth daily    Essential hypertension with goal blood pressure less than 140/90       nitroGLYcerin 0.4 MG sublingual tablet    NITROSTAT    25 tablet    Place 1 tablet (0.4 mg) under the tongue every 5 minutes as needed for chest pain (call your doctor if you take a third dose)    Atherosclerosis of native coronary artery of native heart without angina pectoris       OCUVITE PO      Take 1 tablet by mouth daily.        ondansetron 4 MG tablet    ZOFRAN    18 tablet    Take 1 tablet (4 mg) by mouth every 6 hours as needed for nausea    Nausea       * oxybutynin 5 MG 24 hr tablet    DITROPAN-XL    30 tablet    Take 1 tablet (5 mg) by mouth daily    Overactive bladder       * oxybutynin 10 MG 24 hr tablet    DITROPAN XL    90 tablet    Take 1 tablet (10 mg) by mouth daily    Overactive bladder       pantoprazole 20 MG EC tablet    PROTONIX    180 tablet    Take 2 tablets (40 mg) by mouth daily    Gastroesophageal reflux disease, esophagitis presence not  specified       pramipexole 0.5 MG tablet    MIRAPEX    90 tablet    TAKE ONE TABLET BY MOUTH AT BEDTIME    Restless leg syndrome       * ranitidine 150 MG tablet    ZANTAC    60 tablet    Take 2 tablets (300 mg) by mouth At Bedtime    Gastroesophageal reflux disease, esophagitis presence not specified       * ranitidine 150 MG tablet    ZANTAC    180 tablet    Take 2 tablets (300 mg) by mouth At Bedtime    Gastroesophageal reflux disease, esophagitis presence not specified       * Notice:  This list has 6 medication(s) that are the same as other medications prescribed for you. Read the directions carefully, and ask your doctor or other care provider to review them with you.

## 2018-04-30 NOTE — NURSING NOTE
"Chief Complaint   Patient presents with     Sinus Problem       Initial /74  Pulse 63  Temp 98.2  F (36.8  C) (Tympanic)  Ht 5' 6\" (1.676 m)  Wt 164 lb (74.4 kg)  SpO2 93%  BMI 26.47 kg/m2 Estimated body mass index is 26.47 kg/(m^2) as calculated from the following:    Height as of this encounter: 5' 6\" (1.676 m).    Weight as of this encounter: 164 lb (74.4 kg).  Medication Reconciliation: complete       Viraj Lofton CMA    "

## 2018-05-02 ENCOUNTER — TELEPHONE (OUTPATIENT)
Dept: ORTHOPEDICS | Facility: CLINIC | Age: 83
End: 2018-05-02

## 2018-05-02 NOTE — TELEPHONE ENCOUNTER
Spoke with patient. Appointment note indicated hip injection and she has had a previous US guided injection with Dr. Palafox. Patient is interested in trying this again if this would be helpful. Understands that Dr. Page does not do this type of injection and would like her to exam first and recommend all options. Will keep appointment on 5/11. No further questions.    Manav Nicole, ATC

## 2018-05-07 ENCOUNTER — HOSPITAL ENCOUNTER (OUTPATIENT)
Dept: CARDIOLOGY | Facility: CLINIC | Age: 83
Discharge: HOME | End: 2018-05-07
Attending: INTERNAL MEDICINE | Admitting: INTERNAL MEDICINE
Payer: MEDICARE

## 2018-05-07 PROCEDURE — 93294 REM INTERROG EVL PM/LDLS PM: CPT | Performed by: INTERNAL MEDICINE

## 2018-05-07 PROCEDURE — 93296 REM INTERROG EVL PM/IDS: CPT

## 2018-05-08 ENCOUNTER — DOCUMENTATION ONLY (OUTPATIENT)
Dept: CARDIOLOGY | Facility: CLINIC | Age: 83
End: 2018-05-08

## 2018-05-08 NOTE — PROGRESS NOTES
Latitude NXT Remote PPM Device Check  AP: 5 %  : 7 %  Mode: DDDR Base rate of 50.   Presenting Rhythm: AS/VS (53-70 bpm)  Heart Rate: Adequate per the histograms.  Sensing: Stable    Pacing Threshold: Stable    Impedance: Stable  Battery Status: 13.5 yrs remain.  Atrial Arrhythmia: 1 mode switch episode, no stored EGM. Comprising of < 1%, w/ AT/AF burden <0.1 %. Pt taking ASA 81 mg q d. Pt also had 6 SBR episodes all on 5/6/18 between the hrs of 6-9 pm. Pt stated that she felt tired during this time. See note on 1/22/18 explaining what a SBR is.  Ventricular Arrhythmia: None.    Care Plan: F/U Latitude NXT Remote PPM  q 3 months. Gave patient results over the phone w/ next appt date.

## 2018-05-11 ENCOUNTER — OFFICE VISIT (OUTPATIENT)
Dept: ORTHOPEDICS | Facility: CLINIC | Age: 83
End: 2018-05-11
Payer: COMMERCIAL

## 2018-05-11 VITALS
BODY MASS INDEX: 26.36 KG/M2 | SYSTOLIC BLOOD PRESSURE: 122 MMHG | DIASTOLIC BLOOD PRESSURE: 68 MMHG | WEIGHT: 164 LBS | HEIGHT: 66 IN

## 2018-05-11 DIAGNOSIS — G89.29 CHRONIC BILATERAL LOW BACK PAIN, WITH SCIATICA PRESENCE UNSPECIFIED: ICD-10-CM

## 2018-05-11 DIAGNOSIS — M54.5 CHRONIC BILATERAL LOW BACK PAIN, WITH SCIATICA PRESENCE UNSPECIFIED: ICD-10-CM

## 2018-05-11 DIAGNOSIS — M16.12 PRIMARY OSTEOARTHRITIS OF LEFT HIP: Primary | ICD-10-CM

## 2018-05-11 PROCEDURE — 99214 OFFICE O/P EST MOD 30 MIN: CPT | Performed by: PEDIATRICS

## 2018-05-11 NOTE — MR AVS SNAPSHOT
After Visit Summary   5/11/2018    Christiana Sal    MRN: [id number]           Patient Information     Date Of Birth          10/25/1931        Visit Information        Provider Department      5/11/2018 9:00 AM Lu Page MD [facility]        Today's Diagnoses     Primary osteoarthritis of left hip    -  1    Chronic bilateral low back pain, with sciatica presence unspecified          Care Instructions    Plan:  - Today's Plan of Care:  US guided left hip injection with Dr. Palafox    -We also discussed other future treatment options:  Referral to pain management    Follow Up: as needed    If you have any further questions for your physician or physician s care team you can call [phone] and use option 3 to leave a voice message. Calls received during business hours will be returned same day.            Follow-ups after your visit        Additional Services     ORTHO  REFERRAL       [facility] is referring you to the Orthopedic  Services at [facility] [place].       The  Representative will assist you in the coordination of your Orthopedic and Musculoskeletal Care as prescribed by your physician.    The  Representative will call you within 1 business day to help schedule your appointment, or you may contact the  Representative at:    All areas ~ [phone]     Type of Referral : US guided left hip injection with Dr. Palafox       Timeframe requested: Routine    Coverage of these services is subject to the terms and limitations of your health insurance plan.  Please call member services at your health plan with any benefit or coverage questions.      If X-rays, CT or MRI's have been performed, please contact the facility where they were done to arrange for , prior to your scheduled appointment.  Please bring this referral request to your appointment and present it to your  "specialist.                  Your next 10 appointments already scheduled     Aug 14, 2018  7:30 AM CDT   Remote PPM Check with [facility]   [facility] ([facility])    [address] [address] [phone]           This appointment is for a remote check of your pacemaker.  This is not an appointment at the office.              Who to contact     If you have questions or need follow up information about today's clinic visit or your schedule please contact [facility] directly at [phone].  Normal or non-critical lab and imaging results will be communicated to you by [email or web address], letter or phone within 4 business days after the clinic has received the results. If you do not hear from us within 7 days, please contact the clinic through [employer] or phone. If you have a critical or abnormal lab result, we will notify you by phone as soon as possible.  Submit refill requests through [employer] or call your pharmacy and they will forward the refill request to us. Please allow 3 business days for your refill to be completed.          Additional Information About Your Visit        [email or web address] Information     [employer] gives you secure access to your electronic health record. If you see a primary care provider, you can also send messages to your care team and make appointments. If you have questions, please call your primary care clinic.  If you do not have a primary care provider, please call [phone] and they will assist you.        Care EveryWhere ID     This is your Care EveryWhere ID. This could be used by other organizations to access your [facility] medical records  [id number]        Your Vitals Were     Height BMI (Body Mass Index)                5' 6\" (1.676 m) 26.47 kg/m2           Blood Pressure from Last 3 Encounters:   05/11/18 122/68   04/30/18 126/74   04/10/18 183/77    Weight from Last 3 Encounters:   05/11/18 164 lb " (74.4 kg)   04/30/18 164 lb (74.4 kg)   03/12/18 164 lb (74.4 kg)              We Performed the Following     ORTHO  REFERRAL        Primary Care Provider Office Phone # Fax #    Kelly Matthew PA-C [phone] [address] [place] [address]        Equal Access to Services     [facility] : Hadii aad ku hadasho Soomaali, waaxda luqadaha, qaybta kaalmada adeegyada, waxay idiin hayaan adeeg khjose alejandrosh la'aan ah. So [facility] [phone].    ATENCIÓN: Si habla español, tiene a ozuna disposición servicios gratuitos de asistencia lingüística. [facility] [phone].    We comply with applicable federal civil rights laws and [place] laws. We do not discriminate on the basis of race, color, national origin, age, disability, sex, sexual orientation, or gender identity.            Thank you!     Thank you for choosing [facility]  for your care. Our goal is always to provide you with excellent care. Hearing back from our patients is one way we can continue to improve our services. Please take a few minutes to complete the written survey that you may receive in the mail after your visit with us. Thank you!             Your Updated Medication List - Protect others around you: Learn how to safely use, store and throw away your medicines at www.disposemymeds.org.          This list is accurate as of 5/11/18  9:44 AM.  Always use your most recent med list.                   Brand Name Dispense Instructions for use Diagnosis    acetaminophen 500 MG tablet    TYLENOL    30 tablet    Take 2 tablets (1,000 mg) by mouth every 8 hours as needed for mild pain        * albuterol 108 (90 Base) MCG/ACT Inhaler    PROAIR HFA/PROVENTIL HFA/VENTOLIN HFA    1 Inhaler    Inhale 2 puffs into the lungs every 6 hours as needed for shortness of breath / dyspnea or wheezing    Acute bronchitis with symptoms > 10 days       * albuterol 108 (90 Base) MCG/ACT Inhaler    PROAIR  HFA/PROVENTIL HFA/VENTOLIN HFA    1 Inhaler    Inhale 2 puffs into the lungs every 6 hours as needed for shortness of breath / dyspnea or wheezing    Acute bronchitis with symptoms > 10 days       aspirin 81 MG tablet      Take 2 tablets by mouth every evening. *.        atorvastatin 40 MG tablet    LIPITOR    90 tablet    TAKE ONE TABLET BY MOUTH EVERY DAY    CAD S/P percutaneous coronary angioplasty       benzonatate 100 MG capsule    TESSALON    42 capsule    Take 1 capsule (100 mg) by mouth 3 times daily as needed for cough    Cough       CALCIUM 600 + D PO      1 tablet by mouth 2 times per day        citalopram 20 MG tablet    celeXA    90 tablet    Take 1 tablet (20 mg) by mouth daily    Major depressive disorder, recurrent episode, mild (H)       doxycycline 100 MG capsule    VIBRAMYCIN    20 capsule    Take 1 capsule (100 mg) by mouth 2 times daily    Acute sinusitis with symptoms > 10 days       FISH OIL PO      1 daily        levothyroxine 75 MCG tablet    SYNTHROID/LEVOTHROID    90 tablet    Take 1 tablet (75 mcg) by mouth daily Due for labs.    Hypothyroidism, unspecified type       losartan 25 MG tablet    COZAAR    90 tablet    TAKE ONE TABLET BY MOUTH ONCE DAILY    Essential hypertension with goal blood pressure less than 140/90       metoprolol succinate 25 MG 24 hr tablet    TOPROL-XL    90 tablet    Take 0.5 tablets (12.5 mg) by mouth daily    Essential hypertension with goal blood pressure less than 140/90       nitroGLYcerin 0.4 MG sublingual tablet    NITROSTAT    25 tablet    Place 1 tablet (0.4 mg) under the tongue every 5 minutes as needed for chest pain (call your doctor if you take a third dose)    Atherosclerosis of native coronary artery of native heart without angina pectoris       OCUVITE PO      Take 1 tablet by mouth daily.        ondansetron 4 MG tablet    ZOFRAN    18 tablet    Take 1 tablet (4 mg) by mouth every 6 hours as needed for nausea    Nausea       * oxybutynin 5 MG 24 hr  tablet    DITROPAN-XL    30 tablet    Take 1 tablet (5 mg) by mouth daily    Overactive bladder       * oxybutynin 10 MG 24 hr tablet    DITROPAN XL    90 tablet    Take 1 tablet (10 mg) by mouth daily    Overactive bladder       pantoprazole 20 MG EC tablet    PROTONIX    180 tablet    Take 2 tablets (40 mg) by mouth daily    Gastroesophageal reflux disease, esophagitis presence not specified       pramipexole 0.5 MG tablet    MIRAPEX    90 tablet    TAKE ONE TABLET BY MOUTH AT BEDTIME    Restless leg syndrome       * ranitidine 150 MG tablet    ZANTAC    60 tablet    Take 2 tablets (300 mg) by mouth At Bedtime    Gastroesophageal reflux disease, esophagitis presence not specified       * ranitidine 150 MG tablet    ZANTAC    180 tablet    Take 2 tablets (300 mg) by mouth At Bedtime    Gastroesophageal reflux disease, esophagitis presence not specified       * Notice:  This list has 6 medication(s) that are the same as other medications prescribed for you. Read the directions carefully, and ask your doctor or other care provider to review them with you.

## 2018-05-11 NOTE — PROGRESS NOTES
"Sports Medicine Clinic Visit - Interim History May 11, 2018    PCP: Kelly Matthew Kim Sal is a 86 year old female who is seen in f/u up for    Primary osteoarthritis of left hip  Chronic bilateral low back pain, with sciatica presence unspecified.  Since last visit on 11/1/17, patient has been seen by multiple providers for her hip and low back including orthopedic surgery and spine surgery.  She has been told her pain is from both and she subsequently has also had an MRI of her lumbar spine.  Today she reports primarily left hip pain and episodes of her left leg \"giving out\" on her like the \"power is shut off\". She uses a walker to get around and sometimes a cane, but doesn't feel as steady with the cane.  She had some relief from the previous hip injection with Dr. Palafox 9/27/2017, however, it's relief only lasted ~ 1 month.    I had a long discussion with Christiana today about what is bothering her the most.  She states her pain and giving out are both bothering her.  She would consider hip replacement, however, not currently as her  is sick.  She would not desire lumbar surgery.    Symptoms are better with: Tylenol and Rest  Symptoms are worse with: stairs, laying on left side, prolonged sitting and walking  Additional Features:   Positive: Pain, weakness, instability   Negative: swelling, bruising, popping, grinding, catching, locking, paresthesias and numbness     Social History: retired    Review of Systems  Skin: no bruising, no swelling  Musculoskeletal: as above  Neurologic: no numbness, paresthesias  Remainder of review of systems is negative including constitutional, CV, pulmonary, GI, except as noted in HPI or medical history.    Patient's current problem list, past medical and surgical history, and family history were reviewed.    Patient Active Problem List   Diagnosis     Coronary atherosclerosis of native coronary artery     Hyperlipidemia LDL goal <100     " Essential hypertension, benign     Hypothyroidism     GERD (gastroesophageal reflux disease)     Family history of colon cancer     Hypertension goal BP (blood pressure) < 140/90     Major depressive disorder, recurrent episode, mild (H)     Advanced directives, counseling/discussion     Angina pectoris (H)     Vulvar pruritus     Lichen planus     JENIFER (obstructive sleep apnea)     CAD S/P percutaneous coronary angioplasty     S/P cardiac pacemaker procedure     Sinoatrial node dysfunction (H)     Cardiac pacemaker - [employer] dual lead - Not Dependent     Insect bite     Bilateral low back pain without sciatica     Hip pain, left     Past Medical History:   Diagnosis Date     Coronary atherosclerosis of native coronary artery     s/p MI ([facility])     Essential hypertension, benign      GERD (gastroesophageal reflux disease)      History of transient ischemic attack (TIA)      Hyperlipidemia LDL goal <100      Hypothyroidism      Major depressive disorder, recurrent episode, mild (H) 5/18/2011     Peptic ulcer disease with hemorrhage     Remote history of stomach ulcer, requiring transfusion after chronic bleeding     Past Surgical History:   Procedure Laterality Date     APPENDECTOMY       CARDIAC CATHERIZATION  1/15/04    drug-eluding stent RCA, Dr. Pérez, [facility]     CARDIAC CATHERIZATION  2/16/12    diffuse mild/mod disease, no new stent     CATARACT IOL, RT/LT  1/26/12    RT, Dr. Wynne     CHOLECYSTECTOMY, OPEN       COLONOSCOPY  2008    [facility]     FRACTURE TX, ANKLE RT/LT      LT, 2 screws remain     HC EXCISE HAND/FOOT NEUROMA      RT     HC LARYNGOSCOPY DIRECT W VOCAL CORD INJECTION      vocal cord polypectomy     HC REMOVAL OF OVARIAN CYST(S)       HC TRANSCATH STENT INIT VESSEL,PERCUT      x 2     REPAIR HAMMER TOE  9/13/10    multiple + RT great toe tendon release, Dr. Sanchez DPM     STENT  5-2016    Cardiac stents 6 placed.     Family History   Problem Relation Age of  "Onset     C.A.D. Father      Hypertension Father      Myocardial Infarction Father 66      from MI     C.A.D. Brother      Myocardial Infarction Brother      CANCER Brother      skin ca     Breast Cancer Sister      Cancer - colorectal Sister      HEART DISEASE Sister      Neurologic Disorder Mother      migraine     C.A.D. Mother      Hypertension Mother      Heart Failure Mother      Thyroid Disease Son      cretinism     Hypertension Son      HEART DISEASE Son      Psychotic Disorder Sister      Hypertension Sister      HEART DISEASE Sister      CABG     Hypertension Brother      C.A.D. Brother      Myocardial Infarction Brother      Arthritis Daughter      RA     Hypertension Sister      HEART DISEASE Sister      CANCER Sister      Blood Disease Sister      Neurologic Disorder Child      migraine (2 children)       Objective  /68 (BP Location: Left arm, Patient Position: Sitting, Cuff Size: Adult Regular)  Ht 5' 6\" (1.676 m)  Wt 164 lb (74.4 kg)  BMI 26.47 kg/m2    GENERAL APPEARANCE: healthy, alert and no distress   GAIT: antalgic  SKIN: no suspicious lesions or rashes  HEENT: Sclera clear, anicteric  CV: good peripheral pulses  RESP: Breathing not labored  NEURO: Normal strength and tone, mentation intact and speech normal  PSYCH:  mentation appears normal and affect normal/bright    Low back exam:  Inspection:     no visible deformity in the low back       normal skin       normal vascular       normal lymphatic     Posture:      rounded shoulders and upper back       lumbar lordosis diminished     Foot Inspection:     pes planus     Tender:     paraspinal muscles - mild     Non Tender:     remainder of lumbar spine     ROM:      full flexion       limited extension but no pain     Strength:     hip flexion 5/5 bilateral       knee extension 5/5 bilateral       ankle dorsiflexion 5/5 bilateral       ankle plantarflexion 5/5 bilateral       dorsiflexion of the great toe 5/5 bilateral       able to " heel and toe walk     Reflexes:     patellar (L3, L4) symmetric normal       achilles tendons (S1) symmetric normal     Sensation:    grossly intact throughout lower extremities     Special tests:      straight leg raise left negative        straight leg raise right negative     Bilateral hip exam  Inspection:      no edema or ecchymosis in hip area     Tender:      Mild throughout upper leg     Non Tender:      remainder of the hip area bilateral     ROM:     Range of motion limited by pain left     Strength:      flexion 4/5 left       abduction 5/5 bilateral       adduction 5/5 bilateral     Special Tests:      positive (+) CYNDY left in groin       positive (+) FADIR left in groin      Radiology  I ordered, visualized and reviewed these images with the patient    MR LUMBAR SPINE W/O CONTRAST 4/24/2018 12:05 PM  Provided History: R/o nerve root compression.; Health maintenance  examination; Lumbar pain; Radiculopathy; Weakness  ICD-10: Health maintenance examination; Lumbar pain; Radiculopathy;  Weakness  Comparison: Lumbar spine. No Radiograph 9/22/2017  Technique: Sagittal T1-weighted, sagittal STIR, 3D volumetric axial  and sagittal reconstructed T2-weighted images of the lumbar spine were  obtained without intravenous contrast.   Findings: Regarding numbering convention, there are 5 lumbar-type  vertebrae assumed for the purposes of this dictation.  The tip of the  conus medullaris is at T12-L1. There is a 6 mm anterolisthesis of L2  on L3, 5 mm anterolisthesis of L3 on L4, and 3 mm retrolisthesis of L4  on L5. There is severe loss of disc height at L2-3 and L4-5. Inferior  endplate compression deformity at L2. Right scoliotic curvature of the  lumbar spine with apex at L2. Degenerative endplate and vertebral body  signal at L2-3, greater on the left.  On a level by level basis:  T12-L1: Mild bulging disc. No spinal canal or neuroforaminal stenosis.  L1-2: Moderate circumferential disc bulge. Moderate  narrowing of the  spinal canal and mild-to-moderate bilateral neuroforaminal stenosis.  In addition, there is mild to moderate narrowing of the lateral  recesses bilaterally, with disc material contacting, but not clearly  impinging upon the descending L2 nerve roots bilaterally.  L2-3: Left central disc extrusion which abuts the descending left L3  nerve root in the lateral recess. In addition, a bulging disc contacts  the extraforaminal left L2 nerve root with moderate to severe left  neuroforaminal stenosis. Right foraminal disc protrusion with moderate  right neuroforaminal stenosis. Moderate to severe spinal canal  narrowing.  L3-4: Mild to moderate disc bulge which abuts the bilateral L3 nerve  roots. Moderate bilateral neuroforaminal stenosis. Moderate to severe  spinal canal narrowing. Bilateral facet arthropathy. Moderate left and  mild/moderate right narrowing of the lateral recesses, possibly  impinging upon the left descending L4 nerve root.   L4-5: Mild to moderate disc bulge with right foraminal disc extrusion  which abuts the right L4 nerve root. Moderate to severe right neural  foraminal stenosis and mild left neural foraminal stenosis. Moderate  spinal canal narrowing, with mild left greater than right lateral  recess narrowing, with disc material contacting the descending left L5  nerve root. Bilateral facet arthropathy.  L5-S1: Disc protrusion extending from the left central zone into the  right neural foramen, mildly narrowing the right lateral recess, with  disc material contacting, but not clearly impinging upon the  descending right S1 nerve root. Moderate right and mild left neural  foraminal stenosis. Mild spinal canal narrowing. Bilateral facet  arthropathy.  Atrophy of the paraspinous and psoas musculature, right more than  left. 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    Impression:   1.  Moderate lateral recess narrowing with possible impingement upon  the  descending left L3 nerve root at L2-3 and the descending left L4  nerve root at L3-4.  2.  Moderate to severe left neural foraminal narrowing at L2-3 and on  the right at L3-4 and L4-5.     LEFT HIP TWO TO THREE VIEWS   9/22/2017 8:44 AM   HISTORY: Evaluate arthritis. Pain in left leg.  COMPARISON: None.  IMPRESSION: Severe bilateral osteoarthritis of both hips is present.  There is no evidence for fracture or dislocation. Degenerative changes  also noted in spine.     LUMBAR SPINE 2 VIEWS  9/22/2017 8:24 AM   HISTORY: Pain in left leg  COMPARISON: None.  IMPRESSION : Multilevel degenerative change L2-S1 with disc space  narrowing at these levels and anterior L34 spondylolisthesis. There is  posterior facet degenerative change and lumbar scoliosis convex right.  No vertebral compression or acute appearing fractures. There is  bilateral medial hip joint space narrowing.       Assessment:  1. Primary osteoarthritis of left hip    2. Chronic bilateral low back pain, with sciatica presence unspecified      Patient has severe hip arthritis and severe lumbar stenosis.  These are likely causing her symptoms of hip pain and leg giving out.  We discussed the following treatment options:  Referral to orthopedic surgery to discuss hip replacement, repeat hip injection, physical therapy, referral to pain management to discuss lumbar injections.  - Patient would like to try another hip injection at this time.    Plan:  - Today's Plan of Care:  US guided left hip injection with Dr. Palafox    -We also discussed other future treatment options:  Referral to pain management    Follow Up: as needed    Concerning signs and symptoms were reviewed.  The patient expressed understanding of this management plan and all questions were answered at this time.    Lu Page MD [facility]  Primary Care Sports Medicine  [facility]

## 2018-05-11 NOTE — PATIENT INSTRUCTIONS
Plan:  - Today's Plan of Care:  US guided left hip injection with Dr. Palafox    -We also discussed other future treatment options:  Referral to pain management    Follow Up: as needed    If you have any further questions for your physician or physician s care team you can call [phone] and use option 3 to leave a voice message. Calls received during business hours will be returned same day.

## 2018-05-11 NOTE — LETTER
"    5/11/2018         RE: Christiana Sal  [id number] [facility] [address]        Dear Colleague,    Thank you for referring your patient, Christiana Sal, to the [facility]. Please see a copy of my visit note below.    Sports Medicine Clinic Visit - Interim History May 11, 2018    PCP: Kelly Matthew    Christiana Sal is a 86 year old female who is seen in f/u up for    Primary osteoarthritis of left hip  Chronic bilateral low back pain, with sciatica presence unspecified.  Since last visit on 11/1/17, patient has been seen by multiple providers for her hip and low back including orthopedic surgery and spine surgery.  She has been told her pain is from both and she subsequently has also had an MRI of her lumbar spine.  Today she reports primarily left hip pain and episodes of her left leg \"giving out\" on her like the \"power is shut off\". She uses a walker to get around and sometimes a cane, but doesn't feel as steady with the cane.  She had some relief from the previous hip injection with Dr. Palafox 9/27/2017, however, it's relief only lasted ~ 1 month.    I had a long discussion with Christiana today about what is bothering her the most.  She states her pain and giving out are both bothering her.  She would consider hip replacement, however, not currently as her  is sick.  She would not desire lumbar surgery.    Symptoms are better with: Tylenol and Rest  Symptoms are worse with: stairs, laying on left side, prolonged sitting and walking  Additional Features:   Positive: Pain, weakness, instability   Negative: swelling, bruising, popping, grinding, catching, locking, paresthesias and numbness     Social History: retired    Review of Systems  Skin: no bruising, no swelling  Musculoskeletal: as above  Neurologic: no numbness, paresthesias  Remainder of review of systems is negative including constitutional, CV, pulmonary, GI, except " as noted in HPI or medical history.    Patient's current problem list, past medical and surgical history, and family history were reviewed.    Patient Active Problem List   Diagnosis     Coronary atherosclerosis of native coronary artery     Hyperlipidemia LDL goal <100     Essential hypertension, benign     Hypothyroidism     GERD (gastroesophageal reflux disease)     Family history of colon cancer     Hypertension goal BP (blood pressure) < 140/90     Major depressive disorder, recurrent episode, mild (H)     Advanced directives, counseling/discussion     Angina pectoris (H)     Vulvar pruritus     Lichen planus     JENIFER (obstructive sleep apnea)     CAD S/P percutaneous coronary angioplasty     S/P cardiac pacemaker procedure     Sinoatrial node dysfunction (H)     Cardiac pacemaker - [employer] dual lead - Not Dependent     Insect bite     Bilateral low back pain without sciatica     Hip pain, left     Past Medical History:   Diagnosis Date     Coronary atherosclerosis of native coronary artery     s/p MI ([facility])     Essential hypertension, benign      GERD (gastroesophageal reflux disease)      History of transient ischemic attack (TIA)      Hyperlipidemia LDL goal <100      Hypothyroidism      Major depressive disorder, recurrent episode, mild (H) 5/18/2011     Peptic ulcer disease with hemorrhage     Remote history of stomach ulcer, requiring transfusion after chronic bleeding     Past Surgical History:   Procedure Laterality Date     APPENDECTOMY       CARDIAC CATHERIZATION  1/15/04    drug-eluding stent RCA, Dr. Pérez, [facility]     CARDIAC CATHERIZATION  2/16/12    diffuse mild/mod disease, no new stent     CATARACT IOL, RT/LT  1/26/12    RT, Dr. Wynne     CHOLECYSTECTOMY, OPEN       COLONOSCOPY  2008    [facility]     FRACTURE TX, ANKLE RT/LT      LT, 2 screws remain     HC EXCISE HAND/FOOT NEUROMA      RT     HC LARYNGOSCOPY DIRECT W VOCAL CORD INJECTION      vocal cord  "polypectomy     HC REMOVAL OF OVARIAN CYST(S)       HC TRANSCATH STENT INIT VESSEL,PERCUT      x 2     REPAIR HAMMER TOE  9/13/10    multiple + RT great toe tendon release, Dr. Sanchez DPM     STENT  -    Cardiac stents 6 placed.     Family History   Problem Relation Age of Onset     C.A.D. Father      Hypertension Father      Myocardial Infarction Father 66      from MI     C.A.D. Brother      Myocardial Infarction Brother      CANCER Brother      skin ca     Breast Cancer Sister      Cancer - colorectal Sister      HEART DISEASE Sister      Neurologic Disorder Mother      migraine     C.A.D. Mother      Hypertension Mother      Heart Failure Mother      Thyroid Disease Son      cretinism     Hypertension Son      HEART DISEASE Son      Psychotic Disorder Sister      Hypertension Sister      HEART DISEASE Sister      CABG     Hypertension Brother      C.A.D. Brother      Myocardial Infarction Brother      Arthritis Daughter      RA     Hypertension Sister      HEART DISEASE Sister      CANCER Sister      Blood Disease Sister      Neurologic Disorder Child      migraine (2 children)       Objective  /68 (BP Location: Left arm, Patient Position: Sitting, Cuff Size: Adult Regular)  Ht 5' 6\" (1.676 m)  Wt 164 lb (74.4 kg)  BMI 26.47 kg/m2    GENERAL APPEARANCE: healthy, alert and no distress   GAIT: antalgic  SKIN: no suspicious lesions or rashes  HEENT: Sclera clear, anicteric  CV: good peripheral pulses  RESP: Breathing not labored  NEURO: Normal strength and tone, mentation intact and speech normal  PSYCH:  mentation appears normal and affect normal/bright    Low back exam:  Inspection:     no visible deformity in the low back       normal skin       normal vascular       normal lymphatic     Posture:      rounded shoulders and upper back       lumbar lordosis diminished     Foot Inspection:     pes planus     Tender:     paraspinal muscles - mild     Non Tender:     remainder of lumbar spine     ROM: "      full flexion       limited extension but no pain     Strength:     hip flexion 5/5 bilateral       knee extension 5/5 bilateral       ankle dorsiflexion 5/5 bilateral       ankle plantarflexion 5/5 bilateral       dorsiflexion of the great toe 5/5 bilateral       able to heel and toe walk     Reflexes:     patellar (L3, L4) symmetric normal       achilles tendons (S1) symmetric normal     Sensation:    grossly intact throughout lower extremities     Special tests:      straight leg raise left negative        straight leg raise right negative     Bilateral hip exam  Inspection:      no edema or ecchymosis in hip area     Tender:      Mild throughout upper leg     Non Tender:      remainder of the hip area bilateral     ROM:     Range of motion limited by pain left     Strength:      flexion 4/5 left       abduction 5/5 bilateral       adduction 5/5 bilateral     Special Tests:      positive (+) CYNDY left in groin       positive (+) FADIR left in groin      Radiology  I ordered, visualized and reviewed these images with the patient    MR LUMBAR SPINE W/O CONTRAST 4/24/2018 12:05 PM  Provided History: R/o nerve root compression.; Health maintenance  examination; Lumbar pain; Radiculopathy; Weakness  ICD-10: Health maintenance examination; Lumbar pain; Radiculopathy;  Weakness  Comparison: Lumbar spine. No Radiograph 9/22/2017  Technique: Sagittal T1-weighted, sagittal STIR, 3D volumetric axial  and sagittal reconstructed T2-weighted images of the lumbar spine were  obtained without intravenous contrast.   Findings: Regarding numbering convention, there are 5 lumbar-type  vertebrae assumed for the purposes of this dictation.  The tip of the  conus medullaris is at T12-L1. There is a 6 mm anterolisthesis of L2  on L3, 5 mm anterolisthesis of L3 on L4, and 3 mm retrolisthesis of L4  on L5. There is severe loss of disc height at L2-3 and L4-5. Inferior  endplate compression deformity at L2. Right scoliotic curvature  of the  lumbar spine with apex at L2. Degenerative endplate and vertebral body  signal at L2-3, greater on the left.  On a level by level basis:  T12-L1: Mild bulging disc. No spinal canal or neuroforaminal stenosis.  L1-2: Moderate circumferential disc bulge. Moderate narrowing of the  spinal canal and mild-to-moderate bilateral neuroforaminal stenosis.  In addition, there is mild to moderate narrowing of the lateral  recesses bilaterally, with disc material contacting, but not clearly  impinging upon the descending L2 nerve roots bilaterally.  L2-3: Left central disc extrusion which abuts the descending left L3  nerve root in the lateral recess. In addition, a bulging disc contacts  the extraforaminal left L2 nerve root with moderate to severe left  neuroforaminal stenosis. Right foraminal disc protrusion with moderate  right neuroforaminal stenosis. Moderate to severe spinal canal  narrowing.  L3-4: Mild to moderate disc bulge which abuts the bilateral L3 nerve  roots. Moderate bilateral neuroforaminal stenosis. Moderate to severe  spinal canal narrowing. Bilateral facet arthropathy. Moderate left and  mild/moderate right narrowing of the lateral recesses, possibly  impinging upon the left descending L4 nerve root.   L4-5: Mild to moderate disc bulge with right foraminal disc extrusion  which abuts the right L4 nerve root. Moderate to severe right neural  foraminal stenosis and mild left neural foraminal stenosis. Moderate  spinal canal narrowing, with mild left greater than right lateral  recess narrowing, with disc material contacting the descending left L5  nerve root. Bilateral facet arthropathy.  L5-S1: Disc protrusion extending from the left central zone into the  right neural foramen, mildly narrowing the right lateral recess, with  disc material contacting, but not clearly impinging upon the  descending right S1 nerve root. Moderate right and mild left neural  foraminal stenosis. Mild spinal canal  narrowing. Bilateral facet  arthropathy.  Atrophy of the paraspinous and psoas musculature, right more than  left. 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    Impression:   1.  Moderate lateral recess narrowing with possible impingement upon  the descending left L3 nerve root at L2-3 and the descending left L4  nerve root at L3-4.  2.  Moderate to severe left neural foraminal narrowing at L2-3 and on  the right at L3-4 and L4-5.     LEFT HIP TWO TO THREE VIEWS   9/22/2017 8:44 AM   HISTORY: Evaluate arthritis. Pain in left leg.  COMPARISON: None.  IMPRESSION: Severe bilateral osteoarthritis of both hips is present.  There is no evidence for fracture or dislocation. Degenerative changes  also noted in spine.     LUMBAR SPINE 2 VIEWS  9/22/2017 8:24 AM   HISTORY: Pain in left leg  COMPARISON: None.  IMPRESSION : Multilevel degenerative change L2-S1 with disc space  narrowing at these levels and anterior L34 spondylolisthesis. There is  posterior facet degenerative change and lumbar scoliosis convex right.  No vertebral compression or acute appearing fractures. There is  bilateral medial hip joint space narrowing.       Assessment:  1. Primary osteoarthritis of left hip    2. Chronic bilateral low back pain, with sciatica presence unspecified      Patient has severe hip arthritis and severe lumbar stenosis.  These are likely causing her symptoms of hip pain and leg giving out.  We discussed the following treatment options:  Referral to orthopedic surgery to discuss hip replacement, repeat hip injection, physical therapy, referral to pain management to discuss lumbar injections.  - Patient would like to try another hip injection at this time.    Plan:  - Today's Plan of Care:  US guided left hip injection with Dr. Palafox    -We also discussed other future treatment options:  Referral to pain management    Follow Up: as needed    Concerning signs and symptoms were reviewed.  The patient  expressed understanding of this management plan and all questions were answered at this time.    Lu Page MD CAQ  Primary Care Sports Medicine  [facility]    Again, thank you for allowing me to participate in the care of your patient.        Sincerely,        Lu Page MD

## 2018-05-29 ENCOUNTER — NURSE TRIAGE (OUTPATIENT)
Dept: NURSING | Facility: CLINIC | Age: 83
End: 2018-05-29

## 2018-05-29 ENCOUNTER — TELEPHONE (OUTPATIENT)
Dept: FAMILY MEDICINE | Facility: CLINIC | Age: 83
End: 2018-05-29

## 2018-05-29 ENCOUNTER — OFFICE VISIT (OUTPATIENT)
Dept: ORTHOPEDICS | Facility: CLINIC | Age: 83
End: 2018-05-29
Attending: PEDIATRICS
Payer: COMMERCIAL

## 2018-05-29 VITALS
DIASTOLIC BLOOD PRESSURE: 65 MMHG | BODY MASS INDEX: 26.52 KG/M2 | SYSTOLIC BLOOD PRESSURE: 125 MMHG | WEIGHT: 165 LBS | HEIGHT: 66 IN

## 2018-05-29 DIAGNOSIS — M16.12 PRIMARY OSTEOARTHRITIS OF LEFT HIP: Primary | ICD-10-CM

## 2018-05-29 DIAGNOSIS — R05.9 COUGH: ICD-10-CM

## 2018-05-29 PROCEDURE — 20611 DRAIN/INJ JOINT/BURSA W/US: CPT | Mod: LT | Performed by: FAMILY MEDICINE

## 2018-05-29 PROCEDURE — 99207 ZZC NO CHARGE LOS: CPT | Performed by: FAMILY MEDICINE

## 2018-05-29 RX ORDER — TRIAMCINOLONE ACETONIDE 40 MG/ML
40 INJECTION, SUSPENSION INTRA-ARTICULAR; INTRAMUSCULAR ONCE
Qty: 1 ML | Refills: 0 | OUTPATIENT
Start: 2018-05-29 | End: 2018-05-29

## 2018-05-29 NOTE — LETTER
2018         RE: Christiana Sal  [id number] [facility] [address]        Dear Colleague,    Thank you for referring your patient, Christiana Sal, to the [facility]. Please see a copy of my visit note below.    Christiana Sal  :  10/25/1931  DOS: 18  MRN: [id number]    Sports Medicine Clinic Procedure    Ultrasound Guided Left Intra-Articular Hip Injection    Clinical History: Patient reports left leg pain for the past 3 months. It started out as trouble walking and has been gradually worsening. She reports that her leg will give out. She has been using a cane within the last month. Her pain is variable, at times in her buttock, thigh, and will extend to her knee and her calf. She denies numbness or tingling.    Patient reports that she had good relief following steroid injection on 17.      Diagnosis:   1. Primary osteoarthritis of left hip         Referring Physician: Lu Page MD  Technique: The risks of the procedure were explained to the patient.  A consent was signed for the intra-articular hip injection.  The patient was evaluated with a Qlibri ultrasound machine using a 12 MHz linear probe.     4 ml of 1% lidocaine was used for local anesthesia. The Left hip was prepped and draped in a sterile manner.  Ultrasound identification of the acetabulum, femoral head, and femoral neck in both long and short axis.  The location of the femoral vessels were noted and marked.  The probe was placed in a oblique-sagittal axis to the Left femoral neck.  A 3.5 inch 22 gauge needle was placed under ultrasound guidance into the anterior hip capsule.  6 ml straw colored fluid was aspirated.  A mixture of 3 ml's of 0.2% ropivacaine and 1 ml kenalog (40mg/ml) was injected without difficulty on oblique-sagittal axis view.  The needle was removed and there was good hemostasis without complications.  There was ultrasound  documentation of needle placement and injection.  Pre-procedural pain 6/10.  Post procedural pain 2-3/10.    Impression:  Successful Left intra-articular hip injection.    Plan:  Follow up PRN as directed by Dr Page  Give update in 2 weeks, especially if getting minimal relief  Expectations and goals of CSI reviewed  Often 2-3 days for steroid effect, and can take up to two weeks for maximum effect  We discussed modified progressive pain-free activity as tolerated  Do not overuse in first two weeks if feeling better due to concern for vulnerability while steroid is working  Supportive care reviewed  All questions were answered today  Contact us with additional questions or concerns  Signs and sx of concern reviewed      Ricardo Palafox DO, SAMUEL  Primary Care Sports Medicine  [facility]           Again, thank you for allowing me to participate in the care of your patient.        Sincerely,        Ricardo Palafox DO

## 2018-05-29 NOTE — MR AVS SNAPSHOT
After Visit Summary   5/29/2018    Christiana Sal    MRN: [id number]           Patient Information     Date Of Birth          10/25/1931        Visit Information        Provider Department      5/29/2018 10:40 AM Ricardo Palafox,  [facility]        Today's Diagnoses     Primary osteoarthritis of left hip    -  1       Follow-ups after your visit        Your next 10 appointments already scheduled     Aug 14, 2018  7:30 AM CDT   Remote PPM Check with [facility]   [facility] ([facility])    [address] [id number] [phone]           This appointment is for a remote check of your pacemaker.  This is not an appointment at the office.              Who to contact     If you have questions or need follow up information about today's clinic visit or your schedule please contact [facility] directly at [phone].  Normal or non-critical lab and imaging results will be communicated to you by [employer], letter or phone within 4 business days after the clinic has received the results. If you do not hear from us within 7 days, please contact the clinic through [employer] or phone. If you have a critical or abnormal lab result, we will notify you by phone as soon as possible.  Submit refill requests through [employer] or call your pharmacy and they will forward the refill request to us. Please allow 3 business days for your refill to be completed.          Additional Information About Your Visit        [employer] Information     [employer] gives you secure access to your electronic health record. If you see a primary care provider, you can also send messages to your care team and make appointments. If you have questions, please call your primary care clinic.  If you do not have a primary care provider, please call [phone] and they will assist you.        Care EveryWhere ID   "   This is your Care EveryWhere ID. This could be used by other organizations to access your [facility] medical records  [id number]        Your Vitals Were     Height BMI (Body Mass Index)                5' 6\" (1.676 m) 26.63 kg/m2           Blood Pressure from Last 3 Encounters:   05/29/18 125/65   05/11/18 122/68   04/30/18 126/74    Weight from Last 3 Encounters:   05/29/18 165 lb (74.8 kg)   05/11/18 164 lb (74.4 kg)   04/30/18 164 lb (74.4 kg)              We Performed the Following     HC ARTHROCENTESIS ASPIR&/INJ MAJOR JT/BURSA W/US     TRIAMCINOLONE ACET INJ NOS          Today's Medication Changes          These changes are accurate as of 5/29/18  1:00 PM.  If you have any questions, ask your nurse or doctor.               Start taking these medicines.        Dose/Directions    triamcinolone acetonide 40 MG/ML injection   Commonly known as:  KENALOG-40   Used for:  Primary osteoarthritis of left hip   Started by:  Ricardo Palafox,         Dose:  40 mg   1 mL (40 mg) by INTRA-ARTICULAR route once for 1 dose   Quantity:  1 mL   Refills:  0            Where to get your medicines      Some of these will need a paper prescription and others can be bought over the counter.  Ask your nurse if you have questions.     You don't need a prescription for these medications     triamcinolone acetonide 40 MG/ML injection                Primary Care Provider Office Phone # Fax #    Kelly Matthew PA-C [phone] [address] [facility] [address]        Equal Access to Services     [facility] Center[facility] AH: Hadii shailesh delong hadasho Soomaali, waaxda luqadaha, qaybta kaalmada adeenrique, waxay idiin hayaan adeeg kharash la'aan . So [facility] [phone].    ATENCIÓN: Si habla español, tiene a ozuna disposición servicios gratuitos de asistencia lingüística. Llame al [phone].    We comply with applicable federal civil rights laws and [place] laws. We do not discriminate on the basis of race, " color, national origin, age, disability, sex, sexual orientation, or gender identity.            Thank you!     Thank you for choosing [facility]  for your care. Our goal is always to provide you with excellent care. Hearing back from our patients is one way we can continue to improve our services. Please take a few minutes to complete the written survey that you may receive in the mail after your visit with us. Thank you!             Your Updated Medication List - Protect others around you: Learn how to safely use, store and throw away your medicines at www.disposemymeds.org.          This list is accurate as of 5/29/18  1:00 PM.  Always use your most recent med list.                   Brand Name Dispense Instructions for use Diagnosis    acetaminophen 500 MG tablet    TYLENOL    30 tablet    Take 2 tablets (1,000 mg) by mouth every 8 hours as needed for mild pain        * albuterol 108 (90 Base) MCG/ACT Inhaler    PROAIR HFA/PROVENTIL HFA/VENTOLIN HFA    1 Inhaler    Inhale 2 puffs into the lungs every 6 hours as needed for shortness of breath / dyspnea or wheezing    Acute bronchitis with symptoms > 10 days       * albuterol 108 (90 Base) MCG/ACT Inhaler    PROAIR HFA/PROVENTIL HFA/VENTOLIN HFA    1 Inhaler    Inhale 2 puffs into the lungs every 6 hours as needed for shortness of breath / dyspnea or wheezing    Acute bronchitis with symptoms > 10 days       aspirin 81 MG tablet      Take 2 tablets by mouth every evening. *.        atorvastatin 40 MG tablet    LIPITOR    90 tablet    TAKE ONE TABLET BY MOUTH EVERY DAY    CAD S/P percutaneous coronary angioplasty       benzonatate 100 MG capsule    TESSALON    42 capsule    Take 1 capsule (100 mg) by mouth 3 times daily as needed for cough    Cough       CALCIUM 600 + D PO      1 tablet by mouth 2 times per day        citalopram 20 MG tablet    celeXA    90 tablet    Take 1 tablet (20 mg) by mouth daily    Major depressive disorder,  recurrent episode, mild (H)       doxycycline 100 MG capsule    VIBRAMYCIN    20 capsule    Take 1 capsule (100 mg) by mouth 2 times daily    Acute sinusitis with symptoms > 10 days       FISH OIL PO      1 daily        levothyroxine 75 MCG tablet    SYNTHROID/LEVOTHROID    90 tablet    Take 1 tablet (75 mcg) by mouth daily Due for labs.    Hypothyroidism, unspecified type       losartan 25 MG tablet    COZAAR    90 tablet    TAKE ONE TABLET BY MOUTH ONCE DAILY    Essential hypertension with goal blood pressure less than 140/90       metoprolol succinate 25 MG 24 hr tablet    TOPROL-XL    90 tablet    Take 0.5 tablets (12.5 mg) by mouth daily    Essential hypertension with goal blood pressure less than 140/90       nitroGLYcerin 0.4 MG sublingual tablet    NITROSTAT    25 tablet    Place 1 tablet (0.4 mg) under the tongue every 5 minutes as needed for chest pain (call your doctor if you take a third dose)    Atherosclerosis of native coronary artery of native heart without angina pectoris       OCUVITE PO      Take 1 tablet by mouth daily.        ondansetron 4 MG tablet    ZOFRAN    18 tablet    Take 1 tablet (4 mg) by mouth every 6 hours as needed for nausea    Nausea       * oxybutynin 5 MG 24 hr tablet    DITROPAN-XL    30 tablet    Take 1 tablet (5 mg) by mouth daily    Overactive bladder       * oxybutynin 10 MG 24 hr tablet    DITROPAN XL    90 tablet    Take 1 tablet (10 mg) by mouth daily    Overactive bladder       pantoprazole 20 MG EC tablet    PROTONIX    180 tablet    Take 2 tablets (40 mg) by mouth daily    Gastroesophageal reflux disease, esophagitis presence not specified       pramipexole 0.5 MG tablet    MIRAPEX    90 tablet    TAKE ONE TABLET BY MOUTH AT BEDTIME    Restless leg syndrome       * ranitidine 150 MG tablet    ZANTAC    60 tablet    Take 2 tablets (300 mg) by mouth At Bedtime    Gastroesophageal reflux disease, esophagitis presence not specified       * ranitidine 150 MG tablet     ZANTAC    180 tablet    Take 2 tablets (300 mg) by mouth At Bedtime    Gastroesophageal reflux disease, esophagitis presence not specified       triamcinolone acetonide 40 MG/ML injection    KENALOG-40    1 mL    1 mL (40 mg) by INTRA-ARTICULAR route once for 1 dose    Primary osteoarthritis of left hip       * Notice:  This list has 6 medication(s) that are the same as other medications prescribed for you. Read the directions carefully, and ask your doctor or other care provider to review them with you.

## 2018-05-29 NOTE — PROGRESS NOTES
Christiana Sal  :  10/25/1931  DOS: 18  MRN: [id number]    Sports Medicine Clinic Procedure    Ultrasound Guided Left Intra-Articular Hip Injection    Clinical History: Patient reports left leg pain for the past 3 months. It started out as trouble walking and has been gradually worsening. She reports that her leg will give out. She has been using a cane within the last month. Her pain is variable, at times in her buttock, thigh, and will extend to her knee and her calf. She denies numbness or tingling.    Patient reports that she had good relief following steroid injection on 17.      Diagnosis:   1. Primary osteoarthritis of left hip         Referring Physician: Lu Page MD  Technique: The risks of the procedure were explained to the patient.  A consent was signed for the intra-articular hip injection.  The patient was evaluated with a [email or web address] ultrasound machine using a 12 MHz linear probe.     4 ml of 1% lidocaine was used for local anesthesia. The Left hip was prepped and draped in a sterile manner.  Ultrasound identification of the acetabulum, femoral head, and femoral neck in both long and short axis.  The location of the femoral vessels were noted and marked.  The probe was placed in a oblique-sagittal axis to the Left femoral neck.  A 3.5 inch 22 gauge needle was placed under ultrasound guidance into the anterior hip capsule.  6 ml straw colored fluid was aspirated.  A mixture of 3 ml's of 0.2% ropivacaine and 1 ml kenalog (40mg/ml) was injected without difficulty on oblique-sagittal axis view.  The needle was removed and there was good hemostasis without complications.  There was ultrasound documentation of needle placement and injection.  Pre-procedural pain 6/10.  Post procedural pain 2-3/10.    Impression:  Successful Left intra-articular hip injection.    Plan:  Follow up PRN as directed by Dr Page  Give update in 2 weeks, especially if getting minimal  relief  Expectations and goals of CSI reviewed  Often 2-3 days for steroid effect, and can take up to two weeks for maximum effect  We discussed modified progressive pain-free activity as tolerated  Do not overuse in first two weeks if feeling better due to concern for vulnerability while steroid is working  Supportive care reviewed  All questions were answered today  Contact us with additional questions or concerns  Signs and sx of concern reviewed      Ricardo Palafox DO, CABERENICE  Primary Care Sports Medicine  [facility]

## 2018-05-30 RX ORDER — BENZONATATE 100 MG/1
100-200 CAPSULE ORAL 3 TIMES DAILY PRN
Qty: 42 CAPSULE | Refills: 0 | Status: SHIPPED | OUTPATIENT
Start: 2018-05-30 | End: 2018-09-06

## 2018-05-30 NOTE — TELEPHONE ENCOUNTER
Patient notified of tessalon order and all of Kelly's comments as noted below. FYI to Kelly: Christiana said that they had been  65 years; that he had alzheimers.  is 18.  Roel Garza RN

## 2018-05-30 NOTE — TELEPHONE ENCOUNTER
"Christiana with two day history of a cough, is requesting a prescription for a cough suppressant.  States her sinuses are not affected, does seem reminiscent of previous cough related to her \"reflux\".  States cough is productive, with \"clear\" phlegm, worse during the day and not bothersome at night.  No SOB, no chest pain with cough.  States her  just passed away, is very busy with  planning and would like a script as his  is next Tuesday.  Using the [facility] pharmacy.  Okay to call her too, may be in and out with her availability.      Thanks  Kenya Alexander RN  FNA    "

## 2018-05-30 NOTE — TELEPHONE ENCOUNTER
Message left on patient's vm advising her of tessalon order and all of Kelly's comments as noted below.  Roel Garza RN

## 2018-05-30 NOTE — TELEPHONE ENCOUNTER
Script for tessalon sent to the pharmacy - this will hopefully help with the cough but NOT make her sleepy.     I'm so sorry to hear about her . Please send my condolences     Kelly

## 2018-06-08 ENCOUNTER — OFFICE VISIT (OUTPATIENT)
Dept: FAMILY MEDICINE | Facility: CLINIC | Age: 83
End: 2018-06-08
Payer: COMMERCIAL

## 2018-06-08 VITALS
HEIGHT: 66 IN | DIASTOLIC BLOOD PRESSURE: 70 MMHG | SYSTOLIC BLOOD PRESSURE: 128 MMHG | WEIGHT: 163 LBS | TEMPERATURE: 98 F | BODY MASS INDEX: 26.2 KG/M2 | HEART RATE: 68 BPM

## 2018-06-08 DIAGNOSIS — N32.81 OVERACTIVE BLADDER: ICD-10-CM

## 2018-06-08 DIAGNOSIS — R32 BLADDER LEAK: Primary | ICD-10-CM

## 2018-06-08 PROCEDURE — 99213 OFFICE O/P EST LOW 20 MIN: CPT | Performed by: PHYSICIAN ASSISTANT

## 2018-06-08 RX ORDER — TOLTERODINE TARTRATE 2 MG/1
2 TABLET, EXTENDED RELEASE ORAL 2 TIMES DAILY
Qty: 60 TABLET | Refills: 1 | Status: SHIPPED | OUTPATIENT
Start: 2018-06-08 | End: 2018-09-06

## 2018-06-08 NOTE — PATIENT INSTRUCTIONS
Call to schedule the urology appointment in [place] - [phone]      Let's retry the detrol in the interim (until you can get in with urology) but at twice daily dosing

## 2018-06-08 NOTE — PROGRESS NOTES
SUBJECTIVE:   Christiana Sal is a 86 year old female who presents to clinic today for the following health issues:      Patient is here today with concerns of having bladder leakage at night that has gotten really bad in the past 2 days. She can;t fully fall asleep otherwise she will not make it to the bathroom in time. No bladder infections symptoms per patient.    Urinary symptoms seem to be worsening  Going often during the day but can make it in time  At night, does not have time to get to the bathroom and a few times she has woken up and already wet everything  Says it's embarrassing   Medications for overactive bladder do not seem to be helping  Had been on detrol - tried switching to oxybutynin but sx seemed worse       Problem list and histories reviewed & adjusted, as indicated.  Additional history: as documented    Current Outpatient Prescriptions   Medication Sig Dispense Refill     acetaminophen (TYLENOL) 500 MG tablet Take 2 tablets (1,000 mg) by mouth every 8 hours as needed for mild pain 30 tablet 0     ASPIRIN 81 MG PO TABS Take 2 tablets by mouth every evening. *.        atorvastatin (LIPITOR) 40 MG tablet TAKE ONE TABLET BY MOUTH EVERY DAY 90 tablet 3     benzonatate (TESSALON) 100 MG capsule Take 1-2 capsules (100-200 mg) by mouth 3 times daily as needed for cough 42 capsule 0     CALCIUM 600 + D OR 1 tablet by mouth 2 times per day       citalopram (CELEXA) 20 MG tablet Take 1 tablet (20 mg) by mouth daily 90 tablet 1     doxycycline (VIBRAMYCIN) 100 MG capsule Take 1 capsule (100 mg) by mouth 2 times daily 20 capsule 0     FISH OIL OR 1 daily       levothyroxine (SYNTHROID/LEVOTHROID) 75 MCG tablet Take 1 tablet (75 mcg) by mouth daily Due for labs. 90 tablet 0     losartan (COZAAR) 25 MG tablet TAKE ONE TABLET BY MOUTH ONCE DAILY 90 tablet 2     metoprolol (TOPROL-XL) 25 MG 24 hr tablet Take 0.5 tablets (12.5 mg) by mouth daily 90 tablet 3     Multiple Vitamins-Minerals (OCUVITE PO)  Take 1 tablet by mouth daily.       ondansetron (ZOFRAN) 4 MG tablet Take 1 tablet (4 mg) by mouth every 6 hours as needed for nausea 18 tablet 3     pantoprazole (PROTONIX) 20 MG EC tablet Take 2 tablets (40 mg) by mouth daily 180 tablet 2     pramipexole (MIRAPEX) 0.5 MG tablet TAKE ONE TABLET BY MOUTH AT BEDTIME 90 tablet 2     ranitidine (ZANTAC) 150 MG tablet Take 2 tablets (300 mg) by mouth At Bedtime 180 tablet 3     ranitidine (ZANTAC) 150 MG tablet Take 2 tablets (300 mg) by mouth At Bedtime 60 tablet 0     tolterodine (DETROL) 2 MG tablet Take 1 tablet (2 mg) by mouth 2 times daily 60 tablet 1     albuterol (PROAIR HFA/PROVENTIL HFA/VENTOLIN HFA) 108 (90 BASE) MCG/ACT Inhaler Inhale 2 puffs into the lungs every 6 hours as needed for shortness of breath / dyspnea or wheezing (Patient not taking: Reported on 3/2/2018) 1 Inhaler 0     albuterol (PROAIR HFA/PROVENTIL HFA/VENTOLIN HFA) 108 (90 BASE) MCG/ACT Inhaler Inhale 2 puffs into the lungs every 6 hours as needed for shortness of breath / dyspnea or wheezing (Patient not taking: Reported on 3/2/2018) 1 Inhaler 0     nitroglycerin (NITROSTAT) 0.4 MG SL tablet Place 1 tablet (0.4 mg) under the tongue every 5 minutes as needed for chest pain (call your doctor if you take a third dose) (Patient not taking: Reported on 3/2/2018) 25 tablet 3     Allergies   Allergen Reactions     Demerol Nausea     Lisinopril Cough     Sulfa Drugs Other (See Comments)     Questionable itching reported by patient     BP Readings from Last 3 Encounters:   06/08/18 128/70   05/29/18 125/65   05/11/18 122/68    Wt Readings from Last 3 Encounters:   06/08/18 163 lb (73.9 kg)   05/29/18 165 lb (74.8 kg)   05/11/18 164 lb (74.4 kg)                    Reviewed and updated as needed this visit by clinical staff       Reviewed and updated as needed this visit by Provider         ROS:  Remainder of ROS obtained and found to be negative other than that which was documented  "above      OBJECTIVE:     /70  Pulse 68  Temp 98  F (36.7  C)  Ht 5' 6\" (1.676 m)  Wt 163 lb (73.9 kg)  BMI 26.31 kg/m2  Body mass index is 26.31 kg/(m^2).  GENERAL: healthy, alert and no distress  RESP: lungs clear to auscultation - no rales, rhonchi or wheezes  CV: regular rate and rhythm, normal S1 S2, no S3 or S4, no murmur, click or rub, no peripheral edema and peripheral pulses strong    Diagnostic Test Results:  none     ASSESSMENT/PLAN:     (R32) Bladder leak  (primary encounter diagnosis)  Comment:   Plan: CANCELED: *UA reflex to Microscopic and Culture        ([facility] [facility] (except [place]        and [place])            (N32.81) Overactive bladder  Comment:   Plan: tolterodine (DETROL) 2 MG tablet, UROLOGY ADULT        REFERRAL, UA with Microscopic reflex to Culture            Will go back to detrol as she had lease had some better control of symptoms  Would like her to see urology. Has had some incontinence but only at bedtime and she feels is more of an issue of not waking up and getting to the bathroom in time          Kelly Matthew PA-C  [facility]    "

## 2018-06-08 NOTE — MR AVS SNAPSHOT
After Visit Summary   6/8/2018    Christiana Sal    MRN: [id number]           Patient Information     Date Of Birth          10/25/1931        Visit Information        Provider Department      6/8/2018 2:00 PM Kelly Matthew PA-C [facility]        Today's Diagnoses     Bladder leak    -  1    Overactive bladder          Care Instructions    Call to schedule the urology appointment in [place] - [phone]      Let's retry the detrol in the interim (until you can get in with urology) but at twice daily dosing          Follow-ups after your visit        Additional Services     UROLOGY ADULT REFERRAL       Your provider has referred you to: [facility]: [facility] [place] [phone]   [email or web address]    Please be aware that coverage of these services is subject to the terms and limitations of your health insurance plan.  Call member services at your health plan with any benefit or coverage questions.      Please bring the following with you to your appointment:    (1) Any X-Rays, CTs or MRIs which have been performed.  Contact the facility where they were done to arrange for  prior to your scheduled appointment.    (2) List of current medications  (3) This referral request   (4) Any documents/labs given to you for this referral                  Your next 10 appointments already scheduled     Aug 14, 2018  7:30 AM CDT   Remote PPM Check with [place] [facility]   [facility] ([facility])    [address] [address] [phone]           This appointment is for a remote check of your pacemaker.  This is not an appointment at the office.              Who to contact     Normal or non-critical lab and imaging results will be communicated to you by MyChart, letter or phone within 4 business days after the clinic has  "received the results. If you do not hear from us within 7 days, please contact the clinic through [employer] or phone. If you have a critical or abnormal lab result, we will notify you by phone as soon as possible.  Submit refill requests through [employer] or call your pharmacy and they will forward the refill request to us. Please allow 3 business days for your refill to be completed.          If you need to speak with a  for additional information , please call: [phone]             Additional Information About Your Visit        [employer] Information     [employer] gives you secure access to your electronic health record. If you see a primary care provider, you can also send messages to your care team and make appointments. If you have questions, please call your primary care clinic.  If you do not have a primary care provider, please call [phone] and they will assist you.        Care EveryWhere ID     This is your Care EveryWhere ID. This could be used by other organizations to access your [place] medical records  [id number]        Your Vitals Were     Pulse Temperature Height BMI (Body Mass Index)          68 98  F (36.7  C) 5' 6\" (1.676 m) 26.31 kg/m2         Blood Pressure from Last 3 Encounters:   06/08/18 128/70   05/29/18 125/65   05/11/18 122/68    Weight from Last 3 Encounters:   06/08/18 163 lb (73.9 kg)   05/29/18 165 lb (74.8 kg)   05/11/18 164 lb (74.4 kg)              We Performed the Following     *UA reflex to Microscopic and Culture ([place] [facility] (except [place] and [place])     UROLOGY ADULT REFERRAL          Today's Medication Changes          These changes are accurate as of 6/8/18  2:26 PM.  If you have any questions, ask your nurse or doctor.               Start taking these medicines.        Dose/Directions    tolterodine 2 MG tablet   Commonly known as:  DETROL   Used for:  Overactive bladder   Started by:  Kelly Matthew PA-C        Dose:  " 2 mg   Take 1 tablet (2 mg) by mouth 2 times daily   Quantity:  60 tablet   Refills:  1            Where to get your medicines      These medications were sent to [facility], [place] - [address]  [address] [facility] [address]     Phone:  [phone]     tolterodine 2 MG tablet                Primary Care Provider Office Phone # Fax #    Kellymandie Matthew PA-C [phone] [address] [address]        Equal Access to Services     [facility]: Hadii aad ku hadasho Soomaali, waaxda luqadaha, qaybta kaalmada adeegyada, waxay belkisin hayaan ken brian . So [facility] [phone].    ATENCIÓN: Si habla español, tiene a ozuna disposición servicios gratuitos de asistencia lingüística. [facility] [phone].    We comply with applicable federal civil rights laws and [place] laws. We do not discriminate on the basis of race, color, national origin, age, disability, sex, sexual orientation, or gender identity.            Thank you!     Thank you for choosing [facility]  for your care. Our goal is always to provide you with excellent care. Hearing back from our patients is one way we can continue to improve our services. Please take a few minutes to complete the written survey that you may receive in the mail after your visit with us. Thank you!             Your Updated Medication List - Protect others around you: Learn how to safely use, store and throw away your medicines at www.disposemymeds.org.          This list is accurate as of 6/8/18  2:26 PM.  Always use your most recent med list.                   Brand Name Dispense Instructions for use Diagnosis    acetaminophen 500 MG tablet    TYLENOL    30 tablet    Take 2 tablets (1,000 mg) by mouth every 8 hours as needed for mild pain        * albuterol 108 (90 Base) MCG/ACT Inhaler    PROAIR HFA/PROVENTIL HFA/VENTOLIN HFA    1 Inhaler    Inhale 2 puffs into the lungs every 6  hours as needed for shortness of breath / dyspnea or wheezing    Acute bronchitis with symptoms > 10 days       * albuterol 108 (90 Base) MCG/ACT Inhaler    PROAIR HFA/PROVENTIL HFA/VENTOLIN HFA    1 Inhaler    Inhale 2 puffs into the lungs every 6 hours as needed for shortness of breath / dyspnea or wheezing    Acute bronchitis with symptoms > 10 days       aspirin 81 MG tablet      Take 2 tablets by mouth every evening. *.        atorvastatin 40 MG tablet    LIPITOR    90 tablet    TAKE ONE TABLET BY MOUTH EVERY DAY    CAD S/P percutaneous coronary angioplasty       benzonatate 100 MG capsule    TESSALON    42 capsule    Take 1-2 capsules (100-200 mg) by mouth 3 times daily as needed for cough    Cough       CALCIUM 600 + D PO      1 tablet by mouth 2 times per day        citalopram 20 MG tablet    celeXA    90 tablet    Take 1 tablet (20 mg) by mouth daily    Major depressive disorder, recurrent episode, mild (H)       doxycycline 100 MG capsule    VIBRAMYCIN    20 capsule    Take 1 capsule (100 mg) by mouth 2 times daily    Acute sinusitis with symptoms > 10 days       FISH OIL PO      1 daily        levothyroxine 75 MCG tablet    SYNTHROID/LEVOTHROID    90 tablet    Take 1 tablet (75 mcg) by mouth daily Due for labs.    Hypothyroidism, unspecified type       losartan 25 MG tablet    COZAAR    90 tablet    TAKE ONE TABLET BY MOUTH ONCE DAILY    Essential hypertension with goal blood pressure less than 140/90       metoprolol succinate 25 MG 24 hr tablet    TOPROL-XL    90 tablet    Take 0.5 tablets (12.5 mg) by mouth daily    Essential hypertension with goal blood pressure less than 140/90       nitroGLYcerin 0.4 MG sublingual tablet    NITROSTAT    25 tablet    Place 1 tablet (0.4 mg) under the tongue every 5 minutes as needed for chest pain (call your doctor if you take a third dose)    Atherosclerosis of native coronary artery of native heart without angina pectoris       OCUVITE PO      Take 1 tablet by  mouth daily.        ondansetron 4 MG tablet    ZOFRAN    18 tablet    Take 1 tablet (4 mg) by mouth every 6 hours as needed for nausea    Nausea       * oxybutynin 5 MG 24 hr tablet    DITROPAN-XL    30 tablet    Take 1 tablet (5 mg) by mouth daily    Overactive bladder       * oxybutynin 10 MG 24 hr tablet    DITROPAN XL    90 tablet    Take 1 tablet (10 mg) by mouth daily    Overactive bladder       pantoprazole 20 MG EC tablet    PROTONIX    180 tablet    Take 2 tablets (40 mg) by mouth daily    Gastroesophageal reflux disease, esophagitis presence not specified       pramipexole 0.5 MG tablet    MIRAPEX    90 tablet    TAKE ONE TABLET BY MOUTH AT BEDTIME    Restless leg syndrome       * ranitidine 150 MG tablet    ZANTAC    60 tablet    Take 2 tablets (300 mg) by mouth At Bedtime    Gastroesophageal reflux disease, esophagitis presence not specified       * ranitidine 150 MG tablet    ZANTAC    180 tablet    Take 2 tablets (300 mg) by mouth At Bedtime    Gastroesophageal reflux disease, esophagitis presence not specified       tolterodine 2 MG tablet    DETROL    60 tablet    Take 1 tablet (2 mg) by mouth 2 times daily    Overactive bladder       * Notice:  This list has 6 medication(s) that are the same as other medications prescribed for you. Read the directions carefully, and ask your doctor or other care provider to review them with you.

## 2018-06-12 DIAGNOSIS — N32.81 OVERACTIVE BLADDER: ICD-10-CM

## 2018-06-12 DIAGNOSIS — R35.0 URINARY FREQUENCY: Primary | ICD-10-CM

## 2018-06-12 LAB
ALBUMIN UR-MCNC: NEGATIVE MG/DL
APPEARANCE UR: CLEAR
BACTERIA #/AREA URNS HPF: ABNORMAL /HPF
BILIRUB UR QL STRIP: NEGATIVE
COLOR UR AUTO: YELLOW
GLUCOSE UR STRIP-MCNC: NEGATIVE MG/DL
HGB UR QL STRIP: NEGATIVE
KETONES UR STRIP-MCNC: NEGATIVE MG/DL
LEUKOCYTE ESTERASE UR QL STRIP: ABNORMAL
MUCOUS THREADS #/AREA URNS LPF: PRESENT /LPF
NITRATE UR QL: NEGATIVE
PH UR STRIP: 5.5 PH (ref 5–7)
RBC #/AREA URNS AUTO: ABNORMAL /HPF
SOURCE: ABNORMAL
SP GR UR STRIP: 1.02 (ref 1–1.03)
UROBILINOGEN UR STRIP-ACNC: 0.2 EU/DL (ref 0.2–1)
WBC #/AREA URNS AUTO: ABNORMAL /HPF

## 2018-06-12 PROCEDURE — 81001 URINALYSIS AUTO W/SCOPE: CPT | Performed by: PHYSICIAN ASSISTANT

## 2018-06-12 PROCEDURE — 87086 URINE CULTURE/COLONY COUNT: CPT | Performed by: PHYSICIAN ASSISTANT

## 2018-06-13 LAB
BACTERIA SPEC CULT: NORMAL
BACTERIA SPEC CULT: NORMAL
Lab: NORMAL
SPECIMEN SOURCE: NORMAL

## 2018-06-14 ENCOUNTER — APPOINTMENT (OUTPATIENT)
Dept: UROLOGY | Facility: CLINIC | Age: 83
End: 2018-06-14
Payer: MEDICARE

## 2018-06-19 DIAGNOSIS — R42 VERTIGO: ICD-10-CM

## 2018-06-19 NOTE — TELEPHONE ENCOUNTER
"Requested Prescriptions   Pending Prescriptions Disp Refills     meclizine (ANTIVERT) 25 MG tablet [Pharmacy Med Name: MECLIZINE HCL 25MG TABS]  Last Written Prescription Date:  01/05/16  Ended 08/28/17  Last Fill Quantity: 30,  # refills: 3   Last office visit: 6/8/2018 with prescribing provider:  06/08/18   Future Office Visit:     30 tablet 3     Sig: TAKE ONE TABLET BY MOUTH EVERY 6 HOURS AS NEEDED FOR DIZZINESS     Antivertigo/Antiemetic Agents Passed    6/19/2018  9:27 AM       Passed - Recent (12 mo) or future (30 days) visit within the authorizing provider's specialty    Patient had office visit in the last 12 months or has a visit in the next 30 days with authorizing provider or within the authorizing provider's specialty.  See \"Patient Info\" tab in inbasket, or \"Choose Columns\" in Meds & Orders section of the refill encounter.           Passed - Patient is 18 years of age or older          "

## 2018-06-20 NOTE — TELEPHONE ENCOUNTER
Routing refill request to provider for review/approval because:  Drug not on the [facility] refill protocol   Roel Garza RN

## 2018-06-21 RX ORDER — MECLIZINE HYDROCHLORIDE 25 MG/1
TABLET ORAL
Qty: 30 TABLET | Refills: 3 | Status: SHIPPED | OUTPATIENT
Start: 2018-06-21

## 2018-06-26 ENCOUNTER — TELEPHONE (OUTPATIENT)
Dept: UROLOGY | Facility: CLINIC | Age: 83
End: 2018-06-26

## 2018-06-26 ENCOUNTER — OFFICE VISIT (OUTPATIENT)
Dept: UROLOGY | Facility: CLINIC | Age: 83
End: 2018-06-26
Payer: COMMERCIAL

## 2018-06-26 VITALS
HEART RATE: 65 BPM | SYSTOLIC BLOOD PRESSURE: 117 MMHG | DIASTOLIC BLOOD PRESSURE: 73 MMHG | OXYGEN SATURATION: 96 % | TEMPERATURE: 98.2 F

## 2018-06-26 DIAGNOSIS — N32.81 OAB (OVERACTIVE BLADDER): Primary | ICD-10-CM

## 2018-06-26 LAB
ALBUMIN UR-MCNC: NEGATIVE MG/DL
APPEARANCE UR: CLEAR
BILIRUB UR QL STRIP: NEGATIVE
COLOR UR AUTO: YELLOW
GLUCOSE UR STRIP-MCNC: NEGATIVE MG/DL
HGB UR QL STRIP: ABNORMAL
KETONES UR STRIP-MCNC: ABNORMAL MG/DL
LEUKOCYTE ESTERASE UR QL STRIP: NEGATIVE
NITRATE UR QL: NEGATIVE
PH UR STRIP: 5.5 PH (ref 5–7)
RBC #/AREA URNS AUTO: NORMAL /HPF
SOURCE: ABNORMAL
SP GR UR STRIP: >1.03 (ref 1–1.03)
UROBILINOGEN UR STRIP-ACNC: 0.2 EU/DL (ref 0.2–1)
WBC #/AREA URNS AUTO: NORMAL /HPF

## 2018-06-26 PROCEDURE — 52000 CYSTOURETHROSCOPY: CPT | Performed by: UROLOGY

## 2018-06-26 PROCEDURE — 99205 OFFICE O/P NEW HI 60 MIN: CPT | Mod: 25 | Performed by: UROLOGY

## 2018-06-26 PROCEDURE — 81001 URINALYSIS AUTO W/SCOPE: CPT | Performed by: UROLOGY

## 2018-06-26 NOTE — NURSING NOTE
"Initial /73  Pulse 65  Temp 98.2  F (36.8  C) (Oral)  SpO2 96% Estimated body mass index is 26.31 kg/(m^2) as calculated from the following:    Height as of 6/8/18: 1.676 m (5' 6\").    Weight as of 6/8/18: 73.9 kg (163 lb). .    Anisha Fischer LPN    "

## 2018-06-26 NOTE — PROGRESS NOTES
"Christiana Sal is a 86 year old female seen in consultation for OAB. Consult from Kelly Matthew.      Pt reports many year hx of mixed UI, urge > stress, now wears Depend at nite and light pad during the day.    Denies dysuria, gross hematuria, frequency, UTI's. Apparently seen by  many yrs ago; cannot recall dx; no rx started. Has been on 2 meds including Detrol and \"a med that was really expensive,\" neither of which seemed to help. Not on med at the present time.    Denies hx bladder surgery or success with Kegel's.    Hx 3 vag deliveries, no hyster, not on HRT.     Denies constipation, fecal incont.     Drinks 4 caf coffee per day, although transitioning to decaf. Also occasional tea, no soda, 3 Robinson per day.    Retired. Did not complete voiding diary.        Past Medical History:   Diagnosis Date     Coronary atherosclerosis of native coronary artery     s/p MI ([facility])     Essential hypertension, benign      GERD (gastroesophageal reflux disease)      History of transient ischemic attack (TIA)      Hyperlipidemia LDL goal <100      Hypothyroidism      Major depressive disorder, recurrent episode, mild (H) 5/18/2011     Peptic ulcer disease with hemorrhage     Remote history of stomach ulcer, requiring transfusion after chronic bleeding       Past Surgical History:   Procedure Laterality Date     APPENDECTOMY       CARDIAC CATHERIZATION  1/15/04    drug-eluding stent RCA, Dr. Pérez, [facility]     CARDIAC CATHERIZATION  2/16/12    diffuse mild/mod disease, no new stent     CATARACT IOL, RT/LT  1/26/12    RT, Dr. Wynne     CHOLECYSTECTOMY, OPEN       COLONOSCOPY  2008    \[facility]\""     FRACTURE TX, ANKLE RT/LT      LT, 2 screws remain     HC EXCISE HAND/FOOT NEUROMA      RT     HC LARYNGOSCOPY DIRECT W VOCAL CORD INJECTION      vocal cord polypectomy     HC REMOVAL OF OVARIAN CYST(S)       HC TRANSCATH STENT INIT VESSEL,PERCUT      x 2     REPAIR HAMMER TOE  9/13/10    " multiple + RT great toe tendon release, Dr. Sanchez DPM     STENT  5-2016    Cardiac stents 6 placed.       Social History     Social History     Marital status:      Spouse name: N/A     Number of children: 4     Years of education: some adry     Occupational History      Retired     Social History Main Topics     Smoking status: Never Smoker     Smokeless tobacco: Never Used      Comment: Never smoker; no secondhand smoke exposure     Alcohol use Yes      Comment: social     Drug use: No     Sexual activity: Not Currently     Other Topics Concern     Parent/Sibling W/ Cabg, Mi Or Angioplasty Before 65f 55m? No     Social History Narrative     has Alzheimer's       Current Outpatient Prescriptions   Medication Sig Dispense Refill     acetaminophen (TYLENOL) 500 MG tablet Take 2 tablets (1,000 mg) by mouth every 8 hours as needed for mild pain 30 tablet 0     ASPIRIN 81 MG PO TABS Take 2 tablets by mouth every evening. *.        atorvastatin (LIPITOR) 40 MG tablet TAKE ONE TABLET BY MOUTH EVERY DAY 90 tablet 3     benzonatate (TESSALON) 100 MG capsule Take 1-2 capsules (100-200 mg) by mouth 3 times daily as needed for cough 42 capsule 0     CALCIUM 600 + D OR 1 tablet by mouth 2 times per day       citalopram (CELEXA) 20 MG tablet Take 1 tablet (20 mg) by mouth daily 90 tablet 1     doxycycline (VIBRAMYCIN) 100 MG capsule Take 1 capsule (100 mg) by mouth 2 times daily 20 capsule 0     FISH OIL OR 1 daily       levothyroxine (SYNTHROID/LEVOTHROID) 75 MCG tablet Take 1 tablet (75 mcg) by mouth daily Due for labs. 90 tablet 0     losartan (COZAAR) 25 MG tablet TAKE ONE TABLET BY MOUTH ONCE DAILY 90 tablet 2     meclizine (ANTIVERT) 25 MG tablet TAKE ONE TABLET BY MOUTH EVERY 6 HOURS AS NEEDED FOR DIZZINESS 30 tablet 3     metoprolol (TOPROL-XL) 25 MG 24 hr tablet Take 0.5 tablets (12.5 mg) by mouth daily 90 tablet 3     Multiple Vitamins-Minerals (OCUVITE PO) Take 1 tablet by mouth daily.       nitroglycerin  (NITROSTAT) 0.4 MG SL tablet Place 1 tablet (0.4 mg) under the tongue every 5 minutes as needed for chest pain (call your doctor if you take a third dose) 25 tablet 3     ondansetron (ZOFRAN) 4 MG tablet Take 1 tablet (4 mg) by mouth every 6 hours as needed for nausea 18 tablet 3     pantoprazole (PROTONIX) 20 MG EC tablet Take 2 tablets (40 mg) by mouth daily 180 tablet 2     pramipexole (MIRAPEX) 0.5 MG tablet TAKE ONE TABLET BY MOUTH AT BEDTIME 90 tablet 2     ranitidine (ZANTAC) 150 MG tablet Take 2 tablets (300 mg) by mouth At Bedtime 180 tablet 3     ranitidine (ZANTAC) 150 MG tablet Take 2 tablets (300 mg) by mouth At Bedtime 60 tablet 0     tolterodine (DETROL) 2 MG tablet Take 1 tablet (2 mg) by mouth 2 times daily 60 tablet 1     albuterol (PROAIR HFA/PROVENTIL HFA/VENTOLIN HFA) 108 (90 BASE) MCG/ACT Inhaler Inhale 2 puffs into the lungs every 6 hours as needed for shortness of breath / dyspnea or wheezing (Patient not taking: Reported on 3/2/2018) 1 Inhaler 0     albuterol (PROAIR HFA/PROVENTIL HFA/VENTOLIN HFA) 108 (90 BASE) MCG/ACT Inhaler Inhale 2 puffs into the lungs every 6 hours as needed for shortness of breath / dyspnea or wheezing (Patient not taking: Reported on 3/2/2018) 1 Inhaler 0       Physical Exam:    GENL: NAD.    ABD: Soft, non-tender, no masses.    EG: Poorly-estrogenized, no masses.    VAGINA: Poorly-estrogenized, moderate urethral caruncle.    BN HYPERMOBILITY: None.    CYSTOCELE: Grade 2.    APICAL PROLAPSE: Midl.    RECTOCELE: Mild.    BIMANUAL: No mass or tenderness.    Cysto:    (Informed consent obtained. Pause for cause performed)   Sterile prep.    17 Fr scope inserted through urethra. Systematic examination w 70 degree lens.   PVR: 3 cc   MUCOSA: Normal without lesion   ORIFICES: Normal location and morphology   CAPACITY: 250 cc; no pain with filling but fairly uncomfortable at that point, apparent UDC with leak around the scope   Scope withdrawn without untoward  effect.    Valsalva:   Mild to moderate hypermobility, moderate leakage noted.    (Pt tolerated procedure without difficulty).      Results for orders placed or performed in visit on 06/26/18   UA reflex to Microscopic and Culture   Result Value Ref Range    Color Urine Yellow     Appearance Urine Clear     Glucose Urine Negative NEG^Negative mg/dL    Bilirubin Urine Negative NEG^Negative    Ketones Urine Trace (A) NEG^Negative mg/dL    Specific Gravity Urine >1.030 1.003 - 1.035    Blood Urine Trace (A) NEG^Negative    pH Urine 5.5 5.0 - 7.0 pH    Protein Albumin Urine Negative NEG^Negative mg/dL    Urobilinogen Urine 0.2 0.2 - 1.0 EU/dL    Nitrite Urine Negative NEG^Negative    Leukocyte Esterase Urine Negative NEG^Negative    Source Midstream Urine    Urine Microscopic   Result Value Ref Range    WBC Urine 0 - 5 OTO5^0 - 5 /HPF    RBC Urine O - 2 OTO2^O - 2 /HPF           IMP:  1. OAB wet, very modest bladder capacity  2. Fair bit of coffee/caffeine  3. VANDA on exam, looks like possible ISD component  4. Maturity with other medical issues        PLAN:  1. Discussed situation with patient in detail.  2. Consider moderation of coffee/caffeine; pt expresses understanding  3. Consider intravesical Botox; discussed in detail rationale, mech of action, process, risks, complications, recovery, results, retention, need for repeat injections, etc; pt expresses understanding and elects to proceed.  4. Carefully set realistic expectations  5. 60 minutes spent with patient, more than 50% in counseling and coordination of care for OAB/VANDA, which did not include time spent for the procedure.  6. Copy

## 2018-06-26 NOTE — MR AVS SNAPSHOT
After Visit Summary   6/26/2018    Christiana Sal    MRN: [id number]           Patient Information     Date Of Birth          10/25/1931        Visit Information        Provider Department      6/26/2018 8:00 AM Cleve Null MD [facility]        Today's Diagnoses     OAB (overactive bladder)    -  1       Follow-ups after your visit        Your next 10 appointments already scheduled     Aug 14, 2018  7:30 AM CDT   Remote PPM Check with [facility] [facility]   [facility] ([facility])    [address] [address]   [phone]           This appointment is for a remote check of your pacemaker.  This is not an appointment at the office.              Who to contact     If you have questions or need follow up information about today's clinic visit or your schedule please contact [facility] directly at [phone].  Normal or non-critical lab and imaging results will be communicated to you by MyChart, letter or phone within 4 business days after the clinic has received the results. If you do not hear from us within 7 days, please contact the clinic through [email or web address] or phone. If you have a critical or abnormal lab result, we will notify you by phone as soon as possible.  Submit refill requests through [email or web address] or call your pharmacy and they will forward the refill request to us. Please allow 3 business days for your refill to be completed.          Additional Information About Your Visit        MyChart Information     [email or web address] gives you secure access to your electronic health record. If you see a primary care provider, you can also send messages to your care team and make appointments. If you have questions, please call your primary care clinic.  If you do not have a primary care provider, please call [phone] and they will assist you.        Care EveryWhere ID     This is your Care EveryWhere ID. This could be  used by other organizations to access your [facility] medical records  [id number]        Your Vitals Were     Pulse Temperature Pulse Oximetry             65 98.2  F (36.8  C) (Oral) 96%          Blood Pressure from Last 3 Encounters:   06/26/18 117/73   06/08/18 128/70   05/29/18 125/65    Weight from Last 3 Encounters:   06/08/18 73.9 kg (163 lb)   05/29/18 74.8 kg (165 lb)   05/11/18 74.4 kg (164 lb)              We Performed the Following     CYSTOURETHROSCOPY     UA reflex to Microscopic and Culture     Urine Microscopic        Primary Care Provider Office Phone # Fax #    Kelly Matthew PA-C [phone] [address] [facility] [address]        Equal Access to Services     SOFIA EDUARDO : Hadii shailesh mono Soalvino, waaxda luqadaha, qaybta kaalmada adeegyada, danya brian . So [facility] [phone].    ATENCIÓN: Si habla español, tiene a ozuna disposición servicios gratuitos de asistencia lingüística. Llame al [phone].    We comply with applicable federal civil rights laws and [place] laws. We do not discriminate on the basis of race, color, national origin, age, disability, sex, sexual orientation, or gender identity.            Thank you!     Thank you for choosing [facility]  for your care. Our goal is always to provide you with excellent care. Hearing back from our patients is one way we can continue to improve our services. Please take a few minutes to complete the written survey that you may receive in the mail after your visit with us. Thank you!             Your Updated Medication List - Protect others around you: Learn how to safely use, store and throw away your medicines at www.disposemymeds.org.          This list is accurate as of 6/26/18  9:10 AM.  Always use your most recent med list.                   Brand Name Dispense Instructions for use Diagnosis    acetaminophen 500 MG tablet    TYLENOL    30 tablet    Take 2  tablets (1,000 mg) by mouth every 8 hours as needed for mild pain        * albuterol 108 (90 Base) MCG/ACT Inhaler    PROAIR HFA/PROVENTIL HFA/VENTOLIN HFA    1 Inhaler    Inhale 2 puffs into the lungs every 6 hours as needed for shortness of breath / dyspnea or wheezing    Acute bronchitis with symptoms > 10 days       * albuterol 108 (90 Base) MCG/ACT Inhaler    PROAIR HFA/PROVENTIL HFA/VENTOLIN HFA    1 Inhaler    Inhale 2 puffs into the lungs every 6 hours as needed for shortness of breath / dyspnea or wheezing    Acute bronchitis with symptoms > 10 days       aspirin 81 MG tablet      Take 2 tablets by mouth every evening. *.        atorvastatin 40 MG tablet    LIPITOR    90 tablet    TAKE ONE TABLET BY MOUTH EVERY DAY    CAD S/P percutaneous coronary angioplasty       benzonatate 100 MG capsule    TESSALON    42 capsule    Take 1-2 capsules (100-200 mg) by mouth 3 times daily as needed for cough    Cough       CALCIUM 600 + D PO      1 tablet by mouth 2 times per day        citalopram 20 MG tablet    celeXA    90 tablet    Take 1 tablet (20 mg) by mouth daily    Major depressive disorder, recurrent episode, mild (H)       doxycycline 100 MG capsule    VIBRAMYCIN    20 capsule    Take 1 capsule (100 mg) by mouth 2 times daily    Acute sinusitis with symptoms > 10 days       FISH OIL PO      1 daily        levothyroxine 75 MCG tablet    SYNTHROID/LEVOTHROID    90 tablet    Take 1 tablet (75 mcg) by mouth daily Due for labs.    Hypothyroidism, unspecified type       losartan 25 MG tablet    COZAAR    90 tablet    TAKE ONE TABLET BY MOUTH ONCE DAILY    Essential hypertension with goal blood pressure less than 140/90       meclizine 25 MG tablet    ANTIVERT    30 tablet    TAKE ONE TABLET BY MOUTH EVERY 6 HOURS AS NEEDED FOR DIZZINESS    Vertigo       metoprolol succinate 25 MG 24 hr tablet    TOPROL-XL    90 tablet    Take 0.5 tablets (12.5 mg) by mouth daily    Essential hypertension with goal blood pressure less  than 140/90       nitroGLYcerin 0.4 MG sublingual tablet    NITROSTAT    25 tablet    Place 1 tablet (0.4 mg) under the tongue every 5 minutes as needed for chest pain (call your doctor if you take a third dose)    Atherosclerosis of native coronary artery of native heart without angina pectoris       OCUVITE PO      Take 1 tablet by mouth daily.        ondansetron 4 MG tablet    ZOFRAN    18 tablet    Take 1 tablet (4 mg) by mouth every 6 hours as needed for nausea    Nausea       pantoprazole 20 MG EC tablet    PROTONIX    180 tablet    Take 2 tablets (40 mg) by mouth daily    Gastroesophageal reflux disease, esophagitis presence not specified       pramipexole 0.5 MG tablet    MIRAPEX    90 tablet    TAKE ONE TABLET BY MOUTH AT BEDTIME    Restless leg syndrome       * ranitidine 150 MG tablet    ZANTAC    60 tablet    Take 2 tablets (300 mg) by mouth At Bedtime    Gastroesophageal reflux disease, esophagitis presence not specified       * ranitidine 150 MG tablet    ZANTAC    180 tablet    Take 2 tablets (300 mg) by mouth At Bedtime    Gastroesophageal reflux disease, esophagitis presence not specified       tolterodine 2 MG tablet    DETROL    60 tablet    Take 1 tablet (2 mg) by mouth 2 times daily    Overactive bladder       * Notice:  This list has 4 medication(s) that are the same as other medications prescribed for you. Read the directions carefully, and ask your doctor or other care provider to review them with you.

## 2018-06-26 NOTE — TELEPHONE ENCOUNTER
Surgery Pre-Certification     Medical Record Number: [id number]   Christiana Sal   YOB: 1931   Phone: [phone] (home)   Primary Provider: Kelly Matthew     Reason for Admit:   OAB     Surgeon: JOAO Null   Surgical Procedure: Cysto w/botox injection   ICD-9 Coded: /53518     Date of Surgery: 7/3/18   Consent signed? Yes    Date signed: 7/3/18   Hospital: [facility]   Outpatient     Requestor:   Melanie Spencer     Location:   [facility]     No pre cert needed, 06/26/2018

## 2018-06-26 NOTE — NURSING NOTE
Pt brought back to the procedure room for a cystoscopy per Dr. Null  Informed consent obtained, pt prepped in a sterile manner.    Scope Number: Tin Storz 70 Rigid: [id number]     Melanie QUINTERO MA

## 2018-07-03 ENCOUNTER — OFFICE VISIT (OUTPATIENT)
Dept: UROLOGY | Facility: CLINIC | Age: 83
End: 2018-07-03
Payer: COMMERCIAL

## 2018-07-03 VITALS
TEMPERATURE: 97.7 F | SYSTOLIC BLOOD PRESSURE: 138 MMHG | RESPIRATION RATE: 16 BRPM | HEART RATE: 70 BPM | BODY MASS INDEX: 26.2 KG/M2 | WEIGHT: 163 LBS | DIASTOLIC BLOOD PRESSURE: 62 MMHG | HEIGHT: 66 IN

## 2018-07-03 DIAGNOSIS — R30.0 DYSURIA: ICD-10-CM

## 2018-07-03 DIAGNOSIS — N32.81 OAB (OVERACTIVE BLADDER): Primary | ICD-10-CM

## 2018-07-03 LAB
ALBUMIN UR-MCNC: NEGATIVE MG/DL
APPEARANCE UR: CLEAR
BILIRUB UR QL STRIP: NEGATIVE
COLOR UR AUTO: YELLOW
GLUCOSE UR STRIP-MCNC: NEGATIVE MG/DL
HGB UR QL STRIP: ABNORMAL
KETONES UR STRIP-MCNC: NEGATIVE MG/DL
LEUKOCYTE ESTERASE UR QL STRIP: ABNORMAL
NITRATE UR QL: NEGATIVE
NON-SQ EPI CELLS #/AREA URNS LPF: ABNORMAL /LPF
PH UR STRIP: 5.5 PH (ref 5–7)
RBC #/AREA URNS AUTO: ABNORMAL /HPF
SOURCE: ABNORMAL
SP GR UR STRIP: 1.02 (ref 1–1.03)
UROBILINOGEN UR STRIP-ACNC: 0.2 EU/DL (ref 0.2–1)
WBC #/AREA URNS AUTO: ABNORMAL /HPF

## 2018-07-03 PROCEDURE — 52287 CYSTOSCOPY CHEMODENERVATION: CPT | Performed by: UROLOGY

## 2018-07-03 PROCEDURE — 81001 URINALYSIS AUTO W/SCOPE: CPT | Performed by: UROLOGY

## 2018-07-03 RX ORDER — LEVOFLOXACIN 250 MG/1
250 TABLET, FILM COATED ORAL DAILY
Qty: 3 TABLET | Refills: 0 | Status: SHIPPED | OUTPATIENT
Start: 2018-07-03 | End: 2018-09-06

## 2018-07-03 NOTE — NURSING NOTE
Pt brought back to the procedure room for a cystoscopy per Dr. Null  Informed consent obtained, pt prepped in a sterile manner.    Scope Number: Tin Storz 30 Rigid: [id number]    Ivan HI, SISSY

## 2018-07-03 NOTE — MR AVS SNAPSHOT
After Visit Summary   7/3/2018    Christiana Sal    MRN: [id number]           Patient Information     Date Of Birth          10/25/1931        Visit Information        Provider Department      7/3/2018 12:30 PM Cleve Null MD [facility]        Today's Diagnoses     OAB (overactive bladder)    -  1    Dysuria           Follow-ups after your visit        Follow-up notes from your care team     Return in about 2 weeks (around 7/17/2018).      Your next 10 appointments already scheduled     Jul 17, 2018 11:30 AM CDT   [facility]ve [facility]   [facility]    [address] [facility] [place] [address]   [phone]            Aug 14, 2018  7:30 AM CDT   Remote PPM Check with [place] [facility]   [facility] ([facility]    [address] [facility] [address]   [phone]           This appointment is for a remote check of your pacemaker.  This is not an appointment at the office.              Who to contact     If you have questions or need follow up information about today's clinic visit or your schedule please contact [facility] directly at [phone].  Normal or non-critical lab and imaging results will be communicated to you by [employer], letter or phone within 4 business days after the clinic has received the results. If you do not hear from us within 7 days, please contact the clinic through [employer] or phone. If you have a critical or abnormal lab result, we will notify you by phone as soon as possible.  Submit refill requests through [employer] or call your pharmacy and they will forward the refill request to us. Please allow 3 business days for your refill to be completed.          Additional Information About Your Visit        MyChart Information     [employer] gives you secure access to your electronic health record. If you see a primary care provider,  "you can also send messages to your care team and make appointments. If you have questions, please call your primary care clinic.  If you do not have a primary care provider, please call [phone] and they will assist you.        Care EveryWhere ID     This is your Care EveryWhere ID. This could be used by other organizations to access your [place] medical records  [id number]        Your Vitals Were     Pulse Temperature Respirations Height BMI (Body Mass Index)       70 97.7  F (36.5  C) (Oral) 16 1.676 m (5' 6\") 26.31 kg/m2        Blood Pressure from Last 3 Encounters:   07/03/18 138/62   06/26/18 117/73   06/08/18 128/70    Weight from Last 3 Encounters:   07/03/18 73.9 kg (163 lb)   06/08/18 73.9 kg (163 lb)   05/29/18 74.8 kg (165 lb)              We Performed the Following     BOTULINUM TOXIN A PER UNIT []     CYSTOURETHROSCOPY INJ CHEMODENERVATION BLADDER (38088)     UA reflex to Microscopic and Culture [[id number]]     Urine Microscopic          Today's Medication Changes          These changes are accurate as of 7/3/18  1:47 PM.  If you have any questions, ask your nurse or doctor.               Start taking these medicines.        Dose/Directions    levofloxacin 250 MG tablet   Commonly known as:  LEVAQUIN   Used for:  Dysuria   Started by:  Cleve Null MD        Dose:  250 mg   Take 1 tablet (250 mg) by mouth daily   Quantity:  3 tablet   Refills:  0            Where to get your medicines      These medications were sent to [facility] [address] [place] [address] - [address]   [address] [facility] [address]     Phone:  [phone]     levofloxacin 250 MG tablet                Primary Care Provider Office Phone # Fax #    Kelly Matthew PA-C [phone] [address]  [facility] [address]        Equal Access to Services     SOFIA EDUARDO AH: Jefferson myers Soalvino, waaxda luqadaha, qaybta kaaljojo fish, danya rogers " maximino moodyaan ah. So [facility] [phone].    ATENCIÓN: Si jorge martin, tiene a ozuna disposición servicios gratuitos de asistencia lingüística. Moisés al [phone].    We comply with applicable federal civil rights laws and [place] laws. We do not discriminate on the basis of race, color, national origin, age, disability, sex, sexual orientation, or gender identity.            Thank you!     Thank you for choosing [facility]  for your care. Our goal is always to provide you with excellent care. Hearing back from our patients is one way we can continue to improve our services. Please take a few minutes to complete the written survey that you may receive in the mail after your visit with us. Thank you!             Your Updated Medication List - Protect others around you: Learn how to safely use, store and throw away your medicines at www.disposemymeds.org.          This list is accurate as of 7/3/18  1:47 PM.  Always use your most recent med list.                   Brand Name Dispense Instructions for use Diagnosis    acetaminophen 500 MG tablet    TYLENOL    30 tablet    Take 2 tablets (1,000 mg) by mouth every 8 hours as needed for mild pain        * albuterol 108 (90 Base) MCG/ACT Inhaler    PROAIR HFA/PROVENTIL HFA/VENTOLIN HFA    1 Inhaler    Inhale 2 puffs into the lungs every 6 hours as needed for shortness of breath / dyspnea or wheezing    Acute bronchitis with symptoms > 10 days       * albuterol 108 (90 Base) MCG/ACT Inhaler    PROAIR HFA/PROVENTIL HFA/VENTOLIN HFA    1 Inhaler    Inhale 2 puffs into the lungs every 6 hours as needed for shortness of breath / dyspnea or wheezing    Acute bronchitis with symptoms > 10 days       aspirin 81 MG tablet      Take 2 tablets by mouth every evening. *.        atorvastatin 40 MG tablet    LIPITOR    90 tablet    TAKE ONE TABLET BY MOUTH EVERY DAY    CAD S/P percutaneous coronary angioplasty       benzonatate 100 MG capsule    TESSALON    42 capsule     Take 1-2 capsules (100-200 mg) by mouth 3 times daily as needed for cough    Cough       CALCIUM 600 + D PO      1 tablet by mouth 2 times per day        citalopram 20 MG tablet    celeXA    90 tablet    Take 1 tablet (20 mg) by mouth daily    Major depressive disorder, recurrent episode, mild (H)       doxycycline 100 MG capsule    VIBRAMYCIN    20 capsule    Take 1 capsule (100 mg) by mouth 2 times daily    Acute sinusitis with symptoms > 10 days       FISH OIL PO      1 daily        levofloxacin 250 MG tablet    LEVAQUIN    3 tablet    Take 1 tablet (250 mg) by mouth daily    Dysuria       levothyroxine 75 MCG tablet    SYNTHROID/LEVOTHROID    90 tablet    Take 1 tablet (75 mcg) by mouth daily Due for labs.    Hypothyroidism, unspecified type       losartan 25 MG tablet    COZAAR    90 tablet    TAKE ONE TABLET BY MOUTH ONCE DAILY    Essential hypertension with goal blood pressure less than 140/90       meclizine 25 MG tablet    ANTIVERT    30 tablet    TAKE ONE TABLET BY MOUTH EVERY 6 HOURS AS NEEDED FOR DIZZINESS    Vertigo       metoprolol succinate 25 MG 24 hr tablet    TOPROL-XL    90 tablet    Take 0.5 tablets (12.5 mg) by mouth daily    Essential hypertension with goal blood pressure less than 140/90       nitroGLYcerin 0.4 MG sublingual tablet    NITROSTAT    25 tablet    Place 1 tablet (0.4 mg) under the tongue every 5 minutes as needed for chest pain (call your doctor if you take a third dose)    Atherosclerosis of native coronary artery of native heart without angina pectoris       OCUVITE PO      Take 1 tablet by mouth daily.        ondansetron 4 MG tablet    ZOFRAN    18 tablet    Take 1 tablet (4 mg) by mouth every 6 hours as needed for nausea    Nausea       pantoprazole 20 MG EC tablet    PROTONIX    180 tablet    Take 2 tablets (40 mg) by mouth daily    Gastroesophageal reflux disease, esophagitis presence not specified       pramipexole 0.5 MG tablet    MIRAPEX    90 tablet    TAKE ONE TABLET BY  MOUTH AT BEDTIME    Restless leg syndrome       * ranitidine 150 MG tablet    ZANTAC    60 tablet    Take 2 tablets (300 mg) by mouth At Bedtime    Gastroesophageal reflux disease, esophagitis presence not specified       * ranitidine 150 MG tablet    ZANTAC    180 tablet    Take 2 tablets (300 mg) by mouth At Bedtime    Gastroesophageal reflux disease, esophagitis presence not specified       tolterodine 2 MG tablet    DETROL    60 tablet    Take 1 tablet (2 mg) by mouth 2 times daily    Overactive bladder       * Notice:  This list has 4 medication(s) that are the same as other medications prescribed for you. Read the directions carefully, and ask your doctor or other care provider to review them with you.

## 2018-07-03 NOTE — NURSING NOTE
"Chief Complaint   Patient presents with     Follow Up For     botox     Cystoscopy       Initial /62 (BP Location: Left arm, Patient Position: Chair, Cuff Size: Adult Regular)  Pulse 70  Temp 97.7  F (36.5  C) (Oral)  Resp 16  Ht 1.676 m (5' 6\")  Wt 73.9 kg (163 lb)  BMI 26.31 kg/m2 Estimated body mass index is 26.31 kg/(m^2) as calculated from the following:    Height as of this encounter: 1.676 m (5' 6\").    Weight as of this encounter: 73.9 kg (163 lb).  Medications and allergies reviewed.    Ivan HI CMA    "

## 2018-07-03 NOTE — PROGRESS NOTES
F/u OAB, modest bladder capacity, fair bit of caffeine/coffee, maturity    Has moderated coffee/caffeine.    Now requesting Botox. Again discussed in detail; informed consent obtained.      Current Outpatient Prescriptions   Medication     acetaminophen (TYLENOL) 500 MG tablet     ASPIRIN 81 MG PO TABS     atorvastatin (LIPITOR) 40 MG tablet     benzonatate (TESSALON) 100 MG capsule     CALCIUM 600 + D OR     citalopram (CELEXA) 20 MG tablet     doxycycline (VIBRAMYCIN) 100 MG capsule     FISH OIL OR     levothyroxine (SYNTHROID/LEVOTHROID) 75 MCG tablet     losartan (COZAAR) 25 MG tablet     meclizine (ANTIVERT) 25 MG tablet     metoprolol (TOPROL-XL) 25 MG 24 hr tablet     Multiple Vitamins-Minerals (OCUVITE PO)     nitroglycerin (NITROSTAT) 0.4 MG SL tablet     ondansetron (ZOFRAN) 4 MG tablet     pantoprazole (PROTONIX) 20 MG EC tablet     pramipexole (MIRAPEX) 0.5 MG tablet     ranitidine (ZANTAC) 150 MG tablet     ranitidine (ZANTAC) 150 MG tablet     tolterodine (DETROL) 2 MG tablet     albuterol (PROAIR HFA/PROVENTIL HFA/VENTOLIN HFA) 108 (90 BASE) MCG/ACT Inhaler     albuterol (PROAIR HFA/PROVENTIL HFA/VENTOLIN HFA) 108 (90 BASE) MCG/ACT Inhaler     No current facility-administered medications for this visit.            Results for orders placed or performed in visit on 07/03/18   UA reflex to Microscopic and Culture [[id number]]   Result Value Ref Range    Color Urine Yellow     Appearance Urine Clear     Glucose Urine Negative NEG^Negative mg/dL    Bilirubin Urine Negative NEG^Negative    Ketones Urine Negative NEG^Negative mg/dL    Specific Gravity Urine 1.020 1.003 - 1.035    Blood Urine Trace (A) NEG^Negative    pH Urine 5.5 5.0 - 7.0 pH    Protein Albumin Urine Negative NEG^Negative mg/dL    Urobilinogen Urine 0.2 0.2 - 1.0 EU/dL    Nitrite Urine Negative NEG^Negative    Leukocyte Esterase Urine Trace (A) NEG^Negative    Source Midstream Urine    Urine Microscopic   Result Value Ref Range    WBC Urine 0  - 5 OTO5^0 - 5 /HPF    RBC Urine 2-5 (A) OTO2^O - 2 /HPF    Squamous Epithelial /LPF Urine Few FEW^Few /LPF     Urojet for 10 minutes. Then Botox A 100 units in 10 cc saline, injected into posterior wall at 3 sites; excellent blebs and hemostasis.        IMP:  1. Botox A #1 for OAB      PLAN:  1. Discussed situation with patient in detail.  2. Precautions given in detail  3. Levoquin 250 x 1  4. RTC 2 wks

## 2018-07-17 ENCOUNTER — OFFICE VISIT (OUTPATIENT)
Dept: UROLOGY | Facility: CLINIC | Age: 83
End: 2018-07-17
Payer: COMMERCIAL

## 2018-07-17 ENCOUNTER — RADIANT APPOINTMENT (OUTPATIENT)
Dept: GENERAL RADIOLOGY | Facility: CLINIC | Age: 83
End: 2018-07-17
Attending: PHYSICIAN ASSISTANT
Payer: COMMERCIAL

## 2018-07-17 ENCOUNTER — OFFICE VISIT (OUTPATIENT)
Dept: FAMILY MEDICINE | Facility: CLINIC | Age: 83
End: 2018-07-17
Payer: COMMERCIAL

## 2018-07-17 VITALS
TEMPERATURE: 97.7 F | WEIGHT: 163 LBS | OXYGEN SATURATION: 96 % | HEART RATE: 68 BPM | RESPIRATION RATE: 14 BRPM | HEIGHT: 66 IN | DIASTOLIC BLOOD PRESSURE: 69 MMHG | BODY MASS INDEX: 26.2 KG/M2 | SYSTOLIC BLOOD PRESSURE: 133 MMHG

## 2018-07-17 VITALS
BODY MASS INDEX: 26.52 KG/M2 | SYSTOLIC BLOOD PRESSURE: 129 MMHG | WEIGHT: 165 LBS | HEIGHT: 66 IN | HEART RATE: 65 BPM | DIASTOLIC BLOOD PRESSURE: 71 MMHG

## 2018-07-17 DIAGNOSIS — M15.0 PRIMARY OSTEOARTHRITIS INVOLVING MULTIPLE JOINTS: ICD-10-CM

## 2018-07-17 DIAGNOSIS — I10 HYPERTENSION GOAL BP (BLOOD PRESSURE) < 140/90: ICD-10-CM

## 2018-07-17 DIAGNOSIS — N32.81 OAB (OVERACTIVE BLADDER): Primary | ICD-10-CM

## 2018-07-17 DIAGNOSIS — M25.579 PAIN IN JOINT, ANKLE AND FOOT, UNSPECIFIED LATERALITY: ICD-10-CM

## 2018-07-17 DIAGNOSIS — E03.9 HYPOTHYROIDISM, UNSPECIFIED TYPE: ICD-10-CM

## 2018-07-17 DIAGNOSIS — M25.579 PAIN IN JOINT, ANKLE AND FOOT, UNSPECIFIED LATERALITY: Primary | ICD-10-CM

## 2018-07-17 PROCEDURE — 99213 OFFICE O/P EST LOW 20 MIN: CPT | Mod: 25 | Performed by: UROLOGY

## 2018-07-17 PROCEDURE — 73630 X-RAY EXAM OF FOOT: CPT | Mod: LT

## 2018-07-17 PROCEDURE — 51798 US URINE CAPACITY MEASURE: CPT | Performed by: UROLOGY

## 2018-07-17 PROCEDURE — 99214 OFFICE O/P EST MOD 30 MIN: CPT | Performed by: PHYSICIAN ASSISTANT

## 2018-07-17 RX ORDER — LEVOTHYROXINE SODIUM 75 UG/1
75 TABLET ORAL DAILY
Qty: 90 TABLET | Refills: 0 | Status: SHIPPED | OUTPATIENT
Start: 2018-07-17 | End: 2018-10-18

## 2018-07-17 ASSESSMENT — PAIN SCALES - GENERAL: PAINLEVEL: MODERATE PAIN (4)

## 2018-07-17 NOTE — MR AVS SNAPSHOT
After Visit Summary   7/17/2018    Christiana Sal    MRN: [id number]           Patient Information     Date Of Birth          10/25/1931        Visit Information        Provider Department      7/17/2018 11:30 AM Cleve Null MD [facility]        Today's Diagnoses     OAB (overactive bladder)    -  1      Care Instructions    If you have questions or concerns on any instructions given to you by your provider today or if you need to schedule an appointment, you can reach us at [phone].                         Follow-ups after your visit        Follow-up notes from your care team     Return in about 6 months (around 1/17/2019).      Your next 10 appointments already scheduled     Jul 17, 2018  1:40 PM CDT   [facility]    [address] [address]   [phone]            Aug 14, 2018  7:30 AM CDT   Remote PPM Check with [place] [facility]   [facility] ([facility])    [address] [facility] [address] [phone]           This appointment is for a remote check of your pacemaker.  This is not an appointment at the office.            Jan 08, 2019 10:15 AM CST   Return Visit with Cleve Null MD   [facility]    [address] [facility] [address] [phone]              Who to contact     If you have questions or need follow up information about today's clinic visit or your schedule please contact [facility] directly at [phone].  Normal or non-critical lab and imaging results will be communicated to you by MyChart, letter or phone within 4 business days after the clinic has received the results. If you do not hear from us within 7 days, please contact the clinic through MyChart or phone. If you have a critical or abnormal lab result, we will notify  "you by phone as soon as possible.  Submit refill requests through [employer] or call your pharmacy and they will forward the refill request to us. Please allow 3 business days for your refill to be completed.          Additional Information About Your Visit        [employer] Information     [employer] gives you secure access to your electronic health record. If you see a primary care provider, you can also send messages to your care team and make appointments. If you have questions, please call your primary care clinic.  If you do not have a primary care provider, please call [phone] and they will assist you.        Care EveryWhere ID     This is your Care EveryWhere ID. This could be used by other organizations to access your [place] medical records  [id number]        Your Vitals Were     Pulse Temperature Respirations Height Pulse Oximetry BMI (Body Mass Index)    68 97.7  F (36.5  C) 14 1.676 m (5' 6\") 96% 26.31 kg/m2       Blood Pressure from Last 3 Encounters:   07/17/18 133/69   07/03/18 138/62   06/26/18 117/73    Weight from Last 3 Encounters:   07/17/18 73.9 kg (163 lb)   07/03/18 73.9 kg (163 lb)   06/08/18 73.9 kg (163 lb)              We Performed the Following     MEASURE POST-VOID RESIDUAL URINE/BLADDER CAPACITY, US NON-IMAGING     UA reflex to Microscopic and Culture [[id number]]        Primary Care Provider Office Phone # Fax #    Kelly Matthew PA-C [phone] [address] [facility] [address]        Equal Access to Services     SOLITARIO EDUARDO : Hadii aad ku hadasho Soomaali, waaxda luqadaha, qaybta kaalmada adeegyada, danya moser. So [facility] [phone].    ATENCIÓN: Si habla español, tiene a ozuna disposición servicios gratuitos de asistencia lingüística. Llame al [phone].    We comply with applicable federal civil rights laws and [place] laws. We do not discriminate on the basis of race, color, national origin, age, disability, sex, " sexual orientation, or gender identity.            Thank you!     Thank you for choosing [facility]  for your care. Our goal is always to provide you with excellent care. Hearing back from our patients is one way we can continue to improve our services. Please take a few minutes to complete the written survey that you may receive in the mail after your visit with us. Thank you!             Your Updated Medication List - Protect others around you: Learn how to safely use, store and throw away your medicines at www.disposemymeds.org.          This list is accurate as of 7/17/18 12:04 PM.  Always use your most recent med list.                   Brand Name Dispense Instructions for use Diagnosis    acetaminophen 500 MG tablet    TYLENOL    30 tablet    Take 2 tablets (1,000 mg) by mouth every 8 hours as needed for mild pain        * albuterol 108 (90 Base) MCG/ACT Inhaler    PROAIR HFA/PROVENTIL HFA/VENTOLIN HFA    1 Inhaler    Inhale 2 puffs into the lungs every 6 hours as needed for shortness of breath / dyspnea or wheezing    Acute bronchitis with symptoms > 10 days       * albuterol 108 (90 Base) MCG/ACT Inhaler    PROAIR HFA/PROVENTIL HFA/VENTOLIN HFA    1 Inhaler    Inhale 2 puffs into the lungs every 6 hours as needed for shortness of breath / dyspnea or wheezing    Acute bronchitis with symptoms > 10 days       aspirin 81 MG tablet      Take 2 tablets by mouth every evening. *.        atorvastatin 40 MG tablet    LIPITOR    90 tablet    TAKE ONE TABLET BY MOUTH EVERY DAY    CAD S/P percutaneous coronary angioplasty       benzonatate 100 MG capsule    TESSALON    42 capsule    Take 1-2 capsules (100-200 mg) by mouth 3 times daily as needed for cough    Cough       CALCIUM 600 + D PO      1 tablet by mouth 2 times per day        citalopram 20 MG tablet    celeXA    90 tablet    Take 1 tablet (20 mg) by mouth daily    Major depressive disorder, recurrent episode, mild (H)       doxycycline 100 MG  capsule    VIBRAMYCIN    20 capsule    Take 1 capsule (100 mg) by mouth 2 times daily    Acute sinusitis with symptoms > 10 days       FISH OIL PO      1 daily        levofloxacin 250 MG tablet    LEVAQUIN    3 tablet    Take 1 tablet (250 mg) by mouth daily    Dysuria       levothyroxine 75 MCG tablet    SYNTHROID/LEVOTHROID    90 tablet    Take 1 tablet (75 mcg) by mouth daily Due for labs.    Hypothyroidism, unspecified type       losartan 25 MG tablet    COZAAR    90 tablet    TAKE ONE TABLET BY MOUTH ONCE DAILY    Essential hypertension with goal blood pressure less than 140/90       meclizine 25 MG tablet    ANTIVERT    30 tablet    TAKE ONE TABLET BY MOUTH EVERY 6 HOURS AS NEEDED FOR DIZZINESS    Vertigo       metoprolol succinate 25 MG 24 hr tablet    TOPROL-XL    90 tablet    Take 0.5 tablets (12.5 mg) by mouth daily    Essential hypertension with goal blood pressure less than 140/90       nitroGLYcerin 0.4 MG sublingual tablet    NITROSTAT    25 tablet    Place 1 tablet (0.4 mg) under the tongue every 5 minutes as needed for chest pain (call your doctor if you take a third dose)    Atherosclerosis of native coronary artery of native heart without angina pectoris       OCUVITE PO      Take 1 tablet by mouth daily.        ondansetron 4 MG tablet    ZOFRAN    18 tablet    Take 1 tablet (4 mg) by mouth every 6 hours as needed for nausea    Nausea       pantoprazole 20 MG EC tablet    PROTONIX    180 tablet    Take 2 tablets (40 mg) by mouth daily    Gastroesophageal reflux disease, esophagitis presence not specified       pramipexole 0.5 MG tablet    MIRAPEX    90 tablet    TAKE ONE TABLET BY MOUTH AT BEDTIME    Restless leg syndrome       * ranitidine 150 MG tablet    ZANTAC    60 tablet    Take 2 tablets (300 mg) by mouth At Bedtime    Gastroesophageal reflux disease, esophagitis presence not specified       * ranitidine 150 MG tablet    ZANTAC    180 tablet    Take 2 tablets (300 mg) by mouth At Bedtime     Gastroesophageal reflux disease, esophagitis presence not specified       tolterodine 2 MG tablet    DETROL    60 tablet    Take 1 tablet (2 mg) by mouth 2 times daily    Overactive bladder       * Notice:  This list has 4 medication(s) that are the same as other medications prescribed for you. Read the directions carefully, and ask your doctor or other care provider to review them with you.

## 2018-07-17 NOTE — PROGRESS NOTES
F/u OAB/modest bladder capacity/caffeine, s/p Botox #1 on 7/3/18    Can now go 2 hrs between voids at nite as well as during the day. No longer on Detrol.    Denies dysuria, gross hematuria, incont. BM's are satisfactory.    Pt had (somewhat unrealistic expectation that Botox would allow her to sleep through the nite without voiding).        Current Outpatient Prescriptions   Medication     acetaminophen (TYLENOL) 500 MG tablet     ASPIRIN 81 MG PO TABS     atorvastatin (LIPITOR) 40 MG tablet     benzonatate (TESSALON) 100 MG capsule     CALCIUM 600 + D OR     citalopram (CELEXA) 20 MG tablet     doxycycline (VIBRAMYCIN) 100 MG capsule     FISH OIL OR     levofloxacin (LEVAQUIN) 250 MG tablet     levothyroxine (SYNTHROID/LEVOTHROID) 75 MCG tablet     losartan (COZAAR) 25 MG tablet     meclizine (ANTIVERT) 25 MG tablet     metoprolol (TOPROL-XL) 25 MG 24 hr tablet     Multiple Vitamins-Minerals (OCUVITE PO)     nitroglycerin (NITROSTAT) 0.4 MG SL tablet     ondansetron (ZOFRAN) 4 MG tablet     pantoprazole (PROTONIX) 20 MG EC tablet     pramipexole (MIRAPEX) 0.5 MG tablet     ranitidine (ZANTAC) 150 MG tablet     ranitidine (ZANTAC) 150 MG tablet     tolterodine (DETROL) 2 MG tablet     albuterol (PROAIR HFA/PROVENTIL HFA/VENTOLIN HFA) 108 (90 BASE) MCG/ACT Inhaler     albuterol (PROAIR HFA/PROVENTIL HFA/VENTOLIN HFA) 108 (90 BASE) MCG/ACT Inhaler     No current facility-administered medications for this visit.        IMP:  1. OAB/modest bladder capacity, much improved with Botox #1  2. Advanced maturity      PLAN:  1. Discussed situation with patient in detail.  2. Continue to watch PM fluids, especially EtOH and caffeine  3. Again carefully set realistic expectations  4. RTC 6 mos for next Botox or sooner prn  5. 15 minutes spent with patient, 100% spent in counseling and coordination of care for OAB.  6. Copy

## 2018-07-17 NOTE — PATIENT INSTRUCTIONS
If you have questions or concerns on any instructions given to you by your provider today or if you need to schedule an appointment, you can reach us at [phone].

## 2018-07-17 NOTE — PROGRESS NOTES
"  SUBJECTIVE:   Christiana Sal is a 86 year old female who presents to clinic today for the following health issues:      Patient is here today to follow up with ongoing leg and ankle pain. Left leg and right ankle. Constant pain, sitting and laying down is when it bothers her the most.    Ongoing pain issues in her left leg and right ankle as well as both feet  She is wondering if this is something she is going to hav eto learn to live with given she is 86    She does note that the left leg weakness and \"giving out\" is better since her injection  Continues to have pain but rates it as a 2-3 on most days  Says the other day she actually had a \"good day\" and \"felt like I was 20 again\"  Admits those days are rare    Continues to have pain across her right foot/ankle  Some days hurts so bad she can't walk on it  Denies any injry  Wondering if it's just arthitis    Also has issues with her toes   Had surgery on her right foot years ago but says she never wants to go through that pain again  Having a hard time finding shoes that don't worsen the pain on her left foot    Problem list and histories reviewed & adjusted, as indicated.  Additional history: as documented    Current Outpatient Prescriptions   Medication Sig Dispense Refill     acetaminophen (TYLENOL) 500 MG tablet Take 2 tablets (1,000 mg) by mouth every 8 hours as needed for mild pain 30 tablet 0     ASPIRIN 81 MG PO TABS Take 2 tablets by mouth every evening. *.        atorvastatin (LIPITOR) 40 MG tablet TAKE ONE TABLET BY MOUTH EVERY DAY 90 tablet 3     CALCIUM 600 + D OR 1 tablet by mouth 2 times per day       citalopram (CELEXA) 20 MG tablet Take 1 tablet (20 mg) by mouth daily 90 tablet 1     diclofenac (VOLTAREN) 50 MG EC tablet Take 1 tablet (50 mg) by mouth 3 times daily as needed for moderate pain 30 tablet 1     FISH OIL OR 1 daily       levofloxacin (LEVAQUIN) 250 MG tablet Take 1 tablet (250 mg) by mouth daily 3 tablet 0     levothyroxine " (SYNTHROID/LEVOTHROID) 75 MCG tablet Take 1 tablet (75 mcg) by mouth daily Due for labs. 90 tablet 0     losartan (COZAAR) 25 MG tablet TAKE ONE TABLET BY MOUTH ONCE DAILY 90 tablet 2     meclizine (ANTIVERT) 25 MG tablet TAKE ONE TABLET BY MOUTH EVERY 6 HOURS AS NEEDED FOR DIZZINESS 30 tablet 3     metoprolol (TOPROL-XL) 25 MG 24 hr tablet Take 0.5 tablets (12.5 mg) by mouth daily 90 tablet 3     Multiple Vitamins-Minerals (OCUVITE PO) Take 1 tablet by mouth daily.       nitroglycerin (NITROSTAT) 0.4 MG SL tablet Place 1 tablet (0.4 mg) under the tongue every 5 minutes as needed for chest pain (call your doctor if you take a third dose) 25 tablet 3     ondansetron (ZOFRAN) 4 MG tablet Take 1 tablet (4 mg) by mouth every 6 hours as needed for nausea 18 tablet 3     pantoprazole (PROTONIX) 20 MG EC tablet Take 2 tablets (40 mg) by mouth daily 180 tablet 2     pramipexole (MIRAPEX) 0.5 MG tablet TAKE ONE TABLET BY MOUTH AT BEDTIME 90 tablet 2     ranitidine (ZANTAC) 150 MG tablet Take 2 tablets (300 mg) by mouth At Bedtime 180 tablet 3     tolterodine (DETROL) 2 MG tablet Take 1 tablet (2 mg) by mouth 2 times daily 60 tablet 1     albuterol (PROAIR HFA/PROVENTIL HFA/VENTOLIN HFA) 108 (90 BASE) MCG/ACT Inhaler Inhale 2 puffs into the lungs every 6 hours as needed for shortness of breath / dyspnea or wheezing (Patient not taking: Reported on 3/2/2018) 1 Inhaler 0     albuterol (PROAIR HFA/PROVENTIL HFA/VENTOLIN HFA) 108 (90 BASE) MCG/ACT Inhaler Inhale 2 puffs into the lungs every 6 hours as needed for shortness of breath / dyspnea or wheezing (Patient not taking: Reported on 3/2/2018) 1 Inhaler 0     benzonatate (TESSALON) 100 MG capsule Take 1-2 capsules (100-200 mg) by mouth 3 times daily as needed for cough (Patient not taking: Reported on 7/17/2018) 42 capsule 0     doxycycline (VIBRAMYCIN) 100 MG capsule Take 1 capsule (100 mg) by mouth 2 times daily (Patient not taking: Reported on 7/17/2018) 20 capsule 0  "    Allergies   Allergen Reactions     Demerol Nausea     Lisinopril Cough     Meperidine Unknown     Sulfa Drugs Other (See Comments) and Unknown     Questionable itching reported by patient     BP Readings from Last 3 Encounters:   07/17/18 129/71   07/17/18 133/69   07/03/18 138/62    Wt Readings from Last 3 Encounters:   07/17/18 165 lb (74.8 kg)   07/17/18 163 lb (73.9 kg)   07/03/18 163 lb (73.9 kg)                    Reviewed and updated as needed this visit by clinical staff       Reviewed and updated as needed this visit by Provider         ROS:  Remainder of ROS obtained and found to be negative other than that which was documented above      OBJECTIVE:     /71  Pulse 65  Ht 5' 6\" (1.676 m)  Wt 165 lb (74.8 kg)  BMI 26.63 kg/m2  Body mass index is 26.63 kg/(m^2).  GENERAL: healthy, alert and no distress  EYES: Eyes grossly normal to inspection  RESP: lungs clear to auscultation - no rales, rhonchi or wheezes  CV: regular rates and rhythm, normal S1 S2, no S3 or S4 and no murmur, click or rub  FEET:   Left - significant hammertoe formation on the left foot with the 2nd digit directly overtop the great toe  Right - slight hammertoe deformity. No redness or swelling over top of foot but tenderness with palpation at base of ankle over foot     Diagnostic Test Results:  Recent Results (from the past 744 hour(s))   XR Foot Bilateral G/E 3 Views    Narrative    XR FOOT BILATERAL G/E 3 VW 7/17/2018 2:40 PM    COMPARISON: RIGHT foot radiographs dated 12/2/2016    HISTORY: Pain.    FINDINGS:  RIGHT foot: Postsurgical changes of RIGHT first metatarsophalangeal  arthroplasty as well as screw arthrodesis at the first interphalangeal  joint, the arthrodesis screw is again shown to be fractured.  Osteopenia about the RIGHT foot. No fractures are seen. Small plantar  calcaneal spur.    LEFT foot: Mild hallux valgus with associated first  metatarsophalangeal degenerative change. Likely second and third " toe  hammertoe deformities. No fractures are seen in the LEFT foot.  Partially visualized screw fixation in the distal LEFT fibula. Small  plantar calcaneal spur.      Impression    IMPRESSION:   1. No acute bone abnormality identified in either foot.  2. Postoperative changes involving the RIGHT great toe with  redemonstration of fractured RIGHT interphalangeal arthrodesis screw.  3. LEFT hallux valgus with mild first metatarsophalangeal degenerative  change.  4. LEFT second and third hammertoes.    ELA ZARATE MD         ASSESSMENT/PLAN:     (M25.579) Pain in joint, ankle and foot, unspecified laterality  (primary encounter diagnosis)  Comment: discussed with patient today that realistically it will not be possible for all of her pain issues to disappear as I do feel there is a significant component of arthritis. What I don't know, is how much her toe deformity is altering her gait and in turn contributing to some of her problems. She is 86 but an active and otehrwise healthy 85 yo so I think it's worth at least discussing with podiatry whether it would be beneficial or more harmful to consider correcting the toe  Plan: XR Foot Bilateral G/E 3 Views            (E03.9) Hypothyroidism, unspecified type  Comment: refilled meds - due for labs. Unfortunately left clinic before going to lab so will future  Plan: levothyroxine (SYNTHROID/LEVOTHROID) 75 MCG         tablet, TSH with free T4 reflex, CANCELED: TSH         with free T4 reflex            (I10) Hypertension goal BP (blood pressure) < 140/90  Comment:   Plan: Basic metabolic panel  (Ca, Cl, CO2, Creat,         Gluc, K, Na, BUN), CANCELED: Basic metabolic         panel  (Ca, Cl, CO2, Creat, Gluc, K, Na, BUN)            (M15.0) Primary osteoarthritis involving multiple joints  Comment: see comment above  Plan: diclofenac (VOLTAREN) 50 MG EC tablet,         PODIATRY/FOOT & ANKLE SURGERY REFERRAL                Kelly Matthew PA-C  [facility]  [place]        Patient Instructions   Call to schedule a consult with podiatry in [place] @ [phone]   - see what they have to say and if they have any options that could at least provide some relief in pain      A script for a new medication called diclofenac was sent to the pharmacy for you    - this is a stronger ibuprofen/anti inflammatory. I don't expect that you will need it everyday BUT on days when your pain is higher than normal, try it to see if it at least provides some relief    - major side effect is upset stomach so I would suggest taking this with at least a small amount of food

## 2018-07-17 NOTE — PATIENT INSTRUCTIONS
Call to schedule a consult with podiatry in [place] @ [phone]   - see what they have to say and if they have any options that could at least provide some relief in pain      A script for a new medication called diclofenac was sent to the pharmacy for you    - this is a stronger ibuprofen/anti inflammatory. I don't expect that you will need it everyday BUT on days when your pain is higher than normal, try it to see if it at least provides some relief    - major side effect is upset stomach so I would suggest taking this with at least a small amount of food

## 2018-07-17 NOTE — MR AVS SNAPSHOT
After Visit Summary   7/17/2018    Christiana Sal    MRN: [id number]           Patient Information     Date Of Birth          10/25/1931        Visit Information        Provider Department      7/17/2018 1:40 PM Kelly Matthew PA-C [facility]        Today's Diagnoses     Pain in joint, ankle and foot, unspecified laterality    -  1    Hypothyroidism, unspecified type        Hypertension goal BP (blood pressure) < 140/90        Primary osteoarthritis involving multiple joints          Care Instructions    Call to schedule a consult with podiatry in [place] @ [phone]   - see what they have to say and if they have any options that could at least provide some relief in pain      A script for a new medication called diclofenac was sent to the pharmacy for you    - this is a stronger ibuprofen/anti inflammatory. I don't expect that you will need it everyday BUT on days when your pain is higher than normal, try it to see if it at least provides some relief    - major side effect is upset stomach so I would suggest taking this with at least a small amount of food          Follow-ups after your visit        Additional Services     PODIATRY/FOOT & ANKLE SURGERY REFERRAL       Your provider has referred you to: [facility] [place] [phone] [email or web address]. [email or web address]    Please be aware that coverage of these services is subject to the terms and limitations of your health insurance plan.  Call member services at your health plan with any benefit or coverage questions.      Please bring the following to your appointment:  >>   Any x-rays, CTs or MRIs which have been performed.  Contact the facility where they were done to arrange for  prior to your scheduled appointment.    >>   List of current medications   >>   This referral request   >>   Any documents/labs given to you for this referral                  Your next 10  "appointments already scheduled     Aug 14, 2018  7:30 AM CDT   Remote PPM Check with [place] [facility]   [facility] ([facility])    [address]  [facility] [place] [address] [phone]           This appointment is for a remote check of your pacemaker.  This is not an appointment at the office.            Jan 08, 2019 10:15 AM CST   Return Visit with Cleve Null MD   [facility] ([facility]    [address]  [facility] [place] [address]   [phone]              Who to contact     Normal or non-critical lab and imaging results will be communicated to you by [employer], letter or phone within 4 business days after the clinic has received the results. If you do not hear from us within 7 days, please contact the clinic through [employer] or phone. If you have a critical or abnormal lab result, we will notify you by phone as soon as possible.  Submit refill requests through [employer] or call your pharmacy and they will forward the refill request to us. Please allow 3 business days for your refill to be completed.          If you need to speak with a  for additional information , please call: [phone]             Additional Information About Your Visit        [employer] Information     [employer] gives you secure access to your electronic health record. If you see a primary care provider, you can also send messages to your care team and make appointments. If you have questions, please call your primary care clinic.  If you do not have a primary care provider, please call [phone] and they will assist you.        Care EveryWhere ID     This is your Care EveryWhere ID. This could be used by other organizations to access your [facility] medical records  [id number]        Your Vitals Were     Pulse Height BMI (Body Mass Index)             65 5' 6\" (1.676 m) 26.63 kg/m2          Blood Pressure from Last 3 Encounters:   07/17/18 " 129/71   07/17/18 133/69   07/03/18 138/62    Weight from Last 3 Encounters:   07/17/18 165 lb (74.8 kg)   07/17/18 163 lb (73.9 kg)   07/03/18 163 lb (73.9 kg)              We Performed the Following     Basic metabolic panel  (Ca, Cl, CO2, Creat, Gluc, K, Na, BUN)     PODIATRY/FOOT & ANKLE SURGERY REFERRAL     TSH with free T4 reflex          Today's Medication Changes          These changes are accurate as of 7/17/18  2:43 PM.  If you have any questions, ask your nurse or doctor.               Start taking these medicines.        Dose/Directions    diclofenac 50 MG EC tablet   Commonly known as:  VOLTAREN   Used for:  Primary osteoarthritis involving multiple joints   Started by:  Kelly Matthew PA-C        Dose:  50 mg   Take 1 tablet (50 mg) by mouth 3 times daily as needed for moderate pain   Quantity:  30 tablet   Refills:  1         These medicines have changed or have updated prescriptions.        Dose/Directions    ranitidine 150 MG tablet   Commonly known as:  ZANTAC   This may have changed:  Another medication with the same name was removed. Continue taking this medication, and follow the directions you see here.   Used for:  Gastroesophageal reflux disease, esophagitis presence not specified   Changed by:  Kelly Matthew PA-C        Dose:  300 mg   Take 2 tablets (300 mg) by mouth At Bedtime   Quantity:  180 tablet   Refills:  3            Where to get your medicines      These medications were sent to [facility] [address]   [address] [facility] [address]     Phone:  [phone]     diclofenac 50 MG EC tablet    levothyroxine 75 MCG tablet                Primary Care Provider Office Phone # Fax #    Kelly Matthew PA-C [phone] [address]  [facility] [address]        Equal Access to Services     SOFIA EDUARDO AH: Jefferson Davis, rubia hester, danya park  sudarshan biggsyocasta sheridan'aan ah. So [facility] [phone].    ATENCIÓN: Si charliela mario, tiene a ozuna disposición servicios gratuitos de asistencia lingüística. Moisés al [phone].    We comply with applicable federal civil rights laws and [place] laws. We do not discriminate on the basis of race, color, national origin, age, disability, sex, sexual orientation, or gender identity.            Thank you!     Thank you for choosing [facility]  for your care. Our goal is always to provide you with excellent care. Hearing back from our patients is one way we can continue to improve our services. Please take a few minutes to complete the written survey that you may receive in the mail after your visit with us. Thank you!             Your Updated Medication List - Protect others around you: Learn how to safely use, store and throw away your medicines at www.disposemymeds.org.          This list is accurate as of 7/17/18  2:43 PM.  Always use your most recent med list.                   Brand Name Dispense Instructions for use Diagnosis    acetaminophen 500 MG tablet    TYLENOL    30 tablet    Take 2 tablets (1,000 mg) by mouth every 8 hours as needed for mild pain        * albuterol 108 (90 Base) MCG/ACT Inhaler    PROAIR HFA/PROVENTIL HFA/VENTOLIN HFA    1 Inhaler    Inhale 2 puffs into the lungs every 6 hours as needed for shortness of breath / dyspnea or wheezing    Acute bronchitis with symptoms > 10 days       * albuterol 108 (90 Base) MCG/ACT Inhaler    PROAIR HFA/PROVENTIL HFA/VENTOLIN HFA    1 Inhaler    Inhale 2 puffs into the lungs every 6 hours as needed for shortness of breath / dyspnea or wheezing    Acute bronchitis with symptoms > 10 days       aspirin 81 MG tablet      Take 2 tablets by mouth every evening. *.        atorvastatin 40 MG tablet    LIPITOR    90 tablet    TAKE ONE TABLET BY MOUTH EVERY DAY    CAD S/P percutaneous coronary angioplasty       benzonatate 100 MG capsule    TESSALON     42 capsule    Take 1-2 capsules (100-200 mg) by mouth 3 times daily as needed for cough    Cough       CALCIUM 600 + D PO      1 tablet by mouth 2 times per day        citalopram 20 MG tablet    celeXA    90 tablet    Take 1 tablet (20 mg) by mouth daily    Major depressive disorder, recurrent episode, mild (H)       diclofenac 50 MG EC tablet    VOLTAREN    30 tablet    Take 1 tablet (50 mg) by mouth 3 times daily as needed for moderate pain    Primary osteoarthritis involving multiple joints       doxycycline 100 MG capsule    VIBRAMYCIN    20 capsule    Take 1 capsule (100 mg) by mouth 2 times daily    Acute sinusitis with symptoms > 10 days       FISH OIL PO      1 daily        levofloxacin 250 MG tablet    LEVAQUIN    3 tablet    Take 1 tablet (250 mg) by mouth daily    Dysuria       levothyroxine 75 MCG tablet    SYNTHROID/LEVOTHROID    90 tablet    Take 1 tablet (75 mcg) by mouth daily Due for labs.    Hypothyroidism, unspecified type       losartan 25 MG tablet    COZAAR    90 tablet    TAKE ONE TABLET BY MOUTH ONCE DAILY    Essential hypertension with goal blood pressure less than 140/90       meclizine 25 MG tablet    ANTIVERT    30 tablet    TAKE ONE TABLET BY MOUTH EVERY 6 HOURS AS NEEDED FOR DIZZINESS    Vertigo       metoprolol succinate 25 MG 24 hr tablet    TOPROL-XL    90 tablet    Take 0.5 tablets (12.5 mg) by mouth daily    Essential hypertension with goal blood pressure less than 140/90       nitroGLYcerin 0.4 MG sublingual tablet    NITROSTAT    25 tablet    Place 1 tablet (0.4 mg) under the tongue every 5 minutes as needed for chest pain (call your doctor if you take a third dose)    Atherosclerosis of native coronary artery of native heart without angina pectoris       OCUVITE PO      Take 1 tablet by mouth daily.        ondansetron 4 MG tablet    ZOFRAN    18 tablet    Take 1 tablet (4 mg) by mouth every 6 hours as needed for nausea    Nausea       pantoprazole 20 MG EC tablet    PROTONIX     180 tablet    Take 2 tablets (40 mg) by mouth daily    Gastroesophageal reflux disease, esophagitis presence not specified       pramipexole 0.5 MG tablet    MIRAPEX    90 tablet    TAKE ONE TABLET BY MOUTH AT BEDTIME    Restless leg syndrome       ranitidine 150 MG tablet    ZANTAC    180 tablet    Take 2 tablets (300 mg) by mouth At Bedtime    Gastroesophageal reflux disease, esophagitis presence not specified       tolterodine 2 MG tablet    DETROL    60 tablet    Take 1 tablet (2 mg) by mouth 2 times daily    Overactive bladder       * Notice:  This list has 2 medication(s) that are the same as other medications prescribed for you. Read the directions carefully, and ask your doctor or other care provider to review them with you.

## 2018-07-17 NOTE — NURSING NOTE
"Chief Complaint   Patient presents with     RECHECK     2 week f/u after Botox injection       Initial /69 (BP Location: Left arm, Patient Position: Chair, Cuff Size: Adult Regular)  Pulse 68  Temp 97.7  F (36.5  C)  Resp 14  Ht 1.676 m (5' 6\")  Wt 73.9 kg (163 lb)  SpO2 96%  BMI 26.31 kg/m2 Estimated body mass index is 26.31 kg/(m^2) as calculated from the following:    Height as of this encounter: 1.676 m (5' 6\").    Weight as of this encounter: 73.9 kg (163 lb).  BP completed using cuff size: regular  Medications and allergies reviewed.    Active order to obtain bladder scan? Yes   Name of ordering provider:  Dr Null  Bladder scan preformed post void Yes  Bladder scan reveled 113ML  Provider notified?  Yes    Melanie QUINTERO MA          "

## 2018-07-25 ENCOUNTER — TRANSFERRED RECORDS (OUTPATIENT)
Dept: HEALTH INFORMATION MANAGEMENT | Facility: CLINIC | Age: 83
End: 2018-07-25

## 2018-08-14 ENCOUNTER — HOSPITAL ENCOUNTER (OUTPATIENT)
Dept: CARDIOLOGY | Facility: CLINIC | Age: 83
Discharge: HOME | End: 2018-08-14
Attending: INTERNAL MEDICINE | Admitting: INTERNAL MEDICINE
Payer: MEDICARE

## 2018-08-14 ENCOUNTER — DOCUMENTATION ONLY (OUTPATIENT)
Dept: CARDIOLOGY | Facility: CLINIC | Age: 83
End: 2018-08-14

## 2018-08-14 PROCEDURE — 93294 REM INTERROG EVL PM/LDLS PM: CPT | Performed by: INTERNAL MEDICINE

## 2018-08-14 PROCEDURE — 93296 REM INTERROG EVL PM/IDS: CPT

## 2018-08-14 NOTE — PROGRESS NOTES
Latitude NXT Remote PPM Device Check  AP: 2 %  : 5 %  Mode: DDDR        Presenting Rhythm: AS/VS (62 bpm).  Heart Rate: Adequate rates per the histograms.  Sensing: Stable    Pacing Threshold: Stable    Impedance: Stable  Battery Status: 14.5 yrs remain.  Atrial Arrhythmia: 4 mode switch episodes comprising of < 1 %, AT/AF burden < 0.4 %. Episodes lasting 4-13 seconds, w/ vent rates of 81-95 bpm. This is not a new finding. Pt continues on ASA 81 mgs q d. Patient also had 10 SBR episodes between the 8/11-13/18. See note on 1/22/18 explaining what SBR is.    Ventricular Arrhythmia: None.     Care Plan: Annual thresholds due in October 2018 and Dr Beverly. Pt was called w/ results and given scheduling number for appts.

## 2018-09-06 ENCOUNTER — OFFICE VISIT (OUTPATIENT)
Dept: FAMILY MEDICINE | Facility: CLINIC | Age: 83
End: 2018-09-06
Payer: COMMERCIAL

## 2018-09-06 VITALS
SYSTOLIC BLOOD PRESSURE: 120 MMHG | BODY MASS INDEX: 26.52 KG/M2 | DIASTOLIC BLOOD PRESSURE: 68 MMHG | OXYGEN SATURATION: 97 % | WEIGHT: 165 LBS | TEMPERATURE: 97.4 F | HEART RATE: 69 BPM | HEIGHT: 66 IN

## 2018-09-06 DIAGNOSIS — J06.9 VIRAL UPPER RESPIRATORY TRACT INFECTION WITH COUGH: Primary | ICD-10-CM

## 2018-09-06 PROCEDURE — 99213 OFFICE O/P EST LOW 20 MIN: CPT | Performed by: PHYSICIAN ASSISTANT

## 2018-09-06 RX ORDER — CODEINE PHOSPHATE AND GUAIFENESIN 10; 100 MG/5ML; MG/5ML
1 SOLUTION ORAL EVERY 4 HOURS PRN
Qty: 120 ML | Refills: 0 | Status: SHIPPED | OUTPATIENT
Start: 2018-09-06 | End: 2018-12-02

## 2018-09-06 ASSESSMENT — ANXIETY QUESTIONNAIRES
2. NOT BEING ABLE TO STOP OR CONTROL WORRYING: NOT AT ALL
GAD7 TOTAL SCORE: 2
1. FEELING NERVOUS, ANXIOUS, OR ON EDGE: NOT AT ALL
7. FEELING AFRAID AS IF SOMETHING AWFUL MIGHT HAPPEN: NOT AT ALL
5. BEING SO RESTLESS THAT IT IS HARD TO SIT STILL: NOT AT ALL
3. WORRYING TOO MUCH ABOUT DIFFERENT THINGS: SEVERAL DAYS
6. BECOMING EASILY ANNOYED OR IRRITABLE: SEVERAL DAYS

## 2018-09-06 ASSESSMENT — PATIENT HEALTH QUESTIONNAIRE - PHQ9: 5. POOR APPETITE OR OVEREATING: NOT AT ALL

## 2018-09-06 NOTE — PROGRESS NOTES
"  SUBJECTIVE:   Christiana Sal is a 86 year old female who presents to clinic today for the following health issues:             Symptoms: cc Present Absent Comment   Fever/Chills x   Low grade   Fatigue x      Headache  x  Also hit head last week    Muscle or Body  Aches   x    Eye Irritation   x    Sneezing   x    Nasal Trav/Drg  x     Sinus Pressure/Pain   x    Dental pain   x    Sore Throat  x  From coughing   Swollen Glands   x    Ear Pain/Fullness   x    Cough x      Wheeze   x    Chest Discomfort   x    Shortness of breath   x    Abdominal pain  x  Upset from coughing so much   Emesis    x    Diarrhea   x    Other   x      Symptom duration:  5 days   Symptom severity:     Treatments tried:  cough syrup, allergy med   Contacts:  no     Initial /68  Pulse 69  Temp 97.4  F (36.3  C) (Tympanic)  Ht 5' 6\" (1.676 m)  Wt 165 lb (74.8 kg)  SpO2 97%  BMI 26.63 kg/m2 Estimated body mass index is 26.63 kg/(m^2) as calculated from the following:    Height as of this encounter: 5' 6\" (1.676 m).    Weight as of this encounter: 165 lb (74.8 kg). .        Problem list and histories reviewed & adjusted, as indicated.  Additional history: as documented      Reviewed and updated as needed this visit by clinical staff  Allergies  Meds         ROS:  Constitutional, HEENT, cardiovascular, pulmonary, gi and gu systems are negative, except as otherwise noted.    OBJECTIVE:     /68  Pulse 69  Temp 97.4  F (36.3  C) (Tympanic)  Ht 5' 6\" (1.676 m)  Wt 165 lb (74.8 kg)  SpO2 97%  BMI 26.63 kg/m2  Body mass index is 26.63 kg/(m^2).  GEN: Well developed, well nourished in NAD.  HEENT: Normocephalic. Eyes: No conjunctival flushing noted. EARS: TMs WNL, Canals clear.  Nose: Edematous mucosa without lesion. Clear rhinorrhea. Mouth/Pharynx: No cobblestoning and telangiectasia. No Percussed Sinus tenderness.  NECK: Supple with no anterior cervical lymphadenopathy.  No Thyromegaly  RESP: CTA with good air entry " all fields.  SKIN: No Exanthem noted.      Diagnostic Test Results:  none     ASSESSMENT/PLAN:   1. Viral upper respiratory tract infection with cough  - guaiFENesin-codeine (ROBITUSSIN AC) 100-10 MG/5ML SOLN solution; Take 5 mLs by mouth every 4 hours as needed for cough  Dispense: 120 mL; Refill: 0    Use medication as directed.  Continue supportive OTC measures.  Follow up if symptoms should persist, change or worsen.  Patient amenable to this follow up plan.     Chavo Verduzco PA-C  [facility]

## 2018-09-06 NOTE — MR AVS SNAPSHOT
After Visit Summary   9/6/2018    Christiana Sal    MRN: [id number]           Patient Information     Date Of Birth          10/25/1931        Visit Information        Provider Department      9/6/2018 11:00 AM Chavo Verduzco PA-C [facility]        Today's Diagnoses     Viral upper respiratory tract infection with cough    -  1       Follow-ups after your visit        Your next 10 appointments already scheduled     Sep 07, 2018  9:00 AM CDT   Return Visit with Lu Page MD   [facility] ([facility])    [address]  [address]  [place] [address]   [phone]            Oct 23, 2018  2:20 PM CDT   Pacemaker Check with Wy Device Rn   [facility] ([facility])    [address] [address]   [phone]            Oct 23, 2018  3:30 PM CDT   P EP RETURN with Alex Beverly MD   [facility] ([facility])    [address] [address]   [phone]            Jan 08, 2019 10:15 AM CST   Return Visit with Cleve Null MD   [facility] ([facility]    [address] [address]   [phone]              Who to contact     Normal or non-critical lab and imaging results will be communicated to you by MyChart, letter or phone within 4 business days after the clinic has received the results. If you do not hear from us within 7 days, please contact the clinic through MyChart or phone. If you have a critical or abnormal lab result, we will notify you by phone as soon as possible.  Submit refill requests through [employer] or call your pharmacy and they will forward the refill request to us. Please allow 3 business days for your refill to be completed.          If you need to speak with a  for additional information , please call:  "[phone]             Additional Information About Your Visit        [employer] Information     [employer] gives you secure access to your electronic health record. If you see a primary care provider, you can also send messages to your care team and make appointments. If you have questions, please call your primary care clinic.  If you do not have a primary care provider, please call [phone] and they will assist you.        Care EveryWhere ID     This is your Care EveryWhere ID. This could be used by other organizations to access your [place] medical records  [id number]        Your Vitals Were     Pulse Temperature Height Pulse Oximetry BMI (Body Mass Index)       69 97.4  F (36.3  C) (Tympanic) 5' 6\" (1.676 m) 97% 26.63 kg/m2        Blood Pressure from Last 3 Encounters:   09/06/18 120/68   07/17/18 129/71   07/17/18 133/69    Weight from Last 3 Encounters:   09/06/18 165 lb (74.8 kg)   07/17/18 165 lb (74.8 kg)   07/17/18 163 lb (73.9 kg)              Today, you had the following     No orders found for display         Today's Medication Changes          These changes are accurate as of 9/6/18  2:26 PM.  If you have any questions, ask your nurse or doctor.               Start taking these medicines.        Dose/Directions    guaiFENesin-codeine 100-10 MG/5ML Soln solution   Commonly known as:  ROBITUSSIN AC   Used for:  Viral upper respiratory tract infection with cough        Dose:  1 tsp.   Take 5 mLs by mouth every 4 hours as needed for cough   Quantity:  120 mL   Refills:  0         These medicines have changed or have updated prescriptions.        Dose/Directions    albuterol 108 (90 Base) MCG/ACT inhaler   Commonly known as:  PROAIR HFA/PROVENTIL HFA/VENTOLIN HFA   This may have changed:  Another medication with the same name was removed. Continue taking this medication, and follow the directions you see here.   Used for:  Acute bronchitis with symptoms > 10 days        Dose:  2 puff   Inhale 2 puffs " into the lungs every 6 hours as needed for shortness of breath / dyspnea or wheezing   Quantity:  1 Inhaler   Refills:  0         Stop taking these medicines if you haven't already. Please contact your care team if you have questions.     benzonatate 100 MG capsule   Commonly known as:  TESSALON           doxycycline 100 MG capsule   Commonly known as:  VIBRAMYCIN           levofloxacin 250 MG tablet   Commonly known as:  LEVAQUIN                Where to get your medicines      Some of these will need a paper prescription and others can be bought over the counter.  Ask your nurse if you have questions.     Bring a paper prescription for each of these medications     guaiFENesin-codeine 100-10 MG/5ML Soln solution                Primary Care Provider Office Phone # Fax #    Kelly Matthew PA-C [phone] [address] [address]        Equal Access to Services     [facility] : Hadii aad ku hadasho Soalvino, waaxda luqadaha, qaybta kaalmada adeyocastayajyotsna, danya brian . So [facility] [phone].    ATENCIÓN: Si habla español, tiene a ozuna disposición servicios gratuitos de asistencia lingüística. [facility] [phone].    We comply with applicable federal civil rights laws and [place] laws. We do not discriminate on the basis of race, color, national origin, age, disability, sex, sexual orientation, or gender identity.            Thank you!     Thank you for choosing [facility]  for your care. Our goal is always to provide you with excellent care. Hearing back from our patients is one way we can continue to improve our services. Please take a few minutes to complete the written survey that you may receive in the mail after your visit with us. Thank you!             Your Updated Medication List - Protect others around you: Learn how to safely use, store and throw away your medicines at www.disposemymeds.org.          This list is accurate as  of 9/6/18  2:26 PM.  Always use your most recent med list.                   Brand Name Dispense Instructions for use Diagnosis    acetaminophen 500 MG tablet    TYLENOL    30 tablet    Take 2 tablets (1,000 mg) by mouth every 8 hours as needed for mild pain        albuterol 108 (90 Base) MCG/ACT inhaler    PROAIR HFA/PROVENTIL HFA/VENTOLIN HFA    1 Inhaler    Inhale 2 puffs into the lungs every 6 hours as needed for shortness of breath / dyspnea or wheezing    Acute bronchitis with symptoms > 10 days       aspirin 81 MG tablet      Take 2 tablets by mouth every evening. *.        atorvastatin 40 MG tablet    LIPITOR    90 tablet    TAKE ONE TABLET BY MOUTH EVERY DAY    CAD S/P percutaneous coronary angioplasty       CALCIUM 600 + D PO      1 tablet by mouth 2 times per day        citalopram 20 MG tablet    celeXA    90 tablet    Take 1 tablet (20 mg) by mouth daily    Major depressive disorder, recurrent episode, mild (H)       diclofenac 50 MG EC tablet    VOLTAREN    30 tablet    Take 1 tablet (50 mg) by mouth 3 times daily as needed for moderate pain    Primary osteoarthritis involving multiple joints       FISH OIL PO      1 daily        guaiFENesin-codeine 100-10 MG/5ML Soln solution    ROBITUSSIN AC    120 mL    Take 5 mLs by mouth every 4 hours as needed for cough    Viral upper respiratory tract infection with cough       levothyroxine 75 MCG tablet    SYNTHROID/LEVOTHROID    90 tablet    Take 1 tablet (75 mcg) by mouth daily Due for labs.    Hypothyroidism, unspecified type       losartan 25 MG tablet    COZAAR    90 tablet    TAKE ONE TABLET BY MOUTH ONCE DAILY    Essential hypertension with goal blood pressure less than 140/90       meclizine 25 MG tablet    ANTIVERT    30 tablet    TAKE ONE TABLET BY MOUTH EVERY 6 HOURS AS NEEDED FOR DIZZINESS    Vertigo       metoprolol succinate 25 MG 24 hr tablet    TOPROL-XL    90 tablet    Take 0.5 tablets (12.5 mg) by mouth daily    Essential hypertension with  goal blood pressure less than 140/90       nitroGLYcerin 0.4 MG sublingual tablet    NITROSTAT    25 tablet    Place 1 tablet (0.4 mg) under the tongue every 5 minutes as needed for chest pain (call your doctor if you take a third dose)    Atherosclerosis of native coronary artery of native heart without angina pectoris       OCUVITE PO      Take 1 tablet by mouth daily.        ondansetron 4 MG tablet    ZOFRAN    18 tablet    Take 1 tablet (4 mg) by mouth every 6 hours as needed for nausea    Nausea       pantoprazole 20 MG EC tablet    PROTONIX    180 tablet    Take 2 tablets (40 mg) by mouth daily    Gastroesophageal reflux disease, esophagitis presence not specified       pramipexole 0.5 MG tablet    MIRAPEX    90 tablet    TAKE ONE TABLET BY MOUTH AT BEDTIME    Restless leg syndrome       ranitidine 150 MG tablet    ZANTAC    180 tablet    Take 2 tablets (300 mg) by mouth At Bedtime    Gastroesophageal reflux disease, esophagitis presence not specified

## 2018-09-07 ENCOUNTER — OFFICE VISIT (OUTPATIENT)
Dept: ORTHOPEDICS | Facility: CLINIC | Age: 83
End: 2018-09-07
Payer: COMMERCIAL

## 2018-09-07 VITALS
DIASTOLIC BLOOD PRESSURE: 80 MMHG | SYSTOLIC BLOOD PRESSURE: 130 MMHG | WEIGHT: 165 LBS | HEIGHT: 66 IN | BODY MASS INDEX: 26.52 KG/M2

## 2018-09-07 DIAGNOSIS — M16.12 PRIMARY OSTEOARTHRITIS OF LEFT HIP: Primary | ICD-10-CM

## 2018-09-07 DIAGNOSIS — G89.29 CHRONIC BILATERAL LOW BACK PAIN, WITH SCIATICA PRESENCE UNSPECIFIED: ICD-10-CM

## 2018-09-07 DIAGNOSIS — M17.12 ARTHRITIS OF LEFT KNEE: ICD-10-CM

## 2018-09-07 DIAGNOSIS — M54.5 CHRONIC BILATERAL LOW BACK PAIN, WITH SCIATICA PRESENCE UNSPECIFIED: ICD-10-CM

## 2018-09-07 DIAGNOSIS — M79.605 LEFT LEG PAIN: ICD-10-CM

## 2018-09-07 PROCEDURE — 99214 OFFICE O/P EST MOD 30 MIN: CPT | Performed by: PEDIATRICS

## 2018-09-07 ASSESSMENT — PATIENT HEALTH QUESTIONNAIRE - PHQ9: SUM OF ALL RESPONSES TO PHQ QUESTIONS 1-9: 1

## 2018-09-07 ASSESSMENT — ANXIETY QUESTIONNAIRES: GAD7 TOTAL SCORE: 2

## 2018-09-07 NOTE — LETTER
9/7/2018         RE: Christiana Sal  [address] [address]        Dear Colleague,    Thank you for referring your patient, Christiana Sal, to the [facility]. Please see a copy of my visit note below.    Sports Medicine Clinic Visit - Interim History September 7, 2018    PCP: Kelly Matthew    Christiana Sal is a 86 year old female who is seen in f/u up for    Primary osteoarthritis of left hip  Chronic bilateral low back pain, with sciatica presence unspecified  Left leg pain  Arthritis of left knee.  Since last visit on 5/11/18 patient has had a steroid injection with Dr Palafox on 5/29/18 in her hip. She reports the injection provided 3 months of pain relief. She states her hip felt more stable following the injection. She reports pain has returned and she is have crepitus and pain in her left knee for 2 weeks. She reports difficulty walking as hip and knee feel unstable.  Still having overall leg pain as well that wasn't improved with the injection.  Knee isn't very painful currently.    Symptoms are better with: Tylenol and Rest  Symptoms are worse with: standing, stairs and walking  Additional Features:   Positive: swelling, grinding, instability and weakness   Negative: bruising, popping, catching, locking, paresthesias and numbness    Social History: retired    Review of Systems  Skin: no bruising, no swelling  Musculoskeletal: as above  Neurologic: no numbness, paresthesias  Remainder of review of systems is negative including constitutional, CV, pulmonary, GI, except as noted in HPI or medical history.    Patient's current problem list, past medical and surgical history, and family history were reviewed.    Patient Active Problem List   Diagnosis     Coronary atherosclerosis of native coronary artery     Hyperlipidemia LDL goal <100     Essential hypertension, benign     Hypothyroidism     GERD (gastroesophageal  reflux disease)     Family history of colon cancer     Hypertension goal BP (blood pressure) < 140/90     Major depressive disorder, recurrent episode, mild (H)     Advanced directives, counseling/discussion     Angina pectoris (H)     Vulvar pruritus     Lichen planus     JENIFER (obstructive sleep apnea)     CAD S/P percutaneous coronary angioplasty     S/P cardiac pacemaker procedure     Sinoatrial node dysfunction (H)     Cardiac pacemaker - [employer] dual lead - Not Dependent     Insect bite     Bilateral low back pain without sciatica     Hip pain, left     Past Medical History:   Diagnosis Date     Coronary atherosclerosis of native coronary artery     s/p MI ([facility])     Essential hypertension, benign      GERD (gastroesophageal reflux disease)      History of transient ischemic attack (TIA)      Hyperlipidemia LDL goal <100      Hypothyroidism      Major depressive disorder, recurrent episode, mild (H) 5/18/2011     Peptic ulcer disease with hemorrhage     Remote history of stomach ulcer, requiring transfusion after chronic bleeding     Past Surgical History:   Procedure Laterality Date     APPENDECTOMY       CARDIAC CATHERIZATION  1/15/04    drug-eluding stent RCA, Dr. Pérez, [facility]     CARDIAC CATHERIZATION  2/16/12    diffuse mild/mod disease, no new stent     CATARACT IOL, RT/LT  1/26/12    RT, Dr. Wynne     CHOLECYSTECTOMY, OPEN       COLONOSCOPY  2008    [facility]     FRACTURE TX, ANKLE RT/LT      LT, 2 screws remain     HC EXCISE HAND/FOOT NEUROMA      RT     HC LARYNGOSCOPY DIRECT W VOCAL CORD INJECTION      vocal cord polypectomy     HC REMOVAL OF OVARIAN CYST(S)       HC TRANSCATH STENT INIT VESSEL,PERCUT      x 2     REPAIR HAMMER TOE  9/13/10    multiple + RT great toe tendon release, Dr. Sanchez DPM     STENT  5-2016    Cardiac stents 6 placed.     Family History   Problem Relation Age of Onset     C.A.D. Father      Hypertension Father      Myocardial Infarction  "Father 66      from MI     C.A.D. Brother      Myocardial Infarction Brother      Cancer Brother      skin ca     Breast Cancer Sister      Cancer - colorectal Sister      HEART DISEASE Sister      Neurologic Disorder Mother      migraine     C.A.D. Mother      Hypertension Mother      Heart Failure Mother      Thyroid Disease Son      cretinism     Hypertension Son      HEART DISEASE Son      Psychotic Disorder Sister      Hypertension Sister      HEART DISEASE Sister      CABG     Hypertension Brother      C.A.D. Brother      Myocardial Infarction Brother      Arthritis Daughter      RA     Hypertension Sister      HEART DISEASE Sister      Cancer Sister      Blood Disease Sister      Neurologic Disorder Child      migraine (2 children)       Objective  /80 (BP Location: Left arm, Patient Position: Chair, Cuff Size: Adult Regular)  Ht 5' 6\" (1.676 m)  Wt 165 lb (74.8 kg)  BMI 26.63 kg/m2    GENERAL APPEARANCE: healthy, alert and no distress   GAIT: antalgic  SKIN: no suspicious lesions or rashes  HEENT: Sclera clear, anicteric  CV: good peripheral pulses  RESP: Breathing not labored  NEURO: Normal strength and tone, mentation intact and speech normal  PSYCH:  mentation appears normal and affect normal/bright    Bilateral hip exam  Inspection:      no edema or ecchymosis in hip area      Tender:      Mild throughout upper leg      Non Tender:      remainder of the hip area bilateral      ROM:     Range of motion limited by pain left      Strength:      flexion 4/5 left       abduction 5/5 bilateral       adduction 5/5 bilateral      Special Tests:      positive (+) CYNDY left in groin       positive (+) FADIR left in groin    Bilateral Knee exam    Inspection:      no effusion, swelling of bruising bilateral    Patella:      Mobility -       hypomobile left       Crepitus noted in the patellofemoral joint bilateral    Tender:      none    Non Tender:      remainder of knee area bilateral    Knee ROM:   "    Full active and passive ROM with flexion and extension bilateral    Strength:      5/5 with knee extension bilateral    Special Tests:     neg (-) Nicky left       neg (-) anterior drawer left       neg (-) posterior drawer left       neg (-) varus at 0 deg and 30 deg left       neg (-) valgus at 0 deg and 30 deg left    Gait:      antalgic gait    Neurovascular:      2+ peripheral pulses bilaterally and brisk capillary refill       sensation grossly intact    Radiology  I visualized and reviewed these images with the patient  MR LUMBAR SPINE W/O CONTRAST 4/24/2018 12:05 PM  Provided History: R/o nerve root compression.; Health maintenance  examination; Lumbar pain; Radiculopathy; Weakness  ICD-10: Health maintenance examination; Lumbar pain; Radiculopathy;  Weakness  Comparison: Lumbar spine. No Radiograph 9/22/2017  Technique: Sagittal T1-weighted, sagittal STIR, 3D volumetric axial  and sagittal reconstructed T2-weighted images of the lumbar spine were  obtained without intravenous contrast.   Findings: Regarding numbering convention, there are 5 lumbar-type  vertebrae assumed for the purposes of this dictation.  The tip of the  conus medullaris is at T12-L1. There is a 6 mm anterolisthesis of L2  on L3, 5 mm anterolisthesis of L3 on L4, and 3 mm retrolisthesis of L4  on L5. There is severe loss of disc height at L2-3 and L4-5. Inferior  endplate compression deformity at L2. Right scoliotic curvature of the  lumbar spine with apex at L2. Degenerative endplate and vertebral body  signal at L2-3, greater on the left.  On a level by level basis:  T12-L1: Mild bulging disc. No spinal canal or neuroforaminal stenosis.  L1-2: Moderate circumferential disc bulge. Moderate narrowing of the  spinal canal and mild-to-moderate bilateral neuroforaminal stenosis.  In addition, there is mild to moderate narrowing of the lateral  recesses bilaterally, with disc material contacting, but not clearly  impinging upon the  descending L2 nerve roots bilaterally.  L2-3: Left central disc extrusion which abuts the descending left L3  nerve root in the lateral recess. In addition, a bulging disc contacts  the extraforaminal left L2 nerve root with moderate to severe left  neuroforaminal stenosis. Right foraminal disc protrusion with moderate  right neuroforaminal stenosis. Moderate to severe spinal canal  narrowing.  L3-4: Mild to moderate disc bulge which abuts the bilateral L3 nerve  roots. Moderate bilateral neuroforaminal stenosis. Moderate to severe  spinal canal narrowing. Bilateral facet arthropathy. Moderate left and  mild/moderate right narrowing of the lateral recesses, possibly  impinging upon the left descending L4 nerve root.   L4-5: Mild to moderate disc bulge with right foraminal disc extrusion  which abuts the right L4 nerve root. Moderate to severe right neural  foraminal stenosis and mild left neural foraminal stenosis. Moderate  spinal canal narrowing, with mild left greater than right lateral  recess narrowing, with disc material contacting the descending left L5  nerve root. Bilateral facet arthropathy.  L5-S1: Disc protrusion extending from the left central zone into the  right neural foramen, mildly narrowing the right lateral recess, with  disc material contacting, but not clearly impinging upon the  descending right S1 nerve root. Moderate right and mild left neural  foraminal stenosis. Mild spinal canal narrowing. Bilateral facet  arthropathy.  Atrophy of the paraspinous and psoas musculature, right more than  left. 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  Impression:   1.  Moderate lateral recess narrowing with possible impingement upon  the descending left L3 nerve root at L2-3 and the descending left L4  nerve root at L3-4.  2.  Moderate to severe left neural foraminal narrowing at L2-3 and on  the right at L3-4 and L4-5.      LEFT HIP TWO TO THREE VIEWS   9/22/2017 8:44 AM    HISTORY: Evaluate arthritis. Pain in left leg.  COMPARISON: None.  IMPRESSION: Severe bilateral osteoarthritis of both hips is present.  There is no evidence for fracture or dislocation. Degenerative changes  also noted in spine.      LUMBAR SPINE 2 VIEWS  9/22/2017 8:24 AM   HISTORY: Pain in left leg  COMPARISON: None.  IMPRESSION : Multilevel degenerative change L2-S1 with disc space  narrowing at these levels and anterior L34 spondylolisthesis. There is  posterior facet degenerative change and lumbar scoliosis convex right.  No vertebral compression or acute appearing fractures. There is  bilateral medial hip joint space narrowing.    KNEE STANDING AP BILATERAL SUNRISE BILATERAL LATERAL LEFT 9/22/2017  8:24 AM  HISTORY: Pain in left leg.  COMPARISON: None.  IMPRESSION:   Right knee; On standing view there is joint space narrowing of the  right lateral compartment. Medial compartment is maintained.  Left knee; Both medial and lateral compartment joint spaces are  normal. No evidence for suprapatellar effusion on the left. There is  mild spurring at the lateral patellofemoral joint on the left.  Bilateral knees;  No acute-appearing abnormality, worrisome focal bone  lesion.    Assessment:  1. Primary osteoarthritis of left hip    2. Chronic bilateral low back pain, with sciatica presence unspecified    3. Left leg pain    4. Arthritis of left knee      Most of Christiana's symptoms likely from hip arthritis, with knee arthritis causing some of the knee symptoms.  Low back still possibly contributing.  We discussed further treatment for hip arthritis and she would like to discuss surgical options with orthopedic surgery.  Will consider further injections or pain clinic referral pending clinical course.     Plan:  - Today's Plan of Care:  Referral to an Orthopedic Surgeon for hip pain    -We also discussed other future treatment options:  Referral to pain clinic, Rehab: Physical Therapy or Steroid injection of hip  with Dr Palafox    Follow Up: as needed    Concerning signs and symptoms were reviewed.  The patient expressed understanding of this management plan and all questions were answered at this time.    Lu Page MD CA  Primary Care Sports Medicine  [facility]    Again, thank you for allowing me to participate in the care of your patient.        Sincerely,        Lu Page MD

## 2018-09-07 NOTE — MR AVS SNAPSHOT
After Visit Summary   9/7/2018    Christiana Sal    MRN: [id number]           Patient Information     Date Of Birth          10/25/1931        Visit Information        Provider Department      9/7/2018 9:00 AM Lu Page MD [facility]        Today's Diagnoses     Primary osteoarthritis of left hip    -  1    Chronic bilateral low back pain, with sciatica presence unspecified        Left leg pain        Arthritis of left knee          Care Instructions        Plan:  - Today's Plan of Care:  Referral to an Orthopedic Surgeon for hip pain    -We also discussed other future treatment options:  Referral to pain clinic, Rehab: Physical Therapy or Steroid injection of hip with Dr Palafox    Follow Up: To Be Determined    If you have any further questions for your physician or physician s care team you can call [phone] and use option 3 to leave a voice message. Calls received during business hours will be returned same day.              Follow-ups after your visit        Additional Services     ORTHO  REFERRAL       [facility] is referring you to the Orthopedic  Services at [facility].       The  Representative will assist you in the coordination of your Orthopedic and Musculoskeletal Care as prescribed by your physician.    The  Representative will call you within 1 business day to help schedule your appointment, or you may contact the  Representative at:    All areas ~ [phone]     Type of Referral : Surgical / Specialist       Timeframe requested: Routine    Coverage of these services is subject to the terms and limitations of your health insurance plan.  Please call member services at your health plan with any benefit or coverage questions.      If X-rays, CT or MRI's have been performed, please contact the facility where they were done to arrange for , prior to  your scheduled appointment.  Please bring this referral request to your appointment and present it to your specialist.                  Your next 10 appointments already scheduled     Oct 23, 2018  2:20 PM CDT   Pacemaker Check with Wy Device Rn   [facility] ([facility])    [address] [address]   [phone]            Oct 23, 2018  3:30 PM CDT   UMP EP RETURN with Alex Beverly MD   [facility] ([facility])    [address] [address]   [phone]            Jan 08, 2019 10:15 AM CST   Return Visit with Cleve Null MD   [facility]    [address] [address]   [phone]              Who to contact     If you have questions or need follow up information about today's clinic visit or your schedule please contact [facility] directly at [phone].  Normal or non-critical lab and imaging results will be communicated to you by [employer], letter or phone within 4 business days after the clinic has received the results. If you do not hear from us within 7 days, please contact the clinic through [employer] or phone. If you have a critical or abnormal lab result, we will notify you by phone as soon as possible.  Submit refill requests through [employer] or call your pharmacy and they will forward the refill request to us. Please allow 3 business days for your refill to be completed.          Additional Information About Your Visit        [employer] Information     [employer] gives you secure access to your electronic health record. If you see a primary care provider, you can also send messages to your care team and make appointments. If you have questions, please call your primary care clinic.  If you do not have a primary care provider, please call [phone] and they will assist you.        Care  "EveryWhere ID     This is your Care EveryWhere ID. This could be used by other organizations to access your [facility] medical records  [id number]        Your Vitals Were     Height BMI (Body Mass Index)                5' 6\" (1.676 m) 26.63 kg/m2           Blood Pressure from Last 3 Encounters:   09/07/18 130/80   09/06/18 120/68   07/17/18 129/71    Weight from Last 3 Encounters:   09/07/18 165 lb (74.8 kg)   09/06/18 165 lb (74.8 kg)   07/17/18 165 lb (74.8 kg)              We Performed the Following     ORTHO  REFERRAL        Primary Care Provider Office Phone # Fax #    Kelly Matthew PA-C [phone] [address] [place] [address]        Equal Access to Services     [facility] ALESHA : Hadii shailesh delong hadasho Soalvino, waaxda luqadaha, qaybta kaalmada adeyocastayajyotsna, danya brian . So [facility] [phone].    ATENCIÓN: Si habla español, tiene a ozuna disposición servicios gratuitos de asistencia lingüística. Moisés al [phone].    We comply with applicable federal civil rights laws and [place] laws. We do not discriminate on the basis of race, color, national origin, age, disability, sex, sexual orientation, or gender identity.            Thank you!     Thank you for choosing [facility]  for your care. Our goal is always to provide you with excellent care. Hearing back from our patients is one way we can continue to improve our services. Please take a few minutes to complete the written survey that you may receive in the mail after your visit with us. Thank you!             Your Updated Medication List - Protect others around you: Learn how to safely use, store and throw away your medicines at www.disposemymeds.org.          This list is accurate as of 9/7/18  9:33 AM.  Always use your most recent med list.                   Brand Name Dispense Instructions for use Diagnosis    acetaminophen 500 MG tablet    TYLENOL "    30 tablet    Take 2 tablets (1,000 mg) by mouth every 8 hours as needed for mild pain        albuterol 108 (90 Base) MCG/ACT inhaler    PROAIR HFA/PROVENTIL HFA/VENTOLIN HFA    1 Inhaler    Inhale 2 puffs into the lungs every 6 hours as needed for shortness of breath / dyspnea or wheezing    Acute bronchitis with symptoms > 10 days       aspirin 81 MG tablet      Take 2 tablets by mouth every evening. *.        atorvastatin 40 MG tablet    LIPITOR    90 tablet    TAKE ONE TABLET BY MOUTH EVERY DAY    CAD S/P percutaneous coronary angioplasty       CALCIUM 600 + D PO      1 tablet by mouth 2 times per day        citalopram 20 MG tablet    celeXA    90 tablet    Take 1 tablet (20 mg) by mouth daily    Major depressive disorder, recurrent episode, mild (H)       diclofenac 50 MG EC tablet    VOLTAREN    30 tablet    Take 1 tablet (50 mg) by mouth 3 times daily as needed for moderate pain    Primary osteoarthritis involving multiple joints       FISH OIL PO      1 daily        guaiFENesin-codeine 100-10 MG/5ML Soln solution    ROBITUSSIN AC    120 mL    Take 5 mLs by mouth every 4 hours as needed for cough    Viral upper respiratory tract infection with cough       levothyroxine 75 MCG tablet    SYNTHROID/LEVOTHROID    90 tablet    Take 1 tablet (75 mcg) by mouth daily Due for labs.    Hypothyroidism, unspecified type       losartan 25 MG tablet    COZAAR    90 tablet    TAKE ONE TABLET BY MOUTH ONCE DAILY    Essential hypertension with goal blood pressure less than 140/90       meclizine 25 MG tablet    ANTIVERT    30 tablet    TAKE ONE TABLET BY MOUTH EVERY 6 HOURS AS NEEDED FOR DIZZINESS    Vertigo       metoprolol succinate 25 MG 24 hr tablet    TOPROL-XL    90 tablet    Take 0.5 tablets (12.5 mg) by mouth daily    Essential hypertension with goal blood pressure less than 140/90       nitroGLYcerin 0.4 MG sublingual tablet    NITROSTAT    25 tablet    Place 1 tablet (0.4 mg) under the tongue every 5 minutes as  needed for chest pain (call your doctor if you take a third dose)    Atherosclerosis of native coronary artery of native heart without angina pectoris       OCUVITE PO      Take 1 tablet by mouth daily.        ondansetron 4 MG tablet    ZOFRAN    18 tablet    Take 1 tablet (4 mg) by mouth every 6 hours as needed for nausea    Nausea       pantoprazole 20 MG EC tablet    PROTONIX    180 tablet    Take 2 tablets (40 mg) by mouth daily    Gastroesophageal reflux disease, esophagitis presence not specified       pramipexole 0.5 MG tablet    MIRAPEX    90 tablet    TAKE ONE TABLET BY MOUTH AT BEDTIME    Restless leg syndrome       ranitidine 150 MG tablet    ZANTAC    180 tablet    Take 2 tablets (300 mg) by mouth At Bedtime    Gastroesophageal reflux disease, esophagitis presence not specified

## 2018-09-07 NOTE — PROGRESS NOTES
Sports Medicine Clinic Visit - Interim History September 7, 2018    PCP: Kelly Matthew Kim Sal is a 86 year old female who is seen in f/u up for    Primary osteoarthritis of left hip  Chronic bilateral low back pain, with sciatica presence unspecified  Left leg pain  Arthritis of left knee.  Since last visit on 5/11/18 patient has had a steroid injection with Dr Palafox on 5/29/18 in her hip. She reports the injection provided 3 months of pain relief. She states her hip felt more stable following the injection. She reports pain has returned and she is have crepitus and pain in her left knee for 2 weeks. She reports difficulty walking as hip and knee feel unstable.  Still having overall leg pain as well that wasn't improved with the injection.  Knee isn't very painful currently.    Symptoms are better with: Tylenol and Rest  Symptoms are worse with: standing, stairs and walking  Additional Features:   Positive: swelling, grinding, instability and weakness   Negative: bruising, popping, catching, locking, paresthesias and numbness    Social History: retired    Review of Systems  Skin: no bruising, no swelling  Musculoskeletal: as above  Neurologic: no numbness, paresthesias  Remainder of review of systems is negative including constitutional, CV, pulmonary, GI, except as noted in HPI or medical history.    Patient's current problem list, past medical and surgical history, and family history were reviewed.    Patient Active Problem List   Diagnosis     Coronary atherosclerosis of native coronary artery     Hyperlipidemia LDL goal <100     Essential hypertension, benign     Hypothyroidism     GERD (gastroesophageal reflux disease)     Family history of colon cancer     Hypertension goal BP (blood pressure) < 140/90     Major depressive disorder, recurrent episode, mild (H)     Advanced directives, counseling/discussion     Angina pectoris (H)     Vulvar pruritus     Lichen planus     JENIFER  (obstructive sleep apnea)     CAD S/P percutaneous coronary angioplasty     S/P cardiac pacemaker procedure     Sinoatrial node dysfunction (H)     Cardiac pacemaker - [employer] dual lead - Not Dependent     Insect bite     Bilateral low back pain without sciatica     Hip pain, left     Past Medical History:   Diagnosis Date     Coronary atherosclerosis of native coronary artery     s/p MI ([facility])     Essential hypertension, benign      GERD (gastroesophageal reflux disease)      History of transient ischemic attack (TIA)      Hyperlipidemia LDL goal <100      Hypothyroidism      Major depressive disorder, recurrent episode, mild (H) 2011     Peptic ulcer disease with hemorrhage     Remote history of stomach ulcer, requiring transfusion after chronic bleeding     Past Surgical History:   Procedure Laterality Date     APPENDECTOMY       CARDIAC CATHERIZATION  1/15/04    drug-eluding stent RCA, Dr. Pérez, [facility]     CARDIAC CATHERIZATION  12    diffuse mild/mod disease, no new stent     CATARACT IOL, RT/LT  12    RT, Dr. Wynne     CHOLECYSTECTOMY, OPEN       COLONOSCOPY      [facility] [place]     FRACTURE TX, ANKLE RT/LT      LT, 2 screws remain     HC EXCISE HAND/FOOT NEUROMA      RT     HC LARYNGOSCOPY DIRECT W VOCAL CORD INJECTION      vocal cord polypectomy     HC REMOVAL OF OVARIAN CYST(S)       HC TRANSCATH STENT INIT VESSEL,PERCUT      x 2     REPAIR HAMMER TOE  9/13/10    multiple + RT great toe tendon release, Dr. Sanchez DPM     STENT  -    Cardiac stents 6 placed.     Family History   Problem Relation Age of Onset     C.A.D. Father      Hypertension Father      Myocardial Infarction Father 66      from MI     C.A.D. Brother      Myocardial Infarction Brother      Cancer Brother      skin ca     Breast Cancer Sister      Cancer - colorectal Sister      HEART DISEASE Sister      Neurologic Disorder Mother      migraine     C.A.D. Mother      Hypertension  "Mother      Heart Failure Mother      Thyroid Disease Son      cretinism     Hypertension Son      HEART DISEASE Son      Psychotic Disorder Sister      Hypertension Sister      HEART DISEASE Sister      CABG     Hypertension Brother      C.A.D. Brother      Myocardial Infarction Brother      Arthritis Daughter      RA     Hypertension Sister      HEART DISEASE Sister      Cancer Sister      Blood Disease Sister      Neurologic Disorder Child      migraine (2 children)       Objective  /80 (BP Location: Left arm, Patient Position: Chair, Cuff Size: Adult Regular)  Ht 5' 6\" (1.676 m)  Wt 165 lb (74.8 kg)  BMI 26.63 kg/m2    GENERAL APPEARANCE: healthy, alert and no distress   GAIT: antalgic  SKIN: no suspicious lesions or rashes  HEENT: Sclera clear, anicteric  CV: good peripheral pulses  RESP: Breathing not labored  NEURO: Normal strength and tone, mentation intact and speech normal  PSYCH:  mentation appears normal and affect normal/bright    Bilateral hip exam  Inspection:      no edema or ecchymosis in hip area      Tender:      Mild throughout upper leg      Non Tender:      remainder of the hip area bilateral      ROM:     Range of motion limited by pain left      Strength:      flexion 4/5 left       abduction 5/5 bilateral       adduction 5/5 bilateral      Special Tests:      positive (+) CYNDY left in groin       positive (+) FADIR left in groin    Bilateral Knee exam    Inspection:      no effusion, swelling of bruising bilateral    Patella:      Mobility -       hypomobile left       Crepitus noted in the patellofemoral joint bilateral    Tender:      none    Non Tender:      remainder of knee area bilateral    Knee ROM:      Full active and passive ROM with flexion and extension bilateral    Strength:      5/5 with knee extension bilateral    Special Tests:     neg (-) Nicky left       neg (-) anterior drawer left       neg (-) posterior drawer left       neg (-) varus at 0 deg and 30 deg " left       neg (-) valgus at 0 deg and 30 deg left    Gait:      antalgic gait    Neurovascular:      2+ peripheral pulses bilaterally and brisk capillary refill       sensation grossly intact    Radiology  I visualized and reviewed these images with the patient  MR LUMBAR SPINE W/O CONTRAST 4/24/2018 12:05 PM  Provided History: R/o nerve root compression.; Health maintenance  examination; Lumbar pain; Radiculopathy; Weakness  ICD-10: Health maintenance examination; Lumbar pain; Radiculopathy;  Weakness  Comparison: Lumbar spine. No Radiograph 9/22/2017  Technique: Sagittal T1-weighted, sagittal STIR, 3D volumetric axial  and sagittal reconstructed T2-weighted images of the lumbar spine were  obtained without intravenous contrast.   Findings: Regarding numbering convention, there are 5 lumbar-type  vertebrae assumed for the purposes of this dictation.  The tip of the  conus medullaris is at T12-L1. There is a 6 mm anterolisthesis of L2  on L3, 5 mm anterolisthesis of L3 on L4, and 3 mm retrolisthesis of L4  on L5. There is severe loss of disc height at L2-3 and L4-5. Inferior  endplate compression deformity at L2. Right scoliotic curvature of the  lumbar spine with apex at L2. Degenerative endplate and vertebral body  signal at L2-3, greater on the left.  On a level by level basis:  T12-L1: Mild bulging disc. No spinal canal or neuroforaminal stenosis.  L1-2: Moderate circumferential disc bulge. Moderate narrowing of the  spinal canal and mild-to-moderate bilateral neuroforaminal stenosis.  In addition, there is mild to moderate narrowing of the lateral  recesses bilaterally, with disc material contacting, but not clearly  impinging upon the descending L2 nerve roots bilaterally.  L2-3: Left central disc extrusion which abuts the descending left L3  nerve root in the lateral recess. In addition, a bulging disc contacts  the extraforaminal left L2 nerve root with moderate to severe left  neuroforaminal stenosis.  Right foraminal disc protrusion with moderate  right neuroforaminal stenosis. Moderate to severe spinal canal  narrowing.  L3-4: Mild to moderate disc bulge which abuts the bilateral L3 nerve  roots. Moderate bilateral neuroforaminal stenosis. Moderate to severe  spinal canal narrowing. Bilateral facet arthropathy. Moderate left and  mild/moderate right narrowing of the lateral recesses, possibly  impinging upon the left descending L4 nerve root.   L4-5: Mild to moderate disc bulge with right foraminal disc extrusion  which abuts the right L4 nerve root. Moderate to severe right neural  foraminal stenosis and mild left neural foraminal stenosis. Moderate  spinal canal narrowing, with mild left greater than right lateral  recess narrowing, with disc material contacting the descending left L5  nerve root. Bilateral facet arthropathy.  L5-S1: Disc protrusion extending from the left central zone into the  right neural foramen, mildly narrowing the right lateral recess, with  disc material contacting, but not clearly impinging upon the  descending right S1 nerve root. Moderate right and mild left neural  foraminal stenosis. Mild spinal canal narrowing. Bilateral facet  arthropathy.  Atrophy of the paraspinous and psoas musculature, right more than  left. 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  Impression:   1.  Moderate lateral recess narrowing with possible impingement upon  the descending left L3 nerve root at L2-3 and the descending left L4  nerve root at L3-4.  2.  Moderate to severe left neural foraminal narrowing at L2-3 and on  the right at L3-4 and L4-5.      LEFT HIP TWO TO THREE VIEWS   9/22/2017 8:44 AM   HISTORY: Evaluate arthritis. Pain in left leg.  COMPARISON: None.  IMPRESSION: Severe bilateral osteoarthritis of both hips is present.  There is no evidence for fracture or dislocation. Degenerative changes  also noted in spine.      LUMBAR SPINE 2 VIEWS  9/22/2017 8:24 AM    HISTORY: Pain in left leg  COMPARISON: None.  IMPRESSION : Multilevel degenerative change L2-S1 with disc space  narrowing at these levels and anterior L34 spondylolisthesis. There is  posterior facet degenerative change and lumbar scoliosis convex right.  No vertebral compression or acute appearing fractures. There is  bilateral medial hip joint space narrowing.    KNEE STANDING AP BILATERAL SUNRISE BILATERAL LATERAL LEFT 9/22/2017  8:24 AM  HISTORY: Pain in left leg.  COMPARISON: None.  IMPRESSION:   Right knee; On standing view there is joint space narrowing of the  right lateral compartment. Medial compartment is maintained.  Left knee; Both medial and lateral compartment joint spaces are  normal. No evidence for suprapatellar effusion on the left. There is  mild spurring at the lateral patellofemoral joint on the left.  Bilateral knees;  No acute-appearing abnormality, worrisome focal bone  lesion.    Assessment:  1. Primary osteoarthritis of left hip    2. Chronic bilateral low back pain, with sciatica presence unspecified    3. Left leg pain    4. Arthritis of left knee      Most of Christiana's symptoms likely from hip arthritis, with knee arthritis causing some of the knee symptoms.  Low back still possibly contributing.  We discussed further treatment for hip arthritis and she would like to discuss surgical options with orthopedic surgery.  Will consider further injections or pain clinic referral pending clinical course.     Plan:  - Today's Plan of Care:  Referral to an Orthopedic Surgeon for hip pain    -We also discussed other future treatment options:  Referral to pain clinic, Rehab: Physical Therapy or Steroid injection of hip with Dr Palafox    Follow Up: as needed    Concerning signs and symptoms were reviewed.  The patient expressed understanding of this management plan and all questions were answered at this time.    Lu Page MD CAQ  Primary Care Sports Medicine  [facility]

## 2018-09-07 NOTE — PATIENT INSTRUCTIONS
Plan:  - Today's Plan of Care:  Referral to an Orthopedic Surgeon for hip pain    -We also discussed other future treatment options:  Referral to pain clinic, Rehab: Physical Therapy or Steroid injection of hip with Dr Palafox    Follow Up: To Be Determined    If you have any further questions for your physician or physician s care team you can call [phone] and use option 3 to leave a voice message. Calls received during business hours will be returned same day.

## 2018-09-13 ENCOUNTER — OFFICE VISIT (OUTPATIENT)
Dept: ORTHOPEDICS | Facility: CLINIC | Age: 83
End: 2018-09-13
Payer: COMMERCIAL

## 2018-09-13 ENCOUNTER — RADIANT APPOINTMENT (OUTPATIENT)
Dept: GENERAL RADIOLOGY | Facility: CLINIC | Age: 83
End: 2018-09-13
Attending: PHYSICIAN ASSISTANT
Payer: COMMERCIAL

## 2018-09-13 VITALS
RESPIRATION RATE: 16 BRPM | WEIGHT: 169.2 LBS | SYSTOLIC BLOOD PRESSURE: 163 MMHG | BODY MASS INDEX: 27.19 KG/M2 | DIASTOLIC BLOOD PRESSURE: 63 MMHG | HEIGHT: 66 IN

## 2018-09-13 DIAGNOSIS — M16.12 PRIMARY OSTEOARTHRITIS OF LEFT HIP: ICD-10-CM

## 2018-09-13 DIAGNOSIS — M70.62 TROCHANTERIC BURSITIS OF LEFT HIP: ICD-10-CM

## 2018-09-13 DIAGNOSIS — M54.50 CHRONIC BILATERAL LOW BACK PAIN WITHOUT SCIATICA: ICD-10-CM

## 2018-09-13 DIAGNOSIS — G89.29 CHRONIC BILATERAL LOW BACK PAIN WITHOUT SCIATICA: ICD-10-CM

## 2018-09-13 DIAGNOSIS — M16.12 PRIMARY OSTEOARTHRITIS OF LEFT HIP: Primary | ICD-10-CM

## 2018-09-13 PROCEDURE — 73502 X-RAY EXAM HIP UNI 2-3 VIEWS: CPT

## 2018-09-13 PROCEDURE — 99203 OFFICE O/P NEW LOW 30 MIN: CPT | Performed by: ORTHOPAEDIC SURGERY

## 2018-09-13 ASSESSMENT — PAIN SCALES - GENERAL: PAINLEVEL: MODERATE PAIN (4)

## 2018-09-13 NOTE — LETTER
"    9/13/2018         RE: Christiana Sal  [id number] [facility] [id number]        Dear Colleague,    Thank you for referring your patient, Christiana Sal, to the [facility]. Please see a copy of my visit note below.    Chief Complaint:   Chief Complaint   Patient presents with     Left Hip - Pain     Here for left total hip arthroplasty consult. Had injection with Dr. Palafox on 5/29/18 which helped prevent the leg from \"buckling\", but it is back doing that again now. She walks with a 4 wheel walker. Pain is mainly lateral thigh and in her butt.      Christiana Sal is seen today in the [facility] for evaluation of left hip pain at the request of Dr. Lu Page.      HISTORY OF PRESENT ILLNESS    Christiana Sal is a 86 year old female seen for evaluation of ongoing left hip pain with no known injury. Pain has been present for about 1 year. Had an intra-articular injection with Dr. Palafox on 5/29/2018. Helped with the instability and \"buckling\". This symptom has started to return recently. This injection did not help with the rest of the \"leg problems\". Reports radiating pain down the lateral thigh, stopping at the knee. Denies groin pain. Today she has moderate hip pain, 4/10. Difficulty with laying onto the left hip. Pain of the lateral hip, lateral thigh and in the buttock.  Difficulty with putting on shoes and socks. Has noticed some weakness in the lower extremity. She has tried doing some physical therapy exercises on her own. Does not take medication for pain. Presents today with a 4 wheeled walker.      Of note: Positive low back pain with Known osteoarthritis. Also right ankle arthritis and bilateral toe problems.     Present symptoms: moderate pain, lateral hip.    Pain severity: 4/10  Pain quality: dull and aching  Frequency of symptoms: frequent  Exacerbating Factors: all weight bearing activities, putting " on shoes and socks, laying on side  Relieving Factors: rest, physical therapy exercises  Night Pain: No  Pain while at rest: No   Associated numbness or tingling: No     Orthopedic PMH: Positive low back. Known osteoarthritis. Also right ankle arthritis and bilateral toe problems.     Other PMH:  has a past medical history of Coronary atherosclerosis of native coronary artery; Essential hypertension, benign; GERD (gastroesophageal reflux disease); History of transient ischemic attack (TIA); Hyperlipidemia LDL goal <100; Hypothyroidism; Major depressive disorder, recurrent episode, mild (H) (5/18/2011); and Peptic ulcer disease with hemorrhage. She also has no past medical history of Basal cell carcinoma; Malignant melanoma (H); or Squamous cell carcinoma.  Patient Active Problem List    Diagnosis Date Noted     Hip pain, left 04/06/2018     Priority: Medium     Bilateral low back pain without sciatica 11/10/2017     Priority: Medium     Insect bite 06/28/2017     Priority: Medium     Cardiac pacemaker - [employer] dual lead - Not Dependent 03/02/2017     Priority: Medium     Sinoatrial node dysfunction (H) 11/10/2016     Priority: Medium     S/P cardiac pacemaker procedure 11/09/2016     Priority: Medium     CAD S/P percutaneous coronary angioplasty 05/11/2016     Priority: Medium     JENIFER (obstructive sleep apnea) 11/17/2014     Priority: Medium     Lichen planus 11/19/2013     Priority: Medium     Vulvar pruritus 11/12/2013     Priority: Medium     Angina pectoris (H) 02/08/2012     Priority: Medium     Advanced directives, counseling/discussion 06/16/2011     Priority: Medium     Patient will bring a copy. Chart r/q to make sure no copy.    GUSTAVO Cunningham MA    Advance Directive Problem List Overview:   Name Relationship Phone    Primary Health Care Agent Albaro Sal  [phone]         Alternative Health Care Agent Esther Aragonon  [phone]     Reviewed Health Care Directive dated 05/13/2005, scanned  into EPIC 01/2012.  Marivel Villarreal [facility]                Hypertension goal BP (blood pressure) < 140/90 03/04/2011     Priority: Medium     Major depressive disorder, recurrent episode, mild (H) 11/02/2010     Priority: Medium     Family history of colon cancer 05/04/2010     Priority: Medium     Coronary atherosclerosis of native coronary artery      Priority: Medium     s/p MI ([facility])       Hyperlipidemia LDL goal <100      Priority: Medium     Essential hypertension, benign      Priority: Medium     Hypothyroidism      Priority: Medium     GERD (gastroesophageal reflux disease)      Priority: Medium       Surgical Hx:  has a past surgical history that includes TRANSCATH STENT INIT VESSEL,PERCUT; appendectomy; cholecystectomy, open; fracture tx, ankle rt/lt; LARYNGOSCOPY DIRECT W VOCAL CORD INJECTION; REMOVAL OF OVARIAN CYST(S); colonoscopy (2008); EXCISE HAND/FOOT NEUROMA; Repair hammer toe (9/13/10); cataract iol, rt/lt (1/26/12); cardiac catherization (1/15/04); cardiac catherization (2/16/12); and Stent (5-2016).    Medications:   Current Outpatient Prescriptions:      acetaminophen (TYLENOL) 500 MG tablet, Take 2 tablets (1,000 mg) by mouth every 8 hours as needed for mild pain, Disp: 30 tablet, Rfl: 0     albuterol (PROAIR HFA/PROVENTIL HFA/VENTOLIN HFA) 108 (90 BASE) MCG/ACT Inhaler, Inhale 2 puffs into the lungs every 6 hours as needed for shortness of breath / dyspnea or wheezing, Disp: 1 Inhaler, Rfl: 0     ASPIRIN 81 MG PO TABS, Take 2 tablets by mouth every evening. *. , Disp: , Rfl:      atorvastatin (LIPITOR) 40 MG tablet, TAKE ONE TABLET BY MOUTH EVERY DAY, Disp: 90 tablet, Rfl: 3     CALCIUM 600 + D OR, 1 tablet by mouth 2 times per day, Disp: , Rfl:      citalopram (CELEXA) 20 MG tablet, Take 1 tablet (20 mg) by mouth daily, Disp: 90 tablet, Rfl: 1     diclofenac (VOLTAREN) 50 MG EC tablet, Take 1 tablet (50 mg) by mouth 3 times daily as needed for moderate pain, Disp: 30 tablet, Rfl: 1      FISH OIL OR, 1 daily, Disp: , Rfl:      guaiFENesin-codeine (ROBITUSSIN AC) 100-10 MG/5ML SOLN solution, Take 5 mLs by mouth every 4 hours as needed for cough, Disp: 120 mL, Rfl: 0     levothyroxine (SYNTHROID/LEVOTHROID) 75 MCG tablet, Take 1 tablet (75 mcg) by mouth daily Due for labs., Disp: 90 tablet, Rfl: 0     losartan (COZAAR) 25 MG tablet, TAKE ONE TABLET BY MOUTH ONCE DAILY, Disp: 90 tablet, Rfl: 2     meclizine (ANTIVERT) 25 MG tablet, TAKE ONE TABLET BY MOUTH EVERY 6 HOURS AS NEEDED FOR DIZZINESS, Disp: 30 tablet, Rfl: 3     metoprolol (TOPROL-XL) 25 MG 24 hr tablet, Take 0.5 tablets (12.5 mg) by mouth daily, Disp: 90 tablet, Rfl: 3     Multiple Vitamins-Minerals (OCUVITE PO), Take 1 tablet by mouth daily., Disp: , Rfl:      nitroglycerin (NITROSTAT) 0.4 MG SL tablet, Place 1 tablet (0.4 mg) under the tongue every 5 minutes as needed for chest pain (call your doctor if you take a third dose), Disp: 25 tablet, Rfl: 3     ondansetron (ZOFRAN) 4 MG tablet, Take 1 tablet (4 mg) by mouth every 6 hours as needed for nausea, Disp: 18 tablet, Rfl: 3     pantoprazole (PROTONIX) 20 MG EC tablet, Take 2 tablets (40 mg) by mouth daily, Disp: 180 tablet, Rfl: 2     pramipexole (MIRAPEX) 0.5 MG tablet, TAKE ONE TABLET BY MOUTH AT BEDTIME, Disp: 90 tablet, Rfl: 2     ranitidine (ZANTAC) 150 MG tablet, Take 2 tablets (300 mg) by mouth At Bedtime, Disp: 180 tablet, Rfl: 3    Allergies:   Allergies   Allergen Reactions     Demerol Nausea     Lisinopril Cough     Meperidine Unknown     Sulfa Drugs Other (See Comments) and Unknown     Questionable itching reported by patient       Social Hx: retired.  reports that she has never smoked. She has never used smokeless tobacco. She reports that she drinks alcohol. She reports that she does not use illicit drugs.    Family Hx: family history includes Arthritis in her daughter; Blood Disease in her sister; Breast Cancer in her sister; C.A.D. in her brother, brother, father, and  "mother; Cancer in her brother and sister; Cancer - colorectal in her sister; HEART DISEASE in her sister, sister, sister, and son; Heart Failure in her mother; Hypertension in her brother, father, mother, sister, sister, and son; Myocardial Infarction in her brother and brother; Myocardial Infarction (age of onset: 66) in her father; Neurologic Disorder in her child and mother; Psychotic Disorder in her sister; Thyroid Disease in her son.    REVIEW OF SYSTEMS:  10 point ROS neg other than the symptoms noted above in the HPI and PAST MEDICAL HISTORY. Notables,  CONSTITUTIONAL:NEGATIVE for fever, chills, change in weight  INTEGUMENTARY/SKIN: NEGATIVE for worrisome rashes, moles or lesions  MUSCULOSKELETAL:See HPI above  NEURO: NEGATIVE for weakness, dizziness or paresthesias    This document serves as a record of the services and decisions personally performed and made by John Galdamez MD. It was created on his behalf by Debbi Crook, a trained medical scribe. The creation of this document is based the provider's statements to the medical scribe.    Scribe Debbi Crook 2:59 PM 9/13/2018    PHYSICAL EXAM:  /63  Resp 16  Ht 5' 6\" (1.676 m)  Wt 169 lb 3.2 oz (76.7 kg)  BMI 27.31 kg/m2   GENERAL APPEARANCE: healthy, alert, no distress  SKIN: no suspicious lesions or rashes  NEURO: Normal strength and tone, mentation intact and speech normal  PSYCH:  mentation appears normal and affect normal  RESPIRATORY: No increased work of breathing.      BILATERAL LOWER EXTREMITIES:  Gait: using walker for support, hunched.  No gross deformities or masses.  Bilateral Quad atrophy, strength weak  Intact sensation deep peroneal nerve, superficial peroneal nerve, med/lat tibial nerve, sural nerve, saphenous nerve  Intact EHL, EDL, TA, FHL, GS, quadriceps hamstrings and hip flexors  Toes warm and well perfused, brisk capillary refill. Palpable 2+ dp pulses.  Bilateral calf soft and nttp or squeeze.  No palpable inquinal " "lymphadenopathy.  DTRs: achilles 2+, patella 2+.  Edema: none      LEFT HIP EXAM:    Palpation: Tender: Greater trochanter, iliotibial band , gluteals, low back, SI joint  Strength:  Grossly intact  Special tests:  Irritability (flexion/adduction/internal rotation) positive   Hip range of motion: flexion 70 limited by discomfort, external rotation, 55, internal rotation neutral  Leg lengths: grossly symmetric at medial malleolus.    RIGHT HIP EXAM:    Palpation: Tender:  Right low back, buttock.  Strength:  Grossly intact  Special tests:  Irritability (flexion/adduction/internal rotation) positive   Hip range of motion: flexion 95, external rotation 65, internal rotation 20      X-RAY: AP pelvis and AP/Lateral views left hip from 9/13/2018 were reviewed in clinic today. No obvious fractures or dislocations. There is advanced, bone on bone loss of bilateral hip joint spaces with prominent marginal osteophyte formation. Advanced low lumbar degenerative changes with degenerative scoliosis.      Impression: 86 year old female with chronic left hip pain, advanced primary osteoarthritis, trochanteric bursitis and iliotibial band tendonitis, chronic low back pain.    Plan:   * reviewed xrays with patient  * discussed pain in groin/hip likely from advanced hip arthritis. This is wearing of the cartilage within the hip joint either due to normal \"wear and tear\" or following an injury. Also has trochanteric bursitis. Any low back / buttock / radiating pain likely coming from the low back.  *  treatment options for hip arthritis and pain include: do nothing, NSAIDS, activity modification, Physical Therapy, injections, total hip arthroplasty. Risks and benefits of each discussed.   * did discuss patient is a candidate for total hip arthroplasty, and offered total hip arthroplasty to patient. Total hip arthroplasty will only attempt to provide pain relief from hip related arthritis only and not any pain from the low back. Any " low back pain likely to continue.  *  Discussed risks of surgery include, but not limited to: bleeding, infection, pain, scar, damage to adjacent structures (e.g. Nerves, blood vessels, bone, cartilage), temporary or permanent nerve damage, recurrence of symptoms, implant dislocation, implant failure, implant infection, unequal limb lengths, stiffness, need for further surgery, blood clots, pulmonary embolism, risks of anesthesia, and death.    * she would be an anterior hip candidate.    * At this time, patient would like to consider her options, both surgery and cortisone injections. Discussed various treatment options, including:    * Activity modification - avoid impact activities or activities that aggravate symptoms.  * NSAIDS - regular use for inflammation ( twice daily or three times daily), with food, as long as no contra-indications Please discuss with pcp if needed  * Stretching of iliotibial band. Core exercises.  * Tylenol as needed for pain  * avoid laying on affected side.  * ice/ heat as needed.  * Return to clinic as needed.   * All questions answered to patient's satisfaction.    Patient to follow up with Primary Care provider regarding elevated blood pressure.      The information in this document, created by a scribe for me, accurately reflects the services I personally performed and the decisions made by me. I have reviewed and approved this document for accuracy.     John Galdamez M.D., M.S.  Dept. of Orthopaedic Surgery  [facility]          Again, thank you for allowing me to participate in the care of your patient.        Sincerely,        John Galdamez MD

## 2018-09-13 NOTE — PROGRESS NOTES
"Chief Complaint:   Chief Complaint   Patient presents with     Left Hip - Pain     Here for left total hip arthroplasty consult. Had injection with Dr. aPlafox on 5/29/18 which helped prevent the leg from \"buckling\", but it is back doing that again now. She walks with a 4 wheel walker. Pain is mainly lateral thigh and in her butt.      Christiana Sal is seen today in the [facility] for evaluation of left hip pain at the request of Dr. Lu Page.      HISTORY OF PRESENT ILLNESS    Christiana Sal is a 86 year old female seen for evaluation of ongoing left hip pain with no known injury. Pain has been present for about 1 year. Had an intra-articular injection with Dr. Palafox on 5/29/2018. Helped with the instability and \"buckling\". This symptom has started to return recently. This injection did not help with the rest of the \"leg problems\". Reports radiating pain down the lateral thigh, stopping at the knee. Denies groin pain. Today she has moderate hip pain, 4/10. Difficulty with laying onto the left hip. Pain of the lateral hip, lateral thigh and in the buttock.  Difficulty with putting on shoes and socks. Has noticed some weakness in the lower extremity. She has tried doing some physical therapy exercises on her own. Does not take medication for pain. Presents today with a 4 wheeled walker.      Of note: Positive low back pain with Known osteoarthritis. Also right ankle arthritis and bilateral toe problems.     Present symptoms: moderate pain, lateral hip.    Pain severity: 4/10  Pain quality: dull and aching  Frequency of symptoms: frequent  Exacerbating Factors: all weight bearing activities, putting on shoes and socks, laying on side  Relieving Factors: rest, physical therapy exercises  Night Pain: No  Pain while at rest: No   Associated numbness or tingling: No     Orthopedic PMH: Positive low back. Known osteoarthritis. Also right ankle arthritis and bilateral toe problems. "     Other PMH:  has a past medical history of Coronary atherosclerosis of native coronary artery; Essential hypertension, benign; GERD (gastroesophageal reflux disease); History of transient ischemic attack (TIA); Hyperlipidemia LDL goal <100; Hypothyroidism; Major depressive disorder, recurrent episode, mild (H) (5/18/2011); and Peptic ulcer disease with hemorrhage. She also has no past medical history of Basal cell carcinoma; Malignant melanoma (H); or Squamous cell carcinoma.  Patient Active Problem List    Diagnosis Date Noted     Hip pain, left 04/06/2018     Priority: Medium     Bilateral low back pain without sciatica 11/10/2017     Priority: Medium     Insect bite 06/28/2017     Priority: Medium     Cardiac pacemaker - [employer] dual lead - Not Dependent 03/02/2017     Priority: Medium     Sinoatrial node dysfunction (H) 11/10/2016     Priority: Medium     S/P cardiac pacemaker procedure 11/09/2016     Priority: Medium     CAD S/P percutaneous coronary angioplasty 05/11/2016     Priority: Medium     JENIFER (obstructive sleep apnea) 11/17/2014     Priority: Medium     Lichen planus 11/19/2013     Priority: Medium     Vulvar pruritus 11/12/2013     Priority: Medium     Angina pectoris (H) 02/08/2012     Priority: Medium     Advanced directives, counseling/discussion 06/16/2011     Priority: Medium     Patient will bring a copy. Chart r/q to make sure no copy.    GUSTAVO Cunningham MA    Advance Directive Problem List Overview:   Name Relationship Phone    Primary Health Care Agent Albaro Jonelle  [phone]         Alternative Health Care Agent Esther Mulligan  [phone]     Reviewed Health Care Directive dated 05/13/2005, scanned into EPIC 01/2012.  Marivel Villarreal CMA                Hypertension goal BP (blood pressure) < 140/90 03/04/2011     Priority: Medium     Major depressive disorder, recurrent episode, mild (H) 11/02/2010     Priority: Medium     Family history of colon cancer 05/04/2010     Priority:  Medium     Coronary atherosclerosis of native coronary artery      Priority: Medium     s/p MI ([facility] [place] [facility])       Hyperlipidemia LDL goal <100      Priority: Medium     Essential hypertension, benign      Priority: Medium     Hypothyroidism      Priority: Medium     GERD (gastroesophageal reflux disease)      Priority: Medium       Surgical Hx:  has a past surgical history that includes TRANSCATH STENT INIT VESSEL,PERCUT; appendectomy; cholecystectomy, open; fracture tx, ankle rt/lt; LARYNGOSCOPY DIRECT W VOCAL CORD INJECTION; REMOVAL OF OVARIAN CYST(S); colonoscopy (2008); EXCISE HAND/FOOT NEUROMA; Repair hammer toe (9/13/10); cataract iol, rt/lt (1/26/12); cardiac catherization (1/15/04); cardiac catherization (2/16/12); and Stent (5-2016).    Medications:   Current Outpatient Prescriptions:      acetaminophen (TYLENOL) 500 MG tablet, Take 2 tablets (1,000 mg) by mouth every 8 hours as needed for mild pain, Disp: 30 tablet, Rfl: 0     albuterol (PROAIR HFA/PROVENTIL HFA/VENTOLIN HFA) 108 (90 BASE) MCG/ACT Inhaler, Inhale 2 puffs into the lungs every 6 hours as needed for shortness of breath / dyspnea or wheezing, Disp: 1 Inhaler, Rfl: 0     ASPIRIN 81 MG PO TABS, Take 2 tablets by mouth every evening. *. , Disp: , Rfl:      atorvastatin (LIPITOR) 40 MG tablet, TAKE ONE TABLET BY MOUTH EVERY DAY, Disp: 90 tablet, Rfl: 3     CALCIUM 600 + D OR, 1 tablet by mouth 2 times per day, Disp: , Rfl:      citalopram (CELEXA) 20 MG tablet, Take 1 tablet (20 mg) by mouth daily, Disp: 90 tablet, Rfl: 1     diclofenac (VOLTAREN) 50 MG EC tablet, Take 1 tablet (50 mg) by mouth 3 times daily as needed for moderate pain, Disp: 30 tablet, Rfl: 1     FISH OIL OR, 1 daily, Disp: , Rfl:      guaiFENesin-codeine (ROBITUSSIN AC) 100-10 MG/5ML SOLN solution, Take 5 mLs by mouth every 4 hours as needed for cough, Disp: 120 mL, Rfl: 0     levothyroxine (SYNTHROID/LEVOTHROID) 75 MCG tablet, Take 1 tablet (75 mcg) by mouth daily Due for  labs., Disp: 90 tablet, Rfl: 0     losartan (COZAAR) 25 MG tablet, TAKE ONE TABLET BY MOUTH ONCE DAILY, Disp: 90 tablet, Rfl: 2     meclizine (ANTIVERT) 25 MG tablet, TAKE ONE TABLET BY MOUTH EVERY 6 HOURS AS NEEDED FOR DIZZINESS, Disp: 30 tablet, Rfl: 3     metoprolol (TOPROL-XL) 25 MG 24 hr tablet, Take 0.5 tablets (12.5 mg) by mouth daily, Disp: 90 tablet, Rfl: 3     Multiple Vitamins-Minerals (OCUVITE PO), Take 1 tablet by mouth daily., Disp: , Rfl:      nitroglycerin (NITROSTAT) 0.4 MG SL tablet, Place 1 tablet (0.4 mg) under the tongue every 5 minutes as needed for chest pain (call your doctor if you take a third dose), Disp: 25 tablet, Rfl: 3     ondansetron (ZOFRAN) 4 MG tablet, Take 1 tablet (4 mg) by mouth every 6 hours as needed for nausea, Disp: 18 tablet, Rfl: 3     pantoprazole (PROTONIX) 20 MG EC tablet, Take 2 tablets (40 mg) by mouth daily, Disp: 180 tablet, Rfl: 2     pramipexole (MIRAPEX) 0.5 MG tablet, TAKE ONE TABLET BY MOUTH AT BEDTIME, Disp: 90 tablet, Rfl: 2     ranitidine (ZANTAC) 150 MG tablet, Take 2 tablets (300 mg) by mouth At Bedtime, Disp: 180 tablet, Rfl: 3    Allergies:   Allergies   Allergen Reactions     Demerol Nausea     Lisinopril Cough     Meperidine Unknown     Sulfa Drugs Other (See Comments) and Unknown     Questionable itching reported by patient       Social Hx: retired.  reports that she has never smoked. She has never used smokeless tobacco. She reports that she drinks alcohol. She reports that she does not use illicit drugs.    Family Hx: family history includes Arthritis in her daughter; Blood Disease in her sister; Breast Cancer in her sister; C.A.D. in her brother, brother, father, and mother; Cancer in her brother and sister; Cancer - colorectal in her sister; HEART DISEASE in her sister, sister, sister, and son; Heart Failure in her mother; Hypertension in her brother, father, mother, sister, sister, and son; Myocardial Infarction in her brother and brother;  "Myocardial Infarction (age of onset: 66) in her father; Neurologic Disorder in her child and mother; Psychotic Disorder in her sister; Thyroid Disease in her son.    REVIEW OF SYSTEMS:  10 point ROS neg other than the symptoms noted above in the HPI and PAST MEDICAL HISTORY. Notables,  CONSTITUTIONAL:NEGATIVE for fever, chills, change in weight  INTEGUMENTARY/SKIN: NEGATIVE for worrisome rashes, moles or lesions  MUSCULOSKELETAL:See HPI above  NEURO: NEGATIVE for weakness, dizziness or paresthesias    This document serves as a record of the services and decisions personally performed and made by John Galdamez MD. It was created on his behalf by Debbi Crook, a trained medical scribe. The creation of this document is based the provider's statements to the medical scribe.    Scribe Debbi Crook 2:59 PM 9/13/2018    PHYSICAL EXAM:  /63  Resp 16  Ht 5' 6\" (1.676 m)  Wt 169 lb 3.2 oz (76.7 kg)  BMI 27.31 kg/m2   GENERAL APPEARANCE: healthy, alert, no distress  SKIN: no suspicious lesions or rashes  NEURO: Normal strength and tone, mentation intact and speech normal  PSYCH:  mentation appears normal and affect normal  RESPIRATORY: No increased work of breathing.      BILATERAL LOWER EXTREMITIES:  Gait: using walker for support, hunched.  No gross deformities or masses.  Bilateral Quad atrophy, strength weak  Intact sensation deep peroneal nerve, superficial peroneal nerve, med/lat tibial nerve, sural nerve, saphenous nerve  Intact EHL, EDL, TA, FHL, GS, quadriceps hamstrings and hip flexors  Toes warm and well perfused, brisk capillary refill. Palpable 2+ dp pulses.  Bilateral calf soft and nttp or squeeze.  No palpable inquinal lymphadenopathy.  DTRs: achilles 2+, patella 2+.  Edema: none      LEFT HIP EXAM:    Palpation: Tender: Greater trochanter, iliotibial band , gluteals, low back, SI joint  Strength:  Grossly intact  Special tests:  Irritability (flexion/adduction/internal rotation) positive   Hip range of " "motion: flexion 70 limited by discomfort, external rotation, 55, internal rotation neutral  Leg lengths: grossly symmetric at medial malleolus.    RIGHT HIP EXAM:    Palpation: Tender:  Right low back, buttock.  Strength:  Grossly intact  Special tests:  Irritability (flexion/adduction/internal rotation) positive   Hip range of motion: flexion 95, external rotation 65, internal rotation 20      X-RAY: AP pelvis and AP/Lateral views left hip from 9/13/2018 were reviewed in clinic today. No obvious fractures or dislocations. There is advanced, bone on bone loss of bilateral hip joint spaces with prominent marginal osteophyte formation. Advanced low lumbar degenerative changes with degenerative scoliosis.      Impression: 86 year old female with chronic left hip pain, advanced primary osteoarthritis, trochanteric bursitis and iliotibial band tendonitis, chronic low back pain.    Plan:   * reviewed xrays with patient  * discussed pain in groin/hip likely from advanced hip arthritis. This is wearing of the cartilage within the hip joint either due to normal \"wear and tear\" or following an injury. Also has trochanteric bursitis. Any low back / buttock / radiating pain likely coming from the low back.  *  treatment options for hip arthritis and pain include: do nothing, NSAIDS, activity modification, Physical Therapy, injections, total hip arthroplasty. Risks and benefits of each discussed.   * did discuss patient is a candidate for total hip arthroplasty, and offered total hip arthroplasty to patient. Total hip arthroplasty will only attempt to provide pain relief from hip related arthritis only and not any pain from the low back. Any low back pain likely to continue.  *  Discussed risks of surgery include, but not limited to: bleeding, infection, pain, scar, damage to adjacent structures (e.g. Nerves, blood vessels, bone, cartilage), temporary or permanent nerve damage, recurrence of symptoms, implant dislocation, " implant failure, implant infection, unequal limb lengths, stiffness, need for further surgery, blood clots, pulmonary embolism, risks of anesthesia, and death.    * she would be an anterior hip candidate.    * At this time, patient would like to consider her options, both surgery and cortisone injections. Discussed various treatment options, including:    * Activity modification - avoid impact activities or activities that aggravate symptoms.  * NSAIDS - regular use for inflammation ( twice daily or three times daily), with food, as long as no contra-indications Please discuss with pcp if needed  * Stretching of iliotibial band. Core exercises.  * Tylenol as needed for pain  * avoid laying on affected side.  * ice/ heat as needed.  * Return to clinic as needed.   * All questions answered to patient's satisfaction.    Patient to follow up with Primary Care provider regarding elevated blood pressure.      The information in this document, created by a scribe for me, accurately reflects the services I personally performed and the decisions made by me. I have reviewed and approved this document for accuracy.     John Galdamez M.D., M.S.  Dept. of Orthopaedic Surgery  [facility]

## 2018-09-13 NOTE — MR AVS SNAPSHOT
After Visit Summary   9/13/2018    Christiana Sal    MRN: [id number]           Patient Information     Date Of Birth          10/25/1931        Visit Information        Provider Department      9/13/2018 2:45 PM John Galdamez MD [facility]        Today's Diagnoses     Primary osteoarthritis of left hip    -  1    Trochanteric bursitis of left hip        Chronic bilateral low back pain without sciatica          Care Instructions    Patient to follow up with Primary Care provider regarding elevated blood pressure.            Follow-ups after your visit        Follow-up notes from your care team     Return if symptoms worsen or fail to improve.      Your next 10 appointments already scheduled     Oct 23, 2018  2:20 PM CDT   Pacemaker Check with Wy Device Rn   [facility] ([facility])    [address] [address]   [phone]            Oct 23, 2018  3:30 PM CDT   UMP EP RETURN with Alex Beverly MD   [facility] ([facility])    [address] [address]   [phone]            Jan 08, 2019 10:15 AM CST   Return Visit with Cleve Null MD   [facility] ([facility])    [address] [address]   [phone]              Who to contact     If you have questions or need follow up information about today's clinic visit or your schedule please contact [facility] directly at [phone].  Normal or non-critical lab and imaging results will be communicated to you by MyChart, letter or phone within 4 business days after the clinic has received the results. If you do not hear from us within 7 days, please contact the clinic through MyChart or phone. If you have a critical or abnormal lab result, we will notify you by phone as soon as possible.  Submit  "refill requests through [facility] or call your pharmacy and they will forward the refill request to us. Please allow 3 business days for your refill to be completed.          Additional Information About Your Visit        [facility] Information     [facility] gives you secure access to your electronic health record. If you see a primary care provider, you can also send messages to your care team and make appointments. If you have questions, please call your primary care clinic.  If you do not have a primary care provider, please call [phone] and they will assist you.        Care EveryWhere ID     This is your Care EveryWhere ID. This could be used by other organizations to access your [place] medical records  [id number]        Your Vitals Were     Respirations Height BMI (Body Mass Index)             16 5' 6\" (1.676 m) 27.31 kg/m2          Blood Pressure from Last 3 Encounters:   09/13/18 163/63   09/07/18 130/80   09/06/18 120/68    Weight from Last 3 Encounters:   09/13/18 169 lb 3.2 oz (76.7 kg)   09/07/18 165 lb (74.8 kg)   09/06/18 165 lb (74.8 kg)               Primary Care Provider Office Phone # Fax #    Kelly Matthew PA-C [phone] [address] [facility] [address]        Equal Access to Services     [facility]: Hadii aad ku hadasho Soomaali, waaxda luqadaha, qaybta kaalmada adeegyada, waxay idiin haymargaret brian . So [facility] [phone].    ATENCIÓN: Si habla español, tiene a ozuna disposición servicios gratuitos de asistencia lingüística. Llame al [phone].    We comply with applicable federal civil rights laws and [place] laws. We do not discriminate on the basis of race, color, national origin, age, disability, sex, sexual orientation, or gender identity.            Thank you!     Thank you for choosing [facility]  for your care. Our goal is always to provide you with excellent care. Hearing back from our patients is " one way we can continue to improve our services. Please take a few minutes to complete the written survey that you may receive in the mail after your visit with us. Thank you!             Your Updated Medication List - Protect others around you: Learn how to safely use, store and throw away your medicines at www.disposemymeds.org.          This list is accurate as of 9/13/18 11:59 PM.  Always use your most recent med list.                   Brand Name Dispense Instructions for use Diagnosis    acetaminophen 500 MG tablet    TYLENOL    30 tablet    Take 2 tablets (1,000 mg) by mouth every 8 hours as needed for mild pain        albuterol 108 (90 Base) MCG/ACT inhaler    PROAIR HFA/PROVENTIL HFA/VENTOLIN HFA    1 Inhaler    Inhale 2 puffs into the lungs every 6 hours as needed for shortness of breath / dyspnea or wheezing    Acute bronchitis with symptoms > 10 days       aspirin 81 MG tablet      Take 2 tablets by mouth every evening. *.        atorvastatin 40 MG tablet    LIPITOR    90 tablet    TAKE ONE TABLET BY MOUTH EVERY DAY    CAD S/P percutaneous coronary angioplasty       CALCIUM 600 + D PO      1 tablet by mouth 2 times per day        citalopram 20 MG tablet    celeXA    90 tablet    Take 1 tablet (20 mg) by mouth daily    Major depressive disorder, recurrent episode, mild (H)       diclofenac 50 MG EC tablet    VOLTAREN    30 tablet    Take 1 tablet (50 mg) by mouth 3 times daily as needed for moderate pain    Primary osteoarthritis involving multiple joints       FISH OIL PO      1 daily        guaiFENesin-codeine 100-10 MG/5ML Soln solution    ROBITUSSIN AC    120 mL    Take 5 mLs by mouth every 4 hours as needed for cough    Viral upper respiratory tract infection with cough       levothyroxine 75 MCG tablet    SYNTHROID/LEVOTHROID    90 tablet    Take 1 tablet (75 mcg) by mouth daily Due for labs.    Hypothyroidism, unspecified type       losartan 25 MG tablet    COZAAR    90 tablet    TAKE ONE TABLET BY  MOUTH ONCE DAILY    Essential hypertension with goal blood pressure less than 140/90       meclizine 25 MG tablet    ANTIVERT    30 tablet    TAKE ONE TABLET BY MOUTH EVERY 6 HOURS AS NEEDED FOR DIZZINESS    Vertigo       metoprolol succinate 25 MG 24 hr tablet    TOPROL-XL    90 tablet    Take 0.5 tablets (12.5 mg) by mouth daily    Essential hypertension with goal blood pressure less than 140/90       nitroGLYcerin 0.4 MG sublingual tablet    NITROSTAT    25 tablet    Place 1 tablet (0.4 mg) under the tongue every 5 minutes as needed for chest pain (call your doctor if you take a third dose)    Atherosclerosis of native coronary artery of native heart without angina pectoris       OCUVITE PO      Take 1 tablet by mouth daily.        ondansetron 4 MG tablet    ZOFRAN    18 tablet    Take 1 tablet (4 mg) by mouth every 6 hours as needed for nausea    Nausea       pantoprazole 20 MG EC tablet    PROTONIX    180 tablet    Take 2 tablets (40 mg) by mouth daily    Gastroesophageal reflux disease, esophagitis presence not specified       pramipexole 0.5 MG tablet    MIRAPEX    90 tablet    TAKE ONE TABLET BY MOUTH AT BEDTIME    Restless leg syndrome       ranitidine 150 MG tablet    ZANTAC    180 tablet    Take 2 tablets (300 mg) by mouth At Bedtime    Gastroesophageal reflux disease, esophagitis presence not specified

## 2018-09-21 ENCOUNTER — OFFICE VISIT (OUTPATIENT)
Dept: FAMILY MEDICINE | Facility: CLINIC | Age: 83
End: 2018-09-21
Payer: COMMERCIAL

## 2018-09-21 VITALS
BODY MASS INDEX: 26.68 KG/M2 | HEART RATE: 61 BPM | WEIGHT: 166 LBS | DIASTOLIC BLOOD PRESSURE: 73 MMHG | SYSTOLIC BLOOD PRESSURE: 132 MMHG | TEMPERATURE: 97 F | HEIGHT: 66 IN

## 2018-09-21 DIAGNOSIS — M48.061 SPINAL STENOSIS OF LUMBAR REGION WITHOUT NEUROGENIC CLAUDICATION: ICD-10-CM

## 2018-09-21 DIAGNOSIS — M16.12 PRIMARY OSTEOARTHRITIS OF LEFT HIP: Primary | ICD-10-CM

## 2018-09-21 DIAGNOSIS — I25.119 ATHEROSCLEROSIS OF NATIVE CORONARY ARTERY OF NATIVE HEART WITH ANGINA PECTORIS (H): ICD-10-CM

## 2018-09-21 DIAGNOSIS — I49.5 SINOATRIAL NODE DYSFUNCTION (H): ICD-10-CM

## 2018-09-21 DIAGNOSIS — M25.571 PAIN IN JOINT INVOLVING ANKLE AND FOOT, RIGHT: ICD-10-CM

## 2018-09-21 PROCEDURE — 99213 OFFICE O/P EST LOW 20 MIN: CPT | Performed by: PHYSICIAN ASSISTANT

## 2018-09-21 ASSESSMENT — PAIN SCALES - GENERAL: PAINLEVEL: MILD PAIN (2)

## 2018-09-21 NOTE — MR AVS SNAPSHOT
After Visit Summary   9/21/2018    Christiana Sal    MRN: [id number]           Patient Information     Date Of Birth          10/25/1931        Visit Information        Provider Department      9/21/2018 10:40 AM Kelly Matthew PA-C [facility]        Care Instructions    Call [place] [phone]  To schedule the follow up with Dr. Bowser (spine surgeon)    Last visit - May 2018          Follow-ups after your visit        Your next 10 appointments already scheduled     Oct 23, 2018  2:20 PM CDT   Pacemaker Check with Wy Device Rn   [facility] ([facility])    [address] [address]   [phone]            Oct 23, 2018  3:30 PM CDT   P EP RETURN with Alex Beverly MD   [facility] ([facility])    [address] [address]   [phone]            Jan 08, 2019 10:15 AM CST   Return Visit with Cleve Null MD   [facility]    [address] [address]   [phone]              Who to contact     Normal or non-critical lab and imaging results will be communicated to you by AlloCurehart, letter or phone within 4 business days after the clinic has received the results. If you do not hear from us within 7 days, please contact the clinic through MyChart or phone. If you have a critical or abnormal lab result, we will notify you by phone as soon as possible.  Submit refill requests through healthfinch or call your pharmacy and they will forward the refill request to us. Please allow 3 business days for your refill to be completed.          If you need to speak with a  for additional information , please call: [phone]             Additional Information About Your Visit        healthfinch Information     healthfinch gives you secure access to your electronic health record. If  "you see a primary care provider, you can also send messages to your care team and make appointments. If you have questions, please call your primary care clinic.  If you do not have a primary care provider, please call [phone] and they will assist you.        Care EveryWhere ID     This is your Care EveryWhere ID. This could be used by other organizations to access your [place] medical records  [id number]        Your Vitals Were     Pulse Temperature Height BMI (Body Mass Index)          61 97  F (36.1  C) 5' 6\" (1.676 m) 26.79 kg/m2         Blood Pressure from Last 3 Encounters:   09/21/18 132/73   09/13/18 163/63   09/07/18 130/80    Weight from Last 3 Encounters:   09/21/18 166 lb (75.3 kg)   09/13/18 169 lb 3.2 oz (76.7 kg)   09/07/18 165 lb (74.8 kg)              Today, you had the following     No orders found for display       Primary Care Provider Office Phone # Fax #    Kelly Matthew PA-C [phone] [address] [address]        Equal Access to Services     [facility]: Hadii aad ku hadasho Soomaali, waaxda luqadaha, qaybta kaalmada adeegyada, danya dejesusn ken brian ah. So [facility] [phone].    ATENCIÓN: Si habla español, tiene a ozuna disposición servicios gratuitos de asistencia lingüística. Moisés al [phone].    We comply with applicable federal civil rights laws and [place] laws. We do not discriminate on the basis of race, color, national origin, age, disability, sex, sexual orientation, or gender identity.            Thank you!     Thank you for choosing [facility]  for your care. Our goal is always to provide you with excellent care. Hearing back from our patients is one way we can continue to improve our services. Please take a few minutes to complete the written survey that you may receive in the mail after your visit with us. Thank you!             Your Updated Medication List - Protect others around you: " Learn how to safely use, store and throw away your medicines at www.disposemymeds.org.          This list is accurate as of 9/21/18 11:03 AM.  Always use your most recent med list.                   Brand Name Dispense Instructions for use Diagnosis    acetaminophen 500 MG tablet    TYLENOL    30 tablet    Take 2 tablets (1,000 mg) by mouth every 8 hours as needed for mild pain        albuterol 108 (90 Base) MCG/ACT inhaler    PROAIR HFA/PROVENTIL HFA/VENTOLIN HFA    1 Inhaler    Inhale 2 puffs into the lungs every 6 hours as needed for shortness of breath / dyspnea or wheezing    Acute bronchitis with symptoms > 10 days       aspirin 81 MG tablet      Take 2 tablets by mouth every evening. *.        atorvastatin 40 MG tablet    LIPITOR    90 tablet    TAKE ONE TABLET BY MOUTH EVERY DAY    CAD S/P percutaneous coronary angioplasty       CALCIUM 600 + D PO      1 tablet by mouth 2 times per day        citalopram 20 MG tablet    celeXA    90 tablet    Take 1 tablet (20 mg) by mouth daily    Major depressive disorder, recurrent episode, mild (H)       diclofenac 50 MG EC tablet    VOLTAREN    30 tablet    Take 1 tablet (50 mg) by mouth 3 times daily as needed for moderate pain    Primary osteoarthritis involving multiple joints       FISH OIL PO      1 daily        guaiFENesin-codeine 100-10 MG/5ML Soln solution    ROBITUSSIN AC    120 mL    Take 5 mLs by mouth every 4 hours as needed for cough    Viral upper respiratory tract infection with cough       levothyroxine 75 MCG tablet    SYNTHROID/LEVOTHROID    90 tablet    Take 1 tablet (75 mcg) by mouth daily Due for labs.    Hypothyroidism, unspecified type       losartan 25 MG tablet    COZAAR    90 tablet    TAKE ONE TABLET BY MOUTH ONCE DAILY    Essential hypertension with goal blood pressure less than 140/90       meclizine 25 MG tablet    ANTIVERT    30 tablet    TAKE ONE TABLET BY MOUTH EVERY 6 HOURS AS NEEDED FOR DIZZINESS    Vertigo       metoprolol succinate 25  MG 24 hr tablet    TOPROL-XL    90 tablet    Take 0.5 tablets (12.5 mg) by mouth daily    Essential hypertension with goal blood pressure less than 140/90       nitroGLYcerin 0.4 MG sublingual tablet    NITROSTAT    25 tablet    Place 1 tablet (0.4 mg) under the tongue every 5 minutes as needed for chest pain (call your doctor if you take a third dose)    Atherosclerosis of native coronary artery of native heart without angina pectoris       OCUVITE PO      Take 1 tablet by mouth daily.        ondansetron 4 MG tablet    ZOFRAN    18 tablet    Take 1 tablet (4 mg) by mouth every 6 hours as needed for nausea    Nausea       pantoprazole 20 MG EC tablet    PROTONIX    180 tablet    Take 2 tablets (40 mg) by mouth daily    Gastroesophageal reflux disease, esophagitis presence not specified       pramipexole 0.5 MG tablet    MIRAPEX    90 tablet    TAKE ONE TABLET BY MOUTH AT BEDTIME    Restless leg syndrome       ranitidine 150 MG tablet    ZANTAC    180 tablet    Take 2 tablets (300 mg) by mouth At Bedtime    Gastroesophageal reflux disease, esophagitis presence not specified

## 2018-09-21 NOTE — PATIENT INSTRUCTIONS
Call [place] [phone]  To schedule the follow up with Dr. Bowser (spine surgeon)    Last visit - May 2018

## 2018-09-21 NOTE — PROGRESS NOTES
"  SUBJECTIVE:   Christiana Sal is a 86 year old female who presents to clinic today for the following health issues:      Patient is here today to follow up with her ongoing issues with her left hip and left leg.    -She went to [facility] and they gave her a cortisone shot in her left ankle. She said it has maybe gotten a little better.     -She visited with a surgeon about her Hip and got the information from him.      -She would like some guidance on what to do next for things.    Problem list and histories reviewed & adjusted, as indicated.  Additional history:     Just trying to figure out what to do next  Has met with several ortho specialists over the past few months regarding her back, leg, and ankle  Has decided to hold off on surgery on her left foot - not bothersome enough yet  Had an injection in her right ankle recently and so far seems to be doing better  Today is a \"good\" day in terms of not needing a walker or cane but says that some days she can't go without and some nights pain in left leg (down toward calf) keeps her awake    Current Outpatient Prescriptions   Medication Sig Dispense Refill     acetaminophen (TYLENOL) 500 MG tablet Take 2 tablets (1,000 mg) by mouth every 8 hours as needed for mild pain 30 tablet 0     albuterol (PROAIR HFA/PROVENTIL HFA/VENTOLIN HFA) 108 (90 BASE) MCG/ACT Inhaler Inhale 2 puffs into the lungs every 6 hours as needed for shortness of breath / dyspnea or wheezing 1 Inhaler 0     ASPIRIN 81 MG PO TABS Take 2 tablets by mouth every evening. *.        atorvastatin (LIPITOR) 40 MG tablet TAKE ONE TABLET BY MOUTH EVERY DAY 90 tablet 3     CALCIUM 600 + D OR 1 tablet by mouth 2 times per day       citalopram (CELEXA) 20 MG tablet Take 1 tablet (20 mg) by mouth daily 90 tablet 1     diclofenac (VOLTAREN) 50 MG EC tablet Take 1 tablet (50 mg) by mouth 3 times daily as needed for moderate pain 30 tablet 1     FISH OIL OR 1 daily       guaiFENesin-codeine " "(ROBITUSSIN AC) 100-10 MG/5ML SOLN solution Take 5 mLs by mouth every 4 hours as needed for cough 120 mL 0     levothyroxine (SYNTHROID/LEVOTHROID) 75 MCG tablet Take 1 tablet (75 mcg) by mouth daily Due for labs. 90 tablet 0     losartan (COZAAR) 25 MG tablet TAKE ONE TABLET BY MOUTH ONCE DAILY 90 tablet 2     meclizine (ANTIVERT) 25 MG tablet TAKE ONE TABLET BY MOUTH EVERY 6 HOURS AS NEEDED FOR DIZZINESS 30 tablet 3     metoprolol (TOPROL-XL) 25 MG 24 hr tablet Take 0.5 tablets (12.5 mg) by mouth daily 90 tablet 3     Multiple Vitamins-Minerals (OCUVITE PO) Take 1 tablet by mouth daily.       nitroglycerin (NITROSTAT) 0.4 MG SL tablet Place 1 tablet (0.4 mg) under the tongue every 5 minutes as needed for chest pain (call your doctor if you take a third dose) 25 tablet 3     ondansetron (ZOFRAN) 4 MG tablet Take 1 tablet (4 mg) by mouth every 6 hours as needed for nausea 18 tablet 3     pantoprazole (PROTONIX) 20 MG EC tablet Take 2 tablets (40 mg) by mouth daily 180 tablet 2     pramipexole (MIRAPEX) 0.5 MG tablet TAKE ONE TABLET BY MOUTH AT BEDTIME 90 tablet 2     ranitidine (ZANTAC) 150 MG tablet Take 2 tablets (300 mg) by mouth At Bedtime 180 tablet 3     Allergies   Allergen Reactions     Demerol Nausea     Lisinopril Cough     Meperidine Unknown     Sulfa Drugs Other (See Comments) and Unknown     Questionable itching reported by patient     BP Readings from Last 3 Encounters:   09/21/18 132/73   09/13/18 163/63   09/07/18 130/80    Wt Readings from Last 3 Encounters:   09/21/18 166 lb (75.3 kg)   09/13/18 169 lb 3.2 oz (76.7 kg)   09/07/18 165 lb (74.8 kg)                    Reviewed and updated as needed this visit by clinical staff       Reviewed and updated as needed this visit by Provider         ROS:  Remainder of ROS obtained and found to be negative other than that which was documented above      OBJECTIVE:     /73  Pulse 61  Temp 97  F (36.1  C)  Ht 5' 6\" (1.676 m)  Wt 166 lb (75.3 kg)  " BMI 26.79 kg/m2  Body mass index is 26.79 kg/(m^2).  GENERAL: healthy, alert and no distress  PSYCH: mentation appears normal, affect normal/bright    Diagnostic Test Results:  none     ASSESSMENT/PLAN:     (M16.12) Primary osteoarthritis of left hip  (primary encounter diagnosis)  Comment:   Plan: reviewed recent note/meeting with Dr. Galdamez. Anterior hip replacement offered but clear to state that it is not clear how much of her current pain issues will be relieved as she also has spinal stenosis    (M48.061) Spinal stenosis of lumbar region without neurogenic claudication  Comment:   Plan: met with Dr. Bowser in May 2018. Per patient offered injections at the time     (M25.571) Pain in joint involving ankle and foot, right  Comment:   Plan: better since injection    (I25.119) Atherosclerosis of native coronary artery of native heart with angina pectoris (H)  Comment: stable  Plan: stable    (I49.5) Sinoatrial node dysfunction (H)  Comment:   Plan: stable      Patient feels most comfortable with trying the back injections that were offered to see how much improvement she gets in her leg symptoms to hopefully get a better idea what a hip surgery may help with   Would like to hold off on surgery if possible until after November as she would like to go visit her daughter        Kelly Matthew PA-C  [facility]

## 2018-10-01 ENCOUNTER — TRANSFERRED RECORDS (OUTPATIENT)
Dept: HEALTH INFORMATION MANAGEMENT | Facility: CLINIC | Age: 83
End: 2018-10-01

## 2018-10-01 DIAGNOSIS — K21.9 GASTROESOPHAGEAL REFLUX DISEASE, ESOPHAGITIS PRESENCE NOT SPECIFIED: ICD-10-CM

## 2018-10-01 NOTE — TELEPHONE ENCOUNTER
"PANTOPRAZOLE SOD 40MG TAB        Last Written Prescription Date:  11/15/17  Last Fill Quantity: 180,   # refills: 2  Last Office Visit: 9/21/18  Future Office visit:         Requested Prescriptions   Pending Prescriptions Disp Refills     pantoprazole (PROTONIX) 40 MG EC tablet [Pharmacy Med Name: PANTOPRAZOLE SOD 40MG TAB] 90 tablet 2     Sig: TAKE ONE TABLET BY MOUTH ONCE DAILY    PPI Protocol Passed    10/1/2018  8:14 AM       Passed - Not on Clopidogrel (unless Pantoprazole ordered)       Passed - No diagnosis of osteoporosis on record       Passed - Recent (12 mo) or future (30 days) visit within the authorizing provider's specialty    Patient had office visit in the last 12 months or has a visit in the next 30 days with authorizing provider or within the authorizing provider's specialty.  See \"Patient Info\" tab in inbasket, or \"Choose Columns\" in Meds & Orders section of the refill encounter.           Passed - Patient is age 18 or older       Passed - No active pregnacy on record       Passed - No positive pregnancy test in past 12 months          "

## 2018-10-02 RX ORDER — PANTOPRAZOLE SODIUM 40 MG/1
TABLET, DELAYED RELEASE ORAL
Qty: 90 TABLET | Refills: 2 | Status: SHIPPED | OUTPATIENT
Start: 2018-10-02 | End: 2018-12-02

## 2018-10-03 PROBLEM — M25.552 HIP PAIN, LEFT: Status: RESOLVED | Noted: 2018-04-06 | Resolved: 2018-10-03

## 2018-10-03 PROBLEM — M54.50 BILATERAL LOW BACK PAIN WITHOUT SCIATICA: Status: RESOLVED | Noted: 2017-11-10 | Resolved: 2018-10-03

## 2018-10-18 DIAGNOSIS — E03.9 HYPOTHYROIDISM, UNSPECIFIED TYPE: ICD-10-CM

## 2018-10-18 RX ORDER — LEVOTHYROXINE SODIUM 75 UG/1
75 TABLET ORAL DAILY
Qty: 30 TABLET | Refills: 0 | Status: SHIPPED | OUTPATIENT
Start: 2018-10-18 | End: 2018-11-28

## 2018-10-23 ENCOUNTER — DOCUMENTATION ONLY (OUTPATIENT)
Dept: CARDIOLOGY | Facility: CLINIC | Age: 83
End: 2018-10-23

## 2018-10-23 ENCOUNTER — OFFICE VISIT (OUTPATIENT)
Dept: CARDIOLOGY | Facility: CLINIC | Age: 83
End: 2018-10-23
Payer: COMMERCIAL

## 2018-10-23 ENCOUNTER — HOSPITAL ENCOUNTER (OUTPATIENT)
Dept: CARDIOLOGY | Facility: CLINIC | Age: 83
Discharge: HOME | End: 2018-10-23
Attending: INTERNAL MEDICINE | Admitting: INTERNAL MEDICINE
Payer: MEDICARE

## 2018-10-23 VITALS
OXYGEN SATURATION: 90 % | SYSTOLIC BLOOD PRESSURE: 115 MMHG | BODY MASS INDEX: 27.44 KG/M2 | WEIGHT: 170 LBS | DIASTOLIC BLOOD PRESSURE: 63 MMHG | HEART RATE: 61 BPM

## 2018-10-23 DIAGNOSIS — I49.5 SINOATRIAL NODE DYSFUNCTION (H): ICD-10-CM

## 2018-10-23 DIAGNOSIS — I10 ESSENTIAL HYPERTENSION WITH GOAL BLOOD PRESSURE LESS THAN 140/90: Primary | ICD-10-CM

## 2018-10-23 PROCEDURE — 99214 OFFICE O/P EST MOD 30 MIN: CPT | Performed by: INTERNAL MEDICINE

## 2018-10-23 PROCEDURE — 93288 INTERROG EVL PM/LDLS PM IP: CPT | Mod: 26 | Performed by: INTERNAL MEDICINE

## 2018-10-23 PROCEDURE — 93288 INTERROG EVL PM/LDLS PM IP: CPT

## 2018-10-23 NOTE — LETTER
10/23/2018    Kelly Matthew PA-C  [address] [facility] [address]    RE: Christiana Sal       Dear Colleague,    I had the pleasure of seeing Christiana Kim Sal in the [facility].    [id number]    Electrophysiology/ Clinic Note         H&P and Plan:       Alex Beverly MD    Physical Exam:  Vitals: /63 (BP Location: Right arm, Patient Position: Sitting, Cuff Size: Adult Regular)  Pulse 61  Wt 77.1 kg (170 lb)  SpO2 90%  BMI 27.44 kg/m2    Constitutional:  AAO x3.  Pt is in NAD.  HEAD: normocephalic.  SKIN: Skin normal color, texture and turgor with no lesions or eruptions.  Eyes: PERRL, EOMI.  ENT:  Supple, normal JVP. No lymphadenopathy or thyroid enlargement.  Chest:  CTAB.  Cardiac: RRR, normal  S1 and S2.  No murmurs rubs or gallop.   Abdomen:  Normal BS.  Soft, non-tender and non-distended.  No rebound or guarding.    Extremities:  Pedious pulses palpable B/L.  No LE edema noticed.   Neurological: Strength and sensation grossly symmetric and intact throughout.       CURRENT MEDICATIONS:  Current Outpatient Prescriptions   Medication Sig Dispense Refill     acetaminophen (TYLENOL) 500 MG tablet Take 2 tablets (1,000 mg) by mouth every 8 hours as needed for mild pain 30 tablet 0     albuterol (PROAIR HFA/PROVENTIL HFA/VENTOLIN HFA) 108 (90 BASE) MCG/ACT Inhaler Inhale 2 puffs into the lungs every 6 hours as needed for shortness of breath / dyspnea or wheezing 1 Inhaler 0     ASPIRIN 81 MG PO TABS Take 2 tablets by mouth every evening. *.        atorvastatin (LIPITOR) 40 MG tablet TAKE ONE TABLET BY MOUTH EVERY DAY 90 tablet 3     CALCIUM 600 + D OR 1 tablet by mouth 2 times per day       citalopram (CELEXA) 20 MG tablet Take 1 tablet (20 mg) by mouth daily 90 tablet 1     diclofenac (VOLTAREN) 50 MG EC tablet Take 1 tablet (50 mg) by mouth 3 times daily as needed for moderate pain 30 tablet 1     FISH OIL OR 1 daily       guaiFENesin-codeine (ROBITUSSIN  AC) 100-10 MG/5ML SOLN solution Take 5 mLs by mouth every 4 hours as needed for cough 120 mL 0     levothyroxine (SYNTHROID/LEVOTHROID) 75 MCG tablet Take 1 tablet (75 mcg) by mouth daily (Needs lab work) 30 tablet 0     losartan (COZAAR) 25 MG tablet TAKE ONE TABLET BY MOUTH ONCE DAILY 90 tablet 2     meclizine (ANTIVERT) 25 MG tablet TAKE ONE TABLET BY MOUTH EVERY 6 HOURS AS NEEDED FOR DIZZINESS 30 tablet 3     metoprolol (TOPROL-XL) 25 MG 24 hr tablet Take 0.5 tablets (12.5 mg) by mouth daily 90 tablet 3     Multiple Vitamins-Minerals (OCUVITE PO) Take 1 tablet by mouth daily.       nitroglycerin (NITROSTAT) 0.4 MG SL tablet Place 1 tablet (0.4 mg) under the tongue every 5 minutes as needed for chest pain (call your doctor if you take a third dose) 25 tablet 3     ondansetron (ZOFRAN) 4 MG tablet Take 1 tablet (4 mg) by mouth every 6 hours as needed for nausea 18 tablet 3     pantoprazole (PROTONIX) 40 MG EC tablet TAKE ONE TABLET BY MOUTH ONCE DAILY 90 tablet 2     pramipexole (MIRAPEX) 0.5 MG tablet TAKE ONE TABLET BY MOUTH AT BEDTIME 90 tablet 2     ranitidine (ZANTAC) 150 MG tablet Take 2 tablets (300 mg) by mouth At Bedtime 180 tablet 3       ALLERGIES     Allergies   Allergen Reactions     Demerol Nausea     Lisinopril Cough     Meperidine Unknown     Sulfa Drugs Other (See Comments) and Unknown     Questionable itching reported by patient       PAST MEDICAL HISTORY:  Past Medical History:   Diagnosis Date     Coronary atherosclerosis of native coronary artery     s/p MI ([facility])     Essential hypertension, benign      GERD (gastroesophageal reflux disease)      History of transient ischemic attack (TIA)      Hyperlipidemia LDL goal <100      Hypothyroidism      Major depressive disorder, recurrent episode, mild (H) 5/18/2011     Peptic ulcer disease with hemorrhage     Remote history of stomach ulcer, requiring transfusion after chronic bleeding       PAST SURGICAL HISTORY:  Past Surgical History:    Procedure Laterality Date     APPENDECTOMY       CARDIAC CATHERIZATION  1/15/04    drug-eluding stent RCA, Dr. Pérez, [facility]     CARDIAC CATHERIZATION  12    diffuse mild/mod disease, no new stent     CATARACT IOL, RT/LT  12    RT, Dr. Wynne     CHOLECYSTECTOMY, OPEN       COLONOSCOPY      hospitals     FRACTURE TX, ANKLE RT/LT      LT, 2 screws remain     HC EXCISE HAND/FOOT NEUROMA      RT     HC LARYNGOSCOPY DIRECT W VOCAL CORD INJECTION      vocal cord polypectomy     HC REMOVAL OF OVARIAN CYST(S)       HC TRANSCATH STENT INIT VESSEL,PERCUT      x 2     REPAIR HAMMER TOE  9/13/10    multiple + RT great toe tendon release, Dr. Sanchez DPM     STENT  -    Cardiac stents 6 placed.       FAMILY HISTORY:  Family History   Problem Relation Age of Onset     C.A.D. Father      Hypertension Father      Myocardial Infarction Father 66      from MI     C.A.D. Brother      Myocardial Infarction Brother      Cancer Brother      skin ca     Breast Cancer Sister      Cancer - colorectal Sister      HEART DISEASE Sister      Neurologic Disorder Mother      migraine     C.A.D. Mother      Hypertension Mother      Heart Failure Mother      Thyroid Disease Son      cretinism     Hypertension Son      HEART DISEASE Son      Psychotic Disorder Sister      Hypertension Sister      HEART DISEASE Sister      CABG     Hypertension Brother      C.A.D. Brother      Myocardial Infarction Brother      Arthritis Daughter      RA     Hypertension Sister      HEART DISEASE Sister      Cancer Sister      Blood Disease Sister      Neurologic Disorder Child      migraine (2 children)       SOCIAL HISTORY:  Social History     Social History     Marital status:      Spouse name: N/A     Number of children: 4     Years of education: some adry     Occupational History      Retired     Social History Main Topics     Smoking status: Never Smoker     Smokeless tobacco: Never Used      Comment: Never smoker;  no secondhand smoke exposure     Alcohol use Yes      Comment: social     Drug use: No     Sexual activity: Not Currently     Other Topics Concern     Parent/Sibling W/ Cabg, Mi Or Angioplasty Before 65f 55m? No     Social History Narrative     has Alzheimer's       Review of Systems:  Skin:  Positive for bruising   Eyes:  Positive for glasses  ENT:  Negative    Respiratory:  Positive for cough  Cardiovascular:    Positive for;fatigue  Gastroenterology: Positive for reflux  Genitourinary:  Negative    Musculoskeletal:  Positive for arthritis;joint pain;joint swelling;joint stiffness;back pain  Neurologic:  Negative    Psychiatric:  Positive for depression;anxiety  Heme/Lymph/Imm:  Negative    Endocrine:  Negative        Recent Lab Results:  LIPID RESULTS:  Lab Results   Component Value Date    CHOL 126 09/05/2017    HDL 44 (L) 09/05/2017    LDL 57 09/05/2017    TRIG 123 09/05/2017    CHOLHDLRATIO 3.7 03/17/2014       LIVER ENZYME RESULTS:  Lab Results   Component Value Date    AST 19 08/28/2017    ALT 25 08/28/2017       CBC RESULTS:  Lab Results   Component Value Date    WBC 6.8 08/28/2017    RBC 4.35 08/28/2017    HGB 13.1 08/28/2017    HCT 39.2 08/28/2017    MCV 90 08/28/2017    MCH 30.1 08/28/2017    MCHC 33.4 08/28/2017    RDW 13.9 08/28/2017     08/28/2017       BMP RESULTS:  Lab Results   Component Value Date     08/28/2017    POTASSIUM 3.4 08/28/2017    CHLORIDE 107 08/28/2017    CO2 23 08/28/2017    ANIONGAP 8 08/28/2017     (H) 08/28/2017    BUN 16 08/28/2017    CR 0.90 08/28/2017    GFRESTIMATED 59 (L) 08/28/2017    GFRESTBLACK 72 08/28/2017    YVONNE 8.4 (L) 08/28/2017        A1C RESULTS:  No results found for: A1C    INR RESULTS:  Lab Results   Component Value Date    INR 0.90 05/10/2016    INR 0.98 02/16/2012         ECHOCARDIOGRAM  No results found for this or any previous visit (from the past 8760 hour(s)).      No orders of the defined types were placed in this  encounter.    No orders of the defined types were placed in this encounter.    There are no discontinued medications.      No diagnosis found.      CC  No referring provider defined for this encounter.                Thank you for allowing me to participate in the care of your patient.      Sincerely,     Alex Beverly MD     [facility]    cc:   No referring provider defined for this encounter.

## 2018-10-23 NOTE — PROGRESS NOTES
[id number]    Electrophysiology/ Clinic Note         H&P and Plan:       Alex Beverly MD    Physical Exam:  Vitals: /63 (BP Location: Right arm, Patient Position: Sitting, Cuff Size: Adult Regular)  Pulse 61  Wt 77.1 kg (170 lb)  SpO2 90%  BMI 27.44 kg/m2    Constitutional:  AAO x3.  Pt is in NAD.  HEAD: normocephalic.  SKIN: Skin normal color, texture and turgor with no lesions or eruptions.  Eyes: PERRL, EOMI.  ENT:  Supple, normal JVP. No lymphadenopathy or thyroid enlargement.  Chest:  CTAB.  Cardiac: RRR, normal  S1 and S2.  No murmurs rubs or gallop.   Abdomen:  Normal BS.  Soft, non-tender and non-distended.  No rebound or guarding.    Extremities:  Pedious pulses palpable B/L.  No LE edema noticed.   Neurological: Strength and sensation grossly symmetric and intact throughout.       CURRENT MEDICATIONS:  Current Outpatient Prescriptions   Medication Sig Dispense Refill     acetaminophen (TYLENOL) 500 MG tablet Take 2 tablets (1,000 mg) by mouth every 8 hours as needed for mild pain 30 tablet 0     albuterol (PROAIR HFA/PROVENTIL HFA/VENTOLIN HFA) 108 (90 BASE) MCG/ACT Inhaler Inhale 2 puffs into the lungs every 6 hours as needed for shortness of breath / dyspnea or wheezing 1 Inhaler 0     ASPIRIN 81 MG PO TABS Take 2 tablets by mouth every evening. *.        atorvastatin (LIPITOR) 40 MG tablet TAKE ONE TABLET BY MOUTH EVERY DAY 90 tablet 3     CALCIUM 600 + D OR 1 tablet by mouth 2 times per day       citalopram (CELEXA) 20 MG tablet Take 1 tablet (20 mg) by mouth daily 90 tablet 1     diclofenac (VOLTAREN) 50 MG EC tablet Take 1 tablet (50 mg) by mouth 3 times daily as needed for moderate pain 30 tablet 1     FISH OIL OR 1 daily       guaiFENesin-codeine (ROBITUSSIN AC) 100-10 MG/5ML SOLN solution Take 5 mLs by mouth every 4 hours as needed for cough 120 mL 0     levothyroxine (SYNTHROID/LEVOTHROID) 75 MCG tablet Take 1 tablet (75 mcg) by mouth daily (Needs lab work) 30 tablet 0     losartan  (COZAAR) 25 MG tablet TAKE ONE TABLET BY MOUTH ONCE DAILY 90 tablet 2     meclizine (ANTIVERT) 25 MG tablet TAKE ONE TABLET BY MOUTH EVERY 6 HOURS AS NEEDED FOR DIZZINESS 30 tablet 3     metoprolol (TOPROL-XL) 25 MG 24 hr tablet Take 0.5 tablets (12.5 mg) by mouth daily 90 tablet 3     Multiple Vitamins-Minerals (OCUVITE PO) Take 1 tablet by mouth daily.       nitroglycerin (NITROSTAT) 0.4 MG SL tablet Place 1 tablet (0.4 mg) under the tongue every 5 minutes as needed for chest pain (call your doctor if you take a third dose) 25 tablet 3     ondansetron (ZOFRAN) 4 MG tablet Take 1 tablet (4 mg) by mouth every 6 hours as needed for nausea 18 tablet 3     pantoprazole (PROTONIX) 40 MG EC tablet TAKE ONE TABLET BY MOUTH ONCE DAILY 90 tablet 2     pramipexole (MIRAPEX) 0.5 MG tablet TAKE ONE TABLET BY MOUTH AT BEDTIME 90 tablet 2     ranitidine (ZANTAC) 150 MG tablet Take 2 tablets (300 mg) by mouth At Bedtime 180 tablet 3       ALLERGIES     Allergies   Allergen Reactions     Demerol Nausea     Lisinopril Cough     Meperidine Unknown     Sulfa Drugs Other (See Comments) and Unknown     Questionable itching reported by patient       PAST MEDICAL HISTORY:  Past Medical History:   Diagnosis Date     Coronary atherosclerosis of native coronary artery     s/p MI ([facility])     Essential hypertension, benign      GERD (gastroesophageal reflux disease)      History of transient ischemic attack (TIA)      Hyperlipidemia LDL goal <100      Hypothyroidism      Major depressive disorder, recurrent episode, mild (H) 5/18/2011     Peptic ulcer disease with hemorrhage     Remote history of stomach ulcer, requiring transfusion after chronic bleeding       PAST SURGICAL HISTORY:  Past Surgical History:   Procedure Laterality Date     APPENDECTOMY       CARDIAC CATHERIZATION  1/15/04    drug-eluding stent RCA, Dr. Pérez, [facility]     CARDIAC CATHERIZATION  2/16/12    diffuse mild/mod disease, no new stent     CATARACT  IOL, RT/LT  12    RT, Dr. Wynne     CHOLECYSTECTOMY, OPEN       COLONOSCOPY      [facility]     FRACTURE TX, ANKLE RT/LT      LT, 2 screws remain     HC EXCISE HAND/FOOT NEUROMA      RT     HC LARYNGOSCOPY DIRECT W VOCAL CORD INJECTION      vocal cord polypectomy     HC REMOVAL OF OVARIAN CYST(S)       HC TRANSCATH STENT INIT VESSEL,PERCUT      x 2     REPAIR HAMMER TOE  9/13/10    multiple + RT great toe tendon release, Dr. Sanchez DPM     STENT  -    Cardiac stents 6 placed.       FAMILY HISTORY:  Family History   Problem Relation Age of Onset     C.A.D. Father      Hypertension Father      Myocardial Infarction Father 66      from MI     C.A.D. Brother      Myocardial Infarction Brother      Cancer Brother      skin ca     Breast Cancer Sister      Cancer - colorectal Sister      HEART DISEASE Sister      Neurologic Disorder Mother      migraine     C.A.D. Mother      Hypertension Mother      Heart Failure Mother      Thyroid Disease Son      cretinism     Hypertension Son      HEART DISEASE Son      Psychotic Disorder Sister      Hypertension Sister      HEART DISEASE Sister      CABG     Hypertension Brother      C.A.D. Brother      Myocardial Infarction Brother      Arthritis Daughter      RA     Hypertension Sister      HEART DISEASE Sister      Cancer Sister      Blood Disease Sister      Neurologic Disorder Child      migraine (2 children)       SOCIAL HISTORY:  Social History     Social History     Marital status:      Spouse name: N/A     Number of children: 4     Years of education: some adry     Occupational History      Retired     Social History Main Topics     Smoking status: Never Smoker     Smokeless tobacco: Never Used      Comment: Never smoker; no secondhand smoke exposure     Alcohol use Yes      Comment: social     Drug use: No     Sexual activity: Not Currently     Other Topics Concern     Parent/Sibling W/ Cabg, Mi Or Angioplasty Before 65f 55m? No     Social  History Narrative     has Alzheimer's       Review of Systems:  Skin:  Positive for bruising   Eyes:  Positive for glasses  ENT:  Negative    Respiratory:  Positive for cough  Cardiovascular:    Positive for;fatigue  Gastroenterology: Positive for reflux  Genitourinary:  Negative    Musculoskeletal:  Positive for arthritis;joint pain;joint swelling;joint stiffness;back pain  Neurologic:  Negative    Psychiatric:  Positive for depression;anxiety  Heme/Lymph/Imm:  Negative    Endocrine:  Negative        Recent Lab Results:  LIPID RESULTS:  Lab Results   Component Value Date    CHOL 126 09/05/2017    HDL 44 (L) 09/05/2017    LDL 57 09/05/2017    TRIG 123 09/05/2017    CHOLHDLRATIO 3.7 03/17/2014       LIVER ENZYME RESULTS:  Lab Results   Component Value Date    AST 19 08/28/2017    ALT 25 08/28/2017       CBC RESULTS:  Lab Results   Component Value Date    WBC 6.8 08/28/2017    RBC 4.35 08/28/2017    HGB 13.1 08/28/2017    HCT 39.2 08/28/2017    MCV 90 08/28/2017    MCH 30.1 08/28/2017    MCHC 33.4 08/28/2017    RDW 13.9 08/28/2017     08/28/2017       BMP RESULTS:  Lab Results   Component Value Date     08/28/2017    POTASSIUM 3.4 08/28/2017    CHLORIDE 107 08/28/2017    CO2 23 08/28/2017    ANIONGAP 8 08/28/2017     (H) 08/28/2017    BUN 16 08/28/2017    CR 0.90 08/28/2017    GFRESTIMATED 59 (L) 08/28/2017    GFRESTBLACK 72 08/28/2017    YVONNE 8.4 (L) 08/28/2017        A1C RESULTS:  No results found for: A1C    INR RESULTS:  Lab Results   Component Value Date    INR 0.90 05/10/2016    INR 0.98 02/16/2012         ECHOCARDIOGRAM  No results found for this or any previous visit (from the past 8760 hour(s)).      No orders of the defined types were placed in this encounter.    No orders of the defined types were placed in this encounter.    There are no discontinued medications.      No diagnosis found.      CC  No referring provider defined for this encounter.

## 2018-10-23 NOTE — LETTER
10/23/2018      Kelly Matthew PA-C  [address] [facility] [address]      RE: Christiana Berger       Dear Colleague,    I had the pleasure of seeing Christiana Berger in the [facility].    Service Date: 10/23/2018      REASON FOR VISIT:  Evaluation of hypertension and device check.      HISTORY OF PRESENT ILLNESS:  Ms. 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      At the moment, she is doing well.  She has no complaints during this visit.  She denies chest pain, shortness of breath, lightheadedness, near-syncope or syncopal episode.  She does complain of problems with her hips which causes her to have severe pain at times.      Device was interrogated today.  Lead parameters are stable.  She is rarely paced.  A nuclear stress test done in  revealed a small fixed defect in the mid to basal anterior and anteroseptal territory.  No ischemia was noticed.      ASSESSMENT AND PLAN:   1.  Device care.  Continue device checks q.3 months.   2.  Hypertension.  Blood pressure is well controlled.  Continue therapy with metoprolol and losartan.   3.  Coronary artery disease.  Continue aspirin, Lipitor, metoprolol and losartan.         JAMES VILLEDA MD             D: 10/23/2018   T: 10/23/2018   MT: RICHAR      Name:     CHRISTIANA BERGER   MRN:      [id number]        Account:      [id number]   :      10/25/1931           Service Date: 10/23/2018      Document: [id number]         Outpatient Encounter Prescriptions as of 10/23/2018   Medication Sig Dispense Refill     acetaminophen (TYLENOL) 500 MG tablet Take 2 tablets (1,000 mg) by mouth every 8  hours as needed for mild pain 30 tablet 0     albuterol (PROAIR HFA/PROVENTIL HFA/VENTOLIN HFA) 108 (90 BASE) MCG/ACT Inhaler Inhale 2 puffs into the lungs every 6 hours as needed for shortness of breath / dyspnea or wheezing 1 Inhaler 0     ASPIRIN 81 MG PO TABS Take 2 tablets by mouth every evening. *.        atorvastatin (LIPITOR) 40 MG tablet TAKE ONE TABLET BY MOUTH EVERY DAY 90 tablet 3     CALCIUM 600 + D OR 1 tablet by mouth 2 times per day       citalopram (CELEXA) 20 MG tablet Take 1 tablet (20 mg) by mouth daily 90 tablet 1     diclofenac (VOLTAREN) 50 MG EC tablet Take 1 tablet (50 mg) by mouth 3 times daily as needed for moderate pain 30 tablet 1     FISH OIL OR 1 daily       guaiFENesin-codeine (ROBITUSSIN AC) 100-10 MG/5ML SOLN solution Take 5 mLs by mouth every 4 hours as needed for cough 120 mL 0     levothyroxine (SYNTHROID/LEVOTHROID) 75 MCG tablet Take 1 tablet (75 mcg) by mouth daily (Needs lab work) 30 tablet 0     meclizine (ANTIVERT) 25 MG tablet TAKE ONE TABLET BY MOUTH EVERY 6 HOURS AS NEEDED FOR DIZZINESS 30 tablet 3     metoprolol (TOPROL-XL) 25 MG 24 hr tablet Take 0.5 tablets (12.5 mg) by mouth daily 90 tablet 3     Multiple Vitamins-Minerals (OCUVITE PO) Take 1 tablet by mouth daily.       nitroglycerin (NITROSTAT) 0.4 MG SL tablet Place 1 tablet (0.4 mg) under the tongue every 5 minutes as needed for chest pain (call your doctor if you take a third dose) 25 tablet 3     ondansetron (ZOFRAN) 4 MG tablet Take 1 tablet (4 mg) by mouth every 6 hours as needed for nausea 18 tablet 3     pantoprazole (PROTONIX) 40 MG EC tablet TAKE ONE TABLET BY MOUTH ONCE DAILY 90 tablet 2     pramipexole (MIRAPEX) 0.5 MG tablet TAKE ONE TABLET BY MOUTH AT BEDTIME 90 tablet 2     [DISCONTINUED] losartan (COZAAR) 25 MG tablet TAKE ONE TABLET BY MOUTH ONCE DAILY 90 tablet 2     [DISCONTINUED] ranitidine (ZANTAC) 150 MG tablet Take 2 tablets (300 mg) by mouth At Bedtime 180 tablet 3     No  facility-administered encounter medications on file as of 10/23/2018.        Again, thank you for allowing me to participate in the care of your patient.      Sincerely,    Alex Beverly MD     [facility]

## 2018-10-23 NOTE — MR AVS SNAPSHOT
After Visit Summary   10/23/2018    Christiana Sal    MRN: [id number]           Patient Information     Date Of Birth          10/25/1931        Visit Information        Provider Department      10/23/2018 3:30 PM Alex Beverly MD [facility]        Today's Diagnoses     Essential hypertension with goal blood pressure less than 140/90    -  1    Sinoatrial node dysfunction (H)           Follow-ups after your visit        Your next 10 appointments already scheduled     Oct 23, 2018  3:30 PM CDT   P EP RETURN with Alex Beverly MD   [facility] ([facility])    [address] [address]   [phone]            Oct 26, 2018  8:00 AM CDT   PROCEDURE with Vaibhav Yeh MD   [facility] ([facility])    [address]  [facility] [address]   [phone]            Dec 04, 2018  8:35 AM CST   (Arrive by 8:20 AM)   [facility]    [address] [facility] [facility] [address]   [phone]            Jan 08, 2019 10:15 AM CST   Return Visit with Cleve Null MD   [facility]    [address] [address]   [phone]            Feb 04, 2019  2:30 PM CST   Remote PPM Check with MONK TECH1   [facility] ([facility])    [address] [place] [address] [address]  [facility] [address] [phone] OPT 2           This appointment is for a remote check of your pacemaker.  This is not an appointment at the office.              Who to contact     If you have questions or need follow up information about today's clinic visit or your schedule please contact [facility] directly at [phone].  Normal or  non-critical lab and imaging results will be communicated to you by MyChart, letter or phone within 4 business days after the clinic has received the results. If you do not hear from us within 7 days, please contact the clinic through [employer] or phone. If you have a critical or abnormal lab result, we will notify you by phone as soon as possible.  Submit refill requests through [employer] or call your pharmacy and they will forward the refill request to us. Please allow 3 business days for your refill to be completed.          Additional Information About Your Visit        [employer] Information     [employer] gives you secure access to your electronic health record. If you see a primary care provider, you can also send messages to your care team and make appointments. If you have questions, please call your primary care clinic.  If you do not have a primary care provider, please call [phone] and they will assist you.        Care EveryWhere ID     This is your Care EveryWhere ID. This could be used by other organizations to access your [place] medical records  [id number]        Your Vitals Were     Pulse Pulse Oximetry BMI (Body Mass Index)             61 90% 27.44 kg/m2          Blood Pressure from Last 3 Encounters:   10/23/18 115/63   09/21/18 132/73   09/13/18 163/63    Weight from Last 3 Encounters:   10/23/18 77.1 kg (170 lb)   09/21/18 75.3 kg (166 lb)   09/13/18 76.7 kg (169 lb 3.2 oz)              Today, you had the following     No orders found for display       Primary Care Provider Office Phone # Fax #    Kelly Matthew PA-C [phone] [address] [facility] [address]        Equal Access to Services     [facility]: Hadii shailesh Davis, waaxda mir, qaybta kaaldanya paige. So [facility] [phone].    ATENCIÓN: Si habla español, tiene a ozuna disposición servicios gratuitos de asistencia lingüística. Llame al  [phone].    We comply with applicable federal civil rights laws and [place] laws. We do not discriminate on the basis of race, color, national origin, age, disability, sex, sexual orientation, or gender identity.            Thank you!     Thank you for choosing [facility]  for your care. Our goal is always to provide you with excellent care. Hearing back from our patients is one way we can continue to improve our services. Please take a few minutes to complete the written survey that you may receive in the mail after your visit with us. Thank you!             Your Updated Medication List - Protect others around you: Learn how to safely use, store and throw away your medicines at www.disposemymeds.org.          This list is accurate as of 10/23/18  3:08 PM.  Always use your most recent med list.                   Brand Name Dispense Instructions for use Diagnosis    acetaminophen 500 MG tablet    TYLENOL    30 tablet    Take 2 tablets (1,000 mg) by mouth every 8 hours as needed for mild pain        albuterol 108 (90 Base) MCG/ACT inhaler    PROAIR HFA/PROVENTIL HFA/VENTOLIN HFA    1 Inhaler    Inhale 2 puffs into the lungs every 6 hours as needed for shortness of breath / dyspnea or wheezing    Acute bronchitis with symptoms > 10 days       aspirin 81 MG tablet      Take 2 tablets by mouth every evening. *.        atorvastatin 40 MG tablet    LIPITOR    90 tablet    TAKE ONE TABLET BY MOUTH EVERY DAY    CAD S/P percutaneous coronary angioplasty       CALCIUM 600 + D PO      1 tablet by mouth 2 times per day        citalopram 20 MG tablet    celeXA    90 tablet    Take 1 tablet (20 mg) by mouth daily    Major depressive disorder, recurrent episode, mild (H)       diclofenac 50 MG EC tablet    VOLTAREN    30 tablet    Take 1 tablet (50 mg) by mouth 3 times daily as needed for moderate pain    Primary osteoarthritis involving multiple joints       FISH OIL PO      1 daily         guaiFENesin-codeine 100-10 MG/5ML Soln solution    ROBITUSSIN AC    120 mL    Take 5 mLs by mouth every 4 hours as needed for cough    Viral upper respiratory tract infection with cough       levothyroxine 75 MCG tablet    SYNTHROID/LEVOTHROID    30 tablet    Take 1 tablet (75 mcg) by mouth daily (Needs lab work)    Hypothyroidism, unspecified type       losartan 25 MG tablet    COZAAR    90 tablet    TAKE ONE TABLET BY MOUTH ONCE DAILY    Essential hypertension with goal blood pressure less than 140/90       meclizine 25 MG tablet    ANTIVERT    30 tablet    TAKE ONE TABLET BY MOUTH EVERY 6 HOURS AS NEEDED FOR DIZZINESS    Vertigo       metoprolol succinate 25 MG 24 hr tablet    TOPROL-XL    90 tablet    Take 0.5 tablets (12.5 mg) by mouth daily    Essential hypertension with goal blood pressure less than 140/90       nitroGLYcerin 0.4 MG sublingual tablet    NITROSTAT    25 tablet    Place 1 tablet (0.4 mg) under the tongue every 5 minutes as needed for chest pain (call your doctor if you take a third dose)    Atherosclerosis of native coronary artery of native heart without angina pectoris       OCUVITE PO      Take 1 tablet by mouth daily.        ondansetron 4 MG tablet    ZOFRAN    18 tablet    Take 1 tablet (4 mg) by mouth every 6 hours as needed for nausea    Nausea       pantoprazole 40 MG EC tablet    PROTONIX    90 tablet    TAKE ONE TABLET BY MOUTH ONCE DAILY    Gastroesophageal reflux disease, esophagitis presence not specified       pramipexole 0.5 MG tablet    MIRAPEX    90 tablet    TAKE ONE TABLET BY MOUTH AT BEDTIME    Restless leg syndrome       ranitidine 150 MG tablet    ZANTAC    180 tablet    Take 2 tablets (300 mg) by mouth At Bedtime    Gastroesophageal reflux disease, esophagitis presence not specified

## 2018-10-23 NOTE — PROGRESS NOTES
Leonardo Ash DR/NATY Pacemaker Device Check/ [place]/Dr Beverly  AP: 3 % : 6 %  Mode: DDDR        Underlying Rhythm: SR  Heart Rate: Histogram is stable  Sensing: stable    Pacing Threshold: stable   Impedance: WNL  Battery Status: 14 years estimated longevity  Device Site: No issues  Atrial Arrhythmia: NONE/   Ventricular Arrhythmia: NONE  Setting Change: NONE    Care Plan: REmote in 3 months.

## 2018-10-24 NOTE — PROGRESS NOTES
Service Date: 10/23/2018      REASON FOR VISIT:  Evaluation of hypertension and device check.      HISTORY OF PRESENT ILLNESS:  Ms. 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      At the moment, she is doing well.  She has no complaints during this visit.  She denies chest pain, shortness of breath, lightheadedness, near-syncope or syncopal episode.  She does complain of problems with her hips which causes her to have severe pain at times.      Device was interrogated today.  Lead parameters are stable.  She is rarely paced.  A nuclear stress test done in  revealed a small fixed defect in the mid to basal anterior and anteroseptal territory.  No ischemia was noticed.      ASSESSMENT AND PLAN:   1.  Device care.  Continue device checks q.3 months.   2.  Hypertension.  Blood pressure is well controlled.  Continue therapy with metoprolol and losartan.   3.  Coronary artery disease.  Continue aspirin, Lipitor, metoprolol and losartan.         JAMES VILLEDA MD             D: 10/23/2018   T: 10/23/2018   MT: RICHAR      Name:     SANDIE BERGER   MRN:      [id number]        Account:      [id number]   :      10/25/1931           Service Date: 10/23/2018      Document: [id number]

## 2018-10-25 DIAGNOSIS — F33.0 MAJOR DEPRESSIVE DISORDER, RECURRENT EPISODE, MILD (H): ICD-10-CM

## 2018-10-25 DIAGNOSIS — I10 ESSENTIAL HYPERTENSION WITH GOAL BLOOD PRESSURE LESS THAN 140/90: ICD-10-CM

## 2018-10-25 DIAGNOSIS — K21.9 GASTROESOPHAGEAL REFLUX DISEASE, ESOPHAGITIS PRESENCE NOT SPECIFIED: ICD-10-CM

## 2018-10-25 NOTE — TELEPHONE ENCOUNTER
"CITALOPRAM 20MG TAB        Last Written Prescription Date:  3/2/18  Last Fill Quantity: 90,   # refills: 1  Last Office Visit: 9/21/18  Future Office visit:    Next 5 appointments (look out 90 days)     Jan 08, 2019 10:15 AM CST   [facility]    [address] [address]   [phone]                   Requested Prescriptions   Pending Prescriptions Disp Refills     citalopram (CELEXA) 20 MG tablet [Pharmacy Med Name: CITALOPRAM 20MG TAB] 90 tablet 1     Sig: TAKE ONE TABLET BY MOUTH EVERY DAY    SSRIs Protocol Passed    10/25/2018  1:19 PM       Passed - PHQ-9 score less than 5 in past 6 months    Please review last PHQ-9 score.          Passed - Patient is age 18 or older       Passed - No active pregnancy on record       Passed - No positive pregnancy test in last 12 months       Passed - Recent (6 mo) or future (30 days) visit within the authorizing provider's specialty    Patient had office visit in the last 6 months or has a visit in the next 30 days with authorizing provider or within the authorizing provider's specialty.  See \"Patient Info\" tab in inbasket, or \"Choose Columns\" in Meds & Orders section of the refill encounter.              "

## 2018-10-25 NOTE — TELEPHONE ENCOUNTER
"LOSARTAN 25MG TAB        Last Written Prescription Date:  2/1/18  Last Fill Quantity: 90,   # refills: 2  Last Office Visit: 9/21/18  Future Office visit:    Next 5 appointments (look out 90 days)     Jan 08, 2019 10:15 AM CST   [facility] Visit with Cleve Null MD   [facility]    [address] [address]   [phone]                 ranitidine (ZANTAC) 150 MG tablet      Last Written Prescription Date:  10/24/17  Last Fill Quantity: 180,   # refills: 3  Last Office Visit: 9/21/18  Future Office visit:    Next 5 appointments (look out 90 days)     Jan 08, 2019 10:15 AM CST   Return Visit with Cleve Null MD   [facility]    [address] [address]   [phone]                   Requested Prescriptions   Pending Prescriptions Disp Refills     losartan (COZAAR) 25 MG tablet [Pharmacy Med Name: LOSARTAN 25MG   TAB] 90 tablet 2     Sig: TAKE 1 TABLET BY MOUTH ONCE DAILY    Angiotensin-II Receptors Failed    10/25/2018  8:34 AM       Failed - Normal serum creatinine on file in past 12 months    Recent Labs   Lab Test  08/28/17   1536   CR  0.90            Failed - Normal serum potassium on file in past 12 months    Recent Labs   Lab Test  08/28/17   1536   POTASSIUM  3.4                   Passed - Blood pressure under 140/90 in past 12 months    BP Readings from Last 3 Encounters:   10/23/18 115/63   09/21/18 132/73   09/13/18 163/63                Passed - Recent (12 mo) or future (30 days) visit within the authorizing provider's specialty    Patient had office visit in the last 12 months or has a visit in the next 30 days with authorizing provider or within the authorizing provider's specialty.  See \"Patient Info\" tab in inbasket, or \"Choose Columns\" in Meds & Orders section of the refill encounter.             Passed - Patient is age 18 or older       Passed - No active " "pregnancy on record       Passed - No positive pregnancy test in past 12 months        ranitidine (ZANTAC) 150 MG tablet [Pharmacy Med Name: RANITIDINE 150MG    TAB] 180 tablet 3     Sig: TAKE TWO TABLETS BY MOUTH AT BEDTIME    H2 Blockers Protocol Passed    10/25/2018  8:34 AM       Passed - Patient is age 12 or older       Passed - Recent (12 mo) or future (30 days) visit within the authorizing provider's specialty    Patient had office visit in the last 12 months or has a visit in the next 30 days with authorizing provider or within the authorizing provider's specialty.  See \"Patient Info\" tab in inbasket, or \"Choose Columns\" in Meds & Orders section of the refill encounter.                  Requested Prescriptions   Pending Prescriptions Disp Refills     losartan (COZAAR) 25 MG tablet [Pharmacy Med Name: LOSARTAN 25MG   TAB] 90 tablet 2     Sig: TAKE 1 TABLET BY MOUTH ONCE DAILY    Angiotensin-II Receptors Failed    10/25/2018  8:34 AM       Failed - Normal serum creatinine on file in past 12 months    Recent Labs   Lab Test  08/28/17   1536   CR  0.90            Failed - Normal serum potassium on file in past 12 months    Recent Labs   Lab Test  08/28/17   1536   POTASSIUM  3.4                   Passed - Blood pressure under 140/90 in past 12 months    BP Readings from Last 3 Encounters:   10/23/18 115/63   09/21/18 132/73   09/13/18 163/63                Passed - Recent (12 mo) or future (30 days) visit within the authorizing provider's specialty    Patient had office visit in the last 12 months or has a visit in the next 30 days with authorizing provider or within the authorizing provider's specialty.  See \"Patient Info\" tab in inbasket, or \"Choose Columns\" in Meds & Orders section of the refill encounter.             Passed - Patient is age 18 or older       Passed - No active pregnancy on record       Passed - No positive pregnancy test in past 12 months        ranitidine (ZANTAC) 150 MG tablet [Pharmacy " "Med Name: RANITIDINE 150MG    TAB] 180 tablet 3     Sig: TAKE TWO TABLETS BY MOUTH AT BEDTIME    H2 Blockers Protocol Passed    10/25/2018  8:34 AM       Passed - Patient is age 12 or older       Passed - Recent (12 mo) or future (30 days) visit within the authorizing provider's specialty    Patient had office visit in the last 12 months or has a visit in the next 30 days with authorizing provider or within the authorizing provider's specialty.  See \"Patient Info\" tab in inbasket, or \"Choose Columns\" in Meds & Orders section of the refill encounter.                "

## 2018-10-26 ENCOUNTER — OFFICE VISIT (OUTPATIENT)
Dept: ORTHOPEDICS | Facility: CLINIC | Age: 83
End: 2018-10-26
Payer: COMMERCIAL

## 2018-10-26 VITALS — DIASTOLIC BLOOD PRESSURE: 78 MMHG | SYSTOLIC BLOOD PRESSURE: 166 MMHG

## 2018-10-26 DIAGNOSIS — M16.12 PRIMARY OSTEOARTHRITIS OF LEFT HIP: Primary | ICD-10-CM

## 2018-10-26 PROCEDURE — 20611 DRAIN/INJ JOINT/BURSA W/US: CPT | Mod: LT | Performed by: FAMILY MEDICINE

## 2018-10-26 RX ORDER — BETAMETHASONE SODIUM PHOSPHATE AND BETAMETHASONE ACETATE 3; 3 MG/ML; MG/ML
6 INJECTION, SUSPENSION INTRA-ARTICULAR; INTRALESIONAL; INTRAMUSCULAR; SOFT TISSUE
Status: DISCONTINUED | OUTPATIENT
Start: 2018-10-26 | End: 2019-05-06

## 2018-10-26 RX ORDER — ROPIVACAINE HYDROCHLORIDE 5 MG/ML
2 INJECTION, SOLUTION EPIDURAL; INFILTRATION; PERINEURAL
Status: DISCONTINUED | OUTPATIENT
Start: 2018-10-26 | End: 2018-12-04

## 2018-10-26 RX ORDER — LOSARTAN POTASSIUM 25 MG/1
TABLET ORAL
Qty: 90 TABLET | Refills: 2 | Status: SHIPPED | OUTPATIENT
Start: 2018-10-26 | End: 2019-02-08

## 2018-10-26 RX ADMIN — BETAMETHASONE SODIUM PHOSPHATE AND BETAMETHASONE ACETATE 6 MG: 3; 3 INJECTION, SUSPENSION INTRA-ARTICULAR; INTRALESIONAL; INTRAMUSCULAR; SOFT TISSUE at 08:00

## 2018-10-26 RX ADMIN — ROPIVACAINE HYDROCHLORIDE 2 ML: 5 INJECTION, SOLUTION EPIDURAL; INFILTRATION; PERINEURAL at 08:00

## 2018-10-26 NOTE — PROGRESS NOTES
Large Joint Injection/Arthocentesis  Date/Time: 10/26/2018 8:00 AM  Performed by: VAIBHAV SEARS  Authorized by: VAIBHAV SEARS     Indications:  Pain and osteoarthritis  Needle Size:  20 G  Guidance: ultrasound    Approach:  Anterolateral  Location:  Hip  Site:  L hip joint  Medications:  6 mg betamethasone acet & sod phos 6 (3-3) MG/ML; 2 mL ropivacaine 5 MG/ML  Aspirate amount (mL):  7  Aspirate:  Serous and clear  Outcome:  Tolerated well, no immediate complications  Procedure discussed: discussed risks, benefits, and alternatives    Consent Given by:  Patient  Timeout: timeout called immediately prior to procedure    Prep: patient was prepped and draped in usual sterile fashion     Patient reported significant improvement of her pain after injection.  Aftercare instructions given to patient.  Plan to follow-up as previously discussed with referring provider.     Vaibhav Sears

## 2018-10-26 NOTE — LETTER
10/26/2018         RE: Christiana Sal  [address] [address]        Dear Colleague,    Thank you for referring your patient, Christiana Sal, to the [facility]. Please see a copy of my visit note below.    Large Joint Injection/Arthocentesis  Date/Time: 10/26/2018 8:00 AM  Performed by: VAIBHAV SEARS  Authorized by: VAIBHAV SEARS     Indications:  Pain and osteoarthritis  Needle Size:  20 G  Guidance: ultrasound    Approach:  Anterolateral  Location:  Hip  Site:  L hip joint  Medications:  6 mg betamethasone acet & sod phos 6 (3-3) MG/ML; 2 mL ropivacaine 5 MG/ML  Aspirate amount (mL):  7  Aspirate:  Serous and clear  Outcome:  Tolerated well, no immediate complications  Procedure discussed: discussed risks, benefits, and alternatives    Consent Given by:  Patient  Timeout: timeout called immediately prior to procedure    Prep: patient was prepped and draped in usual sterile fashion     Patient reported significant improvement of her pain after injection.  Aftercare instructions given to patient.  Plan to follow-up as previously discussed with referring provider.     Vaibhav Sears                Again, thank you for allowing me to participate in the care of your patient.        Sincerely,        Vaibhav Sears MD

## 2018-10-26 NOTE — MR AVS SNAPSHOT
After Visit Summary   10/26/2018    Christiana Sal    MRN: [id number]           Patient Information     Date Of Birth          10/25/1931        Visit Information        Provider Department      10/26/2018 8:00 AM Vaibhav Yeh MD [facility]        Today's Diagnoses     Primary osteoarthritis of left hip    -  1      Care Instructions    Patient to follow up with Primary Care provider regarding elevated blood pressure.    FS Injection Discharge Instructions      You may shower, however avoid swimming, tub baths or hot tubs for 24 hours following your procedure    You may have a mild to moderate increase in pain for several days following the injection.    It may take up to 14 days for the steroid medication to start working although you may feel the effect as early as a few days after the procedure.    You may use ice packs for 10-15 minutes, 3 to 4 times a day at the injection site for comfort    You may use anti-inflammatory medications (such as Ibuprofen or Aleve or Advil) or Tylenol for pain control if necessary    If you were fasting, you may resume your normal diet and medications after the procedure    If you have diabetes, check your blood sugar more frequently than usual as your blood sugar may be higher than normal for 10-14 days following a steroid injection. Contact your doctor who manages your diabetes if your blood sugar is higher than usual      If you experience any of the following, call [facility] @ [phone] or [phone]  -Fever over 100 degree F  -Swelling, bleeding, redness, drainage, warmth at the injection site  - New or worsening pain                Follow-ups after your visit        Follow-up notes from your care team     Return if symptoms worsen or fail to improve.      Your next 10 appointments already scheduled     Dec 04, 2018  8:35 AM CST   (Arrive by 8:20 AM)   [facility] Extremity with Jas Mendenhall PT   [facility] ([facility] [place])    [address] [place] [address]   [phone]            Jan 08, 2019 10:15 AM CST   Return Visit with Cleve Null MD   [facility]    [address]  [facility] [address]   [phone]            Feb 04, 2019  2:30 PM CST   Remote PPM Check with MONK TECH1   [facility] ([facility])    [address]  [place] [address] [phone] OPT [address]           This appointment is for a remote check of your pacemaker.  This is not an appointment at the office.              Who to contact     If you have questions or need follow up information about today's clinic visit or your schedule please contact [facility] directly at [phone].  Normal or non-critical lab and imaging results will be communicated to you by [employer], letter or phone within 4 business days after the clinic has received the results. If you do not hear from us within 7 days, please contact the clinic through [employer] or phone. If you have a critical or abnormal lab result, we will notify you by phone as soon as possible.  Submit refill requests through [employer] or call your pharmacy and they will forward the refill request to us. Please allow 3 business days for your refill to be completed.          Additional Information About Your Visit        [employer] Information     [employer] gives you secure access to your electronic health record. If you see a primary care provider, you can also send messages to your care team and make appointments. If you have questions, please call your primary care clinic.  If you do not have a primary care provider, please call [phone] and they will assist you.        Care EveryWhere ID     This is your Care EveryWhere ID. This could be used by other organizations to access your [facility] medical records  [id number]         Blood Pressure  from Last 3 Encounters:   10/26/18 166/78   10/23/18 115/63   09/21/18 132/73    Weight from Last 3 Encounters:   10/26/18 (P) 170 lb (77.1 kg)   10/23/18 170 lb (77.1 kg)   09/21/18 166 lb (75.3 kg)              Today, you had the following     No orders found for display       Primary Care Provider Office Phone # Fax #    Kelly Matthew PA-C [phone] [address] [address]        Equal Access to Services     [facility]: Hadii shailesh delong hadasho Sokbali, waaxda luqadaha, qaybta kaalmada polina, danya brian . So [facility] [phone].    ATENCIÓN: Si habla español, tiene a ozuna disposición servicios gratuitos de asistencia lingüística. [facility] [phone].    We comply with applicable federal civil rights laws and [place] laws. We do not discriminate on the basis of race, color, national origin, age, disability, sex, sexual orientation, or gender identity.            Thank you!     Thank you for choosing [facility]  for your care. Our goal is always to provide you with excellent care. Hearing back from our patients is one way we can continue to improve our services. Please take a few minutes to complete the written survey that you may receive in the mail after your visit with us. Thank you!             Your Updated Medication List - Protect others around you: Learn how to safely use, store and throw away your medicines at www.disposemymeds.org.          This list is accurate as of 10/26/18  8:29 AM.  Always use your most recent med list.                   Brand Name Dispense Instructions for use Diagnosis    acetaminophen 500 MG tablet    TYLENOL    30 tablet    Take 2 tablets (1,000 mg) by mouth every 8 hours as needed for mild pain        albuterol 108 (90 Base) MCG/ACT inhaler    PROAIR HFA/PROVENTIL HFA/VENTOLIN HFA    1 Inhaler    Inhale 2 puffs into the lungs every 6 hours as needed for shortness of  breath / dyspnea or wheezing    Acute bronchitis with symptoms > 10 days       aspirin 81 MG tablet      Take 2 tablets by mouth every evening. *.        atorvastatin 40 MG tablet    LIPITOR    90 tablet    TAKE ONE TABLET BY MOUTH EVERY DAY    CAD S/P percutaneous coronary angioplasty       CALCIUM 600 + D PO      1 tablet by mouth 2 times per day        citalopram 20 MG tablet    celeXA    90 tablet    Take 1 tablet (20 mg) by mouth daily    Major depressive disorder, recurrent episode, mild (H)       diclofenac 50 MG EC tablet    VOLTAREN    30 tablet    Take 1 tablet (50 mg) by mouth 3 times daily as needed for moderate pain    Primary osteoarthritis involving multiple joints       FISH OIL PO      1 daily        guaiFENesin-codeine 100-10 MG/5ML Soln solution    ROBITUSSIN AC    120 mL    Take 5 mLs by mouth every 4 hours as needed for cough    Viral upper respiratory tract infection with cough       levothyroxine 75 MCG tablet    SYNTHROID/LEVOTHROID    30 tablet    Take 1 tablet (75 mcg) by mouth daily (Needs lab work)    Hypothyroidism, unspecified type       losartan 25 MG tablet    COZAAR    90 tablet    TAKE ONE TABLET BY MOUTH ONCE DAILY    Essential hypertension with goal blood pressure less than 140/90       meclizine 25 MG tablet    ANTIVERT    30 tablet    TAKE ONE TABLET BY MOUTH EVERY 6 HOURS AS NEEDED FOR DIZZINESS    Vertigo       metoprolol succinate 25 MG 24 hr tablet    TOPROL-XL    90 tablet    Take 0.5 tablets (12.5 mg) by mouth daily    Essential hypertension with goal blood pressure less than 140/90       nitroGLYcerin 0.4 MG sublingual tablet    NITROSTAT    25 tablet    Place 1 tablet (0.4 mg) under the tongue every 5 minutes as needed for chest pain (call your doctor if you take a third dose)    Atherosclerosis of native coronary artery of native heart without angina pectoris       OCUVITE PO      Take 1 tablet by mouth daily.        ondansetron 4 MG tablet    ZOFRAN    18 tablet     Take 1 tablet (4 mg) by mouth every 6 hours as needed for nausea    Nausea       pantoprazole 40 MG EC tablet    PROTONIX    90 tablet    TAKE ONE TABLET BY MOUTH ONCE DAILY    Gastroesophageal reflux disease, esophagitis presence not specified       pramipexole 0.5 MG tablet    MIRAPEX    90 tablet    TAKE ONE TABLET BY MOUTH AT BEDTIME    Restless leg syndrome       ranitidine 150 MG tablet    ZANTAC    180 tablet    Take 2 tablets (300 mg) by mouth At Bedtime    Gastroesophageal reflux disease, esophagitis presence not specified

## 2018-10-26 NOTE — TELEPHONE ENCOUNTER
Routing refill request to provider for review/approval because:  Labs not current:    Kirsten Decker RN LL/HU

## 2018-10-27 ENCOUNTER — NURSE TRIAGE (OUTPATIENT)
Dept: NURSING | Facility: CLINIC | Age: 83
End: 2018-10-27

## 2018-10-27 NOTE — TELEPHONE ENCOUNTER
"Patient had injection today for her hip at the clinic, and now she is having increased pain just distal to the injection site but above her knee. Recommended ER, and patient said she \"will try\" to get there.  Jaimee Jimenez RN  [facility]      Reason for Disposition    [1] SEVERE pain (e.g., excruciating, unable to do any normal activities) AND [2] not improved after 2 hours of pain medicine    Additional Information    Negative: [1] Red area or streak AND [2] fever    Negative: [1] Swollen joint AND [2] fever    Negative: [1] Cast on leg or ankle AND [2] now increased pain    Negative: Patient sounds very sick or weak to the triager    Protocols used: LEG PAIN-ADULT-      "

## 2018-10-29 ENCOUNTER — NURSE TRIAGE (OUTPATIENT)
Dept: NURSING | Facility: CLINIC | Age: 83
End: 2018-10-29

## 2018-10-29 ENCOUNTER — TELEPHONE (OUTPATIENT)
Dept: ORTHOPEDICS | Facility: CLINIC | Age: 83
End: 2018-10-29

## 2018-10-29 NOTE — TELEPHONE ENCOUNTER
Reason for Call:  Other Knee pain    Detailed comments: Pt states after her hip injection her knee started to become painful and black and blue, wants to know if injection caused this? Please call    Phone Number Patient can be reached at: Home number on file [phone] (home)    Best Time: today    Can we leave a detailed message on this number? YES    Call taken on 10/29/2018 at 2:56 PM by Brigette Campbell

## 2018-10-29 NOTE — TELEPHONE ENCOUNTER
Routing refill request to provider for review/approval because:  Has been more than one year since mood addressed at office visit.    Caprice Miller RN

## 2018-10-30 ENCOUNTER — HOSPITAL ENCOUNTER (OUTPATIENT)
Dept: ULTRASOUND IMAGING | Facility: CLINIC | Age: 83
Discharge: HOME | End: 2018-10-30
Attending: FAMILY MEDICINE | Admitting: FAMILY MEDICINE
Payer: MEDICARE

## 2018-10-30 ENCOUNTER — OFFICE VISIT (OUTPATIENT)
Dept: ORTHOPEDICS | Facility: CLINIC | Age: 83
End: 2018-10-30
Payer: COMMERCIAL

## 2018-10-30 VITALS
HEIGHT: 66 IN | WEIGHT: 170 LBS | SYSTOLIC BLOOD PRESSURE: 130 MMHG | BODY MASS INDEX: 27.32 KG/M2 | DIASTOLIC BLOOD PRESSURE: 77 MMHG

## 2018-10-30 DIAGNOSIS — S80.12XA HEMATOMA OF LEFT LOWER EXTREMITY, INITIAL ENCOUNTER: ICD-10-CM

## 2018-10-30 DIAGNOSIS — M79.605 LEFT LEG PAIN: ICD-10-CM

## 2018-10-30 DIAGNOSIS — M79.89 SWELLING OF LEFT LOWER EXTREMITY: ICD-10-CM

## 2018-10-30 DIAGNOSIS — S80.12XA HEMATOMA OF LEFT LOWER EXTREMITY, INITIAL ENCOUNTER: Primary | ICD-10-CM

## 2018-10-30 PROCEDURE — 99214 OFFICE O/P EST MOD 30 MIN: CPT | Performed by: FAMILY MEDICINE

## 2018-10-30 PROCEDURE — 93926 LOWER EXTREMITY STUDY: CPT | Mod: LT

## 2018-10-30 RX ORDER — CITALOPRAM HYDROBROMIDE 20 MG/1
TABLET ORAL
Qty: 90 TABLET | Refills: 1 | Status: SHIPPED | OUTPATIENT
Start: 2018-10-30 | End: 2019-02-08

## 2018-10-30 NOTE — MR AVS SNAPSHOT
After Visit Summary   10/30/2018    Christiana Sal    MRN: [id number]           Patient Information     Date Of Birth          10/25/1931        Visit Information        Provider Department      10/30/2018 10:20 AM Ricardo Palafox DO [facility]        Today's Diagnoses     Hematoma of left lower extremity, initial encounter    -  1    Left leg pain        Swelling of left lower extremity           Follow-ups after your visit        Your next 10 appointments already scheduled     Dec 04, 2018  8:35 AM CST   (Arrive by 8:20 AM)   [facility] Extremity with Jas Mendenhall PT   [facility]    [address] [address]   [phone]            Jan 08, 2019 10:15 AM CST   [facility] with Cleve Null MD   [facility]    [address] [address]   [phone]            Feb 04, 2019  2:30 PM CST   Remote PPM Check with MONK TECH1   [facility] ([facility])    [address] [place] [address] [address]  [facility] [id number] [phone] OPT 2           This appointment is for a remote check of your pacemaker.  This is not an appointment at the office.              Future tests that were ordered for you today     Open Future Orders        Priority Expected Expires Ordered    US Lower Extremity Arterial Duplex Left STAT  10/30/2019 10/30/2018            Who to contact     If you have questions or need follow up information about today's clinic visit or your schedule please contact [facility] directly at [phone].  Normal or non-critical lab and imaging results will be communicated to you by MyChart, letter or phone within 4 business days after the clinic has received the results. If you do not hear from us within 7 days, please contact the clinic through MyChart or phone. If you  "have a critical or abnormal lab result, we will notify you by phone as soon as possible.  Submit refill requests through [employer] or call your pharmacy and they will forward the refill request to us. Please allow 3 business days for your refill to be completed.          Additional Information About Your Visit        [employer] Information     [employer] gives you secure access to your electronic health record. If you see a primary care provider, you can also send messages to your care team and make appointments. If you have questions, please call your primary care clinic.  If you do not have a primary care provider, please call [phone] and they will assist you.        Care EveryWhere ID     This is your Care EveryWhere ID. This could be used by other organizations to access your [facility] medical records  [id number]        Your Vitals Were     Height BMI (Body Mass Index)                5' 6\" (1.676 m) 27.44 kg/m2           Blood Pressure from Last 3 Encounters:   10/30/18 130/77   10/26/18 166/78   10/23/18 115/63    Weight from Last 3 Encounters:   10/30/18 170 lb (77.1 kg)   10/26/18 (P) 170 lb (77.1 kg)   10/23/18 170 lb (77.1 kg)               Primary Care Provider Office Phone # Fax #    Kelly Matthew PA-C [phone] [address] [facility] [address]        Equal Access to Services     [facility]: Hadii aad ku hadasho Soomaali, waaxda luqadaha, qaybta kaalmada adeegyada, danya brian . So [facility] [phone].    ATENCIÓN: Si habla español, tiene a ozuna disposición servicios gratuitos de asistencia lingüística. Llame al [phone].    We comply with applicable federal civil rights laws and [place] laws. We do not discriminate on the basis of race, color, national origin, age, disability, sex, sexual orientation, or gender identity.            Thank you!     Thank you for choosing [facility]  for your care. Our " goal is always to provide you with excellent care. Hearing back from our patients is one way we can continue to improve our services. Please take a few minutes to complete the written survey that you may receive in the mail after your visit with us. Thank you!             Your Updated Medication List - Protect others around you: Learn how to safely use, store and throw away your medicines at www.disposemymeds.org.          This list is accurate as of 10/30/18  1:38 PM.  Always use your most recent med list.                   Brand Name Dispense Instructions for use Diagnosis    acetaminophen 500 MG tablet    TYLENOL    30 tablet    Take 2 tablets (1,000 mg) by mouth every 8 hours as needed for mild pain        albuterol 108 (90 Base) MCG/ACT inhaler    PROAIR HFA/PROVENTIL HFA/VENTOLIN HFA    1 Inhaler    Inhale 2 puffs into the lungs every 6 hours as needed for shortness of breath / dyspnea or wheezing    Acute bronchitis with symptoms > 10 days       aspirin 81 MG tablet      Take 2 tablets by mouth every evening. *.        atorvastatin 40 MG tablet    LIPITOR    90 tablet    TAKE ONE TABLET BY MOUTH EVERY DAY    CAD S/P percutaneous coronary angioplasty       CALCIUM 600 + D PO      1 tablet by mouth 2 times per day        citalopram 20 MG tablet    celeXA    90 tablet    TAKE ONE TABLET BY MOUTH EVERY DAY    Major depressive disorder, recurrent episode, mild (H)       diclofenac 50 MG EC tablet    VOLTAREN    30 tablet    Take 1 tablet (50 mg) by mouth 3 times daily as needed for moderate pain    Primary osteoarthritis involving multiple joints       FISH OIL PO      1 daily        guaiFENesin-codeine 100-10 MG/5ML Soln solution    ROBITUSSIN AC    120 mL    Take 5 mLs by mouth every 4 hours as needed for cough    Viral upper respiratory tract infection with cough       levothyroxine 75 MCG tablet    SYNTHROID/LEVOTHROID    30 tablet    Take 1 tablet (75 mcg) by mouth daily (Needs lab work)    Hypothyroidism,  unspecified type       losartan 25 MG tablet    COZAAR    90 tablet    TAKE 1 TABLET BY MOUTH ONCE DAILY    Essential hypertension with goal blood pressure less than 140/90       meclizine 25 MG tablet    ANTIVERT    30 tablet    TAKE ONE TABLET BY MOUTH EVERY 6 HOURS AS NEEDED FOR DIZZINESS    Vertigo       metoprolol succinate 25 MG 24 hr tablet    TOPROL-XL    90 tablet    Take 0.5 tablets (12.5 mg) by mouth daily    Essential hypertension with goal blood pressure less than 140/90       nitroGLYcerin 0.4 MG sublingual tablet    NITROSTAT    25 tablet    Place 1 tablet (0.4 mg) under the tongue every 5 minutes as needed for chest pain (call your doctor if you take a third dose)    Atherosclerosis of native coronary artery of native heart without angina pectoris       OCUVITE PO      Take 1 tablet by mouth daily.        ondansetron 4 MG tablet    ZOFRAN    18 tablet    Take 1 tablet (4 mg) by mouth every 6 hours as needed for nausea    Nausea       pantoprazole 40 MG EC tablet    PROTONIX    90 tablet    TAKE ONE TABLET BY MOUTH ONCE DAILY    Gastroesophageal reflux disease, esophagitis presence not specified       pramipexole 0.5 MG tablet    MIRAPEX    90 tablet    TAKE ONE TABLET BY MOUTH AT BEDTIME    Restless leg syndrome       ranitidine 150 MG tablet    ZANTAC    180 tablet    TAKE TWO TABLETS BY MOUTH AT BEDTIME    Gastroesophageal reflux disease, esophagitis presence not specified

## 2018-10-30 NOTE — TELEPHONE ENCOUNTER
Pt reports having a hip injection on Friday.  Sunday the bruising began to spread down the leg and has become worse today.  Pain has decreased, denies swelling. Denies a fever.  Pt states she does not feel ill.      Disposition:  Call provider in the morning.  Writer will send a message.      Sarika Hartman RN/STEPHANIE    Reason for Disposition    [1] Not caused by an injury AND [2] < 5 unexplained bruises    Additional Information    Negative: Shock suspected (e.g., cold/pale/clammy skin, too weak to stand, low BP, rapid pulse)    Negative: Sounds like a life-threatening emergency to the triager    Negative: Bruises with fever    Negative: Tiny bruises (spots or dots) of unknown cause    Negative: Bruise(s) of forehead or head    Negative: Bruise(s) of face or jaw    Negative: Followed an injury, and triager doesn't know which injury guideline to use first    Post-operative bruising    Negative: Dizziness or lightheadedness    Negative: [1] Bruise on head/face, chest, or abdomen AND [2]  taking Coumadin (warfarin) or other strong blood thinner, or known bleeding disorder (e.g., thrombocytopenia)    Negative: Unexplained bleeding from another site (e.g., gums, nose, urine) as well    Negative: Patient sounds very sick or weak to the triager    Negative: [1] Not caused by an injury AND [2] 5 or more bruises now    Negative: [1] Raised bruise AND [2] size > 2 inches (5 cm) AND [3] expanding    Negative: [1] SEVERE pain AND [2] not improved 2 hours after pain medicine/ice packs    Negative: Suspicious history for the injury    Negative: Taking Coumadin (warfarin) or other strong blood thinner, or known bleeding disorder (e.g., thrombocytopenia)    Protocols used: BRUISES-ADULT-

## 2018-10-30 NOTE — LETTER
10/30/2018         RE: Christiana Sal  [address] [facility] [address]        Dear Colleague,    Thank you for referring your patient, Christiana Sal, to [facility] [place]. Please see a copy of my visit note below.    Christiana Sal  :  10/25/1931  DOS: 2018  MRN: [id number]    Sports Medicine Clinic Visit - Interim History 2018    PCP: Kelly Matthew    Social History: retired    Interim History - 2018  Since last visit on 10/26/2018 with Dr Yeh patient has moderate-severe left thigh and lower leg bruising following her intra-articular left hip injection.  Patient reports that she had moderate-severe left thigh and knee pain on 10/26 following her injection that has moderately improved over the weekend.  Significant bruising noted over distal medial thigh, anterior knee and lower leg.  Mild bruising over injection site.  Mild-moderate tenderness over the anterior knee.  No new injury in the interim.    Review of Systems  Skin: no bruising, no swelling  Musculoskeletal: as above  Neurologic: no numbness, paresthesias  Remainder of review of systems is negative including constitutional, CV, pulmonary, GI, except as noted in HPI or medical history.    Patient's current problem list, past medical and surgical history, and family history were reviewed.    Patient Active Problem List   Diagnosis     Coronary atherosclerosis of native coronary artery     Hyperlipidemia LDL goal <100     Essential hypertension, benign     Hypothyroidism     GERD (gastroesophageal reflux disease)     Family history of colon cancer     Hypertension goal BP (blood pressure) < 140/90     Major depressive disorder, recurrent episode, mild (H)     Advanced directives, counseling/discussion     Angina pectoris (H)     Vulvar pruritus     Lichen planus     JENIFER (obstructive sleep apnea)     CAD S/P percutaneous coronary  angioplasty     S/P cardiac pacemaker procedure     Sinoatrial node dysfunction (H)     Cardiac pacemaker - [employer] dual lead - Not Dependent     Insect bite     Primary osteoarthritis of left hip     Past Medical History:   Diagnosis Date     Coronary atherosclerosis of native coronary artery     s/p MI ([facility])     Essential hypertension, benign      GERD (gastroesophageal reflux disease)      History of transient ischemic attack (TIA)      Hyperlipidemia LDL goal <100      Hypothyroidism      Major depressive disorder, recurrent episode, mild (H) 2011     Peptic ulcer disease with hemorrhage     Remote history of stomach ulcer, requiring transfusion after chronic bleeding     Past Surgical History:   Procedure Laterality Date     APPENDECTOMY       CARDIAC CATHERIZATION  1/15/04    drug-eluding stent RCA, Dr. Pérez, [facility]     CARDIAC CATHERIZATION  12    diffuse mild/mod disease, no new stent     CATARACT IOL, RT/LT  12    RT, Dr. Wynne     CHOLECYSTECTOMY, OPEN       COLONOSCOPY      [facility]     FRACTURE TX, ANKLE RT/LT      LT, 2 screws remain     HC EXCISE HAND/FOOT NEUROMA      RT     HC LARYNGOSCOPY DIRECT W VOCAL CORD INJECTION      vocal cord polypectomy     HC REMOVAL OF OVARIAN CYST(S)       HC TRANSCATH STENT INIT VESSEL,PERCUT      x 2     REPAIR HAMMER TOE  9/13/10    multiple + RT great toe tendon release, Dr. Sanchez DPM     STENT  -    Cardiac stents 6 placed.     Family History   Problem Relation Age of Onset     C.A.D. Father      Hypertension Father      Myocardial Infarction Father 66      from MI     C.A.D. Brother      Myocardial Infarction Brother      Cancer Brother      skin ca     Breast Cancer Sister      Cancer - colorectal Sister      HEART DISEASE Sister      Neurologic Disorder Mother      migraine     C.A.D. Mother      Hypertension Mother      Heart Failure Mother      Thyroid Disease Son      cretinism      "Hypertension Son      HEART DISEASE Son      Psychotic Disorder Sister      Hypertension Sister      HEART DISEASE Sister      CABG     Hypertension Brother      C.A.D. Brother      Myocardial Infarction Brother      Arthritis Daughter      RA     Hypertension Sister      HEART DISEASE Sister      Cancer Sister      Blood Disease Sister      Neurologic Disorder Child      migraine (2 children)       Objective  /77  Ht 5' 6\" (1.676 m)  Wt 170 lb (77.1 kg)  BMI 27.44 kg/m2    GENERAL APPEARANCE: healthy, alert and no distress   GAIT: antalgic  SKIN: no suspicious lesions or rashes  HEENT: Sclera clear, anicteric  CV: good peripheral pulses  RESP: Breathing not labored  NEURO: Normal strength and tone, mentation intact and speech normal  PSYCH:  mentation appears normal and affect normal/bright    Left hip exam  Inspection: very small bruise over injection site.  significant over the mid and distal thigh medially, anteriorly over the knee and along the anterior and lateral shin      Tender:      Mild throughout upper leg, anterior knee and lower leg      Non Tender:      remainder of the hip area bilateral      ROM:     Range of motion limited by pain left, milder than previous      Strength:      flexion 4/5 left       abduction 5/5 bilateral       adduction 5/5 bilateral      Special Tests:             positive (+) FADIR left in groin, improved     Equal and intact distal pulses, no temperature differences in the feet or lower legs, palpable femoral pulses as well      Radiology  I visualized and reviewed these images with the patient  MR LUMBAR SPINE W/O CONTRAST 4/24/2018 12:05 PM  Provided History: R/o nerve root compression.; Health maintenance  examination; Lumbar pain; Radiculopathy; Weakness  ICD-10: Health maintenance examination; Lumbar pain; Radiculopathy;  Weakness  Comparison: Lumbar spine. No Radiograph 9/22/2017  Technique: Sagittal T1-weighted, sagittal STIR, 3D volumetric axial  and sagittal " reconstructed T2-weighted images of the lumbar spine were  obtained without intravenous contrast.   Findings: Regarding numbering convention, there are 5 lumbar-type  vertebrae assumed for the purposes of this dictation.  The tip of the  conus medullaris is at T12-L1. There is a 6 mm anterolisthesis of L2  on L3, 5 mm anterolisthesis of L3 on L4, and 3 mm retrolisthesis of L4  on L5. There is severe loss of disc height at L2-3 and L4-5. Inferior  endplate compression deformity at L2. Right scoliotic curvature of the  lumbar spine with apex at L2. Degenerative endplate and vertebral body  signal at L2-3, greater on the left.  On a level by level basis:  T12-L1: Mild bulging disc. No spinal canal or neuroforaminal stenosis.  L1-2: Moderate circumferential disc bulge. Moderate narrowing of the  spinal canal and mild-to-moderate bilateral neuroforaminal stenosis.  In addition, there is mild to moderate narrowing of the lateral  recesses bilaterally, with disc material contacting, but not clearly  impinging upon the descending L2 nerve roots bilaterally.  L2-3: Left central disc extrusion which abuts the descending left L3  nerve root in the lateral recess. In addition, a bulging disc contacts  the extraforaminal left L2 nerve root with moderate to severe left  neuroforaminal stenosis. Right foraminal disc protrusion with moderate  right neuroforaminal stenosis. Moderate to severe spinal canal  narrowing.  L3-4: Mild to moderate disc bulge which abuts the bilateral L3 nerve  roots. Moderate bilateral neuroforaminal stenosis. Moderate to severe  spinal canal narrowing. Bilateral facet arthropathy. Moderate left and  mild/moderate right narrowing of the lateral recesses, possibly  impinging upon the left descending L4 nerve root.   L4-5: Mild to moderate disc bulge with right foraminal disc extrusion  which abuts the right L4 nerve root. Moderate to severe right neural  foraminal stenosis and mild left neural foraminal  stenosis. Moderate  spinal canal narrowing, with mild left greater than right lateral  recess narrowing, with disc material contacting the descending left L5  nerve root. Bilateral facet arthropathy.  L5-S1: Disc protrusion extending from the left central zone into the  right neural foramen, mildly narrowing the right lateral recess, with  disc material contacting, but not clearly impinging upon the  descending right S1 nerve root. Moderate right and mild left neural  foraminal stenosis. Mild spinal canal narrowing. Bilateral facet  arthropathy.  Atrophy of the paraspinous and psoas musculature, right more than  left. 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  Impression:   1.  Moderate lateral recess narrowing with possible impingement upon  the descending left L3 nerve root at L2-3 and the descending left L4  nerve root at L3-4.  2.  Moderate to severe left neural foraminal narrowing at L2-3 and on  the right at L3-4 and L4-5.      LEFT HIP TWO TO THREE VIEWS   9/22/2017 8:44 AM   HISTORY: Evaluate arthritis. Pain in left leg.  COMPARISON: None.  IMPRESSION: Severe bilateral osteoarthritis of both hips is present.  There is no evidence for fracture or dislocation. Degenerative changes  also noted in spine.      LUMBAR SPINE 2 VIEWS  9/22/2017 8:24 AM   HISTORY: Pain in left leg  COMPARISON: None.  IMPRESSION : Multilevel degenerative change L2-S1 with disc space  narrowing at these levels and anterior L34 spondylolisthesis. There is  posterior facet degenerative change and lumbar scoliosis convex right.  No vertebral compression or acute appearing fractures. There is  bilateral medial hip joint space narrowing.    KNEE STANDING AP BILATERAL SUNRISE BILATERAL LATERAL LEFT 9/22/2017  8:24 AM  HISTORY: Pain in left leg.  COMPARISON: None.  IMPRESSION:   Right knee; On standing view there is joint space narrowing of the  right lateral compartment. Medial compartment is maintained.  Left  knee; Both medial and lateral compartment joint spaces are  normal. No evidence for suprapatellar effusion on the left. There is  mild spurring at the lateral patellofemoral joint on the left.  Bilateral knees;  No acute-appearing abnormality, worrisome focal bone  lesion.    Assessment:  1. Hematoma of left lower extremity, initial encounter    2. Left leg pain    3. Swelling of left lower extremity      Plan:  Plan for f/u next week if pain is labile or worsens  Discussed sx of concern to go to the ED  Overall her progression of sx is reassuring, worst pain was in the proximal/anterior thigh on the day of the injection  In the 3 days since, her pain has improved  She now believes her hip pain, which she had the injection for, is improved compared to before the injection, so it seems the injection was well targeted  Her rather impressive bruising is still of some concern, though her vascular exam today was reassuring  I performed a cursory bedside US and saw the femoral artery functioning well, and no clear large hematomas present  She did have some increased fluid in her vastus lateralis  I suspect a smaller vessel may have been damaged with the procedure  She could also have saccular aneurysms present  A diagnostic US was ordered today for further evaluation of her vasculature  She could also have a coagulopathy, though her previous bloodwork does not suggest this.  Consider CBC based on ongoing clinical progress and results of the ultrasound  She will keep in close contact with us regarding her progress  Home handouts provided and supportive care reviewed  All questions were answered today  Contact us with additional questions or concerns  Signs and sx of concern reviewed      Ricardo Palafox DO, SAMUEL  Primary Care Sports Medicine  [facility]       Time spent in one-on-one evaluation and discussion with patient regarding nature of problem, course, prior treatments, and therapeutic options, at  least 50% of which was spent in counseling and coordination of care: 33 minutes      Again, thank you for allowing me to participate in the care of your patient.        Sincerely,        Ricardo Palafox, DO

## 2018-10-30 NOTE — PROGRESS NOTES
Christiana Sal  :  10/25/1931  DOS: 2018  MRN: [id number]    Sports Medicine Clinic Visit - Interim History 2018    PCP: Kelly Matthew    Social History: retired    Interim History - 2018  Since last visit on 10/26/2018 with Dr Yeh patient has moderate-severe left thigh and lower leg bruising following her intra-articular left hip injection.  Patient reports that she had moderate-severe left thigh and knee pain on 10/26 following her injection that has moderately improved over the weekend.  Significant bruising noted over distal medial thigh, anterior knee and lower leg.  Mild bruising over injection site.  Mild-moderate tenderness over the anterior knee.  No new injury in the interim.    Review of Systems  Skin: no bruising, no swelling  Musculoskeletal: as above  Neurologic: no numbness, paresthesias  Remainder of review of systems is negative including constitutional, CV, pulmonary, GI, except as noted in HPI or medical history.    Patient's current problem list, past medical and surgical history, and family history were reviewed.    Patient Active Problem List   Diagnosis     Coronary atherosclerosis of native coronary artery     Hyperlipidemia LDL goal <100     Essential hypertension, benign     Hypothyroidism     GERD (gastroesophageal reflux disease)     Family history of colon cancer     Hypertension goal BP (blood pressure) < 140/90     Major depressive disorder, recurrent episode, mild (H)     Advanced directives, counseling/discussion     Angina pectoris (H)     Vulvar pruritus     Lichen planus     JENIFER (obstructive sleep apnea)     CAD S/P percutaneous coronary angioplasty     S/P cardiac pacemaker procedure     Sinoatrial node dysfunction (H)     Cardiac pacemaker - [employer] [employer] dual lead - Not Dependent     Insect bite     Primary osteoarthritis of left hip     Past Medical History:   Diagnosis Date     Coronary atherosclerosis of  native coronary artery     s/p MI ([facility])     Essential hypertension, benign      GERD (gastroesophageal reflux disease)      History of transient ischemic attack (TIA)      Hyperlipidemia LDL goal <100      Hypothyroidism      Major depressive disorder, recurrent episode, mild (H) 2011     Peptic ulcer disease with hemorrhage     Remote history of stomach ulcer, requiring transfusion after chronic bleeding     Past Surgical History:   Procedure Laterality Date     APPENDECTOMY       CARDIAC CATHERIZATION  1/15/04    drug-eluding stent RCA, Dr. Pérez, [facility]     CARDIAC CATHERIZATION  12    diffuse mild/mod disease, no new stent     CATARACT IOL, RT/LT  12    RT, Dr. Wynne     CHOLECYSTECTOMY, OPEN       COLONOSCOPY      [facility]     FRACTURE TX, ANKLE RT/LT      LT, 2 screws remain     HC EXCISE HAND/FOOT NEUROMA      RT     HC LARYNGOSCOPY DIRECT W VOCAL CORD INJECTION      vocal cord polypectomy     HC REMOVAL OF OVARIAN CYST(S)       HC TRANSCATH STENT INIT VESSEL,PERCUT      x 2     REPAIR HAMMER TOE  9/13/10    multiple + RT great toe tendon release, Dr. Sanchez DPM     STENT  -    Cardiac stents 6 placed.     Family History   Problem Relation Age of Onset     C.A.D. Father      Hypertension Father      Myocardial Infarction Father 66      from MI     C.A.D. Brother      Myocardial Infarction Brother      Cancer Brother      skin ca     Breast Cancer Sister      Cancer - colorectal Sister      HEART DISEASE Sister      Neurologic Disorder Mother      migraine     C.A.D. Mother      Hypertension Mother      Heart Failure Mother      Thyroid Disease Son      cretinism     Hypertension Son      HEART DISEASE Son      Psychotic Disorder Sister      Hypertension Sister      HEART DISEASE Sister      CABG     Hypertension Brother      C.A.D. Brother      Myocardial Infarction Brother      Arthritis Daughter      RA     Hypertension Sister      HEART DISEASE  "Sister      Cancer Sister      Blood Disease Sister      Neurologic Disorder Child      migraine (2 children)       Objective  /77  Ht 5' 6\" (1.676 m)  Wt 170 lb (77.1 kg)  BMI 27.44 kg/m2    GENERAL APPEARANCE: healthy, alert and no distress   GAIT: antalgic  SKIN: no suspicious lesions or rashes  HEENT: Sclera clear, anicteric  CV: good peripheral pulses  RESP: Breathing not labored  NEURO: Normal strength and tone, mentation intact and speech normal  PSYCH:  mentation appears normal and affect normal/bright    Left hip exam  Inspection: very small bruise over injection site.  significant over the mid and distal thigh medially, anteriorly over the knee and along the anterior and lateral shin      Tender:      Mild throughout upper leg, anterior knee and lower leg      Non Tender:      remainder of the hip area bilateral      ROM:     Range of motion limited by pain left, milder than previous      Strength:      flexion 4/5 left       abduction 5/5 bilateral       adduction 5/5 bilateral      Special Tests:             positive (+) FADIR left in groin, improved     Equal and intact distal pulses, no temperature differences in the feet or lower legs, palpable femoral pulses as well      Radiology  I visualized and reviewed these images with the patient  MR LUMBAR SPINE W/O CONTRAST 4/24/2018 12:05 PM  Provided History: R/o nerve root compression.; Health maintenance  examination; Lumbar pain; Radiculopathy; Weakness  ICD-10: Health maintenance examination; Lumbar pain; Radiculopathy;  Weakness  Comparison: Lumbar spine. No Radiograph 9/22/2017  Technique: Sagittal T1-weighted, sagittal STIR, 3D volumetric axial  and sagittal reconstructed T2-weighted images of the lumbar spine were  obtained without intravenous contrast.   Findings: Regarding numbering convention, there are 5 lumbar-type  vertebrae assumed for the purposes of this dictation.  The tip of the  conus medullaris is at T12-L1. There is a 6 mm " anterolisthesis of L2  on L3, 5 mm anterolisthesis of L3 on L4, and 3 mm retrolisthesis of L4  on L5. There is severe loss of disc height at L2-3 and L4-5. Inferior  endplate compression deformity at L2. Right scoliotic curvature of the  lumbar spine with apex at L2. Degenerative endplate and vertebral body  signal at L2-3, greater on the left.  On a level by level basis:  T12-L1: Mild bulging disc. No spinal canal or neuroforaminal stenosis.  L1-2: Moderate circumferential disc bulge. Moderate narrowing of the  spinal canal and mild-to-moderate bilateral neuroforaminal stenosis.  In addition, there is mild to moderate narrowing of the lateral  recesses bilaterally, with disc material contacting, but not clearly  impinging upon the descending L2 nerve roots bilaterally.  L2-3: Left central disc extrusion which abuts the descending left L3  nerve root in the lateral recess. In addition, a bulging disc contacts  the extraforaminal left L2 nerve root with moderate to severe left  neuroforaminal stenosis. Right foraminal disc protrusion with moderate  right neuroforaminal stenosis. Moderate to severe spinal canal  narrowing.  L3-4: Mild to moderate disc bulge which abuts the bilateral L3 nerve  roots. Moderate bilateral neuroforaminal stenosis. Moderate to severe  spinal canal narrowing. Bilateral facet arthropathy. Moderate left and  mild/moderate right narrowing of the lateral recesses, possibly  impinging upon the left descending L4 nerve root.   L4-5: Mild to moderate disc bulge with right foraminal disc extrusion  which abuts the right L4 nerve root. Moderate to severe right neural  foraminal stenosis and mild left neural foraminal stenosis. Moderate  spinal canal narrowing, with mild left greater than right lateral  recess narrowing, with disc material contacting the descending left L5  nerve root. Bilateral facet arthropathy.  L5-S1: Disc protrusion extending from the left central zone into the  right neural  foramen, mildly narrowing the right lateral recess, with  disc material contacting, but not clearly impinging upon the  descending right S1 nerve root. Moderate right and mild left neural  foraminal stenosis. Mild spinal canal narrowing. Bilateral facet  arthropathy.  Atrophy of the paraspinous and psoas musculature, right more than  left. 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  Impression:   1.  Moderate lateral recess narrowing with possible impingement upon  the descending left L3 nerve root at L2-3 and the descending left L4  nerve root at L3-4.  2.  Moderate to severe left neural foraminal narrowing at L2-3 and on  the right at L3-4 and L4-5.      LEFT HIP TWO TO THREE VIEWS   9/22/2017 8:44 AM   HISTORY: Evaluate arthritis. Pain in left leg.  COMPARISON: None.  IMPRESSION: Severe bilateral osteoarthritis of both hips is present.  There is no evidence for fracture or dislocation. Degenerative changes  also noted in spine.      LUMBAR SPINE 2 VIEWS  9/22/2017 8:24 AM   HISTORY: Pain in left leg  COMPARISON: None.  IMPRESSION : Multilevel degenerative change L2-S1 with disc space  narrowing at these levels and anterior L34 spondylolisthesis. There is  posterior facet degenerative change and lumbar scoliosis convex right.  No vertebral compression or acute appearing fractures. There is  bilateral medial hip joint space narrowing.    KNEE STANDING AP BILATERAL SUNRISE BILATERAL LATERAL LEFT 9/22/2017  8:24 AM  HISTORY: Pain in left leg.  COMPARISON: None.  IMPRESSION:   Right knee; On standing view there is joint space narrowing of the  right lateral compartment. Medial compartment is maintained.  Left knee; Both medial and lateral compartment joint spaces are  normal. No evidence for suprapatellar effusion on the left. There is  mild spurring at the lateral patellofemoral joint on the left.  Bilateral knees;  No acute-appearing abnormality, worrisome focal  bone  lesion.    Assessment:  1. Hematoma of left lower extremity, initial encounter    2. Left leg pain    3. Swelling of left lower extremity      Plan:  Plan for f/u next week if pain is labile or worsens  Discussed sx of concern to go to the ED  Overall her progression of sx is reassuring, worst pain was in the proximal/anterior thigh on the day of the injection  In the 3 days since, her pain has improved  She now believes her hip pain, which she had the injection for, is improved compared to before the injection, so it seems the injection was well targeted  Her rather impressive bruising is still of some concern, though her vascular exam today was reassuring  I performed a cursory bedside US and saw the femoral artery functioning well, and no clear large hematomas present  She did have some increased fluid in her vastus lateralis  I suspect a smaller vessel may have been damaged with the procedure  She could also have saccular aneurysms present  A diagnostic US was ordered today for further evaluation of her vasculature  She could also have a coagulopathy, though her previous bloodwork does not suggest this.  Consider CBC based on ongoing clinical progress and results of the ultrasound  She will keep in close contact with us regarding her progress  Home handouts provided and supportive care reviewed  All questions were answered today  Contact us with additional questions or concerns  Signs and sx of concern reviewed      Ricardo Palafox DO, CAQ  Primary Care Sports Medicine  [facility]       Time spent in one-on-one evaluation and discussion with patient regarding nature of problem, course, prior treatments, and therapeutic options, at least 50% of which was spent in counseling and coordination of care: 33 minutes

## 2018-10-30 NOTE — TELEPHONE ENCOUNTER
Pt reports having a hip injection on Friday.  Friday afternoon/evening she started to have increased pain.  Saturday she felt better.  Sunday she developed bruising that has spread down into her calf.  Pain has decreased.  Denies fever, swelling, ill feeling.      Pt calling back regarding the message below.  Please see and advise.     Sarika Hartman RN/FNA

## 2018-11-01 ENCOUNTER — TELEPHONE (OUTPATIENT)
Dept: ORTHOPEDICS | Facility: CLINIC | Age: 83
End: 2018-11-01

## 2018-11-01 NOTE — TELEPHONE ENCOUNTER
Reason for Call:  Request for results:    Name of test or procedure: US    Date of test of procedure: 10/30    Location of the test or procedure: [place]    OK to leave the result message on voice mail or with a family member? YES    Phone number Patient can be reached at:  Home number on file [phone] (home)    Additional comments: pt calling stating she would like to get results from US    Call taken on 11/1/2018 at 8:46 AM by Leeanne Madison

## 2018-11-06 ENCOUNTER — OFFICE VISIT (OUTPATIENT)
Dept: ORTHOPEDICS | Facility: CLINIC | Age: 83
End: 2018-11-06
Payer: COMMERCIAL

## 2018-11-06 VITALS
WEIGHT: 171 LBS | SYSTOLIC BLOOD PRESSURE: 147 MMHG | DIASTOLIC BLOOD PRESSURE: 82 MMHG | BODY MASS INDEX: 27.48 KG/M2 | HEIGHT: 66 IN

## 2018-11-06 DIAGNOSIS — S80.12XD HEMATOMA OF LEFT LOWER EXTREMITY, SUBSEQUENT ENCOUNTER: Primary | ICD-10-CM

## 2018-11-06 DIAGNOSIS — M16.12 PRIMARY OSTEOARTHRITIS OF LEFT HIP: ICD-10-CM

## 2018-11-06 DIAGNOSIS — M79.89 SWELLING OF LEFT LOWER EXTREMITY: ICD-10-CM

## 2018-11-06 DIAGNOSIS — M79.605 LEFT LEG PAIN: ICD-10-CM

## 2018-11-06 PROCEDURE — 99213 OFFICE O/P EST LOW 20 MIN: CPT | Performed by: FAMILY MEDICINE

## 2018-11-06 NOTE — PROGRESS NOTES
Christiana Sal  :  10/25/1931  DOS: 2018  MRN: [id number]    Sports Medicine Clinic Visit - Interim History 2018    PCP: Kelly Matthew    Social History: retired    Interim History - 2018  Since last visit on 10/26/2018 with Dr Yeh patient has moderate-severe left thigh and lower leg bruising following her intra-articular left hip injection.  Patient reports that she had moderate-severe left thigh and knee pain on 10/26 following her injection that has moderately improved over the weekend.  Significant bruising noted over distal medial thigh, anterior knee and lower leg.  Mild bruising over injection site.  Mild-moderate tenderness over the anterior knee.  No new injury in the interim.    Interim History - 2018  Since last visit on 2018 patient has moderate left thigh pain.  Notes that bruising continues to improve, although thigh discomfort remains with walking.  Left hip intra-articular injection completed on 10/26 provided good relief of hip pain.  No new injury in the interim.    Review of Systems  Skin: no bruising, no swelling  Musculoskeletal: as above  Neurologic: no numbness, paresthesias  Remainder of review of systems is negative including constitutional, CV, pulmonary, GI, except as noted in HPI or medical history.    Patient's current problem list, past medical and surgical history, and family history were reviewed.    Patient Active Problem List   Diagnosis     Coronary atherosclerosis of native coronary artery     Hyperlipidemia LDL goal <100     Essential hypertension, benign     Hypothyroidism     GERD (gastroesophageal reflux disease)     Family history of colon cancer     Hypertension goal BP (blood pressure) < 140/90     Major depressive disorder, recurrent episode, mild (H)     Advanced directives, counseling/discussion     Angina pectoris (H)     Vulvar pruritus     Lichen planus     JENIFER (obstructive sleep apnea)      CAD S/P percutaneous coronary angioplasty     S/P cardiac pacemaker procedure     Sinoatrial node dysfunction (H)     Cardiac pacemaker - [employer] dual lead - Not Dependent     Insect bite     Primary osteoarthritis of left hip     Past Medical History:   Diagnosis Date     Coronary atherosclerosis of native coronary artery     s/p MI ([facility])     Essential hypertension, benign      GERD (gastroesophageal reflux disease)      History of transient ischemic attack (TIA)      Hyperlipidemia LDL goal <100      Hypothyroidism      Major depressive disorder, recurrent episode, mild (H) 2011     Peptic ulcer disease with hemorrhage     Remote history of stomach ulcer, requiring transfusion after chronic bleeding     Past Surgical History:   Procedure Laterality Date     APPENDECTOMY       CARDIAC CATHERIZATION  1/15/04    drug-eluding stent RCA, Dr. Pérez, [facility]     CARDIAC CATHERIZATION  12    diffuse mild/mod disease, no new stent     CATARACT IOL, RT/LT  12    RT, Dr. Wynne     CHOLECYSTECTOMY, OPEN       COLONOSCOPY      [facility]     FRACTURE TX, ANKLE RT/LT      LT, 2 screws remain     HC EXCISE HAND/FOOT NEUROMA      RT     HC LARYNGOSCOPY DIRECT W VOCAL CORD INJECTION      vocal cord polypectomy     HC REMOVAL OF OVARIAN CYST(S)       HC TRANSCATH STENT INIT VESSEL,PERCUT      x 2     REPAIR HAMMER TOE  9/13/10    multiple + RT great toe tendon release, Dr. Sanchez DPM     STENT  -    Cardiac stents 6 placed.     Family History   Problem Relation Age of Onset     C.A.D. Father      Hypertension Father      Myocardial Infarction Father 66      from MI     C.A.D. Brother      Myocardial Infarction Brother      Cancer Brother      skin ca     Breast Cancer Sister      Cancer - colorectal Sister      HEART DISEASE Sister      Neurologic Disorder Mother      migraine     C.A.D. Mother      Hypertension Mother      Heart Failure Mother      Thyroid Disease  "Son      cretinism     Hypertension Son      HEART DISEASE Son      Psychotic Disorder Sister      Hypertension Sister      HEART DISEASE Sister      CABG     Hypertension Brother      C.A.D. Brother      Myocardial Infarction Brother      Arthritis Daughter      RA     Hypertension Sister      HEART DISEASE Sister      Cancer Sister      Blood Disease Sister      Neurologic Disorder Child      migraine (2 children)       Objective  /82  Ht 5' 6\" (1.676 m)  Wt 171 lb (77.6 kg)  BMI 27.6 kg/m2    GENERAL APPEARANCE: healthy, alert and no distress   GAIT: antalgic  SKIN: no suspicious lesions or rashes  HEENT: Sclera clear, anicteric  CV: good peripheral pulses  RESP: Breathing not labored  NEURO: Normal strength and tone, mentation intact and speech normal  PSYCH:  mentation appears normal and affect normal/bright    Left hip exam  Inspection: very small bruise over injection site.  significant over the mid and distal thigh medially, anteriorly over the knee and along the anterior and lateral shin, improving overall, but still significant      Tender: mild/moderate throughout upper thigh anteriorly, anterior knee and lower leg improving      Non Tender:      remainder of the hip area bilateral      ROM:     Range of motion limited by pain left, milder than previous      Strength:      flexion 4/5 left       abduction 5/5 bilateral       adduction 5/5 bilateral      Special Tests:             positive (+) FADIR left in groin, improved     Equal and intact distal pulses, no temperature differences in the feet or lower legs, palpable femoral pulses as well      Radiology  I visualized and reviewed these images with the patient  Recent Results (from the past 744 hour(s))   US Lower Extremity Arterial Duplex Left    Narrative    PROCEDURE:  Arterial duplex ultrasound examination of the left lower extremity    DATE OF PROCEDURE:  10/30/2018 1:00 PM    CLINICAL HISTORY/INDICATION:   87-year-old female with left " lower extremity hematoma    COMPARISON:  None relevant    TECHNIQUE:   Grayscale, color-flow, and spectral waveform analysis with velocities  were performed of the arteries of the left lower extremity    FINDINGS: Patent iliac, common femoral, profunda, superficial femoral,  and popliteal arteries on the left without evidence of focal stenosis.  The tibial trifurcation and tibial vessels are patent without  significant stenosis. No vascular abnormality or active bleeding seen  in the area of bruising of the left lower extremity.      Impression    IMPRESSION:   1.  Patent left lower extremity arteries without evidence of stenosis.  2.  No active extravasation or vascular abnormality in the area of  lower extremity bruising.    ROXI BERNARDO MD       MR LUMBAR SPINE W/O CONTRAST 4/24/2018 12:05 PM  Provided History: R/o nerve root compression.; Health maintenance  examination; Lumbar pain; Radiculopathy; Weakness  ICD-10: Health maintenance examination; Lumbar pain; Radiculopathy;  Weakness  Comparison: Lumbar spine. No Radiograph 9/22/2017  Technique: Sagittal T1-weighted, sagittal STIR, 3D volumetric axial  and sagittal reconstructed T2-weighted images of the lumbar spine were  obtained without intravenous contrast.   Findings: Regarding numbering convention, there are 5 lumbar-type  vertebrae assumed for the purposes of this dictation.  The tip of the  conus medullaris is at T12-L1. There is a 6 mm anterolisthesis of L2  on L3, 5 mm anterolisthesis of L3 on L4, and 3 mm retrolisthesis of L4  on L5. There is severe loss of disc height at L2-3 and L4-5. Inferior  endplate compression deformity at L2. Right scoliotic curvature of the  lumbar spine with apex at L2. Degenerative endplate and vertebral body  signal at L2-3, greater on the left.  On a level by level basis:  T12-L1: Mild bulging disc. No spinal canal or neuroforaminal stenosis.  L1-2: Moderate circumferential disc bulge. Moderate narrowing of  the  spinal canal and mild-to-moderate bilateral neuroforaminal stenosis.  In addition, there is mild to moderate narrowing of the lateral  recesses bilaterally, with disc material contacting, but not clearly  impinging upon the descending L2 nerve roots bilaterally.  L2-3: Left central disc extrusion which abuts the descending left L3  nerve root in the lateral recess. In addition, a bulging disc contacts  the extraforaminal left L2 nerve root with moderate to severe left  neuroforaminal stenosis. Right foraminal disc protrusion with moderate  right neuroforaminal stenosis. Moderate to severe spinal canal  narrowing.  L3-4: Mild to moderate disc bulge which abuts the bilateral L3 nerve  roots. Moderate bilateral neuroforaminal stenosis. Moderate to severe  spinal canal narrowing. Bilateral facet arthropathy. Moderate left and  mild/moderate right narrowing of the lateral recesses, possibly  impinging upon the left descending L4 nerve root.   L4-5: Mild to moderate disc bulge with right foraminal disc extrusion  which abuts the right L4 nerve root. Moderate to severe right neural  foraminal stenosis and mild left neural foraminal stenosis. Moderate  spinal canal narrowing, with mild left greater than right lateral  recess narrowing, with disc material contacting the descending left L5  nerve root. Bilateral facet arthropathy.  L5-S1: Disc protrusion extending from the left central zone into the  right neural foramen, mildly narrowing the right lateral recess, with  disc material contacting, but not clearly impinging upon the  descending right S1 nerve root. Moderate right and mild left neural  foraminal stenosis. Mild spinal canal narrowing. Bilateral facet  arthropathy.  Atrophy of the paraspinous and psoas musculature, right more than  left. 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  Impression:   1.  Moderate lateral recess narrowing with possible impingement upon  the descending left  L3 nerve root at L2-3 and the descending left L4  nerve root at L3-4.  2.  Moderate to severe left neural foraminal narrowing at L2-3 and on  the right at L3-4 and L4-5.      LEFT HIP TWO TO THREE VIEWS   9/22/2017 8:44 AM   HISTORY: Evaluate arthritis. Pain in left leg.  COMPARISON: None.  IMPRESSION: Severe bilateral osteoarthritis of both hips is present.  There is no evidence for fracture or dislocation. Degenerative changes  also noted in spine.      LUMBAR SPINE 2 VIEWS  9/22/2017 8:24 AM   HISTORY: Pain in left leg  COMPARISON: None.  IMPRESSION : Multilevel degenerative change L2-S1 with disc space  narrowing at these levels and anterior L34 spondylolisthesis. There is  posterior facet degenerative change and lumbar scoliosis convex right.  No vertebral compression or acute appearing fractures. There is  bilateral medial hip joint space narrowing.    KNEE STANDING AP BILATERAL SUNRISE BILATERAL LATERAL LEFT 9/22/2017  8:24 AM  HISTORY: Pain in left leg.  COMPARISON: None.  IMPRESSION:   Right knee; On standing view there is joint space narrowing of the  right lateral compartment. Medial compartment is maintained.  Left knee; Both medial and lateral compartment joint spaces are  normal. No evidence for suprapatellar effusion on the left. There is  mild spurring at the lateral patellofemoral joint on the left.  Bilateral knees;  No acute-appearing abnormality, worrisome focal bone  lesion.    Assessment:  1. Hematoma of left lower extremity, subsequent encounter    2. Left leg pain    3. Swelling of left lower extremity    4. Primary osteoarthritis of left hip           Plan:  Plan for f/u next week if pain is labile or worsens, otherwise heading out of state to visit children  Consider further testing if systemic sx or increased local sx  Still significant, largely intramuscular hematoma seen on bedside US in quadriceps  Superficial bruising still significant but improving overall  Clinically she is still  sore with walking but feels better over time  Discussed sx of concern to go to the ED  Hip joint pain improved since injection  Again today her vascular exam today is reassuring  Diagnostic US results reviewed today, reassuring  Consider CBC based on ongoing clinical progress  Otherwise continued watchful waiting for now  I gave her my cell phone number to use if needed on her upcoming trip  She will keep in close contact with us regarding her progress while away  Use of compression and supportive care reviewed  All questions were answered today  Contact us with additional questions or concerns  Signs and sx of concern reviewed      Ricardo Palafox DO, CAQ  Primary Care Sports Medicine  [facility]

## 2018-11-06 NOTE — MR AVS SNAPSHOT
After Visit Summary   11/6/2018    Christiana Sal    MRN: [id number]           Patient Information     Date Of Birth          10/25/1931        Visit Information        Provider Department      11/6/2018 1:20 PM Ricardo Palafox DO [facility]        Today's Diagnoses     Hematoma of left lower extremity, subsequent encounter    -  1    Left leg pain        Swelling of left lower extremity        Primary osteoarthritis of left hip          Care Instructions    Patient to follow up with Primary Care provider regarding elevated blood pressure.          Follow-ups after your visit        Your next 10 appointments already scheduled     Dec 04, 2018  8:35 AM CST   (Arrive by 8:20 AM)   [facility] Extremity with Jas Mendenhall PT   [facility]    [address]  [facility] [address] [phone]            Jan 08, 2019 10:15 AM CST   [facility] Cleve Null MD   [facility]    [address] [facility] [address] [phone]            Feb 04, 2019  2:30 PM CST   Remote PPM Check with MONK TECH1   [facility] ([facility])    [address] [place] [address] [address]  [facility] [id number] [phone] OPT 2           This appointment is for a remote check of your pacemaker.  This is not an appointment at the office.              Who to contact     If you have questions or need follow up information about today's clinic visit or your schedule please contact [facility] directly at [phone].  Normal or non-critical lab and imaging results will be communicated to you by MyChart, letter or phone within 4 business days after the clinic has received the results. If you do not hear from us within 7 days, please contact the clinic through MyChart or phone. If you have a critical or abnormal lab  "result, we will notify you by phone as soon as possible.  Submit refill requests through [employer] or call your pharmacy and they will forward the refill request to us. Please allow 3 business days for your refill to be completed.          Additional Information About Your Visit        [employer] Information     [employer] gives you secure access to your electronic health record. If you see a primary care provider, you can also send messages to your care team and make appointments. If you have questions, please call your primary care clinic.  If you do not have a primary care provider, please call [phone] and they will assist you.        Care EveryWhere ID     This is your Care EveryWhere ID. This could be used by other organizations to access your [facility] medical records  [id number]        Your Vitals Were     Height BMI (Body Mass Index)                5' 6\" (1.676 m) 27.6 kg/m2           Blood Pressure from Last 3 Encounters:   11/06/18 147/82   10/30/18 130/77   10/26/18 166/78    Weight from Last 3 Encounters:   11/06/18 171 lb (77.6 kg)   10/30/18 170 lb (77.1 kg)   10/26/18 (P) 170 lb (77.1 kg)              Today, you had the following     No orders found for display       Primary Care Provider Office Phone # Fax #    Kelly Matthew PA-C [phone] [address] [facility] [address]        Equal Access to Services     [facility]: Hadii aad ku hadasho Soomaali, waaxda luqadaha, qaybta kaalmada adeegyada, danya brian . So [facility] [phone].    ATENCIÓN: Si habla español, tiene a ozuna disposición servicios gratuitos de asistencia lingüística. Moisés al [phone].    We comply with applicable federal civil rights laws and [place] laws. We do not discriminate on the basis of race, color, national origin, age, disability, sex, sexual orientation, or gender identity.            Thank you!     Thank you for choosing [facility]  for your care. Our goal is always to provide you with excellent care. Hearing back from our patients is one way we can continue to improve our services. Please take a few minutes to complete the written survey that you may receive in the mail after your visit with us. Thank you!             Your Updated Medication List - Protect others around you: Learn how to safely use, store and throw away your medicines at www.disposemymeds.org.          This list is accurate as of 11/6/18 11:59 PM.  Always use your most recent med list.                   Brand Name Dispense Instructions for use Diagnosis    acetaminophen 500 MG tablet    TYLENOL    30 tablet    Take 2 tablets (1,000 mg) by mouth every 8 hours as needed for mild pain        albuterol 108 (90 Base) MCG/ACT inhaler    PROAIR HFA/PROVENTIL HFA/VENTOLIN HFA    1 Inhaler    Inhale 2 puffs into the lungs every 6 hours as needed for shortness of breath / dyspnea or wheezing    Acute bronchitis with symptoms > 10 days       aspirin 81 MG tablet      Take 2 tablets by mouth every evening. *.        atorvastatin 40 MG tablet    LIPITOR    90 tablet    TAKE ONE TABLET BY MOUTH EVERY DAY    CAD S/P percutaneous coronary angioplasty       CALCIUM 600 + D PO      1 tablet by mouth 2 times per day        citalopram 20 MG tablet    celeXA    90 tablet    TAKE ONE TABLET BY MOUTH EVERY DAY    Major depressive disorder, recurrent episode, mild (H)       diclofenac 50 MG EC tablet    VOLTAREN    30 tablet    Take 1 tablet (50 mg) by mouth 3 times daily as needed for moderate pain    Primary osteoarthritis involving multiple joints       FISH OIL PO      1 daily        guaiFENesin-codeine 100-10 MG/5ML Soln solution    ROBITUSSIN AC    120 mL    Take 5 mLs by mouth every 4 hours as needed for cough    Viral upper respiratory tract infection with cough       levothyroxine 75 MCG tablet    SYNTHROID/LEVOTHROID    30 tablet    Take 1 tablet (75 mcg) by mouth daily (Needs  lab work)    Hypothyroidism, unspecified type       losartan 25 MG tablet    COZAAR    90 tablet    TAKE 1 TABLET BY MOUTH ONCE DAILY    Essential hypertension with goal blood pressure less than 140/90       meclizine 25 MG tablet    ANTIVERT    30 tablet    TAKE ONE TABLET BY MOUTH EVERY 6 HOURS AS NEEDED FOR DIZZINESS    Vertigo       metoprolol succinate 25 MG 24 hr tablet    TOPROL-XL    90 tablet    Take 0.5 tablets (12.5 mg) by mouth daily    Essential hypertension with goal blood pressure less than 140/90       nitroGLYcerin 0.4 MG sublingual tablet    NITROSTAT    25 tablet    Place 1 tablet (0.4 mg) under the tongue every 5 minutes as needed for chest pain (call your doctor if you take a third dose)    Atherosclerosis of native coronary artery of native heart without angina pectoris       OCUVITE PO      Take 1 tablet by mouth daily.        ondansetron 4 MG tablet    ZOFRAN    18 tablet    Take 1 tablet (4 mg) by mouth every 6 hours as needed for nausea    Nausea       pantoprazole 40 MG EC tablet    PROTONIX    90 tablet    TAKE ONE TABLET BY MOUTH ONCE DAILY    Gastroesophageal reflux disease, esophagitis presence not specified       pramipexole 0.5 MG tablet    MIRAPEX    90 tablet    TAKE ONE TABLET BY MOUTH AT BEDTIME    Restless leg syndrome       ranitidine 150 MG tablet    ZANTAC    180 tablet    TAKE TWO TABLETS BY MOUTH AT BEDTIME    Gastroesophageal reflux disease, esophagitis presence not specified

## 2018-11-06 NOTE — LETTER
2018         RE: Christiana Sal  [id number] [facility] [id number]        Dear Colleague,    Thank you for referring your patient, Christiana Sal, to the [facility]. Please see a copy of my visit note below.    Christiana Sal  :  10/25/1931  DOS: 2018  MRN: [id number]    Sports Medicine Clinic Visit - Interim History 2018    PCP: Kelly Matthew    Social History: retired    Interim History - 2018  Since last visit on 10/26/2018 with Dr Yeh patient has moderate-severe left thigh and lower leg bruising following her intra-articular left hip injection.  Patient reports that she had moderate-severe left thigh and knee pain on 10/26 following her injection that has moderately improved over the weekend.  Significant bruising noted over distal medial thigh, anterior knee and lower leg.  Mild bruising over injection site.  Mild-moderate tenderness over the anterior knee.  No new injury in the interim.    Interim History - 2018  Since last visit on 2018 patient has moderate left thigh pain.  Notes that bruising continues to improve, although thigh discomfort remains with walking.  Left hip intra-articular injection completed on 10/26 provided good relief of hip pain.  No new injury in the interim.    Review of Systems  Skin: no bruising, no swelling  Musculoskeletal: as above  Neurologic: no numbness, paresthesias  Remainder of review of systems is negative including constitutional, CV, pulmonary, GI, except as noted in HPI or medical history.    Patient's current problem list, past medical and surgical history, and family history were reviewed.    Patient Active Problem List   Diagnosis     Coronary atherosclerosis of native coronary artery     Hyperlipidemia LDL goal <100     Essential hypertension, benign     Hypothyroidism     GERD (gastroesophageal reflux disease)      Family history of colon cancer     Hypertension goal BP (blood pressure) < 140/90     Major depressive disorder, recurrent episode, mild (H)     Advanced directives, counseling/discussion     Angina pectoris (H)     Vulvar pruritus     Lichen planus     JENIFER (obstructive sleep apnea)     CAD S/P percutaneous coronary angioplasty     S/P cardiac pacemaker procedure     Sinoatrial node dysfunction (H)     Cardiac pacemaker - [employer] dual lead - Not Dependent     Insect bite     Primary osteoarthritis of left hip     Past Medical History:   Diagnosis Date     Coronary atherosclerosis of native coronary artery     s/p MI ([facility])     Essential hypertension, benign      GERD (gastroesophageal reflux disease)      History of transient ischemic attack (TIA)      Hyperlipidemia LDL goal <100      Hypothyroidism      Major depressive disorder, recurrent episode, mild (H) 2011     Peptic ulcer disease with hemorrhage     Remote history of stomach ulcer, requiring transfusion after chronic bleeding     Past Surgical History:   Procedure Laterality Date     APPENDECTOMY       CARDIAC CATHERIZATION  1/15/04    drug-eluding stent RCA, Dr. Pérez, [facility]     CARDIAC CATHERIZATION  12    diffuse mild/mod disease, no new stent     CATARACT IOL, RT/LT  12    RT, Dr. Wynne     CHOLECYSTECTOMY, OPEN       COLONOSCOPY      hospitals     FRACTURE TX, ANKLE RT/LT      LT, 2 screws remain     HC EXCISE HAND/FOOT NEUROMA      RT     HC LARYNGOSCOPY DIRECT W VOCAL CORD INJECTION      vocal cord polypectomy     HC REMOVAL OF OVARIAN CYST(S)       HC TRANSCATH STENT INIT VESSEL,PERCUT      x 2     REPAIR HAMMER TOE  9/13/10    multiple + RT great toe tendon release, Dr. Sanchez DPM     STENT  -    Cardiac stents 6 placed.     Family History   Problem Relation Age of Onset     C.A.D. Father      Hypertension Father      Myocardial Infarction Father 66      from MI     C.A.D. Brother   "    Myocardial Infarction Brother      Cancer Brother      skin ca     Breast Cancer Sister      Cancer - colorectal Sister      HEART DISEASE Sister      Neurologic Disorder Mother      migraine     C.A.D. Mother      Hypertension Mother      Heart Failure Mother      Thyroid Disease Son      cretinism     Hypertension Son      HEART DISEASE Son      Psychotic Disorder Sister      Hypertension Sister      HEART DISEASE Sister      CABG     Hypertension Brother      C.A.D. Brother      Myocardial Infarction Brother      Arthritis Daughter      RA     Hypertension Sister      HEART DISEASE Sister      Cancer Sister      Blood Disease Sister      Neurologic Disorder Child      migraine (2 children)       Objective  /82  Ht 5' 6\" (1.676 m)  Wt 171 lb (77.6 kg)  BMI 27.6 kg/m2    GENERAL APPEARANCE: healthy, alert and no distress   GAIT: antalgic  SKIN: no suspicious lesions or rashes  HEENT: Sclera clear, anicteric  CV: good peripheral pulses  RESP: Breathing not labored  NEURO: Normal strength and tone, mentation intact and speech normal  PSYCH:  mentation appears normal and affect normal/bright    Left hip exam  Inspection: very small bruise over injection site.  significant over the mid and distal thigh medially, anteriorly over the knee and along the anterior and lateral shin, improving overall, but still significant      Tender: mild/moderate throughout upper thigh anteriorly, anterior knee and lower leg improving      Non Tender:      remainder of the hip area bilateral      ROM:     Range of motion limited by pain left, milder than previous      Strength:      flexion 4/5 left       abduction 5/5 bilateral       adduction 5/5 bilateral      Special Tests:             positive (+) FADIR left in groin, improved     Equal and intact distal pulses, no temperature differences in the feet or lower legs, palpable femoral pulses as well      Radiology  I visualized and reviewed these images with the " patient  Recent Results (from the past 744 hour(s))   US Lower Extremity Arterial Duplex Left    Narrative    PROCEDURE:  Arterial duplex ultrasound examination of the left lower extremity    DATE OF PROCEDURE:  10/30/2018 1:00 PM    CLINICAL HISTORY/INDICATION:   87-year-old female with left lower extremity hematoma    COMPARISON:  None relevant    TECHNIQUE:   Grayscale, color-flow, and spectral waveform analysis with velocities  were performed of the arteries of the left lower extremity    FINDINGS: Patent iliac, common femoral, profunda, superficial femoral,  and popliteal arteries on the left without evidence of focal stenosis.  The tibial trifurcation and tibial vessels are patent without  significant stenosis. No vascular abnormality or active bleeding seen  in the area of bruising of the left lower extremity.      Impression    IMPRESSION:   1.  Patent left lower extremity arteries without evidence of stenosis.  2.  No active extravasation or vascular abnormality in the area of  lower extremity bruising.    ROXI BERNARDO MD       MR LUMBAR SPINE W/O CONTRAST 4/24/2018 12:05 PM  Provided History: R/o nerve root compression.; Health maintenance  examination; Lumbar pain; Radiculopathy; Weakness  ICD-10: Health maintenance examination; Lumbar pain; Radiculopathy;  Weakness  Comparison: Lumbar spine. No Radiograph 9/22/2017  Technique: Sagittal T1-weighted, sagittal STIR, 3D volumetric axial  and sagittal reconstructed T2-weighted images of the lumbar spine were  obtained without intravenous contrast.   Findings: Regarding numbering convention, there are 5 lumbar-type  vertebrae assumed for the purposes of this dictation.  The tip of the  conus medullaris is at T12-L1. There is a 6 mm anterolisthesis of L2  on L3, 5 mm anterolisthesis of L3 on L4, and 3 mm retrolisthesis of L4  on L5. There is severe loss of disc height at L2-3 and L4-5. Inferior  endplate compression deformity at L2. Right scoliotic curvature  of the  lumbar spine with apex at L2. Degenerative endplate and vertebral body  signal at L2-3, greater on the left.  On a level by level basis:  T12-L1: Mild bulging disc. No spinal canal or neuroforaminal stenosis.  L1-2: Moderate circumferential disc bulge. Moderate narrowing of the  spinal canal and mild-to-moderate bilateral neuroforaminal stenosis.  In addition, there is mild to moderate narrowing of the lateral  recesses bilaterally, with disc material contacting, but not clearly  impinging upon the descending L2 nerve roots bilaterally.  L2-3: Left central disc extrusion which abuts the descending left L3  nerve root in the lateral recess. In addition, a bulging disc contacts  the extraforaminal left L2 nerve root with moderate to severe left  neuroforaminal stenosis. Right foraminal disc protrusion with moderate  right neuroforaminal stenosis. Moderate to severe spinal canal  narrowing.  L3-4: Mild to moderate disc bulge which abuts the bilateral L3 nerve  roots. Moderate bilateral neuroforaminal stenosis. Moderate to severe  spinal canal narrowing. Bilateral facet arthropathy. Moderate left and  mild/moderate right narrowing of the lateral recesses, possibly  impinging upon the left descending L4 nerve root.   L4-5: Mild to moderate disc bulge with right foraminal disc extrusion  which abuts the right L4 nerve root. Moderate to severe right neural  foraminal stenosis and mild left neural foraminal stenosis. Moderate  spinal canal narrowing, with mild left greater than right lateral  recess narrowing, with disc material contacting the descending left L5  nerve root. Bilateral facet arthropathy.  L5-S1: Disc protrusion extending from the left central zone into the  right neural foramen, mildly narrowing the right lateral recess, with  disc material contacting, but not clearly impinging upon the  descending right S1 nerve root. Moderate right and mild left neural  foraminal stenosis. Mild spinal canal  narrowing. Bilateral facet  arthropathy.  Atrophy of the paraspinous and psoas musculature, right more than  left. 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  Impression:   1.  Moderate lateral recess narrowing with possible impingement upon  the descending left L3 nerve root at L2-3 and the descending left L4  nerve root at L3-4.  2.  Moderate to severe left neural foraminal narrowing at L2-3 and on  the right at L3-4 and L4-5.      LEFT HIP TWO TO THREE VIEWS   9/22/2017 8:44 AM   HISTORY: Evaluate arthritis. Pain in left leg.  COMPARISON: None.  IMPRESSION: Severe bilateral osteoarthritis of both hips is present.  There is no evidence for fracture or dislocation. Degenerative changes  also noted in spine.      LUMBAR SPINE 2 VIEWS  9/22/2017 8:24 AM   HISTORY: Pain in left leg  COMPARISON: None.  IMPRESSION : Multilevel degenerative change L2-S1 with disc space  narrowing at these levels and anterior L34 spondylolisthesis. There is  posterior facet degenerative change and lumbar scoliosis convex right.  No vertebral compression or acute appearing fractures. There is  bilateral medial hip joint space narrowing.    KNEE STANDING AP BILATERAL SUNRISE BILATERAL LATERAL LEFT 9/22/2017  8:24 AM  HISTORY: Pain in left leg.  COMPARISON: None.  IMPRESSION:   Right knee; On standing view there is joint space narrowing of the  right lateral compartment. Medial compartment is maintained.  Left knee; Both medial and lateral compartment joint spaces are  normal. No evidence for suprapatellar effusion on the left. There is  mild spurring at the lateral patellofemoral joint on the left.  Bilateral knees;  No acute-appearing abnormality, worrisome focal bone  lesion.    Assessment:  1. Hematoma of left lower extremity, subsequent encounter    2. Left leg pain    3. Swelling of left lower extremity    4. Primary osteoarthritis of left hip           Plan:  Plan for f/u next week if pain is labile or  worsens, otherwise heading out of state to visit children  Consider further testing if systemic sx or increased local sx  Still significant, largely intramuscular hematoma seen on bedside US in quadriceps  Superficial bruising still significant but improving overall  Clinically she is still sore with walking but feels better over time  Discussed sx of concern to go to the ED  Hip joint pain improved since injection  Again today her vascular exam today is reassuring  Diagnostic US results reviewed today, reassuring  Consider CBC based on ongoing clinical progress  Otherwise continued watchful waiting for now  I gave her my cell phone number to use if needed on her upcoming trip  She will keep in close contact with us regarding her progress while away  Use of compression and supportive care reviewed  All questions were answered today  Contact us with additional questions or concerns  Signs and sx of concern reviewed      Ricardo Palafox DO, CAQ  Primary Care Sports Medicine  [facility]         Again, thank you for allowing me to participate in the care of your patient.        Sincerely,        Ricardo Palafox DO

## 2018-11-28 ENCOUNTER — APPOINTMENT (OUTPATIENT)
Dept: CT IMAGING | Facility: CLINIC | Age: 83
End: 2018-11-28
Attending: EMERGENCY MEDICINE
Payer: MEDICARE

## 2018-11-28 ENCOUNTER — HOSPITAL ENCOUNTER (EMERGENCY)
Facility: CLINIC | Age: 83
Discharge: HOME | End: 2018-11-28
Attending: EMERGENCY MEDICINE | Admitting: EMERGENCY MEDICINE
Payer: MEDICARE

## 2018-11-28 ENCOUNTER — APPOINTMENT (OUTPATIENT)
Dept: ULTRASOUND IMAGING | Facility: CLINIC | Age: 83
End: 2018-11-28
Attending: EMERGENCY MEDICINE
Payer: MEDICARE

## 2018-11-28 ENCOUNTER — TELEPHONE (OUTPATIENT)
Dept: ORTHOPEDICS | Facility: CLINIC | Age: 83
End: 2018-11-28

## 2018-11-28 VITALS
DIASTOLIC BLOOD PRESSURE: 79 MMHG | RESPIRATION RATE: 23 BRPM | OXYGEN SATURATION: 98 % | TEMPERATURE: 98.2 F | SYSTOLIC BLOOD PRESSURE: 172 MMHG | HEART RATE: 70 BPM

## 2018-11-28 DIAGNOSIS — J98.4 PNEUMONITIS: ICD-10-CM

## 2018-11-28 DIAGNOSIS — I10 ESSENTIAL HYPERTENSION, BENIGN: Primary | ICD-10-CM

## 2018-11-28 DIAGNOSIS — E03.9 HYPOTHYROIDISM, UNSPECIFIED TYPE: ICD-10-CM

## 2018-11-28 LAB
ALBUMIN SERPL-MCNC: 3.5 G/DL (ref 3.4–5)
ALP SERPL-CCNC: 99 U/L (ref 40–150)
ALT SERPL W P-5'-P-CCNC: 18 U/L (ref 0–50)
ANION GAP SERPL CALCULATED.3IONS-SCNC: 7 MMOL/L (ref 3–14)
AST SERPL W P-5'-P-CCNC: 24 U/L (ref 0–45)
BASOPHILS # BLD AUTO: 0.1 10E9/L (ref 0–0.2)
BASOPHILS NFR BLD AUTO: 0.8 %
BILIRUB SERPL-MCNC: 0.6 MG/DL (ref 0.2–1.3)
BUN SERPL-MCNC: 15 MG/DL (ref 7–30)
CALCIUM SERPL-MCNC: 8.3 MG/DL (ref 8.5–10.1)
CHLORIDE SERPL-SCNC: 108 MMOL/L (ref 94–109)
CO2 SERPL-SCNC: 26 MMOL/L (ref 20–32)
CREAT SERPL-MCNC: 0.83 MG/DL (ref 0.52–1.04)
D DIMER PPP FEU-MCNC: 1.5 UG/ML FEU (ref 0–0.5)
DIFFERENTIAL METHOD BLD: NORMAL
EOSINOPHIL # BLD AUTO: 0.3 10E9/L (ref 0–0.7)
EOSINOPHIL NFR BLD AUTO: 4.5 %
ERYTHROCYTE [DISTWIDTH] IN BLOOD BY AUTOMATED COUNT: 13.8 % (ref 10–15)
GFR SERPL CREATININE-BSD FRML MDRD: 65 ML/MIN/1.7M2
GLUCOSE SERPL-MCNC: 82 MG/DL (ref 70–99)
HCT VFR BLD AUTO: 37.7 % (ref 35–47)
HGB BLD-MCNC: 12.7 G/DL (ref 11.7–15.7)
IMM GRANULOCYTES # BLD: 0 10E9/L (ref 0–0.4)
IMM GRANULOCYTES NFR BLD: 0.2 %
LYMPHOCYTES # BLD AUTO: 1.2 10E9/L (ref 0.8–5.3)
LYMPHOCYTES NFR BLD AUTO: 17.7 %
MCH RBC QN AUTO: 31.4 PG (ref 26.5–33)
MCHC RBC AUTO-ENTMCNC: 33.7 G/DL (ref 31.5–36.5)
MCV RBC AUTO: 93 FL (ref 78–100)
MONOCYTES # BLD AUTO: 0.9 10E9/L (ref 0–1.3)
MONOCYTES NFR BLD AUTO: 13.5 %
NEUTROPHILS # BLD AUTO: 4.2 10E9/L (ref 1.6–8.3)
NEUTROPHILS NFR BLD AUTO: 63.3 %
NRBC # BLD AUTO: 0 10*3/UL
NRBC BLD AUTO-RTO: 0 /100
NT-PROBNP SERPL-MCNC: 1048 PG/ML (ref 0–1800)
PLATELET # BLD AUTO: 265 10E9/L (ref 150–450)
POTASSIUM SERPL-SCNC: 3.9 MMOL/L (ref 3.4–5.3)
PROT SERPL-MCNC: 6.5 G/DL (ref 6.8–8.8)
RBC # BLD AUTO: 4.04 10E12/L (ref 3.8–5.2)
SODIUM SERPL-SCNC: 141 MMOL/L (ref 133–144)
TROPONIN I SERPL-MCNC: <0.015 UG/L (ref 0–0.04)
WBC # BLD AUTO: 6.6 10E9/L (ref 4–11)

## 2018-11-28 PROCEDURE — 25000125 ZZHC RX 250: Performed by: EMERGENCY MEDICINE

## 2018-11-28 PROCEDURE — 80053 COMPREHEN METABOLIC PANEL: CPT | Performed by: EMERGENCY MEDICINE

## 2018-11-28 PROCEDURE — 85025 COMPLETE CBC W/AUTO DIFF WBC: CPT | Performed by: EMERGENCY MEDICINE

## 2018-11-28 PROCEDURE — 83880 ASSAY OF NATRIURETIC PEPTIDE: CPT | Performed by: EMERGENCY MEDICINE

## 2018-11-28 PROCEDURE — 71260 CT THORAX DX C+: CPT

## 2018-11-28 PROCEDURE — 25000128 H RX IP 250 OP 636: Performed by: EMERGENCY MEDICINE

## 2018-11-28 PROCEDURE — 99285 EMERGENCY DEPT VISIT HI MDM: CPT | Mod: 25 | Performed by: EMERGENCY MEDICINE

## 2018-11-28 PROCEDURE — 84484 ASSAY OF TROPONIN QUANT: CPT | Performed by: EMERGENCY MEDICINE

## 2018-11-28 PROCEDURE — 93010 ELECTROCARDIOGRAM REPORT: CPT | Mod: Z6 | Performed by: EMERGENCY MEDICINE

## 2018-11-28 PROCEDURE — 25000132 ZZH RX MED GY IP 250 OP 250 PS 637: Performed by: EMERGENCY MEDICINE

## 2018-11-28 PROCEDURE — 93005 ELECTROCARDIOGRAM TRACING: CPT | Performed by: EMERGENCY MEDICINE

## 2018-11-28 PROCEDURE — A9270 NON-COVERED ITEM OR SERVICE: HCPCS | Performed by: EMERGENCY MEDICINE

## 2018-11-28 PROCEDURE — 93971 EXTREMITY STUDY: CPT | Mod: LT

## 2018-11-28 PROCEDURE — 85379 FIBRIN DEGRADATION QUANT: CPT | Performed by: EMERGENCY MEDICINE

## 2018-11-28 RX ORDER — PREDNISONE 20 MG/1
TABLET ORAL
Qty: 10 TABLET | Refills: 0 | Status: ON HOLD | OUTPATIENT
Start: 2018-11-28 | End: 2018-12-04

## 2018-11-28 RX ORDER — AZITHROMYCIN 250 MG/1
TABLET, FILM COATED ORAL
Qty: 6 TABLET | Refills: 0 | Status: ON HOLD | OUTPATIENT
Start: 2018-11-28 | End: 2018-12-04

## 2018-11-28 RX ORDER — IOPAMIDOL 755 MG/ML
75 INJECTION, SOLUTION INTRAVASCULAR ONCE
Status: COMPLETED | OUTPATIENT
Start: 2018-11-28 | End: 2018-11-28

## 2018-11-28 RX ORDER — ACETAMINOPHEN 500 MG
1000 TABLET ORAL ONCE
Status: COMPLETED | OUTPATIENT
Start: 2018-11-28 | End: 2018-11-28

## 2018-11-28 RX ADMIN — IOPAMIDOL 75 ML: 755 INJECTION, SOLUTION INTRAVENOUS at 10:52

## 2018-11-28 RX ADMIN — SODIUM CHLORIDE 100 ML: 9 INJECTION, SOLUTION INTRAVENOUS at 10:52

## 2018-11-28 RX ADMIN — ACETAMINOPHEN 1000 MG: 500 TABLET ORAL at 10:41

## 2018-11-28 NOTE — ED NOTES
Bed: IN01  Expected date:   Expected time:   Means of arrival:   Comments:  86yo  MRN:  [id number]:  Christiana Sal    Notification from Sports/Medicine    10/26/18 had hip injection.  Seen the following week and had hematoma draining down leg.      Called this AM feeling weak/tired.  ?hemoglobin drop vs. Other illness.    Arterial US in early November with no active bleeding.      To do:  eval and treat.

## 2018-11-28 NOTE — TELEPHONE ENCOUNTER
Received call this morning from Christiana, reporting feelings of fatigue and weakness.  I asked her to keep me updated on her symptoms after her hip injection and resulting large hematoma.  Clinically she had been doing well, improving over time, and her arterial ultrasound after the injection showed no active bleeding.  Besides counseling her on local lower extremity sx of concern, we also discussed several times systemic sx of concern related to blood loss.  I asked to follow up with me when she returned home from her trip [place].  I had been in contact with her and her family while she was away.       I advised her, even though her sx may not be related to her recent event in our clinic, that she should have blood work and evaluation right away, and I advised her to go to the emergency room for this workup.  She agreed.  I then called the emergency room in [place] and spoke with Dr Miles to give him some context before her arrival.  I will continue to follow her situation closely.

## 2018-11-28 NOTE — ED PROVIDER NOTES
History     Chief Complaint   Patient presents with     Generalized Weakness     HPI  Christiana Sal is a 87 year old female who presents complaining of feeling lightheaded when up and about, feels faint at times, associated shortness of air and dyspnea on exertion.  Symptoms been ongoing for the past week.  She was visiting relatives over the holidays, flew back home yesterday.  Intermittent shortness of air, 2 days ago had minimally productive cough which has since quieted.  Denies associated fever.  Describes 2-3 fleeting episodes of substernal tightness lasting just seconds, has history of GERD.  Left quadriceps hematoma recently after hip injection.  Left lower extremity arterial duplex ultrasound 10/30 unremarkable.    Problem List:    Patient Active Problem List    Diagnosis Date Noted     Primary osteoarthritis of left hip 09/21/2018     Priority: Medium     Insect bite 06/28/2017     Priority: Medium     Cardiac pacemaker - [employer] dual lead - Not Dependent 03/02/2017     Priority: Medium     Sinoatrial node dysfunction (H) 11/10/2016     Priority: Medium     S/P cardiac pacemaker procedure 11/09/2016     Priority: Medium     CAD S/P percutaneous coronary angioplasty 05/11/2016     Priority: Medium     JENIFER (obstructive sleep apnea) 11/17/2014     Priority: Medium     Lichen planus 11/19/2013     Priority: Medium     Vulvar pruritus 11/12/2013     Priority: Medium     Angina pectoris (H) 02/08/2012     Priority: Medium     Advanced directives, counseling/discussion 06/16/2011     Priority: Medium     Patient will bring a copy. Chart r/q to make sure no copy.    GUSTAVO Cunningham MA    Advance Directive Problem List Overview:   Name Relationship Phone    Primary Health Care Agent Albaro Sal  [phone]         Alternative Health Care Agent Esther Mulligan  [phone]     Reviewed Health Care Directive dated 05/13/2005, scanned into EPIC 01/2012.  Marivel Villarreal CMA                Hypertension  goal BP (blood pressure) < 140/90 2011     Priority: Medium     Major depressive disorder, recurrent episode, mild (H) 2010     Priority: Medium     Family history of colon cancer 2010     Priority: Medium     Coronary atherosclerosis of native coronary artery      Priority: Medium     s/p MI ([facility])       Hyperlipidemia LDL goal <100      Priority: Medium     Essential hypertension, benign      Priority: Medium     Hypothyroidism      Priority: Medium     GERD (gastroesophageal reflux disease)      Priority: Medium        Past Medical History:    Past Medical History:   Diagnosis Date     Coronary atherosclerosis of native coronary artery      Essential hypertension, benign      GERD (gastroesophageal reflux disease)      History of transient ischemic attack (TIA)      Hyperlipidemia LDL goal <100      Hypothyroidism      Major depressive disorder, recurrent episode, mild (H) 2011     Peptic ulcer disease with hemorrhage        Past Surgical History:    Past Surgical History:   Procedure Laterality Date     APPENDECTOMY       CARDIAC CATHERIZATION  1/15/04    drug-eluding stent RCA, Dr. Pérez, [facility]     CARDIAC CATHERIZATION  12    diffuse mild/mod disease, no new stent     CATARACT IOL, RT/LT  12    RT, Dr. Wynne     CHOLECYSTECTOMY, OPEN       COLONOSCOPY      [facility]     FRACTURE TX, ANKLE RT/LT      LT, 2 screws remain     HC EXCISE HAND/FOOT NEUROMA      RT     HC LARYNGOSCOPY DIRECT W VOCAL CORD INJECTION      vocal cord polypectomy     HC REMOVAL OF OVARIAN CYST(S)       HC TRANSCATH STENT INIT VESSEL,PERCUT      x 2     REPAIR HAMMER TOE  9/13/10    multiple + RT great toe tendon release, Dr. Sanchez DPM     STENT  -    Cardiac stents 6 placed.       Family History:    Family History   Problem Relation Age of Onset     C.A.D. Father      Hypertension Father      Myocardial Infarction Father 66      from MI     C.A.D. Brother       Myocardial Infarction Brother      Cancer Brother      skin ca     Breast Cancer Sister      Cancer - colorectal Sister      Heart Disease Sister      Neurologic Disorder Mother      migraine     C.A.D. Mother      Hypertension Mother      Heart Failure Mother      Thyroid Disease Son      cretinism     Hypertension Son      Heart Disease Son      Psychotic Disorder Sister      Hypertension Sister      Heart Disease Sister      CABG     Hypertension Brother      C.A.D. Brother      Myocardial Infarction Brother      Arthritis Daughter      RA     Hypertension Sister      Heart Disease Sister      Cancer Sister      Blood Disease Sister      Neurologic Disorder Child      migraine (2 children)       Social History:  Marital Status:   [2]  Social History   Substance Use Topics     Smoking status: Never Smoker     Smokeless tobacco: Never Used      Comment: Never smoker; no secondhand smoke exposure     Alcohol use Yes      Comment: social        Medications:      albuterol (PROAIR HFA/PROVENTIL HFA/VENTOLIN HFA) 108 (90 BASE) MCG/ACT Inhaler   ASPIRIN 81 MG PO TABS   atorvastatin (LIPITOR) 40 MG tablet   azithromycin (ZITHROMAX Z-RADHIKA) 250 MG tablet   CALCIUM 600 + D OR   citalopram (CELEXA) 20 MG tablet   FISH OIL OR   levothyroxine (SYNTHROID/LEVOTHROID) 75 MCG tablet   losartan (COZAAR) 25 MG tablet   meclizine (ANTIVERT) 25 MG tablet   metoprolol (TOPROL-XL) 25 MG 24 hr tablet   Multiple Vitamins-Minerals (OCUVITE PO)   pramipexole (MIRAPEX) 0.5 MG tablet   predniSONE (DELTASONE) 20 MG tablet   ranitidine (ZANTAC) 150 MG tablet   acetaminophen (TYLENOL) 500 MG tablet   diclofenac (VOLTAREN) 50 MG EC tablet   guaiFENesin-codeine (ROBITUSSIN AC) 100-10 MG/5ML SOLN solution   nitroglycerin (NITROSTAT) 0.4 MG SL tablet   ondansetron (ZOFRAN) 4 MG tablet   pantoprazole (PROTONIX) 40 MG EC tablet         Review of Systems  All other systems reviewed and are negative.    Physical Exam   BP: 153/75  Pulse: 70  Heart  Rate: 64  Temp: 98.2  F (36.8  C)  Resp: 18  SpO2: 93 %      Physical Exam  Nontoxic appearing no respiratory distress alert and oriented ×3  Head atraumatic normocephalic  Neck supple full active painless range of motion  Lungs clear to auscultation  Heart regular no murmur  Abdomen soft nontender bowel sounds positive no masses or HSM  Strength and sensation grossly intact throughout the extremities, gait and station normal  Speech is fluent, good eye contact, thought processes are rational  Lower extremities without swelling, redness or tenderness  Pedal pulses symmetrical and strong    ED Course     ED Course     Procedures  EKG normal sinus rhythm rate 68, first-degree AV block, left bundle branch block, unchanged from 8/28/17, read by Dr. Ricardo Queen             Critical Care time:  none               Results for orders placed or performed during the hospital encounter of 11/28/18 (from the past 24 hour(s))   CBC with platelets differential   Result Value Ref Range    WBC 6.6 4.0 - 11.0 10e9/L    RBC Count 4.04 3.8 - 5.2 10e12/L    Hemoglobin 12.7 11.7 - 15.7 g/dL    Hematocrit 37.7 35.0 - 47.0 %    MCV 93 78 - 100 fl    MCH 31.4 26.5 - 33.0 pg    MCHC 33.7 31.5 - 36.5 g/dL    RDW 13.8 10.0 - 15.0 %    Platelet Count 265 150 - 450 10e9/L    Diff Method Automated Method     % Neutrophils 63.3 %    % Lymphocytes 17.7 %    % Monocytes 13.5 %    % Eosinophils 4.5 %    % Basophils 0.8 %    % Immature Granulocytes 0.2 %    Nucleated RBCs 0 0 /100    Absolute Neutrophil 4.2 1.6 - 8.3 10e9/L    Absolute Lymphocytes 1.2 0.8 - 5.3 10e9/L    Absolute Monocytes 0.9 0.0 - 1.3 10e9/L    Absolute Eosinophils 0.3 0.0 - 0.7 10e9/L    Absolute Basophils 0.1 0.0 - 0.2 10e9/L    Abs Immature Granulocytes 0.0 0 - 0.4 10e9/L    Absolute Nucleated RBC 0.0    Comprehensive metabolic panel   Result Value Ref Range    Sodium 141 133 - 144 mmol/L    Potassium 3.9 3.4 - 5.3 mmol/L    Chloride 108 94 - 109 mmol/L    Carbon Dioxide 26 20  - 32 mmol/L    Anion Gap 7 3 - 14 mmol/L    Glucose 82 70 - 99 mg/dL    Urea Nitrogen 15 7 - 30 mg/dL    Creatinine 0.83 0.52 - 1.04 mg/dL    GFR Estimate 65 >60 mL/min/1.7m2    GFR Estimate If Black 78 >60 mL/min/1.7m2    Calcium 8.3 (L) 8.5 - 10.1 mg/dL    Bilirubin Total 0.6 0.2 - 1.3 mg/dL    Albumin 3.5 3.4 - 5.0 g/dL    Protein Total 6.5 (L) 6.8 - 8.8 g/dL    Alkaline Phosphatase 99 40 - 150 U/L    ALT 18 0 - 50 U/L    AST 24 0 - 45 U/L   Troponin I   Result Value Ref Range    Troponin I ES <0.015 0.000 - 0.045 ug/L   D dimer quantitative   Result Value Ref Range    D Dimer 1.5 (H) 0.0 - 0.50 ug/ml FEU   Nt probnp inpatient (BNP)   Result Value Ref Range    N-Terminal Pro BNP Inpatient 1048 0 - 1800 pg/mL   US Lower Extremity Venous Duplex Left    Narrative    ULTRASOUND LEFT LOWER EXTREMITY VENOUS DUPLEX November 28, 2018 10:24  AM     HISTORY: Swelling, dyspnea.     COMPARISON: None.    FINDINGS: Gray-scale, color and Doppler spectral analysis ultrasound  was performed of the left leg. Compression and augmentation imaging  was performed.    There is no evidence for deep venous thrombosis.       Impression    IMPRESSION: No evidence for deep vein thrombosis within the left leg.   Chest CT - IV contrast only - PE protocol    Narrative    CT CHEST PULMONARY EMBOLISM WITH CONTRAST  11/28/2018 11:08 AM    HISTORY:  Dyspnea, lightheaded, elevated D-dimer.    TECHNIQUE: Scans obtained from the apices through the diaphragm with  IV contrast. 75 mL Isovue 370 injected. Radiation dose for this scan  was reduced using automated exposure control, adjustment of the mA  and/or kV according to patient size, or iterative reconstruction  technique.    COMPARISON:  Chest x-ray 11/20/2017.    FINDINGS:   Vascular: Limited opacification of the thoracic aorta limits  assessment. No clear thoracic aortic abnormality can be seen. Thoracic  aortic and coronary artery calcifications. No evidence for pulmonary  embolism.     Lungs  and pleura: No effusions. Mild patchy bilateral groundglass  opacities. No lobar consolidation identified. Right middle lobe  pulmonary nodule is 0.5 cm, series 8 image 129.    Lymph nodes: A few scattered small mediastinal and hilar lymph nodes  noted. One of these at the right infrahilar region is borderline  prominent measuring 1.3 cm, series 4 image 67.    Additional findings: Upper abdomen images show no acute abnormality.      Impression    IMPRESSION:   1. Mild patchy bilateral groundglass airspace opacities could be seen  with pulmonary edema or other alveolar infiltrate.  2. No pulmonary embolism or acute thoracic aortic abnormality.  3. Pulmonary nodule on the right is indeterminate. See below for  follow up guidelines.    Recommendations for one or multiple incidental lung nodules < 6mm:    Low risk patients: No routine follow-up.    High risk patients: Optional follow-up CT at 12 months; if  unchanged, no further follow-up.    *Low Risk: Minimal or absent history of smoking or other known risk  factors.  *Nonsolid (ground glass) or partly solid nodules may require longer  follow-up to exclude indolent adenocarcinoma.  *Recommendations based on Guidelines for the Management of Incidental  Pulmonary Nodules Detected at CT: From the Fleischner Society 2017,  Radiology 2017.       Medications   acetaminophen (TYLENOL) tablet 1,000 mg (1,000 mg Oral Given 11/28/18 1041)   iopamidol (ISOVUE-370) solution 75 mL (75 mLs Intravenous Given 11/28/18 1052)   sodium chloride 0.9 % bag 500mL for CT scan flush use (100 mLs Intravenous Given 11/28/18 1052)       Assessments & Plan (with Medical Decision Making)  87-year-old female presents with lightheadedness cough and shortness of air.  Broad differential considered, there is no evidence for acute coronary syndrome, ECG is without acute ischemic change and troponin normal, BNP within normal limits, no clinical evidence or heart failure.  CT scan chest undertaken given  left leg symptoms, negative for pulmonary embolism, there is mild patchy groundglass bilateral diffuse airspace opacities.  Given cough over the last couple days, elect to treat with prednisone short course as well as azithromycin.  Pneumonitis/atypical pneumonia.  No indication for further evaluation at this time.  Patient's oxygen saturation remained 95% on room air while ambulating about the emergency department, no significant dyspnea or near syncope.  Recommend plenty of fluids.  Rest.  Follow-up primary care 5-7 days.  Return criteria reviewed.  Patient expressed understanding and agreement discharged in stable condition.     I have reviewed the nursing notes.    I have reviewed the findings, diagnosis, plan and need for follow up with the patient.          Discharge Medication List as of 11/28/2018 12:39 PM      START taking these medications    Details   azithromycin (ZITHROMAX Z-RADHIKA) 250 MG tablet Two tablets on the first day, then one tablet daily for the next 4 days, Disp-6 tablet, R-0, E-Prescribe      predniSONE (DELTASONE) 20 MG tablet Take two tablets (= 40mg) each day for 5 (five) days, Disp-10 tablet, R-0, E-Prescribe             Final diagnoses:   Pneumonitis       11/28/2018   [facility] EMERGENCY DEPARTMENT     Ricardo Queen MD  11/28/18 [id number]

## 2018-11-28 NOTE — TELEPHONE ENCOUNTER
Routing refill request to provider for review/approval because:  Tashia given x1 and patient did not follow up  Last thyroid labs were 3/17/17.   Roel Garza RN

## 2018-11-28 NOTE — DISCHARGE INSTRUCTIONS
When You Have Pneumonia  You have been diagnosed with pneumonia. This is a serious lung infection. Most cases of pneumonia are caused by bacteria. Pneumonia most often occurs in older adults, young children, and people with chronic health problems.  Home care    Take your medicine exactly as directed. Don t skip doses. Continue taking your antibiotics as until they are all gone, even if you start to feel better. This will prevent the pneumonia from coming back.    Drink at least 8 glasses of water daily, unless directed otherwise. This helps to loosen and thin secretions so that you can cough them up.    Use a cool-mist humidifier in your bedroom. Be sure to clean the humidifier daily.    Don t use medicines to suppress your cough unless your cough is dry, painful, or interferes with your sleep. Coughing up mucus is normal. You may use an expectorant if your healthcare provider says it s okay.    You can use warm compresses or a heating pad on the lowest setting to relieve chest discomfort. Use several times a day for 15-20 minutes at a time. To prevent injury to your skin, set the temperature to warm, not hot. Don t put the compress or pad directly on your skin. Make certain it has a cover or wrap it in a towel. This is to prevent skin burns.    Get plenty of rest until your fever, shortness of breath, and chest pain go away.    Plan to get a flu shot every year. The flu is a common cause of pneumonia. Getting a flu shot every year can help prevent both the flu and pneumonia.  Getting the pneumococcal vaccine  Talk with your healthcare provider about getting the pneumococcal vaccine. Pneumococcal pneumonia is caused by bacteria that spread from person to person. It can cause minor problems, such as ear infections. But it can also turn into life-threatening illnesses of the lungs (pneumonia), the covering of the brain and spinal cord (meningitis), and the blood (bacteremia).  Children under 2 years of age, adults  over age 65, people with certain health conditions, and smokers are at the highest risk of pneumococcal disease. This vaccine can help prevent pneumococcal disease in both adults and children. Some people should not have the vaccine. Make sure to ask your healthcare provider if you should have the vaccine.   Follow-up care  Make a follow-up appointment as directed by our staff.  When to call your healthcare provider  Call your healthcare provider right away if you have any of the following:    Fever of 100.4 F (38 C) or higher, or as directed by your healthcare provider    Mucus from the lungs (sputum) that s yellow, green, bloody, or smells bad    A large amount of sputum    Vomiting    Symptoms that get worse  Call 911  Call 911 right away if you have any of the following:    Chest pain    Trouble breathing    Blue lips or fingernails   Date Last Reviewed: 11/1/2016 2000-2018 [employer]. [address] [place] [address]. All rights reserved. This information is not intended as a substitute for professional medical care. Always follow your healthcare professional's instructions.      Take prednisone and antibiotic as prescribed.  Follow-up primary care 5-7 days, return here as outlined above.  Rest, drink plenty of fluids.

## 2018-11-28 NOTE — LETTER
[facility]  [address] [address]  [phone]        December 3, 2019    Christiana Sal  [address]   [place] [address]              Dear Christiana Sal    This is to remind you that your LAB is due.    You may call our office at [phone] to schedule an appointment.    Please disregard this notice if you have already had your labs drawn or made an appointment.        Sincerely,        Kelly Matthew PA-C

## 2018-11-28 NOTE — ED AVS SNAPSHOT
[facility]    [address]    [facility] [address]    Phone:  [phone]    Fax:  [phone]                                       Christiana Sal   MRN: [id number]    Department:  [facility]   Date of Visit:  11/28/2018           Patient Information     Date Of Birth          10/25/1931        Your diagnoses for this visit were:     Pneumonitis        You were seen by Ricardo Queen MD.      Follow-up Information     Follow up with Kelly Matthew PA-C.    Specialty:  Physician Assistant    Why:  5-7 days    Contact information:    [address] [facility] [address]  [phone]          Discharge Instructions         When You Have Pneumonia  You have been diagnosed with pneumonia. This is a serious lung infection. Most cases of pneumonia are caused by bacteria. Pneumonia most often occurs in older adults, young children, and people with chronic health problems.  Home care    Take your medicine exactly as directed. Don t skip doses. Continue taking your antibiotics as until they are all gone, even if you start to feel better. This will prevent the pneumonia from coming back.    Drink at least 8 glasses of water daily, unless directed otherwise. This helps to loosen and thin secretions so that you can cough them up.    Use a cool-mist humidifier in your bedroom. Be sure to clean the humidifier daily.    Don t use medicines to suppress your cough unless your cough is dry, painful, or interferes with your sleep. Coughing up mucus is normal. You may use an expectorant if your healthcare provider says it s okay.    You can use warm compresses or a heating pad on the lowest setting to relieve chest discomfort. Use several times a day for 15-20 minutes at a time. To prevent injury to your skin, set the temperature to warm, not hot. Don t put the compress or pad directly on your skin. Make certain it has a cover or wrap it in a towel.  This is to prevent skin burns.    Get plenty of rest until your fever, shortness of breath, and chest pain go away.    Plan to get a flu shot every year. The flu is a common cause of pneumonia. Getting a flu shot every year can help prevent both the flu and pneumonia.  Getting the pneumococcal vaccine  Talk with your healthcare provider about getting the pneumococcal vaccine. Pneumococcal pneumonia is caused by bacteria that spread from person to person. It can cause minor problems, such as ear infections. But it can also turn into life-threatening illnesses of the lungs (pneumonia), the covering of the brain and spinal cord (meningitis), and the blood (bacteremia).  Children under 2 years of age, adults over age 65, people with certain health conditions, and smokers are at the highest risk of pneumococcal disease. This vaccine can help prevent pneumococcal disease in both adults and children. Some people should not have the vaccine. Make sure to ask your healthcare provider if you should have the vaccine.   Follow-up care  Make a follow-up appointment as directed by our staff.  When to call your healthcare provider  Call your healthcare provider right away if you have any of the following:    Fever of 100.4 F (38 C) or higher, or as directed by your healthcare provider    Mucus from the lungs (sputum) that s yellow, green, bloody, or smells bad    A large amount of sputum    Vomiting    Symptoms that get worse  Call 911  Call 911 right away if you have any of the following:    Chest pain    Trouble breathing    Blue lips or fingernails   Date Last Reviewed: 11/1/2016 2000-2018 [employer] [address] [place] [address], [place] [address]. All rights reserved. This information is not intended as a substitute for professional medical care. Always follow your healthcare professional's instructions.      Take prednisone and antibiotic as prescribed.  Follow-up primary care 5-7 days, return here as outlined  above.  Rest, drink plenty of fluids.    Your next 10 appointments already scheduled     Dec 04, 2018  8:35 AM CST   (Arrive by 8:20 AM)   [facility] Extremity with Jas Mendenhall PT   [facility] ([facility] [place])    [address] [facility]  [place] [address]   [phone]            Dec 04, 2018 10:40 AM CST   Return Visit with Ricardo Palafox DO   [facility] ([facility])    [address]  [address]  [facility] [address]   [phone]            Jan 08, 2019 10:15 AM CST   Return Visit with Cleve Null MD   [facility] ([facility])    [address] [address]   [phone]            Feb 04, 2019  2:30 PM CST   Remote PPM Check with MONK TECH1   [facility] ([facility])    [address]  [facility] [id number] [phone] OPT 2           This appointment is for a remote check of your pacemaker.  This is not an appointment at the office.              24 Hour Appointment Hotline       To make an appointment at any [facility], call [phone] ([phone]). If you don't have a family doctor or clinic, we will help you find one. [facility] are conveniently located to serve the needs of you and your family.             Review of your medicines      START taking        Dose / Directions Last dose taken    azithromycin 250 MG tablet   Commonly known as:  ZITHROMAX Z-RADHIKA   Quantity:  6 tablet        Two tablets on the first day, then one tablet daily for the next 4 days   Refills:  0        predniSONE 20 MG tablet   Commonly known as:  DELTASONE   Quantity:  10 tablet        Take two tablets (= 40mg) each day for 5 (five) days   Refills:  0          Our records show that you are taking the medicines listed below. If these are incorrect, please call your family doctor or clinic.        Dose / Directions Last dose  taken    acetaminophen 500 MG tablet   Commonly known as:  TYLENOL   Dose:  1000 mg   Quantity:  30 tablet        Take 2 tablets (1,000 mg) by mouth every 8 hours as needed for mild pain   Refills:  0        albuterol 108 (90 Base) MCG/ACT inhaler   Commonly known as:  PROAIR HFA/PROVENTIL HFA/VENTOLIN HFA   Dose:  2 puff   Quantity:  1 Inhaler        Inhale 2 puffs into the lungs every 6 hours as needed for shortness of breath / dyspnea or wheezing   Refills:  0        aspirin 81 MG tablet   Commonly known as:  ASA   Dose:  2 tablet        Take 2 tablets by mouth every evening   Refills:  0        atorvastatin 40 MG tablet   Commonly known as:  LIPITOR   Quantity:  90 tablet        TAKE ONE TABLET BY MOUTH EVERY DAY   Refills:  3        CALCIUM 600 + D PO        1 tablet by mouth 2 times per day   Refills:  0        citalopram 20 MG tablet   Commonly known as:  celeXA   Quantity:  90 tablet        TAKE ONE TABLET BY MOUTH EVERY DAY   Refills:  1        diclofenac 50 MG EC tablet   Commonly known as:  VOLTAREN   Dose:  50 mg   Quantity:  30 tablet        Take 1 tablet (50 mg) by mouth 3 times daily as needed for moderate pain   Refills:  1        FISH OIL PO        1 daily   Refills:  0        guaiFENesin-codeine 100-10 MG/5ML Soln solution   Commonly known as:  ROBITUSSIN AC   Dose:  1 tsp.   Quantity:  120 mL        Take 5 mLs by mouth every 4 hours as needed for cough   Refills:  0        levothyroxine 75 MCG tablet   Commonly known as:  SYNTHROID/LEVOTHROID   Dose:  75 mcg   Quantity:  30 tablet        Take 1 tablet (75 mcg) by mouth daily (Needs lab work)   Refills:  0        losartan 25 MG tablet   Commonly known as:  COZAAR   Quantity:  90 tablet        TAKE 1 TABLET BY MOUTH ONCE DAILY   Refills:  2        meclizine 25 MG tablet   Commonly known as:  ANTIVERT   Quantity:  30 tablet        TAKE ONE TABLET BY MOUTH EVERY 6 HOURS AS NEEDED FOR DIZZINESS   Refills:  3        metoprolol succinate 25 MG 24 hr  tablet   Commonly known as:  TOPROL-XL   Dose:  12.5 mg   Quantity:  90 tablet        Take 0.5 tablets (12.5 mg) by mouth daily   Refills:  3        nitroGLYcerin 0.4 MG sublingual tablet   Commonly known as:  NITROSTAT   Dose:  0.4 mg   Quantity:  25 tablet        Place 1 tablet (0.4 mg) under the tongue every 5 minutes as needed for chest pain (call your doctor if you take a third dose)   Refills:  3        OCUVITE PO   Dose:  1 tablet        Take 1 tablet by mouth daily.   Refills:  0        ondansetron 4 MG tablet   Commonly known as:  ZOFRAN   Dose:  4 mg   Quantity:  18 tablet        Take 1 tablet (4 mg) by mouth every 6 hours as needed for nausea   Refills:  3        pantoprazole 40 MG EC tablet   Commonly known as:  PROTONIX   Quantity:  90 tablet        TAKE ONE TABLET BY MOUTH ONCE DAILY   Refills:  2        pramipexole 0.5 MG tablet   Commonly known as:  MIRAPEX   Quantity:  90 tablet        TAKE ONE TABLET BY MOUTH AT BEDTIME   Refills:  2        ranitidine 150 MG tablet   Commonly known as:  ZANTAC   Quantity:  180 tablet        TAKE TWO TABLETS BY MOUTH AT BEDTIME   Refills:  3                Prescriptions were sent or printed at these locations (2 Prescriptions)                   [facility] [address] [place] [address] [address]    [address] [facility] [address]    Telephone:  [phone]   Fax:  [phone]   Hours:                  E-Prescribed (2 of 2)         azithromycin (ZITHROMAX Z-RADHIKA) 250 MG tablet               predniSONE (DELTASONE) 20 MG tablet                Procedures and tests performed during your visit     CBC with platelets differential    Chest CT - IV contrast only - PE protocol    Comprehensive metabolic panel    D dimer quantitative    EKG 12-lead, tracing only    Nt probnp inpatient (BNP)    Troponin I    US Lower Extremity Venous Duplex Left      Orders Needing Specimen Collection     None      Pending Results     Date and Time Order Name Status Description     11/28/2018 1034 Chest CT - IV contrast only - PE protocol Preliminary     11/28/2018 0926 US Lower Extremity Venous Duplex Left Preliminary             Pending Culture Results     No orders found from 11/26/2018 to 11/29/2018.            Pending Results Instructions     If you had any lab results that were not finalized at the time of your Discharge, you can call the ED Lab Result RN at [phone]. You will be contacted by this team for any positive Lab results or changes in treatment. The nurses are available 7 days a week from 10A to 6:30P.  You can leave a message 24 hours per day and they will return your call.        Test Results From Your Hospital Stay        11/28/2018  9:56 AM      Component Results     Component Value Ref Range & Units Status    WBC 6.6 4.0 - 11.0 10e9/L Final    RBC Count 4.04 3.8 - 5.2 10e12/L Final    Hemoglobin 12.7 11.7 - 15.7 g/dL Final    Hematocrit 37.7 35.0 - 47.0 % Final    MCV 93 78 - 100 fl Final    MCH 31.4 26.5 - 33.0 pg Final    MCHC 33.7 31.5 - 36.5 g/dL Final    RDW 13.8 10.0 - 15.0 % Final    Platelet Count 265 150 - 450 10e9/L Final    Diff Method Automated Method  Final    % Neutrophils 63.3 % Final    % Lymphocytes 17.7 % Final    % Monocytes 13.5 % Final    % Eosinophils 4.5 % Final    % Basophils 0.8 % Final    % Immature Granulocytes 0.2 % Final    Nucleated RBCs 0 0 /100 Final    Absolute Neutrophil 4.2 1.6 - 8.3 10e9/L Final    Absolute Lymphocytes 1.2 0.8 - 5.3 10e9/L Final    Absolute Monocytes 0.9 0.0 - 1.3 10e9/L Final    Absolute Eosinophils 0.3 0.0 - 0.7 10e9/L Final    Absolute Basophils 0.1 0.0 - 0.2 10e9/L Final    Abs Immature Granulocytes 0.0 0 - 0.4 10e9/L Final    Absolute Nucleated RBC 0.0  Final         11/28/2018 10:17 AM      Component Results     Component Value Ref Range & Units Status    Sodium 141 133 - 144 mmol/L Final    Potassium 3.9 3.4 - 5.3 mmol/L Final    Chloride 108 94 - 109 mmol/L Final    Carbon Dioxide 26 20 - 32 mmol/L Final     Anion Gap 7 3 - 14 mmol/L Final    Glucose 82 70 - 99 mg/dL Final    Urea Nitrogen 15 7 - 30 mg/dL Final    Creatinine 0.83 0.52 - 1.04 mg/dL Final    GFR Estimate 65 >60 mL/min/1.7m2 Final    Non  GFR Calc    GFR Estimate If Black 78 >60 mL/min/1.7m2 Final    African American GFR Calc    Calcium 8.3 (L) 8.5 - 10.1 mg/dL Final    Bilirubin Total 0.6 0.2 - 1.3 mg/dL Final    Albumin 3.5 3.4 - 5.0 g/dL Final    Protein Total 6.5 (L) 6.8 - 8.8 g/dL Final    Alkaline Phosphatase 99 40 - 150 U/L Final    ALT 18 0 - 50 U/L Final    AST 24 0 - 45 U/L Final         11/28/2018 10:17 AM      Component Results     Component Value Ref Range & Units Status    Troponin I ES <0.015 0.000 - 0.045 ug/L Final    The 99th percentile for upper reference range is 0.045 ug/L.  Troponin values   in the range of 0.045 - 0.120 ug/L may be associated with risks of adverse   clinical events.           11/28/2018 10:28 AM      Component Results     Component Value Ref Range & Units Status    D Dimer 1.5 (H) 0.0 - 0.50 ug/ml FEU Final    This D-dimer assay is intended for use in conjunction with a clinical pretest   probability assessment model to exclude pulmonary embolism (PE) and deep   venous thrombosis (DVT) in outpatients suspected of PE or DVT. The cut-off   value is 0.5 ug/mL FEU.           11/28/2018 10:17 AM      Component Results     Component Value Ref Range & Units Status    N-Terminal Pro BNP Inpatient 1048 0 - 1800 pg/mL Final       Reference range shown and results flagged as abnormal are suggested inpatient   cut points for confirming diagnosis if CHF in an acute setting. Establishing a   baseline value for each individual patient is useful for follow-up. An   inpatient or emergency department NT-proPBNP <300 pg/mL effectively rules out   acute CHF, with 99% negative predictive value.  The outpatient non-acute reference range for ruling out CHF is:   0-125 pg/mL (age 18 to less than 75)   0-450 pg/mL (age 75 yrs  and older)           11/28/2018 10:34 AM      Narrative     ULTRASOUND LEFT LOWER EXTREMITY VENOUS DUPLEX November 28, 2018 10:24  AM     HISTORY: Swelling, dyspnea.     COMPARISON: None.    FINDINGS: Gray-scale, color and Doppler spectral analysis ultrasound  was performed of the left leg. Compression and augmentation imaging  was performed.    There is no evidence for deep venous thrombosis.         Impression     IMPRESSION: No evidence for deep vein thrombosis within the left leg.         11/28/2018 11:17 AM      Narrative     CT CHEST PULMONARY EMBOLISM WITH CONTRAST  11/28/2018 11:08 AM    HISTORY:  Dyspnea, lightheaded, elevated D-dimer.    TECHNIQUE: Scans obtained from the apices through the diaphragm with  IV contrast. 75 mL Isovue 370 injected. Radiation dose for this scan  was reduced using automated exposure control, adjustment of the mA  and/or kV according to patient size, or iterative reconstruction  technique.    COMPARISON:  Chest x-ray 11/20/2017.    FINDINGS:   Vascular: Limited opacification of the thoracic aorta limits  assessment. No clear thoracic aortic abnormality can be seen. Thoracic  aortic and coronary artery calcifications. No evidence for pulmonary  embolism.     Lungs and pleura: No effusions. Mild patchy bilateral groundglass  opacities. No lobar consolidation identified. Right middle lobe  pulmonary nodule is 0.5 cm, series 8 image 129.    Lymph nodes: A few scattered small mediastinal and hilar lymph nodes  noted. One of these at the right infrahilar region is borderline  prominent measuring 1.3 cm, series 4 image 67.    Additional findings: Upper abdomen images show no acute abnormality.        Impression     IMPRESSION:   1. Mild patchy bilateral groundglass airspace opacities could be seen  with pulmonary edema or other alveolar infiltrate.  2. No pulmonary embolism or acute thoracic aortic abnormality.  3. Pulmonary nodule on the right is indeterminate. See below for  follow  up guidelines.    Recommendations for one or multiple incidental lung nodules < 6mm:    Low risk patients: No routine follow-up.    High risk patients: Optional follow-up CT at 12 months; if  unchanged, no further follow-up.    *Low Risk: Minimal or absent history of smoking or other known risk  factors.  *Nonsolid (ground glass) or partly solid nodules may require longer  follow-up to exclude indolent adenocarcinoma.  *Recommendations based on Guidelines for the Management of Incidental  Pulmonary Nodules Detected at CT: From the Fleischner Society 2017,  Radiology 2017.                Thank you for choosing [facility]       Thank you for choosing [facility] for your care. Our goal is always to provide you with excellent care. Hearing back from our patients is one way we can continue to improve our services. Please take a few minutes to complete the written survey that you may receive in the mail after you visit with us. Thank you!        [facility] Information     [employer] gives you secure access to your electronic health record. If you see a primary care provider, you can also send messages to your care team and make appointments. If you have questions, please call your primary care clinic.  If you do not have a primary care provider, please call [phone] and they will assist you.        Care EveryWhere ID     This is your Care EveryWhere ID. This could be used by other organizations to access your [facility] medical records  [id number]        Equal Access to Services     SOFIA EDUARDO : [place] mono Ryan, waaxda luqadaha, qaybta kaalmada adeenrique, danya moser. So [facility] [phone].    ATENCIÓN: Si habla español, tiene a ozuna disposición servicios gratuitos de asistencia lingüística. Llame al [phone].    We comply with applicable federal civil rights laws and [place] laws. We do not discriminate on the basis of race, color, national origin, age, disability, sex, sexual  orientation, or gender identity.            After Visit Summary       This is your record. Keep this with you and show to your community pharmacist(s) and doctor(s) at your next visit.

## 2018-11-28 NOTE — TELEPHONE ENCOUNTER
"LEVOTHYROXIN 75MCG TAB        Last Written Prescription Date:  10/18/18  Last Fill Quantity: 30,   # refills: 0  Last Office Visit: 9/21/18  Future Office visit:    Next 5 appointments (look out 90 days)     Dec 04, 2018 10:40 AM CST   Return Visit with Ricardo Palafox DO   [facility] ([facility])    [address]  [address]  [facility] [address]   [phone]            Jan 08, 2019 10:15 AM CST   Return Visit with Cleve Null MD   [facility] ([facility]    [address] [facility] [place] [address]   [phone]                   Requested Prescriptions   Pending Prescriptions Disp Refills     levothyroxine (SYNTHROID/LEVOTHROID) 75 MCG tablet [Pharmacy Med Name: LEVOTHYROXIN 75MCG  TAB] 30 tablet 0     Sig: TAKE 1 TABLET BY MOUTH ONCE DAILY (NEEDS  LAB  WORK)    Thyroid Protocol Failed    11/28/2018  2:10 PM       Failed - Normal TSH on file in past 12 months    Recent Labs   Lab Test  03/08/17   1059   TSH  0.67             Passed - Patient is 12 years or older       Passed - Recent (12 mo) or future (30 days) visit within the authorizing provider's specialty    Patient had office visit in the last 12 months or has a visit in the next 30 days with authorizing provider or within the authorizing provider's specialty.  See \"Patient Info\" tab in inbasket, or \"Choose Columns\" in Meds & Orders section of the refill encounter.             Passed - No active pregnancy on record    If patient is pregnant or has had a positive pregnancy test, please check TSH.         Passed - No positive pregnancy test in past 12 months    If patient is pregnant or has had a positive pregnancy test, please check TSH.            "

## 2018-11-28 NOTE — ED NOTES
"Patient called nurses and let them know that her \"heart monitor\" group called her and told her to call them back because she had something show up on her monitor. Pt will call them back shortly to see what the monitor said.   "

## 2018-11-28 NOTE — ED AVS SNAPSHOT
[facility]    [address] [address]    Phone:  [phone]    Fax:  [phone]                                       Christiana Sal   MRN: [id number]    Department:  [facility]   Date of Visit:  11/28/2018           After Visit Summary Signature Page     I have received my discharge instructions, and my questions have been answered. I have discussed any challenges I see with this plan with the nurse or doctor.    ..........................................................................................................................................  Patient/Patient Representative Signature      ..........................................................................................................................................  Patient Representative Print Name and Relationship to Patient    ..................................................               ................................................  Date                                   Time    ..........................................................................................................................................  Reviewed by Signature/Title    ...................................................              ..............................................  Date                                               Time          22EPIC Rev 08/18

## 2018-11-29 RX ORDER — LEVOTHYROXINE SODIUM 75 UG/1
TABLET ORAL
Qty: 90 TABLET | Refills: 0 | Status: SHIPPED | OUTPATIENT
Start: 2018-11-29 | End: 2019-03-20

## 2018-11-29 NOTE — TELEPHONE ENCOUNTER
Please call patient - she is overdue for labs and she also was recently in the ED so I would like to see her in follow up  Kelly

## 2018-12-02 ENCOUNTER — APPOINTMENT (OUTPATIENT)
Dept: GENERAL RADIOLOGY | Facility: CLINIC | Age: 83
End: 2018-12-02
Attending: EMERGENCY MEDICINE
Payer: MEDICARE

## 2018-12-02 ENCOUNTER — HOSPITAL ENCOUNTER (OUTPATIENT)
Facility: CLINIC | Age: 83
Setting detail: OBSERVATION
Discharge: NURSING, REHAB OR LONG-TERM CARE | End: 2018-12-04
Attending: EMERGENCY MEDICINE | Admitting: ORTHOPAEDIC SURGERY
Payer: MEDICARE

## 2018-12-02 DIAGNOSIS — S82.821A CLOSED TORUS FRACTURE OF DISTAL END OF RIGHT FIBULA, INITIAL ENCOUNTER: Primary | ICD-10-CM

## 2018-12-02 DIAGNOSIS — S82.409A CLOSED FRACTURE OF SHAFT OF FIBULA: ICD-10-CM

## 2018-12-02 DIAGNOSIS — R42 LIGHTHEADEDNESS: ICD-10-CM

## 2018-12-02 DIAGNOSIS — S82.401A CLOSED FRACTURE OF RIGHT FIBULA: ICD-10-CM

## 2018-12-02 DIAGNOSIS — N39.0 ENTEROCOCCUS UTI: ICD-10-CM

## 2018-12-02 DIAGNOSIS — S80.11XA CONTUSION OF RIGHT LOWER EXTREMITY, INITIAL ENCOUNTER: ICD-10-CM

## 2018-12-02 DIAGNOSIS — R82.90 ABNORMAL URINALYSIS: ICD-10-CM
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
## 2018-12-02 DIAGNOSIS — B95.2 ENTEROCOCCUS UTI: ICD-10-CM

## 2018-12-02 LAB
ALBUMIN UR-MCNC: 30 MG/DL
ANION GAP SERPL CALCULATED.3IONS-SCNC: 8 MMOL/L (ref 3–14)
APPEARANCE UR: ABNORMAL
BACTERIA #/AREA URNS HPF: ABNORMAL /HPF
BASOPHILS # BLD AUTO: 0 10E9/L (ref 0–0.2)
BASOPHILS NFR BLD AUTO: 0.3 %
BILIRUB UR QL STRIP: NEGATIVE
BUN SERPL-MCNC: 28 MG/DL (ref 7–30)
CALCIUM SERPL-MCNC: 7.8 MG/DL (ref 8.5–10.1)
CHLORIDE SERPL-SCNC: 105 MMOL/L (ref 94–109)
CO2 SERPL-SCNC: 26 MMOL/L (ref 20–32)
COLOR UR AUTO: ABNORMAL
CREAT SERPL-MCNC: 1.08 MG/DL (ref 0.52–1.04)
DIFFERENTIAL METHOD BLD: ABNORMAL
EOSINOPHIL # BLD AUTO: 0.1 10E9/L (ref 0–0.7)
EOSINOPHIL NFR BLD AUTO: 0.7 %
ERYTHROCYTE [DISTWIDTH] IN BLOOD BY AUTOMATED COUNT: 13.6 % (ref 10–15)
GFR SERPL CREATININE-BSD FRML MDRD: 48 ML/MIN/1.7M2
GLUCOSE SERPL-MCNC: 137 MG/DL (ref 70–99)
GLUCOSE UR STRIP-MCNC: NEGATIVE MG/DL
HCT VFR BLD AUTO: 37.3 % (ref 35–47)
HGB BLD-MCNC: 12.5 G/DL (ref 11.7–15.7)
HGB UR QL STRIP: NEGATIVE
HYALINE CASTS #/AREA URNS LPF: 25 /LPF (ref 0–2)
IMM GRANULOCYTES # BLD: 0.1 10E9/L (ref 0–0.4)
IMM GRANULOCYTES NFR BLD: 0.5 %
KETONES UR STRIP-MCNC: 5 MG/DL
LEUKOCYTE ESTERASE UR QL STRIP: ABNORMAL
LYMPHOCYTES # BLD AUTO: 1.5 10E9/L (ref 0.8–5.3)
LYMPHOCYTES NFR BLD AUTO: 12.6 %
MCH RBC QN AUTO: 31.3 PG (ref 26.5–33)
MCHC RBC AUTO-ENTMCNC: 33.5 G/DL (ref 31.5–36.5)
MCV RBC AUTO: 94 FL (ref 78–100)
MONOCYTES # BLD AUTO: 1.8 10E9/L (ref 0–1.3)
MONOCYTES NFR BLD AUTO: 15.4 %
MUCOUS THREADS #/AREA URNS LPF: PRESENT /LPF
NEUTROPHILS # BLD AUTO: 8.3 10E9/L (ref 1.6–8.3)
NEUTROPHILS NFR BLD AUTO: 70.5 %
NITRATE UR QL: NEGATIVE
NRBC # BLD AUTO: 0 10*3/UL
NRBC BLD AUTO-RTO: 0 /100
PH UR STRIP: 5 PH (ref 5–7)
PLATELET # BLD AUTO: 275 10E9/L (ref 150–450)
POTASSIUM SERPL-SCNC: 3.2 MMOL/L (ref 3.4–5.3)
RBC # BLD AUTO: 3.99 10E12/L (ref 3.8–5.2)
RBC #/AREA URNS AUTO: 6 /HPF (ref 0–2)
SODIUM SERPL-SCNC: 139 MMOL/L (ref 133–144)
SOURCE: ABNORMAL
SP GR UR STRIP: 1.02 (ref 1–1.03)
SQUAMOUS #/AREA URNS AUTO: 4 /HPF (ref 0–1)
UROBILINOGEN UR STRIP-MCNC: 2 MG/DL (ref 0–2)
WBC # BLD AUTO: 11.8 10E9/L (ref 4–11)
WBC #/AREA URNS AUTO: 41 /HPF (ref 0–5)

## 2018-12-02 PROCEDURE — 99285 EMERGENCY DEPT VISIT HI MDM: CPT | Mod: 25

## 2018-12-02 PROCEDURE — 87088 URINE BACTERIA CULTURE: CPT | Performed by: EMERGENCY MEDICINE

## 2018-12-02 PROCEDURE — 87186 SC STD MICRODIL/AGAR DIL: CPT | Performed by: EMERGENCY MEDICINE

## 2018-12-02 PROCEDURE — 81001 URINALYSIS AUTO W/SCOPE: CPT | Performed by: EMERGENCY MEDICINE

## 2018-12-02 PROCEDURE — G0378 HOSPITAL OBSERVATION PER HR: HCPCS

## 2018-12-02 PROCEDURE — 25000132 ZZH RX MED GY IP 250 OP 250 PS 637: Mod: GY | Performed by: EMERGENCY MEDICINE

## 2018-12-02 PROCEDURE — 87086 URINE CULTURE/COLONY COUNT: CPT | Performed by: EMERGENCY MEDICINE

## 2018-12-02 PROCEDURE — 96360 HYDRATION IV INFUSION INIT: CPT

## 2018-12-02 PROCEDURE — 73590 X-RAY EXAM OF LOWER LEG: CPT | Mod: LT

## 2018-12-02 PROCEDURE — 93010 ELECTROCARDIOGRAM REPORT: CPT | Mod: Z6 | Performed by: EMERGENCY MEDICINE

## 2018-12-02 PROCEDURE — 85025 COMPLETE CBC W/AUTO DIFF WBC: CPT | Performed by: EMERGENCY MEDICINE

## 2018-12-02 PROCEDURE — A9270 NON-COVERED ITEM OR SERVICE: HCPCS | Mod: GY | Performed by: PHYSICIAN ASSISTANT

## 2018-12-02 PROCEDURE — 25000132 ZZH RX MED GY IP 250 OP 250 PS 637: Mod: GY | Performed by: PHYSICIAN ASSISTANT

## 2018-12-02 PROCEDURE — 93005 ELECTROCARDIOGRAM TRACING: CPT

## 2018-12-02 PROCEDURE — 25000128 H RX IP 250 OP 636: Performed by: EMERGENCY MEDICINE

## 2018-12-02 PROCEDURE — 25000128 H RX IP 250 OP 636: Performed by: PHYSICIAN ASSISTANT

## 2018-12-02 PROCEDURE — 80048 BASIC METABOLIC PNL TOTAL CA: CPT | Performed by: EMERGENCY MEDICINE

## 2018-12-02 PROCEDURE — 99285 EMERGENCY DEPT VISIT HI MDM: CPT | Mod: 25 | Performed by: EMERGENCY MEDICINE

## 2018-12-02 PROCEDURE — A9270 NON-COVERED ITEM OR SERVICE: HCPCS | Mod: GY | Performed by: EMERGENCY MEDICINE

## 2018-12-02 PROCEDURE — 73610 X-RAY EXAM OF ANKLE: CPT | Mod: RT

## 2018-12-02 PROCEDURE — 99220 ZZC INITIAL OBSERVATION CARE,LEVL III: CPT | Performed by: PHYSICIAN ASSISTANT

## 2018-12-02 RX ORDER — LEVOTHYROXINE SODIUM 75 UG/1
75 TABLET ORAL DAILY
Status: DISCONTINUED | OUTPATIENT
Start: 2018-12-02 | End: 2018-12-04 | Stop reason: HOSPADM

## 2018-12-02 RX ORDER — CITALOPRAM HYDROBROMIDE 20 MG/1
20 TABLET ORAL DAILY
Status: DISCONTINUED | OUTPATIENT
Start: 2018-12-02 | End: 2018-12-04 | Stop reason: HOSPADM

## 2018-12-02 RX ORDER — HYDROCODONE BITARTRATE AND ACETAMINOPHEN 5; 325 MG/1; MG/1
1 TABLET ORAL ONCE
Status: COMPLETED | OUTPATIENT
Start: 2018-12-02 | End: 2018-12-02

## 2018-12-02 RX ORDER — ONDANSETRON 2 MG/ML
4 INJECTION INTRAMUSCULAR; INTRAVENOUS EVERY 6 HOURS PRN
Status: DISCONTINUED | OUTPATIENT
Start: 2018-12-02 | End: 2018-12-04 | Stop reason: HOSPADM

## 2018-12-02 RX ORDER — ONDANSETRON 2 MG/ML
4 INJECTION INTRAMUSCULAR; INTRAVENOUS EVERY 6 HOURS PRN
Status: DISCONTINUED | OUTPATIENT
Start: 2018-12-02 | End: 2018-12-02

## 2018-12-02 RX ORDER — PRAMIPEXOLE DIHYDROCHLORIDE 0.5 MG/1
0.5 TABLET ORAL
Status: DISCONTINUED | OUTPATIENT
Start: 2018-12-02 | End: 2018-12-04 | Stop reason: HOSPADM

## 2018-12-02 RX ORDER — BIOTIN 10000 MCG
1 CAPSULE ORAL DAILY
COMMUNITY
End: 2018-12-07

## 2018-12-02 RX ORDER — NALOXONE HYDROCHLORIDE 0.4 MG/ML
.1-.4 INJECTION, SOLUTION INTRAMUSCULAR; INTRAVENOUS; SUBCUTANEOUS
Status: DISCONTINUED | OUTPATIENT
Start: 2018-12-02 | End: 2018-12-02

## 2018-12-02 RX ORDER — SODIUM CHLORIDE, SODIUM LACTATE, POTASSIUM CHLORIDE, CALCIUM CHLORIDE 600; 310; 30; 20 MG/100ML; MG/100ML; MG/100ML; MG/100ML
INJECTION, SOLUTION INTRAVENOUS CONTINUOUS
Status: DISCONTINUED | OUTPATIENT
Start: 2018-12-02 | End: 2018-12-04 | Stop reason: HOSPADM

## 2018-12-02 RX ORDER — LIDOCAINE 40 MG/G
CREAM TOPICAL
Status: DISCONTINUED | OUTPATIENT
Start: 2018-12-02 | End: 2018-12-04 | Stop reason: HOSPADM

## 2018-12-02 RX ORDER — ACETAMINOPHEN 325 MG/1
650 TABLET ORAL EVERY 4 HOURS PRN
Status: DISCONTINUED | OUTPATIENT
Start: 2018-12-02 | End: 2018-12-04 | Stop reason: HOSPADM

## 2018-12-02 RX ORDER — ONDANSETRON 4 MG/1
4 TABLET, ORALLY DISINTEGRATING ORAL EVERY 6 HOURS PRN
Status: DISCONTINUED | OUTPATIENT
Start: 2018-12-02 | End: 2018-12-02

## 2018-12-02 RX ORDER — ACETAMINOPHEN 325 MG/1
975 TABLET ORAL 3 TIMES DAILY
Status: DISCONTINUED | OUTPATIENT
Start: 2018-12-02 | End: 2018-12-04 | Stop reason: HOSPADM

## 2018-12-02 RX ORDER — MULTIVIT-MIN/IRON/FOLIC ACID/K 18-600-40
2000 CAPSULE ORAL DAILY
COMMUNITY

## 2018-12-02 RX ORDER — ATORVASTATIN CALCIUM 20 MG/1
40 TABLET, FILM COATED ORAL DAILY
Status: DISCONTINUED | OUTPATIENT
Start: 2018-12-02 | End: 2018-12-04 | Stop reason: HOSPADM

## 2018-12-02 RX ORDER — OXYCODONE HYDROCHLORIDE 5 MG/1
5-10 TABLET ORAL
Status: DISCONTINUED | OUTPATIENT
Start: 2018-12-02 | End: 2018-12-04 | Stop reason: HOSPADM

## 2018-12-02 RX ORDER — NALOXONE HYDROCHLORIDE 0.4 MG/ML
.1-.4 INJECTION, SOLUTION INTRAMUSCULAR; INTRAVENOUS; SUBCUTANEOUS
Status: DISCONTINUED | OUTPATIENT
Start: 2018-12-02 | End: 2018-12-03

## 2018-12-02 RX ORDER — CEPHALEXIN 500 MG/1
500 CAPSULE ORAL EVERY 12 HOURS
Status: DISCONTINUED | OUTPATIENT
Start: 2018-12-02 | End: 2018-12-04 | Stop reason: HOSPADM

## 2018-12-02 RX ORDER — ONDANSETRON 4 MG/1
4 TABLET, ORALLY DISINTEGRATING ORAL EVERY 6 HOURS PRN
Status: DISCONTINUED | OUTPATIENT
Start: 2018-12-02 | End: 2018-12-04 | Stop reason: HOSPADM

## 2018-12-02 RX ORDER — MAGNESIUM OXIDE 400 MG/1
400 TABLET ORAL DAILY
COMMUNITY
End: 2018-12-07

## 2018-12-02 RX ADMIN — LEVOTHYROXINE SODIUM 75 MCG: 75 TABLET ORAL at 18:31

## 2018-12-02 RX ADMIN — SODIUM CHLORIDE, POTASSIUM CHLORIDE, SODIUM LACTATE AND CALCIUM CHLORIDE: 600; 310; 30; 20 INJECTION, SOLUTION INTRAVENOUS at 18:29

## 2018-12-02 RX ADMIN — HYDROCODONE BITARTRATE AND ACETAMINOPHEN 1 TABLET: 5; 325 TABLET ORAL at 17:23

## 2018-12-02 RX ADMIN — RANITIDINE 150 MG: 150 TABLET ORAL at 21:26

## 2018-12-02 RX ADMIN — METOPROLOL SUCCINATE 12.5 MG: 25 TABLET, EXTENDED RELEASE ORAL at 18:38

## 2018-12-02 RX ADMIN — CITALOPRAM HYDROBROMIDE 20 MG: 20 TABLET ORAL at 18:31

## 2018-12-02 RX ADMIN — CEPHALEXIN 500 MG: 500 CAPSULE ORAL at 17:24

## 2018-12-02 RX ADMIN — SODIUM CHLORIDE, POTASSIUM CHLORIDE, SODIUM LACTATE AND CALCIUM CHLORIDE 500 ML: 600; 310; 30; 20 INJECTION, SOLUTION INTRAVENOUS at 15:20

## 2018-12-02 RX ADMIN — ATORVASTATIN CALCIUM 40 MG: 20 TABLET, FILM COATED ORAL at 18:31

## 2018-12-02 RX ADMIN — ACETAMINOPHEN 650 MG: 325 TABLET, FILM COATED ORAL at 19:08

## 2018-12-02 RX ADMIN — ACETAMINOPHEN 650 MG: 325 TABLET, FILM COATED ORAL at 22:45

## 2018-12-02 RX ADMIN — SODIUM CHLORIDE, POTASSIUM CHLORIDE, SODIUM LACTATE AND CALCIUM CHLORIDE 500 ML: 600; 310; 30; 20 INJECTION, SOLUTION INTRAVENOUS at 14:06

## 2018-12-02 ASSESSMENT — ENCOUNTER SYMPTOMS
CARDIOVASCULAR NEGATIVE: 1
MUSCULOSKELETAL NEGATIVE: 1
ENDOCRINE NEGATIVE: 1
PSYCHIATRIC NEGATIVE: 1
HEMATOLOGIC/LYMPHATIC NEGATIVE: 1
LIGHT-HEADEDNESS: 1
ALLERGIC/IMMUNOLOGIC NEGATIVE: 1
FREQUENCY: 1
DIZZINESS: 1
GASTROINTESTINAL NEGATIVE: 1
RESPIRATORY NEGATIVE: 1
WEAKNESS: 1
EYES NEGATIVE: 1

## 2018-12-02 ASSESSMENT — PAIN DESCRIPTION - DESCRIPTORS
DESCRIPTORS: ACHING

## 2018-12-02 NOTE — ED NOTES
Pt had off and on episodes of lightheadedness last week and worse on Wednesday-saw doctor and was dx with pneumonia-cont to be lightheaded when up and about-last pm pt tripped on a suitcase and fell on tile floor-rt lower leg ,ankle and foot bruised and swollen and has a bruise on left lower leg-pain with wt bearing and ambulation-positive pedal pulses

## 2018-12-02 NOTE — ED NOTES
PT is resting in position of comfort and states pain to the right ankle is increasing and would like some pain meds. Admitting MD at bedside and is aware. No further changes in PT condition at this time. All needs are being met and all comfort measures are being addressed. Awaiting admit bed placement at this time.

## 2018-12-02 NOTE — ED NOTES
BP LYING  138/69  HR  66    BP SITTING  121/71  HR  73   BP  STANDING  100/65  HR  84---A LITTLE LIGHTHEADED

## 2018-12-02 NOTE — ED NOTES
Report called to DALL[facility] Veronica and all questions answered. She is to assume care of PT upon arrival to room 2307.

## 2018-12-02 NOTE — ED NOTES
PT back from Radiology and placed back on the monitor.   Entered PT room and found PT sitting on gurney in position of comfort. She states she has tolerable pain to the right lower extremity. Denies any other symptoms. States her dizziness has gone away at this time. PT is noted to be A&OX4, appears to be in NAD with no SOB noted at this time. All needs are being assessed and will be met and all comfort measures are being addressed. Awaiting MD dispo at this time.

## 2018-12-02 NOTE — IP AVS SNAPSHOT
` ` Patient Information     Patient Name Sex     Christiana Sal ([id number]) Female 10/25/1931       Room Bed    2308 [id number]      Patient Demographics     Address Phone E-mail Address    [address] [address] [phone] (Home) *Preferred*  [phone] (Mobile) [email or web address]      Patient Ethnicity & Race     Ethnic Group Patient Race    American White      Emergency Contact(s)     Name Relation Home Work Mobile    Esther Mercedes [phone] NONE [phone]      Documents on File        Status Date Received Description       Documents for the Patient    Privacy Notice - [place] Received 09     Insurance Card Received 09     Face Sheet Received () 09     External Medication Information Consent Accepted () 05/04/10     Face Sheet Received () 05/04/10     Insurance Card Received 05/04/10     Patient ID Scan Refused () 14     Consent for Services - Hospital/Clinic Received () 11/02/10     Insurance Card Received 11     External Medication Information Consent Accepted () 11     Consent for Services - Hospital/Clinic Received but Refused Research () 11     HIM EVETTE Authorization - File Only   Release of Information - [employer] 11    Advance Directives and Living Will Not Received  05 Health Care Directive    Insurance Card Received 12     Insurance Card   Stent Implant Card    Consent for Services - Hospital/Clinic Received () 12     External Medication Information Consent Accepted 12     Insurance Card Received 13 [facility]    Privacy Notice - [place]       Consent for EHR Access  13 Copied from existing Consent for services - C/HOD collected on 2012    [facility] Specified Other       Consent for Services - Hospital/Clinic Received () 13     Insurance Card Received 14     Consent for Services -  Hospital/Clinic Received () 14     Consent for Services - Hospital/Clinic       HIM EVETTE Authorization - File Only   EVETTE Authorization- Carmine Hunter 14    Consent for Services/Privacy Notice - Hospital/Clinic Received () 02/15/16     Insurance Card Received 02/15/16 MEDICA    Patient ID Received () 02/15/16 [place]     Immunization Record  16 [facility]- FLU VACCINE  10/21/2016    Consent for Services/Privacy Notice - Hospital/Clinic Received () 17     Insurance Card Received 17 medica    HIM EVETTE Authorization  17     HIM EVETTE Authorization  17     HIM EVETTE Authorization  06/15/17 [facility]    HIM EVETTE Authorization  17 [facility] Prospective Auth Received 10/19/17     Consent for Services/Privacy Notice - Hospital/Clinic Received 18     Consent for Services - Hospital and Clinic Received 18     HIE Auth Received 18     Consent for Services - Hospital and Clinic Received 18     Insurance Card Received 18 new Medicare 2018    Patient ID Received 10/23/18 [place] 10-25-21    Face Sheet Received (Deleted) 05/04/10     External Medication Information Consent  (Deleted)         Documents for the Encounter    CMS IM for Patient Signature Received 18     Observation Notice Received 18     CMS DALY for Patient Signature Received 18     ECG   ECG Report      Admission Information     Attending Provider Admitting Provider Admission Type Admission Date/Time    Stevo Ortiz MD Hanson, Rebecca Sara,  Emergency 18  1236    Discharge Date Hospital Service Auth/Cert Status Service Area     Internal Medicine Incomplete [facility]    Unit Room/Bed Admission Status       [facility] 2307/2307-01 Admission (Confirmed)       Admission     Complaint    Closed fracture of shaft of fibula, Fall, ankle fracture      Hospital  Account     Name Acct ID Class Status Primary Coverage    Christiana Sal [id number] Observation Open MEDICARE - MEDICARE FOR HB SUPPLEMENT            Guarantor Account (for Hospital Account #[id number])     Name Relation to Pt Service Area Active? Acct Type    Christiana Sal  FCS Yes Personal/Family    Address Phone          [address] [place] [address] [phone](H)              Coverage Information (for Hospital Account #[id number])     1. MEDICARE/MEDICARE FOR HB SUPPLEMENT     F/O Payor/Plan Precert #    MEDICARE/MEDICARE FOR HB SUPPLEMENT     Subscriber Subscriber #    Christiana Sal [id number]    Address Phone    ATTN CLAIMS  [address]  [place] [address] [phone]          2. BCBS/BCBS PLATINUM BLUE     F/O Payor/Plan Precert #    BCBS/BCBS PLATINUM BLUE     Subscriber Subscriber #    Christiana Sal [id number]    Address Phone    [address] [address]  [place] [address] [phone]

## 2018-12-02 NOTE — ED NOTES
Report received from LUCHO Conley and all questions answered. I will assume care of PT at this time.     PT to Radiology at this time. I will assess upon her return to room.

## 2018-12-02 NOTE — IP AVS SNAPSHOT
"` `           [facility] SURGICAL: [phone]                                              INTERAGENCY TRANSFER FORM - NURSING   2018                    Hospital Admission Date: 2018  SANDIE BERGER   : 10/25/1931  Sex: Female        Attending Provider: Stevo Ortiz MD     Allergies:  Demerol, Lisinopril, Meperidine, Sulfa Drugs    Infection:  None   Service:  INTERNAL MED    Ht:  1.676 m (5' 6\")   Wt:  74.8 kg (165 lb)   Admission Wt:  74.8 kg (165 lb)    BMI:  26.63 kg/m 2   BSA:  1.87 m 2            Patient PCP Information     Provider PCP Type    Kelly Matthew PA-C General      Current Code Status     Date Active Code Status Order ID Comments User Context       2018  6:14 PM Full Code [id number]  Cristina Randle PA-C Inpatient       Questions for Current Code Status     Question Answer Comment    Code status determined by: Discussion with patient/legal decision maker       Code Status History     Date Active Date Inactive Code Status Order ID Comments User Context    2016  7:44 AM 2018  6:14 PM Full Code [id number]  Pal Real MD Outpatient    2016  1:54 PM 2016  7:44 AM Full Code [id number]  Joshua Durán MD Inpatient    2016 11:59 AM 2016  1:54 PM Full Code [id number]  Pal Real MD Inpatient    2012  3:31 PM 2016 11:59 AM Full Code [id number] See health care directive for specifics.  Page 3.  Marivel Villarreal, Marivel Ocampo Outpatient      Advance Directives        Scanned docmt in ACP Activity?           Yes, scanned ACP docmt        Hospital Problems as of 2018              Priority Class Noted POA    Coronary atherosclerosis of native coronary artery Medium  Unknown Yes    Hyperlipidemia LDL goal <100 Medium  Unknown Yes    Essential hypertension, benign Medium  Unknown Yes    Hypothyroidism Medium  Unknown Yes    GERD (gastroesophageal reflux disease) Medium  " Unknown Yes    Major depressive disorder, recurrent episode, mild (H) Medium  11/2/2010 Yes    JENIFER (obstructive sleep apnea) Medium  11/17/2014 Yes    CAD S/P percutaneous coronary angioplasty Medium  5/11/2016 Yes    Sinoatrial node dysfunction (H) Medium  11/10/2016 Yes    Cardiac pacemaker - [employer] dual lead - Not Dependent Medium  3/2/2017 Yes    Closed fracture of shaft of fibula Medium  12/2/2018 Yes    * (Principal)Fall Medium  12/2/2018 Yes      Non-Hospital Problems as of 12/4/2018              Priority Class Noted    Family history of colon cancer Medium  5/4/2010    Advanced directives, counseling/discussion Medium  6/16/2011    Angina pectoris (H) Medium  2/8/2012    Vulvar pruritus Medium  11/12/2013    Lichen planus Medium  11/19/2013    S/P cardiac pacemaker procedure Medium  11/9/2016    Insect bite Medium  6/28/2017    Primary osteoarthritis of left hip Medium  9/21/2018      Immunizations     Name Date      Influenza (High Dose) 3 valent vaccine 10/21/16     Influenza (IIV3) PF 09/15/15     Influenza (IIV3) PF 09/04/12     Influenza (IIV3) PF 10/19/11     Pneumo Conj 13-V (2010&after) 12/23/15     Pneumococcal 23 valent 05/04/10     TD (ADULT, 7+) 05/04/10     Zoster vaccine, live 01/15/13          END      ASSESSMENT     Discharge Profile Flowsheet     EXPECTED DISCHARGE     COMMUNICATION ASSESSMENT      Expected Discharge Date  12/04/18 12/03/18 1408   Patient's communication style  spoken language (English or Bilingual) 12/02/18 1754    DISCHARGE NEEDS ASSESSMENT     FINAL RESOURCES      Concerns To Be Addressed  discharge planning concerns 12/03/18 1408   Resources List  Skilled Nursing Facility 12/03/18 1408    Concerns Comments  needs TCU 12/03/18 1408   [facility] Facility  [facility] [address] [phone], Fax: [phone] 12/03/18 1623    Patient/family verbalizes understanding of discharge plan recommendations?  Yes 12/03/18 1408   PAS Number  [id number] 12/03/18  "1614    Medical Team notified of plan?  yes 12/03/18 1408   SKIN      Readmission Within The Last 30 Days  no previous admission in last 30 days 12/03/18 1408   Inspection of bony prominences  Full except (identify areas not inspected) 12/03/18 2332    Anticipated Changes Related to Illness  inability to care for self 12/03/18 1408   Full except areas not inspected   Ankle, right;Heel, right;Foot, right 12/03/18 2332    Equipment Currently Used at Home  none 12/03/18 1408   Inspection under devices  Focused Inspection (identify device(s) inspected) 12/03/18 2332    Transportation Available  car;family or friend will provide 12/03/18 1408   Focused inspection under devices  Other (RLE below knee/ ace wrap with soft splint) 12/03/18 2332    # of Referrals Placed by CTS  Post Acute Facilities 12/03/18 1408   Skin WDL  ex 12/03/18 2332    Does Patient Need a Referral for Clinic CC  Yes 12/03/18 1408   Skin Color/Characteristics  bruised (ecchymotic) 12/03/18 2332    Coordination Referral Criteria  Fragility 12/03/18 1408   Skin Temperature  warm 12/03/18 2332    [facility] 12/03/18 1408   Skin Moisture  dry 12/03/18 2332    Primary Care MD Name  Dr Matthew 12/03/18 1408   Skin Elasticity  quick return to original state 12/03/18 2332    Is patient in another telemonitoring program  No 12/03/18 1408   Skin Integrity  bruise(s);incision(s) 12/03/18 2332    GASTROINTESTINAL (ADULT,PEDIATRIC,OB)     SAFETY      GI WDL  WDL 12/03/18 2332   Safety WDL  WDL 12/03/18 2332    Last Bowel Movement  12/02/18 12/03/18 1943   All Alarms  alarm(s) activated and audible 12/03/18 2332    Passing flatus  yes 12/02/18 1807                      Assessment WDL (Within Defined Limits) Definitions           Safety WDL     Effective: 09/28/15    Row Information: <b>WDL Definition:</b> Bed in low position, wheels locked; call light in reach; upper side rails up x 2; ID band on<br> <font color=\"gray\"><i>Item=AS safety " "wdl>>List=AS safety wdl>>Version=F14</i></font>      Skin WDL     Effective: 09/28/15    Row Information: <b>WDL Definition:</b> Warm; dry; intact; elastic; without discoloration; pressure points without redness<br> <font color=\"gray\"><i>Item=AS skin wdl>>List=AS skin wdl>>Version=F14</i></font>      Vitals     Vital Signs Flowsheet     VITAL SIGNS     Comfort  comfortably manageable 12/04/18 0451    Temp  97.7  F (36.5  C) 12/04/18 0824   Change in Pain  getting better 12/1935    Temp src  Oral 12/04/18 0824   Pain Control  partially effective 12/1935    Resp  18 12/04/18 0824   Functioning  can do most things, but pain gets in the way of some 12/1935    Pulse  67 12/04/18 0824   Sleep  normal sleep 12/1935    Heart Rate  71 12/03/18 1858   HEIGHT AND WEIGHT      Pulse/Heart Rate Source  Monitor 12/04/18 0824   Height  1.676 m (5' 6\") 12/02/18 1249    BP  166/52 12/04/18 0824   Weight  74.8 kg (165 lb) 12/02/18 1249    BP Location  Left arm 12/04/18 0824   BSA (Calculated - sq m)  1.87 12/02/18 1249    LYING ORTHOSTATIC BP     BMI (Calculated)  26.69 12/02/18 1249    Lying Orthostatic BP  160/68 12/03/18 0724   POSITIONING      Lying Orthostatic Pulse  71 bpm 12/03/18 0724   Body Position  independently positioning;neutral body alignment;neutral head position;supine;supine, legs elevated;supine, head elevated 12/04/18 0044    SITTING ORTHOSTATIC BP     Head of Bed (HOB)  HOB at 30-45 degrees 12/04/18 0044    Sitting Orthostatic BP  118/67 12/03/18 0724   Chair  Upright in chair 12/03/18 1302    Sitting Orthostatic Pulse  77 bpm 12/03/18 0724   Positioning/Transfer Devices  pillows 12/04/18 0044    STANDING ORTHOSTATIC BP     DAILY CARE      Standing Orthostatic BP  146/69 12/03/18 0724   Activity Management  bedrest with commode 12/04/18 0044    Standing Orthostatic Pulse  80 bpm 12/03/18 0724   Activity Assistance Provided  assistance, 1 person 12/04/18 0044    OXYGEN THERAPY     [facility]" COMA SCALE      SpO2  95 % 12/04/18 0824   Best Eye Response  4-->(E4) spontaneous 12/03/18 1943    O2 Device  None (Room air) 12/04/18 0824   Best Motor Response  6-->(M6) obeys commands 12/03/18 1943    Oxygen Delivery  10 LPM 12/03/18 1142   Best Verbal Response  5-->(V5) oriented 12/03/18 1943    PAIN/COMFORT     [place] Coma Scale Score  15 12/03/18 1943    Patient Currently in Pain  yes 12/03/18 1934   ECG      Preferred Pain Scale  CAPA (Clinically Aligned Pain Assessment) ([facility] Adults Only) 12/03/18 1934   ECG Rhythm  Sinus rhythm 12/03/18 1230    Patient's Stated Pain Goal  3 12/02/18 1708   Ectopy  None 12/03/18 1230    0-10 Pain Scale  4 12/03/18 1934   RESPIRATORY MONITORING      Pain Location  Ankle 12/02/18 1909   Respiratory Monitoring (EtCO2)  33 mmHg 12/03/18 1259    Pain Orientation  Right 12/02/18 1708   ANALGESIA SIDE EFFECTS MONITORING      Pain Descriptors  Aching 12/02/18 1954   Side Effects Monitoring: Respiratory Quality  R 12/1935    Response to Interventions  Absence of nonverbal indicators of pain 12/03/18 0645   Side Effects Monitoring: Respiratory Depth  N 12/1935    CLINICALLY ALIGNED PAIN ASSESSMENT (CAPA) ([facility] ADULTS ONLY)     Side Effects Monitoring: Sedation Level  1 12/1935            Patient Lines/Drains/Airways Status    Active LINES/DRAINS/AIRWAYS     Name: Placement date: Placement time: Site: Days: Last dressing change:    Peripheral IV 12/02/18 Left Upper forearm 12/02/18   1345   Upper forearm   1     Incision/Surgical Site 12/03/18 Right Ankle 12/03/18   1035    1             Patient Lines/Drains/Airways Status    Active PICC/CVC     None            Intake/Output Detail Report     Date Intake     Output   Net    Shift P.O. I.V. IV Piggyback Total Urine Blood Total       Noc 12/02/18 2300 - 12/03/18 0659 -- -- -- -- -- -- -- 0    Day 12/03/18 0700 - 12/03/18 1459 100 700 -- 800 175 15 190 610    Esmer 12/03/18 1500  - 12/03/18 2259 300 586 -- 886 700 -- 700 186    Noc 12/03/18 2300 - 12/04/18 0659 -- -- -- -- 200 -- 200 -200    Day 12/04/18 0700 - 12/04/18 1459 -- -- -- -- -- -- -- 0      Last Void/BM       Most Recent Value    Urine Occurrence 1 at 12/03/2018 1852    Stool Occurrence 1 at 12/02/2018 1829      Case Management/Discharge Planning     Case Management/Discharge Planning Flowsheet     REFERRAL INFORMATION     Expected Discharge Date  12/04/18 12/03/18 1408    Admission Type  observation 12/03/18 1406   ASSESSMENT/CONCERNS TO BE ADDRESSED      Arrived From  home or self-care 12/03/18 1406   Concerns To Be Addressed  discharge planning concerns 12/03/18 1408    Referral Source  physician 12/03/18 1406   Concerns Comments  needs TCU 12/03/18 1408    # of Referrals Placed by CTS  Post Acute Facilities 12/03/18 1408   DISCHARGE PLANNING      Reason For Consult  discharge planning 12/03/18 1406   Patient/family verbalizes understanding of discharge plan recommendations?  Yes 12/03/18 1408    Record Reviewed  clinical discipline documentation;history and physical;medical record;patient profile;plan of care 12/03/18 1406   Medical Team notified of plan?  yes 12/03/18 1408    CTS Assigned to Case  Jaimee 12/03/18 1406   Readmission Within The Last 30 Days  no previous admission in last 30 days 12/03/18 1408    [facility]  [facility] 12/03/18 1408   Anticipated Changes Related to Illness  inability to care for self 12/03/18 1408    Primary Care MD Name  Dr Matthew 12/03/18 1408   Transportation Available  car;family or friend will provide 12/03/18 1408    LIVING ENVIRONMENT     Does Patient Need a Referral for Clinic CC  Yes 12/03/18 1408    Lives With  alone 12/03/18 1408   Coordination Referral Criteria  Fragility 12/03/18 1408    Living Arrangements  apartment 12/03/18 1408   FINAL RESOURCES      Provides Primary Care For  no one 12/03/18 1408   Equipment Currently Used at Home  none 12/03/18 1408    Quality Of  Family Relationships  supportive;involved 12/03/18 1408   Resources List  Skilled Nursing Facility 12/03/18 1408    Able to Return to Prior Living Arrangements  no 12/03/18 1408   Skilled Nursing Facility  [facility] [phone], Fax: [phone] 12/03/18 1623    HOME SAFETY     PAS Number  [id number] 12/03/18 1614    Patient Feels Safe Living in Home?  yes 12/03/18 1408   ABUSE RISK SCREEN      ASSESSMENT OF FAMILY/SOCIAL SUPPORT     QUESTION TO PATIENT:  Has a member of your family or a partner(now or in the past) intimidated, hurt, manipulated, or controlled you in any way?  no 12/02/18 1801    Marital Status   12/03/18 1408   QUESTION TO PATIENT: Do you feel safe going back to the place where you are living?  yes 12/02/18 1801    Who is your support system?  Children 12/03/18 1408   OBSERVATION: Is there reason to believe there has been maltreatment of a vulnerable adult (ie. Physical/Sexual/Emotional abuse, self neglect, lack of adequate food, shelter, medical care, or financial exploitation)?  no 12/02/18 1801    Description of Support System  Supportive;Involved 12/03/18 1408   OTHER      Support Assessment  Lacks adequate physical care 12/03/18 1408   Are you depressed or being treated for depression?  Yes 12/03/18 0237    COPING/STRESS     HOMICIDE RISK      Major Change/Loss/Stressor  none 12/02/18 1801   Feels Like Hurting Others  no 12/02/18 1245    EXPECTED DISCHARGE

## 2018-12-02 NOTE — IP AVS SNAPSHOT
` `     [facility] SURGICAL: [phone]                 INTERAGENCY TRANSFER FORM - NOTES (H&P, Discharge Summary, Consults, Procedures, Therapies)   2018                    Hospital Admission Date: 2018  CHRISTIANA BERGER   : 10/25/1931  Sex: Female        Patient PCP Information     Provider PCP Type    Kelly Matthew PA-C General         History & Physicals      H&P by Cristina Randle PA-C at 2018  4:40 PM     Author:  Cristina Randle PA-C Service:  Hospitalist Author Type:  Physician Assistant Suzi MCALLISTER    Filed:  2018  8:33 PM Date of Service:  2018  4:40 PM Creation Time:  2018  4:40 PM    Status:  Attested :  Cristina Randle PA-C (Physician Assistant Suzi MCALLISTER)    Cosigner:  Cristina Hampton DO at 12/3/2018 12:17 AM        Attestation signed by Cristina Hampton DO at 12/3/2018 12:17 AM        Physician Attestation   I, Cristina Hampton, have reviewed and discussed with the advanced practice provider their history, physical and plan for Christiana Berger. I did not participate in a shared visit by interviewing or examining the patient and this should be billed as an advanced practice provider only visit.    Cristina Hampton  Date of Service (when I saw the patient): I did not personally see this patient today.                               [facility]    History and Physical  Hospital Medicine       Date of Admission:  2018  Date of Service: 2018[RL1.1]     Assessment & Plan[RL1.2]   Christiana Berger is a 87 year old female with[RL1.1] past medical history of coronary artery disease s/p stenting x 6, sinus node dysfunction s/p pacemaker, hypertension, hyperlipidemia, hypothyroidism, GERD, depression[RL1.3] who presents[RL1.1] with right lower extremity pain after mechanical fall[RL1.3]     Mechanical[RL1.4] Fall[RL1.1]  Mechanical fall, tripping over a suitcase landing primarily  on right leg. No head strike or loss of consciousness. No preceding symptoms. XR as below shows fibula fracture.  - further management as below[RL1.3]     Distal[RL1.1] Right[RL1.3] Fibula Fracture  Fall as above. XR ankle shows comminuted distal fibular fracture with mild widening of ankle mortise.[RL1.1] Splint applied in ED.[RL1.3] ED spoke with on-call PA for [facility] ankle, patient will be evaluated in the morning.  - orthopedics consult  - Pain: scheduled acetaminophen, oxycodone prn[RL1.1] breakthrough[RL1.5]  - NPO at midnight until evaluation by ortho service[RL1.1]  - continue immobilization  - apply ice prn[RL1.3]    Lightheadedness, orthostatic hypotension[RL1.4]  Lightheadedness over past week with postural changes. Recently treated for pneumonia with azithromycin and prednisone burst. Additionally reports increased urinary frequency over past month with decreased PO intake due to the urinary frequency. Vital significant for orthostatic hypotension in ED,  --> 76. Received 1 L of fluids in ED. Suspect volume depletion due to illness and poor oral intake.   - IVF: LR at[RL1.3] 125[RL1.5] ml/hr  -[RL1.3] hold home[RL1.5] losartan[RL1.3] for now[RL1.5]  - infectious treatment as below  - repeat orthostatic vitals in morning[RL1.3]    Abnormal UA, urinary frequency, overactive bladder[RL1.1]  Urinary frequency x 1 month, worse in past week with urinary nearly every hour at night. No dysuria.[RL1.5] UA shows 41 WBC, moderate leukocyte esterase. Treated with Keflex in ED. Afebrile. WBC 11.8[RL1.1] though patient was on prednisone last dose today.[RL1.5]  - follow pending urine culture  - continue Keflex[RL1.1] BID[RL1.5]    Acute Kidney Injury  Creatinine 1.08. BUN 28. GFR 48. Baseline creatinine 0.8.[RL1.1] Suspected[RL1.3] etiology is[RL1.1] pre-renal due to reduce oral intake and urinary frequency[RL1.3].  - IVF: LR at 100 ml/hr  - Monitor I/O's, daily weights  - follow BMP[RL1.1]    Recently treated  "pneumonia  Shortness of breath resolving. Intermittent cough. Treated with Azithromycin and prednisone after CT showed \"Mild patchy bilateral groundglass airspace opacities could be seen with pulmonary edema or other alveolar infiltrate.\"  - monitor[RL1.3]    Coronary Artery Disease  Chronic.[RL1.1] No[RL1.3] current symptoms. S/P PCI complicated by dissection with stents to LAD and D1 5/2016, stenting in 2004 to RCA.  Nuclear stress in 2017 showed small fixed defect.  - continue statin, beta blocker[RL1.1]  - hold home losartan for now due to orthostatic hypotension[RL1.5]  - hold home ASA for potential surgery[RL1.6]    Sinoatrial node dysfunction[RL1.7]  S/P[RL1.1] Cardiac pacemaker - [employer] dual lead[RL1.7]  Follows with cardiology, pacemaker implantation 11/2016 for sinus node dysfunction.    Hyperlipidemia  Chronic.  - continue home atorvastatin    Hypertension  Chronic. Blood pressures reviewed, stable though significant orthostatic hypotension noted. Outpatient readings largely 120s-150.  -[RL1.1] hold home losartan for now due to orthostatic hypotension[RL1.5]  - continue home metoprolol    Hypothyroidism  Chronic.   - continue home levothyroxine    Gastroesophageal reflux disease  Chronic.  - continue home ranitidine[RL1.1]    JENIFER (obstructive sleep apnea)[RL1.7]  Not compliant with CPAP.[RL1.3]    FEN:  - LR at[RL1.1] 125[RL1.5] ml/hr  - Will monitor electrolytes and replace as needed  - Regular diet    DVT Prophylaxis:[RL1.1] Pneumatic Compression Devices[RL1.3]  Code Status:[RL1.1] Full Code[RL1.3]    Disposition: Anticipate discharge in[RL1.1] 1-2[RL1.3] days once[RL1.1] pain improving, evaluation by orthopedics and safe disposition identified[RL1.3]. Appropriate for[RL1.1] observation level[RL1.3] care    I have discussed patient and formulated plan with Dr. Cristina Hampton.    HALI RosaC  [facility] Medicine[RL1.1]        Primary Care Physician[RL1.2]   Kelly Matthew " [phone]    History is obtained from the patient, ED notes and review of the EMR.[RL1.1]    Past Medical History    Past Medical History:   Diagnosis Date     Coronary atherosclerosis of native coronary artery     s/p MI ([facility])     Essential hypertension, benign      GERD (gastroesophageal reflux disease)      History of transient ischemic attack (TIA)      Hyperlipidemia LDL goal <100      Hypothyroidism      Major depressive disorder, recurrent episode, mild (H) 5/18/2011     Peptic ulcer disease with hemorrhage     Remote history of stomach ulcer, requiring transfusion after chronic bleeding     Patient Active Problem List    Diagnosis Date Noted     Primary osteoarthritis of left hip 09/21/2018     Priority: Medium     Insect bite 06/28/2017     Priority: Medium     Cardiac pacemaker - [employer] dual lead - Not Dependent 03/02/2017     Priority: Medium     Sinoatrial node dysfunction (H) 11/10/2016     Priority: Medium     S/P cardiac pacemaker procedure 11/09/2016     Priority: Medium     CAD S/P percutaneous coronary angioplasty 05/11/2016     Priority: Medium     JENIFER (obstructive sleep apnea) 11/17/2014     Priority: Medium     Lichen planus 11/19/2013     Priority: Medium     Vulvar pruritus 11/12/2013     Priority: Medium     Angina pectoris (H) 02/08/2012     Priority: Medium     Advanced directives, counseling/discussion 06/16/2011     Priority: Medium     Patient will bring a copy. Chart r/q to make sure no copy.    GUSTAVO Cunningham MA    Advance Directive Problem List Overview:   Name Relationship Phone    Primary Health Care Agent Albaro Sal  [phone]         Alternative Health Care Agent Esther Aragonon  [phone]     Reviewed Health Care Directive dated 05/13/2005, scanned into EPIC 01/2012.  Marivel Villarreal CMA                Major depressive disorder, recurrent episode, mild (H) 11/02/2010     Priority: Medium     Family history of colon cancer 05/04/2010     Priority: Medium      Coronary atherosclerosis of native coronary artery      Priority: Medium     s/p MI ([facility])       Hyperlipidemia LDL goal <100      Priority: Medium     Essential hypertension, benign      Priority: Medium     Hypothyroidism      Priority: Medium     GERD (gastroesophageal reflux disease)      Priority: Medium      Past Surgical History   Past Surgical History:   Procedure Laterality Date     APPENDECTOMY       CARDIAC CATHERIZATION  1/15/04    drug-eluding stent RCA, Dr. Pérez, [facility]     CARDIAC CATHERIZATION  2/16/12    diffuse mild/mod disease, no new stent     CATARACT IOL, RT/LT  1/26/12    RT, Dr. Wynne     CHOLECYSTECTOMY, OPEN       COLONOSCOPY  2008    [facility]     FRACTURE TX, ANKLE RT/LT      LT, 2 screws remain     HC EXCISE HAND/FOOT NEUROMA      RT     HC LARYNGOSCOPY DIRECT W VOCAL CORD INJECTION      vocal cord polypectomy     HC REMOVAL OF OVARIAN CYST(S)       HC TRANSCATH STENT INIT VESSEL,PERCUT      x 2     REPAIR HAMMER TOE  9/13/10    multiple + RT great toe tendon release, Dr. Sanchez DPM     STENT  5-2016    Cardiac stents 6 placed.      History of Present Illness[RL1.2]   Christiana Sal is a 87 year old female who presents[RL1.1] after mechanical fall at home.[RL1.3]     Last week she started to feel lightheaded when she would change positions (sitting to standing, laying to sitting). She came to the emergency department on 11/28 due to the lightheadedness. She states she was also feeling short of breath and had intermittent coughing. She was diagnosed with pneumonia and prescribed azithromycin and prednisone. She thinks she felt somewhat better after starting this. No fevers or chills.    Last night she was getting up to go to the bathroom when she tripped over a suitcase that was sitting on the floor. Landed on her right side. No head strike, no loss of consciousness. She felt pain near her right ankle following the fall. She was able to get up with  help and walk around following the fall though she did have significant pain in that left leg. Last night she had some achyness in her hips and thighs.     Patient states she has increased urinary frequency at night over about the past month and has noticed this is getting worse. No dysuria. One episode of diarrhea.    Patient denies dizziness, congestion, rhinorrhea, sore throat, palpitations, chest pain, abdominal pain, nausea, vomiting, rash or wounds.[RL1.4]    Prior to Admission Medications   Prior to Admission Medications   Prescriptions Last Dose Informant Patient Reported? Taking?   ASPIRIN 81 MG PO TABS 2018 at hs Self Yes No   Sig: Take 2 tablets by mouth every evening    Biotin 10 MG CAPS 2018 at 1200  Yes Yes   Sig: Take 1 capsule by mouth daily   CALCIUM 600 + D OR Past Week at Unknown time Self Yes Yes   Si tablet by mouth daily   FISH OIL OR 2018 at Unknown time Self Yes Yes   Si daily   Multiple Vitamins-Minerals (OCUVITE PO) 2018 at Unknown time Self Yes Yes   Sig: Take 1 tablet by mouth daily.   Vitamin D, Cholecalciferol, 1000 units TABS 2018 at 1200  Yes Yes   Sig: Take 2,000 Units by mouth daily   acetaminophen (TYLENOL) 500 MG tablet 2018 at 1000 Self No Yes   Sig: Take 2 tablets (1,000 mg) by mouth every 8 hours as needed for mild pain   albuterol (PROAIR HFA/PROVENTIL HFA/VENTOLIN HFA) 108 (90 BASE) MCG/ACT Inhaler More than a month at Unknown time Self No No   Sig: Inhale 2 puffs into the lungs every 6 hours as needed for shortness of breath / dyspnea or wheezing   atorvastatin (LIPITOR) 40 MG tablet 2018 at am Self No Yes   Sig: TAKE ONE TABLET BY MOUTH EVERY DAY   azithromycin (ZITHROMAX Z-RADHIKA) 250 MG tablet 2018 at am  No Yes   Sig: Two tablets on the first day, then one tablet daily for the next 4 days   citalopram (CELEXA) 20 MG tablet 2018 at am Self No Yes   Sig: TAKE ONE TABLET BY MOUTH EVERY DAY   diclofenac (VOLTAREN) 50 MG EC  tablet More than a month at Unknown time Self No No   Sig: Take 1 tablet (50 mg) by mouth 3 times daily as needed for moderate pain   levothyroxine (SYNTHROID/LEVOTHROID) 75 MCG tablet 12/1/2018 at 1200  No Yes   Sig: TAKE 1 TABLET BY MOUTH ONCE DAILY (NEEDS  LAB  WORK)   losartan (COZAAR) 25 MG tablet 11/30/2018 at hs Self No No   Sig: TAKE 1 TABLET BY MOUTH ONCE DAILY   Patient taking differently: TAKE 1 TABLET BY MOUTH EVERY EVENING   magnesium oxide (MAG-OX) 400 MG tablet 12/1/2018 at 1200  Yes Yes   Sig: Take 400 mg by mouth daily   meclizine (ANTIVERT) 25 MG tablet Past Month at Unknown time Self No Yes   Sig: TAKE ONE TABLET BY MOUTH EVERY 6 HOURS AS NEEDED FOR DIZZINESS   metoprolol (TOPROL-XL) 25 MG 24 hr tablet 12/1/2018 at am Self No Yes   Sig: Take 0.5 tablets (12.5 mg) by mouth daily   nitroglycerin (NITROSTAT) 0.4 MG SL tablet More than a month at Unknown time Self No No   Sig: Place 1 tablet (0.4 mg) under the tongue every 5 minutes as needed for chest pain (call your doctor if you take a third dose)   ondansetron (ZOFRAN) 4 MG tablet More than a month at Unknown time Self No No   Sig: Take 1 tablet (4 mg) by mouth every 6 hours as needed for nausea   pantoprazole (PROTONIX) 40 MG EC tablet More than a month at Unknown time Self No No   Sig: TAKE ONE TABLET BY MOUTH ONCE DAILY   Patient taking differently: TAKE ONE TABLET BY MOUTH ONCE EVERY EVENING   pramipexole (MIRAPEX) 0.5 MG tablet Past Week at Unknown time Self No Yes   Sig: TAKE ONE TABLET BY MOUTH AT BEDTIME   predniSONE (DELTASONE) 20 MG tablet 12/1/2018 at am  No Yes   Sig: Take two tablets (= 40mg) each day for 5 (five) days   ranitidine (ZANTAC) 150 MG tablet 11/30/2018 at hs Self No No   Sig: TAKE TWO TABLETS BY MOUTH AT BEDTIME      Facility-Administered Medications Last Administration Doses Remaining   betamethasone acet & sod phos (CELESTONE) injection 6 mg 10/26/2018  8:00 AM    ropivacaine (NAROPIN) injection 2 mL 10/26/2018  8:00 AM          Allergies   Allergies   Allergen Reactions     Demerol Nausea     Lisinopril Cough     Meperidine Unknown     Sulfa Drugs Other (See Comments) and Unknown     Questionable itching reported by patient     Family History    Family History   Problem Relation Age of Onset     C.A.D. Father      Hypertension Father      Myocardial Infarction Father 66      from MI     C.A.D. Brother      Myocardial Infarction Brother      Cancer Brother      skin ca     Breast Cancer Sister      Cancer - colorectal Sister      Heart Disease Sister      Neurologic Disorder Mother      migraine     C.A.D. Mother      Hypertension Mother      Heart Failure Mother      Thyroid Disease Son      cretinism     Hypertension Son      Heart Disease Son      Psychotic Disorder Sister      Hypertension Sister      Heart Disease Sister      CABG     Hypertension Brother      C.A.D. Brother      Myocardial Infarction Brother      Arthritis Daughter      RA     Hypertension Sister      Heart Disease Sister      Cancer Sister      Blood Disease Sister      Neurologic Disorder Child      migraine (2 children)       Social History   Social History     Social History     Marital status:      Spouse name: N/A     Number of children: 4     Years of education: some adry     Occupational History      Retired     Social History Main Topics     Smoking status: Never Smoker     Smokeless tobacco: Never Used      Comment: Never smoker; no secondhand smoke exposure     Alcohol use Yes      Comment: social     Drug use: No     Sexual activity: Not Currently     Other Topics Concern     Parent/Sibling W/ Cabg, Mi Or Angioplasty Before 65f 55m? No     Social History Narrative    She just moved in to [place] in a alf center.       Review of Systems[RL1.2]   The 10 point Review of Systems is negative other than noted in the HPI or here.[RL1.1]     Physical Exam   /83 (BP Location: Right arm)  Temp 97.6  F (36.4  C) (Oral)   "Resp 16  Ht 1.676 m (5' 6\")  Wt 74.8 kg (165 lb)  SpO2 97%  BMI 26.63 kg/m2[RL1.2]     Weight:[RL1.1] 165 lbs 0 oz Body mass index is 26.63 kg/(m^2).[RL1.2]     Constitutional: Patient is[RL1.1] lying down comfortably[RL1.3] on exam. Alert & oriented. Pleasant & cooperative. No apparent distress. Appears nontoxic. Appears stated age.  Eyes: Sclera are anicteric, EOMI  HENT: Normocephalic. Atraumatic. Oropharynx is clear and moist.   Lymph/Hematologic: No epitrochlear, axillary, preauricular, postauricular, occipital, sub-mandibular, tonsillar, sub-mental, anterior or posterior cervical, or supraclavicular lymphadenopathy is appreciated.  Cardiovascular: Regular rate and normal rhythm. No murmur, rubs or gallops noted. Radial pulses are 2+ bilaterally. Distal pulses are intact.[RL1.1] No[RL1.3] lower extremity edema.  Respiratory: No accessory muscle usage. Speaking in full sentences. Clear to auscultation bilaterally without wheezes, crackles or rhonchi.  GI: Normal bowel sounds present, soft, non-tender, non-distended. No rebound or guarding.  Genitourinary: Deferred  Musculoskeletal: Normal muscle bulk and tone. Moves all extremities appropriately[RL1.1] with limited ROM of right lower extremity which is splinted. Right lower extremity neurovascularly intact distally.[RL1.3]  Skin: Warm and dry, no rashes.[RL1.1]     Data[RL1.2]   Data reviewed today:[RL1.1]     Recent Labs  Lab 12/02/18  1345 11/28/18  0948   WBC 11.8* 6.6   HGB 12.5 12.7   MCV 94 93    265    141   POTASSIUM 3.2* 3.9   CHLORIDE 105 108   CO2 26 26   BUN 28 15   CR 1.08* 0.83   ANIONGAP 8 7   YVONNE 7.8* 8.3*   * 82   ALBUMIN  --  3.5   PROTTOTAL  --  6.5*   BILITOTAL  --  0.6   ALKPHOS  --  99   ALT  --  18   AST  --  24   TROPI  --  <0.015     Recent Results (from the past 24 hour(s))   Ankle XR, G/E 3 views, right    Narrative    RIGHT ANKLE THREE VIEWS   12/2/2018 3:45 PM     HISTORY: Fall at home from standing height. " Evaluate for acute bony  process due to trauma.     COMPARISON: None.      Impression    IMPRESSION: There is a comminuted distal fibular fracture with some  mild widening of the ankle mortise. Degenerative changes are seen in  the midfoot.    MARK RICHARDSON MD   XR Tibia & Fibula Left 2 Views    Narrative    LEFT TIBIA-FIBULA TWO VIEWS 12/2/2018 3:45 PM     HISTORY: Fall at home last night, bruising and swelling, with pain  with weightbearing. Evaluate for acute bony process due to trauma.     COMPARISON: None.      Impression    IMPRESSION: Negative for any acute fracture. There has been previous  pinning of the distal fibula.    MARK RICHARDSON MD[RL1.2]     I personally reviewed the EKG tracing showing NSR, 1st degree AV block with left bundle, similar to prior..    I have discussed patient and formulated plan with Dr. Cristina Hampton.    Chart documentation with keystrokes and/or Dragon voice recognition software. Although reviewed after completion, some word and grammatical error may remain.  Cristina Randle PA-C  [facility] Medicine[RL1.1]        Revision History        User Key Date/Time User Provider Type Action    > RL1.6 12/2/2018  8:33 PM Cristina Randle PA-C Physician Assistant - C Sign     RL1.5 12/2/2018  5:39 PM Cristina Randle PA-C Physician Assistant - C Sign     RL1.3 12/2/2018  5:21 PM Cristina Randle PA-C Physician Assistant - C      RL1.2 12/2/2018  5:11 PM Cristina Randle PA-C Physician Assistant - C      RL1.4 12/2/2018  5:03 PM Cristina Randle PA-C Physician Assistant - C      RL1.7 12/2/2018  4:42 PM Cristina Randle PA-C Physician Assistant - C      RL1.1 12/2/2018  4:40 PM Cristina Randle PA-C Physician Assistant - C                   Discharge Summaries     No notes of this type exist for this encounter.         Consult Notes      Consults by Jaimee Williamson RN at 12/3/2018  2:08 PM     Author:  Clay, Jaimee, RN Service:  Nursing Author Type:  Case  Manager    Filed:  12/3/2018  2:14 PM Date of Service:  12/3/2018  2:08 PM Creation Time:  12/3/2018  2:08 PM    Status:  Signed :  Jaimee Williamson RN ()     Consult Orders:    1. Care Coordinator IP Consult [[id number]] ordered by Cristina Randle PA-C at 12/02/18 5712                Care Transition Initial Assessment - RN  Reason For Consult: discharge planning   Met with: Patient.    DATA[SN1.1]   Principal Problem:    Fall  Active Problems:    Coronary atherosclerosis of native coronary artery    Hyperlipidemia LDL goal <100    Essential hypertension, benign    Hypothyroidism    GERD (gastroesophageal reflux disease)    Major depressive disorder, recurrent episode, mild (H)    JENIFER (obstructive sleep apnea)    CAD S/P percutaneous coronary angioplasty    Sinoatrial node dysfunction (H)    Cardiac pacemaker - [employer] dual lead - Not Dependent    Closed fracture of shaft of fibula[SN1.2]       [facility]  Primary Care MD Name: Dr Matthew  Contact information and PCP information verified: Yes      ASSESSMENT  Cognitive Status: awake, alert and oriented.       Resources List: Skilled Nursing Facility     Lives With: alone  Living Arrangements: apartment  Quality Of Family Relationships: supportive, involved  Description of Support System: Supportive, Involved   Who is your support system?: Children   Support Assessment: Lacks adequate physical care   Insurance Concerns: Other Talked with patient about up front cost for a TCU          This writer met with pt in her room, introduced self and role. Patient currently lives alone in an independent apartment and verbalizes concern about returning with a non-weightbearing status. Patient unable to secure 24/7 care and would like to look into a TCU. Writer informed patient there would be an upfront cost as medicare would not cover due to observation status and not having a 3 night stay. Patient is concerned about the  cost but wants writer to send referrals and find out the upfront cost to go. Patient was provided with Medicare certified nursing home list. Pts choices are as follows [facility] (Phone: [phone] Fax: [phone]) [facility] Phone (Main Phone:[phone] Admissions Phone:[phone] Fax: [phone]) [facility] TCU Phone: (Admissions: [phone] RN Report: [phone] Fax: [phone]) .      PLAN    Pending referrals and CTS to inform patient of cost      Jaimee Williamson CTS RN [facility] [phone] [facility] [phone][SN1.1]           Revision History        User Key Date/Time User Provider Type Action    > SN1.2 12/3/2018  2:14 PM Jaimee Williamson RN Case Manager Sign     SN1.1 12/3/2018  2:08 PM Jaimee Williamson RN Case Manager             Consults by Jacinto Adams MD at 12/3/2018  9:34 AM     Author:  Jacinto Adams MD Service:  Orthopedics Author Type:  Physician    Filed:  12/3/2018  9:34 AM Date of Service:  12/3/2018  9:34 AM Creation Time:  12/3/2018  9:28 AM    Status:  Signed :  Jacinto Adams MD (Physician)     Consult Orders:    1. Orthopedics IP Consult: Patient to be seen: Routine - within 24 hours; closded right distal midly displaced, communited fibula fracture.; Consultant may enter orders: Yes [[id number]] ordered by Bernardo Walker MD at 12/02/18 1625                ORTHO CONSULT    REASON FOR CONSULTATION: Dr. Walker requested orthopaedic consultation regarding right distal fibula fracture    HPI:   87F otherwise healthy fell on after a fall when she tripped on suit case  Noted immediate pain and went to ED where work up revealed isolated right distal fibula fracture  She's been having lightheadedness recently -- diagnosed with pneumonia and on prednisone and azithromycin  Also had left hip injection complicated by significant bleeding with large hematoma -- was recovering from that  No  antecedent ankle issues    ROS: A 10 pt ROS was obtained and negative except as mentioned in the HPI    ALLERGIES:[EP1.1]      Allergies   Allergen Reactions     Demerol Nausea     Lisinopril Cough     Meperidine Unknown     Sulfa Drugs Other (See Comments) and Unknown     Questionable itching reported by patient[EP1.2]       MEDICATIONS:[EP1.1]     No current facility-administered medications on file prior to encounter.   Current Outpatient Prescriptions on File Prior to Encounter:  acetaminophen (TYLENOL) 500 MG tablet Take 2 tablets (1,000 mg) by mouth every 8 hours as needed for mild pain   atorvastatin (LIPITOR) 40 MG tablet TAKE ONE TABLET BY MOUTH EVERY DAY   azithromycin (ZITHROMAX Z-RADHIKA) 250 MG tablet Two tablets on the first day, then one tablet daily for the next 4 days   CALCIUM 600 + D OR 1 tablet by mouth daily   citalopram (CELEXA) 20 MG tablet TAKE ONE TABLET BY MOUTH EVERY DAY   FISH OIL OR 1 daily   levothyroxine (SYNTHROID/LEVOTHROID) 75 MCG tablet TAKE 1 TABLET BY MOUTH ONCE DAILY (NEEDS  LAB  WORK)   meclizine (ANTIVERT) 25 MG tablet TAKE ONE TABLET BY MOUTH EVERY 6 HOURS AS NEEDED FOR DIZZINESS   metoprolol (TOPROL-XL) 25 MG 24 hr tablet Take 0.5 tablets (12.5 mg) by mouth daily   Multiple Vitamins-Minerals (OCUVITE PO) Take 1 tablet by mouth daily.   pramipexole (MIRAPEX) 0.5 MG tablet TAKE ONE TABLET BY MOUTH AT BEDTIME   predniSONE (DELTASONE) 20 MG tablet Take two tablets (= 40mg) each day for 5 (five) days   albuterol (PROAIR HFA/PROVENTIL HFA/VENTOLIN HFA) 108 (90 BASE) MCG/ACT Inhaler Inhale 2 puffs into the lungs every 6 hours as needed for shortness of breath / dyspnea or wheezing   ASPIRIN 81 MG PO TABS Take 2 tablets by mouth every evening    losartan (COZAAR) 25 MG tablet TAKE 1 TABLET BY MOUTH ONCE DAILY (Patient taking differently: TAKE 1 TABLET BY MOUTH EVERY EVENING)   nitroglycerin (NITROSTAT) 0.4 MG SL tablet Place 1 tablet (0.4 mg) under the tongue every 5 minutes as needed  for chest pain (call your doctor if you take a third dose)   ranitidine (ZANTAC) 150 MG tablet TAKE TWO TABLETS BY MOUTH AT BEDTIME[EP1.3]       PMH:[EP1.1]  Past Medical History:   Diagnosis Date     Coronary atherosclerosis of native coronary artery     s/p MI ([facility])     Essential hypertension, benign      GERD (gastroesophageal reflux disease)      History of transient ischemic attack (TIA)      Hyperlipidemia LDL goal <100      Hypothyroidism      Major depressive disorder, recurrent episode, mild (H) 5/18/2011     Peptic ulcer disease with hemorrhage     Remote history of stomach ulcer, requiring transfusion after chronic bleeding[EP1.3]       SOCIAL HISTORY:   Nonsmoker  Walks with cane  Lives in senior living -- supposed to be moving to new apartment today    FAMILY HISTORY: Negative for bleeding disorders, clotting disorders, or adverse reactions to anesthesia    EXAM:  General: Pleasant, oriented  Neuro: CN II-XII intact  Skin:  No rashes or lesions  Resp:  Nonlabored breathing  Abd:  Soft, NT, ND  MSK:  Right ankle in splint which was removed.  Moderate swelling and ecchymosis but skin wrinkles and no blisters.  Foot perfused.  Distal sensorimotor exam intact    IMAGING: Xrays of right ankle reviewed.  Comminuted distal fibula fracture with widening of ankle mortise.  No obvious preexisting arthritis    ASSESSMENT:    1. Unstable right distal fibula fracture sustained 12.2.18   2. Comorbidities including recent pnuemonia, CAD s/p pacemaker placement    PLAN:   1. Findings discussed with Christiana.  At this point, recommend ORIF to restore stability and optimize long term ankle function.  As she has no blisters and skin wrinkles, think it's safe to proceed now.  Discussed risks including infection, nonunion, need for further surgery.  Will be in hospital for 2-3 days then likely nursing home for 4-6 weeks.  Anticipate 4 weeks of NWB.    Jacinto Adams MD[EP1.1]           Revision History         User Key Date/Time User Provider Type Action    > EP1.2 12/3/2018  9:34 AM Jacinto Adams MD Physician Sign     EP1.3 12/3/2018  9:29 AM Jacinto Adams MD Physician      EP1.1 12/3/2018  9:28 AM Jacinto Adams MD Physician                      Progress Notes - Physician (Notes from 12/01/18 through 12/04/18)      Progress Notes by Stevo Ortiz MD at 12/3/2018 12:33 PM     Author:  Stevo Ortiz MD Service:  Hospitalist Author Type:  Physician    Filed:  12/4/2018 10:54 AM Date of Service:  12/3/2018 12:33 PM Creation Time:  12/3/2018 12:33 PM    Status:  Addendum :  Stevo Ortiz MD (Physician)         [facility]      Subjective:[JE1.1]  No complaints   Foot is still numb[JE1.2]    Review of Systems:[JE1.1]  CONSTITUTIONAL: NEGATIVE for fever, chills, change in weight  INTEGUMENTARY/SKIN: NEGATIVE for worrisome rashes, moles or lesions  EYES: NEGATIVE for vision changes or irritation  ENT/MOUTH: NEGATIVE for ear, mouth and throat problems  RESP: NEGATIVE for significant cough or SOB  BREAST: NEGATIVE for masses, tenderness or discharge  CV: NEGATIVE for chest pain, palpitations or peripheral edema  GI: NEGATIVE for nausea, abdominal pain, heartburn, or change in bowel habits  : NEGATIVE for frequency, dysuria, or hematuria  MUSCULOSKELETAL:above  NEURO: NEGATIVE for weakness, dizziness or paresthesias  ENDOCRINE: NEGATIVE for temperature intolerance, skin/hair changes  HEME: NEGATIVE for bleeding problems  PSYCHIATRIC: NEGATIVE for changes in mood or affect    Physical Ex[JE1.2]am:  Vitals Were Reviewed    Patient Vitals for the past 16 hrs:   BP Temp Temp src Pulse Heart Rate Resp SpO2   12/03/18 1215 142/72 - - - 53 16 96 %   12/03/18 1200 131/65 - - - 54 12 95 %   12/03/18 1122 125/82 98.6  F (37  C) Oral - 54 13 97 %   12/03/18 0719 118/67 98.4  F (36.9  C) Oral 76 - 18 95 %   12/03/18 0354 167/62 97.8  F (36.6  C) Oral 58 - 18 97 %    12/03/18 0037 133/65 97.5  F (36.4  C) Oral - 63 18 93 %         Intake/Output Summary (Last 24 hours) at 12/03/18 1233  Last data filed at 12/03/18 1117   Gross per 24 hour   Intake             2098 ml   Output              190 ml   Net             1908 ml[JE1.1]       GENERAL APPEARANCE: healthy, alert and no distress  RESP: lungs clear to auscultation - no rales, rhonchi or wheezes  CV: regular rate and rhythm, normal S1 S2, no S3 or S4 and no murmur, click or rub   ABDOMEN: soft, nontender, no HSM or masses and bowel sounds normal  MS: good perfusion of toes  SKIN: clear without significant rashes or lesions    Lab[JE1.3]:  Recent Labs   Lab Test  12/02/18   1345  11/28/18   0948   NA  139  141   POTASSIUM  3.2*  3.9   CHLORIDE  105  108   CO2  26  26   ANIONGAP  8  7   GLC  137*  82   BUN  28  15   CR  1.08*  0.83   YVONNE  7.8*  8.3*     CBC RESULTS:   Recent Labs   Lab Test  12/02/18   1345  11/28/18   0948   WBC  11.8*  6.6   RBC  3.99  4.04   HGB  12.5  12.7   HCT  37.3  37.7   PLT  275  265       Results for orders placed or performed during the hospital encounter of 12/02/18 (from the past 24 hour(s))   CBC with platelets differential   Result Value Ref Range    WBC 11.8 (H) 4.0 - 11.0 10e9/L    RBC Count 3.99 3.8 - 5.2 10e12/L    Hemoglobin 12.5 11.7 - 15.7 g/dL    Hematocrit 37.3 35.0 - 47.0 %    MCV 94 78 - 100 fl    MCH 31.3 26.5 - 33.0 pg    MCHC 33.5 31.5 - 36.5 g/dL    RDW 13.6 10.0 - 15.0 %    Platelet Count 275 150 - 450 10e9/L    Diff Method Automated Method     % Neutrophils 70.5 %    % Lymphocytes 12.6 %    % Monocytes 15.4 %    % Eosinophils 0.7 %    % Basophils 0.3 %    % Immature Granulocytes 0.5 %    Nucleated RBCs 0 0 /100    Absolute Neutrophil 8.3 1.6 - 8.3 10e9/L    Absolute Lymphocytes 1.5 0.8 - 5.3 10e9/L    Absolute Monocytes 1.8 (H) 0.0 - 1.3 10e9/L    Absolute Eosinophils 0.1 0.0 - 0.7 10e9/L    Absolute Basophils 0.0 0.0 - 0.2 10e9/L    Abs Immature Granulocytes 0.1 0 - 0.4 10e9/L     Absolute Nucleated RBC 0.0    Basic metabolic panel   Result Value Ref Range    Sodium 139 133 - 144 mmol/L    Potassium 3.2 (L) 3.4 - 5.3 mmol/L    Chloride 105 94 - 109 mmol/L    Carbon Dioxide 26 20 - 32 mmol/L    Anion Gap 8 3 - 14 mmol/L    Glucose 137 (H) 70 - 99 mg/dL    Urea Nitrogen 28 7 - 30 mg/dL    Creatinine 1.08 (H) 0.52 - 1.04 mg/dL    GFR Estimate 48 (L) >60 mL/min/1.7m2    GFR Estimate If Black 58 (L) >60 mL/min/1.7m2    Calcium 7.8 (L) 8.5 - 10.1 mg/dL   UA reflex to Microscopic   Result Value Ref Range    Color Urine Sherry     Appearance Urine Cloudy     Glucose Urine Negative NEG^Negative mg/dL    Bilirubin Urine Negative NEG^Negative    Ketones Urine 5 (A) NEG^Negative mg/dL    Specific Gravity Urine 1.018 1.003 - 1.035    Blood Urine Negative NEG^Negative    pH Urine 5.0 5.0 - 7.0 pH    Protein Albumin Urine 30 (A) NEG^Negative mg/dL    Urobilinogen mg/dL 2.0 0.0 - 2.0 mg/dL    Nitrite Urine Negative NEG^Negative    Leukocyte Esterase Urine Moderate (A) NEG^Negative    Source Midstream Urine     RBC Urine 6 (H) 0 - 2 /HPF    WBC Urine 41 (H) 0 - 5 /HPF    Bacteria Urine Few (A) NEG^Negative /HPF    Squamous Epithelial /HPF Urine 4 (H) 0 - 1 /HPF    Mucous Urine Present (A) NEG^Negative /LPF    Hyaline Casts 25 (H) 0 - 2 /LPF   Urine Culture   Result Value Ref Range    Specimen Description Midstream Urine     Special Requests Specimen received in preservative     Culture Micro Culture in progress    Ankle XR, G/E 3 views, right    Narrative    RIGHT ANKLE THREE VIEWS   12/2/2018 3:45 PM     HISTORY: Fall at home from standing height. Evaluate for acute bony  process due to trauma.     COMPARISON: None.      Impression    IMPRESSION: There is a comminuted distal fibular fracture with some  mild widening of the ankle mortise. Degenerative changes are seen in  the midfoot.    MARK RICHARDSON MD   XR Tibia & Fibula Left 2 Views    Narrative    LEFT TIBIA-FIBULA TWO VIEWS 12/2/2018 3:45 PM      HISTORY: Fall at home last night, bruising and swelling, with pain  with weightbearing. Evaluate for acute bony process due to trauma.     COMPARISON: None.      Impression    IMPRESSION: Negative for any acute fracture. There has been previous  pinning of the distal fibula.    MARK RICHARDSON MD   Orthopedics IP Consult: Patient to be seen: Routine - within 24 hours; closded right distal midly displaced, communited fibula fracture.; Consultant may enter orders: Yes    Narrative    Jacinto Adams MD     12/3/2018  9:34 AM  ORTHO CONSULT    REASON FOR CONSULTATION: Dr. Walker requested orthopaedic   consultation regarding right distal fibula fracture    HPI:   87F otherwise healthy fell on after a fall when she tripped on   suit case  Noted immediate pain and went to ED where work up revealed   isolated right distal fibula fracture  She's been having lightheadedness recently -- diagnosed with   pneumonia and on prednisone and azithromycin  Also had left hip injection complicated by significant bleeding   with large hematoma -- was recovering from that  No antecedent ankle issues    ROS: A 10 pt ROS was obtained and negative except as mentioned in   the HPI    ALLERGIES:      Allergies   Allergen Reactions     Demerol Nausea     Lisinopril Cough     Meperidine Unknown     Sulfa Drugs Other (See Comments) and Unknown     Questionable itching reported by patient       MEDICATIONS:     No current facility-administered medications on file prior to   encounter.   Current Outpatient Prescriptions on File Prior to Encounter:  acetaminophen (TYLENOL) 500 MG tablet Take 2 tablets (1,000 mg)   by mouth every 8 hours as needed for mild pain   atorvastatin (LIPITOR) 40 MG tablet TAKE ONE TABLET BY MOUTH   EVERY DAY   azithromycin (ZITHROMAX Z-RADHIKA) 250 MG tablet Two tablets on the   first day, then one tablet daily for the next 4 days   CALCIUM 600 + D OR 1 tablet by mouth daily   citalopram (CELEXA) 20 MG tablet TAKE  ONE TABLET BY MOUTH EVERY   DAY   FISH OIL OR 1 daily   levothyroxine (SYNTHROID/LEVOTHROID) 75 MCG tablet TAKE 1 TABLET   BY MOUTH ONCE DAILY (NEEDS  LAB  WORK)   meclizine (ANTIVERT) 25 MG tablet TAKE ONE TABLET BY MOUTH EVERY   6 HOURS AS NEEDED FOR DIZZINESS   metoprolol (TOPROL-XL) 25 MG 24 hr tablet Take 0.5 tablets (12.5   mg) by mouth daily   Multiple Vitamins-Minerals (OCUVITE PO) Take 1 tablet by mouth   daily.   pramipexole (MIRAPEX) 0.5 MG tablet TAKE ONE TABLET BY MOUTH AT   BEDTIME   predniSONE (DELTASONE) 20 MG tablet Take two tablets (= 40mg)   each day for 5 (five) days   albuterol (PROAIR HFA/PROVENTIL HFA/VENTOLIN HFA) 108 (90 BASE)   MCG/ACT Inhaler Inhale 2 puffs into the lungs every 6 hours as   needed for shortness of breath / dyspnea or wheezing   ASPIRIN 81 MG PO TABS Take 2 tablets by mouth every evening    losartan (COZAAR) 25 MG tablet TAKE 1 TABLET BY MOUTH ONCE DAILY   (Patient taking differently: TAKE 1 TABLET BY MOUTH EVERY   EVENING)   nitroglycerin (NITROSTAT) 0.4 MG SL tablet Place 1 tablet (0.4   mg) under the tongue every 5 minutes as needed for chest pain   (call your doctor if you take a third dose)   ranitidine (ZANTAC) 150 MG tablet TAKE TWO TABLETS BY MOUTH AT   BEDTIME       PMH:  Past Medical History:   Diagnosis Date     Coronary atherosclerosis of native coronary artery     s/p MI ([facility])     Essential hypertension, benign      GERD (gastroesophageal reflux disease)      History of transient ischemic attack (TIA)      Hyperlipidemia LDL goal <100      Hypothyroidism      Major depressive disorder, recurrent episode, mild (H)   5/18/2011     Peptic ulcer disease with hemorrhage     Remote history of stomach ulcer, requiring transfusion after   chronic bleeding       SOCIAL HISTORY:   Nonsmoker  Walks with cane  Lives in senior living -- supposed to be moving to new apartment   today    FAMILY HISTORY: Negative for bleeding disorders, clotting   disorders, or  adverse reactions to anesthesia    EXAM:  General: Pleasant, oriented  Neuro: CN II-XII intact  Skin:  No rashes or lesions  Resp:  Nonlabored breathing  Abd:  Soft, NT, ND  MSK:  Right ankle in splint which was removed.  Moderate swelling   and ecchymosis but skin wrinkles and no blisters.  Foot perfused.    Distal sensorimotor exam intact    IMAGING: Xrays of right ankle reviewed.  Comminuted distal fibula   fracture with widening of ankle mortise.  No obvious preexisting   arthritis    ASSESSMENT:    1. Unstable right distal fibula fracture sustained 12.2.18   2. Comorbidities including recent pnuemonia, CAD s/p pacemaker   placement    PLAN:   1. Findings discussed with Christiana.  At this point, recommend   ORIF to restore stability and optimize long term ankle function.    As she has no blisters and skin wrinkles, think it's safe to   proceed now.  Discussed risks including infection, nonunion, need   for further surgery.  Will be in hospital for 2-3 days then   likely nursing home for 4-6 weeks.  Anticipate 4 weeks of NWB.    Jacinto Adams MD         XR Surgery ALVIN L/T 5 Min Fluoro w Stills    Narrative    This exam was marked as non-reportable because it will not be read by a   radiologist or a [place] non-radiologist provider.                 Assessment and Plan:    Christiana Sal is a 87 year old female with past medical history of coronary artery disease s/p stenting x 6, sinus node dysfunction s/p pacemaker, hypertension, hyperlipidemia, hypothyroidism, GERD, depression who presents with right lower extremity pain after mechanical fall      Mechanical Fall  Mechanical fall, tripping over a suitcase landing primarily on right leg. No head strike or loss of consciousness. No preceding symptoms.      Distal Right Fibula Fracture--just post op now    Lightheadedness, orthostatic hypotension  Lightheadedness over past week with postural changes. Recently treated for pneumonia with azithromycin and  "prednisone burst. Additionally reports increased urinary frequency over past month with decreased PO intake due to the urinary frequency. Vital significant for orthostatic hypotension in ED,  --> 76. Received 1 L of fluids in ED. Suspect volume depletion due to illness and poor oral intake.   Holding losartan   [JE1.1]  UTI,[JE1.4]  overactive bladder  Urinary frequency x 1 month, worse in past week with urinary nearly every hour at night. No dysuria. UA shows 41 WBC, moderate leukocyte esterase. Treated with Keflex in ED. Afebrile. WBC 11.8 though patient was on prednisone last dose today.  Continue Keflex BID--[JE1.1]UC[JE1.5] enterococcus , awaiting sens.[JE1.4]   [JE1.1]  Mild creatinine elevation[JE1.5]  Creatinine 1.08. BUN 28. GFR 48. Baseline creatinine 0.8. Suspected etiology is pre-renal due to reduce oral intake and urinary frequency.    Recently treated pneumonia  Shortness of breath resolving. Intermittent cough. Treated with Azithromycin and prednisone after CT showed \"Mild patchy bilateral groundglass airspace opacities could be seen with pulmonary edema or other alveolar infiltrate.\"     Coronary Artery Disease  Chronic. No current symptoms. S/P PCI complicated by dissection with stents to LAD and D1 5/2016, stenting in 2004 to RCA.  Nuclear stress in 2017 showed small fixed defect.  - continue statin, beta blocker  - hold home losartan for now due to orthostatic hypotension  - hold home ASA for potential surgery     Sinoatrial node dysfunction  S/P Cardiac pacemaker - [employer] dual lead  Follows with cardiology, pacemaker implantation 11/2016 for sinus node dysfunction.     Hyperlipidemia  Chronic.  - continue home atorvastatin     Hypertension  Chronic. Blood pressures reviewed, stable though significant orthostatic hypotension noted. Outpatient readings largely 120s-150.  - continue home metoprolol[JE1.1]  Restart losartan due to htn.[JE1.6]     Hypothyroidism  Chronic.   - " continue home levothyroxine     Gastroesophageal reflux disease  Chronic.  - continue home ranitidine     JENIFER (obstructive sleep apnea)  Not compliant with CPAP.     FEN:[JE1.1] lr at 50 ml per hour[JE1.4]     DVT Prophylaxis: Pneumatic Compression Devices  Code Status: Full Code     Disposition:[JE1.1] 88 yo with mechanical fall, immediately post op ankle fracture surgery, will need tcu.[JE1.5] Restarting losartan.[JE1.2]   [JE1.1]     Revision History        User Key Date/Time User Provider Type Action    > [N/A] 12/4/2018 10:54 AM Stevo Ortiz MD Physician Addend     JE1.3 12/3/2018  5:52 PM Stevo Ortiz MD Physician Sign     JE1.2 12/3/2018  1:37 PM Stevo Ortiz MD Physician      JE1.6 12/3/2018  1:35 PM Stevo Ortiz MD Physician      JE1.4 12/3/2018  1:33 PM Stevo Ortiz MD Physician      JE1.5 12/3/2018 12:34 PM Stevo Ortiz MD Physician      JE1.1 12/3/2018 12:33 PM Stevo Ortiz MD Physician             Progress Notes by Hafsa Waddell RN at 12/4/2018 10:36 AM     Author:  Hafsa Waddell RN Service:  (none) Author Type:      Filed:  12/4/2018 10:37 AM Date of Service:  12/4/2018 10:36 AM Creation Time:  12/4/2018 10:36 AM    Status:  Signed :  Hafsa Waddell RN ()         Name: Christiana Sal    MRN#: [id number]    Reason for Hospitalization: Closed fracture of shaft of fibula [S82.409A]  Lightheadedness [R42]  Abnormal urinalysis [R82.90]  Contusion of right lower extremity, initial encounter [S80.11XA]  Fall, initial encounter [W19.XXXA]    Discharge Date:[PW1.1] 12/04/18[PW1.2]    Patient/Family Response to DC Plan: in agreement    Transportation: Call Daughter Alessandra for transport    [facility]: declined    Care Coordinator Hand off completed: Yes    Other Providers (Care Coordinator, County Services, [facility] Services etc.):  No    Future Appointments : Future Appointments  Date Time Provider Department  [place]   12/4/2018 10:40 AM [facility]   12/6/2018 9:00 AM Kelly Matthew PA-C [facility]   1/8/2019 10:15 AM Cleve Null MD [facility]   2/4/2019 12:00 AM [facility] TECH1 [facility]       Discharge Disposition: transitional care unit, [place] [facility] (Phone: [phone] Fax: [phone])    Hafsa Waddell RN Care Coordinator  [facility] [phone]  [facility] [phone][PW1.1]         Revision History        User Key Date/Time User Provider Type Action    > PW1.2 12/4/2018 10:37 AM Hafsa Waddell RN Case Manager Sign     PW1.1 12/4/2018 10:36 AM Hafsa Waddell RN Case Manager             Progress Notes by Alvarado Alarcon at 12/3/2018  4:14 PM     Author:  Alvarado Alarcon Service:  (none) Author Type:  Coordinator    Filed:  12/3/2018  4:14 PM Date of Service:  12/3/2018  4:14 PM Creation Time:  12/3/2018  4:14 PM    Status:  Signed :  Alvarado Alarcon (Coordinator)         Your information has been submitted on December 03rd, 2018 at 04:14:30 PM CST. The confirmation number is [id number][BB1.1]       Revision History        User Key Date/Time User Provider Type Action    > BB1.1 12/3/2018  4:14 PM Alvarado Alarcon Coordinator Sign            Progress Notes by Jaimee Williamson RN at 12/3/2018  4:02 PM     Author:  Clay, Jaimee, RN Service:  Nursing Author Type:      Filed:  12/3/2018  4:03 PM Date of Service:  12/3/2018  4:02 PM Creation Time:  12/3/2018  4:02 PM    Status:  Signed :  Jaimee Williamson RN ()         Discharge Planner   Discharge Plans in progress: [facility] for rehab  Barriers to discharge plan: none  Follow up plan: [facility] with hopes to return home with possible home care Hand off to primary clinic       Entered by: Jaimee Williamson 12/03/2018 4:02 PM[SN1.1]          Revision History        User Key Date/Time User Provider Type Action    > SN1.1 12/3/2018  4:03 PM Clay  LUCHO [place] Case Manager Sign            Progress Notes by Jaimee Williamson RN at 12/3/2018  3:59 PM     Author:  Clay, Jaimee, RN Service:  Nursing Author Type:      Filed:  12/3/2018  4:02 PM Date of Service:  12/3/2018  3:59 PM Creation Time:  12/3/2018  3:59 PM    Status:  Signed :  Jaimee Williamson RN ()         CTS update: [facility] (Phone: [phone] Fax: [phone]) for tomorrow patient will need $6000 upfront and patient is willing to pay. Family will transport tomorrow when ready for discharge. AAYUSH Williamson CTS RN[SN1.1]     Revision History        User Key Date/Time User Provider Type Action    > SN1.1 12/3/2018  4:02 PM Jaimee Williamson RN Case Manager Sign            Progress Notes by Jaimee Williamson RN at 12/3/2018  2:14 PM     Author:  Clay, Jaimee, RN Service:  Nursing Author Type:      Filed:  12/3/2018  2:14 PM Date of Service:  12/3/2018  2:14 PM Creation Time:  12/3/2018  2:14 PM    Status:  Signed :  Jaimee Williamson RN ()         Discharge Planner   Discharge Plans in progress: Pending referrals  Barriers to discharge plan: Cost of stay  Follow up plan: CTS to follow       Entered by: Jaimee Williamson 12/03/2018 2:14 PM[SN1.1]          Revision History        User Key Date/Time User Provider Type Action    > SN1.1 12/3/2018  2:14 PM Jaimee Williamson RN Case Manager Sign            Progress Notes by Marialuisa Caldwell RN at 12/3/2018  1:24 PM     Author:  Chall, Marialuisa, RN Service:  Nursing Author Type:  Registered Nurse    Filed:  12/3/2018  1:28 PM Date of Service:  12/3/2018  1:24 PM Creation Time:  12/3/2018  1:24 PM    Status:  Signed :  Marialuisa Caldwell RN (Registered Nurse)         [facility] TRANSPORT NOTE  Data:   Reason for Transport:  Return from PACU    Christiana Sal was transported from Pacu via cart at 1315.  Patient was accompanied by Transport Aide. Equipment used for transport: IV  pump and None. Family was aware of reason for transport: yes. Patient states she called son in law.    Action:  Report: received from Jaimee    Response:  Patient's condition upon return was stable.    Marialuisa Caldwell[JC1.1]     Revision History        User Key Date/Time User Provider Type Action    > JC1.1 12/3/2018  1:28 PM Marialuisa Caldwell RN Registered Nurse Sign            Progress Notes by Marialuisa Caldwell RN at 12/3/2018  9:10 AM     Author:  Chall, Marialuisa, RN Service:  Nursing Author Type:  Registered Nurse    Filed:  12/3/2018  9:11 AM Date of Service:  12/3/2018  9:10 AM Creation Time:  12/3/2018  9:10 AM    Status:  Signed :  Marialuisa Caldwell RN (Registered Nurse)         [facility] TRANSPORT NOTE  Data:   Reason for Transport:  surgery    Christiana Sal was transported to hospitals via wheel chair at 0905.  Patient was accompanied by Nursing Assistant. Equipment used for transport: None. Family was aware of reason for transport: yes - dtr Alessandra notified.     Action:  Report: given to Patricia    Response:  Patient's condition when transferred off unit was stable. Patient denies pain.    Marialuisa Caldwell[JC1.1]     Revision History        User Key Date/Time User Provider Type Action    > JC1.1 12/3/2018  9:11 AM Marialuisa Caldwell RN Registered Nurse Sign            Progress Notes by Maci Cruz RN at 12/2/2018  6:07 PM     Author:  Maci Cruz RN Service:  (none) Author Type:  Registered Nurse    Filed:  12/2/2018 11:03 PM Date of Service:  12/2/2018  6:07 PM Creation Time:  12/2/2018  6:07 PM    Status:  Addendum :  Maci Cruz RN (Registered Nurse)         [place] [facility] ADMISSION NOTE    Patient admitted to room 2307 at approximately 1745 via cart from emergency room. Patient was accompanied by nurse.     Verbal SBAR report received from RN prior to patient arrival.     Patient ambulated to bed with two assist. Patient alert and oriented X 3. The patient is not having any pain. 0-10 Pain Scale: 2 (8/10 with wt bear). Admission  "vital signs: Blood pressure 159/66, temperature 97.7  F (36.5  C), temperature source Oral, resp. rate 18, height 1.676 m (5' 6\"), weight 74.8 kg (165 lb), SpO2 97 %, not currently breastfeeding. Patient was oriented to plan of care, call light, bed controls, tv, telephone, bathroom and visiting hours.     Risk Assessment    The following safety risks were identified during admission: fall. Yellow risk band applied: YES.     Skin Initial Assessment    This writer admitted this patient and completed a full skin assessment and Dillon score in the Adult PCS flowsheet. Appropriate interventions initiated as needed.     Secondary skin check completed by Raine YEPEZ.[DK1.1] Unable to view right ankle/foot d/t splint wrap on foot.[DK1.2]      Skin  Inspection of bony prominences: Full  Inspection under devices: Full  Skin WDL:  WDL except, all  Skin Color/Characteristics: pale, redness blanchable  Skin Temperature: warm  Skin Moisture: dry  Skin Elasticity: quick return to original state  Skin Integrity: bruise(s), scab(s)    Dillon Risk Assessment  Sensory Perception: 4-->no impairment  Moisture: 3-->occasionally moist  Activity: 4-->walks frequently  Mobility: 3-->slightly limited  Nutrition: 3-->adequate  Friction and Shear: 3-->no apparent problem  Dillon Score: 20  Bed Support Surface: Atmos Air mattress    Maci Cruz[DK1.1]       Revision History        User Key Date/Time User Provider Type Action    > DK1.2 12/2/2018 11:03 PM Maci Cruz RN Registered Nurse Addend     DK1.1 12/2/2018  6:09 PM Maci Cruz, RN Registered Nurse Sign            Progress Notes by Maci Cruz RN at 12/2/2018 10:58 PM     Author:  Maci Cruz RN Service:  (none) Author Type:  Registered Nurse    Filed:  12/2/2018 11:01 PM Date of Service:  12/2/2018 10:58 PM Creation Time:  12/2/2018 10:58 PM    Status:  Addendum :  Maci Cruz RN (Registered Nurse)         Patient refused oxy only wants to take " tylenol.[DK1.1]pt up  to bedside commode with one assist and walker. Patient is voiding small amounts.pt brief on .  Patient states she does voids like this at night. Patient uses call light to let needs be known. Patient has bed alarm and call light within reach.[DK1.2]      Revision History        User Key Date/Time User Provider Type Action    > [N/A] 12/2/2018 11:01 PM Maci Cruz RN Registered Nurse Addend     DK1.2 12/2/2018 10:59 PM Maci Cruz RN Registered Nurse Sign     DK1.1 12/2/2018 10:58 PM Maci Cruz RN Registered Nurse             ED Provider Notes by Bernardo Walker MD at 12/2/2018 12:35 PM     Author:  Bernardo Walker MD Service:  Emergency Medicine Author Type:  Physician    Filed:  12/2/2018  9:27 PM Date of Service:  12/2/2018 12:35 PM Creation Time:  12/2/2018 12:47 PM    Status:  Addendum :  Bernardo Walker MD (Physician)           History[JC1.1]     Chief Complaint   Patient presents with     Dizziness     lightheadedness since Wed-dx with pneumonia-tripped and fell last pm-both lower legs bruised and sore[EA1.1]     HPI  Christiana Sal is a 87 year old female[JC1.1] with history of coronary artery disease pacemaker dependent, osteoarthritis of the hip who presents for evaluation for feeling lightheadedness.  She also reports suffering a fall yesterday with bruising in her lower legs[EA1.2].  Patient reports she tripped and lost her footing on a suitcase falling onto tile floor while she had her cane while walking.  The fall was mechanical because she was tripped and from standing height.  She reports since her ED visit on November 28 when she was seen for concern for lightheadedness she is continuing to feel lightheaded.  She was diagnosed with pneumonia based on CT imaging which was obtained this patient recently had a flight to [place] to visit her daughter and was recently evaluated for concern for post intra-articular injection  hematoma.  She was treated with azithromycin and prednisone and is on the last day of her therapy.  She tells me when she gets up from a lying or sitting position she feels lightheaded and like she is going to faint.  She does have a pacemaker which she reports was recently interrogated and did not show any acute process.  She is reporting that she also has increased urination and has underlying history of overactive bladder.  She received Botox injection for overactive bladder July 2018 and is scheduled for follow-up with Dr. Null in urology in January 2019 she is not reporting any signs or symptoms of headache no palpitations no chest pain or pressure and no other symptoms.[EA1.3]      Social History:[JC1.1] here in ED by private car with daughter. Lives in [place], [place]. In the process of moving to a senior living complex in [place], [place].[EA1.3]      Problem List:[JC1.1]    Patient Active Problem List    Diagnosis Date Noted     Primary osteoarthritis of left hip 09/21/2018     Priority: Medium     Insect bite 06/28/2017     Priority: Medium     Cardiac pacemaker - [employer] dual lead - Not Dependent 03/02/2017     Priority: Medium     Sinoatrial node dysfunction (H) 11/10/2016     Priority: Medium     S/P cardiac pacemaker procedure 11/09/2016     Priority: Medium     CAD S/P percutaneous coronary angioplasty 05/11/2016     Priority: Medium     JENIFER (obstructive sleep apnea) 11/17/2014     Priority: Medium     Lichen planus 11/19/2013     Priority: Medium     Vulvar pruritus 11/12/2013     Priority: Medium     Angina pectoris (H) 02/08/2012     Priority: Medium     Advanced directives, counseling/discussion 06/16/2011     Priority: Medium     Patient will bring a copy. Chart r/q to make sure no copy.    GUSTAVO Cunningham MA    Advance Directive Problem List Overview:   Name Relationship Phone    Primary Health Care Agent Albaro Sal  [phone]         Alternative Health Care Agent Esther Mulligan  [phone]     Reviewed Health Care Directive dated 05/13/2005, scanned into EPIC 01/2012.  Marivel Cherelle, SISSY                Major depressive disorder, recurrent episode, mild (H) 11/02/2010     Priority: Medium     Family history of colon cancer 05/04/2010     Priority: Medium     Coronary atherosclerosis of native coronary artery      Priority: Medium     s/p MI ([facility])       Hyperlipidemia LDL goal <100      Priority: Medium     Essential hypertension, benign      Priority: Medium     Hypothyroidism      Priority: Medium     GERD (gastroesophageal reflux disease)      Priority: Medium[EA1.1]        Past Medical History:[JC1.1]    Past Medical History:   Diagnosis Date     Coronary atherosclerosis of native coronary artery      Essential hypertension, benign      GERD (gastroesophageal reflux disease)      History of transient ischemic attack (TIA)      Hyperlipidemia LDL goal <100      Hypothyroidism      Major depressive disorder, recurrent episode, mild (H) 5/18/2011     Peptic ulcer disease with hemorrhage[EA1.1]        Past Surgical History:[JC1.1]    Past Surgical History:   Procedure Laterality Date     APPENDECTOMY       CARDIAC CATHERIZATION  1/15/04    drug-eluding stent RCA, Dr. Pérez, [facility]     CARDIAC CATHERIZATION  2/16/12    diffuse mild/mod disease, no new stent     CATARACT IOL, RT/LT  1/26/12    RT, Dr. Wynne     CHOLECYSTECTOMY, OPEN       COLONOSCOPY  2008    [facility]     FRACTURE TX, ANKLE RT/LT      LT, 2 screws remain     HC EXCISE HAND/FOOT NEUROMA      RT     HC LARYNGOSCOPY DIRECT W VOCAL CORD INJECTION      vocal cord polypectomy     HC REMOVAL OF OVARIAN CYST(S)       HC TRANSCATH STENT INIT VESSEL,PERCUT      x 2     REPAIR HAMMER TOE  9/13/10    multiple + RT great toe tendon release, Dr. Sanchez DPM     STENT  5-2016    Cardiac stents 6 placed.[EA1.1]       Family History:[JC1.1]    Family History   Problem Relation Age of Onset     C.A.D. Father       Hypertension Father      Myocardial Infarction Father 66      from MI     C.A.D. Brother      Myocardial Infarction Brother      Cancer Brother      skin ca     Breast Cancer Sister      Cancer - colorectal Sister      Heart Disease Sister      Neurologic Disorder Mother      migraine     C.A.D. Mother      Hypertension Mother      Heart Failure Mother      Thyroid Disease Son      cretinism     Hypertension Son      Heart Disease Son      Psychotic Disorder Sister      Hypertension Sister      Heart Disease Sister      CABG     Hypertension Brother      C.A.D. Brother      Myocardial Infarction Brother      Arthritis Daughter      RA     Hypertension Sister      Heart Disease Sister      Cancer Sister      Blood Disease Sister      Neurologic Disorder Child      migraine (2 children)[EA1.1]       Social History:  Marital Status:   [2][JC1.1]  Social History   Substance Use Topics     Smoking status: Never Smoker     Smokeless tobacco: Never Used      Comment: Never smoker; no secondhand smoke exposure     Alcohol use Yes      Comment: social[EA1.1]        Medications:[JC1.1]      acetaminophen (TYLENOL) 500 MG tablet   atorvastatin (LIPITOR) 40 MG tablet   azithromycin (ZITHROMAX Z-RADHIKA) 250 MG tablet   Biotin 10 MG CAPS   CALCIUM 600 + D OR   citalopram (CELEXA) 20 MG tablet   FISH OIL OR   levothyroxine (SYNTHROID/LEVOTHROID) 75 MCG tablet   magnesium oxide (MAG-OX) 400 MG tablet   meclizine (ANTIVERT) 25 MG tablet   metoprolol (TOPROL-XL) 25 MG 24 hr tablet   Multiple Vitamins-Minerals (OCUVITE PO)   pramipexole (MIRAPEX) 0.5 MG tablet   predniSONE (DELTASONE) 20 MG tablet   Vitamin D, Cholecalciferol, 1000 units TABS   albuterol (PROAIR HFA/PROVENTIL HFA/VENTOLIN HFA) 108 (90 BASE) MCG/ACT Inhaler   ASPIRIN 81 MG PO TABS   diclofenac (VOLTAREN) 50 MG EC tablet   losartan (COZAAR) 25 MG tablet   nitroglycerin (NITROSTAT) 0.4 MG SL tablet   ondansetron (ZOFRAN) 4 MG tablet   pantoprazole (PROTONIX) 40  "MG EC tablet   ranitidine (ZANTAC) 150 MG tablet[EA1.1]         Review of Systems   HENT:[EA1.2] Negative[EA1.4].    Eyes:[EA1.2] Negative[EA1.4].    Respiratory:[EA1.2] Negative[EA1.4].    Cardiovascular:[EA1.2] Negative[EA1.4].    Gastrointestinal:[EA1.2] Negative[EA1.4].    Endocrine:[EA1.2] Negative[EA1.4].    Genitourinary: Positive for[EA1.2] frequency (chronic with hsitory of overactive bladder)[EA1.4].   Musculoskeletal:[EA1.2] Negative[EA1.4].    Skin:[EA1.2] Negative[EA1.4].    Allergic/Immunologic:[EA1.2] Negative[EA1.4].    Neurological: Positive for[EA1.2] dizziness[EA1.4],[EA1.2] weakness[EA1.4] and[EA1.2] light-headedness[EA1.4].   Hematological:[EA1.2] Negative[EA1.4].    Psychiatric/Behavioral:[EA1.2] Negative[EA1.4].[EA1.2]    All other systems reviewed and are negative[EA1.4].[EA1.2]      Physical Exam[JC1.1]   BP: 110/53  Heart Rate: 76  Temp: 97.6  F (36.4  C)  Resp: 16  Height: 167.6 cm (5' 6\")  Weight: 74.8 kg (165 lb)  SpO2: 93 %[EA1.1]      Physical Exam   Constitutional: She is[EA1.2] oriented to person, place, and time[EA1.4]. She appears[EA1.2] well-developed[EA1.4] and[EA1.2] well-nourished[EA1.4].[EA1.2] No distress[EA1.4].   HENT:   Head:[EA1.2] Normocephalic[EA1.4] and[EA1.2] atraumatic[EA1.4].   Eyes:[EA1.2] Conjunctivae[EA1.4] and[EA1.2] EOM[EA1.4] are normal.[EA1.2] Pupils are equal, round, and reactive to light[EA1.4]. Right eye exhibits[EA1.2] no discharge[EA1.4]. Left eye exhibits[EA1.2] no discharge[EA1.4].[EA1.2] No scleral icterus[EA1.4].   Neck:[EA1.2] Normal range of motion[EA1.4].[EA1.2] Neck supple[EA1.4].[EA1.2] No JVD[EA1.4] present.[EA1.2] No tracheal deviation[EA1.4] present.[EA1.2] No thyromegaly[EA1.4] present.   Cardiovascular:[EA1.2] Normal rate[EA1.4] and[EA1.2] regular rhythm[EA1.4].  Exam reveals[EA1.2] no gallop[EA1.4] and[EA1.2] no friction rub[EA1.4].    Pulmonary/Chest:[EA1.2] Effort normal[EA1.4] and[EA1.2] breath sounds normal[EA1.4]. No[EA1.2] " stridor[EA1.4]. No[EA1.2] respiratory distress[EA1.4]. She has[EA1.2] no wheezes[EA1.4]. She has[EA1.2] no rales[EA1.4]. She exhibits[EA1.2] no tenderness[EA1.4].   Lymphadenopathy:     She has[EA1.2] no cervical adenopathy[EA1.4].   Neurological: She is[EA1.2] alert[EA1.4] and[EA1.2] oriented to person, place, and time[EA1.4]. She displays[EA1.2] normal reflexes[EA1.4]. No[EA1.2] cranial nerve deficit[EA1.4]. She exhibits[EA1.2] normal muscle tone[EA1.4].[EA1.2] Coordination[EA1.4] normal.   Skin:[EA1.2] No rash[EA1.4] noted. She is[EA1.2] not diaphoretic[EA1.4]. No[EA1.2] erythema[EA1.4]. No[EA1.2] pallor[EA1.4].   Psychiatric: She has a[EA1.2] normal mood and affect[EA1.4]. Her[EA1.2] behavior is normal[EA1.4].[EA1.2] Judgment[EA1.4] and[EA1.2] thought content[EA1.4] normal.[EA1.2]       ED Course[JC1.1]     ED Course[EA1.1]     Procedures[JC1.1]               EKG Interpretation:      Interpreted by Bernardo Walker  Time reviewed:[EA1.4] 13:50[EA1.3]  Symptoms at time of EKG:[EA1.4] lightheaded with position change[EA1.3]   Rhythm:[EA1.4] normal sinus[EA1.3]   Rate:[EA1.4] Normal[EA1.3]  Axis:[EA1.4] Normal[EA1.3]  Ectopy:[EA1.4] none[EA1.3]  Conduction:[EA1.4] left bundle branch block (complete)[EA1.3]  ST Segments/ T Waves:[EA1.4] Non-specific ST-T wave changes[EA1.3]  Q Waves:[EA1.4] nonspecific[EA1.3]  Comparison to prior:[EA1.4] Unchanged from 11/28/18[EA1.3]    Clinical Impression:[EA1.4] no acute changes, with known LBBB and first degree AV block.[EA1.3]        Critical Care time:[JC1.1]  none[EA1.4]             ED Medications:[JC1.1]  Medications   cephALEXin (KEFLEX) capsule 500 mg (not administered)   HYDROcodone-acetaminophen (NORCO) 5-325 MG per tablet 1 tablet (not administered)   lactated ringers BOLUS 500 mL (0 mLs Intravenous Stopped 12/2/18 1500)   lactated ringers BOLUS 500 mL (500 mLs Intravenous New Bag 12/2/18 1520)[EA1.1]       ED Vitals:[JC1.1]  Vitals:    12/02/18 1430 12/02/18 1541  12/02/18 1600 12/02/18 1700   BP: 133/71 129/65 130/67 151/83   BP Location:  Right arm Right arm Right arm   Resp:  16 16 16   Temp:       TempSrc:       SpO2: 98% 97% 99% 97%   Weight:       Height:[EA1.1]           ED Labs and Imaging:[JC1.1]  Results for orders placed or performed during the hospital encounter of 12/02/18 (from the past 24 hour(s))   CBC with platelets differential   Result Value Ref Range    WBC 11.8 (H) 4.0 - 11.0 10e9/L    RBC Count 3.99 3.8 - 5.2 10e12/L    Hemoglobin 12.5 11.7 - 15.7 g/dL    Hematocrit 37.3 35.0 - 47.0 %    MCV 94 78 - 100 fl    MCH 31.3 26.5 - 33.0 pg    MCHC 33.5 31.5 - 36.5 g/dL    RDW 13.6 10.0 - 15.0 %    Platelet Count 275 150 - 450 10e9/L    Diff Method Automated Method     % Neutrophils 70.5 %    % Lymphocytes 12.6 %    % Monocytes 15.4 %    % Eosinophils 0.7 %    % Basophils 0.3 %    % Immature Granulocytes 0.5 %    Nucleated RBCs 0 0 /100    Absolute Neutrophil 8.3 1.6 - 8.3 10e9/L    Absolute Lymphocytes 1.5 0.8 - 5.3 10e9/L    Absolute Monocytes 1.8 (H) 0.0 - 1.3 10e9/L    Absolute Eosinophils 0.1 0.0 - 0.7 10e9/L    Absolute Basophils 0.0 0.0 - 0.2 10e9/L    Abs Immature Granulocytes 0.1 0 - 0.4 10e9/L    Absolute Nucleated RBC 0.0    Basic metabolic panel   Result Value Ref Range    Sodium 139 133 - 144 mmol/L    Potassium 3.2 (L) 3.4 - 5.3 mmol/L    Chloride 105 94 - 109 mmol/L    Carbon Dioxide 26 20 - 32 mmol/L    Anion Gap 8 3 - 14 mmol/L    Glucose 137 (H) 70 - 99 mg/dL    Urea Nitrogen 28 7 - 30 mg/dL    Creatinine 1.08 (H) 0.52 - 1.04 mg/dL    GFR Estimate 48 (L) >60 mL/min/1.7m2    GFR Estimate If Black 58 (L) >60 mL/min/1.7m2    Calcium 7.8 (L) 8.5 - 10.1 mg/dL   UA reflex to Microscopic   Result Value Ref Range    Color Urine Sherry     Appearance Urine Cloudy     Glucose Urine Negative NEG^Negative mg/dL    Bilirubin Urine Negative NEG^Negative    Ketones Urine 5 (A) NEG^Negative mg/dL    Specific Gravity Urine 1.018 1.003 - 1.035    Blood Urine  Negative NEG^Negative    pH Urine 5.0 5.0 - 7.0 pH    Protein Albumin Urine 30 (A) NEG^Negative mg/dL    Urobilinogen mg/dL 2.0 0.0 - 2.0 mg/dL    Nitrite Urine Negative NEG^Negative    Leukocyte Esterase Urine Moderate (A) NEG^Negative    Source Midstream Urine     RBC Urine 6 (H) 0 - 2 /HPF    WBC Urine 41 (H) 0 - 5 /HPF    Bacteria Urine Few (A) NEG^Negative /HPF    Squamous Epithelial /HPF Urine 4 (H) 0 - 1 /HPF    Mucous Urine Present (A) NEG^Negative /LPF    Hyaline Casts 25 (H) 0 - 2 /LPF   Ankle XR, G/E 3 views, right    Narrative    RIGHT ANKLE THREE VIEWS   12/2/2018 3:45 PM     HISTORY: Fall at home from standing height. Evaluate for acute bony  process due to trauma.     COMPARISON: None.      Impression    IMPRESSION: There is a comminuted distal fibular fracture with some  mild widening of the ankle mortise. Degenerative changes are seen in  the midfoot.    MARK RICHARDSON MD   XR Tibia & Fibula Left 2 Views    Narrative    LEFT TIBIA-FIBULA TWO VIEWS 12/2/2018 3:45 PM     HISTORY: Fall at home last night, bruising and swelling, with pain  with weightbearing. Evaluate for acute bony process due to trauma.     COMPARISON: None.      Impression    IMPRESSION: Negative for any acute fracture. There has been previous  pinning of the distal fibula.    MARK RICHARDSON MD[EA1.1]         Patient assessed.[JC1.1]    Prior or recent diagnostic imaging and work-up:  CT CHEST PULMONARY EMBOLISM WITH CONTRAST  11/28/2018 11:08 AM     HISTORY:  Dyspnea, lightheaded, elevated D-dimer.     TECHNIQUE: Scans obtained from the apices through the diaphragm with  IV contrast. 75 mL Isovue 370 injected. Radiation dose for this scan  was reduced using automated exposure control, adjustment of the mA  and/or kV according to patient size, or iterative reconstruction  technique.     COMPARISON:  Chest x-ray 11/20/2017.     FINDINGS:   Vascular: Limited opacification of the thoracic aorta limits  assessment. No clear thoracic aortic  abnormality can be seen. Thoracic  aortic and coronary artery calcifications. No evidence for pulmonary  embolism.      Lungs and pleura: No effusions. Mild patchy bilateral groundglass  opacities. No lobar consolidation identified. Right middle lobe  pulmonary nodule is 0.5 cm, series 8 image 129.     Lymph nodes: A few scattered small mediastinal and hilar lymph nodes  noted. One of these at the right infrahilar region is borderline  prominent measuring 1.3 cm, series 4 image 67.     Additional findings: Upper abdomen images show no acute abnormality.         IMPRESSION:   1. Mild patchy bilateral groundglass airspace opacities could be seen  with pulmonary edema or other alveolar infiltrate.  2. No pulmonary embolism or acute thoracic aortic abnormality.  3. Pulmonary nodule on the right is indeterminate. See below for  follow up guidelines.     Recommendations for one or multiple incidental lung nodules < 6mm:    Low risk patients: No routine follow-up.    High risk patients: Optional follow-up CT at 12 months; if  unchanged, no further follow-up.     *Low Risk: Minimal or absent history of smoking or other known risk  factors.  *Nonsolid (ground glass) or partly solid nodules may require longer  follow-up to exclude indolent adenocarcinoma.  *Recommendations based on Guidelines for the Management of Incidental  Pulmonary Nodules Detected at CT: From the Fleischner Society 2017,  Radiology 2017.     SATHISH LEE MD    ULTRASOUND LEFT LOWER EXTREMITY VENOUS DUPLEX November 28, 2018 10:24  AM      HISTORY: Swelling, dyspnea.      COMPARISON: None.     FINDINGS: Gray-scale, color and Doppler spectral analysis ultrasound  was performed of the left leg. Compression and augmentation imaging  was performed.     There is no evidence for deep venous thrombosis.          IMPRESSION: No evidence for deep vein thrombosis within the left leg.     SATHISH LEE MD[EA1.2]    PROCEDURE:  Arterial duplex ultrasound examination of  the left lower extremity     DATE OF PROCEDURE:  10/30/2018 1:00 PM     CLINICAL HISTORY/INDICATION:   87-year-old female with left lower extremity hematoma     COMPARISON:  None relevant     TECHNIQUE:   Grayscale, color-flow, and spectral waveform analysis with velocities  were performed of the arteries of the left lower extremity     FINDINGS: Patent iliac, common femoral, profunda, superficial femoral,  and popliteal arteries on the left without evidence of focal stenosis.  The tibial trifurcation and tibial vessels are patent without  significant stenosis. No vascular abnormality or active bleeding seen  in the area of bruising of the left lower extremity.         IMPRESSION:   1.  Patent left lower extremity arteries without evidence of stenosis.  2.  No active extravasation or vascular abnormality in the area of  lower extremity bruising.     ROXI BERNARDO MD[EA1.3]    Assessments & Plan (with Medical Decision Making)   Clinical Impression:[JC1.1] 87-year-old female who arrived for evaluation of her feeling lightheaded since her ED visit on November 28[EA1.2], 2018[EA1.4] and a fall last night.[EA1.2]  She is[EA1.5] lightheaded[EA1.6] because she is orthostatic.  She also has a closed comminuted mildly displaced distal right fibular fracture as a result of her fall last night at home.  She has a possible early urinary tract infection with pyuria[EA1.5] on[EA1.6] urinalysis she has a history of overactive bladder[EA1.5] w[EA1.6]hich may have contributed to her orthostasis[EA1.5] as[EA1.6] she reports she does get up every hour at night to urinate[EA1.5].[EA1.7]   History is obtained from the patient and her daughter who is at the bedside.  Patient is in the process of moving to [place] to a senior living complex.  She reports last night she tripped on her cane falling onto her legs on her suitcase onto a tile floor in her home.  She reports some pain with weightbearing in her legs.  She has marked  bruising and swelling about the right lower leg with swelling about the ankle[EA1.4] (she also has bruising over her left mary jo)[EA1.6].  No gross bony deformity.  No bony pain to palpation over the length of the foot, ankle and right lower leg.  She tells me when she gets up she has a sense that she feels like she is fainting.  She reports when she lays down she has no symptoms.  She reports her pacemaker was interrogated recently after return trip from [place] and there was no arrhythmia abnormality captured by her report.  Patient is reporting no palpitations.  She does not reports any headaches or chest pain or discomfort.  She reports she felt the sense that she is going to faint when she gets up from lying.    On my exam she is in no obvious distress.  She is pleasant.  She has bruising and swelling about her right lower leg.  She is hemodynamically normal.  She is reporting no recent medication change except complete her prescribed course of azithromycin and prednisone provided during her ED visit on November 28, 2018.[EA1.4]         ED Course and Plan:[JC1.1]   I reviewed her ED visit on November 28, 2018. CT imaging was obtained due to patient's recent flight[EA1.2] (to [facility])[EA1.6] which was negative for PE.  She was noted to have bilateral groundglass opacities with pulmonary edema.  There[EA1.2] wa[EA1.6]s no pulmonary embolism noted.  He also had lower extremity ultrasound which did not show any DVT.  Please see detailed[EA1.2] report above.  We reviewed options for care. On review of systems during her ED course she mention[EA1.4]ed[EA1.6] she has been getting up to urinate every hour to 2 hours.  Records indicate she was seen in July 2018 for overactive bladder by Dr. Null.  She had a Botox injection in July 2018.[EA1.4]  S[EA1.6]he is scheduled for follow-up appointment and recheck in January 2019 by report.  Blood work and urinalysis were obtained as well as EKG and orthostatics.  I reviewed  her medical records.  She is on Celexa, losartan, ranitidine, Voltaren, meclizine, Mirapex, Lipitor, Toprol[EA1.4]. Patient reportedly[EA1.3] had[EA1.6] recently  injection to her hip[EA1.4]. Records indicate she had a injection in October 2018 and subsequently suffered a lower extremity hematoma.  Ultrasound did not show any vascular abnormality (see report above).    Patient was noted to be orthostatic in the emergency department with[EA1.3] (>40 point drop) in[EA1.6]  blood pressure change from standing to lying and was symptomatic with lightheadedness.  A degree of orthostatic blood pressure and vital changes is likely due to report of frequent urination with underlying history of overactive bladder.[EA1.3]  E[EA1.8]KG in the ED today does not show any acute change from comparison EKG from November 28, 2018.  No left bundle branch block with first-degree AV block and sinus rhythm[EA1.3].    Patient had improvement in her sense of feeling lightheaded and dizzy after IV fluid bolus.  Her vital signs improved with IV fluid hydration.[EA1.8]  Her urine is cultured today with degree of pyuria with 41 white cells per high-power field and moderate leukocyte esterase.  I considered empiric antibiotics for possible UTI with underlying history of overactive bladder. XR showed a comminuted distal fibular fracture related to her report of fall.  With a distal fibular fracture we discussed and reviewed options for care including a cam boot versus Chuck Pope splint[EA1.9].  I elected to place the patient on Chuck Pope splint[EA1.10]. She had normal perfusion prior to and after splinting. She is[EA1.5] admitted to the hospital because of concern about going home because she is in the process of moving to a new senior living complex with an apartment.  She is given oral Keflex here in the ED for possible urinary tract infection pending urine culture[EA1.10].[EA1.5]    I reviewed options for care my concern about patients  ability to care for herself with a plan for new moved to a new apartment complex with a fibular fracture the patient and her daughter.  Patient is in agreement with plan of care.  I reviewed patient's presentation and x-ray findings with[EA1.10]  Tracie Argueat PA-C-on-call with[EA1.11]/for[EA1.6] Dr. Alvarado Noble (Foot and ankle orthopedist). I was informed by Dr. Guillermo Adams is the on-call orthopedic surgeon at [facility] and consult should be placed for Dr. Jacinto Adams[EA1.11]. P[EA1.6]oxana is for someone on the orthopedic team to see the patient in the morning for definitive management and further care.[EA1.11]    I spoke with ZAIDA Randle PA-C-on-call hospitalist at[EA1.10] 3.50P[EA1.6]M[EA1.7].[EA1.6] She[EA1.11] agreed to assume care upon admission after reviewing rationale for admission, ED course[EA1.10],[EA1.11] workup, x-ray imaging results, intervention in the ED and pending lab results.  I also reviewed my consultation with[EA1.10] orthopedic[EA1.11] surgery. Plan is to admit for further care including evaluation and management[EA1.10].[EA1.6]    ADDENDUM:  Prior to transfer to the medical floor patient was reporting increasing pain and discomfort in her lower leg and ankle.  She was given oral Norco for pain control[EA1.1]    Disclaimer: This note consists of symbols derived from keyboarding, dictation and/or voice recognition software. As a result, there may be errors in the script that have gone undetected. Please consider this when interpreting information found in this chart.      I have reviewed the nursing notes.    I have reviewed the findings, diagnosis, plan and need for follow up with the patient.[JC1.1]       New Prescriptions    No medications on file       Final diagnoses:   Lightheadedness - Due to orthostasis   Fall, initial encounter - from standing height, tripped on a suite case at home last night   Contusion of right lower extremity, initial encounter - post fall with trip on a suite  case and cane at home   Closed fracture of shaft of fibula   Abnormal urinalysis - Abnormal urinalysis suspicious for UTI with pyuria report of urinary frequency in the context of overactive bladder[EA1.1]       This document serves as a record of the services and decisions personally performed and made by Bernardo Walker. The HPI was created on his behalf by Precious Martin, a trained medical scribe. The creation of this document is based the provider's statements to the medical scribe.  Precious Martin 12:48 PM 12/2/2018    Provider:   The information in this document, created by the medical scribe for me, accurately reflects the services I personally performed and the decisions made by me. I have reviewed and approved this document for accuracy prior to leaving the patient care area.  Bernardo Walker, 12:48 PM 12/2/2018 12/2/2018   [facility] EMERGENCY DEPARTMENT[JC1.1]     Bernardo Walker MD  12/02/18 1655[EA1.12]       Bernardo Walker MD  12/02/18 2127  [EA1.13]     Revision History        User Key Date/Time User Provider Type Action    > EA1.13 12/2/2018  9:27 PM Bernardo Walker MD Physician Addend     EA1.7 12/2/2018  9:26 PM Bernardo Walker MD Physician      EA1.1 12/2/2018  5:20 PM Bernardo Walker MD Physician Incomplete Revision     EA1.12 12/2/2018  4:55 PM Bernardo Walker MD Physician Sign     EA1.6 12/2/2018  4:51 PM Bernardo Walker MD Physician      [N/A] 12/2/2018  4:26 PM Bernardo Walker MD Physician Share     EA1.11 12/2/2018  4:15 PM Bernardo Walker MD Physician Share     EA1.5 12/2/2018  4:13 PM Bernardo Walker MD Physician      EA1.10 12/2/2018  4:04 PM Bernardo Walker MD Physician Share     EA1.9 12/2/2018  3:52 PM Bernardo Walker MD Physician Share     EA1.8 12/2/2018  3:22 PM Bernardo Walker MD Physician Share     EA1.3 12/2/2018  2:09 PM Bernardo Walker MD  Physician Share     EA1.4 12/2/2018  1:43 PM Bernardo Walker MD Physician Share     EA1.2 12/2/2018 12:52 PM Bernardo Walker MD Physician Share     JC1.1 12/2/2018 12:48 PM Micaela Veronica, Precious Doedarrion Share            Progress Notes by Maci Cruz RN at 12/2/2018  7:09 PM     Author:  Maci Cruz RN Service:  (none) Author Type:  Registered Nurse    Filed:  12/2/2018  7:10 PM Date of Service:  12/2/2018  7:09 PM Creation Time:  12/2/2018  7:09 PM    Status:  Signed :  Maci Cruz RN (Registered Nurse)         Patient refused ice for her ankle.[DK1.1]     Revision History        User Key Date/Time User Provider Type Action    > DK1.1 12/2/2018  7:10 PM Maci Cruz RN Registered Nurse Sign            Progress Notes by Maci Cruz RN at 12/2/2018  6:46 PM     Author:  Maci Cruz RN Service:  (none) Author Type:  Registered Nurse    Filed:  12/2/2018  6:46 PM Date of Service:  12/2/2018  6:46 PM Creation Time:  12/2/2018  6:46 PM    Status:  Signed :  Maci Cruz RN (Registered Nurse)         Ortho blood pressure done on patient FYI to oncall.[DK1.1]      Revision History        User Key Date/Time User Provider Type Action    > DK1.1 12/2/2018  6:46 PM Maci Cruz RN Registered Nurse Sign            Progress Notes by Vivian Atwood RN at 12/2/2018  6:36 PM     Author:  Vivian Atwood RN Service:  Nursing Author Type:  Registered Nurse    Filed:  12/2/2018  6:37 PM Date of Service:  12/2/2018  6:36 PM Creation Time:  12/2/2018  6:36 PM    Status:  Signed :  Vivian Atwood, RN (Registered Nurse)         I completed skin assessment with primary RN Glenys and agree with assessment and Dillon.[KN1.1]     Revision History        User Key Date/Time User Provider Type Action    > KN1.1 12/2/2018  6:37 PM Vivian Atwood, RN Registered Nurse Sign            ED Notes by Slade Huynh RN at 12/2/2018  5:25 PM     Author:  Slade Huynh, RN  Service:  Nursing Author Type:  Registered Nurse    Filed:  12/2/2018  5:25 PM Date of Service:  12/2/2018  5:25 PM Creation Time:  12/2/2018  5:25 PM    Status:  Signed :  Slade Huynh RN (Registered Nurse)         Report called to RB Glenys and all questions answered. She is to assume care of PT upon arrival to room 2307.[KM1.1]      Revision History        User Key Date/Time User Provider Type Action    > KM1.1 12/2/2018  5:25 PM Slade Huynh RN Registered Nurse Sign            ED Notes by Slade Huynh RN at 12/2/2018  5:00 PM     Author:  Slade Huynh RN Service:  Nursing Author Type:  Registered Nurse    Filed:  12/2/2018  5:09 PM Date of Service:  12/2/2018  5:00 PM Creation Time:  12/2/2018  5:08 PM    Status:  Signed :  Slade Huynh RN (Registered Nurse)         PT is resting in position of comfort and states pain to the right ankle is increasing and would like some pain meds. Admitting MD at bedside and is aware. No further changes in PT condition at this time. All needs are being met and all comfort measures are being addressed. Awaiting admit bed placement at this time.[KM1.1]      Revision History        User Key Date/Time User Provider Type Action    > KM1.1 12/2/2018  5:09 PM Slade Huynh RN Registered Nurse Sign            ED Notes by Slade Huynh RN at 12/2/2018  3:40 PM     Author:  Slade Huynh RN Service:  Nursing Author Type:  Registered Nurse    Filed:  12/2/2018  3:44 PM Date of Service:  12/2/2018  3:40 PM Creation Time:  12/2/2018  3:40 PM    Status:  Signed :  Slade Huynh RN (Registered Nurse)         PT back from Radiology and placed back on the monitor.   Entered PT room and found PT sitting on gurney in position of comfort. She states she has tolerable pain to the right lower extremity. Denies any other symptoms. States her dizziness has gone away at this time. PT is noted to be A&OX4, appears to be in NAD with no SOB noted at this time. All  needs are being assessed and will be met and all comfort measures are being addressed. Awaiting MD dispo at this time.[KM1.1]      Revision History        User Key Date/Time User Provider Type Action    > KM1.1 12/2/2018  3:44 PM Slade Huynh RN Registered Nurse Sign            ED Notes by Slade Huynh RN at 12/2/2018  3:25 PM     Author:  Slade Huynh RN Service:  Nursing Author Type:  Registered Nurse    Filed:  12/2/2018  3:37 PM Date of Service:  12/2/2018  3:25 PM Creation Time:  12/2/2018  3:36 PM    Status:  Signed :  Slade Huynh RN (Registered Nurse)         Report received from LUCHO Conley and all questions answered. I will assume care of PT at this time.     PT to Radiology at this time. I will assess upon her return to room.[KM1.1]      Revision History        User Key Date/Time User Provider Type Action    > KM1.1 12/2/2018  3:37 PM Slade Huynh RN Registered Nurse Sign            ED Notes by Coty Martinez RN at 12/2/2018  3:20 PM     Author:  Coty Martinez RN Service:  (none) Author Type:  Registered Nurse    Filed:  12/2/2018  3:29 PM Date of Service:  12/2/2018  3:20 PM Creation Time:  12/2/2018  3:29 PM    Status:  Signed :  Ctoy Martinez RN (Registered Nurse)         BP LYING  138/69  HR  66    BP SITTING  121/71  HR  73   BP  STANDING  100/65  HR  84---A LITTLE LIGHTHEADED[BG1.1]     Revision History        User Key Date/Time User Provider Type Action    > BG1.1 12/2/2018  3:29 PM Coty Martinez RN Registered Nurse Sign            ED Notes by Coty Martinez RN at 12/2/2018  1:55 PM     Author:  Coty Martinez RN Service:  (none) Author Type:  Registered Nurse    Filed:  12/2/2018  2:45 PM Date of Service:  12/2/2018  1:55 PM Creation Time:  12/2/2018  2:45 PM    Status:  Signed :  Coty Martinez RN (Registered Nurse)         BP LYING    122/70---HR  93       BP SITTING   103 69   HR  75         BP  STANDING   76/46   HR  93-LIGHTHEADED[BG1.1]     Revision  History        User Key Date/Time User Provider Type Action    > BG1.1 12/2/2018  2:45 PM Coty Martinez RN Registered Nurse Sign            ED Notes by Coty Martinez RN at 12/2/2018  1:48 PM     Author:  Coty Martinez RN Service:  (none) Author Type:  Registered Nurse    Filed:  12/2/2018  1:49 PM Date of Service:  12/2/2018  1:48 PM Creation Time:  12/2/2018  1:49 PM    Status:  Signed :  Coty Martinez RN (Registered Nurse)         Labs drawn with IV insertion and sent[BG1.1]     Revision History        User Key Date/Time User Provider Type Action    > BG1.1 12/2/2018  1:49 PM Coty Martinez RN Registered Nurse Sign            ED Notes by Coty Martinez RN at 12/2/2018 12:47 PM     Author:  Coty Martinez RN Service:  (none) Author Type:  Registered Nurse    Filed:  12/2/2018  1:13 PM Date of Service:  12/2/2018 12:47 PM Creation Time:  12/2/2018  1:13 PM    Status:  Signed :  Coty Martinez RN (Registered Nurse)         Pt had off and on episodes of lightheadedness last week and worse on Wednesday-saw doctor and was dx with pneumonia-cont to be lightheaded when up and about-last pm pt tripped on a suitcase and fell on tile floor-rt lower leg ,ankle and foot bruised and swollen and has a bruise on left lower leg-pain with wt bearing and ambulation-positive pedal pulses[BG1.1]     Revision History        User Key Date/Time User Provider Type Action    > BG1.1 12/2/2018  1:13 PM Coty Martinez RN Registered Nurse Sign                     Procedure Notes      Procedures by Jacinto Adams MD at 12/3/2018 11:14 AM     Author:  Jacinto Adams MD Service:  Orthopedics Author Type:  Physician    Filed:  12/3/2018 11:14 AM Date of Service:  12/3/2018 11:14 AM Creation Time:  12/3/2018 11:10 AM    Status:  Signed :  Jacinto Adams MD (Physician)         12/3/2018    PREOPERATIVE DIAGNOSIS:    1. Unstable right distal fibula fracture    POSTOPERATIVE DIAGNOSIS:    1.  Unstable right distal fibula fracture    PROCEDURE:    1. ORIF right distal fibula    SURGEON: Jacinto Adams MD    ASSISTANT: Lisa Tillman MD    ANESTHESIA: Spinal    TOURNIQUET TIME: 20 minutes    EBL: 15ml    IMPLANTS: Synthes 1/3 tubular locking plate with 2.7mm cortical lag screw, 3.5mm locking and nonlocking screws    DISPOSITION: Stable to PACU    INDICATIONS: Christiana is an 87 year old female who sustained an unstable right distal fibula fracture after a fall yesterday.  After discussing treatment options, she elected to proceed with ORIF of the right distal fibula to restore stability and optimize long term ankle function, understanding the risks of infection, nonunion, damage to vessels or nerves, symptomatic hardware, and the need for further surgery.    PROCEDURE: Christiana was met in the preoperative holding area where the right leg was marked.  Following this, she was transferred to the operating theater.  After smooth induction of general anesthesia, she was positioned supine with leg elevated on a bone foam.  A timeout was performed verifying the correct surgery, patient, and location.  The right lower extremity was then prepped and draped in a standard sterile fashion.    We started by placing a tourniquet on the calf and inflating this to 250mmHg.  We then made a 10cm longitudinal incision over the distal fibula.  Dissection was sharply carried down through skin and subcutaneous tissue, taking care to avoid the superficial peroneal nerve.  We then exposed the distal fibula fracture, and used a combination of curettes and rongeur to remove fracture hematoma.  We performed an open reduction with a clamp and placed a 2.7mm lag screw from proximal anterior to distal posterior with good purchase.  We then selected a 6 hole plate, and place a 3.5mm locking screw in the distal and proximal fragment in order to lag the plate to the bone.  We then placed 2 additional nonlocking screws proximally and one  addtional locking screws distally.  Her bone quality was poor and the initial 3.5mm nonlocking screw distally didn't get great purchase.  It was therefore traded out for a 3.5mm locking screws.  We then stressed the ankle and there was no evidence of syndesmotic widening.  Tourniquet was let down and hemostasis achieved.  Deep layer closed with 0-vicryl, subcutaneous layer with 2-0 vicryl, and skin with nylon.  A sterile dressing followed by a well padded short leg splint were applied and she was awoken from general anesthesia and transferred to the PACU in stable condition.    POSTOPERATIVE PLAN:   1. NWB right lower extremity -- elevate as much as possible   2. Return to floor -- lives on her own and will likely need TCU   3. Follow up in 2 weeks for wound check    JACINTO ELISE MD[EP1.1]       Revision History        User Key Date/Time User Provider Type Action    > EP1.1 12/3/2018 11:14 AM Jacinto Elise MD Physician Sign                  Progress Notes - Therapies (Notes from 12/01/18 through 12/04/18)     No notes of this type exist for this encounter.

## 2018-12-02 NOTE — ED PROVIDER NOTES
History     Chief Complaint   Patient presents with     Dizziness     lightheadedness since Wed-dx with pneumonia-tripped and fell last pm-both lower legs bruised and sore     HPI  Christiana Sal is a 87 year old female with history of coronary artery disease pacemaker dependent, osteoarthritis of the hip who presents for evaluation for feeling lightheadedness.  She also reports suffering a fall yesterday with bruising in her lower legs.  Patient reports she tripped and lost her footing on a suitcase falling onto tile floor while she had her cane while walking.  The fall was mechanical because she was tripped and from standing height.  She reports since her ED visit on November 28 when she was seen for concern for lightheadedness she is continuing to feel lightheaded.  She was diagnosed with pneumonia based on CT imaging which was obtained this patient recently had a flight to [place] to visit her daughter and was recently evaluated for concern for post intra-articular injection hematoma.  She was treated with azithromycin and prednisone and is on the last day of her therapy.  She tells me when she gets up from a lying or sitting position she feels lightheaded and like she is going to faint.  She does have a pacemaker which she reports was recently interrogated and did not show any acute process.  She is reporting that she also has increased urination and has underlying history of overactive bladder.  She received Botox injection for overactive bladder July 2018 and is scheduled for follow-up with Dr. Null in urology in January 2019 she is not reporting any signs or symptoms of headache no palpitations no chest pain or pressure and no other symptoms.      Social History: here in ED by private car with daughter. Lives in [place], [place]. In the process of moving to a senior living complex in [place], [place].      Problem List:    Patient Active Problem List    Diagnosis Date Noted     Primary  osteoarthritis of left hip 09/21/2018     Priority: Medium     Insect bite 06/28/2017     Priority: Medium     Cardiac pacemaker - [employer] dual lead - Not Dependent 03/02/2017     Priority: Medium     Sinoatrial node dysfunction (H) 11/10/2016     Priority: Medium     S/P cardiac pacemaker procedure 11/09/2016     Priority: Medium     CAD S/P percutaneous coronary angioplasty 05/11/2016     Priority: Medium     JENIFER (obstructive sleep apnea) 11/17/2014     Priority: Medium     Lichen planus 11/19/2013     Priority: Medium     Vulvar pruritus 11/12/2013     Priority: Medium     Angina pectoris (H) 02/08/2012     Priority: Medium     Advanced directives, counseling/discussion 06/16/2011     Priority: Medium     Patient will bring a copy. Chart r/q to make sure no copy.    GUSTAVO Cunningham MA    Advance Directive Problem List Overview:   Name Relationship Phone    Primary Health Care Agent Albaro Jonelle  [phone]         Alternative Health Care Agent Esther Mulligan  [phone]     Reviewed Health Care Directive dated 05/13/2005, scanned into EPIC 01/2012.  Marivel Villarreal CMA                Major depressive disorder, recurrent episode, mild (H) 11/02/2010     Priority: Medium     Family history of colon cancer 05/04/2010     Priority: Medium     Coronary atherosclerosis of native coronary artery      Priority: Medium     s/p MI ([facility])       Hyperlipidemia LDL goal <100      Priority: Medium     Essential hypertension, benign      Priority: Medium     Hypothyroidism      Priority: Medium     GERD (gastroesophageal reflux disease)      Priority: Medium        Past Medical History:    Past Medical History:   Diagnosis Date     Coronary atherosclerosis of native coronary artery      Essential hypertension, benign      GERD (gastroesophageal reflux disease)      History of transient ischemic attack (TIA)      Hyperlipidemia LDL goal <100      Hypothyroidism      Major depressive disorder, recurrent episode,  mild (H) 2011     Peptic ulcer disease with hemorrhage        Past Surgical History:    Past Surgical History:   Procedure Laterality Date     APPENDECTOMY       CARDIAC CATHERIZATION  1/15/04    drug-eluding stent RCA, Dr. Pérez, [facility]     CARDIAC CATHERIZATION  12    diffuse mild/mod disease, no new stent     CATARACT IOL, RT/LT  12    RT, Dr. Wynne     CHOLECYSTECTOMY, OPEN       COLONOSCOPY      [facility]     FRACTURE TX, ANKLE RT/LT      LT, 2 screws remain     HC EXCISE HAND/FOOT NEUROMA      RT     HC LARYNGOSCOPY DIRECT W VOCAL CORD INJECTION      vocal cord polypectomy     HC REMOVAL OF OVARIAN CYST(S)       HC TRANSCATH STENT INIT VESSEL,PERCUT      x 2     REPAIR HAMMER TOE  9/13/10    multiple + RT great toe tendon release, Dr. Sanchez DPM     STENT  -    Cardiac stents 6 placed.       Family History:    Family History   Problem Relation Age of Onset     C.A.D. Father      Hypertension Father      Myocardial Infarction Father 66      from MI     C.A.D. Brother      Myocardial Infarction Brother      Cancer Brother      skin ca     Breast Cancer Sister      Cancer - colorectal Sister      Heart Disease Sister      Neurologic Disorder Mother      migraine     C.A.D. Mother      Hypertension Mother      Heart Failure Mother      Thyroid Disease Son      cretinism     Hypertension Son      Heart Disease Son      Psychotic Disorder Sister      Hypertension Sister      Heart Disease Sister      CABG     Hypertension Brother      C.A.D. Brother      Myocardial Infarction Brother      Arthritis Daughter      RA     Hypertension Sister      Heart Disease Sister      Cancer Sister      Blood Disease Sister      Neurologic Disorder Child      migraine (2 children)       Social History:  Marital Status:   [2]  Social History   Substance Use Topics     Smoking status: Never Smoker     Smokeless tobacco: Never Used      Comment: Never smoker; no secondhand smoke  "exposure     Alcohol use Yes      Comment: social        Medications:      acetaminophen (TYLENOL) 500 MG tablet   atorvastatin (LIPITOR) 40 MG tablet   azithromycin (ZITHROMAX Z-RADHIKA) 250 MG tablet   Biotin 10 MG CAPS   CALCIUM 600 + D OR   citalopram (CELEXA) 20 MG tablet   FISH OIL OR   levothyroxine (SYNTHROID/LEVOTHROID) 75 MCG tablet   magnesium oxide (MAG-OX) 400 MG tablet   meclizine (ANTIVERT) 25 MG tablet   metoprolol (TOPROL-XL) 25 MG 24 hr tablet   Multiple Vitamins-Minerals (OCUVITE PO)   pramipexole (MIRAPEX) 0.5 MG tablet   predniSONE (DELTASONE) 20 MG tablet   Vitamin D, Cholecalciferol, 1000 units TABS   albuterol (PROAIR HFA/PROVENTIL HFA/VENTOLIN HFA) 108 (90 BASE) MCG/ACT Inhaler   ASPIRIN 81 MG PO TABS   diclofenac (VOLTAREN) 50 MG EC tablet   losartan (COZAAR) 25 MG tablet   nitroglycerin (NITROSTAT) 0.4 MG SL tablet   ondansetron (ZOFRAN) 4 MG tablet   pantoprazole (PROTONIX) 40 MG EC tablet   ranitidine (ZANTAC) 150 MG tablet         Review of Systems   HENT: Negative.    Eyes: Negative.    Respiratory: Negative.    Cardiovascular: Negative.    Gastrointestinal: Negative.    Endocrine: Negative.    Genitourinary: Positive for frequency (chronic with hsitory of overactive bladder).   Musculoskeletal: Negative.    Skin: Negative.    Allergic/Immunologic: Negative.    Neurological: Positive for dizziness, weakness and light-headedness.   Hematological: Negative.    Psychiatric/Behavioral: Negative.    All other systems reviewed and are negative.      Physical Exam   BP: 110/53  Heart Rate: 76  Temp: 97.6  F (36.4  C)  Resp: 16  Height: 167.6 cm (5' 6\")  Weight: 74.8 kg (165 lb)  SpO2: 93 %      Physical Exam   Constitutional: She is oriented to person, place, and time. She appears well-developed and well-nourished. No distress.   HENT:   Head: Normocephalic and atraumatic.   Eyes: Conjunctivae and EOM are normal. Pupils are equal, round, and reactive to light. Right eye exhibits no discharge. " Left eye exhibits no discharge. No scleral icterus.   Neck: Normal range of motion. Neck supple. No JVD present. No tracheal deviation present. No thyromegaly present.   Cardiovascular: Normal rate and regular rhythm.  Exam reveals no gallop and no friction rub.    Pulmonary/Chest: Effort normal and breath sounds normal. No stridor. No respiratory distress. She has no wheezes. She has no rales. She exhibits no tenderness.   Lymphadenopathy:     She has no cervical adenopathy.   Neurological: She is alert and oriented to person, place, and time. She displays normal reflexes. No cranial nerve deficit. She exhibits normal muscle tone. Coordination normal.   Skin: No rash noted. She is not diaphoretic. No erythema. No pallor.   Psychiatric: She has a normal mood and affect. Her behavior is normal. Judgment and thought content normal.       ED Course     ED Course     Procedures               EKG Interpretation:      Interpreted by Bernardo Walker  Time reviewed: 13:50  Symptoms at time of EKG: lightheaded with position change   Rhythm: normal sinus   Rate: Normal  Axis: Normal  Ectopy: none  Conduction: left bundle branch block (complete)  ST Segments/ T Waves: Non-specific ST-T wave changes  Q Waves: nonspecific  Comparison to prior: Unchanged from 11/28/18    Clinical Impression: no acute changes, with known LBBB and first degree AV block.        Critical Care time:  none             ED Medications:  Medications   cephALEXin (KEFLEX) capsule 500 mg (not administered)   HYDROcodone-acetaminophen (NORCO) 5-325 MG per tablet 1 tablet (not administered)   lactated ringers BOLUS 500 mL (0 mLs Intravenous Stopped 12/2/18 1500)   lactated ringers BOLUS 500 mL (500 mLs Intravenous New Bag 12/2/18 1520)       ED Vitals:  Vitals:    12/02/18 1430 12/02/18 1541 12/02/18 1600 12/02/18 1700   BP: 133/71 129/65 130/67 151/83   BP Location:  Right arm Right arm Right arm   Resp:  16 16 16   Temp:       TempSrc:       SpO2: 98%  97% 99% 97%   Weight:       Height:           ED Labs and Imaging:  Results for orders placed or performed during the hospital encounter of 12/02/18 (from the past 24 hour(s))   CBC with platelets differential   Result Value Ref Range    WBC 11.8 (H) 4.0 - 11.0 10e9/L    RBC Count 3.99 3.8 - 5.2 10e12/L    Hemoglobin 12.5 11.7 - 15.7 g/dL    Hematocrit 37.3 35.0 - 47.0 %    MCV 94 78 - 100 fl    MCH 31.3 26.5 - 33.0 pg    MCHC 33.5 31.5 - 36.5 g/dL    RDW 13.6 10.0 - 15.0 %    Platelet Count 275 150 - 450 10e9/L    Diff Method Automated Method     % Neutrophils 70.5 %    % Lymphocytes 12.6 %    % Monocytes 15.4 %    % Eosinophils 0.7 %    % Basophils 0.3 %    % Immature Granulocytes 0.5 %    Nucleated RBCs 0 0 /100    Absolute Neutrophil 8.3 1.6 - 8.3 10e9/L    Absolute Lymphocytes 1.5 0.8 - 5.3 10e9/L    Absolute Monocytes 1.8 (H) 0.0 - 1.3 10e9/L    Absolute Eosinophils 0.1 0.0 - 0.7 10e9/L    Absolute Basophils 0.0 0.0 - 0.2 10e9/L    Abs Immature Granulocytes 0.1 0 - 0.4 10e9/L    Absolute Nucleated RBC 0.0    Basic metabolic panel   Result Value Ref Range    Sodium 139 133 - 144 mmol/L    Potassium 3.2 (L) 3.4 - 5.3 mmol/L    Chloride 105 94 - 109 mmol/L    Carbon Dioxide 26 20 - 32 mmol/L    Anion Gap 8 3 - 14 mmol/L    Glucose 137 (H) 70 - 99 mg/dL    Urea Nitrogen 28 7 - 30 mg/dL    Creatinine 1.08 (H) 0.52 - 1.04 mg/dL    GFR Estimate 48 (L) >60 mL/min/1.7m2    GFR Estimate If Black 58 (L) >60 mL/min/1.7m2    Calcium 7.8 (L) 8.5 - 10.1 mg/dL   UA reflex to Microscopic   Result Value Ref Range    Color Urine Sherry     Appearance Urine Cloudy     Glucose Urine Negative NEG^Negative mg/dL    Bilirubin Urine Negative NEG^Negative    Ketones Urine 5 (A) NEG^Negative mg/dL    Specific Gravity Urine 1.018 1.003 - 1.035    Blood Urine Negative NEG^Negative    pH Urine 5.0 5.0 - 7.0 pH    Protein Albumin Urine 30 (A) NEG^Negative mg/dL    Urobilinogen mg/dL 2.0 0.0 - 2.0 mg/dL    Nitrite Urine Negative NEG^Negative     Leukocyte Esterase Urine Moderate (A) NEG^Negative    Source Midstream Urine     RBC Urine 6 (H) 0 - 2 /HPF    WBC Urine 41 (H) 0 - 5 /HPF    Bacteria Urine Few (A) NEG^Negative /HPF    Squamous Epithelial /HPF Urine 4 (H) 0 - 1 /HPF    Mucous Urine Present (A) NEG^Negative /LPF    Hyaline Casts 25 (H) 0 - 2 /LPF   Ankle XR, G/E 3 views, right    Narrative    RIGHT ANKLE THREE VIEWS   12/2/2018 3:45 PM     HISTORY: Fall at home from standing height. Evaluate for acute bony  process due to trauma.     COMPARISON: None.      Impression    IMPRESSION: There is a comminuted distal fibular fracture with some  mild widening of the ankle mortise. Degenerative changes are seen in  the midfoot.    MARK RICHARDSON MD   XR Tibia & Fibula Left 2 Views    Narrative    LEFT TIBIA-FIBULA TWO VIEWS 12/2/2018 3:45 PM     HISTORY: Fall at home last night, bruising and swelling, with pain  with weightbearing. Evaluate for acute bony process due to trauma.     COMPARISON: None.      Impression    IMPRESSION: Negative for any acute fracture. There has been previous  pinning of the distal fibula.    MARK RICHARDSON MD         Patient assessed.    Prior or recent diagnostic imaging and work-up:  CT CHEST PULMONARY EMBOLISM WITH CONTRAST  11/28/2018 11:08 AM     HISTORY:  Dyspnea, lightheaded, elevated D-dimer.     TECHNIQUE: Scans obtained from the apices through the diaphragm with  IV contrast. 75 mL Isovue 370 injected. Radiation dose for this scan  was reduced using automated exposure control, adjustment of the mA  and/or kV according to patient size, or iterative reconstruction  technique.     COMPARISON:  Chest x-ray 11/20/2017.     FINDINGS:   Vascular: Limited opacification of the thoracic aorta limits  assessment. No clear thoracic aortic abnormality can be seen. Thoracic  aortic and coronary artery calcifications. No evidence for pulmonary  embolism.      Lungs and pleura: No effusions. Mild patchy bilateral groundglass  opacities.  No lobar consolidation identified. Right middle lobe  pulmonary nodule is 0.5 cm, series 8 image 129.     Lymph nodes: A few scattered small mediastinal and hilar lymph nodes  noted. One of these at the right infrahilar region is borderline  prominent measuring 1.3 cm, series 4 image 67.     Additional findings: Upper abdomen images show no acute abnormality.         IMPRESSION:   1. Mild patchy bilateral groundglass airspace opacities could be seen  with pulmonary edema or other alveolar infiltrate.  2. No pulmonary embolism or acute thoracic aortic abnormality.  3. Pulmonary nodule on the right is indeterminate. See below for  follow up guidelines.     Recommendations for one or multiple incidental lung nodules < 6mm:    Low risk patients: No routine follow-up.    High risk patients: Optional follow-up CT at 12 months; if  unchanged, no further follow-up.     *Low Risk: Minimal or absent history of smoking or other known risk  factors.  *Nonsolid (ground glass) or partly solid nodules may require longer  follow-up to exclude indolent adenocarcinoma.  *Recommendations based on Guidelines for the Management of Incidental  Pulmonary Nodules Detected at CT: From the Fleischner Society 2017,  Radiology 2017.     SATHISH LEE MD    ULTRASOUND LEFT LOWER EXTREMITY VENOUS DUPLEX November 28, 2018 10:24  AM      HISTORY: Swelling, dyspnea.      COMPARISON: None.     FINDINGS: Gray-scale, color and Doppler spectral analysis ultrasound  was performed of the left leg. Compression and augmentation imaging  was performed.     There is no evidence for deep venous thrombosis.          IMPRESSION: No evidence for deep vein thrombosis within the left leg.     SATHISH LEE MD    PROCEDURE:  Arterial duplex ultrasound examination of the left lower extremity     DATE OF PROCEDURE:  10/30/2018 1:00 PM     CLINICAL HISTORY/INDICATION:   87-year-old female with left lower extremity hematoma     COMPARISON:  None relevant     TECHNIQUE:    Grayscale, color-flow, and spectral waveform analysis with velocities  were performed of the arteries of the left lower extremity     FINDINGS: Patent iliac, common femoral, profunda, superficial femoral,  and popliteal arteries on the left without evidence of focal stenosis.  The tibial trifurcation and tibial vessels are patent without  significant stenosis. No vascular abnormality or active bleeding seen  in the area of bruising of the left lower extremity.         IMPRESSION:   1.  Patent left lower extremity arteries without evidence of stenosis.  2.  No active extravasation or vascular abnormality in the area of  lower extremity bruising.     ROXI BERNARDO MD    Assessments & Plan (with Medical Decision Making)   Clinical Impression: 87-year-old female who arrived for evaluation of her feeling lightheaded since her ED visit on November 28, 2018 and a fall last night.  She is lightheaded because she is orthostatic.  She also has a closed comminuted mildly displaced distal right fibular fracture as a result of her fall last night at home.  She has a possible early urinary tract infection with pyuria on urinalysis she has a history of overactive bladder which may have contributed to her orthostasis as she reports she does get up every hour at night to urinate.   History is obtained from the patient and her daughter who is at the bedside.  Patient is in the process of moving to [place] to a senior living complex.  She reports last night she tripped on her cane falling onto her legs on her suitcase onto a tile floor in her home.  She reports some pain with weightbearing in her legs.  She has marked bruising and swelling about the right lower leg with swelling about the ankle (she also has bruising over her left mary jo).  No gross bony deformity.  No bony pain to palpation over the length of the foot, ankle and right lower leg.  She tells me when she gets up she has a sense that she feels like she is  fainting.  She reports when she lays down she has no symptoms.  She reports her pacemaker was interrogated recently after return trip from [place] and there was no arrhythmia abnormality captured by her report.  Patient is reporting no palpitations.  She does not reports any headaches or chest pain or discomfort.  She reports she felt the sense that she is going to faint when she gets up from lying.    On my exam she is in no obvious distress.  She is pleasant.  She has bruising and swelling about her right lower leg.  She is hemodynamically normal.  She is reporting no recent medication change except complete her prescribed course of azithromycin and prednisone provided during her ED visit on November 28, 2018.         ED Course and Plan:   I reviewed her ED visit on November 28, 2018. CT imaging was obtained due to patient's recent flight (to [facility]) which was negative for PE.  She was noted to have bilateral groundglass opacities with pulmonary edema.  There was no pulmonary embolism noted.  He also had lower extremity ultrasound which did not show any DVT.  Please see detailed report above.  We reviewed options for care. On review of systems during her ED course she mentioned she has been getting up to urinate every hour to 2 hours.  Records indicate she was seen in July 2018 for overactive bladder by Dr. Null.  She had a Botox injection in July 2018.  She is scheduled for follow-up appointment and recheck in January 2019 by report.  Blood work and urinalysis were obtained as well as EKG and orthostatics.  I reviewed her medical records.  She is on Celexa, losartan, ranitidine, Voltaren, meclizine, Mirapex, Lipitor, Toprol. Patient reportedly had recently  injection to her hip. Records indicate she had a injection in October 2018 and subsequently suffered a lower extremity hematoma.  Ultrasound did not show any vascular abnormality (see report above).    Patient was noted to be orthostatic in the  emergency department with (>40 point drop) in  blood pressure change from standing to lying and was symptomatic with lightheadedness.  A degree of orthostatic blood pressure and vital changes is likely due to report of frequent urination with underlying history of overactive bladder.  EKG in the ED today does not show any acute change from comparison EKG from November 28, 2018.  No left bundle branch block with first-degree AV block and sinus rhythm.    Patient had improvement in her sense of feeling lightheaded and dizzy after IV fluid bolus.  Her vital signs improved with IV fluid hydration.  Her urine is cultured today with degree of pyuria with 41 white cells per high-power field and moderate leukocyte esterase.  I considered empiric antibiotics for possible UTI with underlying history of overactive bladder. XR showed a comminuted distal fibular fracture related to her report of fall.  With a distal fibular fracture we discussed and reviewed options for care including a cam boot versus Chuck Pope splint.  I elected to place the patient on Chuck Pope splint. She had normal perfusion prior to and after splinting. She is admitted to the hospital because of concern about going home because she is in the process of moving to a new senior living complex with an apartment.  She is given oral Keflex here in the ED for possible urinary tract infection pending urine culture.    I reviewed options for care my concern about patients ability to care for herself with a plan for new moved to a new apartment complex with a fibular fracture the patient and her daughter.  Patient is in agreement with plan of care.  I reviewed patient's presentation and x-ray findings with  Tracie Argueta PA-C-on-call with/for Dr. Alvarado Noble (Foot and ankle orthopedist). I was informed by Dr. Guillermo Adams is the on-call orthopedic surgeon at [facility] and consult should be placed for Dr. Jacinto Adams. Plan is for someone on the orthopedic  team to see the patient in the morning for definitive management and further care.    I spoke with ZAIDA Randle PA-C-on-call hospitalist at 3.50PM. She agreed to assume care upon admission after reviewing rationale for admission, ED course, workup, x-ray imaging results, intervention in the ED and pending lab results.  I also reviewed my consultation with orthopedic surgery. Plan is to admit for further care including evaluation and management.    ADDENDUM:  Prior to transfer to the medical floor patient was reporting increasing pain and discomfort in her lower leg and ankle.  She was given oral Norco for pain control    Disclaimer: This note consists of symbols derived from keyboarding, dictation and/or voice recognition software. As a result, there may be errors in the script that have gone undetected. Please consider this when interpreting information found in this chart.      I have reviewed the nursing notes.    I have reviewed the findings, diagnosis, plan and need for follow up with the patient.       New Prescriptions    No medications on file       Final diagnoses:   Lightheadedness - Due to orthostasis   Fall, initial encounter - from standing height, tripped on a suite case at home last night   Contusion of right lower extremity, initial encounter - post fall with trip on a suite case and cane at home   Closed fracture of shaft of fibula   Abnormal urinalysis - Abnormal urinalysis suspicious for UTI with pyuria report of urinary frequency in the context of overactive bladder       This document serves as a record of the services and decisions personally performed and made by Bernardo Walker. The HPI was created on his behalf by Precious Martin, a trained medical scribe. The creation of this document is based the provider's statements to the medical scribe.  Precious Martin 12:48 PM 12/2/2018    Provider:   The information in this document, created by the medical scribe for me, accurately reflects the  services I personally performed and the decisions made by me. I have reviewed and approved this document for accuracy prior to leaving the patient care area.  Bernardo Walker, 12:48 PM 12/2/2018 12/2/2018   [facility] EMERGENCY DEPARTMENT     Bernardo Walker MD  12/02/18 7612       Bernardo Walker MD  12/02/18 0116

## 2018-12-02 NOTE — ED NOTES
BP LYING    122/70---HR  93       BP SITTING   103 69   HR  75         BP  STANDING   76/46   HR  93-LIGHTHEADED

## 2018-12-02 NOTE — IP AVS SNAPSHOT
"    [facility] SURGICAL: [phone]                                              INTERAGENCY TRANSFER FORM - LAB / IMAGING / EKG / EMG RESULTS   2018                    Hospital Admission Date: 2018  SANDIE BERGER   : 10/25/1931  Sex: Female        Attending Provider: Stevo Ortiz MD     Allergies:  Demerol, Lisinopril, Meperidine, Sulfa Drugs    Infection:  None   Service:  INTERNAL MED    Ht:  1.676 m (5' 6\")   Wt:  74.8 kg (165 lb)   Admission Wt:  74.8 kg (165 lb)    BMI:  26.63 kg/m 2   BSA:  1.87 m 2            Patient PCP Information     Provider PCP Type    Kelly Matthew PA-C General         Lab Results - 3 Days      Basic metabolic panel [[id number]] (Abnormal)  Resulted: 18, Result status: Final result    Ordering provider: Stevo Ortiz MD  18 0000 Resulting lab: [facility]    Specimen Information    Type Source Collected On   Blood  18 0544          Components       Value Reference Range Flag Lab   Sodium 138 133 - 144 mmol/L  59   Potassium 4.1 3.4 - 5.3 mmol/L  59   Chloride 104 94 - 109 mmol/L  59   Carbon Dioxide 29 20 - 32 mmol/L  59   Anion Gap 5 3 - 14 mmol/L  59   Glucose 142 70 - 99 mg/dL H 59   Urea Nitrogen 17 7 - 30 mg/dL  59   Creatinine 0.87 0.52 - 1.04 mg/dL  59   GFR Estimate 61 >60 mL/min/1.7m2  59   Comment:  Non  GFR Calc   GFR Estimate If Black 74 >60 mL/min/1.7m2  59   Comment:  African American GFR Calc   Calcium 7.7 8.5 - 10.1 mg/dL L 59            CBC with platelets [[id number]] (Abnormal)  Resulted: 18, Result status: Final result    Ordering provider: Stevo Ortiz MD  18 0000 Resulting lab: [facility]    Specimen Information    Type Source Collected On   Blood  18 0544          Components       Value Reference Range Flag Lab   WBC 11.7 4.0 - 11.0 10e9/L H 59   RBC Count 3.41 3.8 - 5.2 10e12/L L 59   Hemoglobin 10.6 " 11.7 - 15.7 g/dL L 59   Hematocrit 32.0 35.0 - 47.0 % L 59   MCV 94 78 - 100 fl  59   MCH 31.1 26.5 - 33.0 pg  59   MCHC 33.1 31.5 - 36.5 g/dL  59   RDW 13.4 10.0 - 15.0 %  59   Platelet Count 237 150 - 450 10e9/L  59            Urine Culture [[id number]] (Abnormal)  Resulted: 12/04/18 0512, Result status: Final result    Ordering provider: Bernardo Walker MD  12/02/18 1549 Resulting lab: [facility]    Specimen Information    Type Source Collected On   Midstream Urine Urine Midstream 12/02/18 1430          Components       Value Reference Range Flag Lab   Specimen Description Midstream Urine      Special Requests Specimen received in preservative   75   Culture Micro --  A 75   Result:         10,000 to 50,000 colonies/mL  Enterococcus faecalis              UA reflex to Microscopic [[id number]] (Abnormal)  Resulted: 12/02/18 1443, Result status: Edited Result - FINAL    Ordering provider: Bernardo Walker MD  12/02/18 1336 Resulting lab: [facility]    Specimen Information    Type Source Collected On   Midstream Urine Urine clean catch 12/02/18 1430          Components       Value Reference Range Flag Lab   Color Urine Sherry   59   Appearance Urine Cloudy   59   Glucose Urine Negative NEG^Negative mg/dL  59   Bilirubin Urine Negative NEG^Negative  59   Ketones Urine 5 NEG^Negative mg/dL A 59   Specific Gravity Urine 1.018 1.003 - 1.035  59   Blood Urine Negative NEG^Negative  59   pH Urine 5.0 5.0 - 7.0 pH  59   Protein Albumin Urine 30 NEG^Negative mg/dL A 59   Urobilinogen mg/dL 2.0 0.0 - 2.0 mg/dL  59   Nitrite Urine Negative NEG^Negative  59   Leukocyte Esterase Urine Moderate NEG^Negative A 59   Source Midstream Urine   59   RBC Urine 6 0 - 2 /HPF H 59   WBC Urine 41 0 - 5 /HPF H 59   Bacteria Urine Few NEG^Negative /HPF A 59   Squamous Epithelial /HPF Urine 4 0 - 1 /HPF H 59   Mucous Urine Present NEG^Negative /LPF A 59   Hyaline Casts 25 0 - 2 /LPF  H 59            Basic metabolic panel [[id number]] (Abnormal)  Resulted: 12/02/18 1406, Result status: Final result    Ordering provider: Bernardo Walker MD  12/02/18 1336 Resulting lab: [facility]    Specimen Information    Type Source Collected On   Blood  12/02/18 1345          Components       Value Reference Range Flag Lab   Sodium 139 133 - 144 mmol/L  59   Potassium 3.2 3.4 - 5.3 mmol/L L 59   Chloride 105 94 - 109 mmol/L  59   Carbon Dioxide 26 20 - 32 mmol/L  59   Anion Gap 8 3 - 14 mmol/L  59   Glucose 137 70 - 99 mg/dL H 59   Urea Nitrogen 28 7 - 30 mg/dL  59   Creatinine 1.08 0.52 - 1.04 mg/dL H 59   GFR Estimate 48 >60 mL/min/1.7m2 L 59   Comment:  Non  GFR Calc   GFR Estimate If Black 58 >60 mL/min/1.7m2 L 59   Comment:  African American GFR Calc   Calcium 7.8 8.5 - 10.1 mg/dL L 59            CBC with platelets differential [[id number]] (Abnormal)  Resulted: 12/02/18 1352, Result status: Final result    Ordering provider: Bernardo Walker MD  12/02/18 1336 Resulting lab: [facility]    Specimen Information    Type Source Collected On   Blood  12/02/18 1345          Components       Value Reference Range Flag Lab   WBC 11.8 4.0 - 11.0 10e9/L H 59   RBC Count 3.99 3.8 - 5.2 10e12/L  59   Hemoglobin 12.5 11.7 - 15.7 g/dL  59   Hematocrit 37.3 35.0 - 47.0 %  59   MCV 94 78 - 100 fl  59   MCH 31.3 26.5 - 33.0 pg  59   MCHC 33.5 31.5 - 36.5 g/dL  59   RDW 13.6 10.0 - 15.0 %  59   Platelet Count 275 150 - 450 10e9/L  59   Diff Method Automated Method   59   % Neutrophils 70.5 %  59   % Lymphocytes 12.6 %  59   % Monocytes 15.4 %  59   % Eosinophils 0.7 %  59   % Basophils 0.3 %  59   % Immature Granulocytes 0.5 %  59   Nucleated RBCs 0 0 /100  59   Absolute Neutrophil 8.3 1.6 - 8.3 10e9/L  59   Absolute Lymphocytes 1.5 0.8 - 5.3 10e9/L  59   Absolute Monocytes 1.8 0.0 - 1.3 10e9/L H 59   Absolute Eosinophils 0.1 0.0 - 0.7 10e9/L  59   Absolute  Basophils 0.0 0.0 - 0.2 10e9/L  59   Abs Immature Granulocytes 0.1 0 - 0.4 10e9/L  59   Absolute Nucleated RBC 0.0   59            Testing Performed By     Lab - Abbreviation Name Director Address Valid Date Range    59 - Unknown [facility] Unknown [address] [address] 12/31/14 1006 - Present    75 - Unknown [facility] Unknown [address] [facility] [address] 01/15/15 1019 - Present            Unresulted Labs (24h ago through future)    Start       Ordered    12/04/18 0600  CBC with platelets  DAILY,   Routine     Comments:  Last Lab Result: Hemoglobin (g/dL)       Date                     Value                 12/02/2018               12.5             ----------    12/03/18 1332    12/04/18 0600  Basic metabolic panel  DAILY,   Routine      12/03/18 1332         Imaging Results - 3 Days      XR Surgery ALVIN L/T 5 Min Fluoro w Stills [[id number]]  Resulted: 12/03/18 1124, Result status: Final result    Ordering provider: Jacinto Adams MD  12/03/18 0751 Performed: 12/03/18 0938 - 12/03/18 1123    Resulting lab: [facility]      Narrative:       This exam was marked as non-reportable because it will not be read by a   radiologist or a [facility] non-radiologist provider.                Ankle XR, G/E 3 views, right [[id number]]  Resulted: 12/02/18 1554, Result status: Final result    Ordering provider: Bernardo Walker MD  12/02/18 1336 Resulted by: Victor Hugo Richardson MD    Performed: 12/02/18 1530 - 12/02/18 1545 Resulting lab: [facility]    Narrative:       RIGHT ANKLE THREE VIEWS   12/2/2018 3:45 PM     HISTORY: Fall at home from standing height. Evaluate for acute bony  process due to trauma.     COMPARISON: None.      Impression:       IMPRESSION: There is a comminuted distal fibular fracture with some  mild widening of the ankle mortise. Degenerative changes are seen in  the midfoot.    VICTOR HUGO RICHARDSON MD      XR Tibia & Fibula  Left 2 Views [[id number]]  Resulted: 12/02/18 1554, Result status: Final result    Ordering provider: Bernardo Walker MD  12/02/18 1336 Resulted by: Mark Richardson MD    Performed: 12/02/18 1529 - 12/02/18 1545 Resulting lab: RAD[facility] RESULTS    Narrative:       LEFT TIBIA-FIBULA TWO VIEWS 12/2/2018 3:45 PM     HISTORY: Fall at home last night, bruising and swelling, with pain  with weightbearing. Evaluate for acute bony process due to trauma.     COMPARISON: None.      Impression:       IMPRESSION: Negative for any acute fracture. There has been previous  pinning of the distal fibula.    MARK RICHARDSON MD      Testing Performed By     Lab - Abbreviation Name Director Address Valid Date Range    104 - [facility] RADIOLOGY RESULTS Unknown Unknown 02/16/05 1553 - Present    178 - [facility] Unknown Unknown 07/20/12 1135 - Present            Encounter-Level Documents:     There are no encounter-level documents.      Order-Level Documents:     There are no order-level documents.

## 2018-12-02 NOTE — LETTER
Transition Communication Hand-off for Care Transitions to Next Level of Care Provider  Name: Christiana Sal  : 10/25/1931  MRN #: [id number]  Primary Care Provider: Kelly Matthew  Primary Care MD Name: Dr Matthew  Primary Clinic: [address] [facility] [address]  [facility]  Reason for Hospitalization:  Closed fracture of shaft of fibula [S82.409A]  Lightheadedness [R42]  Abnormal urinalysis [R82.90]  Contusion of right lower extremity, initial encounter [S80.11XA]  Fall, initial encounter [W19.XXXA]  Admit Date/Time: 2018 12:36 PM  Discharge Date: 2018  Payor Source: Payor: BCBS / Plan: [employer] PLATINUM BLUE / Product Type: PPO /   Readmission Assessment Measure (BLADIMIR) Risk Score/category: Observation  Reason for Communication Hand-off Referral: Fragility  Discharge Plan:  Discharge Plan:       Most Recent Value    Concerns To Be Addressed discharge planning concerns    Concerns Comments needs TCU      Concern for non-adherence with plan of care:   Y/N no  Discharge Needs Assessment:  Needs       Most Recent Value    Anticipated Changes Related to Illness inability to care for self    Equipment Currently Used at Home none    Transportation Available car, family or friend will provide    # of Referrals Placed by CTS Post Acute Facilities    Skilled [facility] [address] [phone], Fax: [phone]    PAS Number [id number]      Follow-up plan:  Future Appointments  Date Time Provider Department Center   2018 10:40 AM [facility]   2018 9:00 AM Kelly Matthew PA-C [facility]   2019 10:15 AM Cleve Null MD [facility]   2019 12:00 [facility]   Hafsa Waddell  AVS/Discharge Summary is the source of truth; this is a helpful guide for improved communication of patient story

## 2018-12-02 NOTE — IP AVS SNAPSHOT
` `     [facility] SURGICAL: [phone]            Medication Administration Report for Christiana Sal as of 12/04/18 1226   Legend:    Given Hold Not Given Due Canceled Entry Other Actions    Time Time (Time) Time  Time-Action       Inactive    Active    Linked        Medications 11/28/18 11/29/18 11/30/18 12/01/18 12/02/18 12/03/18 12/04/18    acetaminophen (TYLENOL) tablet 650 mg  Dose: 650 mg  Freq: EVERY 4 HOURS PRN Route: PO  PRN Reason: mild pain  Start: 12/02/18 1814   Admin Instructions: Alternate ibuprofen (if ordered) with acetaminophen.  Maximum acetaminophen dose from all sources = 75 mg/kg/day not to exceed 4 grams/day.    Admin. Amount: 2 tablet (2 × 325 mg tablet)  Last Admin: 12/03/18 0400  Dispense Loc: [facility] 200         1908 (650 mg)-Given       2245 (650 mg)-Given        0400 (650 mg)-Given       0910-Auto Hold       1253-Unhold            acetaminophen (TYLENOL) tablet 975 mg  Dose: 975 mg  Freq: 3 TIMES DAILY Route: PO  Start: 12/02/18 2200   Admin Instructions: Alternate ibuprofen (if ordered) with acetaminophen  Maximum acetaminophen dose from all sources = 75 mg/kg/day not to exceed 4 grams/day.    Admin. Amount: 3 tablet (3 × 325 mg tablet)  Last Admin: 12/04/18 0841  Dispense Loc: [facility] 200         (2126)-Not Given        (0800)-Not Given       0910-Auto Hold       1253-Unhold       1509 (975 mg)-Given       2026 (975 mg)-Given        0841 (975 mg)-Given       [ ] 1400       [ ] 2000           aspirin (ASA) chewable tablet 81 mg  Dose: 81 mg  Freq: DAILY Route: PO  Start: 12/04/18 1145   Admin. Amount: 1 tablet (1 × 81 mg tablet)  Last Admin: 12/04/18 1221  Dispense Loc: [facility] 200           1221 (81 mg)-Given           atorvastatin (LIPITOR) tablet 40 mg  Dose: 40 mg  Freq: DAILY Route: PO  Start: 12/02/18 1815   Admin. Amount: 2 tablet (2 × 20 mg tablet)  Last Admin: 12/04/18 0841  Dispense Loc: [facility] ADS Med 200         9667 (40 mg)-Given         (0800)-Not Given       0910-Auto Hold       1253-Unhold        0841 (40 mg)-Given           cephALEXin (KEFLEX) capsule 500 mg  Dose: 500 mg  Freq: EVERY 12 HOURS Route: PO  Indications of Use: URINARY TRACT INFECTION  Start: 18 160   End: 18 160   Admin. Amount: 1 capsule (1 × 500 mg capsule)  Last Admin: 18 0351  Dispense Loc: [facility] Med 200  Administrations Remainin         1724 (500 mg)-Given        0400 (500 mg)-Given       0910-Auto Hold       1253-Unhold       1615 (500 mg)-Given        0351 (500 mg)-Given       [ ] 1603           citalopram (celeXA) tablet 20 mg  Dose: 20 mg  Freq: DAILY Route: PO  Start: 18   Admin. Amount: 1 tablet (1 × 20 mg tablet)  Last Admin: 18 08  Dispense Loc: [facility] Med 200         1831 (20 mg)-Given        (0800)-Not Given       0910-Auto Hold       1253-Unhold        0841 (20 mg)-Given           influenza Vac Split High-Dose (FLUZONE) injection 0.5 mL  Dose: 0.5 mL  Freq: PRIOR TO DISCHARGE Route: IM  Start: 18 1000   Admin Instructions: Administer when afebrile (less than 100.4F) x 24 hours, or Prior to Discharge.  If not administering when scheduled , change the due time by following the instructions in the reference link below.  If patient refuses vaccine, chart as Vaccine Refused.    Admin. Amount: 0.5 mL  Dispense Loc: [facility]  Administrations Remainin  Volume: 0.5 mL           (1139)-Vaccine Refused           lactated ringers infusion  Rate: 50 mL/hr   Freq: CONTINUOUS Route: IV  Start: 18   Last Admin: 18  Dispense Loc: [facility] Floor Stock  Volume: 1,000 mL   Current Line: Peripheral IV 18 Left Upper forearm        1829 ( )-New Bag        0100 ( )-Rate/Dose Verify       0212 ( )-New Bag       1117 ( )-Anesthesia Volume Adjustment       1508 ( )-Rate/Dose Change       1635 ( )-New Bag       2208 ( )-Rate/Dose Verify            levothyroxine (SYNTHROID/LEVOTHROID) tablet 75 mcg  Dose: 75  "mcg  Freq: DAILY Route: PO  Start: 12/02/18 1815   Admin Instructions: Separate oral administration of iron- or calcium-containing products and levothyroxine by at least 4 hours.    Admin. Amount: 1 tablet (1 × 75 mcg tablet)  Last Admin: 12/04/18 0841  Dispense Loc: [facility] Med 200         1831 (75 mcg)-Given        (0800)-Not Given       0910-Auto Hold       1253-Unhold        0841 (75 mcg)-Given           lidocaine (LMX4) kit  Freq: EVERY 1 HOUR PRN Route: Top  PRN Reason: pain  PRN Comment: with VAD insertion or accessing implanted port.  Start: 12/02/18 1814   Admin Instructions: Do NOT give if patient has a history of allergy to any local anesthetic or any \"rafa\" product.  Apply 30 minutes prior to VAD insertion or port access. MAX Dose: 2.5 g (  of 5 g tube).    Dispense Loc: [facility] Floor Stock          0910-Auto Hold       1253-Unhold            lidocaine 1 % 1 mL  Dose: 1 mL  Freq: EVERY 1 HOUR PRN Route: OTHER  PRN Comment: mild pain with VAD insertion or accessing implanted port  Start: 12/02/18 1814   Admin Instructions: Do NOT give if patient has a history of allergy to any local anesthetic or any \"rafa\" product. MAX dose 1 mL subcutaneous OR intradermal in divided doses.    Admin. Amount: 1 mL  Dispense Loc: [facility]  Volume: 5 mL          0910-Auto Hold       1253-Unhold            metoprolol succinate (TOPROL-XL) half-tab 12.5 mg  Dose: 12.5 mg  Freq: DAILY Route: PO  Start: 12/02/18 1815   Admin Instructions: DO NOT CRUSH. Tablet may be split in half along score line.    Admin. Amount: 1 half-tab (1 × 12.5 mg half-tab)  Last Admin: 12/04/18 0844  Dispense Loc: [facility]         1838 (12.5 mg)-Given        (0800)-Not Given       0910-Auto Hold       1253-Unhold        0844 (12.5 mg)-Given           naloxone (NARCAN) injection 0.1-0.4 mg  Dose: 0.1-0.4 mg  Freq: EVERY 2 MIN PRN Route: IV  PRN Reason: opioid reversal  Start: 12/03/18 1328   End: 12/04/18 1327   Admin Instructions: For " apnea or imminent respiratory arrest: give 0.4 mg IV undiluted Q 2 minutes PRN until desired degree of reversal is obtained, stop opioid and notify provider. Continue monitoring until discharge criteria are met for a minimum of 2 hours.  For severe sedation, decrease in respiratory depth, quality or respiratory rate less than 8: give 0.1 mg IV Q 2 minutes x 3 doses, stop opioid and notify provider.  Try to minimize reversal of analgesia especially in end-of-life patients  For ordered IV doses 0.1-2mg give IVP. Give each 0.4mg over 15 seconds in emergency situations. For non-emergent situations further dilute in 9mL of NS to facilitate titration of response.    Admin. Amount: 0.1-0.4 mg = 0.25-1 mL Conc: 0.4 mg/mL  Dispense Loc: [email or web address] 200  Volume: 1 mL  POC: Post-procedure           1327-Med Discontinued       ondansetron (ZOFRAN-ODT) ODT tab 4 mg  Dose: 4 mg  Freq: EVERY 6 HOURS PRN Route: PO  PRN Reasons: nausea,vomiting  Start: 12/02/18 1814   Admin Instructions: This is Step 1 of nausea and vomiting management.  If nausea not resolved in 15 minutes, go to Step 2 prochlorperazine (COMPAZINE). Do not push through foil backing. Peel back foil and gently remove. Place on tongue immediately. Administration with liquid unnecessary  With dry hands, peel back foil backing and gently remove tablet; do not push oral disintegrating tablet through foil backing; administer immediately on tongue and oral disintegrating tablet dissolves in seconds; then swallow with saliva; liquid not required.    Admin. Amount: 1 tablet (1 × 4 mg tablet)  Dispense Loc: [email or web address] 200          0910-Auto Hold       1253-Unhold           Or  ondansetron (ZOFRAN) injection 4 mg  Dose: 4 mg  Freq: EVERY 6 HOURS PRN Route: IV  PRN Reasons: nausea,vomiting  Start: 12/02/18 1814   Admin Instructions: This is Step 1 of nausea and vomiting management.  If nausea not resolved in 15 minutes, go to Step 2 prochlorperazine (COMPAZINE).  Irritant. For  ordered IV doses 0.1-4 mg, give IV Push undiluted over 2-5 minutes.    Admin. Amount: 4 mg = 2 mL Conc: 4 mg/2 mL  Dispense Loc: [facility] 200  Infused Over: 2-5 Minutes  Volume: 2 mL          0910-Auto Hold       1253-Unhold            oxyCODONE (ROXICODONE) tablet 5-10 mg  Dose: 5-10 mg  Freq: EVERY 3 HOURS PRN Route: PO  PRN Reason: other  PRN Comment: pain control or improvement in physical function.  Start: 12/02/18 1814   Admin Instructions: Start with the lowest dose.  May adjust dose by 5 mg every 3 hours as needed for pain control or improvement in physical function.  Hold dose for analgesic side effects.  Notify provider to assess for uncontrolled pain or analgesic side effects.    Admin. Amount: 1-2 tablet (1-2 × 5 mg tablet)  Last Admin: 12/04/18 1221  Dispense Loc: [facility] 200          0910-Auto Hold       1253-Unhold       1748 (5 mg)-Given       2126 (10 mg)-Given        0033 (10 mg)-Given       0351 (10 mg)-Given       1221 (10 mg)-Given           pramipexole (MIRAPEX) tablet 0.5 mg  Dose: 0.5 mg  Freq: AT BEDTIME PRN Route: PO  PRN Comment: restless legs  Start: 12/02/18 1814   Admin. Amount: 1 tablet (1 × 0.5 mg tablet)  Dispense Loc: [facility] 200          0910-Auto Hold       1253-Unhold            ranitidine (ZANTAC) tablet 150 mg  Dose: 150 mg  Freq: AT BEDTIME Route: PO  Start: 12/02/18 2200   Admin. Amount: 1 tablet (1 × 150 mg tablet)  Last Admin: 12/03/18 2125  Dispense Loc: [facility] 200         2126 (150 mg)-Given        0910-Auto Hold       1253-Unhold       2125 (150 mg)-Given        [ ] 2200           sodium chloride (PF) 0.9% PF flush 3 mL  Dose: 3 mL  Freq: EVERY 8 HOURS Route: IK  Start: 12/02/18 1815   Admin Instructions: And Q1H PRN, to lock peripheral IV dormant line.    Admin. Amount: 3 mL  Last Admin: 12/02/18 1826  [facility]  Volume: 3 mL   Current Line: Peripheral IV 12/02/18 Left Upper forearm        1826 (3 mL)-Given        (0134)-Not Given        0910-Auto Hold       1015 Auto Hold-Automatically Held       1253-Unhold       (1749)-Not Given        (0124)-Not Given              [ ] 181           sodium chloride (PF) 0.9% PF flush 3 mL  Dose: 3 mL  Freq: EVERY 1 HOUR PRN Route: IK  PRN Reason: line flush  Start: 18 1814   Admin Instructions: for peripheral IV flush post IV meds    Admin. Amount: 3 mL  Dispense Loc: [facility] Floor Stock  Volume: 3 mL          0910-Auto Hold       1253-Unhold           Future Medications  Medications 18       losartan (COZAAR) tablet 25 mg  Dose: 25 mg  Freq: EVERY EVENING Route: PO  Start: 18 173   Admin. Amount: 1 tablet (1 × 25 mg tablet)  Dispense Loc: [facility] 200           [ ] 1730          Completed Medications  Medications 18         Dose: 1 tablet  Freq: ONCE Route: PO  Start: 18 172   End: 18 1723   Admin Instructions: Maximum acetaminophen dose from all sources= 75 mg/kg/day not to exceed 4 grams    Admin. Amount: 1 tablet  Last Admin: 18  Dispense Loc: [facility]  Administrations Remainin         1723 (1 tablet)-Given               Dose: 500 mL  Freq: ONCE Route: IV  Last Dose: 500 mL (18 1520)  Start: 18 1520   End: 18 1620   Admin. Amount: 500 mL  Last Admin: 18 1520  Dispense Loc: [facility] Floor Stock  Infused Over: 1 Hours  Administrations Remainin  Volume: 500 mL   Current Line: Peripheral IV 18 Left Upper forearm        1520 (500 mL)-New Bag               Dose: 500 mL  Freq: ONCE Route: IV  Last Dose: Stopped (18 1500)  Start: 18 1406   End: 18 1500   Admin. Amount: 500 mL  Last Admin: 18 1406  Dispense Loc: [facility] Floor Stock  Infused Over: 1 Hours  Administrations Remainin  Volume: 500 mL   Current Line: Peripheral IV 18 Left Upper forearm        1406 (500 mL)-New Bag       1500-Stopped                Dose: 25 mg  Freq: ONCE Route: PO  Start: 18 1400   End: 18 1509   Admin. Amount: 1 tablet (1 × 25 mg tablet)  Last Admin: 18  Dispense Loc: [facility] 200  Administrations Remainin          1509 (25 mg)-Given           Discontinued Medications  Medications 18         Dose: 1 g  Freq: SEE ADMIN INSTRUCTIONS Route: IV  Indications of Use: PERIOPERATIVE PHARMACOPROPHYLAXIS  Start: 18   End: 18   Admin Instructions: Intra-Op Dose.  Give every 2 hours while patient in surgery, starting 2 hours after pre-op dose.  DO NOT GIVE intra-op dose if CrCl less than 10 mL/min (on dialysis).  If CrCL less than 50 mL/min, double the time interval between doses.    Admin. Amount: 1 g = 50 mL Conc: 1 g/50 mL  Dispense Loc: [facility]  Infused Over: 30 Minutes  Volume: 50 mL  POC: Pre-procedure          1139-Med Discontinued          Dose: 2 g  Freq: PRE-OP/PRE-PROCEDURE Route: IV  Indications of Use: PERIOPERATIVE PHARMACOPROPHYLAXIS  Start: 18   End: 18   Admin Instructions: Give first dose within 1 hour PRIOR to incision. If patient weight is greater than or equal to 120 kg increase dose to 3 g.    Admin. Amount: 2 g = 100 mL Conc: 2 g/100 mL  Dispense Loc: [facility] SDS  Infused Over: 30 Minutes  Administrations Remainin  Volume: 100 mL  POC: Pre-procedure          1139-Med Discontinued          Rate: 10 mL/hr   Freq: CONTINUOUS Route: IV  Start: 18   End: 18   Last Admin: 18  Dispense Loc: [facility] Floor Stock  Volume: 1,000 mL  POC: Pre-procedure   Current Line: Peripheral IV 18 Left Upper forearm         0919 ( )-New Bag       1139-Med Discontinued          Freq: EVERY 1 HOUR PRN Route: Top  PRN Reason: pain  PRN Comment: with VAD insertion or accessing implanted port.  Start: 1833   End: 18 1139   Admin Instructions: Do NOT give  "if patient has a history of allergy to any local anesthetic or any \"rafa\" product.   Apply 30 minutes prior to VAD insertion or port access.  MAX Dose:  2.5 g (  of 5 g tube)    Dispense Loc: [facility] Floor Stock  POC: Pre-procedure          1139-Med Discontinued          Dose: 1 mL  Freq: EVERY 1 HOUR PRN Route: OTHER  PRN Comment: mild pain with VAD insertion or accessing implanted port  Start: 12/03/18 0833   End: 12/03/18 1139   Admin Instructions: Do NOT give if patient has a history of allergy to any local anesthetic or any \"rafa\" product. MAX dose 1 mL subcutaneous OR intradermal in divided doses.    Admin. Amount: 1 mL  Dispense Loc: [facility] Med 200  Volume: 5 mL  POC: Pre-procedure          1139-Med Discontinued          Dose: 0.1-0.4 mg  Freq: EVERY 2 MIN PRN Route: IV  PRN Reason: opioid reversal  Start: 12/02/18 1814   End: 12/02/18 1815   Admin Instructions: For respiratory rate LESS than or EQUAL to 8.  Partial reversal dose:  0.1 mg titrated q 2 minutes for Analgesia Side Effects Monitoring Sedation Level of 3 (frequently drowsy, arousable, drifts to sleep during conversation).Full reversal dose:  0.4 mg bolus for Analgesia Side Effects Monitoring Sedation Level of 4 (somnolent, minimal or no response to stimulation).  For ordered IV doses 0.1-2mg give IVP. Give each 0.4mg over 15 seconds in emergency situations. For non-emergent situations further dilute in 9mL of NS to facilitate titration of response.    Admin. Amount: 0.1-0.4 mg = 0.25-1 mL Conc: 0.4 mg/mL  Volume: 1 mL         1815-Med Discontinued           Dose: 0.1-0.4 mg  Freq: EVERY 2 MIN PRN Route: IV  PRN Reason: opioid reversal  Start: 12/02/18 1814   End: 12/03/18 1330   Admin Instructions: For respiratory rate LESS than or EQUAL to 8.  Partial reversal dose:  0.1 mg titrated q 2 minutes for Analgesia Side Effects Monitoring Sedation Level of 3 (frequently drowsy, arousable, drifts to sleep during conversation).Full reversal dose:  0.4 mg " bolus for Analgesia Side Effects Monitoring Sedation Level of 4 (somnolent, minimal or no response to stimulation).  For ordered IV doses 0.1-2mg give IVP. Give each 0.4mg over 15 seconds in emergency situations. For non-emergent situations further dilute in 9mL of NS to facilitate titration of response.    Admin. Amount: 0.1-0.4 mg = 0.25-1 mL Conc: 0.4 mg/mL  Dispense Loc: [facility] Med 200  Volume: 1 mL          0910-Auto Hold       1253-Unhold       1330-Med Discontinued          Dose: 4 mg  Freq: EVERY 6 HOURS PRN Route: PO  PRN Reasons: nausea,vomiting  Start: 12/02/18 1814   End: 12/02/18 1815   Admin Instructions: This is Step 1 of nausea and vomiting management.  If nausea not resolved in 15 minutes, go to Step 2 prochlorperazine (COMPAZINE). Do not push through foil backing. Peel back foil and gently remove. Place on tongue immediately. Administration with liquid unnecessary  With dry hands, peel back foil backing and gently remove tablet; do not push oral disintegrating tablet through foil backing; administer immediately on tongue and oral disintegrating tablet dissolves in seconds; then swallow with saliva; liquid not required.    Admin. Amount: 1 tablet (1 × 4 mg tablet)         1815-Med Discontinued        Or    Dose: 4 mg  Freq: EVERY 6 HOURS PRN Route: IV  PRN Reasons: nausea,vomiting  Start: 12/02/18 1814   End: 12/02/18 1815   Admin Instructions: This is Step 1 of nausea and vomiting management.  If nausea not resolved in 15 minutes, go to Step 2 prochlorperazine (COMPAZINE).  Irritant. For ordered IV doses 0.1-4 mg, give IV Push undiluted over 2-5 minutes.    Admin. Amount: 4 mg = 2 mL Conc: 4 mg/2 mL  Infused Over: 2-5 Minutes  Volume: 2 mL         1815-Med Discontinued           Dose: 3 mL  Freq: EVERY 8 HOURS Route: IK  Start: 12/03/18 0845   End: 12/03/18 1139   Admin Instructions: And Q1H PRN, to lock peripheral IV dormant line.    Admin. Amount: 3 mL  Dispense Loc: [facility] Floor Stock  Volume: 3  mL  POC: Pre-procedure          (0800)-Not Given       1139-Med Discontinued          Dose: 3 mL  Freq: EVERY 1 HOUR PRN Route: IK  PRN Reason: line flush  PRN Comment: for peripheral IV flush post IV meds  Start: 12/03/18 0833   End: 12/03/18 1139   Admin. Amount: 3 mL  Dispense Loc: [facility] Stock  Volume: 3 mL  POC: Pre-procedure          1139-Med Discontinued     Medications 11/28/18 11/29/18 11/30/18 12/01/18 12/02/18 12/03/18 12/04/18

## 2018-12-02 NOTE — H&P
[facility]    History and Physical  Hospital Medicine       Date of Admission:  12/2/2018  Date of Service: 12/2/2018     Assessment & Plan   Christiana Sal is a 87 year old female with past medical history of coronary artery disease s/p stenting x 6, sinus node dysfunction s/p pacemaker, hypertension, hyperlipidemia, hypothyroidism, GERD, depression who presents with right lower extremity pain after mechanical fall     Mechanical Fall  Mechanical fall, tripping over a suitcase landing primarily on right leg. No head strike or loss of consciousness. No preceding symptoms. XR as below shows fibula fracture.  - further management as below     Distal Right Fibula Fracture  Fall as above. XR ankle shows comminuted distal fibular fracture with mild widening of ankle mortise. Splint applied in ED. ED spoke with on-call PA for [facility] ankle, patient will be evaluated in the morning.  - orthopedics consult  - Pain: scheduled acetaminophen, oxycodone prn breakthrough  - NPO at midnight until evaluation by ortho service  - continue immobilization  - apply ice prn    Lightheadedness, orthostatic hypotension  Lightheadedness over past week with postural changes. Recently treated for pneumonia with azithromycin and prednisone burst. Additionally reports increased urinary frequency over past month with decreased PO intake due to the urinary frequency. Vital significant for orthostatic hypotension in ED,  --> 76. Received 1 L of fluids in ED. Suspect volume depletion due to illness and poor oral intake.   - IVF: LR at 125 ml/hr  - hold home losartan for now  - infectious treatment as below  - repeat orthostatic vitals in morning    Abnormal UA, urinary frequency, overactive bladder  Urinary frequency x 1 month, worse in past week with urinary nearly every hour at night. No dysuria. UA shows 41 WBC, moderate leukocyte esterase. Treated with Keflex in ED. Afebrile. WBC 11.8 though patient was  "on prednisone last dose today.  - follow pending urine culture  - continue Keflex BID    Acute Kidney Injury  Creatinine 1.08. BUN 28. GFR 48. Baseline creatinine 0.8. Suspected etiology is pre-renal due to reduce oral intake and urinary frequency.  - IVF: LR at 100 ml/hr  - Monitor I/O's, daily weights  - follow BMP    Recently treated pneumonia  Shortness of breath resolving. Intermittent cough. Treated with Azithromycin and prednisone after CT showed \"Mild patchy bilateral groundglass airspace opacities could be seen with pulmonary edema or other alveolar infiltrate.\"  - monitor    Coronary Artery Disease  Chronic. No current symptoms. S/P PCI complicated by dissection with stents to LAD and D1 5/2016, stenting in 2004 to RCA.  Nuclear stress in 2017 showed small fixed defect.  - continue statin, beta blocker  - hold home losartan for now due to orthostatic hypotension  - hold home ASA for potential surgery    Sinoatrial node dysfunction  S/P Cardiac pacemaker - [employer] dual lead  Follows with cardiology, pacemaker implantation 11/2016 for sinus node dysfunction.    Hyperlipidemia  Chronic.  - continue home atorvastatin    Hypertension  Chronic. Blood pressures reviewed, stable though significant orthostatic hypotension noted. Outpatient readings largely 120s-150.  - hold home losartan for now due to orthostatic hypotension  - continue home metoprolol    Hypothyroidism  Chronic.   - continue home levothyroxine    Gastroesophageal reflux disease  Chronic.  - continue home ranitidine    JENIFER (obstructive sleep apnea)  Not compliant with CPAP.    FEN:  - LR at 125 ml/hr  - Will monitor electrolytes and replace as needed  - Regular diet    DVT Prophylaxis: Pneumatic Compression Devices  Code Status: Full Code    Disposition: Anticipate discharge in 1-2 days once pain improving, evaluation by orthopedics and safe disposition identified. Appropriate for observation level care    I have discussed patient and " formulated plan with Dr. Cristina Hampton.    Cristina Randle PA-C  [facility]        Primary Care Physician   Kelly Matthew [phone]    History is obtained from the patient, ED notes and review of the EMR.    Past Medical History    Past Medical History:   Diagnosis Date     Coronary atherosclerosis of native coronary artery     s/p MI ([facility])     Essential hypertension, benign      GERD (gastroesophageal reflux disease)      History of transient ischemic attack (TIA)      Hyperlipidemia LDL goal <100      Hypothyroidism      Major depressive disorder, recurrent episode, mild (H) 5/18/2011     Peptic ulcer disease with hemorrhage     Remote history of stomach ulcer, requiring transfusion after chronic bleeding     Patient Active Problem List    Diagnosis Date Noted     Primary osteoarthritis of left hip 09/21/2018     Priority: Medium     Insect bite 06/28/2017     Priority: Medium     Cardiac pacemaker - [employer] dual lead - Not Dependent 03/02/2017     Priority: Medium     Sinoatrial node dysfunction (H) 11/10/2016     Priority: Medium     S/P cardiac pacemaker procedure 11/09/2016     Priority: Medium     CAD S/P percutaneous coronary angioplasty 05/11/2016     Priority: Medium     JENIFER (obstructive sleep apnea) 11/17/2014     Priority: Medium     Lichen planus 11/19/2013     Priority: Medium     Vulvar pruritus 11/12/2013     Priority: Medium     Angina pectoris (H) 02/08/2012     Priority: Medium     Advanced directives, counseling/discussion 06/16/2011     Priority: Medium     Patient will bring a copy. Chart r/q to make sure no copy.    GUSTAVO Cunningham MA    Advance Directive Problem List Overview:   Name Relationship Phone    Primary Health Care Agent Albaro Jonelle  [phone]         Alternative Health Care Agent Esther Aragonon  [phone]     Reviewed Health Care Directive dated 05/13/2005, scanned into EPIC 01/2012.  Marivel Villarreal CMA                Major depressive  disorder, recurrent episode, mild (H) 11/02/2010     Priority: Medium     Family history of colon cancer 05/04/2010     Priority: Medium     Coronary atherosclerosis of native coronary artery      Priority: Medium     s/p MI ([facility])       Hyperlipidemia LDL goal <100      Priority: Medium     Essential hypertension, benign      Priority: Medium     Hypothyroidism      Priority: Medium     GERD (gastroesophageal reflux disease)      Priority: Medium      Past Surgical History   Past Surgical History:   Procedure Laterality Date     APPENDECTOMY       CARDIAC CATHERIZATION  1/15/04    drug-eluding stent RCA, Dr. Pérez, [facility]     CARDIAC CATHERIZATION  2/16/12    diffuse mild/mod disease, no new stent     CATARACT IOL, RT/LT  1/26/12    RT, Dr. Wynne     CHOLECYSTECTOMY, OPEN       COLONOSCOPY  2008    hospitals     FRACTURE TX, ANKLE RT/LT      LT, 2 screws remain     HC EXCISE HAND/FOOT NEUROMA      RT     HC LARYNGOSCOPY DIRECT W VOCAL CORD INJECTION      vocal cord polypectomy     HC REMOVAL OF OVARIAN CYST(S)       HC TRANSCATH STENT INIT VESSEL,PERCUT      x 2     REPAIR HAMMER TOE  9/13/10    multiple + RT great toe tendon release, Dr. Sanchez DPM     STENT  5-2016    Cardiac stents 6 placed.      History of Present Illness   Christiana Sal is a 87 year old female who presents after mechanical fall at home.     Last week she started to feel lightheaded when she would change positions (sitting to standing, laying to sitting). She came to the emergency department on 11/28 due to the lightheadedness. She states she was also feeling short of breath and had intermittent coughing. She was diagnosed with pneumonia and prescribed azithromycin and prednisone. She thinks she felt somewhat better after starting this. No fevers or chills.    Last night she was getting up to go to the bathroom when she tripped over a suitcase that was sitting on the floor. Landed on her right side. No head  strike, no loss of consciousness. She felt pain near her right ankle following the fall. She was able to get up with help and walk around following the fall though she did have significant pain in that left leg. Last night she had some achyness in her hips and thighs.     Patient states she has increased urinary frequency at night over about the past month and has noticed this is getting worse. No dysuria. One episode of diarrhea.    Patient denies dizziness, congestion, rhinorrhea, sore throat, palpitations, chest pain, abdominal pain, nausea, vomiting, rash or wounds.    Prior to Admission Medications   Prior to Admission Medications   Prescriptions Last Dose Informant Patient Reported? Taking?   ASPIRIN 81 MG PO TABS 2018 at hs Self Yes No   Sig: Take 2 tablets by mouth every evening    Biotin 10 MG CAPS 2018 at 1200  Yes Yes   Sig: Take 1 capsule by mouth daily   CALCIUM 600 + D OR Past Week at Unknown time Self Yes Yes   Si tablet by mouth daily   FISH OIL OR 2018 at Unknown time Self Yes Yes   Si daily   Multiple Vitamins-Minerals (OCUVITE PO) 2018 at Unknown time Self Yes Yes   Sig: Take 1 tablet by mouth daily.   Vitamin D, Cholecalciferol, 1000 units TABS 2018 at 1200  Yes Yes   Sig: Take 2,000 Units by mouth daily   acetaminophen (TYLENOL) 500 MG tablet 2018 at 1000 Self No Yes   Sig: Take 2 tablets (1,000 mg) by mouth every 8 hours as needed for mild pain   albuterol (PROAIR HFA/PROVENTIL HFA/VENTOLIN HFA) 108 (90 BASE) MCG/ACT Inhaler More than a month at Unknown time Self No No   Sig: Inhale 2 puffs into the lungs every 6 hours as needed for shortness of breath / dyspnea or wheezing   atorvastatin (LIPITOR) 40 MG tablet 2018 at am Self No Yes   Sig: TAKE ONE TABLET BY MOUTH EVERY DAY   azithromycin (ZITHROMAX Z-RADHIKA) 250 MG tablet 2018 at am  No Yes   Sig: Two tablets on the first day, then one tablet daily for the next 4 days   citalopram (CELEXA) 20 MG  tablet 12/1/2018 at am Self No Yes   Sig: TAKE ONE TABLET BY MOUTH EVERY DAY   diclofenac (VOLTAREN) 50 MG EC tablet More than a month at Unknown time Self No No   Sig: Take 1 tablet (50 mg) by mouth 3 times daily as needed for moderate pain   levothyroxine (SYNTHROID/LEVOTHROID) 75 MCG tablet 12/1/2018 at 1200  No Yes   Sig: TAKE 1 TABLET BY MOUTH ONCE DAILY (NEEDS  LAB  WORK)   losartan (COZAAR) 25 MG tablet 11/30/2018 at hs Self No No   Sig: TAKE 1 TABLET BY MOUTH ONCE DAILY   Patient taking differently: TAKE 1 TABLET BY MOUTH EVERY EVENING   magnesium oxide (MAG-OX) 400 MG tablet 12/1/2018 at 1200  Yes Yes   Sig: Take 400 mg by mouth daily   meclizine (ANTIVERT) 25 MG tablet Past Month at Unknown time Self No Yes   Sig: TAKE ONE TABLET BY MOUTH EVERY 6 HOURS AS NEEDED FOR DIZZINESS   metoprolol (TOPROL-XL) 25 MG 24 hr tablet 12/1/2018 at am Self No Yes   Sig: Take 0.5 tablets (12.5 mg) by mouth daily   nitroglycerin (NITROSTAT) 0.4 MG SL tablet More than a month at Unknown time Self No No   Sig: Place 1 tablet (0.4 mg) under the tongue every 5 minutes as needed for chest pain (call your doctor if you take a third dose)   ondansetron (ZOFRAN) 4 MG tablet More than a month at Unknown time Self No No   Sig: Take 1 tablet (4 mg) by mouth every 6 hours as needed for nausea   pantoprazole (PROTONIX) 40 MG EC tablet More than a month at Unknown time Self No No   Sig: TAKE ONE TABLET BY MOUTH ONCE DAILY   Patient taking differently: TAKE ONE TABLET BY MOUTH ONCE EVERY EVENING   pramipexole (MIRAPEX) 0.5 MG tablet Past Week at Unknown time Self No Yes   Sig: TAKE ONE TABLET BY MOUTH AT BEDTIME   predniSONE (DELTASONE) 20 MG tablet 12/1/2018 at am  No Yes   Sig: Take two tablets (= 40mg) each day for 5 (five) days   ranitidine (ZANTAC) 150 MG tablet 11/30/2018 at hs Self No No   Sig: TAKE TWO TABLETS BY MOUTH AT BEDTIME      Facility-Administered Medications Last Administration Doses Remaining   betamethasone acet & sod  phos (CELESTONE) injection 6 mg 10/26/2018  8:00 AM    ropivacaine (NAROPIN) injection 2 mL 10/26/2018  8:00 AM         Allergies   Allergies   Allergen Reactions     Demerol Nausea     Lisinopril Cough     Meperidine Unknown     Sulfa Drugs Other (See Comments) and Unknown     Questionable itching reported by patient     Family History    Family History   Problem Relation Age of Onset     C.A.D. Father      Hypertension Father      Myocardial Infarction Father 66      from MI     C.A.D. Brother      Myocardial Infarction Brother      Cancer Brother      skin ca     Breast Cancer Sister      Cancer - colorectal Sister      Heart Disease Sister      Neurologic Disorder Mother      migraine     C.A.D. Mother      Hypertension Mother      Heart Failure Mother      Thyroid Disease Son      cretinism     Hypertension Son      Heart Disease Son      Psychotic Disorder Sister      Hypertension Sister      Heart Disease Sister      CABG     Hypertension Brother      C.A.D. Brother      Myocardial Infarction Brother      Arthritis Daughter      RA     Hypertension Sister      Heart Disease Sister      Cancer Sister      Blood Disease Sister      Neurologic Disorder Child      migraine (2 children)       Social History   Social History     Social History     Marital status:      Spouse name: N/A     Number of children: 4     Years of education: some adry     Occupational History      Retired     Social History Main Topics     Smoking status: Never Smoker     Smokeless tobacco: Never Used      Comment: Never smoker; no secondhand smoke exposure     Alcohol use Yes      Comment: social     Drug use: No     Sexual activity: Not Currently     Other Topics Concern     Parent/Sibling W/ Cabg, Mi Or Angioplasty Before 65f 55m? No     Social History Narrative    She just moved in to [place] in a snf center.       Review of Systems   The 10 point Review of Systems is negative other than noted in the HPI or  "here.     Physical Exam   /83 (BP Location: Right arm)  Temp 97.6  F (36.4  C) (Oral)  Resp 16  Ht 1.676 m (5' 6\")  Wt 74.8 kg (165 lb)  SpO2 97%  BMI 26.63 kg/m2     Weight: 165 lbs 0 oz Body mass index is 26.63 kg/(m^2).     Constitutional: Patient is lying down comfortably on exam. Alert & oriented. Pleasant & cooperative. No apparent distress. Appears nontoxic. Appears stated age.  Eyes: Sclera are anicteric, EOMI  HENT: Normocephalic. Atraumatic. Oropharynx is clear and moist.   Lymph/Hematologic: No epitrochlear, axillary, preauricular, postauricular, occipital, sub-mandibular, tonsillar, sub-mental, anterior or posterior cervical, or supraclavicular lymphadenopathy is appreciated.  Cardiovascular: Regular rate and normal rhythm. No murmur, rubs or gallops noted. Radial pulses are 2+ bilaterally. Distal pulses are intact. No lower extremity edema.  Respiratory: No accessory muscle usage. Speaking in full sentences. Clear to auscultation bilaterally without wheezes, crackles or rhonchi.  GI: Normal bowel sounds present, soft, non-tender, non-distended. No rebound or guarding.  Genitourinary: Deferred  Musculoskeletal: Normal muscle bulk and tone. Moves all extremities appropriately with limited ROM of right lower extremity which is splinted. Right lower extremity neurovascularly intact distally.  Skin: Warm and dry, no rashes.     Data   Data reviewed today:     Recent Labs  Lab 12/02/18  1345 11/28/18  0948   WBC 11.8* 6.6   HGB 12.5 12.7   MCV 94 93    265    141   POTASSIUM 3.2* 3.9   CHLORIDE 105 108   CO2 26 26   BUN 28 15   CR 1.08* 0.83   ANIONGAP 8 7   YVONNE 7.8* 8.3*   * 82   ALBUMIN  --  3.5   PROTTOTAL  --  6.5*   BILITOTAL  --  0.6   ALKPHOS  --  99   ALT  --  18   AST  --  24   TROPI  --  <0.015     Recent Results (from the past 24 hour(s))   Ankle XR, G/E 3 views, right    Narrative    RIGHT ANKLE THREE VIEWS   12/2/2018 3:45 PM     HISTORY: Fall at home from standing " height. Evaluate for acute bony  process due to trauma.     COMPARISON: None.      Impression    IMPRESSION: There is a comminuted distal fibular fracture with some  mild widening of the ankle mortise. Degenerative changes are seen in  the midfoot.    MARK RICHARDSON MD   XR Tibia & Fibula Left 2 Views    Narrative    LEFT TIBIA-FIBULA TWO VIEWS 12/2/2018 3:45 PM     HISTORY: Fall at home last night, bruising and swelling, with pain  with weightbearing. Evaluate for acute bony process due to trauma.     COMPARISON: None.      Impression    IMPRESSION: Negative for any acute fracture. There has been previous  pinning of the distal fibula.    MARK RICHARDSON MD     I personally reviewed the EKG tracing showing NSR, 1st degree AV block with left bundle, similar to prior..    I have discussed patient and formulated plan with Dr. Cristina Hampton.    Chart documentation with keystrokes and/or Dragon voice recognition software. Although reviewed after completion, some word and grammatical error may remain.  Cristina Randle PA-C  [facility]

## 2018-12-02 NOTE — IP AVS SNAPSHOT
"[facility] SURGICAL: [phone]                                              INTERAGENCY TRANSFER FORM - PHYSICIAN ORDERS   2018                    Hospital Admission Date: 2018  SANDIE BERGER   : 10/25/1931  Sex: Female        Attending Provider: Stevo Ortiz MD     Allergies:  Demerol, Lisinopril, Meperidine, Sulfa Drugs    Infection:  None   Service:  INTERNAL MED    Ht:  1.676 m (5' 6\")   Wt:  74.8 kg (165 lb)   Admission Wt:  74.8 kg (165 lb)    BMI:  26.63 kg/m 2   BSA:  1.87 m 2            Patient PCP Information     Provider PCP Type    Kelly Matthew PA-C General      ED Clinical Impression     Diagnosis Description Comment Added By Time Added    Lightheadedness [R42] Lightheadedness [R42] Due to orthostasis Bernardo Walker MD 2018  3:20 PM    Fall, initial encounter [W19.XXXA] Fall, initial encounter [W19.XXXA] from standing height, tripped on a suite case at home last night Bernardo Walker MD 2018  3:21 PM    Contusion of right lower extremity, initial encounter [S80.11XA] Contusion of right lower extremity, initial encounter [S80.11XA] post fall with trip on a suite case and cane at home Bernardo Walker MD 2018  3:21 PM    Closed fracture of shaft of fibula [S82.409A] Closed fracture of shaft of fibula [S82.409A]  Bernardo Walker MD 2018  4:01 PM    Abnormal urinalysis [R82.90] Abnormal urinalysis [R82.90] Abnormal urinalysis suspicious for UTI with pyuria report of urinary frequency in the context of overactive bladder Bernardo Walker MD 2018  4:03 PM    Closed torus fracture of distal end of right fibula, initial encounter [S82.821A] Closed torus fracture of distal end of right fibula, initial encounter [S82.821A]  Stevo Ortiz MD 2018 10:45 AM    Enterococcus UTI [N39.0, B95.2] Enterococcus UTI [N39.0, B95.2]  Stevo Ortiz MD 2018 10:50 AM      Hospital " Problems as of 12/4/2018              Priority Class Noted POA    Coronary atherosclerosis of native coronary artery Medium  Unknown Yes    Hyperlipidemia LDL goal <100 Medium  Unknown Yes    Essential hypertension, benign Medium  Unknown Yes    Hypothyroidism Medium  Unknown Yes    GERD (gastroesophageal reflux disease) Medium  Unknown Yes    Major depressive disorder, recurrent episode, mild (H) Medium  11/2/2010 Yes    JENIFER (obstructive sleep apnea) Medium  11/17/2014 Yes    CAD S/P percutaneous coronary angioplasty Medium  5/11/2016 Yes    Sinoatrial node dysfunction (H) Medium  11/10/2016 Yes    Cardiac pacemaker - [employer] dual lead - Not Dependent Medium  3/2/2017 Yes    Closed fracture of shaft of fibula Medium  12/2/2018 Yes    * (Principal)Fall Medium  12/2/2018 Yes      Non-Hospital Problems as of 12/4/2018              Priority Class Noted    Family history of colon cancer Medium  5/4/2010    Advanced directives, counseling/discussion Medium  6/16/2011    Angina pectoris (H) Medium  2/8/2012    Vulvar pruritus Medium  11/12/2013    Lichen planus Medium  11/19/2013    S/P cardiac pacemaker procedure Medium  11/9/2016    Insect bite Medium  6/28/2017    Primary osteoarthritis of left hip Medium  9/21/2018      Code Status History     Date Active Date Inactive Code Status Order ID Comments User Context    5/12/2016  7:44 AM 12/2/2018  6:14 PM Full Code [id number]  Pal Real MD Outpatient    5/11/2016  1:54 PM 5/12/2016  7:44 AM Full Code [id number]  Joshua Durán MD Inpatient    5/11/2016 11:59 AM 5/11/2016  1:54 PM Full Code [id number]  Pal Real MD Inpatient    1/24/2012  3:31 PM 5/11/2016 11:59 AM Full Code [id number] See health care directive for specifics.  Page 3.  Marivel Villarreal, Marivel Ocampo Outpatient         Medication Review      START taking        Dose / Directions Comments    oxyCODONE 5 MG tablet   Commonly known as:  ROXICODONE   Used  for:  Closed torus fracture of distal end of right fibula, initial encounter        Dose:  5 mg   Take 1 tablet (5 mg) by mouth every 3 hours as needed   Quantity:  14 tablet   Refills:  0          CONTINUE these medications which may have CHANGED, or have new prescriptions. If we are uncertain of the size of tablets/capsules you have at home, strength may be listed as something that might have changed.        Dose / Directions Comments    losartan 25 MG tablet   Commonly known as:  COZAAR   This may have changed:  See the new instructions.   Used for:  Essential hypertension with goal blood pressure less than 140/90        TAKE 1 TABLET BY MOUTH ONCE DAILY   Quantity:  90 tablet   Refills:  2          CONTINUE these medications which have NOT CHANGED        Dose / Directions Comments    acetaminophen 500 MG tablet   Commonly known as:  TYLENOL        Dose:  1000 mg   Take 2 tablets (1,000 mg) by mouth every 8 hours as needed for mild pain   Quantity:  30 tablet   Refills:  0        albuterol 108 (90 Base) MCG/ACT inhaler   Commonly known as:  PROAIR HFA/PROVENTIL HFA/VENTOLIN HFA   Used for:  Acute bronchitis with symptoms > 10 days        Dose:  2 puff   Inhale 2 puffs into the lungs every 6 hours as needed for shortness of breath / dyspnea or wheezing   Quantity:  1 Inhaler   Refills:  0        amoxicillin 500 MG capsule   Commonly known as:  AMOXIL   Used for:  Enterococcus UTI        Dose:  500 mg   Take 1 capsule (500 mg) by mouth 3 times daily   Quantity:  15 capsule   Refills:  0        aspirin 81 MG tablet   Commonly known as:  ASA        Dose:  2 tablet   Take 2 tablets by mouth every evening   Refills:  0        atorvastatin 40 MG tablet   Commonly known as:  LIPITOR   Used for:  CAD S/P percutaneous coronary angioplasty        TAKE ONE TABLET BY MOUTH EVERY DAY   Quantity:  90 tablet   Refills:  3        Biotin 10 MG Caps        Dose:  1 capsule   Take 1 capsule by mouth daily   Refills:  0         CALCIUM 600 + D PO        1 tablet by mouth daily   Refills:  0        citalopram 20 MG tablet   Commonly known as:  celeXA   Used for:  Major depressive disorder, recurrent episode, mild (H)        TAKE ONE TABLET BY MOUTH EVERY DAY   Quantity:  90 tablet   Refills:  1        FISH OIL PO        1 daily   Refills:  0        levothyroxine 75 MCG tablet   Commonly known as:  SYNTHROID/LEVOTHROID   Used for:  Hypothyroidism, unspecified type        TAKE 1 TABLET BY MOUTH ONCE DAILY (NEEDS  LAB  WORK)   Quantity:  90 tablet   Refills:  0    Please consider 90 day supplies to promote better adherence       magnesium oxide 400 MG tablet   Commonly known as:  MAG-OX        Dose:  400 mg   Take 400 mg by mouth daily   Refills:  0        meclizine 25 MG tablet   Commonly known as:  ANTIVERT   Used for:  Vertigo        TAKE ONE TABLET BY MOUTH EVERY 6 HOURS AS NEEDED FOR DIZZINESS   Quantity:  30 tablet   Refills:  3        metoprolol succinate ER 25 MG 24 hr tablet   Commonly known as:  TOPROL-XL   Used for:  Essential hypertension with goal blood pressure less than 140/90        Dose:  12.5 mg   Take 0.5 tablets (12.5 mg) by mouth daily   Quantity:  90 tablet   Refills:  3        nitroGLYcerin 0.4 MG sublingual tablet   Commonly known as:  NITROSTAT   Used for:  Atherosclerosis of native coronary artery of native heart without angina pectoris        Dose:  0.4 mg   Place 1 tablet (0.4 mg) under the tongue every 5 minutes as needed for chest pain (call your doctor if you take a third dose)   Quantity:  25 tablet   Refills:  3        OCUVITE PO        Dose:  1 tablet   Take 1 tablet by mouth daily.   Refills:  0        pramipexole 0.5 MG tablet   Commonly known as:  MIRAPEX   Used for:  Restless leg syndrome        TAKE ONE TABLET BY MOUTH AT BEDTIME   Quantity:  90 tablet   Refills:  2        ranitidine 150 MG tablet   Commonly known as:  ZANTAC   Used for:  Gastroesophageal reflux disease, esophagitis presence not specified         TAKE TWO TABLETS BY MOUTH AT BEDTIME   Quantity:  180 tablet   Refills:  3        Vitamin D (Cholecalciferol) 1000 units Tabs        Dose:  2000 Units   Take 2,000 Units by mouth daily   Refills:  0          STOP taking     azithromycin 250 MG tablet   Commonly known as:  ZITHROMAX Z-RADHIKA           predniSONE 20 MG tablet   Commonly known as:  DELTASONE                     Further instructions from your care team       Non weight bearing right lower extremity.  Make a follow up appointment with [facility] ortho about 12/172018    After Care     Activity - Up with nursing assistance       NWB right lower extremity       Advance Diet as Tolerated       Follow this diet upon discharge: Orders Placed This Encounter      Regular Diet Adult       General info for SNF       Length of Stay Estimate: Short Term Care: Estimated # of Days <30  Condition at Discharge: Improving  Level of care:skilled   Rehabilitation Potential: Good  Admission H&P remains valid and up-to-date: Yes  Recent Chemotherapy: N/A  Use Nursing Home Standing Orders: Yes       Mantoux instructions       Give two-step Mantoux (PPD) Per Facility Policy Yes             Referrals     Occupational Therapy Adult Consult       Evaluate and treat as clinically indicated.    Reason: right ankle fracture       Physical Therapy Adult Consult       Evaluate and treat as clinically indicated.    Reason:  Right ankle fracture             Your next 10 appointments already scheduled     Dec 06, 2018  9:00 AM CST   SHORT with Kelly Matthew PA-C   [facility]    [address] [address]   [phone]            Jan 08, 2019 10:15 AM CST   [facility] with Cleve Null MD   [facility]    [address] [address]   [phone]            Feb 04, 2019 12:00 AM CST   CARDIAC DEVICE CHECK - REMOTE with MONK TECH1   [facility]  [facility] ([facility])    [address]  [place] [address]   [phone]              Statement of Approval     Ordered          12/04/18 1047  I have reviewed and agree with all the recommendations and orders detailed in this document.  EFFECTIVE NOW     Approved and electronically signed by:  Stevo Ortiz MD

## 2018-12-03 ENCOUNTER — ANESTHESIA (OUTPATIENT)
Dept: SURGERY | Facility: CLINIC | Age: 83
End: 2018-12-03
Payer: MEDICARE

## 2018-12-03 ENCOUNTER — APPOINTMENT (OUTPATIENT)
Dept: GENERAL RADIOLOGY | Facility: CLINIC | Age: 83
End: 2018-12-03
Attending: INTERNAL MEDICINE
Payer: MEDICARE

## 2018-12-03 ENCOUNTER — ANESTHESIA EVENT (OUTPATIENT)
Dept: SURGERY | Facility: CLINIC | Age: 83
End: 2018-12-03
Payer: MEDICARE

## 2018-12-03 PROCEDURE — 25000128 H RX IP 250 OP 636: Performed by: NURSE ANESTHETIST, CERTIFIED REGISTERED

## 2018-12-03 PROCEDURE — 36000063 ZZH SURGERY LEVEL 4 EA 15 ADDTL MIN: Performed by: ORTHOPAEDIC SURGERY

## 2018-12-03 PROCEDURE — 40000277 XR SURGERY CARM FLUORO LESS THAN 5 MIN W STILLS: Mod: TC

## 2018-12-03 PROCEDURE — 99225 ZZC SUBSEQUENT OBSERVATION CARE,LEVEL II: CPT | Performed by: FAMILY MEDICINE

## 2018-12-03 PROCEDURE — 25000132 ZZH RX MED GY IP 250 OP 250 PS 637: Mod: GY | Performed by: EMERGENCY MEDICINE

## 2018-12-03 PROCEDURE — C1713 ANCHOR/SCREW BN/BN,TIS/BN: HCPCS | Performed by: ORTHOPAEDIC SURGERY

## 2018-12-03 PROCEDURE — 37000009 ZZH ANESTHESIA TECHNICAL FEE, EACH ADDTL 15 MIN: Performed by: ORTHOPAEDIC SURGERY

## 2018-12-03 PROCEDURE — A9270 NON-COVERED ITEM OR SERVICE: HCPCS | Mod: GY | Performed by: FAMILY MEDICINE

## 2018-12-03 PROCEDURE — 25000128 H RX IP 250 OP 636: Performed by: PHYSICIAN ASSISTANT

## 2018-12-03 PROCEDURE — G0378 HOSPITAL OBSERVATION PER HR: HCPCS

## 2018-12-03 PROCEDURE — 25000125 ZZHC RX 250: Performed by: NURSE ANESTHETIST, CERTIFIED REGISTERED

## 2018-12-03 PROCEDURE — A9270 NON-COVERED ITEM OR SERVICE: HCPCS | Mod: GY | Performed by: EMERGENCY MEDICINE

## 2018-12-03 PROCEDURE — 99207 ZZC CDG-MDM COMPONENT: MEETS LOW - DOWN CODED: CPT | Performed by: FAMILY MEDICINE

## 2018-12-03 PROCEDURE — 36000065 ZZH SURGERY LEVEL 4 W FLUORO 1ST 30 MIN: Performed by: ORTHOPAEDIC SURGERY

## 2018-12-03 PROCEDURE — 40000305 ZZH STATISTIC PRE PROC ASSESS I: Performed by: ORTHOPAEDIC SURGERY

## 2018-12-03 PROCEDURE — 27210794 ZZH OR GENERAL SUPPLY STERILE: Performed by: ORTHOPAEDIC SURGERY

## 2018-12-03 PROCEDURE — 25000132 ZZH RX MED GY IP 250 OP 250 PS 637: Mod: GY | Performed by: FAMILY MEDICINE

## 2018-12-03 PROCEDURE — 25000128 H RX IP 250 OP 636: Performed by: FAMILY MEDICINE

## 2018-12-03 PROCEDURE — 37000008 ZZH ANESTHESIA TECHNICAL FEE, 1ST 30 MIN: Performed by: ORTHOPAEDIC SURGERY

## 2018-12-03 PROCEDURE — 71000012 ZZH RECOVERY PHASE 1 LEVEL 1 FIRST HR: Performed by: ORTHOPAEDIC SURGERY

## 2018-12-03 PROCEDURE — 25000132 ZZH RX MED GY IP 250 OP 250 PS 637: Mod: GY | Performed by: PHYSICIAN ASSISTANT

## 2018-12-03 PROCEDURE — A9270 NON-COVERED ITEM OR SERVICE: HCPCS | Mod: GY | Performed by: PHYSICIAN ASSISTANT

## 2018-12-03 PROCEDURE — 71000013 ZZH RECOVERY PHASE 1 LEVEL 1 EA ADDTL HR: Performed by: ORTHOPAEDIC SURGERY

## 2018-12-03 DEVICE — IMP SCR SYN CORTEX 2.7X26MM SELF TAP SS 202.826
Type: IMPLANTABLE DEVICE | Site: ANKLE | Status: NON-FUNCTIONAL
Removed: 2020-07-30

## 2018-12-03 DEVICE — IMP PLATE SYN 1/3 TUBULAR 69MM 06H SS 241.361
Type: IMPLANTABLE DEVICE | Site: ANKLE | Status: NON-FUNCTIONAL
Removed: 2020-07-30

## 2018-12-03 DEVICE — IMP SCR SYN CORTEX 3.5X12MM SELF TAP SS 204.812
Type: IMPLANTABLE DEVICE | Site: ANKLE | Status: NON-FUNCTIONAL
Removed: 2020-07-30

## 2018-12-03 DEVICE — IMP SCR SYN CORTEX 3.5X14MM SELF TAP SS 204.814
Type: IMPLANTABLE DEVICE | Site: ANKLE | Status: NON-FUNCTIONAL
Removed: 2020-07-30

## 2018-12-03 DEVICE — IMP SCR SYN 3.5X16MM LOCKING W/STARDRIVE SS 212.104
Type: IMPLANTABLE DEVICE | Site: ANKLE | Status: NON-FUNCTIONAL
Removed: 2020-07-30

## 2018-12-03 DEVICE — IMP SCR SYN 3.5X14MM LOCKING W/STARDRIVE SS 212.103
Type: IMPLANTABLE DEVICE | Site: ANKLE | Status: NON-FUNCTIONAL
Removed: 2020-07-30

## 2018-12-03 RX ORDER — LOSARTAN POTASSIUM 25 MG/1
25 TABLET ORAL EVERY EVENING
Status: DISCONTINUED | OUTPATIENT
Start: 2018-12-04 | End: 2018-12-04 | Stop reason: HOSPADM

## 2018-12-03 RX ORDER — CEFAZOLIN SODIUM 2 G/100ML
2 INJECTION, SOLUTION INTRAVENOUS
Status: DISCONTINUED | OUTPATIENT
Start: 2018-12-03 | End: 2018-12-03 | Stop reason: HOSPADM

## 2018-12-03 RX ORDER — HYDROMORPHONE HYDROCHLORIDE 1 MG/ML
.3-.5 INJECTION, SOLUTION INTRAMUSCULAR; INTRAVENOUS; SUBCUTANEOUS EVERY 5 MIN PRN
Status: CANCELLED | OUTPATIENT
Start: 2018-12-03

## 2018-12-03 RX ORDER — DEXAMETHASONE SODIUM PHOSPHATE 10 MG/ML
INJECTION, SOLUTION INTRAMUSCULAR; INTRAVENOUS PRN
Status: DISCONTINUED | OUTPATIENT
Start: 2018-12-03 | End: 2018-12-03

## 2018-12-03 RX ORDER — PROPOFOL 10 MG/ML
INJECTION, EMULSION INTRAVENOUS CONTINUOUS PRN
Status: DISCONTINUED | OUTPATIENT
Start: 2018-12-03 | End: 2018-12-03

## 2018-12-03 RX ORDER — BUPIVACAINE HYDROCHLORIDE 7.5 MG/ML
INJECTION, SOLUTION INTRASPINAL PRN
Status: DISCONTINUED | OUTPATIENT
Start: 2018-12-03 | End: 2018-12-03

## 2018-12-03 RX ORDER — ONDANSETRON 2 MG/ML
4 INJECTION INTRAMUSCULAR; INTRAVENOUS EVERY 30 MIN PRN
Status: CANCELLED | OUTPATIENT
Start: 2018-12-03

## 2018-12-03 RX ORDER — ONDANSETRON 4 MG/1
4 TABLET, ORALLY DISINTEGRATING ORAL EVERY 30 MIN PRN
Status: CANCELLED | OUTPATIENT
Start: 2018-12-03

## 2018-12-03 RX ORDER — SODIUM CHLORIDE, SODIUM LACTATE, POTASSIUM CHLORIDE, CALCIUM CHLORIDE 600; 310; 30; 20 MG/100ML; MG/100ML; MG/100ML; MG/100ML
INJECTION, SOLUTION INTRAVENOUS CONTINUOUS
Status: DISCONTINUED | OUTPATIENT
Start: 2018-12-03 | End: 2018-12-03 | Stop reason: HOSPADM

## 2018-12-03 RX ORDER — NALOXONE HYDROCHLORIDE 0.4 MG/ML
.1-.4 INJECTION, SOLUTION INTRAMUSCULAR; INTRAVENOUS; SUBCUTANEOUS
Status: DISCONTINUED | OUTPATIENT
Start: 2018-12-03 | End: 2018-12-04 | Stop reason: HOSPADM

## 2018-12-03 RX ORDER — EPINEPHRINE 1 MG/ML
INJECTION, SOLUTION, CONCENTRATE INTRAVENOUS PRN
Status: DISCONTINUED | OUTPATIENT
Start: 2018-12-03 | End: 2018-12-03

## 2018-12-03 RX ORDER — LOSARTAN POTASSIUM 25 MG/1
25 TABLET ORAL ONCE
Status: COMPLETED | OUTPATIENT
Start: 2018-12-03 | End: 2018-12-03

## 2018-12-03 RX ORDER — LIDOCAINE HYDROCHLORIDE 10 MG/ML
INJECTION, SOLUTION INFILTRATION; PERINEURAL PRN
Status: DISCONTINUED | OUTPATIENT
Start: 2018-12-03 | End: 2018-12-03

## 2018-12-03 RX ORDER — LIDOCAINE 40 MG/G
CREAM TOPICAL
Status: DISCONTINUED | OUTPATIENT
Start: 2018-12-03 | End: 2018-12-03 | Stop reason: HOSPADM

## 2018-12-03 RX ORDER — CEFAZOLIN SODIUM 1 G/3ML
INJECTION, POWDER, FOR SOLUTION INTRAMUSCULAR; INTRAVENOUS PRN
Status: DISCONTINUED | OUTPATIENT
Start: 2018-12-03 | End: 2018-12-03

## 2018-12-03 RX ORDER — FENTANYL CITRATE 50 UG/ML
25-50 INJECTION, SOLUTION INTRAMUSCULAR; INTRAVENOUS
Status: CANCELLED | OUTPATIENT
Start: 2018-12-03

## 2018-12-03 RX ORDER — ONDANSETRON 2 MG/ML
INJECTION INTRAMUSCULAR; INTRAVENOUS PRN
Status: DISCONTINUED | OUTPATIENT
Start: 2018-12-03 | End: 2018-12-03

## 2018-12-03 RX ORDER — CEFAZOLIN SODIUM 1 G/50ML
1 INJECTION, SOLUTION INTRAVENOUS SEE ADMIN INSTRUCTIONS
Status: DISCONTINUED | OUTPATIENT
Start: 2018-12-03 | End: 2018-12-03 | Stop reason: HOSPADM

## 2018-12-03 RX ADMIN — BUPIVACAINE HYDROCHLORIDE IN DEXTROSE 1.4 ML: 7.5 INJECTION, SOLUTION SUBARACHNOID at 10:09

## 2018-12-03 RX ADMIN — ACETAMINOPHEN 975 MG: 325 TABLET, FILM COATED ORAL at 15:09

## 2018-12-03 RX ADMIN — SODIUM CHLORIDE, POTASSIUM CHLORIDE, SODIUM LACTATE AND CALCIUM CHLORIDE: 600; 310; 30; 20 INJECTION, SOLUTION INTRAVENOUS at 02:12

## 2018-12-03 RX ADMIN — EPINEPHRINE 0.2 MG: 1 INJECTION, SOLUTION INTRAMUSCULAR; SUBCUTANEOUS at 10:09

## 2018-12-03 RX ADMIN — PROPOFOL 75 MCG/KG/MIN: 10 INJECTION, EMULSION INTRAVENOUS at 10:17

## 2018-12-03 RX ADMIN — MIDAZOLAM 2 MG: 1 INJECTION INTRAMUSCULAR; INTRAVENOUS at 10:04

## 2018-12-03 RX ADMIN — PHENYLEPHRINE HYDROCHLORIDE 100 MCG: 10 INJECTION, SOLUTION INTRAMUSCULAR; INTRAVENOUS; SUBCUTANEOUS at 10:55

## 2018-12-03 RX ADMIN — CEPHALEXIN 500 MG: 500 CAPSULE ORAL at 04:00

## 2018-12-03 RX ADMIN — SODIUM CHLORIDE, POTASSIUM CHLORIDE, SODIUM LACTATE AND CALCIUM CHLORIDE: 600; 310; 30; 20 INJECTION, SOLUTION INTRAVENOUS at 16:35

## 2018-12-03 RX ADMIN — ACETAMINOPHEN 650 MG: 325 TABLET, FILM COATED ORAL at 04:00

## 2018-12-03 RX ADMIN — ACETAMINOPHEN 975 MG: 325 TABLET, FILM COATED ORAL at 20:26

## 2018-12-03 RX ADMIN — DEXAMETHASONE SODIUM PHOSPHATE 8 MG: 10 INJECTION, SOLUTION INTRAMUSCULAR; INTRAVENOUS at 10:11

## 2018-12-03 RX ADMIN — RANITIDINE 150 MG: 150 TABLET ORAL at 21:25

## 2018-12-03 RX ADMIN — LIDOCAINE HYDROCHLORIDE 30 MG: 10 INJECTION, SOLUTION INFILTRATION; PERINEURAL at 10:09

## 2018-12-03 RX ADMIN — PHENYLEPHRINE HYDROCHLORIDE 100 MCG: 10 INJECTION, SOLUTION INTRAMUSCULAR; INTRAVENOUS; SUBCUTANEOUS at 10:51

## 2018-12-03 RX ADMIN — CEFAZOLIN 2 G: 1 INJECTION, POWDER, FOR SOLUTION INTRAMUSCULAR; INTRAVENOUS at 10:16

## 2018-12-03 RX ADMIN — MIDAZOLAM 2 MG: 1 INJECTION INTRAMUSCULAR; INTRAVENOUS at 10:28

## 2018-12-03 RX ADMIN — CEPHALEXIN 500 MG: 500 CAPSULE ORAL at 16:15

## 2018-12-03 RX ADMIN — OXYCODONE HYDROCHLORIDE 10 MG: 5 TABLET ORAL at 21:26

## 2018-12-03 RX ADMIN — ONDANSETRON 4 MG: 2 INJECTION INTRAMUSCULAR; INTRAVENOUS at 10:39

## 2018-12-03 RX ADMIN — LOSARTAN POTASSIUM 25 MG: 25 TABLET ORAL at 15:09

## 2018-12-03 RX ADMIN — OXYCODONE HYDROCHLORIDE 5 MG: 5 TABLET ORAL at 17:48

## 2018-12-03 RX ADMIN — SODIUM CHLORIDE, POTASSIUM CHLORIDE, SODIUM LACTATE AND CALCIUM CHLORIDE: 600; 310; 30; 20 INJECTION, SOLUTION INTRAVENOUS at 09:19

## 2018-12-03 NOTE — CONSULTS
Care Transition Initial Assessment - RN  Reason For Consult: discharge planning   Met with: Patient.    DATA   Principal Problem:    Fall  Active Problems:    Coronary atherosclerosis of native coronary artery    Hyperlipidemia LDL goal <100    Essential hypertension, benign    Hypothyroidism    GERD (gastroesophageal reflux disease)    Major depressive disorder, recurrent episode, mild (H)    JENIFER (obstructive sleep apnea)    CAD S/P percutaneous coronary angioplasty    Sinoatrial node dysfunction (H)    Cardiac pacemaker - [employer] dual lead - Not Dependent    Closed fracture of shaft of fibula       [facility]  Primary Care MD Name: Dr Matthew  Contact information and PCP information verified: Yes      ASSESSMENT  Cognitive Status: awake, alert and oriented.       Resources List: Skilled Nursing Facility     Lives With: alone  Living Arrangements: apartment  Quality Of Family Relationships: supportive, involved  Description of Support System: Supportive, Involved   Who is your support system?: Children   Support Assessment: Lacks adequate physical care   Insurance Concerns: Other Talked with patient about up front cost for a TCU          This writer met with pt in her room, introduced self and role. Patient currently lives alone in an independent apartment and verbalizes concern about returning with a non-weightbearing status. Patient unable to secure 24/7 care and would like to look into a TCU. Writer informed patient there would be an upfront cost as medicare would not cover due to observation status and not having a 3 night stay. Patient is concerned about the cost but wants writer to send referrals and find out the upfront cost to go. Patient was provided with Medicare certified nursing home list. Pts choices are as follows [facility] (Phone: [phone] Fax: [phone]) [facility] Phone (Main Phone:[phone] Admissions Phone:[phone]  Fax: [phone]) [facility] Phone: (Admissions: [phone] RN Report: [phone] Fax: [phone]) .      PLAN    Pending referrals and CTS to inform patient of cost      Jaimee Williamson CTS RN [facility] [phone] [facility] [phone]

## 2018-12-03 NOTE — ANESTHESIA CARE TRANSFER NOTE
Patient: Christiana Sal    Procedure(s):  OPEN REDUCTION INTERNAL FIXATION Right ANKLE    Diagnosis: ankle fracture  Diagnosis Additional Information: No value filed.    Anesthesia Type:   Spinal     Note:  Airway :Face Mask  Patient transferred to:PACU  Handoff Report: Identifed the Patient, Identified the Reponsible Provider, Reviewed the pertinent medical history, Discussed the surgical course, Reviewed Intra-OP anesthesia mangement and issues during anesthesia, Set expectations for post-procedure period and Allowed opportunity for questions and acknowledgement of understanding      Vitals: (Last set prior to Anesthesia Care Transfer)    CRNA VITALS  12/3/2018 1048 - 12/3/2018 1125      12/3/2018             Pulse: (!)  49    SpO2: 97 %                Electronically Signed By: Hank Seth CRNA, APRN CRNA  December 3, 2018  11:25 AM

## 2018-12-03 NOTE — PLAN OF CARE
Problem: Pain, Acute (Adult)  Goal: Identify Related Risk Factors and Signs and Symptoms  Related risk factors and signs and symptoms are identified upon initiation of Human Response Clinical Practice Guideline (CPG).  Outcome: Improving  Patient will have pain under control with oral pain med prior to discharge.

## 2018-12-03 NOTE — PLAN OF CARE
Problem: Patient Care Overview  Goal: Plan of Care/Patient Progress Review  OT: Orders received. Pt getting surgery today. Will see POD #1 as appropriate.

## 2018-12-03 NOTE — PROGRESS NOTES
Discharge Planner   Discharge Plans in progress: Rohit for rehab  Barriers to discharge plan: none  Follow up plan: Rohit with hopes to return home with possible home care Hand off to primary clinic       Entered by: Jaimee Williamson 12/03/2018 4:02 PM

## 2018-12-03 NOTE — PROGRESS NOTES
Patient refused oxy only wants to take tylenol.pt up  to bedside commode with one assist and walker. Patient is voiding small amounts.pt brief on .  Patient states she does voids like this at night. Patient uses call light to let needs be known. Patient has bed alarm and call light within reach.

## 2018-12-03 NOTE — PROGRESS NOTES
CTS update: [facility] (Phone: [phone] Fax: [phone]) for tomorrow patient will need $6000 upfront and patient is willing to pay. Family will transport tomorrow when ready for discharge. AAYUSH Williamson CTS RN

## 2018-12-03 NOTE — PROCEDURES
12/3/2018    PREOPERATIVE DIAGNOSIS:    1. Unstable right distal fibula fracture    POSTOPERATIVE DIAGNOSIS:    1. Unstable right distal fibula fracture    PROCEDURE:    1. ORIF right distal fibula    SURGEON: Jacinto Adams MD    ASSISTANT: Lisa Tillman MD    ANESTHESIA: Spinal    TOURNIQUET TIME: 20 minutes    EBL: 15ml    IMPLANTS: Synthes 1/3 tubular locking plate with 2.7mm cortical lag screw, 3.5mm locking and nonlocking screws    DISPOSITION: Stable to PACU    INDICATIONS: Christiana is an 87 year old female who sustained an unstable right distal fibula fracture after a fall yesterday.  After discussing treatment options, she elected to proceed with ORIF of the right distal fibula to restore stability and optimize long term ankle function, understanding the risks of infection, nonunion, damage to vessels or nerves, symptomatic hardware, and the need for further surgery.    PROCEDURE: Christiana was met in the preoperative holding area where the right leg was marked.  Following this, she was transferred to the operating theater.  After smooth induction of general anesthesia, she was positioned supine with leg elevated on a bone foam.  A timeout was performed verifying the correct surgery, patient, and location.  The right lower extremity was then prepped and draped in a standard sterile fashion.    We started by placing a tourniquet on the calf and inflating this to 250mmHg.  We then made a 10cm longitudinal incision over the distal fibula.  Dissection was sharply carried down through skin and subcutaneous tissue, taking care to avoid the superficial peroneal nerve.  We then exposed the distal fibula fracture, and used a combination of curettes and rongeur to remove fracture hematoma.  We performed an open reduction with a clamp and placed a 2.7mm lag screw from proximal anterior to distal posterior with good purchase.  We then selected a 6 hole plate, and place a 3.5mm locking screw in the distal and proximal  fragment in order to lag the plate to the bone.  We then placed 2 additional nonlocking screws proximally and one addtional locking screws distally.  Her bone quality was poor and the initial 3.5mm nonlocking screw distally didn't get great purchase.  It was therefore traded out for a 3.5mm locking screws.  We then stressed the ankle and there was no evidence of syndesmotic widening.  Tourniquet was let down and hemostasis achieved.  Deep layer closed with 0-vicryl, subcutaneous layer with 2-0 vicryl, and skin with nylon.  A sterile dressing followed by a well padded short leg splint were applied and she was awoken from general anesthesia and transferred to the PACU in stable condition.    POSTOPERATIVE PLAN:   1. NWB right lower extremity -- elevate as much as possible   2. Return to floor -- lives on her own and will likely need TCU   3. Follow up in 2 weeks for wound check    MCKINLEY ELISE MD

## 2018-12-03 NOTE — PROGRESS NOTES
Your information has been submitted on December 03rd, 2018 at 04:14:30 PM CST. The confirmation number is [id number]

## 2018-12-03 NOTE — ANESTHESIA POSTPROCEDURE EVALUATION
Patient: Christiana Sal    Procedure(s):  OPEN REDUCTION INTERNAL FIXATION Right ANKLE    Diagnosis:ankle fracture  Diagnosis Additional Information: No value filed.    Anesthesia Type:  Spinal    Note:  Anesthesia Post Evaluation    Patient location during evaluation: Bedside  Patient participation: Able to participate in evaluation but full recovery from regional anesthesia has not yet ocurrred but is anticipated to occur within 48 hours  Level of consciousness: awake and alert  Pain management: adequate  Airway patency: patent  Cardiovascular status: acceptable  Respiratory status: acceptable  Hydration status: acceptable  PONV: none     Anesthetic complications: None          Last vitals:  Vitals:    12/03/18 1200 12/03/18 1215 12/03/18 1230   BP: 131/65 142/72 160/75   Pulse:      Resp: 12 16 19   Temp:      SpO2: 95% 96% 98%         Electronically Signed By: Hakn Seth CRNA, APRN CRNA  December 3, 2018  1:05 PM

## 2018-12-03 NOTE — PROGRESS NOTES
Discharge Planner   Discharge Plans in progress: Pending referrals  Barriers to discharge plan: Cost of stay  Follow up plan: CTS to follow       Entered by: Jaimee Williamson 12/03/2018 2:14 PM

## 2018-12-03 NOTE — PROGRESS NOTES
[facility] TRANSPORT NOTE  Data:   Reason for Transport:  Return from PACU    Christiana Sal was transported from Pacu via cart at 1315.  Patient was accompanied by Transport Aide. Equipment used for transport: IV pump and None. Family was aware of reason for transport: yes. Patient states she called son in law.    Action:  Report: received from Jaimee    Response:  Patient's condition upon return was stable.    Marialuisa Caldwell

## 2018-12-03 NOTE — PLAN OF CARE
Problem: Patient Care Overview  Goal: Plan of Care/Patient Progress Review  PT: Orders received. Pt currently off floor to surgery. Will see POD #1 as appropriate.

## 2018-12-03 NOTE — PLAN OF CARE
Problem: Pain, Acute (Adult)  Goal: Identify Related Risk Factors and Signs and Symptoms  Related risk factors and signs and symptoms are identified upon initiation of Human Response Clinical Practice Guideline (CPG).   Outcome: No Change  Pt alert and oriented. Pain well managed with tylenol. CMS intact. Has been NPO since midnight as ordered until surgery consult completed. Denies dysuria. Up to Bedside commode pivot assist of 1, pt did well not bearing weight to RLE. Denies any lightheadedness or dizziness when getting up to commode.

## 2018-12-03 NOTE — PROGRESS NOTES
"[facility] ADMISSION [facility]    Patient admitted to room 2307 at approximately 1745 via cart from emergency room. Patient was accompanied by nurse.     Verbal SBAR report received from RN prior to patient arrival.     Patient ambulated to bed with two assist. Patient alert and oriented X 3. The patient is not having any pain. 0-10 Pain Scale: 2 (8/10 with wt bear). Admission vital signs: Blood pressure 159/66, temperature 97.7  F (36.5  C), temperature source Oral, resp. rate 18, height 1.676 m (5' 6\"), weight 74.8 kg (165 lb), SpO2 97 %, not currently breastfeeding. Patient was oriented to plan of care, call light, bed controls, tv, telephone, bathroom and visiting hours.     Risk Assessment    The following safety risks were identified during admission: fall. Yellow risk band applied: YES.     Skin Initial Assessment    This writer admitted this patient and completed a full skin assessment and Dillon score in the Adult PCS flowsheet. Appropriate interventions initiated as needed.     Secondary skin check completed by Raine YEPEZ. Unable to view right ankle/foot d/t splint wrap on foot.      Skin  Inspection of bony prominences: Full  Inspection under devices: Full  Skin WDL:  WDL except, all  Skin Color/Characteristics: pale, redness blanchable  Skin Temperature: warm  Skin Moisture: dry  Skin Elasticity: quick return to original state  Skin Integrity: bruise(s), scab(s)    Dillon Risk Assessment  Sensory Perception: 4-->no impairment  Moisture: 3-->occasionally moist  Activity: 4-->walks frequently  Mobility: 3-->slightly limited  Nutrition: 3-->adequate  Friction and Shear: 3-->no apparent problem  Dillon Score: 20  Bed Support Surface: Atmos Air mattress    Maci Milan    "

## 2018-12-03 NOTE — CONSULTS
ORTHO CONSULT    REASON FOR CONSULTATION: Dr. Walker requested orthopaedic consultation regarding right distal fibula fracture    HPI:   87F otherwise healthy fell on after a fall when she tripped on suit case  Noted immediate pain and went to ED where work up revealed isolated right distal fibula fracture  She's been having lightheadedness recently -- diagnosed with pneumonia and on prednisone and azithromycin  Also had left hip injection complicated by significant bleeding with large hematoma -- was recovering from that  No antecedent ankle issues    ROS: A 10 pt ROS was obtained and negative except as mentioned in the HPI    ALLERGIES:      Allergies   Allergen Reactions     Demerol Nausea     Lisinopril Cough     Meperidine Unknown     Sulfa Drugs Other (See Comments) and Unknown     Questionable itching reported by patient       MEDICATIONS:     No current facility-administered medications on file prior to encounter.   Current Outpatient Prescriptions on File Prior to Encounter:  acetaminophen (TYLENOL) 500 MG tablet Take 2 tablets (1,000 mg) by mouth every 8 hours as needed for mild pain   atorvastatin (LIPITOR) 40 MG tablet TAKE ONE TABLET BY MOUTH EVERY DAY   azithromycin (ZITHROMAX Z-RADHIKA) 250 MG tablet Two tablets on the first day, then one tablet daily for the next 4 days   CALCIUM 600 + D OR 1 tablet by mouth daily   citalopram (CELEXA) 20 MG tablet TAKE ONE TABLET BY MOUTH EVERY DAY   FISH OIL OR 1 daily   levothyroxine (SYNTHROID/LEVOTHROID) 75 MCG tablet TAKE 1 TABLET BY MOUTH ONCE DAILY (NEEDS  LAB  WORK)   meclizine (ANTIVERT) 25 MG tablet TAKE ONE TABLET BY MOUTH EVERY 6 HOURS AS NEEDED FOR DIZZINESS   metoprolol (TOPROL-XL) 25 MG 24 hr tablet Take 0.5 tablets (12.5 mg) by mouth daily   Multiple Vitamins-Minerals (OCUVITE PO) Take 1 tablet by mouth daily.   pramipexole (MIRAPEX) 0.5 MG tablet TAKE ONE TABLET BY MOUTH AT BEDTIME   predniSONE (DELTASONE) 20 MG tablet Take two tablets (= 40mg) each  day for 5 (five) days   albuterol (PROAIR HFA/PROVENTIL HFA/VENTOLIN HFA) 108 (90 BASE) MCG/ACT Inhaler Inhale 2 puffs into the lungs every 6 hours as needed for shortness of breath / dyspnea or wheezing   ASPIRIN 81 MG PO TABS Take 2 tablets by mouth every evening    losartan (COZAAR) 25 MG tablet TAKE 1 TABLET BY MOUTH ONCE DAILY (Patient taking differently: TAKE 1 TABLET BY MOUTH EVERY EVENING)   nitroglycerin (NITROSTAT) 0.4 MG SL tablet Place 1 tablet (0.4 mg) under the tongue every 5 minutes as needed for chest pain (call your doctor if you take a third dose)   ranitidine (ZANTAC) 150 MG tablet TAKE TWO TABLETS BY MOUTH AT BEDTIME       PMH:  Past Medical History:   Diagnosis Date     Coronary atherosclerosis of native coronary artery     s/p MI ([facility])     Essential hypertension, benign      GERD (gastroesophageal reflux disease)      History of transient ischemic attack (TIA)      Hyperlipidemia LDL goal <100      Hypothyroidism      Major depressive disorder, recurrent episode, mild (H) 5/18/2011     Peptic ulcer disease with hemorrhage     Remote history of stomach ulcer, requiring transfusion after chronic bleeding       SOCIAL HISTORY:   Nonsmoker  Walks with cane  Lives in senior living -- supposed to be moving to new apartment today    FAMILY HISTORY: Negative for bleeding disorders, clotting disorders, or adverse reactions to anesthesia    EXAM:  General: Pleasant, oriented  Neuro: CN II-XII intact  Skin:  No rashes or lesions  Resp:  Nonlabored breathing  Abd:  Soft, NT, ND  MSK:  Right ankle in splint which was removed.  Moderate swelling and ecchymosis but skin wrinkles and no blisters.  Foot perfused.  Distal sensorimotor exam intact    IMAGING: Xrays of right ankle reviewed.  Comminuted distal fibula fracture with widening of ankle mortise.  No obvious preexisting arthritis    ASSESSMENT:    1. Unstable right distal fibula fracture sustained 12.2.18   2. Comorbidities including recent  brannon, CAD s/p pacemaker placement    PLAN:   1. Findings discussed with Christiana.  At this point, recommend ORIF to restore stability and optimize long term ankle function.  As she has no blisters and skin wrinkles, think it's safe to proceed now.  Discussed risks including infection, nonunion, need for further surgery.  Will be in hospital for 2-3 days then likely nursing home for 4-6 weeks.  Anticipate 4 weeks of NWB.    Jacinto Adams MD

## 2018-12-03 NOTE — PROGRESS NOTES
[facility]      Subjective:  No complaints   Foot is still numb    Review of Systems:  CONSTITUTIONAL: NEGATIVE for fever, chills, change in weight  INTEGUMENTARY/SKIN: NEGATIVE for worrisome rashes, moles or lesions  EYES: NEGATIVE for vision changes or irritation  ENT/MOUTH: NEGATIVE for ear, mouth and throat problems  RESP: NEGATIVE for significant cough or SOB  BREAST: NEGATIVE for masses, tenderness or discharge  CV: NEGATIVE for chest pain, palpitations or peripheral edema  GI: NEGATIVE for nausea, abdominal pain, heartburn, or change in bowel habits  : NEGATIVE for frequency, dysuria, or hematuria  MUSCULOSKELETAL:above  NEURO: NEGATIVE for weakness, dizziness or paresthesias  ENDOCRINE: NEGATIVE for temperature intolerance, skin/hair changes  HEME: NEGATIVE for bleeding problems  PSYCHIATRIC: NEGATIVE for changes in mood or affect    Physical Exam:  Vitals Were Reviewed    Patient Vitals for the past 16 hrs:   BP Temp Temp src Pulse Heart Rate Resp SpO2   12/03/18 1215 142/72 - - - 53 16 96 %   12/03/18 1200 131/65 - - - 54 12 95 %   12/03/18 1122 125/82 98.6  F (37  C) Oral - 54 13 97 %   12/03/18 0719 118/67 98.4  F (36.9  C) Oral 76 - 18 95 %   12/03/18 0354 167/62 97.8  F (36.6  C) Oral 58 - 18 97 %   12/03/18 0037 133/65 97.5  F (36.4  C) Oral - 63 18 93 %         Intake/Output Summary (Last 24 hours) at 12/03/18 1233  Last data filed at 12/03/18 1117   Gross per 24 hour   Intake             2098 ml   Output              190 ml   Net             1908 ml       GENERAL APPEARANCE: healthy, alert and no distress  RESP: lungs clear to auscultation - no rales, rhonchi or wheezes  CV: regular rate and rhythm, normal S1 S2, no S3 or S4 and no murmur, click or rub   ABDOMEN: soft, nontender, no HSM or masses and bowel sounds normal  MS: good perfusion of toes  SKIN: clear without significant rashes or lesions    Lab:  Recent Labs   Lab Test  12/02/18   1345  11/28/18   0948   NA  139   141   POTASSIUM  3.2*  3.9   CHLORIDE  105  108   CO2  26  26   ANIONGAP  8  7   GLC  137*  82   BUN  28  15   CR  1.08*  0.83   YVONNE  7.8*  8.3*     CBC RESULTS:   Recent Labs   Lab Test  12/02/18   1345  11/28/18   0948   WBC  11.8*  6.6   RBC  3.99  4.04   HGB  12.5  12.7   HCT  37.3  37.7   PLT  275  265       Results for orders placed or performed during the hospital encounter of 12/02/18 (from the past 24 hour(s))   CBC with platelets differential   Result Value Ref Range    WBC 11.8 (H) 4.0 - 11.0 10e9/L    RBC Count 3.99 3.8 - 5.2 10e12/L    Hemoglobin 12.5 11.7 - 15.7 g/dL    Hematocrit 37.3 35.0 - 47.0 %    MCV 94 78 - 100 fl    MCH 31.3 26.5 - 33.0 pg    MCHC 33.5 31.5 - 36.5 g/dL    RDW 13.6 10.0 - 15.0 %    Platelet Count 275 150 - 450 10e9/L    Diff Method Automated Method     % Neutrophils 70.5 %    % Lymphocytes 12.6 %    % Monocytes 15.4 %    % Eosinophils 0.7 %    % Basophils 0.3 %    % Immature Granulocytes 0.5 %    Nucleated RBCs 0 0 /100    Absolute Neutrophil 8.3 1.6 - 8.3 10e9/L    Absolute Lymphocytes 1.5 0.8 - 5.3 10e9/L    Absolute Monocytes 1.8 (H) 0.0 - 1.3 10e9/L    Absolute Eosinophils 0.1 0.0 - 0.7 10e9/L    Absolute Basophils 0.0 0.0 - 0.2 10e9/L    Abs Immature Granulocytes 0.1 0 - 0.4 10e9/L    Absolute Nucleated RBC 0.0    Basic metabolic panel   Result Value Ref Range    Sodium 139 133 - 144 mmol/L    Potassium 3.2 (L) 3.4 - 5.3 mmol/L    Chloride 105 94 - 109 mmol/L    Carbon Dioxide 26 20 - 32 mmol/L    Anion Gap 8 3 - 14 mmol/L    Glucose 137 (H) 70 - 99 mg/dL    Urea Nitrogen 28 7 - 30 mg/dL    Creatinine 1.08 (H) 0.52 - 1.04 mg/dL    GFR Estimate 48 (L) >60 mL/min/1.7m2    GFR Estimate If Black 58 (L) >60 mL/min/1.7m2    Calcium 7.8 (L) 8.5 - 10.1 mg/dL   UA reflex to Microscopic   Result Value Ref Range    Color Urine Sherry     Appearance Urine Cloudy     Glucose Urine Negative NEG^Negative mg/dL    Bilirubin Urine Negative NEG^Negative    Ketones Urine 5 (A) NEG^Negative  mg/dL    Specific Gravity Urine 1.018 1.003 - 1.035    Blood Urine Negative NEG^Negative    pH Urine 5.0 5.0 - 7.0 pH    Protein Albumin Urine 30 (A) NEG^Negative mg/dL    Urobilinogen mg/dL 2.0 0.0 - 2.0 mg/dL    Nitrite Urine Negative NEG^Negative    Leukocyte Esterase Urine Moderate (A) NEG^Negative    Source Midstream Urine     RBC Urine 6 (H) 0 - 2 /HPF    WBC Urine 41 (H) 0 - 5 /HPF    Bacteria Urine Few (A) NEG^Negative /HPF    Squamous Epithelial /HPF Urine 4 (H) 0 - 1 /HPF    Mucous Urine Present (A) NEG^Negative /LPF    Hyaline Casts 25 (H) 0 - 2 /LPF   Urine Culture   Result Value Ref Range    Specimen Description Midstream Urine     Special Requests Specimen received in preservative     Culture Micro Culture in progress    Ankle XR, G/E 3 views, right    Narrative    RIGHT ANKLE THREE VIEWS   12/2/2018 3:45 PM     HISTORY: Fall at home from standing height. Evaluate for acute bony  process due to trauma.     COMPARISON: None.      Impression    IMPRESSION: There is a comminuted distal fibular fracture with some  mild widening of the ankle mortise. Degenerative changes are seen in  the midfoot.    MARK RICHARDSON MD   XR Tibia & Fibula Left 2 Views    Narrative    LEFT TIBIA-FIBULA TWO VIEWS 12/2/2018 3:45 PM     HISTORY: Fall at home last night, bruising and swelling, with pain  with weightbearing. Evaluate for acute bony process due to trauma.     COMPARISON: None.      Impression    IMPRESSION: Negative for any acute fracture. There has been previous  pinning of the distal fibula.    MARK RICHARDSON MD   Orthopedics IP Consult: Patient to be seen: Routine - within 24 hours; closded right distal midly displaced, communited fibula fracture.; Consultant may enter orders: Yes    Narrative    Jacinto Adams MD     12/3/2018  9:34 AM  ORTHO CONSULT    REASON FOR CONSULTATION: Dr. Walker requested orthopaedic   consultation regarding right distal fibula fracture    HPI:   87F otherwise healthy fell on  after a fall when she tripped on   suit case  Noted immediate pain and went to ED where work up revealed   isolated right distal fibula fracture  She's been having lightheadedness recently -- diagnosed with   pneumonia and on prednisone and azithromycin  Also had left hip injection complicated by significant bleeding   with large hematoma -- was recovering from that  No antecedent ankle issues    ROS: A 10 pt ROS was obtained and negative except as mentioned in   the HPI    ALLERGIES:      Allergies   Allergen Reactions     Demerol Nausea     Lisinopril Cough     Meperidine Unknown     Sulfa Drugs Other (See Comments) and Unknown     Questionable itching reported by patient       MEDICATIONS:     No current facility-administered medications on file prior to   encounter.   Current Outpatient Prescriptions on File Prior to Encounter:  acetaminophen (TYLENOL) 500 MG tablet Take 2 tablets (1,000 mg)   by mouth every 8 hours as needed for mild pain   atorvastatin (LIPITOR) 40 MG tablet TAKE ONE TABLET BY MOUTH   EVERY DAY   azithromycin (ZITHROMAX Z-RADHIKA) 250 MG tablet Two tablets on the   first day, then one tablet daily for the next 4 days   CALCIUM 600 + D OR 1 tablet by mouth daily   citalopram (CELEXA) 20 MG tablet TAKE ONE TABLET BY MOUTH EVERY   DAY   FISH OIL OR 1 daily   levothyroxine (SYNTHROID/LEVOTHROID) 75 MCG tablet TAKE 1 TABLET   BY MOUTH ONCE DAILY (NEEDS  LAB  WORK)   meclizine (ANTIVERT) 25 MG tablet TAKE ONE TABLET BY MOUTH EVERY   6 HOURS AS NEEDED FOR DIZZINESS   metoprolol (TOPROL-XL) 25 MG 24 hr tablet Take 0.5 tablets (12.5   mg) by mouth daily   Multiple Vitamins-Minerals (OCUVITE PO) Take 1 tablet by mouth   daily.   pramipexole (MIRAPEX) 0.5 MG tablet TAKE ONE TABLET BY MOUTH AT   BEDTIME   predniSONE (DELTASONE) 20 MG tablet Take two tablets (= 40mg)   each day for 5 (five) days   albuterol (PROAIR HFA/PROVENTIL HFA/VENTOLIN HFA) 108 (90 BASE)   MCG/ACT Inhaler Inhale 2 puffs into the lungs  every 6 hours as   needed for shortness of breath / dyspnea or wheezing   ASPIRIN 81 MG PO TABS Take 2 tablets by mouth every evening    losartan (COZAAR) 25 MG tablet TAKE 1 TABLET BY MOUTH ONCE DAILY   (Patient taking differently: TAKE 1 TABLET BY MOUTH EVERY   EVENING)   nitroglycerin (NITROSTAT) 0.4 MG SL tablet Place 1 tablet (0.4   mg) under the tongue every 5 minutes as needed for chest pain   (call your doctor if you take a third dose)   ranitidine (ZANTAC) 150 MG tablet TAKE TWO TABLETS BY MOUTH AT   BEDTIME       PMH:  Past Medical History:   Diagnosis Date     Coronary atherosclerosis of native coronary artery     s/p MI ([facility])     Essential hypertension, benign      GERD (gastroesophageal reflux disease)      History of transient ischemic attack (TIA)      Hyperlipidemia LDL goal <100      Hypothyroidism      Major depressive disorder, recurrent episode, mild (H)   5/18/2011     Peptic ulcer disease with hemorrhage     Remote history of stomach ulcer, requiring transfusion after   chronic bleeding       SOCIAL HISTORY:   Nonsmoker  Walks with cane  Lives in senior living -- supposed to be moving to new apartment   today    FAMILY HISTORY: Negative for bleeding disorders, clotting   disorders, or adverse reactions to anesthesia    EXAM:  General: Pleasant, oriented  Neuro: CN II-XII intact  Skin:  No rashes or lesions  Resp:  Nonlabored breathing  Abd:  Soft, NT, ND  MSK:  Right ankle in splint which was removed.  Moderate swelling   and ecchymosis but skin wrinkles and no blisters.  Foot perfused.    Distal sensorimotor exam intact    IMAGING: Xrays of right ankle reviewed.  Comminuted distal fibula   fracture with widening of ankle mortise.  No obvious preexisting   arthritis    ASSESSMENT:    1. Unstable right distal fibula fracture sustained 12.2.18   2. Comorbidities including recent pnuemonia, CAD s/p pacemaker   placement    PLAN:   1. Findings discussed with Christiana.  At this point,  recommend   ORIF to restore stability and optimize long term ankle function.    As she has no blisters and skin wrinkles, think it's safe to   proceed now.  Discussed risks including infection, nonunion, need   for further surgery.  Will be in hospital for 2-3 days then   likely nursing home for 4-6 weeks.  Anticipate 4 weeks of NWB.    Jacinto Adams MD         XR Surgery ALVIN L/T 5 Min Fluoro w Stills    Narrative    This exam was marked as non-reportable because it will not be read by a   radiologist or a [place] non-radiologist provider.                 Assessment and Plan:    Christiana Sal is a 87 year old female with past medical history of coronary artery disease s/p stenting x 6, sinus node dysfunction s/p pacemaker, hypertension, hyperlipidemia, hypothyroidism, GERD, depression who presents with right lower extremity pain after mechanical fall      Mechanical Fall  Mechanical fall, tripping over a suitcase landing primarily on right leg. No head strike or loss of consciousness. No preceding symptoms.      Distal Right Fibula Fracture--just post op now    Lightheadedness, orthostatic hypotension  Lightheadedness over past week with postural changes. Recently treated for pneumonia with azithromycin and prednisone burst. Additionally reports increased urinary frequency over past month with decreased PO intake due to the urinary frequency. Vital significant for orthostatic hypotension in ED,  --> 76. Received 1 L of fluids in ED. Suspect volume depletion due to illness and poor oral intake.   Holding losartan     UTI,  overactive bladder  Urinary frequency x 1 month, worse in past week with urinary nearly every hour at night. No dysuria. UA shows 41 WBC, moderate leukocyte esterase. Treated with Keflex in ED. Afebrile. WBC 11.8 though patient was on prednisone last dose today.  Continue Keflex BID--UC enterococcus , awaiting sens.     Mild creatinine elevation  Creatinine 1.08. BUN 28. GFR 48.  "Baseline creatinine 0.8. Suspected etiology is pre-renal due to reduce oral intake and urinary frequency.    Recently treated pneumonia  Shortness of breath resolving. Intermittent cough. Treated with Azithromycin and prednisone after CT showed \"Mild patchy bilateral groundglass airspace opacities could be seen with pulmonary edema or other alveolar infiltrate.\"     Coronary Artery Disease  Chronic. No current symptoms. S/P PCI complicated by dissection with stents to LAD and D1 5/2016, stenting in 2004 to RCA.  Nuclear stress in 2017 showed small fixed defect.  - continue statin, beta blocker  - hold home losartan for now due to orthostatic hypotension  - hold home ASA for potential surgery     Sinoatrial node dysfunction  S/P Cardiac pacemaker - [employer] MRO dual lead  Follows with cardiology, pacemaker implantation 11/2016 for sinus node dysfunction.     Hyperlipidemia  Chronic.  - continue home atorvastatin     Hypertension  Chronic. Blood pressures reviewed, stable though significant orthostatic hypotension noted. Outpatient readings largely 120s-150.  - continue home metoprolol  Restart losartan due to htn.     Hypothyroidism  Chronic.   - continue home levothyroxine     Gastroesophageal reflux disease  Chronic.  - continue home ranitidine     JENIFER (obstructive sleep apnea)  Not compliant with CPAP.     FEN: lr at 50 ml per hour     DVT Prophylaxis: Pneumatic Compression Devices  Code Status: Full Code     Disposition: 88 yo with mechanical fall, immediately post op ankle fracture surgery, will need tcu. Restarting losartan.     "

## 2018-12-03 NOTE — ANESTHESIA PREPROCEDURE EVALUATION
Anesthesia Evaluation     . Pt has had prior anesthetic. Type: General and MAC    No history of anesthetic complications          ROS/MED HX    ENT/Pulmonary:     (+)sleep apnea, , recent URI resolved . .    Neurologic: Comment: lightheadedness    (+)TIA     Cardiovascular: Comment: Chronic. No current symptoms. S/P PCI complicated by dissection with stents to LAD and D1 5/2016, stenting in 2004 to RCA.  Nuclear stress in 2017 showed small fixed defect.    Angioplasty 2016    Orthostatic hypotension    (+) Dyslipidemia, hypertension--CAD, angina-past MI,-stent,6 . : . . . pacemaker :type: DDDR - Patientt is not dependent on pacemaker . . Previous cardiac testing Echodate:[id number]:Interpretation Summary     Left ventricular systolic function is normal.The visual ejection fraction is  estimated at 60-65%.The transmitral spectral Doppler flow pattern is  suggestive of diastolic dysfunction of the left ventricle.E by E prime ratio  is greater than 15, that likely suggests increased left ventricular filling  pressures.  The right ventricular systolic function is normal.  There is mild (1+) mitral regurgitation.  There is mild (1+) aortic regurgitation.Stress Testdate:9-6-17 results:Indication/Clinical History: 85-year-old female with coronary artery  disease and pacemaker undergoing stress test for chest pain     Impression  1.  Myocardial perfusion imaging using single isotope technique  demonstrated small fixed defect of the mid to basal anterior and  anteroseptal territory. Suspect this is LBBB induced perfusion  abnormality. No ischemia..   2. Gated images demonstrated normal wall motion.  The left ventricular  systolic function is 76% at rest, 78% with stress.  3. Compared to the prior study from November 2016, the small fixed  anterior and anteroseptal defect described above is new.     Procedure  Pharmacologic stress testing was performed with Lexiscan at a rate of  0.08 mg/ml rapid bolus injection, for 15  seconds, 0.4 mg/5ml  intravenously. Low-level exercise was not performed along with the  vasodilator infusion.  The heart rate was 65 at baseline and august to  77 beats per minute during the Lexiscan infusion. The rest blood  pressure was 150/62 mmHg and was 132/68 mm Hg during Lexiscan  infusion. The patient experienced no chest pain  during the test.     Myocardial perfusion imaging was performed at rest, approximately 45  minutes after the injection intravenously of 9.8 mCi of Tc-99m  Myoview. At peak pharmacologic effect, 10-20 seconds after Lexiscan,   the patient was injected intravenously with 31.5 mCi of  Tc-99m  Myoview. The post-stress tomographic imaging was performed  approximately 60 minutes after stress.     EKG Findings  The resting EKG demonstrated sinus rhythm, left bundle branch block.  The stress EKG demonstrated no changes from baseline, negative for  ischemia.     Tomographic Findings  Overall, the study quality is good . On the stress images, small  perfusion defect of the basal to mid anterior and anteroseptal  territories. On the rest images, small perfusion defect of the basal  to mid anterior and anteroseptal territories . Gated images  demonstrated normal wall motion. The left ventricular ejection  fraction was calculated to be 76% at rest, 70% with stress. TID was  was not appreciated.ECG reviewed date:12-2-18 results:Sinus  Rhythm  -First degree A-V block   Yang = 244  -Left bundle branch block and left axis.     ABNORMAL Cath date: 2-16-12 results:Nonobstructive CAD, patent stent          METS/Exercise Tolerance:  3 - Able to walk 1-2 blocks without stopping   Hematologic:  - neg hematologic  ROS       Musculoskeletal: Comment: OA hip        GI/Hepatic: Comment: PUD    (+) GERD       Renal/Genitourinary: Comment: Possible UTI  OAB        Endo:     (+) thyroid problem hypothyroidism, .      Psychiatric:     (+) psychiatric history depression      Infectious Disease:  - neg infectious  disease ROS       Malignancy:      - no malignancy   Other: Comment: Lichen planus  Vulvar pruritis                    Physical Exam  Normal systems: cardiovascular, pulmonary and dental    Airway   Mallampati: II  TM distance: >3 FB  Neck ROM: full    Dental     Cardiovascular   Rhythm and rate: regular and normal      Pulmonary    breath sounds clear to auscultation                    Anesthesia Plan      History & Physical Review  History and physical reviewed and following examination; no interval change.    ASA Status:  3 emergent.    NPO Status:  > 8 hours    Plan for Spinal and Periph. Nerve Block for postop pain   PONV prophylaxis:  Ondansetron (or other 5HT-3) and Dexamethasone or Solumedrol  SAB with right popliteal nerve block post-op prn      Postoperative Care  Postoperative pain management:  IV analgesics, Oral pain medications and Peripheral nerve block (Single Shot).      Consents  Anesthetic plan, risks, benefits and alternatives discussed with:  Patient..                          .

## 2018-12-03 NOTE — PROGRESS NOTES
[facility] TRANSPORT NOTE  Data:   Reason for Transport:  surgery    Christiana Sal was transported to \[facility]\"" via wheel chair at 0905.  Patient was accompanied by Nursing Assistant. Equipment used for transport: None. Family was aware of reason for transport: yes - dtr Alessandra notified.     Action:  Report: given to Patricia    Response:  Patient's condition when transferred off unit was stable. Patient denies pain.    Marialuisa Chall

## 2018-12-04 ENCOUNTER — TELEPHONE (OUTPATIENT)
Dept: UROLOGY | Facility: CLINIC | Age: 83
End: 2018-12-04

## 2018-12-04 VITALS
SYSTOLIC BLOOD PRESSURE: 166 MMHG | TEMPERATURE: 97.7 F | BODY MASS INDEX: 26.52 KG/M2 | HEART RATE: 67 BPM | HEIGHT: 66 IN | RESPIRATION RATE: 18 BRPM | OXYGEN SATURATION: 95 % | WEIGHT: 165 LBS | DIASTOLIC BLOOD PRESSURE: 52 MMHG

## 2018-12-04 LAB
ANION GAP SERPL CALCULATED.3IONS-SCNC: 5 MMOL/L (ref 3–14)
BACTERIA SPEC CULT: ABNORMAL
BUN SERPL-MCNC: 17 MG/DL (ref 7–30)
CALCIUM SERPL-MCNC: 7.7 MG/DL (ref 8.5–10.1)
CHLORIDE SERPL-SCNC: 104 MMOL/L (ref 94–109)
CO2 SERPL-SCNC: 29 MMOL/L (ref 20–32)
CREAT SERPL-MCNC: 0.87 MG/DL (ref 0.52–1.04)
ERYTHROCYTE [DISTWIDTH] IN BLOOD BY AUTOMATED COUNT: 13.4 % (ref 10–15)
GFR SERPL CREATININE-BSD FRML MDRD: 61 ML/MIN/1.7M2
GLUCOSE SERPL-MCNC: 142 MG/DL (ref 70–99)
HCT VFR BLD AUTO: 32 % (ref 35–47)
HGB BLD-MCNC: 10.6 G/DL (ref 11.7–15.7)
Lab: ABNORMAL
MCH RBC QN AUTO: 31.1 PG (ref 26.5–33)
MCHC RBC AUTO-ENTMCNC: 33.1 G/DL (ref 31.5–36.5)
MCV RBC AUTO: 94 FL (ref 78–100)
PLATELET # BLD AUTO: 237 10E9/L (ref 150–450)
POTASSIUM SERPL-SCNC: 4.1 MMOL/L (ref 3.4–5.3)
RBC # BLD AUTO: 3.41 10E12/L (ref 3.8–5.2)
SODIUM SERPL-SCNC: 138 MMOL/L (ref 133–144)
SPECIMEN SOURCE: ABNORMAL
WBC # BLD AUTO: 11.7 10E9/L (ref 4–11)

## 2018-12-04 PROCEDURE — 25000132 ZZH RX MED GY IP 250 OP 250 PS 637: Mod: GY | Performed by: PHYSICIAN ASSISTANT

## 2018-12-04 PROCEDURE — A9270 NON-COVERED ITEM OR SERVICE: HCPCS | Mod: GY | Performed by: ORTHOPAEDIC SURGERY

## 2018-12-04 PROCEDURE — 99217 ZZC OBSERVATION CARE DISCHARGE: CPT | Performed by: FAMILY MEDICINE

## 2018-12-04 PROCEDURE — A9270 NON-COVERED ITEM OR SERVICE: HCPCS | Mod: GY | Performed by: EMERGENCY MEDICINE

## 2018-12-04 PROCEDURE — A9270 NON-COVERED ITEM OR SERVICE: HCPCS | Mod: GY | Performed by: PHYSICIAN ASSISTANT

## 2018-12-04 PROCEDURE — 99207 ZZC CDG-CODE CATEGORY CHANGED: CPT | Performed by: FAMILY MEDICINE

## 2018-12-04 PROCEDURE — 80048 BASIC METABOLIC PNL TOTAL CA: CPT | Performed by: FAMILY MEDICINE

## 2018-12-04 PROCEDURE — G0378 HOSPITAL OBSERVATION PER HR: HCPCS

## 2018-12-04 PROCEDURE — 25000132 ZZH RX MED GY IP 250 OP 250 PS 637: Mod: GY | Performed by: EMERGENCY MEDICINE

## 2018-12-04 PROCEDURE — 25000132 ZZH RX MED GY IP 250 OP 250 PS 637: Mod: GY | Performed by: ORTHOPAEDIC SURGERY

## 2018-12-04 PROCEDURE — 85027 COMPLETE CBC AUTOMATED: CPT | Performed by: FAMILY MEDICINE

## 2018-12-04 PROCEDURE — 36415 COLL VENOUS BLD VENIPUNCTURE: CPT | Performed by: FAMILY MEDICINE

## 2018-12-04 RX ORDER — OXYCODONE HYDROCHLORIDE 5 MG/1
5 TABLET ORAL
Qty: 14 TABLET | Refills: 0 | Status: SHIPPED | OUTPATIENT
Start: 2018-12-04 | End: 2018-12-14

## 2018-12-04 RX ORDER — AMOXICILLIN 500 MG/1
500 CAPSULE ORAL 3 TIMES DAILY
Qty: 15 CAPSULE | Refills: 0 | COMMUNITY
Start: 2018-12-04 | End: 2019-02-01

## 2018-12-04 RX ORDER — ASPIRIN 81 MG/1
81 TABLET, CHEWABLE ORAL DAILY
Status: DISCONTINUED | OUTPATIENT
Start: 2018-12-04 | End: 2018-12-04 | Stop reason: HOSPADM

## 2018-12-04 RX ADMIN — METOPROLOL SUCCINATE 12.5 MG: 25 TABLET, EXTENDED RELEASE ORAL at 08:44

## 2018-12-04 RX ADMIN — ASPIRIN 81 MG 81 MG: 81 TABLET ORAL at 12:21

## 2018-12-04 RX ADMIN — OXYCODONE HYDROCHLORIDE 10 MG: 5 TABLET ORAL at 03:51

## 2018-12-04 RX ADMIN — ATORVASTATIN CALCIUM 40 MG: 20 TABLET, FILM COATED ORAL at 08:41

## 2018-12-04 RX ADMIN — CITALOPRAM HYDROBROMIDE 20 MG: 20 TABLET ORAL at 08:41

## 2018-12-04 RX ADMIN — OXYCODONE HYDROCHLORIDE 10 MG: 5 TABLET ORAL at 00:33

## 2018-12-04 RX ADMIN — LEVOTHYROXINE SODIUM 75 MCG: 75 TABLET ORAL at 08:41

## 2018-12-04 RX ADMIN — CEPHALEXIN 500 MG: 500 CAPSULE ORAL at 03:51

## 2018-12-04 RX ADMIN — ACETAMINOPHEN 975 MG: 325 TABLET, FILM COATED ORAL at 08:41

## 2018-12-04 RX ADMIN — OXYCODONE HYDROCHLORIDE 10 MG: 5 TABLET ORAL at 12:21

## 2018-12-04 NOTE — PROGRESS NOTES
Name: Christiana Sal    MRN#: [id number]    Reason for Hospitalization: Closed fracture of shaft of fibula [S82.409A]  Lightheadedness [R42]  Abnormal urinalysis [R82.90]  Contusion of right lower extremity, initial encounter [S80.11XA]  Fall, initial encounter [W19.XXXA]    Discharge Date: 12/04/18    Patient/Family Response to DC Plan: in agreement    Transportation: Call Daughter for transport    Lifeline: declined    Care Coordinator Hand off completed: Yes    Other Providers (Care Coordinator, [facility], [facility] etc.):  No    Future Appointments : Future Appointments  Date Time Provider Department Center   12/4/2018 10:40 AM [facility]   12/6/2018 9:00 AM Kelly Matthew PA-C [facility]   1/8/2019 10:15 AM Cleve Null MD [facility]   2/4/2019 12:00 AM [facility]       Discharge Disposition: transitional care unit, [place] [facility] (Phone: [phone] Fax: [phone])    Hafsa Waddell RN Care Coordinator  [facility] [phone]  [facility] [phone]    ADDENDUM: Daughter will arrive to transport the patient to the [facility], transporting around [phone] today per patient.

## 2018-12-04 NOTE — TELEPHONE ENCOUNTER
Surgery Pre-Certification    Medical Record Number: [id number]  Christiana Sal  YOB: 1931   Phone: [phone] (home)   Primary Provider: Kelly Matthew    Reason for Admit:  OAB (N32.81)    Surgeon: JOAO Null MD  Surgical Procedure: Cystoscopy with Botox Injection  ICD-9 Coded: 04847/  Date of Surgery: 01/08/2019  Consent signed? No    Date signed: 01/08/2019  Hospital: [facility]  In-Clinic Procedure    Requestor:  Melanie Spencer     Location:  [facility]    No prior auth needed    Thank you,   Winifred Tovar   Referral Department  [phone]    12/04/2018

## 2018-12-04 NOTE — DISCHARGE SUMMARY
Admit Date:     12/02/2018   Discharge Date:           HOSPITAL COURSE:  Christiana Sal is an 87-year-old female who presented after a mechanical fall at home.  She had been feeling somewhat lightheaded the week prior to admission.  She actually came to the ER on 11/28 due to lightheadedness.  She was diagnosed with pneumonia and put on Zithromax and prednisone.  She thought she was doing somewhat better.  The night before admission, she was getting up to go to the bathroom and she tripped over a suitcase that was on the floor.  She landed on her right side.  She did not strike her head or have loss of consciousness.  She injured her right ankle.  She came to the emergency room and was found to have a distal fibular fracture on the right.      The patient ultimately had surgical repair of this.  She did well postop.  She did not have any orthostatic changes or hypotension in the hospital.  She had noted urinary frequency prior to admission and she did have enterococcus in her urine and this was treated with Keflex in the hospital.  She is going to complete 5 days of treatment with amoxicillin as an outpatient.  Otherwise, the patient was stable in the hospital.      ASSESSMENT:     1.  Acute distal right fibular fracture with surgical repair.   2.  Mechanical fall secondary to tripping.   3.  Recent lightheadedness.   4.  Recent treatment for pneumonia.   5.  Enterococcus urinary tract infection.   6.  History of coronary artery disease, status post stenting.   7.  History of cardiac pacemaker.   8.  Hyperlipidemia.   9.  Hypertension.   10.  Hypothyroidism.   11.  GERD.   12.  Obstructive sleep apnea.      PLAN:  The patient is going to be discharged to TCU.  She is nonweightbearing on her right lower extremity for the next 2 weeks.  She will see Dr. Adams in 2 weeks.  Will treat enterococcus UTI with 5 days of amoxicillin.      TOTAL TIME SPENT:  Greater than 30 minutes spent on this.     Discharge Medication  List as of 12/4/2018 12:26 PM      START taking these medications    Details   oxyCODONE (ROXICODONE) 5 MG tablet Take 1 tablet (5 mg) by mouth every 3 hours as needed, Disp-14 tablet, R-0, Local Print         CONTINUE these medications which have NOT CHANGED    Details   acetaminophen (TYLENOL) 500 MG tablet Take 2 tablets (1,000 mg) by mouth every 8 hours as needed for mild pain, Disp-30 tablet, R-0, OTC      albuterol (PROAIR HFA/PROVENTIL HFA/VENTOLIN HFA) 108 (90 BASE) MCG/ACT Inhaler Inhale 2 puffs into the lungs every 6 hours as needed for shortness of breath / dyspnea or wheezing, Disp-1 Inhaler, R-0, E-Prescribe      amoxicillin (AMOXIL) 500 MG capsule Take 1 capsule (500 mg) by mouth 3 times daily, Disp-15 capsule, R-0, Historical      ASPIRIN 81 MG PO TABS Take 2 tablets by mouth every evening , Historical      atorvastatin (LIPITOR) 40 MG tablet TAKE ONE TABLET BY MOUTH EVERY DAY, Disp-90 tablet, R-3, E-Prescribe      Biotin 10 MG CAPS Take 1 capsule by mouth daily, Historical      CALCIUM 600 + D OR 1 tablet by mouth daily, Historical      citalopram (CELEXA) 20 MG tablet TAKE ONE TABLET BY MOUTH EVERY DAY, Disp-90 tablet, R-1, E-Prescribe      FISH OIL OR 1 daily, Historical      levothyroxine (SYNTHROID/LEVOTHROID) 75 MCG tablet TAKE 1 TABLET BY MOUTH ONCE DAILY (NEEDS  LAB  WORK), Disp-90 tablet, R-0, E-PrescribePlease consider 90 day supplies to promote better adherence      losartan (COZAAR) 25 MG tablet TAKE 1 TABLET BY MOUTH ONCE DAILY, Disp-90 tablet, R-2, E-Prescribe      magnesium oxide (MAG-OX) 400 MG tablet Take 400 mg by mouth daily, Historical      meclizine (ANTIVERT) 25 MG tablet TAKE ONE TABLET BY MOUTH EVERY 6 HOURS AS NEEDED FOR DIZZINESS, Disp-30 tablet, R-3, E-Prescribe      metoprolol (TOPROL-XL) 25 MG 24 hr tablet Take 0.5 tablets (12.5 mg) by mouth daily, Disp-90 tablet, R-3, E-Prescribe      Multiple Vitamins-Minerals (OCUVITE PO) Take 1 tablet by mouth daily., Historical       nitroglycerin (NITROSTAT) 0.4 MG SL tablet Place 1 tablet (0.4 mg) under the tongue every 5 minutes as needed for chest pain (call your doctor if you take a third dose), Disp-25 tablet, R-3, E-Prescribe      pramipexole (MIRAPEX) 0.5 MG tablet TAKE ONE TABLET BY MOUTH AT BEDTIME, Disp-90 tablet, R-2, E-Prescribe      ranitidine (ZANTAC) 150 MG tablet TAKE TWO TABLETS BY MOUTH AT BEDTIME, Disp-180 tablet, R-3, E-Prescribe      Vitamin D, Cholecalciferol, 1000 units TABS Take 2,000 Units by mouth daily, Historical         STOP taking these medications       azithromycin (ZITHROMAX Z-RADHIKA) 250 MG tablet Comments:   Reason for Stopping:         predniSONE (DELTASONE) 20 MG tablet Comments:   Reason for Stopping:             Unresulted Labs Ordered in the Past 30 Days of this Admission     No orders found from 10/3/2018 to 12/3/2018.                 NATHAN SALMERON MD             D: 2018   T: 2018   MT: RAVI      Name:     SANDIE BERGER   MRN:      [id number]        Account:        [id number]   :      10/25/1931           Admit Date:     2018                                  Discharge Date:       Document: [id number]

## 2018-12-04 NOTE — PROGRESS NOTES
Ortho    No acute events overnight.  Pain controlled with oxycodone  Planning on going to [facility] today    AFVSS  Pleasant, NAD  Nonlabored breathing  Right LE: Dressing cdi.  Fires ehl/fhl/gsc/ta c SILT dp/sp/tib n.  Toes warm    IMPRESSION:   1.  s/p ORIF right distal fibula fracture 12.3.18    PLAN:   --NWB RLE -- elevate as much as possible to minimize swelling   --DVT prophylaxis: ASA 81mg daily x 6 weeks   --Cont oxycodone for pain control   --[facility] today    Jacinto Adams MD

## 2018-12-04 NOTE — PLAN OF CARE
Problem: Pain, Acute (Adult)  Goal: Identify Related Risk Factors and Signs and Symptoms  Related risk factors and signs and symptoms are identified upon initiation of Human Response Clinical Practice Guideline (CPG).   Outcome: Improving  Patient taking 10 mg Oxycodone q 3-4 hr with good management of pain  Patient up to BEDSIDE COMMODE (pivoting) with STAND BY ASSIST X1  Ice to ankle area, patient makes her needs known.  Plan: TCU today

## 2018-12-04 NOTE — PLAN OF CARE
Problem: Patient Care Overview  Goal: Plan of Care/Patient Progress Review  Outcome: Adequate for Discharge Date Met: 12/04/18  JALYN KAUR DISCHARGE NOTE    Patient discharged to transitional care unit at 12:45 PM via wheel chair. Accompanied by daughter and staff.  [facility] Discharge instructions reviewed with patient and daughter, opportunity offered to ask questions. All belongings sent with patient.    Vivian Atwood

## 2018-12-04 NOTE — DISCHARGE INSTRUCTIONS
Non weight bearing right lower extremity.  Make a follow up appointment with [facility] ortho about 12/172018

## 2018-12-04 NOTE — PLAN OF CARE
"Problem: Patient Care Overview  Goal: Plan of Care/Patient Progress Review  A&O. Pt has not been out of bed since procedure, Pt reported full feeling in her lower extremities with no numbness or tingling around 730pm. Oxy 5mg one dose and tylenol managing pain. Ice applied to ankle. Surgical ace wrap is clean, dry, and intact. Pt has bilateral hearing aides and eye glasses. /65 (BP Location: Right arm)  Pulse 71  Temp 97  F (36.1  C) (Oral)  Resp 16  Ht 1.676 m (5' 6\")  Wt 74.8 kg (165 lb)  SpO2 94%  BMI 26.63 kg/m2. Will continue to monitor.       "

## 2018-12-04 NOTE — PLAN OF CARE
Problem: Patient Care Overview  Goal: Plan of Care/Patient Progress Review  OT: Orders received. Pt with plans to discharge to TCU today. Appropriate to initiate OT at next level of care (TCU).

## 2018-12-04 NOTE — PROGRESS NOTES
[facility]      Subjective:  Pain controlled  No other complaints    Review of Systems:  CONSTITUTIONAL: NEGATIVE for fever, chills, change in weight  ENT/MOUTH: NEGATIVE for ear, mouth and throat problems  RESP: NEGATIVE for significant cough or SOB  CV: NEGATIVE for chest pain, palpitations or peripheral edema    Physical Exam:  Vitals Were Reviewed    Patient Vitals for the past 16 hrs:   BP Temp Temp src Pulse Resp SpO2   12/04/18 0808 166/52 97.7  F (36.5  C) Oral 67 18 95 %   12/03/18 2248 157/69 97.5  F (36.4  C) Oral 63 18 94 %         Intake/Output Summary (Last 24 hours) at 12/04/18 1054  Last data filed at 12/04/18 0044   Gross per 24 hour   Intake             1686 ml   Output              915 ml   Net              771 ml       GENERAL APPEARANCE: healthy, alert and no distress  EYES: conjunctiva clear, eyes grossly normal  RESP: lungs clear to auscultation - no rales, rhonchi or wheezes  CV: regular rate and rhythm, normal S1 S2, no S3 or S4 and no murmur, click or rub   ABDOMEN: soft, nontender, no HSM or masses and bowel sounds normal  MS: perfusion normal toes right foot    Lab:  Recent Labs   Lab Test  12/04/18   0544  12/02/18   1345   NA  138  139   POTASSIUM  4.1  3.2*   CHLORIDE  104  105   CO2  29  26   ANIONGAP  5  8   GLC  142*  137*   BUN  17  28   CR  0.87  1.08*   YVONNE  7.7*  7.8*     CBC RESULTS:   Recent Labs   Lab Test  12/04/18   0544  12/02/18   1345   WBC  11.7*  11.8*   RBC  3.41*  3.99   HGB  10.6*  12.5   HCT  32.0*  37.3   PLT  237  275       Results for orders placed or performed during the hospital encounter of 12/02/18 (from the past 24 hour(s))   XR Surgery ALVIN L/T 5 Min Fluoro w Stills    Narrative    This exam was marked as non-reportable because it will not be read by a   radiologist or a [place] non-radiologist provider.             CBC with platelets   Result Value Ref Range    WBC 11.7 (H) 4.0 - 11.0 10e9/L    RBC Count 3.41 (L) 3.8 - 5.2 10e12/L     Hemoglobin 10.6 (L) 11.7 - 15.7 g/dL    Hematocrit 32.0 (L) 35.0 - 47.0 %    MCV 94 78 - 100 fl    MCH 31.1 26.5 - 33.0 pg    MCHC 33.1 31.5 - 36.5 g/dL    RDW 13.4 10.0 - 15.0 %    Platelet Count 237 150 - 450 10e9/L   Basic metabolic panel   Result Value Ref Range    Sodium 138 133 - 144 mmol/L    Potassium 4.1 3.4 - 5.3 mmol/L    Chloride 104 94 - 109 mmol/L    Carbon Dioxide 29 20 - 32 mmol/L    Anion Gap 5 3 - 14 mmol/L    Glucose 142 (H) 70 - 99 mg/dL    Urea Nitrogen 17 7 - 30 mg/dL    Creatinine 0.87 0.52 - 1.04 mg/dL    GFR Estimate 61 >60 mL/min/1.7m2    GFR Estimate If Black 74 >60 mL/min/1.7m2    Calcium 7.7 (L) 8.5 - 10.1 mg/dL       Assessment and Plan:    Christiana Sal is a 87 year old female with past medical history of coronary artery disease s/p stenting x 6, sinus node dysfunction s/p pacemaker, hypertension, hyperlipidemia, hypothyroidism, GERD, depression who presents with right lower extremity pain after mechanical fall       Mechanical Fall  Mechanical fall, tripping over a suitcase landing primarily on right leg. No head strike or loss of consciousness. No preceding symptoms.       Distal Right Fibula Fracture--just post op now     Lightheadedness, orthostatic hypotension  Lightheadedness over past week with postural changes. Recently treated for pneumonia with azithromycin and prednisone burst. Additionally reports increased urinary frequency over past month with decreased PO intake due to the urinary frequency. Vital significant for orthostatic hypotension in ED,  --> 76. Received 1 L of fluids in ED. Suspect volume depletion due to illness and poor oral intake.         UTI,  overactive bladder  Urinary frequency x 1 month, worse in past week with urinary nearly every hour at night. No dysuria. UA shows 41 WBC, moderate leukocyte esterase. Treated with Keflex in ED. Afebrile. UC enterococcus will treat with amox for five days post discontinue.      Mild creatinine  "elevation  Creatinine 1.08--0.87     Recently treated pneumonia  Shortness of breath resolving. Intermittent cough. Treated with Azithromycin and prednisone after CT showed \"Mild patchy bilateral groundglass airspace opacities could be seen with pulmonary edema or other alveolar infiltrate.\"      Coronary Artery Disease  Chronic. No current symptoms. S/P PCI complicated by dissection with stents to LAD and D1 5/2016, stenting in 2004 to RCA.  Nuclear stress in 2017 showed small fixed defect.  - continue statin, beta blocker  - hold home losartan for now due to orthostatic hypotension  - hold home ASA for potential surgery      Sinoatrial node dysfunction  S/P Cardiac pacemaker - Targovax dual lead  Follows with cardiology, pacemaker implantation 11/2016 for sinus node dysfunction.      Hyperlipidemia  Chronic.  - continue home atorvastatin      Hypertension  Chronic. Blood pressures reviewed, stable though significant orthostatic hypotension noted. Outpatient readings largely 120s-150.  - continue home metoprolol  Restart losartan due to htn.      Hypothyroidism  Chronic.   - continue home levothyroxine      Gastroesophageal reflux disease  Chronic.  - continue home ranitidine      JENIFER (obstructive sleep apnea)  Not compliant with CPAP.      FEN: lr at 50 ml per hour      DVT Prophylaxis: Pneumatic Compression Devices  Code Status: Full Code      Disposition: 88 yo with mechanical fall, immediately post op ankle fracture surgery-discharge to tcu.       "

## 2018-12-05 ENCOUNTER — PATIENT OUTREACH (OUTPATIENT)
Dept: CARE COORDINATION | Facility: CLINIC | Age: 83
End: 2018-12-05

## 2018-12-05 NOTE — PROGRESS NOTES
Clinic Care Coordination Contact  Care Coordination Transition Communication    Referral Source: IP Handoff    Clinical Data: Patient was hospitalized at [facility] from 12-2-18 to 12-4-18 with diagnosis of fracture of fibula due to a fall.  And abnormal urinalysis. .     Transition to Facility:              [facility] Name: [facility] [place]              Contact name and phone number/fax: [phone]    Plan: RN/SW Care Coordinator will await notification from facility staff informing RN/SW Care Coordinator of patient's discharge plans/needs. RN/SW Care Coordinator will review chart and outreach to facility staff every 4 weeks and as needed.     Peng Francis, MSN, RN, PHN I Clinic Care Coordinator  [facility]  Phone: [phone]  [email or web address]

## 2018-12-07 ENCOUNTER — NURSING HOME VISIT (OUTPATIENT)
Dept: GERIATRICS | Facility: CLINIC | Age: 83
End: 2018-12-07
Payer: COMMERCIAL

## 2018-12-07 VITALS
HEART RATE: 67 BPM | SYSTOLIC BLOOD PRESSURE: 143 MMHG | RESPIRATION RATE: 16 BRPM | TEMPERATURE: 98.6 F | DIASTOLIC BLOOD PRESSURE: 77 MMHG | HEIGHT: 66 IN | WEIGHT: 173.2 LBS | BODY MASS INDEX: 27.83 KG/M2

## 2018-12-07 DIAGNOSIS — D62 ANEMIA DUE TO BLOOD LOSS, ACUTE: ICD-10-CM

## 2018-12-07 DIAGNOSIS — Z98.61 CAD S/P PERCUTANEOUS CORONARY ANGIOPLASTY: ICD-10-CM

## 2018-12-07 DIAGNOSIS — E03.9 HYPOTHYROIDISM, UNSPECIFIED TYPE: ICD-10-CM

## 2018-12-07 DIAGNOSIS — G25.81 RESTLESS LEG SYNDROME: ICD-10-CM

## 2018-12-07 DIAGNOSIS — Z47.89 AFTERCARE FOLLOWING SURGERY OF THE MUSCULOSKELETAL SYSTEM: Primary | ICD-10-CM

## 2018-12-07 DIAGNOSIS — N39.0 URINARY TRACT INFECTION WITHOUT HEMATURIA, SITE UNSPECIFIED: ICD-10-CM

## 2018-12-07 DIAGNOSIS — I25.10 CAD S/P PERCUTANEOUS CORONARY ANGIOPLASTY: ICD-10-CM

## 2018-12-07 DIAGNOSIS — K21.9 GASTROESOPHAGEAL REFLUX DISEASE, ESOPHAGITIS PRESENCE NOT SPECIFIED: ICD-10-CM

## 2018-12-07 DIAGNOSIS — Z87.81 S/P ORIF (OPEN REDUCTION INTERNAL FIXATION) FRACTURE: ICD-10-CM

## 2018-12-07 DIAGNOSIS — S82.401D CLOSED FRACTURE OF SHAFT OF RIGHT FIBULA WITH ROUTINE HEALING, UNSPECIFIED FRACTURE MORPHOLOGY, SUBSEQUENT ENCOUNTER: ICD-10-CM

## 2018-12-07 DIAGNOSIS — I10 ESSENTIAL HYPERTENSION, BENIGN: ICD-10-CM

## 2018-12-07 DIAGNOSIS — F33.0 MAJOR DEPRESSIVE DISORDER, RECURRENT EPISODE, MILD (H): ICD-10-CM

## 2018-12-07 DIAGNOSIS — Z98.890 S/P ORIF (OPEN REDUCTION INTERNAL FIXATION) FRACTURE: ICD-10-CM

## 2018-12-07 DIAGNOSIS — G47.33 OSA (OBSTRUCTIVE SLEEP APNEA): ICD-10-CM

## 2018-12-07 DIAGNOSIS — E78.5 HYPERLIPIDEMIA LDL GOAL <100: ICD-10-CM

## 2018-12-07 DIAGNOSIS — R53.81 PHYSICAL DECONDITIONING: ICD-10-CM

## 2018-12-07 DIAGNOSIS — Z95.0 CARDIAC PACEMAKER IN SITU: ICD-10-CM

## 2018-12-07 DIAGNOSIS — R42 DIZZINESS: ICD-10-CM

## 2018-12-07 DIAGNOSIS — K59.01 SLOW TRANSIT CONSTIPATION: ICD-10-CM

## 2018-12-07 PROCEDURE — 99310 SBSQ NF CARE HIGH MDM 45: CPT | Performed by: NURSE PRACTITIONER

## 2018-12-07 RX ORDER — CALCIUM CARBONATE 500 MG/1
1 TABLET, CHEWABLE ORAL 3 TIMES DAILY PRN
COMMUNITY

## 2018-12-07 RX ORDER — AMOXICILLIN 250 MG
2 CAPSULE ORAL 2 TIMES DAILY
COMMUNITY
End: 2019-04-29

## 2018-12-07 NOTE — PROGRESS NOTES
[facility]  PRIMARY CARE PROVIDER AND CLINIC:  Indu Matthewmandie Arriola [address] [facility] / [place] [address]  Chief Complaint   Patient presents with     Hospital F/U     [facility] Medical Record Number:  [id number]  Place of Service where encounter took place:  [facility] ([facility]) [[id number]]    HPI:    Christiana Sal is a 87 year old  (10/25/1931),admitted to the above facility from  [facility].  Hospital stay 12/2/18 through 12/4/18.  Admitted to this facility for  rehab, medical management and nursing care.  HPI information obtained from: facility chart records, facility staff, patient report and [facility] Epic chart review.         HOSPITAL COURSE:  Christiana Sal is an 87-year-old female who presented after a mechanical fall at home.  She had been feeling somewhat lightheaded the week prior to admission.  She actually came to the ER on 11/28 due to lightheadedness.  She was diagnosed with pneumonia and put on Zithromax and prednisone.  She thought she was doing somewhat better.  The night before admission, she was getting up to go to the bathroom and she tripped over a suitcase that was on the floor.  She landed on her right side.  She did not strike her head or have loss of consciousness.  She injured her right ankle.  She came to the emergency room and was found to have a distal fibular fracture on the right.       The patient ultimately had surgical repair of this.  She did well postop.  She did not have any orthostatic changes or hypotension in the hospital.  She had noted urinary frequency prior to admission and she did have enterococcus in her urine and this was treated with Keflex in the hospital.  She is going to complete 5 days of treatment with amoxicillin as an outpatient.  Otherwise, the patient was stable in the hospital.     Current issues are:         Aftercare following surgery of the musculoskeletal system  Closed fracture of  "shaft of right fibula with routine healing, unspecified fracture morphology, subsequent encounter  S/P ORIF (open reduction internal fixation) fracture  Physical deconditioning  Urinary tract infection without hematuria, site unspecified  CAD S/P percutaneous coronary angioplasty  Cardiac pacemaker in situ  Essential hypertension, benign  Hyperlipidemia LDL goal <100  Hypothyroidism, unspecified type  Gastroesophageal reflux disease, esophagitis presence not specified  Major depressive disorder, recurrent episode, mild (H)  Slow transit constipation  Dizziness  Anemia due to blood loss, acute   Reports she had significant pain in her right ankle, but is much better after receiving PRN oxycodone. She is NWB to E, has splint in place. Reports she continues to have some urinary frequency, but feels that it is getting better. Reports mild lightheadedness when she changes position quickly, but states she knows that she has not been drinking enough. She has history of a \"heart attack\" but denies any chest pain recently. Did have a headache this AM, now resolved. Does report occasional issues swallowing - feels like food is getting stuck in her throat. States she has similar issues previously when she was diagnosed with gastric reflux. Denies any nausea, having bowel movements. Reports she has chronic pain from arthritis in left hip. She had a hip injection earlier this year and unfortunately, her artery was knicked and she developed hematoma and significant bruising to LLE, continues to have some discomfort to touch to LLE.     CODE STATUS/ADVANCE DIRECTIVES DISCUSSION:   CPR/Full code   Patient's living condition: lives alone    ALLERGIES:Demerol; Lisinopril; Meperidine; and Sulfa drugs  PAST MEDICAL HISTORY:  has a past medical history of Coronary atherosclerosis of native coronary artery; Essential hypertension, benign; GERD (gastroesophageal reflux disease); History of transient ischemic attack (TIA); Hyperlipidemia " LDL goal <100; Hypothyroidism; Major depressive disorder, recurrent episode, mild (H) (5/18/2011); and Peptic ulcer disease with hemorrhage. She also has no past medical history of Basal cell carcinoma; Malignant melanoma (H); or Squamous cell carcinoma.  PAST SURGICAL HISTORY:  has a past surgical history that includes TRANSCATH STENT INIT VESSEL,PERCUT; appendectomy; cholecystectomy, open; fracture tx, ankle rt/lt; LARYNGOSCOPY DIRECT W VOCAL CORD INJECTION; REMOVAL OF OVARIAN CYST(S); colonoscopy (2008); EXCISE HAND/FOOT NEUROMA; Repair hammer toe (9/13/10); cataract iol, rt/lt (1/26/12); cardiac catherization (1/15/04); cardiac catherization (2/16/12); Stent (5-2016); and Open reduction internal fixation ankle (Right, 12/3/2018).  FAMILY HISTORY: family history includes Arthritis in her daughter; Blood Disease in her sister; Breast Cancer in her sister; C.A.D. in her brother, brother, father, and mother; Cancer in her brother and sister; Cancer - colorectal in her sister; Heart Disease in her sister, sister, sister, and son; Heart Failure in her mother; Hypertension in her brother, father, mother, sister, sister, and son; Myocardial Infarction in her brother and brother; Myocardial Infarction (age of onset: 66) in her father; Neurologic Disorder in her child and mother; Psychotic Disorder in her sister; Thyroid Disease in her son.  SOCIAL HISTORY:  reports that she has never smoked. She has never used smokeless tobacco. She reports that she drinks alcohol. She reports that she does not use illicit drugs.    Post Discharge Medication Reconciliation Status: discharge medications reconciled and changed, per note/orders (see AVS).  Current Outpatient Prescriptions   Medication Sig Dispense Refill     acetaminophen (TYLENOL) 500 MG tablet Take 2 tablets (1,000 mg) by mouth every 8 hours as needed for mild pain 30 tablet 0     albuterol (PROAIR HFA/PROVENTIL HFA/VENTOLIN HFA) 108 (90 BASE) MCG/ACT Inhaler Inhale 2  "puffs into the lungs every 6 hours as needed for shortness of breath / dyspnea or wheezing 1 Inhaler 0     amoxicillin (AMOXIL) 500 MG capsule Take 1 capsule (500 mg) by mouth 3 times daily 15 capsule 0     ASPIRIN 81 MG PO TABS Take 2 tablets by mouth every evening        atorvastatin (LIPITOR) 40 MG tablet TAKE ONE TABLET BY MOUTH EVERY DAY 90 tablet 3     CALCIUM 600 + D OR 1 tablet by mouth daily       calcium carbonate (TUMS) 500 MG chewable tablet Take 1 chew tab by mouth 3 times daily as needed for heartburn       citalopram (CELEXA) 20 MG tablet TAKE ONE TABLET BY MOUTH EVERY DAY 90 tablet 1     levothyroxine (SYNTHROID/LEVOTHROID) 75 MCG tablet TAKE 1 TABLET BY MOUTH ONCE DAILY (NEEDS  LAB  WORK) 90 tablet 0     losartan (COZAAR) 25 MG tablet TAKE 1 TABLET BY MOUTH ONCE DAILY 90 tablet 2     meclizine (ANTIVERT) 25 MG tablet TAKE ONE TABLET BY MOUTH EVERY 6 HOURS AS NEEDED FOR DIZZINESS 30 tablet 3     Multiple Vitamins-Minerals (OCUVITE PO) Take 1 tablet by mouth daily.       nitroglycerin (NITROSTAT) 0.4 MG SL tablet Place 1 tablet (0.4 mg) under the tongue every 5 minutes as needed for chest pain (call your doctor if you take a third dose) 25 tablet 3     OMEPRAZOLE PO Take 20 mg by mouth daily       oxyCODONE (ROXICODONE) 5 MG tablet Take 1 tablet (5 mg) by mouth every 3 hours as needed 14 tablet 0     pramipexole (MIRAPEX) 0.5 MG tablet TAKE ONE TABLET BY MOUTH AT BEDTIME 90 tablet 2     senna-docusate (SENOKOT-S/PERICOLACE) 8.6-50 MG tablet Take 2 tablets by mouth 2 times daily       Vitamin D, Cholecalciferol, 1000 units TABS Take 2,000 Units by mouth daily         ROS:  10 point ROS of systems including Constitutional, Eyes, Respiratory, Cardiovascular, Gastroenterology, Genitourinary, Integumentary, Musculoskeletal, Psychiatric were all negative except for pertinent positives noted in my HPI.    Exam:  /77  Pulse 67  Temp 98.6  F (37  C)  Resp 16  Ht 5' 6\" (1.676 m)  Wt 173 lb 3.2 oz " (78.6 kg)  BMI 27.96 kg/m2  GENERAL APPEARANCE:  Alert, in no distress, oriented, cooperative  ENT:  Mouth and posterior oropharynx normal, moist mucous membranes, [place]  RESP:  respiratory effort and palpation of chest normal, lungs clear to auscultation , no respiratory distress  CV:  Palpation and auscultation of heart done , regular rate and rhythm, no murmur, rub, or gallop, +2 pedal pulses  ABDOMEN:  normal bowel sounds, soft, nontender, no hepatosplenomegaly or other masses, no guarding or rebound  M/S:   NWB to RLE, +CMS, decreased ROM to left hip, mild tenderness to LLE with palpation  SKIN:  Inspection of skin and subcutaneous tissue baseline, wound healing well, no signs of infection no visible as covered by splint  NEURO:   Cranial nerves 2-12 are normal tested and grossly at patient's baseline  PSYCH:  oriented X 3, normal insight, judgement and memory, affect and mood normal    Lab/Diagnostic data:     CBC RESULTS:   Recent Labs   Lab Test  12/04/18   0544  12/02/18   1345   WBC  11.7*  11.8*   RBC  3.41*  3.99   HGB  10.6*  12.5   HCT  32.0*  37.3   MCV  94  94   MCH  31.1  31.3   MCHC  33.1  33.5   RDW  13.4  13.6   PLT  237  275       Last Basic Metabolic Panel:  Recent Labs   Lab Test  12/04/18   0544  12/02/18   1345   NA  138  139   POTASSIUM  4.1  3.2*   CHLORIDE  104  105   YVONNE  7.7*  7.8*   CO2  29  26   BUN  17  28   CR  0.87  1.08*   GLC  142*  137*       Liver Function Studies -   Recent Labs   Lab Test  11/28/18   0948  08/28/17   1536   PROTTOTAL  6.5*  6.7*   ALBUMIN  3.5  3.5   BILITOTAL  0.6  0.4   ALKPHOS  99  103   AST  24  19   ALT  18  25       TSH   Date Value Ref Range Status   03/08/2017 0.67 0.40 - 4.00 mU/L Final   08/08/2016 0.78 0.40 - 4.00 mU/L Final         ASSESSMENT/PLAN:  (Z47.89) Aftercare following surgery of the musculoskeletal system  (primary encounter diagnosis)  (S82.203D) Closed fracture of shaft of right fibula with routine healing, unspecified fracture  morphology, subsequent encounter  (Z96.7,  Z87.81) S/P ORIF (open reduction internal fixation) fracture  (R53.81) Physical deconditioning  Comment: Variable pain, does not want to be on scheduled narcotics, mobility limited d/t weight bearing status, no s/s of infection  Plan: change APAP to 1000mg TID, continue oxycodone 5mg q3h PRN, ice PRN, ASA 81mg x 6 week for DVT prophylaxis, PT/OT, follow-up with ortho in 2 weeks as scheduled     (N39.0) Urinary tract infection without hematuria, site unspecified  Comment: frequency improving, had elevated WBC while IP, no s/s of bacteremia in TCU  Plan: complete amoxicillin x 5 days, encourage fluids, monitor, CBC to monitor for stability     (I25.10,  Z98.61) CAD S/P percutaneous coronary angioplasty  (Z95.0) Cardiac pacemaker - [employer]ight dual lead - Not Dependent  (I10) Essential hypertension, benign  (E78.5) Hyperlipidemia LDL goal <100  Comment: Asymptomatic, BP appears stable  Plan: continue cozaar 25mg daily, ASA 81 mg, atorvastatin 40mg daily, monitor    (E03.9) Hypothyroidism, unspecified type  Comment: TSH - most recent available from 2017 was 0.67  Plan: levothyroxine 75mcg daily     (K21.9) Gastroesophageal reflux disease, esophagitis presence not specified  Comment: reports difficulty swallowing - ? R/t GERD vs esophageal issues  Plan: will discontinue ranitidine and started omeprazole as below    (F33.0) Major depressive disorder, recurrent episode, mild (H)  Comment: stable  Plan: continue celexa 20mg PO daily     (K59.01) Slow transit constipation  Comment: reports having BM's  Plan: continue senna-s 2 tabs daily    (R42) Dizziness  Comment: chronic reports rare occurence  Plan: continue PRN meclizine    Acute blood loss anemia  Comment: no s/s of bleeding, Hgb 12.7 -> 10.6  Plan CBC to check for stability    Restless legs  Chronic, stable  Plan: continue Mirapex, magnesium supplement    JENIFER  Comment: Does not wear CPAP as she does not like her mask,  reports she has had increased fatigue since she stopped using mask  Plan: encourage patient to try resuming use of CPAP in TCU, monitor     Orders:  1. discontinue PRN tylenol start tylenol 1000 mg PO Q8 horusDx: Pain  2. discontinue zantac  3. Omeprazole 20 mg PO Q am Dx: GERD  4. Tums 500 mg P1 tab PO TID PNR Dx: GERD  5. Encourage patient to drink fluids  6. CBC, BMP Dx: UTI, Anemia      Total time spent with patient visit at the skilled nursing facility was 40 including patient visit and review of past records. Greater than 50% of total time spent with counseling and coordinating care due to discussion of pain control, mobility, and medication in TCU d/t right ankle fracture, GERD, JENIFER, RLS, HTN, CAD    Electronically signed by:  SHARMIN Pichardo CNP

## 2018-12-07 NOTE — LETTER
12/7/2018        RE: Christiana Sal  [address] [facility] [address]        [facility]  PRIMARY CARE PROVIDER AND CLINIC:  Kelly Matthew [address] [facility] [address]  Chief Complaint   Patient presents with     Hospital F/U     [facility] Record Number:  [id number]  Place of Service where encounter took place:  [facility] ([facility]) [[id number]]    HPI:    Christiana Sal is a 87 year old  (10/25/1931),admitted to the above facility from  [facility].  Hospital stay 12/2/18 through 12/4/18.  Admitted to this facility for  rehab, medical management and nursing care.  HPI information obtained from: facility chart records, facility staff, patient report and [facility] chart review.         HOSPITAL COURSE:  Christiana Sal is an 87-year-old female who presented after a mechanical fall at home.  She had been feeling somewhat lightheaded the week prior to admission.  She actually came to the ER on 11/28 due to lightheadedness.  She was diagnosed with pneumonia and put on Zithromax and prednisone.  She thought she was doing somewhat better.  The night before admission, she was getting up to go to the bathroom and she tripped over a suitcase that was on the floor.  She landed on her right side.  She did not strike her head or have loss of consciousness.  She injured her right ankle.  She came to the emergency room and was found to have a distal fibular fracture on the right.       The patient ultimately had surgical repair of this.  She did well postop.  She did not have any orthostatic changes or hypotension in the hospital.  She had noted urinary frequency prior to admission and she did have enterococcus in her urine and this was treated with Keflex in the hospital.  She is going to complete 5 days of treatment with amoxicillin as an outpatient.  Otherwise, the patient was stable in the hospital.  "    Current issues are:         Aftercare following surgery of the musculoskeletal system  Closed fracture of shaft of right fibula with routine healing, unspecified fracture morphology, subsequent encounter  S/P ORIF (open reduction internal fixation) fracture  Physical deconditioning  Urinary tract infection without hematuria, site unspecified  CAD S/P percutaneous coronary angioplasty  Cardiac pacemaker in situ  Essential hypertension, benign  Hyperlipidemia LDL goal <100  Hypothyroidism, unspecified type  Gastroesophageal reflux disease, esophagitis presence not specified  Major depressive disorder, recurrent episode, mild (H)  Slow transit constipation  Dizziness  Anemia due to blood loss, acute   Reports she had significant pain in her right ankle, but is much better after receiving PRN oxycodone. She is NW[facility] to [facility], has splint in place. Reports she continues to have some urinary frequency, but feels that it is getting better. Reports mild lightheadedness when she changes position quickly, but states she knows that she has not been drinking enough. She has history of a \"heart attack\" but denies any chest pain recently. Did have a headache this AM, now resolved. Does report occasional issues swallowing - feels like food is getting stuck in her throat. States she has similar issues previously when she was diagnosed with gastric reflux. Denies any nausea, having bowel movements. Reports she has chronic pain from arthritis in left hip. She had a hip injection earlier this year and unfortunately, her artery was knicked and she developed hematoma and significant bruising to LLE, continues to have some discomfort to touch to LLE.     CODE STATUS/ADVANCE DIRECTIVES DISCUSSION:   CPR/Full code   Patient's living condition: lives alone    ALLERGIES:Demerol; Lisinopril; Meperidine; and Sulfa drugs  PAST MEDICAL HISTORY:  has a past medical history of Coronary atherosclerosis of native coronary artery; Essential " hypertension, benign; GERD (gastroesophageal reflux disease); History of transient ischemic attack (TIA); Hyperlipidemia LDL goal <100; Hypothyroidism; Major depressive disorder, recurrent episode, mild (H) (5/18/2011); and Peptic ulcer disease with hemorrhage. She also has no past medical history of Basal cell carcinoma; Malignant melanoma (H); or Squamous cell carcinoma.  PAST SURGICAL HISTORY:  has a past surgical history that includes TRANSCATH STENT INIT VESSEL,PERCUT; appendectomy; cholecystectomy, open; fracture tx, ankle rt/lt; LARYNGOSCOPY DIRECT W VOCAL CORD INJECTION; REMOVAL OF OVARIAN CYST(S); colonoscopy (2008); EXCISE HAND/FOOT NEUROMA; Repair hammer toe (9/13/10); cataract iol, rt/lt (1/26/12); cardiac catherization (1/15/04); cardiac catherization (2/16/12); Stent (5-2016); and Open reduction internal fixation ankle (Right, 12/3/2018).  FAMILY HISTORY: family history includes Arthritis in her daughter; Blood Disease in her sister; Breast Cancer in her sister; C.A.D. in her brother, brother, father, and mother; Cancer in her brother and sister; Cancer - colorectal in her sister; Heart Disease in her sister, sister, sister, and son; Heart Failure in her mother; Hypertension in her brother, father, mother, sister, sister, and son; Myocardial Infarction in her brother and brother; Myocardial Infarction (age of onset: 66) in her father; Neurologic Disorder in her child and mother; Psychotic Disorder in her sister; Thyroid Disease in her son.  SOCIAL HISTORY:  reports that she has never smoked. She has never used smokeless tobacco. She reports that she drinks alcohol. She reports that she does not use illicit drugs.    Post Discharge Medication Reconciliation Status: discharge medications reconciled and changed, per note/orders (see AVS).  Current Outpatient Prescriptions   Medication Sig Dispense Refill     acetaminophen (TYLENOL) 500 MG tablet Take 2 tablets (1,000 mg) by mouth every 8 hours as needed  for mild pain 30 tablet 0     albuterol (PROAIR HFA/PROVENTIL HFA/VENTOLIN HFA) 108 (90 BASE) MCG/ACT Inhaler Inhale 2 puffs into the lungs every 6 hours as needed for shortness of breath / dyspnea or wheezing 1 Inhaler 0     amoxicillin (AMOXIL) 500 MG capsule Take 1 capsule (500 mg) by mouth 3 times daily 15 capsule 0     ASPIRIN 81 MG PO TABS Take 2 tablets by mouth every evening        atorvastatin (LIPITOR) 40 MG tablet TAKE ONE TABLET BY MOUTH EVERY DAY 90 tablet 3     CALCIUM 600 + D OR 1 tablet by mouth daily       calcium carbonate (TUMS) 500 MG chewable tablet Take 1 chew tab by mouth 3 times daily as needed for heartburn       citalopram (CELEXA) 20 MG tablet TAKE ONE TABLET BY MOUTH EVERY DAY 90 tablet 1     levothyroxine (SYNTHROID/LEVOTHROID) 75 MCG tablet TAKE 1 TABLET BY MOUTH ONCE DAILY (NEEDS  LAB  WORK) 90 tablet 0     losartan (COZAAR) 25 MG tablet TAKE 1 TABLET BY MOUTH ONCE DAILY 90 tablet 2     meclizine (ANTIVERT) 25 MG tablet TAKE ONE TABLET BY MOUTH EVERY 6 HOURS AS NEEDED FOR DIZZINESS 30 tablet 3     Multiple Vitamins-Minerals (OCUVITE PO) Take 1 tablet by mouth daily.       nitroglycerin (NITROSTAT) 0.4 MG SL tablet Place 1 tablet (0.4 mg) under the tongue every 5 minutes as needed for chest pain (call your doctor if you take a third dose) 25 tablet 3     OMEPRAZOLE PO Take 20 mg by mouth daily       oxyCODONE (ROXICODONE) 5 MG tablet Take 1 tablet (5 mg) by mouth every 3 hours as needed 14 tablet 0     pramipexole (MIRAPEX) 0.5 MG tablet TAKE ONE TABLET BY MOUTH AT BEDTIME 90 tablet 2     senna-docusate (SENOKOT-S/PERICOLACE) 8.6-50 MG tablet Take 2 tablets by mouth 2 times daily       Vitamin D, Cholecalciferol, 1000 units TABS Take 2,000 Units by mouth daily         ROS:  10 point ROS of systems including Constitutional, Eyes, Respiratory, Cardiovascular, Gastroenterology, Genitourinary, Integumentary, Musculoskeletal, Psychiatric were all negative except for pertinent positives noted  "in my HPI.    Exam:  /77  Pulse 67  Temp 98.6  F (37  C)  Resp 16  Ht 5' 6\" (1.676 m)  Wt 173 lb 3.2 oz (78.6 kg)  BMI 27.96 kg/m2  GENERAL APPEARANCE:  Alert, in no distress, oriented, cooperative  ENT:  Mouth and posterior oropharynx normal, moist mucous membranes, [place]  RESP:  respiratory effort and palpation of chest normal, lungs clear to auscultation , no respiratory distress  CV:  Palpation and auscultation of heart done , regular rate and rhythm, no murmur, rub, or gallop, +2 pedal pulses  ABDOMEN:  normal bowel sounds, soft, nontender, no hepatosplenomegaly or other masses, no guarding or rebound  M/S:   NWB to RLE, +CMS, decreased ROM to left hip, mild tenderness to LLE with palpation  SKIN:  Inspection of skin and subcutaneous tissue baseline, wound healing well, no signs of infection no visible as covered by splint  NEURO:   Cranial nerves 2-12 are normal tested and grossly at patient's baseline  PSYCH:  oriented X 3, normal insight, judgement and memory, affect and mood normal    Lab/Diagnostic data:     CBC RESULTS:   Recent Labs   Lab Test  12/04/18   0544  12/02/18   1345   WBC  11.7*  11.8*   RBC  3.41*  3.99   HGB  10.6*  12.5   HCT  32.0*  37.3   MCV  94  94   MCH  31.1  31.3   MCHC  33.1  33.5   RDW  13.4  13.6   PLT  237  275       Last Basic Metabolic Panel:  Recent Labs   Lab Test  12/04/18   0544  12/02/18   1345   NA  138  139   POTASSIUM  4.1  3.2*   CHLORIDE  104  105   YVONNE  7.7*  7.8*   CO2  29  26   BUN  17  28   CR  0.87  1.08*   GLC  142*  137*       Liver Function Studies -   Recent Labs   Lab Test  11/28/18   0948  08/28/17   1536   PROTTOTAL  6.5*  6.7*   ALBUMIN  3.5  3.5   BILITOTAL  0.6  0.4   ALKPHOS  99  103   AST  24  19   ALT  18  25       TSH   Date Value Ref Range Status   03/08/2017 0.67 0.40 - 4.00 mU/L Final   08/08/2016 0.78 0.40 - 4.00 mU/L Final         ASSESSMENT/PLAN:  (Z47.89) Aftercare following surgery of the musculoskeletal system  (primary encounter " diagnosis)  (S82.401D) Closed fracture of shaft of right fibula with routine healing, unspecified fracture morphology, subsequent encounter  (Z96.7,  Z87.81) S/P ORIF (open reduction internal fixation) fracture  (R53.81) Physical deconditioning  Comment: Variable pain, does not want to be on scheduled narcotics, mobility limited d/t weight bearing status, no s/s of infection  Plan: change APAP to 1000mg TID, continue oxycodone 5mg q3h PRN, ice PRN, ASA 81mg x 6 week for DVT prophylaxis, PT/OT, follow-up with ortho in 2 weeks as scheduled     (N39.0) Urinary tract infection without hematuria, site unspecified  Comment: frequency improving, had elevated WBC while IP, no s/s of bacteremia in TCU  Plan: complete amoxicillin x 5 days, encourage fluids, monitor, CBC to monitor for stability     (I25.10,  Z98.61) CAD S/P percutaneous coronary angioplasty  (Z95.0) Cardiac pacemaker - Taptu dual lead - Not Dependent  (I10) Essential hypertension, benign  (E78.5) Hyperlipidemia LDL goal <100  Comment: Asymptomatic, BP appears stable  Plan: continue cozaar 25mg daily, ASA 81 mg, atorvastatin 40mg daily, monitor    (E03.9) Hypothyroidism, unspecified type  Comment: TSH - most recent available from 2017 was 0.67  Plan: levothyroxine 75mcg daily     (K21.9) Gastroesophageal reflux disease, esophagitis presence not specified  Comment: reports difficulty swallowing - ? R/t GERD vs esophageal issues  Plan: will discontinue ranitidine and started omeprazole as below    (F33.0) Major depressive disorder, recurrent episode, mild (H)  Comment: stable  Plan: continue celexa 20mg PO daily     (K59.01) Slow transit constipation  Comment: reports having BM's  Plan: continue senna-s 2 tabs daily    (R42) Dizziness  Comment: chronic reports rare occurence  Plan: continue PRN meclizine    Acute blood loss anemia  Comment: no s/s of bleeding, Hgb 12.7 -> 10.6  Plan CBC to check for stability    Restless legs  Chronic, stable  Plan:  continue Mirapex, magnesium supplement    JENIFER  Comment: Does not wear CPAP as she does not like her mask, reports she has had increased fatigue since she stopped using mask  Plan: encourage patient to try resuming use of CPAP in TCU, monitor     Orders:  1. discontinue PRN tylenol start tylenol 1000 mg PO Q8 horusDx: Pain  2. discontinue zantac  3. Omeprazole 20 mg PO Q am Dx: GERD  4. Tums 500 mg P1 tab PO TID PNR Dx: GERD  5. Encourage patient to drink fluids  6. CBC, BMP Dx: UTI, Anemia      Total time spent with patient visit at the [facility] nursing [facility] was 40 including patient visit and review of past records. Greater than 50% of total time spent with counseling and coordinating care due to discussion of pain control, mobility, and medication in TCU d/t right ankle fracture, GERD, JENIFER, RLS, HTN, CAD    Electronically signed by:  SHARMIN Pichardo CNP      Sincerely,        SHARMIN Pichardo CNP

## 2018-12-10 ENCOUNTER — DISCHARGE SUMMARY NURSING HOME (OUTPATIENT)
Dept: GERIATRICS | Facility: CLINIC | Age: 83
End: 2018-12-10
Payer: COMMERCIAL

## 2018-12-10 VITALS
TEMPERATURE: 98.1 F | HEART RATE: 69 BPM | BODY MASS INDEX: 27.25 KG/M2 | WEIGHT: 168.8 LBS | SYSTOLIC BLOOD PRESSURE: 167 MMHG | RESPIRATION RATE: 18 BRPM | DIASTOLIC BLOOD PRESSURE: 80 MMHG

## 2018-12-10 DIAGNOSIS — N39.0 URINARY TRACT INFECTION WITHOUT HEMATURIA, SITE UNSPECIFIED: ICD-10-CM

## 2018-12-10 DIAGNOSIS — I10 ESSENTIAL HYPERTENSION, BENIGN: ICD-10-CM

## 2018-12-10 DIAGNOSIS — G47.33 OSA (OBSTRUCTIVE SLEEP APNEA): ICD-10-CM

## 2018-12-10 DIAGNOSIS — Z98.61 CAD S/P PERCUTANEOUS CORONARY ANGIOPLASTY: ICD-10-CM

## 2018-12-10 DIAGNOSIS — K21.9 GASTROESOPHAGEAL REFLUX DISEASE, ESOPHAGITIS PRESENCE NOT SPECIFIED: ICD-10-CM

## 2018-12-10 DIAGNOSIS — Z87.81 S/P ORIF (OPEN REDUCTION INTERNAL FIXATION) FRACTURE: ICD-10-CM

## 2018-12-10 DIAGNOSIS — K59.01 SLOW TRANSIT CONSTIPATION: ICD-10-CM

## 2018-12-10 DIAGNOSIS — Z47.89 AFTERCARE FOLLOWING SURGERY OF THE MUSCULOSKELETAL SYSTEM: Primary | ICD-10-CM

## 2018-12-10 DIAGNOSIS — R53.81 PHYSICAL DECONDITIONING: ICD-10-CM

## 2018-12-10 DIAGNOSIS — F33.0 MAJOR DEPRESSIVE DISORDER, RECURRENT EPISODE, MILD (H): ICD-10-CM

## 2018-12-10 DIAGNOSIS — S82.401D CLOSED FRACTURE OF SHAFT OF RIGHT FIBULA WITH ROUTINE HEALING, UNSPECIFIED FRACTURE MORPHOLOGY, SUBSEQUENT ENCOUNTER: ICD-10-CM

## 2018-12-10 DIAGNOSIS — I25.10 CAD S/P PERCUTANEOUS CORONARY ANGIOPLASTY: ICD-10-CM

## 2018-12-10 DIAGNOSIS — R42 DIZZINESS: ICD-10-CM

## 2018-12-10 DIAGNOSIS — E03.9 HYPOTHYROIDISM, UNSPECIFIED TYPE: ICD-10-CM

## 2018-12-10 DIAGNOSIS — E78.5 HYPERLIPIDEMIA LDL GOAL <100: ICD-10-CM

## 2018-12-10 DIAGNOSIS — Z95.0 CARDIAC PACEMAKER IN SITU: ICD-10-CM

## 2018-12-10 DIAGNOSIS — D62 ANEMIA DUE TO BLOOD LOSS, ACUTE: ICD-10-CM

## 2018-12-10 DIAGNOSIS — G25.81 RESTLESS LEG SYNDROME: ICD-10-CM

## 2018-12-10 DIAGNOSIS — Z98.890 S/P ORIF (OPEN REDUCTION INTERNAL FIXATION) FRACTURE: ICD-10-CM

## 2018-12-10 PROCEDURE — 99316 NF DSCHRG MGMT 30 MIN+: CPT | Performed by: NURSE PRACTITIONER

## 2018-12-10 NOTE — LETTER
12/10/2018        RE: Christiana Sal  [id number] [facility] [address]          [place] [facility] DISCHARGE SUMMARY    PATIENT'S NAME: Christiana Sal  YOB: 1931  MEDICAL RECORD NUMBER:  [id number]  Place of Service where encounter took place:  [facility] (SNF) [[id number]]    PRIMARY CARE PROVIDER AND CLINIC RESPONSIBLE AFTER TRANSFER: Kelly Matthew [id number] [facility] [id number]    TRANSFERRING PROVIDERS: SHARMIN Pichardo CNP, Tara Suárez MD  DATE OF SNF ADMISSION:  December / 04 / 2018  DATE OF SNF (anticipated) DISCHARGE: December / 11 / 2018  DISCHARGE DISPOSITION: Non-[facility] Provider   RECENT HOSPITALIZATION/ED:  [facility] stay 12/2/18 to 12/4/18.     CODE STATUS/ADVANCE DIRECTIVES DISCUSSION:   CPR/Full code      Allergies   Allergen Reactions     Demerol Nausea     Lisinopril Cough     Meperidine Unknown     Sulfa Drugs Other (See Comments) and Unknown     Questionable itching reported by patient     Condition on Discharge:  Improving.  Function:  Limited assist of one with lower body dressing, transfers, toileting, bed mobility.  Cognitive Scores: SLUMS 25/30    Equipment: walker and wheelchair    DISCHARGE DIAGNOSIS:   1. Aftercare following surgery of the musculoskeletal system    2. Closed fracture of shaft of right fibula with routine healing, unspecified fracture morphology, subsequent encounter    3. S/P ORIF (open reduction internal fixation) fracture    4. Physical deconditioning    5. Urinary tract infection without hematuria, site unspecified    6. CAD S/P percutaneous coronary angioplasty    7. Cardiac pacemaker - [employer] dual lead - Not Dependent    8. Essential hypertension, benign    9. Hyperlipidemia LDL goal <100    10. Hypothyroidism, unspecified type    11. Gastroesophageal reflux disease, esophagitis presence not specified    12. Major depressive  disorder, recurrent episode, mild (H)    13. Anemia due to blood loss, acute    14. Slow transit constipation    15. Restless leg syndrome    16. JENIFER (obstructive sleep apnea)    17. Dizziness        HPI Nursing Facility Course:  HPI information obtained from: facility chart records, facility staff, patient report and [facility] chart review.    HOSPITAL COURSE:  Christiana Sal is an 87-year-old female who presented after a mechanical fall at home.  She had been feeling somewhat lightheaded the week prior to admission.  She actually came to the ER on 11/28 due to lightheadedness.  She was diagnosed with pneumonia and put on Zithromax and prednisone.  She thought she was doing somewhat better.  The night before admission, she was getting up to go to the bathroom and she tripped over a suitcase that was on the floor.  She landed on her right side.  She did not strike her head or have loss of consciousness.  She injured her right ankle.  She came to the emergency room and was found to have a distal fibular fracture on the right.       The patient ultimately had surgical repair of this.  She did well postop.  She did not have any orthostatic changes or hypotension in the hospital.  She had noted urinary frequency prior to admission and she did have enterococcus in her urine and this was treated with Keflex in the hospital.  She is going to complete 5 days of treatment with amoxicillin as an outpatient.  Otherwise, the patient was stable in the hospital.     Aftercare following surgery of the musculoskeletal system  Closed fracture of shaft of right fibula with routine healing, unspecified fracture morphology, subsequent encounter  S/P ORIF (open reduction internal fixation) fracture  Physical deconditioning  Pain has been controlled on minimal use of oxycodone 5 q5h PRN, APAP 1000mg TID. She is on ASA 162mg PO x 6 weeks for DVT prophylaxis. She reports sharp pain to right leg, recommended she continue to ice and  elevate RLE at home. She remains NW to RLE, has f/u with ortho next week. She will continue with home PT, OT and HHA.     Urinary tract infection without hematuria, site unspecified  Completed 5 day course of amoxicillin in TCU. Denies any urinary symptoms. CBC not rechecked in TCU as lab unable to come out and draw patient, will defer further labs to PCP d/t short TCU stay.     CAD S/P percutaneous coronary angioplasty  Cardiac pacemaker - [employer] dual lead - Not Dependent  Essential hypertension, benign  BP, HR stable during TCU stay, no cardiac concerns. Remains on PTA cozaar 25mg daily, ASA as above.     Hyperlipidemia LDL goal <100  On atorvastatin 40mg daily.     Hypothyroidism, unspecified type  TSH in 2017 was 0.67, remains on levothyroxine 75 mcg daily, recommend f/u with PCP    Gastroesophageal reflux disease, esophagitis presence not specified  C/o of intermittent issues with swallows on TCU admission - reports felt like food was getting stuck at times. She was changed to omeprazole 20mg daily and PRN Tums from Zantac 300mg daily. Recommend further f/u with PCP.     Major depressive disorder, recurrent episode, mild (H)  Mood stable during TCU stay, remains on Celexa 20mg daily.     Anemia due to blood loss, acute  Hgb 12.7->10.6. Vitals stable in TCU. Hgb not checked in TCU d/t short stay and lab unable to draw. Recommend f/u with PCP    Slow transit constipation  Having regular BM's on senna-s 2 tabs daily.     Restless leg syndrome  Remains on Mirapex, magnesium supplement.     JENIFER (obstructive sleep apnea)  Has not been wearing CPAP as she does not like the mask. She was encouraged to use during TCU stay, but it was not brought in. F/u with PCP/sleep medicine after discharge as she may benefit from alternate mask for her CPAP.     Dizziness  No concerns during TCU stay, remain on PRN meclizine.       PAST MEDICAL HISTORY:  has a past medical history of Coronary atherosclerosis of native  coronary artery, Essential hypertension, benign, GERD (gastroesophageal reflux disease), History of transient ischemic attack (TIA), Hyperlipidemia LDL goal <100, Hypothyroidism, Major depressive disorder, recurrent episode, mild (H) (5/18/2011), and Peptic ulcer disease with hemorrhage. She also has no past medical history of Basal cell carcinoma, Malignant melanoma (H), or Squamous cell carcinoma.    DISCHARGE MEDICATIONS:  Current Outpatient Medications   Medication Sig Dispense Refill     acetaminophen (TYLENOL) 500 MG tablet Take 2 tablets (1,000 mg) by mouth every 8 hours as needed for mild pain 30 tablet 0     albuterol (PROAIR HFA/PROVENTIL HFA/VENTOLIN HFA) 108 (90 BASE) MCG/ACT Inhaler Inhale 2 puffs into the lungs every 6 hours as needed for shortness of breath / dyspnea or wheezing 1 Inhaler 0     amoxicillin (AMOXIL) 500 MG capsule Take 1 capsule (500 mg) by mouth 3 times daily 15 capsule 0     ASPIRIN 81 MG PO TABS Take 2 tablets by mouth every evening        atorvastatin (LIPITOR) 40 MG tablet TAKE ONE TABLET BY MOUTH EVERY DAY 90 tablet 3     CALCIUM 600 + D OR 1 tablet by mouth daily       calcium carbonate (TUMS) 500 MG chewable tablet Take 1 chew tab by mouth 3 times daily as needed for heartburn       citalopram (CELEXA) 20 MG tablet TAKE ONE TABLET BY MOUTH EVERY DAY 90 tablet 1     levothyroxine (SYNTHROID/LEVOTHROID) 75 MCG tablet TAKE 1 TABLET BY MOUTH ONCE DAILY (NEEDS  LAB  WORK) 90 tablet 0     losartan (COZAAR) 25 MG tablet TAKE 1 TABLET BY MOUTH ONCE DAILY 90 tablet 2     meclizine (ANTIVERT) 25 MG tablet TAKE ONE TABLET BY MOUTH EVERY 6 HOURS AS NEEDED FOR DIZZINESS 30 tablet 3     Multiple Vitamins-Minerals (OCUVITE PO) Take 1 tablet by mouth daily.       nitroglycerin (NITROSTAT) 0.4 MG SL tablet Place 1 tablet (0.4 mg) under the tongue every 5 minutes as needed for chest pain (call your doctor if you take a third dose) 25 tablet 3     OMEPRAZOLE PO Take 20 mg by mouth daily        oxyCODONE (ROXICODONE) 5 MG tablet Take 1 tablet (5 mg) by mouth every 3 hours as needed 14 tablet 0     pramipexole (MIRAPEX) 0.5 MG tablet TAKE ONE TABLET BY MOUTH AT BEDTIME 90 tablet 2     senna-docusate (SENOKOT-S/PERICOLACE) 8.6-50 MG tablet Take 2 tablets by mouth 2 times daily       Vitamin D, Cholecalciferol, 1000 units TABS Take 2,000 Units by mouth daily         MEDICATION CHANGES/RATIONALE:   Discontinued zantac, started on omeprazole to 20mg daily, Frank's PRN d/t possible GERD symptosm    Controlled medications sent with patient:   Medication: oxycodone , 7 tabs given to patient at the time of discharge to take home     ROS:    10 point ROS of systems including Constitutional, Eyes, Respiratory, Cardiovascular, Gastroenterology, Genitourinary, Integumentary, Musculoskeletal, Psychiatric were all negative except for pertinent positives noted in my HPI.    Physical Exam:   Vitals: /80   Pulse 69   Temp 98.1  F (36.7  C)   Resp 18   Wt 76.6 kg (168 lb 12.8 oz)   BMI 27.25 kg/m     BMI= Body mass index is 27.25 kg/m .    GENERAL APPEARANCE:  Alert, in no distress, oriented, cooperative  RESP:  respiratory effort and palpation of chest normal, lungs clear to auscultation , no respiratory distress  CV:  Palpation and auscultation of heart done , regular rate and rhythm, no murmur, rub, or gallop, +2 pedal pulses, peripheral edema 2+ in right foot  ABDOMEN:  normal bowel sounds, soft, nontender, no hepatosplenomegaly or other masses, no guarding or rebound  M/S:   NWB To RLE, right leg in splint,   SKIN:  Inspection of skin and subcutaneous tissue baseline  NEURO:   Cranial nerves 2-12 are normal tested and grossly at patient's baseline, Examination of sensation by touch normal  PSYCH:  oriented X 3, normal insight, judgement and memory, affect and mood normal    DISCHARGE PLAN:  Occupational Therapy, Physical Therapy, Home Health Aide and From:  [facility]  Patient instructed to follow-up with:   PCP in 7 days      [facility] scheduled appointments:  Future Appointments   Date Time Provider Department Center   1/8/2019 10:15 AM Cleve Null MD [facility]   2/4/2019 12:00 AM [facility]       MTM referral needed and placed by this provider: No    Pending labs: None  SNF labs   CBC RESULTS:   Recent Labs   Lab Test 12/04/18  0544 12/02/18  1345   WBC 11.7* 11.8*   RBC 3.41* 3.99   HGB 10.6* 12.5   HCT 32.0* 37.3   MCV 94 94   MCH 31.1 31.3   MCHC 33.1 33.5   RDW 13.4 13.6    275       Last Basic Metabolic Panel:  Recent Labs   Lab Test 12/04/18  0544 12/02/18  1345    139   POTASSIUM 4.1 3.2*   CHLORIDE 104 105   YVONNE 7.7* 7.8*   CO2 29 26   BUN 17 28   CR 0.87 1.08*   * 137*       Liver Function Studies -   Recent Labs   Lab Test 11/28/18  0948 08/28/17  1536   PROTTOTAL 6.5* 6.7*   ALBUMIN 3.5 3.5   BILITOTAL 0.6 0.4   ALKPHOS 99 103   AST 24 19   ALT 18 25       TSH   Date Value Ref Range Status   03/08/2017 0.67 0.40 - 4.00 mU/L Final   08/08/2016 0.78 0.40 - 4.00 mU/L Final       Discharge Treatments:  Medications as above  Ice and elevate RLE when at rest  Home PT/OT/HHA   F/u with ortho as scheduled  F/u with PCP in 7-10 days    TOTAL DISCHARGE TIME:   Greater than 30 minutes  Electronically signed by:  SHARMIN Pichardo CNP         Documentation of Face-to-Face and Certification for Home Health Services     Patient: Christiana Sal   YOB: 1931  MR Number: [id number]  Today's Date: 12/10/2018    I certify that patient: Christiana Sal is under my care and that I, or a nurse practitioner or physician's assistant working with me, had a face-to-face encounter that meets the physician face-to-face encounter requirements with this patient on: 12/10/2018.    This encounter with the patient was in whole, or in part, for the following medical condition, which is the primary reason for home health care: right fibula fracture,  s/p ORIF, physical deconditioning.    I certify that, based on my findings, the following services are medically necessary home health services: Occupational Therapy, Physical Therapy and home health aide.    My clinical findings support the need for the above services because: Occupational Therapy Services are needed to assess and treat cognitive ability and address ADL safety due to impairment in needs to use adaptive equipment d/t NWB status to RLE., Physical Therapy Services are needed to assess and treat the following functional impairments: needs continued rehab d/t NWB to RLE. and home health aide to assist with bathing and ADL's    Further, I certify that my clinical findings support that this patient is homebound (i.e. absences from home require considerable and taxing effort and are for medical reasons or [employer] services or infrequently or of short duration when for other reasons) because: Requires assistance of another person or specialized equipment to access medical services because patient: Range of motion limitations prevents ability to exit home safely...    Based on the above findings. I certify that this patient is confined to the home and needs intermittent skilled nursing care, physical therapy and/or speech therapy.  The patient is under my care, and I have initiated the establishment of the plan of care.  This patient will be followed by a physician who will periodically review the plan of care.  Physician/Provider to provide follow up care: Kelly Matthew    Responsible Medicare certified PECOS Physician: Dr. Tara Suárez  Physician Signature: See electronic signature associated with these discharge orders.  Date: 12/10/2018    Electronically signed by Dr. Tara Suárez MD, and only signing for initial order. Please send all follow up questions and concerns or needed follow up signatures to the PCP Kelly Matthew.          Sincerely,        SHARMIN Pichardo  CNP

## 2018-12-10 NOTE — PROGRESS NOTES
[place] [facility]ERIATRIC SERVICES DISCHARGE SUMMARY    PATIENT'S NAME: Christiana Sal  YOB: 1931  MEDICAL RECORD NUMBER:  [id number]  Place of Service where encounter took place:  [facility] ([facility]) [[id number]]    PRIMARY CARE PROVIDER AND CLINIC RESPONSIBLE AFTER TRANSFER: Kelly Matthew [id number] [facility] [address]    TRANSFERRING PROVIDERS: SHARMIN Pichardo CNP, Tara Suárez MD  DATE OF SNF ADMISSION:  December / 04 / 2018  DATE OF SNF (anticipated) DISCHARGE: December / 11 / 2018  DISCHARGE DISPOSITION: Non-[facility] Provider   RECENT HOSPITALIZATION/ED:  [facility] stay 12/2/18 to 12/4/18.     CODE STATUS/ADVANCE DIRECTIVES DISCUSSION:   CPR/Full code      Allergies   Allergen Reactions     Demerol Nausea     Lisinopril Cough     Meperidine Unknown     Sulfa Drugs Other (See Comments) and Unknown     Questionable itching reported by patient     Condition on Discharge:  Improving.  Function:  Limited assist of one with lower body dressing, transfers, toileting, bed mobility.  Cognitive Scores: SLUMS 25/30    Equipment: walker and wheelchair    DISCHARGE DIAGNOSIS:   1. Aftercare following surgery of the musculoskeletal system    2. Closed fracture of shaft of right fibula with routine healing, unspecified fracture morphology, subsequent encounter    3. S/P ORIF (open reduction internal fixation) fracture    4. Physical deconditioning    5. Urinary tract infection without hematuria, site unspecified    6. CAD S/P percutaneous coronary angioplasty    7. Cardiac pacemaker - [employer] dual lead - Not Dependent    8. Essential hypertension, benign    9. Hyperlipidemia LDL goal <100    10. Hypothyroidism, unspecified type    11. Gastroesophageal reflux disease, esophagitis presence not specified    12. Major depressive disorder, recurrent episode, mild (H)    13. Anemia due to blood loss, acute    14. Slow transit  constipation    15. Restless leg syndrome    16. JENIFER (obstructive sleep apnea)    17. Dizziness        HPI Nursing Facility Course:  HPI information obtained from: facility chart records, facility staff, patient report and [facility] chart review.    HOSPITAL COURSE:  Christiana Sal is an 87-year-old female who presented after a mechanical fall at home.  She had been feeling somewhat lightheaded the week prior to admission.  She actually came to the ER on 11/28 due to lightheadedness.  She was diagnosed with pneumonia and put on Zithromax and prednisone.  She thought she was doing somewhat better.  The night before admission, she was getting up to go to the bathroom and she tripped over a suitcase that was on the floor.  She landed on her right side.  She did not strike her head or have loss of consciousness.  She injured her right ankle.  She came to the emergency room and was found to have a distal fibular fracture on the right.       The patient ultimately had surgical repair of this.  She did well postop.  She did not have any orthostatic changes or hypotension in the hospital.  She had noted urinary frequency prior to admission and she did have enterococcus in her urine and this was treated with Keflex in the hospital.  She is going to complete 5 days of treatment with amoxicillin as an outpatient.  Otherwise, the patient was stable in the hospital.     Aftercare following surgery of the musculoskeletal system  Closed fracture of shaft of right fibula with routine healing, unspecified fracture morphology, subsequent encounter  S/P ORIF (open reduction internal fixation) fracture  Physical deconditioning  Pain has been controlled on minimal use of oxycodone 5 q5h PRN, APAP 1000mg TID. She is on ASA 162mg PO x 6 weeks for DVT prophylaxis. She reports sharp pain to right leg, recommended she continue to ice and elevate RLE at home. She remains NW to RLE, has f/u with ortho next week. She will continue with  home PT, OT and HHA.     Urinary tract infection without hematuria, site unspecified  Completed 5 day course of amoxicillin in TCU. Denies any urinary symptoms. CBC not rechecked in TCU as lab unable to come out and draw patient, will defer further labs to PCP d/t short TCU stay.     CAD S/P percutaneous coronary angioplasty  Cardiac pacemaker - [employer] dual lead - Not Dependent  Essential hypertension, benign  BP, HR stable during TCU stay, no cardiac concerns. Remains on PTA cozaar 25mg daily, ASA as above.     Hyperlipidemia LDL goal <100  On atorvastatin 40mg daily.     Hypothyroidism, unspecified type  TSH in 2017 was 0.67, remains on levothyroxine 75 mcg daily, recommend f/u with PCP    Gastroesophageal reflux disease, esophagitis presence not specified  C/o of intermittent issues with swallows on TCU admission - reports felt like food was getting stuck at times. She was changed to omeprazole 20mg daily and PRN Tums from Zantac 300mg daily. Recommend further f/u with PCP.     Major depressive disorder, recurrent episode, mild (H)  Mood stable during TCU stay, remains on Celexa 20mg daily.     Anemia due to blood loss, acute  Hgb 12.7->10.6. Vitals stable in TCU. Hgb not checked in TCU d/t short stay and lab unable to draw. Recommend f/u with PCP    Slow transit constipation  Having regular BM's on senna-s 2 tabs daily.     Restless leg syndrome  Remains on Mirapex, magnesium supplement.     JENIFER (obstructive sleep apnea)  Has not been wearing CPAP as she does not like the mask. She was encouraged to use during TCU stay, but it was not brought in. F/u with PCP/sleep medicine after discharge as she may benefit from alternate mask for her CPAP.     Dizziness  No concerns during TCU stay, remain on PRN meclizine.       PAST MEDICAL HISTORY:  has a past medical history of Coronary atherosclerosis of native coronary artery, Essential hypertension, benign, GERD (gastroesophageal reflux disease), History of  transient ischemic attack (TIA), Hyperlipidemia LDL goal <100, Hypothyroidism, Major depressive disorder, recurrent episode, mild (H) (5/18/2011), and Peptic ulcer disease with hemorrhage. She also has no past medical history of Basal cell carcinoma, Malignant melanoma (H), or Squamous cell carcinoma.    DISCHARGE MEDICATIONS:  Current Outpatient Medications   Medication Sig Dispense Refill     acetaminophen (TYLENOL) 500 MG tablet Take 2 tablets (1,000 mg) by mouth every 8 hours as needed for mild pain 30 tablet 0     albuterol (PROAIR HFA/PROVENTIL HFA/VENTOLIN HFA) 108 (90 BASE) MCG/ACT Inhaler Inhale 2 puffs into the lungs every 6 hours as needed for shortness of breath / dyspnea or wheezing 1 Inhaler 0     amoxicillin (AMOXIL) 500 MG capsule Take 1 capsule (500 mg) by mouth 3 times daily 15 capsule 0     ASPIRIN 81 MG PO TABS Take 2 tablets by mouth every evening        atorvastatin (LIPITOR) 40 MG tablet TAKE ONE TABLET BY MOUTH EVERY DAY 90 tablet 3     CALCIUM 600 + D OR 1 tablet by mouth daily       calcium carbonate (TUMS) 500 MG chewable tablet Take 1 chew tab by mouth 3 times daily as needed for heartburn       citalopram (CELEXA) 20 MG tablet TAKE ONE TABLET BY MOUTH EVERY DAY 90 tablet 1     levothyroxine (SYNTHROID/LEVOTHROID) 75 MCG tablet TAKE 1 TABLET BY MOUTH ONCE DAILY (NEEDS  LAB  WORK) 90 tablet 0     losartan (COZAAR) 25 MG tablet TAKE 1 TABLET BY MOUTH ONCE DAILY 90 tablet 2     meclizine (ANTIVERT) 25 MG tablet TAKE ONE TABLET BY MOUTH EVERY 6 HOURS AS NEEDED FOR DIZZINESS 30 tablet 3     Multiple Vitamins-Minerals (OCUVITE PO) Take 1 tablet by mouth daily.       nitroglycerin (NITROSTAT) 0.4 MG SL tablet Place 1 tablet (0.4 mg) under the tongue every 5 minutes as needed for chest pain (call your doctor if you take a third dose) 25 tablet 3     OMEPRAZOLE PO Take 20 mg by mouth daily       oxyCODONE (ROXICODONE) 5 MG tablet Take 1 tablet (5 mg) by mouth every 3 hours as needed 14 tablet 0      pramipexole (MIRAPEX) 0.5 MG tablet TAKE ONE TABLET BY MOUTH AT BEDTIME 90 tablet 2     senna-docusate (SENOKOT-S/PERICOLACE) 8.6-50 MG tablet Take 2 tablets by mouth 2 times daily       Vitamin D, Cholecalciferol, 1000 units TABS Take 2,000 Units by mouth daily         MEDICATION CHANGES/RATIONALE:   Discontinued zantac, started on omeprazole to 20mg daily, Frank's PRN d/t possible GERD symptosm    Controlled medications sent with patient:   Medication: oxycodone , 7 tabs given to patient at the time of discharge to take home     ROS:    10 point ROS of systems including Constitutional, Eyes, Respiratory, Cardiovascular, Gastroenterology, Genitourinary, Integumentary, Musculoskeletal, Psychiatric were all negative except for pertinent positives noted in my HPI.    Physical Exam:   Vitals: /80   Pulse 69   Temp 98.1  F (36.7  C)   Resp 18   Wt 76.6 kg (168 lb 12.8 oz)   BMI 27.25 kg/m    BMI= Body mass index is 27.25 kg/m .    GENERAL APPEARANCE:  Alert, in no distress, oriented, cooperative  RESP:  respiratory effort and palpation of chest normal, lungs clear to auscultation , no respiratory distress  CV:  Palpation and auscultation of heart done , regular rate and rhythm, no murmur, rub, or gallop, +2 pedal pulses, peripheral edema 2+ in right foot  ABDOMEN:  normal bowel sounds, soft, nontender, no hepatosplenomegaly or other masses, no guarding or rebound  M/S:   NWB To RLE, right leg in splint,   SKIN:  Inspection of skin and subcutaneous tissue baseline  NEURO:   Cranial nerves 2-12 are normal tested and grossly at patient's baseline, Examination of sensation by touch normal  PSYCH:  oriented X 3, normal insight, judgement and memory, affect and mood normal    DISCHARGE PLAN:  Occupational Therapy, Physical Therapy, Home Health Aide and From:  [facility]  Patient instructed to follow-up with:  PCP in 7 days      Current [place] scheduled appointments:  Future Appointments   Date Time  Provider Department Center   1/8/2019 10:15 AM Cleve Null MD [facility]   2/4/2019 12:00 AM [facility] TECH1 [facility]       MTM referral needed and placed by this provider: No    Pending labs: None  SNF labs   CBC RESULTS:   Recent Labs   Lab Test 12/04/18  0544 12/02/18  1345   WBC 11.7* 11.8*   RBC 3.41* 3.99   HGB 10.6* 12.5   HCT 32.0* 37.3   MCV 94 94   MCH 31.1 31.3   MCHC 33.1 33.5   RDW 13.4 13.6    275       Last Basic Metabolic Panel:  Recent Labs   Lab Test 12/04/18  0544 12/02/18  1345    139   POTASSIUM 4.1 3.2*   CHLORIDE 104 105   YVONNE 7.7* 7.8*   CO2 29 26   BUN 17 28   CR 0.87 1.08*   * 137*       Liver Function Studies -   Recent Labs   Lab Test 11/28/18  0948 08/28/17  1536   PROTTOTAL 6.5* 6.7*   ALBUMIN 3.5 3.5   BILITOTAL 0.6 0.4   ALKPHOS 99 103   AST 24 19   ALT 18 25       TSH   Date Value Ref Range Status   03/08/2017 0.67 0.40 - 4.00 mU/L Final   08/08/2016 0.78 0.40 - 4.00 mU/L Final       Discharge Treatments:  Medications as above  Ice and elevate RLE when at rest  Home PT/OT/HHA   F/u with ortho as scheduled  F/u with PCP in 7-10 days    TOTAL DISCHARGE TIME:   Greater than 30 minutes  Electronically signed by:  SHARMIN Pichardo CNP         Documentation of Face-to-Face and Certification for Home Health Services     Patient: Christiana Sal   YOB: 1931  MR Number: [id number]  Today's Date: 12/10/2018    I certify that patient: Christiana Sal is under my care and that I, or a nurse practitioner or physician's assistant working with me, had a face-to-face encounter that meets the physician face-to-face encounter requirements with this patient on: 12/10/2018.    This encounter with the patient was in whole, or in part, for the following medical condition, which is the primary reason for home health care: right fibula fracture, s/p ORIF, physical deconditioning.    I certify that, based on my findings, the following  services are medically necessary home health services: Occupational Therapy, Physical Therapy and home health aide.    My clinical findings support the need for the above services because: Occupational Therapy Services are needed to assess and treat cognitive ability and address ADL safety due to impairment in needs to use adaptive equipment d/t NWB status to RLE., Physical Therapy Services are needed to assess and treat the following functional impairments: needs continued rehab d/t NWB to RLE. and home health aide to assist with bathing and ADL's    Further, I certify that my clinical findings support that this patient is homebound (i.e. absences from home require considerable and taxing effort and are for medical reasons or [employer] services or infrequently or of short duration when for other reasons) because: Requires assistance of another person or specialized equipment to access medical services because patient: Range of motion limitations prevents ability to exit home safely...    Based on the above findings. I certify that this patient is confined to the home and needs intermittent skilled nursing care, physical therapy and/or speech therapy.  The patient is under my care, and I have initiated the establishment of the plan of care.  This patient will be followed by a physician who will periodically review the plan of care.  Physician/Provider to provide follow up care: Kelly Matthew    Responsible Medicare certified PECOS Physician: Dr. Tara Suárez  Physician Signature: See electronic signature associated with these discharge orders.  Date: 12/10/2018    Electronically signed by Dr. Tara Suárez MD, and only signing for initial order. Please send all follow up questions and concerns or needed follow up signatures to the PCP Kelly Matthew.

## 2018-12-11 ENCOUNTER — TELEPHONE (OUTPATIENT)
Dept: FAMILY MEDICINE | Facility: CLINIC | Age: 83
End: 2018-12-11

## 2018-12-11 ENCOUNTER — HOSPITAL LABORATORY (OUTPATIENT)
Facility: OTHER | Age: 83
End: 2018-12-11

## 2018-12-11 LAB
ANION GAP SERPL CALCULATED.3IONS-SCNC: 6 MMOL/L (ref 3–14)
BUN SERPL-MCNC: 19 MG/DL (ref 7–30)
CALCIUM SERPL-MCNC: 8.2 MG/DL (ref 8.5–10.1)
CHLORIDE SERPL-SCNC: 104 MMOL/L (ref 94–109)
CO2 SERPL-SCNC: 26 MMOL/L (ref 20–32)
CREAT SERPL-MCNC: 0.74 MG/DL (ref 0.52–1.04)
ERYTHROCYTE [DISTWIDTH] IN BLOOD BY AUTOMATED COUNT: 13.8 % (ref 10–15)
GFR SERPL CREATININE-BSD FRML MDRD: 74 ML/MIN/1.7M2
GLUCOSE SERPL-MCNC: 103 MG/DL (ref 70–99)
HCT VFR BLD AUTO: 35.4 % (ref 35–47)
HGB BLD-MCNC: 11.4 G/DL (ref 11.7–15.7)
MCH RBC QN AUTO: 30.7 PG (ref 26.5–33)
MCHC RBC AUTO-ENTMCNC: 32.2 G/DL (ref 31.5–36.5)
MCV RBC AUTO: 95 FL (ref 78–100)
PLATELET # BLD AUTO: 268 10E9/L (ref 150–450)
POTASSIUM SERPL-SCNC: 3.8 MMOL/L (ref 3.4–5.3)
RBC # BLD AUTO: 3.71 10E12/L (ref 3.8–5.2)
SODIUM SERPL-SCNC: 136 MMOL/L (ref 133–144)
WBC # BLD AUTO: 8.4 10E9/L (ref 4–11)

## 2018-12-11 NOTE — TELEPHONE ENCOUNTER
Shani RN calling requesting follow-up home care orders from PCP. Please call Shani at [phone]. Thank You.    Melanie Cruz, [facility]  [place] [facility]  Diabetes and Nutrition Scheduling

## 2018-12-13 ENCOUNTER — TRANSFERRED RECORDS (OUTPATIENT)
Dept: HEALTH INFORMATION MANAGEMENT | Facility: CLINIC | Age: 83
End: 2018-12-13

## 2018-12-13 ENCOUNTER — MEDICAL CORRESPONDENCE (OUTPATIENT)
Dept: HEALTH INFORMATION MANAGEMENT | Facility: CLINIC | Age: 83
End: 2018-12-13

## 2018-12-13 ENCOUNTER — TELEPHONE (OUTPATIENT)
Dept: FAMILY MEDICINE | Facility: CLINIC | Age: 83
End: 2018-12-13

## 2018-12-14 ENCOUNTER — TELEPHONE (OUTPATIENT)
Dept: FAMILY MEDICINE | Facility: CLINIC | Age: 83
End: 2018-12-14

## 2018-12-14 DIAGNOSIS — S82.821A CLOSED TORUS FRACTURE OF DISTAL END OF RIGHT FIBULA, INITIAL ENCOUNTER: ICD-10-CM

## 2018-12-14 RX ORDER — OXYCODONE HYDROCHLORIDE 5 MG/1
5 TABLET ORAL EVERY 6 HOURS PRN
Qty: 14 TABLET | Refills: 0 | Status: SHIPPED | OUTPATIENT
Start: 2018-12-14 | End: 2019-04-29

## 2018-12-14 NOTE — TELEPHONE ENCOUNTER
"Reason for call:  Patient reporting a symptom    Symptom or request: Christiana just had ankle surgery and is home recuperating.  She states that she is almost out of oxycodone but doesn't want to get \"hooked\" on those. She was wondering if there is something else she can take - stronger than tylenol, but not as strong as oxycodone.  [facility].  Please review and advise. Thank you..Consuelo Hawthorne    Duration (how long have symptoms been present): 12/3/18    Have you been treated for this before? Yes had surgery on 12/3/18    Phone Number patient can be reached at:  Home number on file [phone] (home)    Best Time:  Any time    Can we leave a detailed message on this number:  YES    Call taken on 12/14/2018 at 10:27 AM by Consuelo Hawthorne    "

## 2018-12-18 ENCOUNTER — MEDICAL CORRESPONDENCE (OUTPATIENT)
Dept: HEALTH INFORMATION MANAGEMENT | Facility: CLINIC | Age: 83
End: 2018-12-18

## 2018-12-18 ENCOUNTER — OFFICE VISIT (OUTPATIENT)
Dept: FAMILY MEDICINE | Facility: CLINIC | Age: 83
End: 2018-12-18
Payer: COMMERCIAL

## 2018-12-18 ENCOUNTER — PATIENT OUTREACH (OUTPATIENT)
Dept: CARE COORDINATION | Facility: CLINIC | Age: 83
End: 2018-12-18

## 2018-12-18 VITALS
HEART RATE: 66 BPM | BODY MASS INDEX: 27.25 KG/M2 | TEMPERATURE: 97 F | OXYGEN SATURATION: 98 % | DIASTOLIC BLOOD PRESSURE: 72 MMHG | SYSTOLIC BLOOD PRESSURE: 128 MMHG | HEIGHT: 66 IN

## 2018-12-18 DIAGNOSIS — K21.9 GASTROESOPHAGEAL REFLUX DISEASE, ESOPHAGITIS PRESENCE NOT SPECIFIED: ICD-10-CM

## 2018-12-18 DIAGNOSIS — S82.401D CLOSED FRACTURE OF SHAFT OF RIGHT FIBULA WITH ROUTINE HEALING, UNSPECIFIED FRACTURE MORPHOLOGY, SUBSEQUENT ENCOUNTER: ICD-10-CM

## 2018-12-18 DIAGNOSIS — M16.12 PRIMARY OSTEOARTHRITIS OF LEFT HIP: Primary | ICD-10-CM

## 2018-12-18 PROCEDURE — 99214 OFFICE O/P EST MOD 30 MIN: CPT | Performed by: PHYSICIAN ASSISTANT

## 2018-12-18 ASSESSMENT — PAIN SCALES - GENERAL: PAINLEVEL: MILD PAIN (2)

## 2018-12-18 NOTE — PROGRESS NOTES
Clinic Care Coordination Contact  Care Team Conversations    PCP wanted a home safety eval and HHA added to Home Care Orders. Home Health Care [facility] was called to add those orders to the service. [facility] will send updated orders for signature.     Diego Martinez RN  Clinic Care Coordinator  [phone] or [phone]

## 2018-12-18 NOTE — PATIENT INSTRUCTIONS
Schedule a follow up with Dr. Galdamez in the next month or so    If needed, we can get you back to see Dr. Palafox in the interim if we think looking at the outer hip will help    I am going to reach out to the physical therapy team and see if they can start working on that left hip/leg as well      I will also reach out to our care coordinator so that if there are any resources she can help set up (such as a personal care attendant) we can get you that help as well

## 2018-12-18 NOTE — PROGRESS NOTES
"  SUBJECTIVE:   Christiana Sal is a 87 year old female who presents to clinic today for the following health issues:      Patient is here today for a follow up for a few things:     -She is having troubles after getting a shot in her hip. Still having pain and doesn't think it has worked. Her leg will get tired and cramped up.     -She had pneumonia recently. She is feeling a lot better and she thinks it is pretty much gone.     -She recently broke her right ankle on 12/2/18. She said it is healing okay but she cannot put any weight on it yet.     Problem list and histories reviewed & adjusted, as indicated.  Additional history:     11/28 in the ED and dx with pneumonia - put on a zpak and prednisone  Thought she was improving but overnight was getting up to the bathroom and she tripped over a suitcase on the floor, landing on her right side  Went back to the ED and found to have a distal fibula fracture on the right  Had surgical repair of this and did well post op  Was noted to have enterococcus in urine while in the hospital so was treated for UTI - completed treatment in TCU    Admitted to TCU post hospital - d/c'd on 12/10/18  Had f/u with ortho on 12/13 - doing well in terms of healing  Has PT coming to her home    In the midst of all this was moving to a new location in [facility]  Still living independently but apartment is connected to an assisted living and memory care unit  She currently does not have any life alert device   Her daughter who is with her today is concerned about her doing some things along (ie getting showered/dressed) in the morning given her left leg pain and right ankle fracture    She is feeling well in terms of recovery from the pneumonia  No further urinary symptoms  Ankle slowly improving - pain improved  Still having issues with her left leg - will sometimes \"give out\" on her or she will have cramping     Current Outpatient Medications   Medication Sig Dispense Refill     " acetaminophen (TYLENOL) 500 MG tablet Take 2 tablets (1,000 mg) by mouth every 8 hours as needed for mild pain 30 tablet 0     albuterol (PROAIR HFA/PROVENTIL HFA/VENTOLIN HFA) 108 (90 BASE) MCG/ACT Inhaler Inhale 2 puffs into the lungs every 6 hours as needed for shortness of breath / dyspnea or wheezing 1 Inhaler 0     amoxicillin (AMOXIL) 500 MG capsule Take 1 capsule (500 mg) by mouth 3 times daily 15 capsule 0     ASPIRIN 81 MG PO TABS Take 2 tablets by mouth every evening        atorvastatin (LIPITOR) 40 MG tablet TAKE ONE TABLET BY MOUTH EVERY DAY 90 tablet 3     CALCIUM 600 + D OR 1 tablet by mouth daily       calcium carbonate (TUMS) 500 MG chewable tablet Take 1 chew tab by mouth 3 times daily as needed for heartburn       citalopram (CELEXA) 20 MG tablet TAKE ONE TABLET BY MOUTH EVERY DAY 90 tablet 1     levothyroxine (SYNTHROID/LEVOTHROID) 75 MCG tablet TAKE 1 TABLET BY MOUTH ONCE DAILY (NEEDS  LAB  WORK) 90 tablet 0     losartan (COZAAR) 25 MG tablet TAKE 1 TABLET BY MOUTH ONCE DAILY 90 tablet 2     meclizine (ANTIVERT) 25 MG tablet TAKE ONE TABLET BY MOUTH EVERY 6 HOURS AS NEEDED FOR DIZZINESS 30 tablet 3     Multiple Vitamins-Minerals (OCUVITE PO) Take 1 tablet by mouth daily.       OMEPRAZOLE PO Take 20 mg by mouth daily       oxyCODONE (ROXICODONE) 5 MG tablet Take 1 tablet (5 mg) by mouth every 6 hours as needed 14 tablet 0     pramipexole (MIRAPEX) 0.5 MG tablet TAKE ONE TABLET BY MOUTH AT BEDTIME 90 tablet 2     senna-docusate (SENOKOT-S/PERICOLACE) 8.6-50 MG tablet Take 2 tablets by mouth 2 times daily       Vitamin D, Cholecalciferol, 1000 units TABS Take 2,000 Units by mouth daily       nitroglycerin (NITROSTAT) 0.4 MG SL tablet Place 1 tablet (0.4 mg) under the tongue every 5 minutes as needed for chest pain (call your doctor if you take a third dose) (Patient not taking: Reported on 12/18/2018) 25 tablet 3     Allergies   Allergen Reactions     Demerol Nausea     Lisinopril Cough      "Meperidine Unknown     Sulfa Drugs Other (See Comments) and Unknown     Questionable itching reported by patient     BP Readings from Last 3 Encounters:   12/18/18 128/72   12/10/18 167/80   12/07/18 143/77    Wt Readings from Last 3 Encounters:   12/10/18 76.6 kg (168 lb 12.8 oz)   12/07/18 78.6 kg (173 lb 3.2 oz)   12/02/18 74.8 kg (165 lb)                    Reviewed and updated as needed this visit by clinical staff       Reviewed and updated as needed this visit by Provider         ROS:  Remainder of ROS obtained and found to be negative other than that which was documented above      OBJECTIVE:     /72   Pulse 66   Temp 97  F (36.1  C)   Ht 1.676 m (5' 6\")   SpO2 98%   BMI 27.25 kg/m    Body mass index is 27.25 kg/m .  GENERAL: healthy, alert and no distress  EYES: Eyes grossly normal to inspection  RESP: lungs clear to auscultation - no rales, rhonchi or wheezes  CV: regular rates and rhythm, normal S1 S2, no S3 or S4, no murmur, click or rub and no peripheral edema  SKIN: no suspicious lesions or rashes  NEURO: Normal strength and tone, mentation intact and speech normal  PSYCH: mentation appears normal, affect normal/bright    Diagnostic Test Results:  none     ASSESSMENT/PLAN:     (M16.12) Primary osteoarthritis of left hip  (primary encounter diagnosis)  Comment: ongoing left leg pain and weakness. Has been evaluated by spine surgery (for lumbar etiology) and ortho (for hip etiology). Was suggested she could pursue a left hip replacement given the osteoarthritis but patient had deferred. Instead had opted to try another hip injection but had complications from that so does not want to try that again and admits it did not help much.   Plan: would like her to touch base again with Dr. Galdamez re: hip surgery. Obviously right now priority will be to rehabilitate from the right ankle fracture but I am concerned given the weakness that she will be more susceptible to falls. Would also like to see if " PT can start working with her on her left leg as well. May consider going back to Dr. Palafox if there is felt to be an IT band issue that could be at least temporarily relieved with another injection    (K21.9) Gastroesophageal reflux disease, esophagitis presence not specified  Comment:   Plan: back on ranitidine only. Will continue but suggested that if she notes difficulty swallowing again or notices she is using more TUMS that she try adding back the omeprazole for a short time    (S82.047D) Closed fracture of shaft of right fibula with routine healing, unspecified fracture morphology, subsequent encounter  Comment:   Plan: has f/u with ortho scheduled next on 1/10/19        Kelly Matthew PA-C  [facility]

## 2018-12-19 ENCOUNTER — MEDICAL CORRESPONDENCE (OUTPATIENT)
Dept: HEALTH INFORMATION MANAGEMENT | Facility: CLINIC | Age: 83
End: 2018-12-19

## 2018-12-21 ENCOUNTER — MEDICAL CORRESPONDENCE (OUTPATIENT)
Dept: HEALTH INFORMATION MANAGEMENT | Facility: CLINIC | Age: 83
End: 2018-12-21

## 2018-12-21 ENCOUNTER — TELEPHONE (OUTPATIENT)
Dept: FAMILY MEDICINE | Facility: CLINIC | Age: 83
End: 2018-12-21

## 2018-12-21 DIAGNOSIS — I25.10 ATHEROSCLEROSIS OF NATIVE CORONARY ARTERY OF NATIVE HEART WITHOUT ANGINA PECTORIS: Primary | ICD-10-CM

## 2018-12-21 DIAGNOSIS — N32.81 OVERACTIVE BLADDER: Primary | ICD-10-CM

## 2018-12-21 RX ORDER — TOLTERODINE TARTRATE 2 MG/1
2 TABLET, EXTENDED RELEASE ORAL 2 TIMES DAILY
Qty: 60 TABLET | Refills: 3 | Status: SHIPPED | OUTPATIENT
Start: 2018-12-21 | End: 2019-05-13

## 2018-12-21 NOTE — TELEPHONE ENCOUNTER
Christiana states she forgot to ask for refill of metoprolol when she was just seen.  I do not see it on her medication list???? She said it is not a new drug for her, she's been taking it.      metoprolol      Last Written Prescription Date:  ??  Last Fill Quantity: ??,   # refills: ??  Last Office Visit: 12/18/18  Future Office visit:    Next 5 appointments (look out 90 days)    Jan 08, 2019 10:15 AM CST  [facility] Cleve [facility] MD  [facility] [address] [facility] [address]  [phone]           Routing refill request to provider for review/approval because:  Drug not active on patient's medication list

## 2018-12-21 NOTE — TELEPHONE ENCOUNTER
We have tried meds before and she had reported they didn't help, however if they reduce the frequency even to a few less times that would be an improvement. Therefore I sent a script for detrol to the pharmacy ([facility] [address]). She can start this and see if it reduces the number of trips to the bathroom  Kelly

## 2018-12-21 NOTE — TELEPHONE ENCOUNTER
Voicemail message left for patient. New medication, dosing and where to  medication.  Call back number left if patient has additional questions.  Kirsten Decker RN

## 2018-12-21 NOTE — TELEPHONE ENCOUNTER
Reason for call:  Patient reporting a symptom    Symptom or request: Christiana states that you have discussed possibly taking medication to help with her incontinence and she didn't want to at first.  Now she thinks it would be a good idea.  States she has to do something.  Please call and assess.  Thank you..Consuelo Hawthorne    Duration (how long have symptoms been present): on going    Have you been treated for this before? No    Phone Number patient can be reached at:  Home number on file [phone] (home)    Best Time:  Any time    Can we leave a detailed message on this number:  YES    Call taken on 12/21/2018 at 8:49 AM by Consuelo Hawthorne

## 2018-12-21 NOTE — TELEPHONE ENCOUNTER
Call placed to patient.  Patient has been home from [facility] x1week.  Completed antibiotics 9days ago for UTI.    Patient verbalizes frustration with urinary frequency at night. Last night, up to void 6-7x.  +Urgency, not new per patient. Afebrile. Denies pain or burning or abnormal urine appearance.  Patient would like to start medication discussed at previous visit to help with frequency.    Patient has not had follow up urine culture since treating UTI.  Please advise.  Kirsten Decker RN

## 2018-12-24 ENCOUNTER — TELEPHONE (OUTPATIENT)
Dept: FAMILY MEDICINE | Facility: CLINIC | Age: 83
End: 2018-12-24

## 2018-12-24 RX ORDER — METOPROLOL SUCCINATE 25 MG/1
12.5 TABLET, EXTENDED RELEASE ORAL DAILY
Qty: 45 TABLET | Refills: 3 | Status: SHIPPED | OUTPATIENT
Start: 2018-12-24 | End: 2019-12-30

## 2018-12-27 ENCOUNTER — MEDICAL CORRESPONDENCE (OUTPATIENT)
Dept: HEALTH INFORMATION MANAGEMENT | Facility: CLINIC | Age: 83
End: 2018-12-27

## 2019-01-02 ENCOUNTER — MEDICAL CORRESPONDENCE (OUTPATIENT)
Dept: HEALTH INFORMATION MANAGEMENT | Facility: CLINIC | Age: 84
End: 2019-01-02

## 2019-01-04 ENCOUNTER — MEDICAL CORRESPONDENCE (OUTPATIENT)
Dept: HEALTH INFORMATION MANAGEMENT | Facility: CLINIC | Age: 84
End: 2019-01-04

## 2019-01-08 ENCOUNTER — TRANSFERRED RECORDS (OUTPATIENT)
Dept: HEALTH INFORMATION MANAGEMENT | Facility: CLINIC | Age: 84
End: 2019-01-08

## 2019-01-08 ENCOUNTER — MEDICAL CORRESPONDENCE (OUTPATIENT)
Dept: HEALTH INFORMATION MANAGEMENT | Facility: CLINIC | Age: 84
End: 2019-01-08

## 2019-01-09 ENCOUNTER — TELEPHONE (OUTPATIENT)
Dept: FAMILY MEDICINE | Facility: CLINIC | Age: 84
End: 2019-01-09

## 2019-01-09 NOTE — TELEPHONE ENCOUNTER
Reason for call:  Patient reporting a symptom    Symptom or request: Christiana states that sitting in wheelchair for 5 weeks put some trauma on her body.  It was discussed at a previous visit that she could have an injection in her leg.  Her left leg has a lot of pain and she is ready to have injection. Could you please place referral.  Thank you..Consuelo Hawthorne    Duration (how long have symptoms been present):  On going    Have you been treated for this before? No    Phone Number patient can be reached at:  Home number on file [phone] (home)    Best Time:  Any time    Can we leave a detailed message on this number:  YES    Call taken on 1/9/2019 at 11:29 AM by Consuelo Hawthorne

## 2019-01-11 DIAGNOSIS — I25.10 CAD S/P PERCUTANEOUS CORONARY ANGIOPLASTY: ICD-10-CM

## 2019-01-11 DIAGNOSIS — Z98.61 CAD S/P PERCUTANEOUS CORONARY ANGIOPLASTY: ICD-10-CM

## 2019-01-11 NOTE — TELEPHONE ENCOUNTER
"Requested Prescriptions   Pending Prescriptions Disp Refills     atorvastatin (LIPITOR) 40 MG tablet [Pharmacy Med Name: ATORVASTATIN CALCIUM 40MG TABS] 90 tablet 3     Sig: TAKE ONE TABLET BY MOUTH EVERY DAY    Statins Protocol Failed - 1/11/2019  8:32 AM       Failed - LDL on file in past 12 months    Recent Labs   Lab Test 09/05/17  0835   LDL 57            Passed - No abnormal creatine kinase in past 12 months    No lab results found.            Passed - Recent (12 mo) or future (30 days) visit within the authorizing provider's specialty    Patient had office visit in the last 12 months or has a visit in the next 30 days with authorizing provider or within the authorizing provider's specialty.  See \"Patient Info\" tab in inbasket, or \"Choose Columns\" in Meds & Orders section of the refill encounter.             Passed - Medication is active on med list       Passed - Patient is age 18 or older       Passed - No active pregnancy on record       Passed - No positive pregnancy test in past 12 months        Last Written Prescription Date:  1/3/18  Last Fill Quantity: 90,  # refills: 3   Last office visit: 12/18/2018 with prescribing provider:  Kelly Matthew     Future Office Visit:   Next 5 appointments (look out 90 days)    Peng 15, 2019 10:00 AM CST  Return Visit with Ricardo Palafox DO  [facility] ([facility]) [address]  [address]  [facility] [place] [facility] [place] [address]  [phone]   Feb 05, 2019 10:15 AM CST  Return Visit with Cleve Null MD  [facility] ([facility]) [address] [address]  [phone]           "

## 2019-01-11 NOTE — TELEPHONE ENCOUNTER
Christiana calling and stated that she called for the injection herself and has an appointment.  No need to call her back..Consuelo Hawthorne

## 2019-01-14 NOTE — TELEPHONE ENCOUNTER
Routing refill request to provider for review/approval because:  Labs not current:    Kirsten Decker RN

## 2019-01-15 ENCOUNTER — OFFICE VISIT (OUTPATIENT)
Dept: ORTHOPEDICS | Facility: CLINIC | Age: 84
End: 2019-01-15
Payer: MEDICARE

## 2019-01-15 VITALS
BODY MASS INDEX: 26.52 KG/M2 | HEIGHT: 66 IN | SYSTOLIC BLOOD PRESSURE: 128 MMHG | WEIGHT: 165 LBS | DIASTOLIC BLOOD PRESSURE: 86 MMHG

## 2019-01-15 DIAGNOSIS — Z87.81 HISTORY OF FRACTURE OF RIGHT ANKLE: ICD-10-CM

## 2019-01-15 DIAGNOSIS — M70.62 TROCHANTERIC BURSITIS OF LEFT HIP: ICD-10-CM

## 2019-01-15 DIAGNOSIS — M16.12 PRIMARY OSTEOARTHRITIS OF LEFT HIP: Primary | ICD-10-CM

## 2019-01-15 DIAGNOSIS — G89.29 CHRONIC LEFT HIP PAIN: ICD-10-CM

## 2019-01-15 DIAGNOSIS — M25.552 CHRONIC LEFT HIP PAIN: ICD-10-CM

## 2019-01-15 PROCEDURE — 99213 OFFICE O/P EST LOW 20 MIN: CPT | Mod: 25 | Performed by: FAMILY MEDICINE

## 2019-01-15 PROCEDURE — 20610 DRAIN/INJ JOINT/BURSA W/O US: CPT | Mod: LT | Performed by: FAMILY MEDICINE

## 2019-01-15 RX ORDER — TRIAMCINOLONE ACETONIDE 40 MG/ML
40 INJECTION, SUSPENSION INTRA-ARTICULAR; INTRAMUSCULAR
Status: DISCONTINUED | OUTPATIENT
Start: 2019-01-15 | End: 2019-05-06

## 2019-01-15 RX ORDER — LIDOCAINE HYDROCHLORIDE 10 MG/ML
2 INJECTION, SOLUTION INFILTRATION; PERINEURAL
Status: DISCONTINUED | OUTPATIENT
Start: 2019-01-15 | End: 2019-05-06

## 2019-01-15 RX ORDER — BUPIVACAINE HYDROCHLORIDE 5 MG/ML
2 INJECTION, SOLUTION EPIDURAL; INTRACAUDAL
Status: DISCONTINUED | OUTPATIENT
Start: 2019-01-15 | End: 2019-05-06

## 2019-01-15 RX ADMIN — BUPIVACAINE HYDROCHLORIDE 2 ML: 5 INJECTION, SOLUTION EPIDURAL; INTRACAUDAL at 10:30

## 2019-01-15 RX ADMIN — LIDOCAINE HYDROCHLORIDE 2 ML: 10 INJECTION, SOLUTION INFILTRATION; PERINEURAL at 10:30

## 2019-01-15 RX ADMIN — TRIAMCINOLONE ACETONIDE 40 MG: 40 INJECTION, SUSPENSION INTRA-ARTICULAR; INTRAMUSCULAR at 10:30

## 2019-01-15 ASSESSMENT — MIFFLIN-ST. JEOR: SCORE: 1200.19

## 2019-01-15 NOTE — PROGRESS NOTES
Christiana Sal  :  10/25/1931  DOS:01/15/19  MRN: [id number]    Sports Medicine Clinic Visit - Interim History 2018    PCP: Kelly Matthew    Social History: retired    Interim History - 2018  Since last visit on 10/26/2018 with Dr Yeh patient has moderate-severe left thigh and lower leg bruising following her intra-articular left hip injection.  Patient reports that she had moderate-severe left thigh and knee pain on 10/26 following her injection that has moderately improved over the weekend.  Significant bruising noted over distal medial thigh, anterior knee and lower leg.  Mild bruising over injection site.  Mild-moderate tenderness over the anterior knee.  No new injury in the interim.    Interim History - 2018  Since last visit on 2018 patient has moderate left thigh pain.  Notes that bruising continues to improve, although thigh discomfort remains with walking.  Left hip intra-articular injection completed on 10/26 provided good relief of hip pain.  No new injury in the interim.    Interim History - January 15, 2019  Since last visit on 2018 patient has moderate left hip and thigh with intermittent weakness/giving out sensation over the last several years.  Patient notes that pain and weakness worsening over last 6 weeks after being confined to a WC following right ankle fracture.  Pain located over left deep lateral hip and thigh with periodic radiation to left lower leg.  Notes minimal relief following left hip intra-articular injection.  No new injury in the interim.    Review of Systems  Skin: no bruising, no swelling  Musculoskeletal: as above  Neurologic: no numbness, paresthesias  Remainder of review of systems is negative including constitutional, CV, pulmonary, GI, except as noted in HPI or medical history.    Patient's current problem list, past medical and surgical history, and family history were reviewed.    Patient Active  Problem List   Diagnosis     Coronary atherosclerosis of native coronary artery     Hyperlipidemia LDL goal <100     Essential hypertension, benign     Hypothyroidism     GERD (gastroesophageal reflux disease)     Family history of colon cancer     Major depressive disorder, recurrent episode, mild (H)     Advanced directives, counseling/discussion     Angina pectoris (H)     Vulvar pruritus     Lichen planus     JENIFER (obstructive sleep apnea)     CAD S/P percutaneous coronary angioplasty     S/P cardiac pacemaker procedure     Sinoatrial node dysfunction (H)     Cardiac pacemaker - [employer] dual lead - Not Dependent     Insect bite     Primary osteoarthritis of left hip     Closed fracture of shaft of fibula     Fall     Past Medical History:   Diagnosis Date     Coronary atherosclerosis of native coronary artery     s/p MI ([facility])     Essential hypertension, benign      GERD (gastroesophageal reflux disease)      History of transient ischemic attack (TIA)      Hyperlipidemia LDL goal <100      Hypothyroidism      Major depressive disorder, recurrent episode, mild (H) 5/18/2011     Peptic ulcer disease with hemorrhage     Remote history of stomach ulcer, requiring transfusion after chronic bleeding     Past Surgical History:   Procedure Laterality Date     APPENDECTOMY       CARDIAC CATHERIZATION  1/15/04    drug-eluding stent RCA, Dr. Pérez, [facility]     CARDIAC CATHERIZATION  2/16/12    diffuse mild/mod disease, no new stent     CATARACT IOL, RT/LT  1/26/12    RT, Dr. Wynne     CHOLECYSTECTOMY, OPEN       COLONOSCOPY  2008    [facility]     FRACTURE TX, ANKLE RT/LT      LT, 2 screws remain     HC EXCISE HAND/FOOT NEUROMA      RT     HC LARYNGOSCOPY DIRECT W VOCAL CORD INJECTION      vocal cord polypectomy     HC REMOVAL OF OVARIAN CYST(S)       HC TRANSCATH STENT INIT VESSEL,PERCUT      x 2     OPEN REDUCTION INTERNAL FIXATION ANKLE Right 12/3/2018    Procedure: OPEN REDUCTION  "INTERNAL FIXATION Right ANKLE;  Surgeon: Jacinto Adams MD;  Location: [facility] OR     REPAIR HAMMER TOE  9/13/10    multiple + RT great toe tendon release, Dr. Sanchez DPM     STENT  -    Cardiac stents 6 placed.     Family History   Problem Relation Age of Onset     C.A.D. Father      Hypertension Father      Myocardial Infarction Father 66         from MI     C.A.D. Brother      Myocardial Infarction Brother      Cancer Brother         skin ca     Breast Cancer Sister      Cancer - colorectal Sister      Heart Disease Sister      Neurologic Disorder Mother         migraine     C.A.D. Mother      Hypertension Mother      Heart Failure Mother      Thyroid Disease Son         cretinism     Hypertension Son      Heart Disease Son      Psychotic Disorder Sister      Hypertension Sister      Heart Disease Sister         CABG     Hypertension Brother      C.A.D. Brother      Myocardial Infarction Brother      Arthritis Daughter         RA     Hypertension Sister      Heart Disease Sister      Cancer Sister      Blood Disease Sister      Neurologic Disorder Child         migraine (2 children)       Objective  /86   Ht 1.676 m (5' 6\")   Wt 74.8 kg (165 lb)   BMI 26.63 kg/m      GENERAL APPEARANCE: healthy, alert and no distress   GAIT: antalgic, mildly  SKIN: no suspicious lesions or rashes  HEENT: Sclera clear, anicteric  CV: good peripheral pulses  RESP: Breathing not labored  NEURO: Normal strength and tone, mentation intact and speech normal  PSYCH:  mentation appears normal and affect normal/bright    Left hip exam  Inspection: prior bruising resolved      Tender: trochanteric bursa on left, mild anterior hip joint      Non Tender:      remainder of the hip area bilateral      ROM:     Range of motion limited by pain left, milder than previous      Strength:      flexion 4/5 left       abduction 5/5 bilateral       adduction 5/5 bilateral      Special Tests:             positive (+) FADIR left in " groin    Equal and intact distal pulses, no temperature differences in the feet or lower legs, palpable femoral pulses as well      Radiology  I visualized and reviewed these images with the patient  Recent Results (from the past 744 hour(s))   US Lower Extremity Arterial Duplex Left    Narrative    PROCEDURE:  Arterial duplex ultrasound examination of the left lower extremity    DATE OF PROCEDURE:  10/30/2018 1:00 PM    CLINICAL HISTORY/INDICATION:   87-year-old female with left lower extremity hematoma    COMPARISON:  None relevant    TECHNIQUE:   Grayscale, color-flow, and spectral waveform analysis with velocities  were performed of the arteries of the left lower extremity    FINDINGS: Patent iliac, common femoral, profunda, superficial femoral,  and popliteal arteries on the left without evidence of focal stenosis.  The tibial trifurcation and tibial vessels are patent without  significant stenosis. No vascular abnormality or active bleeding seen  in the area of bruising of the left lower extremity.      Impression    IMPRESSION:   1.  Patent left lower extremity arteries without evidence of stenosis.  2.  No active extravasation or vascular abnormality in the area of  lower extremity bruising.    ROXI BERNARDO MD       MR LUMBAR SPINE W/O CONTRAST 4/24/2018 12:05 PM  Provided History: R/o nerve root compression.; Health maintenance  examination; Lumbar pain; Radiculopathy; Weakness  ICD-10: Health maintenance examination; Lumbar pain; Radiculopathy;  Weakness  Comparison: Lumbar spine. No Radiograph 9/22/2017  Technique: Sagittal T1-weighted, sagittal STIR, 3D volumetric axial  and sagittal reconstructed T2-weighted images of the lumbar spine were  obtained without intravenous contrast.   Findings: Regarding numbering convention, there are 5 lumbar-type  vertebrae assumed for the purposes of this dictation.  The tip of the  conus medullaris is at T12-L1. There is a 6 mm anterolisthesis of L2  on L3, 5 mm  anterolisthesis of L3 on L4, and 3 mm retrolisthesis of L4  on L5. There is severe loss of disc height at L2-3 and L4-5. Inferior  endplate compression deformity at L2. Right scoliotic curvature of the  lumbar spine with apex at L2. Degenerative endplate and vertebral body  signal at L2-3, greater on the left.  On a level by level basis:  T12-L1: Mild bulging disc. No spinal canal or neuroforaminal stenosis.  L1-2: Moderate circumferential disc bulge. Moderate narrowing of the  spinal canal and mild-to-moderate bilateral neuroforaminal stenosis.  In addition, there is mild to moderate narrowing of the lateral  recesses bilaterally, with disc material contacting, but not clearly  impinging upon the descending L2 nerve roots bilaterally.  L2-3: Left central disc extrusion which abuts the descending left L3  nerve root in the lateral recess. In addition, a bulging disc contacts  the extraforaminal left L2 nerve root with moderate to severe left  neuroforaminal stenosis. Right foraminal disc protrusion with moderate  right neuroforaminal stenosis. Moderate to severe spinal canal  narrowing.  L3-4: Mild to moderate disc bulge which abuts the bilateral L3 nerve  roots. Moderate bilateral neuroforaminal stenosis. Moderate to severe  spinal canal narrowing. Bilateral facet arthropathy. Moderate left and  mild/moderate right narrowing of the lateral recesses, possibly  impinging upon the left descending L4 nerve root.   L4-5: Mild to moderate disc bulge with right foraminal disc extrusion  which abuts the right L4 nerve root. Moderate to severe right neural  foraminal stenosis and mild left neural foraminal stenosis. Moderate  spinal canal narrowing, with mild left greater than right lateral  recess narrowing, with disc material contacting the descending left L5  nerve root. Bilateral facet arthropathy.  L5-S1: Disc protrusion extending from the left central zone into the  right neural foramen, mildly narrowing the right  lateral recess, with  disc material contacting, but not clearly impinging upon the  descending right S1 nerve root. Moderate right and mild left neural  foraminal stenosis. Mild spinal canal narrowing. Bilateral facet  arthropathy.  Atrophy of the paraspinous and psoas musculature, right more than  left. 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  Impression:   1.  Moderate lateral recess narrowing with possible impingement upon  the descending left L3 nerve root at L2-3 and the descending left L4  nerve root at L3-4.  2.  Moderate to severe left neural foraminal narrowing at L2-3 and on  the right at L3-4 and L4-5.      LEFT HIP TWO TO THREE VIEWS   9/22/2017 8:44 AM   HISTORY: Evaluate arthritis. Pain in left leg.  COMPARISON: None.  IMPRESSION: Severe bilateral osteoarthritis of both hips is present.  There is no evidence for fracture or dislocation. Degenerative changes  also noted in spine.      LUMBAR SPINE 2 VIEWS  9/22/2017 8:24 AM   HISTORY: Pain in left leg  COMPARISON: None.  IMPRESSION : Multilevel degenerative change L2-S1 with disc space  narrowing at these levels and anterior L34 spondylolisthesis. There is  posterior facet degenerative change and lumbar scoliosis convex right.  No vertebral compression or acute appearing fractures. There is  bilateral medial hip joint space narrowing.    KNEE STANDING AP BILATERAL SUNRISE BILATERAL LATERAL LEFT 9/22/2017  8:24 AM  HISTORY: Pain in left leg.  COMPARISON: None.  IMPRESSION:   Right knee; On standing view there is joint space narrowing of the  right lateral compartment. Medial compartment is maintained.  Left knee; Both medial and lateral compartment joint spaces are  normal. No evidence for suprapatellar effusion on the left. There is  mild spurring at the lateral patellofemoral joint on the left.  Bilateral knees;  No acute-appearing abnormality, worrisome focal bone  Lesion.    Large Joint Injection/Arthocentesis: L greater  trochanteric bursa  Date/Time: 1/15/2019 10:30 AM  Performed by: Ricardo Palafox DO  Authorized by: Ricardo Palafox DO     Indications:  Pain  Needle Size:  25 G  Guidance: landmark guided    Approach:  Posterolateral  Location:  Hip  Site:  L greater trochanteric bursa  Medications:  2 mL bupivacaine (PF) 0.5 %; 2 mL lidocaine 1 %; 40 mg triamcinolone 40 MG/ML  Outcome:  Tolerated well, no immediate complications  Procedure discussed: discussed risks, benefits, and alternatives    Consent Given by:  Patient  Prep: patient was prepped and draped in usual sterile fashion          Assessment:  1. Primary osteoarthritis of left hip    2. Chronic left hip pain    3. Trochanteric bursitis of left hip    4. History of fracture of right ankle           Plan:  Plan for f/u in 2-3 weeks if pain is labile or worsens  Trochanteric bursa injection today  Offered repeat intraarticular injection, which is likely a more significant pain generator but she is understandably hesitant to repeat this after adverse reaction from last injection  Also dealing with deconditioning from right ankle fracture, had home PT but has completed that.   Ordered outpatient PT for balance, strengthening, activity progression for ankle and hip pains  Will continue to follow closely as directed with Dr Adams of [facility] who is managing the fracture, discussed breifly with him today  VERONICA conversation could be a consideration in the future as well  Expectations and goals of CSI reviewed  Often 2-3 days for steroid effect, and can take up to two weeks for maximum effect  We discussed modified progressive pain-free activity as tolerated  Do not overuse in first two weeks if feeling better due to concern for vulnerability while steroid is working  Supportive care reviewed  All questions were answered today  Contact us with additional questions or concerns  Signs and sx of concern reviewed      Ricardo Palafox DO, CAQ  Primary Care Sports  Medicine  [facility]

## 2019-01-15 NOTE — LETTER
1/15/2019         RE: Christiana Sal  [address]  [place] [address]        Dear Colleague,    Thank you for referring your patient, Christiana Sal, to the [facility]. Please see a copy of my visit note below.    Christiana Sal  :  10/25/1931  DOS:01/15/19  MRN: [id number]    Sports Medicine Clinic Visit - Interim History 2018    PCP: Kelly Matthew    Social History: retired    Interim History - 2018  Since last visit on 10/26/2018 with Dr Yeh patient has moderate-severe left thigh and lower leg bruising following her intra-articular left hip injection.  Patient reports that she had moderate-severe left thigh and knee pain on 10/26 following her injection that has moderately improved over the weekend.  Significant bruising noted over distal medial thigh, anterior knee and lower leg.  Mild bruising over injection site.  Mild-moderate tenderness over the anterior knee.  No new injury in the interim.    Interim History - 2018  Since last visit on 2018 patient has moderate left thigh pain.  Notes that bruising continues to improve, although thigh discomfort remains with walking.  Left hip intra-articular injection completed on 10/26 provided good relief of hip pain.  No new injury in the interim.    Interim History - January 15, 2019  Since last visit on 2018 patient has moderate left hip and thigh with intermittent weakness/giving out sensation over the last several years.  Patient notes that pain and weakness worsening over last 6 weeks after being confined to a  following right ankle fracture.  Pain located over left deep lateral hip and thigh with periodic radiation to left lower leg.  Notes minimal relief following left hip intra-articular injection.  No new injury in the interim.    Review of Systems  Skin: no bruising, no swelling  Musculoskeletal: as  above  Neurologic: no numbness, paresthesias  Remainder of review of systems is negative including constitutional, CV, pulmonary, GI, except as noted in HPI or medical history.    Patient's current problem list, past medical and surgical history, and family history were reviewed.    Patient Active Problem List   Diagnosis     Coronary atherosclerosis of native coronary artery     Hyperlipidemia LDL goal <100     Essential hypertension, benign     Hypothyroidism     GERD (gastroesophageal reflux disease)     Family history of colon cancer     Major depressive disorder, recurrent episode, mild (H)     Advanced directives, counseling/discussion     Angina pectoris (H)     Vulvar pruritus     Lichen planus     JENIFER (obstructive sleep apnea)     CAD S/P percutaneous coronary angioplasty     S/P cardiac pacemaker procedure     Sinoatrial node dysfunction (H)     Cardiac pacemaker - [employer] dual lead - Not Dependent     Insect bite     Primary osteoarthritis of left hip     Closed fracture of shaft of fibula     Fall     Past Medical History:   Diagnosis Date     Coronary atherosclerosis of native coronary artery     s/p MI ([facility])     Essential hypertension, benign      GERD (gastroesophageal reflux disease)      History of transient ischemic attack (TIA)      Hyperlipidemia LDL goal <100      Hypothyroidism      Major depressive disorder, recurrent episode, mild (H) 5/18/2011     Peptic ulcer disease with hemorrhage     Remote history of stomach ulcer, requiring transfusion after chronic bleeding     Past Surgical History:   Procedure Laterality Date     APPENDECTOMY       CARDIAC CATHERIZATION  1/15/04    drug-eluding stent RCA, Dr. Pérez, [facility]     CARDIAC CATHERIZATION  2/16/12    diffuse mild/mod disease, no new stent     CATARACT IOL, RT/LT  1/26/12    RT, Dr. Wynne     CHOLECYSTECTOMY, OPEN       COLONOSCOPY  2008    [facility]     FRACTURE TX, ANKLE RT/LT      LT, 2 screws  "remain     HC EXCISE HAND/FOOT NEUROMA      RT     HC LARYNGOSCOPY DIRECT W VOCAL CORD INJECTION      vocal cord polypectomy     HC REMOVAL OF OVARIAN CYST(S)       HC TRANSCATH STENT INIT VESSEL,PERCUT      x 2     OPEN REDUCTION INTERNAL FIXATION ANKLE Right 12/3/2018    Procedure: OPEN REDUCTION INTERNAL FIXATION Right ANKLE;  Surgeon: Jacinto Adams MD;  Location: [facility]     REPAIR HAMMER TOE  9/13/10    multiple + RT great toe tendon release, Dr. Sanchez DPLOLY     STENT  -    Cardiac stents 6 placed.     Family History   Problem Relation Age of Onset     C.A.D. Father      Hypertension Father      Myocardial Infarction Father 66         from MI     C.A.D. Brother      Myocardial Infarction Brother      Cancer Brother         skin ca     Breast Cancer Sister      Cancer - colorectal Sister      Heart Disease Sister      Neurologic Disorder Mother         migraine     C.A.D. Mother      Hypertension Mother      Heart Failure Mother      Thyroid Disease Son         cretinism     Hypertension Son      Heart Disease Son      Psychotic Disorder Sister      Hypertension Sister      Heart Disease Sister         CABG     Hypertension Brother      C.A.D. Brother      Myocardial Infarction Brother      Arthritis Daughter         RA     Hypertension Sister      Heart Disease Sister      Cancer Sister      Blood Disease Sister      Neurologic Disorder Child         migraine (2 children)       Objective  /86   Ht 1.676 m (5' 6\")   Wt 74.8 kg (165 lb)   BMI 26.63 kg/m       GENERAL APPEARANCE: healthy, alert and no distress   GAIT: antalgic, mildly  SKIN: no suspicious lesions or rashes  HEENT: Sclera clear, anicteric  CV: good peripheral pulses  RESP: Breathing not labored  NEURO: Normal strength and tone, mentation intact and speech normal  PSYCH:  mentation appears normal and affect normal/bright    Left hip exam  Inspection: prior bruising resolved      Tender: trochanteric bursa on left, mild anterior " hip joint      Non Tender:      remainder of the hip area bilateral      ROM:     Range of motion limited by pain left, milder than previous      Strength:      flexion 4/5 left       abduction 5/5 bilateral       adduction 5/5 bilateral      Special Tests:             positive (+) FADIR left in groin    Equal and intact distal pulses, no temperature differences in the feet or lower legs, palpable femoral pulses as well      Radiology  I visualized and reviewed these images with the patient  Recent Results (from the past 744 hour(s))   US Lower Extremity Arterial Duplex Left    Narrative    PROCEDURE:  Arterial duplex ultrasound examination of the left lower extremity    DATE OF PROCEDURE:  10/30/2018 1:00 PM    CLINICAL HISTORY/INDICATION:   87-year-old female with left lower extremity hematoma    COMPARISON:  None relevant    TECHNIQUE:   Grayscale, color-flow, and spectral waveform analysis with velocities  were performed of the arteries of the left lower extremity    FINDINGS: Patent iliac, common femoral, profunda, superficial femoral,  and popliteal arteries on the left without evidence of focal stenosis.  The tibial trifurcation and tibial vessels are patent without  significant stenosis. No vascular abnormality or active bleeding seen  in the area of bruising of the left lower extremity.      Impression    IMPRESSION:   1.  Patent left lower extremity arteries without evidence of stenosis.  2.  No active extravasation or vascular abnormality in the area of  lower extremity bruising.    ROXI BERNARDO MD       MR LUMBAR SPINE W/O CONTRAST 4/24/2018 12:05 PM  Provided History: R/o nerve root compression.; Health maintenance  examination; Lumbar pain; Radiculopathy; Weakness  ICD-10: Health maintenance examination; Lumbar pain; Radiculopathy;  Weakness  Comparison: Lumbar spine. No Radiograph 9/22/2017  Technique: Sagittal T1-weighted, sagittal STIR, 3D volumetric axial  and sagittal reconstructed T2-weighted  images of the lumbar spine were  obtained without intravenous contrast.   Findings: Regarding numbering convention, there are 5 lumbar-type  vertebrae assumed for the purposes of this dictation.  The tip of the  conus medullaris is at T12-L1. There is a 6 mm anterolisthesis of L2  on L3, 5 mm anterolisthesis of L3 on L4, and 3 mm retrolisthesis of L4  on L5. There is severe loss of disc height at L2-3 and L4-5. Inferior  endplate compression deformity at L2. Right scoliotic curvature of the  lumbar spine with apex at L2. Degenerative endplate and vertebral body  signal at L2-3, greater on the left.  On a level by level basis:  T12-L1: Mild bulging disc. No spinal canal or neuroforaminal stenosis.  L1-2: Moderate circumferential disc bulge. Moderate narrowing of the  spinal canal and mild-to-moderate bilateral neuroforaminal stenosis.  In addition, there is mild to moderate narrowing of the lateral  recesses bilaterally, with disc material contacting, but not clearly  impinging upon the descending L2 nerve roots bilaterally.  L2-3: Left central disc extrusion which abuts the descending left L3  nerve root in the lateral recess. In addition, a bulging disc contacts  the extraforaminal left L2 nerve root with moderate to severe left  neuroforaminal stenosis. Right foraminal disc protrusion with moderate  right neuroforaminal stenosis. Moderate to severe spinal canal  narrowing.  L3-4: Mild to moderate disc bulge which abuts the bilateral L3 nerve  roots. Moderate bilateral neuroforaminal stenosis. Moderate to severe  spinal canal narrowing. Bilateral facet arthropathy. Moderate left and  mild/moderate right narrowing of the lateral recesses, possibly  impinging upon the left descending L4 nerve root.   L4-5: Mild to moderate disc bulge with right foraminal disc extrusion  which abuts the right L4 nerve root. Moderate to severe right neural  foraminal stenosis and mild left neural foraminal stenosis. Moderate  spinal  canal narrowing, with mild left greater than right lateral  recess narrowing, with disc material contacting the descending left L5  nerve root. Bilateral facet arthropathy.  L5-S1: Disc protrusion extending from the left central zone into the  right neural foramen, mildly narrowing the right lateral recess, with  disc material contacting, but not clearly impinging upon the  descending right S1 nerve root. Moderate right and mild left neural  foraminal stenosis. Mild spinal canal narrowing. Bilateral facet  arthropathy.  Atrophy of the paraspinous and psoas musculature, right more than  left. 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  Impression:   1.  Moderate lateral recess narrowing with possible impingement upon  the descending left L3 nerve root at L2-3 and the descending left L4  nerve root at L3-4.  2.  Moderate to severe left neural foraminal narrowing at L2-3 and on  the right at L3-4 and L4-5.      LEFT HIP TWO TO THREE VIEWS   9/22/2017 8:44 AM   HISTORY: Evaluate arthritis. Pain in left leg.  COMPARISON: None.  IMPRESSION: Severe bilateral osteoarthritis of both hips is present.  There is no evidence for fracture or dislocation. Degenerative changes  also noted in spine.      LUMBAR SPINE 2 VIEWS  9/22/2017 8:24 AM   HISTORY: Pain in left leg  COMPARISON: None.  IMPRESSION : Multilevel degenerative change L2-S1 with disc space  narrowing at these levels and anterior L34 spondylolisthesis. There is  posterior facet degenerative change and lumbar scoliosis convex right.  No vertebral compression or acute appearing fractures. There is  bilateral medial hip joint space narrowing.    KNEE STANDING AP BILATERAL SUNRISE BILATERAL LATERAL LEFT 9/22/2017  8:24 AM  HISTORY: Pain in left leg.  COMPARISON: None.  IMPRESSION:   Right knee; On standing view there is joint space narrowing of the  right lateral compartment. Medial compartment is maintained.  Left knee; Both medial and lateral  compartment joint spaces are  normal. No evidence for suprapatellar effusion on the left. There is  mild spurring at the lateral patellofemoral joint on the left.  Bilateral knees;  No acute-appearing abnormality, worrisome focal bone  Lesion.    Large Joint Injection/Arthocentesis: L greater trochanteric bursa  Date/Time: 1/15/2019 10:30 AM  Performed by: Ricardo Palafox DO  Authorized by: Ricardo Palafox DO     Indications:  Pain  Needle Size:  25 G  Guidance: landmark guided    Approach:  Posterolateral  Location:  Hip  Site:  L greater trochanteric bursa  Medications:  2 mL bupivacaine (PF) 0.5 %; 2 mL lidocaine 1 %; 40 mg triamcinolone 40 MG/ML  Outcome:  Tolerated well, no immediate complications  Procedure discussed: discussed risks, benefits, and alternatives    Consent Given by:  Patient  Prep: patient was prepped and draped in usual sterile fashion          Assessment:  1. Primary osteoarthritis of left hip    2. Chronic left hip pain    3. Trochanteric bursitis of left hip    4. History of fracture of right ankle           Plan:  Plan for f/u in 2-3 weeks if pain is labile or worsens  Trochanteric bursa injection today  Offered repeat intraarticular injection, which is likely a more significant pain generator but she is understandably hesitant to repeat this after adverse reaction from last injection  Also dealing with deconditioning from right ankle fracture, had home PT but has completed that.   Ordered outpatient PT for balance, strengthening, activity progression for ankle and hip pains  Will continue to follow closely as directed with Dr Adams of [facility] who is managing the fracture, discussed breifly with him today  VERONICA conversation could be a consideration in the future as well  Expectations and goals of CSI reviewed  Often 2-3 days for steroid effect, and can take up to two weeks for maximum effect  We discussed modified progressive pain-free activity as tolerated  Do not overuse  in first two weeks if feeling better due to concern for vulnerability while steroid is working  Supportive care reviewed  All questions were answered today  Contact us with additional questions or concerns  Signs and sx of concern reviewed      Ricarod Palafox DO, CAQ  Primary Care Sports Medicine  [facility]         Again, thank you for allowing me to participate in the care of your patient.        Sincerely,        Ricardo Palafox DO

## 2019-01-16 ENCOUNTER — MEDICAL CORRESPONDENCE (OUTPATIENT)
Dept: HEALTH INFORMATION MANAGEMENT | Facility: CLINIC | Age: 84
End: 2019-01-16

## 2019-01-17 ENCOUNTER — TELEPHONE (OUTPATIENT)
Dept: FAMILY MEDICINE | Facility: CLINIC | Age: 84
End: 2019-01-17

## 2019-01-17 RX ORDER — ATORVASTATIN CALCIUM 40 MG/1
TABLET, FILM COATED ORAL
Qty: 90 TABLET | Refills: 3 | Status: SHIPPED | OUTPATIENT
Start: 2019-01-17 | End: 2020-02-07

## 2019-01-21 ENCOUNTER — MEDICAL CORRESPONDENCE (OUTPATIENT)
Dept: HEALTH INFORMATION MANAGEMENT | Facility: CLINIC | Age: 84
End: 2019-01-21

## 2019-01-23 ENCOUNTER — THERAPY VISIT (OUTPATIENT)
Dept: PHYSICAL THERAPY | Facility: CLINIC | Age: 84
End: 2019-01-23
Payer: MEDICARE

## 2019-01-23 DIAGNOSIS — M62.81 GENERALIZED MUSCLE WEAKNESS: ICD-10-CM

## 2019-01-23 DIAGNOSIS — M16.12 PRIMARY OSTEOARTHRITIS OF LEFT HIP: ICD-10-CM

## 2019-01-23 DIAGNOSIS — M25.552 CHRONIC LEFT HIP PAIN: ICD-10-CM

## 2019-01-23 DIAGNOSIS — R53.81 PHYSICAL DECONDITIONING: ICD-10-CM

## 2019-01-23 DIAGNOSIS — G89.29 CHRONIC LEFT HIP PAIN: ICD-10-CM

## 2019-01-23 DIAGNOSIS — M70.62 TROCHANTERIC BURSITIS OF LEFT HIP: ICD-10-CM

## 2019-01-23 DIAGNOSIS — Z87.81 HISTORY OF FRACTURE OF RIGHT ANKLE: ICD-10-CM

## 2019-01-23 DIAGNOSIS — M25.552 HIP PAIN, LEFT: Primary | ICD-10-CM

## 2019-01-23 PROCEDURE — 97161 PT EVAL LOW COMPLEX 20 MIN: CPT | Mod: GP | Performed by: PHYSICAL THERAPIST

## 2019-01-23 PROCEDURE — 97110 THERAPEUTIC EXERCISES: CPT | Mod: GP | Performed by: PHYSICAL THERAPIST

## 2019-01-23 ASSESSMENT — ACTIVITIES OF DAILY LIVING (ADL)
STANDING_FOR_15_MINUTES: SLIGHT DIFFICULTY
WALKING_DOWN_STEEP_HILLS: UNABLE TO DO
DEEP_SQUATTING: SLIGHT DIFFICULTY
HOW_WOULD_YOU_RATE_YOUR_CURRENT_LEVEL_OF_FUNCTION_DURING_YOUR_USUAL_ACTIVITIES_OF_DAILY_LIVING_FROM_0_TO_100_WITH_100_BEING_YOUR_LEVEL_OF_FUNCTION_PRIOR_TO_YOUR_HIP_PROBLEM_AND_0_BEING_THE_INABILITY_TO_PERFORM_ANY_OF_YOUR_USUAL_DAILY_ACTIVITIES?: 50
HOS_ADL_ITEM_SCORE_TOTAL: 31
WALKING_INITIALLY: MODERATE DIFFICULTY
GOING_UP_1_FLIGHT_OF_STAIRS: MODERATE DIFFICULTY
GETTING_INTO_AND_OUT_OF_AN_AVERAGE_CAR: SLIGHT DIFFICULTY
WALKING_APPROXIMATELY_10_MINUTES: MODERATE DIFFICULTY
WALKING_UP_STEEP_HILLS: UNABLE TO DO
HOS_ADL_SCORE(%): 45.59
HOS_ADL_COUNT: 17
GOING_DOWN_1_FLIGHT_OF_STAIRS: MODERATE DIFFICULTY
WALKING_15_MINUTES_OR_GREATER: EXTREME DIFFICULTY
GETTING_INTO_AND_OUT_OF_A_BATHTUB: EXTREME DIFFICULTY
PUTTING_ON_SOCKS_AND_SHOES: SLIGHT DIFFICULTY
STEPPING_UP_AND_DOWN_CURBS: SLIGHT DIFFICULTY
SITTING_FOR_15_MINUTES: UNABLE TO DO
ROLLING_OVER_IN_BED: SLIGHT DIFFICULTY
LIGHT_TO_MODERATE_WORK: SLIGHT DIFFICULTY
HOS_ADL_HIGHEST_POTENTIAL_SCORE: 68
RECREATIONAL_ACTIVITIES: EXTREME DIFFICULTY
HEAVY_WORK: UNABLE TO DO
TWISTING/PIVOTING_ON_INVOLVED_LEG: MODERATE DIFFICULTY

## 2019-01-23 NOTE — LETTER
DEPARTMENT OF HEALTH AND HUMAN SERVICES  [facility] FOR MEDICARE & MEDICAID SERVICES    PLAN/UPDATED PLAN OF PROGRESS FOR OUTPATIENT REHABILITATION    PATIENTS NAME:  Christiana Sal   : 10/25/1931  PROVIDER NUMBER:    [id number]  HICN:  [id number]  PROVIDER NAME: YuenimeiTIC Novaled  MEDICAL RECORD NUMBER: [id number]   START OF CARE DATE:  SOC Date: 19   TYPE:  PT  PRIMARY/TREATMENT DIAGNOSIS: (Pertinent Medical Diagnosis)   Primary osteoarthritis of left hip  Chronic left hip pain  Trochanteric bursitis of left hip  History of fracture of right ankle  Hip pain, left  Generalized muscle weakness  Physical deconditioning    VISITS FROM START OF CARE:  Rxs Used: 1     [facility] Initial Evaluation/Medicare Certification  The history is provided by the patient. No  was used.   Christiana Sal is a 87 year old female with a left hip condition.  Condition occurred with:  Insidious onset.    This is a new condition  Onset of L hip pain about a year+. They aren't sure if the pain is coming from the back or the hip. Had an injection in Oct and had a bad reaction to it and had a large hematoma from it and bruising from her thigh to her ankle. Then broke her R ankle on Dec 1st and was using a boot and had ORIF. Was weight bearing. Is now currently using her walker for ambulation. Prior to this was only using it occasionally. Last saw MD on 1/15/19.   Right now, is having pain down the side of her leg and into the thigh and hip. Did have an injection into her bursa last week which really has helped. Still has some sensations of the leg giving out however. Does feel weakness in her legs..    Patient reports pain:  Medial and lateral (side of her lower leg and shin).    Pain is described as aching and sharp and is intermittent and reported as 7/10 (0/10).  Associated symptoms:  Buckling/giving out, loss of strength and loss of motion/stiffness. Pain is the  same all the time.  Symptoms are exacerbated by walking and standing (bending or lifting, bailee with reaching to the L. Standing > 5 min, rolling over in bed) and relieved by analgesics (massage).  Since onset symptoms are gradually improving.    Previous treatment includes physical therapy.  There was moderate improvement following previous treatment.  General health as reported by patient is good.  Pertinent medical history includes:  Heart problems, high blood pressure, incontinence and osteoarthritis (Pacemaker).  Medical allergies: yes (Demerol, sulfa).  Other surgeries include:  Orthopedic surgery and other (gallbladder).  Current medications:  Anti-depressants, cardiac medication, pain medication and high blood pressure medication.  Current occupation is Retired.  Barriers include:  Lives alone (long hallways at home).  Red flags:  None as reported by the patient.    Lumbar/SI Evaluation  ROM:  AROM Lumbar: normal  Lumbar Palpation:  normal  Lumbar Provocation:    Left positive with:  PROM hip  Left negative with:  Mobility  Right negative with:  Mobility and PROM hip  SI joint/Sacrum:    Normal pelvic alignment    Christiana Sal       Hip Evaluation  HIP AROM:  : Overall, WNL except with L hip flexion (decreased 25% with ++ pain) and L hip IR (dec 50% ++ pain)  Hip Strength:    Flexion:   Left: 4/5   Pain:  Right: 5-/5   Pain:  Abduction:  Left: 4-/5     Pain:Right: 5-/5    Pain:  Knee Flexion:  Left: 4+/5   Pain:Right: 5-/5   Pain:  Knee Extension:  Left: 4/5   Pain:Right: 5-/5    Pain:  Hip Palpation:  Palpations normal left hip: No tenderness except in groin and adductors today. Pt notes less tenderness since injection into bursa.  Left hip tenderness present at:   Adductors  Left hip tenderness not present at:  Ischial Tuberosity; Greater Trachanter; IT Band; hip flexors; Piriformis; PSIS; ASIS; Abductors; Iliac Crest; Gluteus Medius or Bursa  Functional Testing:  Functional test hip: Requires HHA for  sit to stand. Decreased WB through L LE. Does reach for objects or wall with ambulation without walker.  General Evaluation:  Gait:    Significant findings:  Ambulates with FWW with mildly antalgic gait pattern.                                 Assessment/Plan:   Patient is a 87 year old female with left side hip complaints.    Patient has the following significant findings with corresponding treatment plan.                Diagnosis 1:  L hip pain however does have B LE weakness due to recent R ankle fracture and deconditioning.  Pain -  manual therapy, self management, education and home program  Decreased ROM/flexibility - manual therapy, therapeutic exercise, therapeutic activity and home program  Decreased strength - therapeutic exercise, therapeutic activities and home program  Impaired balance - neuro re-education, therapeutic activities and home program  Decreased proprioception - neuro re-education, therapeutic activities and home program  Impaired gait - gait training and home program  Impaired muscle performance - neuro re-education and home program  Decreased function - therapeutic activities and home program    Therapy Evaluation Codes:   1) History comprised of:   Personal factors that impact the plan of care:      Age and Past/current experiences.    Comorbidity factors that impact the plan of care are:      Heart problems, High blood pressure, Osteoarthritis and Weakness.     Medications impacting care: Anti-depressant, High blood pressure and Pain.  2) Examination of Body Systems comprised of:   Body structures and functions that impact the plan of care:      Hip, Knee and Lumbar spine.   Activity limitations that impact the plan of care are:      Bathing, Bending, Cooking, Lifting, Squatting/kneeling, Standing and Walking.  3) Clinical presentation characteristics are:   Stable/Uncomplicated.  4) Decision-Making    Low complexity using standardized patient assessment instrument and/or measureable  "assessment of functional outcome.  Cumulative Therapy Evaluation is: Low complexity.    Previous and current functional limitations:  (See Goal Flow Sheet for this information)    Short term and Long term goals: (See Goal Flow Sheet for this information)     Communication ability:  Patient appears to be able to clearly communicate and understand verbal and written communication and follow directions correctly.  Christiana Sal     Treatment Explanation - The following has been discussed with the patient:   RX ordered/plan of care  Anticipated outcomes  Possible risks and side effects  This patient would benefit from PT intervention to resume normal activities.   Rehab potential is good.    Frequency:  1 X week, once daily  Duration:  for 6 weeks  Discharge Plan:  Achieve all LTG.  Independent in home treatment program.  Reach maximal therapeutic benefit.        Caregiver Signature/Credentials _____________________________ Date ________       Treating Provider: Helen Alvarez, PT   I have reviewed and certified the need for these services and plan of treatment while under my care.        PHYSICIAN'S SIGNATURE:   _________________________________________  Date___________   Ricardo Palafox MD    Certification period:  Beginning of Cert date period: 01/23/19 to  End of Cert period date: 03/29/19     Functional Level Progress Report: Please see attached \"Goal Flow sheet for Functional level.\"    ____X____ Continue Services or       ________ DC Services                Service dates: From  SOC Date: 01/23/19 date to present                         "

## 2019-01-23 NOTE — PROGRESS NOTES
[facility] Initial Evaluation  Subjective:  The history is provided by the patient. No  was used.   Christiana Sal is a 87 year old female with a left hip condition.  Condition occurred with:  Insidious onset.    This is a new condition  Onset of L hip pain about a year+. They aren't sure if the pain is coming from the back or the hip. Had an injection in Oct and had a bad reaction to it and had a large hematoma from it and bruising from her thigh to her ankle. Then broke her R ankle on Dec 1st and was using a boot and had ORIF. Was weight bearing. Is now currently using her walker for ambulation. Prior to this was only using it occasionally. Last saw MD on 1/15/19.   Right now, is having pain down the side of her leg and into the thigh and hip. Did have an injection into her bursa last week which really has helped. Still has some sensations of the leg giving out however. Does feel weakness in her legs..    Patient reports pain:  Medial and lateral (side of her lower leg and shin).    Pain is described as aching and sharp and is intermittent and reported as 7/10 (0/10).  Associated symptoms:  Buckling/giving out, loss of strength and loss of motion/stiffness. Pain is the same all the time.  Symptoms are exacerbated by walking and standing (bending or lifting, bailee with reaching to the L. Standing > 5 min, rolling over in bed) and relieved by analgesics (massage).  Since onset symptoms are gradually improving.    Previous treatment includes physical therapy.  There was moderate improvement following previous treatment.  General health as reported by patient is good.  Pertinent medical history includes:  Heart problems, high blood pressure, incontinence and osteoarthritis (Pacemaker).  Medical allergies: yes (Demerol, sulfa).  Other surgeries include:  Orthopedic surgery and other (gallbladder).  Current medications:  Anti-depressants, cardiac medication, pain medication  and high blood pressure medication.  Current occupation is Retired.        Barriers include:  Lives alone (long hallways at home).    Red flags:  None as reported by the patient.                        Objective:  System         Lumbar/SI Evaluation  ROM:  AROM Lumbar: normal                Lumbar Palpation:  normal        Lumbar Provocation:    Left positive with:  PROM hip  Left negative with:  Mobility    Right negative with:  Mobility and PROM hip    SI joint/Sacrum:    Normal pelvic alignment                                            Hip Evaluation  HIP AROM:  : Overall, WNL except with L hip flexion (decreased 25% with ++ pain) and L hip IR (dec 50% ++ pain)                    Hip Strength:    Flexion:   Left: 4/5   Pain:  Right: 5-/5   Pain:                      Abduction:  Left: 4-/5     Pain:Right: 5-/5    Pain:        Knee Flexion:  Left: 4+/5   Pain:Right: 5-/5   Pain:  Knee Extension:  Left: 4/5   Pain:Right: 5-/5    Pain:          Hip Palpation:  Palpations normal left hip: No tenderness except in groin and adductors today. Pt notes less tenderness since injection into bursa.  Left hip tenderness present at:   Adductors  Left hip tenderness not present at:  Ischial Tuberosity; Greater Trachanter; IT Band; hip flexors; Piriformis; PSIS; ASIS; Abductors; Iliac Crest; Gluteus Medius or Bursa    Functional Testing:  Functional test hip: Requires HHA for sit to stand. Decreased WB through L LE. Does reach for objects or wall with ambulation without walker.                           General Evaluation:                              Gait:    Significant findings:  Ambulates with FWW with mildly antalgic gait pattern.                                     ROS    Assessment/Plan:    Patient is a 87 year old female with left side hip complaints.    Patient has the following significant findings with corresponding treatment plan.                Diagnosis 1:  L hip pain however does have B LE weakness due to recent R  ankle fracture and deconditioning.  Pain -  manual therapy, self management, education and home program  Decreased ROM/flexibility - manual therapy, therapeutic exercise, therapeutic activity and home program  Decreased strength - therapeutic exercise, therapeutic activities and home program  Impaired balance - neuro re-education, therapeutic activities and home program  Decreased proprioception - neuro re-education, therapeutic activities and home program  Impaired gait - gait training and home program  Impaired muscle performance - neuro re-education and home program  Decreased function - therapeutic activities and home program    Therapy Evaluation Codes:   1) History comprised of:   Personal factors that impact the plan of care:      Age and Past/current experiences.    Comorbidity factors that impact the plan of care are:      Heart problems, High blood pressure, Osteoarthritis and Weakness.     Medications impacting care: Anti-depressant, High blood pressure and Pain.  2) Examination of Body Systems comprised of:   Body structures and functions that impact the plan of care:      Hip, Knee and Lumbar spine.   Activity limitations that impact the plan of care are:      Bathing, Bending, Cooking, Lifting, Squatting/kneeling, Standing and Walking.  3) Clinical presentation characteristics are:   Stable/Uncomplicated.  4) Decision-Making    Low complexity using standardized patient assessment instrument and/or measureable assessment of functional outcome.  Cumulative Therapy Evaluation is: Low complexity.    Previous and current functional limitations:  (See Goal Flow Sheet for this information)    Short term and Long term goals: (See Goal Flow Sheet for this information)     Communication ability:  Patient appears to be able to clearly communicate and understand verbal and written communication and follow directions correctly.  Treatment Explanation - The following has been discussed with the patient:   RX  ordered/plan of care  Anticipated outcomes  Possible risks and side effects  This patient would benefit from PT intervention to resume normal activities.   Rehab potential is good.    Frequency:  1 X week, once daily  Duration:  for 6 weeks  Discharge Plan:  Achieve all LTG.  Independent in home treatment program.  Reach maximal therapeutic benefit.    Please refer to the daily flowsheet for treatment today, total treatment time and time spent performing 1:1 timed codes.

## 2019-02-01 ENCOUNTER — OFFICE VISIT (OUTPATIENT)
Dept: FAMILY MEDICINE | Facility: CLINIC | Age: 84
End: 2019-02-01
Payer: MEDICARE

## 2019-02-01 VITALS
TEMPERATURE: 98.1 F | DIASTOLIC BLOOD PRESSURE: 65 MMHG | HEART RATE: 81 BPM | SYSTOLIC BLOOD PRESSURE: 112 MMHG | HEIGHT: 66 IN | BODY MASS INDEX: 25.07 KG/M2 | WEIGHT: 156 LBS | OXYGEN SATURATION: 98 %

## 2019-02-01 DIAGNOSIS — F33.0 MAJOR DEPRESSIVE DISORDER, RECURRENT EPISODE, MILD (H): ICD-10-CM

## 2019-02-01 DIAGNOSIS — I49.5 SINOATRIAL NODE DYSFUNCTION (H): ICD-10-CM

## 2019-02-01 DIAGNOSIS — I25.119 ATHEROSCLEROSIS OF NATIVE CORONARY ARTERY OF NATIVE HEART WITH ANGINA PECTORIS (H): ICD-10-CM

## 2019-02-01 DIAGNOSIS — J18.9 PNEUMONIA OF LEFT LOWER LOBE DUE TO INFECTIOUS ORGANISM: Primary | ICD-10-CM

## 2019-02-01 PROCEDURE — 99213 OFFICE O/P EST LOW 20 MIN: CPT | Performed by: PHYSICIAN ASSISTANT

## 2019-02-01 RX ORDER — DOXYCYCLINE 100 MG/1
100 CAPSULE ORAL 2 TIMES DAILY
Qty: 20 CAPSULE | Refills: 0 | Status: SHIPPED | OUTPATIENT
Start: 2019-02-01 | End: 2019-04-01

## 2019-02-01 ASSESSMENT — MIFFLIN-ST. JEOR: SCORE: 1159.36

## 2019-02-01 ASSESSMENT — PAIN SCALES - GENERAL: PAINLEVEL: NO PAIN (0)

## 2019-02-01 NOTE — PROGRESS NOTES
SUBJECTIVE:   Christiana Sal is a 87 year old female who presents to clinic today for the following health issues:      ENT Symptoms             Symptoms: cc Present Absent Comment   Fever/Chills  x  Earlier this week    Fatigue  x  And weakness   Muscle Aches   x    Eye Irritation  x  Watering, redness    Sneezing  x     Nasal Trav/Drg  x     Sinus Pressure/Pain  x     Loss of smell   x    Dental pain   x    Sore Throat  x     Swollen Glands   x    Ear Pain/Fullness  x     Cough  x  A lot of mucous coming up    Wheeze  x     Chest Pain   x    Shortness of breath  x     Rash   x    Other   x      Symptom duration:  1 week    Symptom severity:  moderate    Treatments tried:  Cough syrup and otc sinus medication    Contacts:  None       Symptoms x1 week - not getting worse, maybe a bit better but not much  Low energy  Coughing - feels like she needs to cough hard to get up all the sputum. So much of it that she has a bucket by her bed to spit it out  Had pneumonia in the fall     Using walker today  Left leg/hip is bothering her more today  Had an injection with Dr. Palafox recently  Has f/u with Dr. Adams next week regarding the right ankle      Problem list and histories reviewed & adjusted, as indicated.  Additional history: as documented    Current Outpatient Medications   Medication Sig Dispense Refill     acetaminophen (TYLENOL) 500 MG tablet Take 2 tablets (1,000 mg) by mouth every 8 hours as needed for mild pain 30 tablet 0     albuterol (PROAIR HFA/PROVENTIL HFA/VENTOLIN HFA) 108 (90 BASE) MCG/ACT Inhaler Inhale 2 puffs into the lungs every 6 hours as needed for shortness of breath / dyspnea or wheezing 1 Inhaler 0     ASPIRIN 81 MG PO TABS Take 2 tablets by mouth every evening        atorvastatin (LIPITOR) 40 MG tablet TAKE ONE TABLET BY MOUTH EVERY DAY 90 tablet 3     CALCIUM 600 + D OR 1 tablet by mouth daily       calcium carbonate (TUMS) 500 MG chewable tablet Take 1 chew tab by mouth 3 times  daily as needed for heartburn       citalopram (CELEXA) 20 MG tablet TAKE ONE TABLET BY MOUTH EVERY DAY 90 tablet 1     levothyroxine (SYNTHROID/LEVOTHROID) 75 MCG tablet TAKE 1 TABLET BY MOUTH ONCE DAILY (NEEDS  LAB  WORK) 90 tablet 0     losartan (COZAAR) 25 MG tablet TAKE 1 TABLET BY MOUTH ONCE DAILY 90 tablet 2     meclizine (ANTIVERT) 25 MG tablet TAKE ONE TABLET BY MOUTH EVERY 6 HOURS AS NEEDED FOR DIZZINESS 30 tablet 3     metoprolol succinate ER (TOPROL-XL) 25 MG 24 hr tablet Take 0.5 tablets (12.5 mg) by mouth daily 45 tablet 3     Multiple Vitamins-Minerals (OCUVITE PO) Take 1 tablet by mouth daily.       OMEPRAZOLE PO Take 20 mg by mouth daily       oxyCODONE (ROXICODONE) 5 MG tablet Take 1 tablet (5 mg) by mouth every 6 hours as needed 14 tablet 0     pramipexole (MIRAPEX) 0.5 MG tablet TAKE ONE TABLET BY MOUTH AT BEDTIME 90 tablet 2     senna-docusate (SENOKOT-S/PERICOLACE) 8.6-50 MG tablet Take 2 tablets by mouth 2 times daily       tolterodine (DETROL) 2 MG tablet Take 1 tablet (2 mg) by mouth 2 times daily 60 tablet 3     Vitamin D, Cholecalciferol, 1000 units TABS Take 2,000 Units by mouth daily       nitroglycerin (NITROSTAT) 0.4 MG SL tablet Place 1 tablet (0.4 mg) under the tongue every 5 minutes as needed for chest pain (call your doctor if you take a third dose) (Patient not taking: Reported on 12/18/2018) 25 tablet 3     Allergies   Allergen Reactions     Demerol Nausea     Lisinopril Cough     Meperidine Unknown     Sulfa Drugs Other (See Comments) and Unknown     Questionable itching reported by patient     BP Readings from Last 3 Encounters:   02/01/19 112/65   01/15/19 128/86   12/18/18 128/72    Wt Readings from Last 3 Encounters:   02/01/19 70.8 kg (156 lb)   01/15/19 74.8 kg (165 lb)   12/10/18 76.6 kg (168 lb 12.8 oz)                    Reviewed and updated as needed this visit by clinical staff       Reviewed and updated as needed this visit by Provider         ROS:  Remainder of ROS  "obtained and found to be negative other than that which was documented above      OBJECTIVE:     /65   Pulse 81   Temp 98.1  F (36.7  C) (Tympanic)   Ht 1.676 m (5' 6\")   Wt 70.8 kg (156 lb)   SpO2 98%   BMI 25.18 kg/m    Body mass index is 25.18 kg/m .  GENERAL: healthy, alert and no distress  EYES: Eyes grossly normal to inspection  HENT: ear canals and TM's normal, nose and mouth without ulcers or lesions  RESP: fine crackles at bases, L>R, no rhonchi or wheezing  CV: regular rates and rhythm, normal S1 S2, no S3 or S4 and no murmur, click or rub    Diagnostic Test Results:  none     ASSESSMENT/PLAN:     (J18.1) Pneumonia of left lower lobe due to infectious organism (H)  (primary encounter diagnosis)  Comment: crackles at bases, more on left than right. Likely some component of atelectasis but with her recent history of pneumonia and sx duration of a week with significant cough would like to treat for possible pneumonia.   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 Patient tearful today that her body seems to be \"failing\" her  Plan: patient says she is doing okay but is tearful today in part because of current illness. Continue to monitor. Just got through her wedding anniversary - first one without her . Anniversary of his death is coming up in May    (I49.5) Sinoatrial node dysfunction (H)  Comment:   Plan:     (I25.119) Atherosclerosis of native coronary artery of native heart with angina pectoris (H)  Comment:   Plan:         Kelly Matthew PA-C  [facility]"

## 2019-02-04 ENCOUNTER — ANCILLARY PROCEDURE (OUTPATIENT)
Dept: CARDIOLOGY | Facility: CLINIC | Age: 84
End: 2019-02-04
Payer: MEDICARE

## 2019-02-04 DIAGNOSIS — I49.5 SICK SINUS SYNDROME (H): ICD-10-CM

## 2019-02-04 PROCEDURE — 93296 REM INTERROG EVL PM/IDS: CPT | Performed by: INTERNAL MEDICINE

## 2019-02-04 PROCEDURE — 93294 REM INTERROG EVL PM/LDLS PM: CPT | Performed by: INTERNAL MEDICINE

## 2019-02-05 LAB
MDC_IDC_EPISODE_DTM: NORMAL
MDC_IDC_EPISODE_DURATION: 123 S
MDC_IDC_EPISODE_DURATION: 123 S
MDC_IDC_EPISODE_DURATION: 124 S
MDC_IDC_EPISODE_DURATION: 125 S
MDC_IDC_EPISODE_DURATION: 126 S
MDC_IDC_EPISODE_ID: NORMAL
MDC_IDC_EPISODE_TYPE: NORMAL
MDC_IDC_LEAD_IMPLANT_DT: NORMAL
MDC_IDC_LEAD_IMPLANT_DT: NORMAL
MDC_IDC_LEAD_LOCATION: NORMAL
MDC_IDC_LEAD_LOCATION: NORMAL
MDC_IDC_LEAD_LOCATION_DETAIL_1: NORMAL
MDC_IDC_LEAD_LOCATION_DETAIL_1: NORMAL
MDC_IDC_LEAD_MFG: NORMAL
MDC_IDC_LEAD_MFG: NORMAL
MDC_IDC_LEAD_MODEL: NORMAL
MDC_IDC_LEAD_MODEL: NORMAL
MDC_IDC_LEAD_POLARITY_TYPE: NORMAL
MDC_IDC_LEAD_POLARITY_TYPE: NORMAL
MDC_IDC_LEAD_SERIAL: NORMAL
MDC_IDC_LEAD_SERIAL: NORMAL
MDC_IDC_MSMT_BATTERY_DTM: NORMAL
MDC_IDC_MSMT_BATTERY_REMAINING_LONGEVITY: 150 MO
MDC_IDC_MSMT_BATTERY_REMAINING_PERCENTAGE: 100 %
MDC_IDC_MSMT_BATTERY_STATUS: NORMAL
MDC_IDC_MSMT_LEADCHNL_RA_IMPEDANCE_VALUE: 738 OHM
MDC_IDC_MSMT_LEADCHNL_RA_PACING_THRESHOLD_AMPLITUDE: 1 V
MDC_IDC_MSMT_LEADCHNL_RA_PACING_THRESHOLD_PULSEWIDTH: 0.4 MS
MDC_IDC_MSMT_LEADCHNL_RV_IMPEDANCE_VALUE: 743 OHM
MDC_IDC_MSMT_LEADCHNL_RV_PACING_THRESHOLD_AMPLITUDE: 1.2 V
MDC_IDC_MSMT_LEADCHNL_RV_PACING_THRESHOLD_PULSEWIDTH: 0.4 MS
MDC_IDC_PG_IMPLANT_DTM: NORMAL
MDC_IDC_PG_MFG: NORMAL
MDC_IDC_PG_MODEL: NORMAL
MDC_IDC_PG_SERIAL: NORMAL
MDC_IDC_PG_TYPE: NORMAL
MDC_IDC_SESS_CLINIC_NAME: NORMAL
MDC_IDC_SESS_DTM: NORMAL
MDC_IDC_SESS_TYPE: NORMAL
MDC_IDC_SET_BRADY_AT_MODE_SWITCH_MODE: NORMAL
MDC_IDC_SET_BRADY_AT_MODE_SWITCH_RATE: 170 {BEATS}/MIN
MDC_IDC_SET_BRADY_LOWRATE: 50 {BEATS}/MIN
MDC_IDC_SET_BRADY_MAX_SENSOR_RATE: 120 {BEATS}/MIN
MDC_IDC_SET_BRADY_MAX_TRACKING_RATE: 120 {BEATS}/MIN
MDC_IDC_SET_BRADY_MODE: NORMAL
MDC_IDC_SET_BRADY_PAV_DELAY_HIGH: 100 MS
MDC_IDC_SET_BRADY_PAV_DELAY_LOW: 200 MS
MDC_IDC_SET_BRADY_SAV_DELAY_HIGH: 85 MS
MDC_IDC_SET_BRADY_SAV_DELAY_LOW: 170 MS
MDC_IDC_SET_LEADCHNL_RA_PACING_AMPLITUDE: 2 V
MDC_IDC_SET_LEADCHNL_RA_PACING_CAPTURE_MODE: NORMAL
MDC_IDC_SET_LEADCHNL_RA_PACING_POLARITY: NORMAL
MDC_IDC_SET_LEADCHNL_RA_PACING_PULSEWIDTH: 0.4 MS
MDC_IDC_SET_LEADCHNL_RA_SENSING_ADAPTATION_MODE: NORMAL
MDC_IDC_SET_LEADCHNL_RA_SENSING_POLARITY: NORMAL
MDC_IDC_SET_LEADCHNL_RA_SENSING_SENSITIVITY: 0.25 MV
MDC_IDC_SET_LEADCHNL_RV_PACING_AMPLITUDE: 1.9 V
MDC_IDC_SET_LEADCHNL_RV_PACING_CAPTURE_MODE: NORMAL
MDC_IDC_SET_LEADCHNL_RV_PACING_POLARITY: NORMAL
MDC_IDC_SET_LEADCHNL_RV_PACING_PULSEWIDTH: 0.4 MS
MDC_IDC_SET_LEADCHNL_RV_SENSING_ADAPTATION_MODE: NORMAL
MDC_IDC_SET_LEADCHNL_RV_SENSING_POLARITY: NORMAL
MDC_IDC_SET_LEADCHNL_RV_SENSING_SENSITIVITY: 1.5 MV
MDC_IDC_SET_ZONE_DETECTION_INTERVAL: 375 MS
MDC_IDC_SET_ZONE_TYPE: NORMAL
MDC_IDC_SET_ZONE_VENDOR_TYPE: NORMAL
MDC_IDC_STAT_AT_BURDEN_PERCENT: 1 %
MDC_IDC_STAT_AT_DTM_END: NORMAL
MDC_IDC_STAT_AT_DTM_START: NORMAL
MDC_IDC_STAT_BRADY_DTM_END: NORMAL
MDC_IDC_STAT_BRADY_DTM_START: NORMAL
MDC_IDC_STAT_BRADY_RA_PERCENT_PACED: 2 %
MDC_IDC_STAT_BRADY_RV_PERCENT_PACED: 3 %
MDC_IDC_STAT_EPISODE_RECENT_COUNT: 0
MDC_IDC_STAT_EPISODE_RECENT_COUNT: 1
MDC_IDC_STAT_EPISODE_RECENT_COUNT_DTM_END: NORMAL
MDC_IDC_STAT_EPISODE_RECENT_COUNT_DTM_START: NORMAL
MDC_IDC_STAT_EPISODE_TYPE: NORMAL
MDC_IDC_STAT_EPISODE_VENDOR_TYPE: NORMAL

## 2019-02-06 ENCOUNTER — THERAPY VISIT (OUTPATIENT)
Dept: PHYSICAL THERAPY | Facility: CLINIC | Age: 84
End: 2019-02-06
Payer: MEDICARE

## 2019-02-06 DIAGNOSIS — M25.552 HIP PAIN, LEFT: ICD-10-CM

## 2019-02-06 DIAGNOSIS — R53.81 PHYSICAL DECONDITIONING: ICD-10-CM

## 2019-02-06 DIAGNOSIS — M62.81 GENERALIZED MUSCLE WEAKNESS: ICD-10-CM

## 2019-02-06 PROCEDURE — 97140 MANUAL THERAPY 1/> REGIONS: CPT | Mod: GP | Performed by: PHYSICAL THERAPIST

## 2019-02-06 PROCEDURE — 97110 THERAPEUTIC EXERCISES: CPT | Mod: GP | Performed by: PHYSICAL THERAPIST

## 2019-02-06 PROCEDURE — 97112 NEUROMUSCULAR REEDUCATION: CPT | Mod: GP | Performed by: PHYSICAL THERAPIST

## 2019-02-08 DIAGNOSIS — F33.0 MAJOR DEPRESSIVE DISORDER, RECURRENT EPISODE, MILD (H): ICD-10-CM

## 2019-02-08 DIAGNOSIS — I10 ESSENTIAL HYPERTENSION WITH GOAL BLOOD PRESSURE LESS THAN 140/90: ICD-10-CM

## 2019-02-08 RX ORDER — LOSARTAN POTASSIUM 25 MG/1
25 TABLET ORAL DAILY
Qty: 90 TABLET | Refills: 1 | Status: ON HOLD | OUTPATIENT
Start: 2019-02-08 | End: 2019-05-06

## 2019-02-08 RX ORDER — CITALOPRAM HYDROBROMIDE 20 MG/1
20 TABLET ORAL DAILY
Qty: 90 TABLET | Refills: 0 | Status: SHIPPED | OUTPATIENT
Start: 2019-02-08 | End: 2019-05-18

## 2019-02-08 NOTE — TELEPHONE ENCOUNTER
"    Requested Prescriptions   Pending Prescriptions Disp Refills     citalopram (CELEXA) 20 MG tablet 90 tablet 1     Sig: Take 1 tablet (20 mg) by mouth daily    SSRIs Protocol Passed - 2/8/2019  8:28 AM       Passed - PHQ-9 score less than 5 in past 6 months    Please review last PHQ-9 score.          Passed - Medication is active on med list       Passed - Patient is age 18 or older       Passed - No active pregnancy on record       Passed - No positive pregnancy test in last 12 months       Passed - Recent (6 mo) or future (30 days) visit within the authorizing provider's specialty    Patient had office visit in the last 6 months or has a visit in the next 30 days with authorizing provider or within the authorizing provider's specialty.  See \"Patient Info\" tab in inbasket, or \"Choose Columns\" in Meds & Orders section of the refill encounter.            losartan (COZAAR) 25 MG tablet 90 tablet 2     Sig: Take 1 tablet (25 mg) by mouth daily    Angiotensin-II Receptors Passed - 2/8/2019  8:28 AM       Passed - Blood pressure under 140/90 in past 12 months    BP Readings from Last 3 Encounters:   02/01/19 112/65   01/15/19 128/86   12/18/18 128/72                Passed - Recent (12 mo) or future (30 days) visit within the authorizing provider's specialty    Patient had office visit in the last 12 months or has a visit in the next 30 days with authorizing provider or within the authorizing provider's specialty.  See \"Patient Info\" tab in inbasket, or \"Choose Columns\" in Meds & Orders section of the refill encounter.             Passed - Medication is active on med list       Passed - Patient is age 18 or older       Passed - No active pregnancy on record       Passed - Normal serum creatinine on file in past 12 months    Recent Labs   Lab Test 12/11/18  0836   CR 0.74            Passed - Normal serum potassium on file in past 12 months    Recent Labs   Lab Test 12/11/18  0836   POTASSIUM 3.8                   Passed " - No positive pregnancy test in past 12 months        PHQ-9 SCORE 3/2/2017 3/2/2018 9/6/2018   PHQ-9 Total Score - - -   PHQ-9 Total Score 6 4 1

## 2019-02-08 NOTE — TELEPHONE ENCOUNTER
losartan      Last Written Prescription Date:  10/26/18  Last Fill Quantity: 90,   # refills: 2  Last Office Visit: 2/1/19  Future Office visit:       Routing refill request to provider for review/approval because:  Drug not on the [facility], [facility] or [facility] refill protocol or controlled substance    citalopram      Last Written Prescription Date:  10/30/18  Last Fill Quantity: 90,   # refills: 1  Last Office Visit: 2/1/19  Future Office visit:       Routing refill request to provider for review/approval because:  Drug not on the [facility], [facility] or [facility] refill protocol or controlled substance    ranitidine   -  She states that she has always gotten this by prescription.       Last Written Prescription Date:  unknown  Last Fill Quantity: unknown,   # refills: unknown  Last Office Visit: 2/1/19  Future Office visit:       Routing refill request to provider for review/approval because:  Drug not on the [facility], [facility] or [facility] refill protocol or controlled substance

## 2019-02-13 ENCOUNTER — THERAPY VISIT (OUTPATIENT)
Dept: PHYSICAL THERAPY | Facility: CLINIC | Age: 84
End: 2019-02-13
Payer: MEDICARE

## 2019-02-13 DIAGNOSIS — R53.81 PHYSICAL DECONDITIONING: ICD-10-CM

## 2019-02-13 DIAGNOSIS — M62.81 GENERALIZED MUSCLE WEAKNESS: ICD-10-CM

## 2019-02-13 DIAGNOSIS — M25.552 HIP PAIN, LEFT: ICD-10-CM

## 2019-02-13 PROCEDURE — 97140 MANUAL THERAPY 1/> REGIONS: CPT | Mod: GP | Performed by: PHYSICAL THERAPIST

## 2019-02-13 PROCEDURE — 97110 THERAPEUTIC EXERCISES: CPT | Mod: GP | Performed by: PHYSICAL THERAPIST

## 2019-02-13 PROCEDURE — 97112 NEUROMUSCULAR REEDUCATION: CPT | Mod: GP | Performed by: PHYSICAL THERAPIST

## 2019-02-15 ENCOUNTER — TELEPHONE (OUTPATIENT)
Dept: FAMILY MEDICINE | Facility: CLINIC | Age: 84
End: 2019-02-15

## 2019-02-15 ENCOUNTER — TELEPHONE (OUTPATIENT)
Dept: PHYSICAL THERAPY | Facility: CLINIC | Age: 84
End: 2019-02-15

## 2019-02-15 DIAGNOSIS — M62.81 GENERALIZED MUSCLE WEAKNESS: ICD-10-CM

## 2019-02-15 DIAGNOSIS — M25.552 HIP PAIN, LEFT: ICD-10-CM

## 2019-02-15 DIAGNOSIS — R53.81 PHYSICAL DECONDITIONING: ICD-10-CM

## 2019-02-15 DIAGNOSIS — K21.9 GASTROESOPHAGEAL REFLUX DISEASE, ESOPHAGITIS PRESENCE NOT SPECIFIED: Primary | ICD-10-CM

## 2019-02-15 NOTE — TELEPHONE ENCOUNTER
Christiana called and reported she had a bad night last night in terms of her hip pain and noted this morming she was having numbness in her lateral calf area and is wondering if she should be concerned. No lack of strength, is able to feel touch, able to fully move foot/ankle without difficulty. No redness, swelling etc. Is intermittent and a little worse when she's standing and walking vs sitting. Talked to patient about reasons behind and I don't feel this is anything emergent but to continue to monitor sx's and when she should seek medical care more urgently. Pt was understanding about it.

## 2019-02-15 NOTE — TELEPHONE ENCOUNTER
Reason for call:  Patient reporting a symptom    Symptom or request: Christiana was seen on 2/1/19 and treated for pneumonia with doxycycline.  She has finished medication and cough is gone, but today she woke up with a swollen throat. Didn't know if she needed to be seen again -  Is this related to the pneumonia?  Please call and assess. Thank you..Cosnuelo Hawthorne    Duration (how long have symptoms been present): this morning    Have you been treated for this before? No    Phone Number patient can be reached at:  Home number on file [phone] (home)    Best Time:  Any time    Can we leave a detailed message on this number:  YES    Call taken on 2/15/2019 at 10:25 AM by Consuelo Hawthorne

## 2019-02-15 NOTE — TELEPHONE ENCOUNTER
Call placed to patient.  Reports waking this am, denies sore throat. Feels throat is swollen.  Denies difficulty breathing or shortness of breath.  Swallowing without difficulty, normal breakfast.  Patient did cough this morning, more than previously. Did cough up large amount of phlegm.  Does not feel febrile.  Generally feeling better.  Requested patient feel her neck, does not feel swollen, baseline.    Advised good po fluid to assist with expectorate any remaining mucous.  If patient feels breathing is difficult or difficulty swallowing, needs evaluation, ED. Patient agrees with plan.  Patient requested refills of Ranitidine, 150mg 2 tabs at [facility]. [facility].  This writer does not see this on medication list.  Will forward to PCP to advise.  Kirsten Decker RN

## 2019-02-18 NOTE — TELEPHONE ENCOUNTER
Please see how she is feeling - if the sore throat is better.   Also let her know I refilled the ranitidine as requested but since she was taking 2 tabs at bedtime or 300mg I filled a 300mg tablet instead - please make sure she knows this so she only takes 1 of them and if she would like to lower the dose to 150mg she can cut the pills in half  Kelly

## 2019-02-18 NOTE — TELEPHONE ENCOUNTER
IZABEL Mendoza: Christiana says her throat feels like it is getting better; not all the way better yet.  She said that she has been taking two 150 mg ranitidine. Advised of the 300 mg tabs so to take just one at a time.  She agreed with plan.  Roel Garza RN

## 2019-02-19 ENCOUNTER — TRANSFERRED RECORDS (OUTPATIENT)
Dept: HEALTH INFORMATION MANAGEMENT | Facility: CLINIC | Age: 84
End: 2019-02-19

## 2019-02-27 ENCOUNTER — THERAPY VISIT (OUTPATIENT)
Dept: PHYSICAL THERAPY | Facility: CLINIC | Age: 84
End: 2019-02-27
Payer: MEDICARE

## 2019-02-27 DIAGNOSIS — R53.81 PHYSICAL DECONDITIONING: ICD-10-CM

## 2019-02-27 DIAGNOSIS — M62.81 GENERALIZED MUSCLE WEAKNESS: ICD-10-CM

## 2019-02-27 DIAGNOSIS — M25.552 HIP PAIN, LEFT: ICD-10-CM

## 2019-02-27 PROCEDURE — 97140 MANUAL THERAPY 1/> REGIONS: CPT | Mod: GP | Performed by: PHYSICAL THERAPIST

## 2019-02-28 ENCOUNTER — TRANSFERRED RECORDS (OUTPATIENT)
Dept: HEALTH INFORMATION MANAGEMENT | Facility: CLINIC | Age: 84
End: 2019-02-28

## 2019-02-28 ENCOUNTER — TELEPHONE (OUTPATIENT)
Dept: PALLIATIVE MEDICINE | Facility: CLINIC | Age: 84
End: 2019-02-28

## 2019-02-28 NOTE — TELEPHONE ENCOUNTER
Pre-screening Questions for Radiology Injections:    Injection to be done at which interventional clinic site? [facility]    Instruct patient to arrive as directed prior to the scheduled appointment time:    [facility]: 30 minutes before      [facility]: 30 minutes before; if IV needed 1 hour before     Procedure ordered by Bryan    Procedure ordered? Lumbar Epidural Steroid Injection    What insurance would patient like us to bill for this procedure? BC      Worker's comp or MVA (motor vehicle accident) -Any injection DO NOT SCHEDULE and route to Cristina Negrete.      [employer] insurance - For SI joint injections, DO NOT SCHEDULE and route Cristina Negrete.       [employer] - Any injection besides hip/shoulder/knee joint DO NOT SCHEDULE and route to Cristina Negrete. She will obtain PA and call pt back to schedule procedure or notify pt of denial.        [employer]-Route to Cristina for review        IF SCHEDULING IN [place] AND NEEDS A PA, IT IS OKAY TO SCHEDULE. [place] HANDLES THEIR OWN PA'S AFTER THE PATIENT IS SCHEDULED. PLEASE SCHEDULE AT LEAST 1 WEEK OUT SO A PA CAN BE OBTAINED.      Any chance of pregnancy? NO   If YES, do NOT schedule and route to RN pool    Is an  needed? No     Patient has a drive home? (mandatory) YES: ok    Is patient taking any blood thinners (plavix, coumadin, jantoven, warfarin, heparin, pradaxa or dabigatran )? No   If hold needed, do NOT schedule, route to RN pool     Is patient taking any aspirin products (includes Excedrin and Fiorinal)? Yes - Pt takes 162mg daily; instructed to hold 0 day(s) prior to procedure.      If more than 325mg/day do NOT schedule; route to RN pool     For CERVICAL procedures, hold all aspirin products for 6 days.     Tell pt that if aspirin product is not held for 6 days, the procedure WILL BE cancelled.      Does the patient have a bleeding or clotting disorder? No     If YES, okay to schedule AND route to RN nurse pool    For any patients with  platelet count <100, must be forwarded to provider    Is patient diabetic?  No  If YES, have them bring their glucometer.    Does patient have an active infection or treated for one within the past week? No     Is patient currently taking any antibiotics?  No     For patients on chronic, preventative, or prophylactic antibiotics, procedures may be scheduled.     For patients on antibiotics for active or recent infection:    Manjula Odell [facility], [place]-antibiotic course must have been completed for 4 days    Is patient currently taking any steroid medications? (i.e. Prednisone, Medrol)  No     For patients on steroid medications:    Manjula Odell [facility], [place]-steroid course must have been completed for 4 days    Reviewed with patient:  If you are started on any steroids or antibiotics between now and your appointment, you must contact us because the procedure may need to be cancelled.  Yes    Is patient actively being treated for cancer or immunocompromised? No  If YES, do NOT schedule and route to RN pool     Are you able to get on and off an exam table with minimal or no assistance? Yes  If NO, do NOT schedule and route to RN pool    Are you able to roll over and lay on your stomach with minimal or no assistance? Yes  If NO, do NOT schedule and route to RN pool     Any allergies to contrast dye, iodine, shellfish, or numbing and steroid medications? No  If YES, route to RN pool AND add allergy information to appointment notes    Allergies: Demerol; Lisinopril; Meperidine; and Sulfa drugs      Has the patient had a flu shot or any other vaccinations within 7 days before or after the procedure.  No     Does patient have an MRI/CT?  YES: MRi  (SI joint, hip injections, lumbar sympathetic blocks, and stellate ganglion blocks do not require an MRI)    Was the MRI done w/in the last 3 years?  Yes    Was MRI done at [facility]? Yes      If not, where was it done? N/A       If MRI was not done at  [facility], [facility] or [facility] do NOT schedule and route to nursing.  If pt has an imaging disc, the injection may be scheduled but pt has to bring disc to appt. If they show up w/out disc the injection cannot be done    Reminders (please tell patient if applicable):       Instructed pt to arrive 30 minutes early for IV start if this is for a cervical procedure, ALL sympathetic (stellate ganglion, hypogastric, or lumbar sympathetic block) and all sedation procedures (RFA, spinal cord stimulation trials).  Not Applicable   -IVs are not routinely placed for Dr. Mcgregor cervical cases   -Dr. Chavez: IVs for cervical ESIs and cervical TBDs (not CMBBs/facet inj)      If NPO for sedation, informed patient that it is okay to take medications with sips of water (except if they are to hold blood thinners).  Not Applicable   *DO take blood pressure medication if it is prescribed*      If this is for a cervical FERCHO, informed patient that aspirin needs to be held for 6 days.   Not Applicable      For all patients not having spinal cord stimulator (SCS) trials or radiofrequency ablations (RFAs), informed patient:    IV sedation is not provided for this procedure.  If you feel that an oral anti-anxiety medication is needed, you can discuss this further with your referring provider or primary care provider.  The Pain Clinic provider will discuss specifics of what the procedure includes at your appointment.  Most procedures last 10-20 minutes.  We use numbing medications to help with any discomfort during the procedure.  Not Applicable      Do not schedule procedures requiring IV placement in the first appointment of the day or first appointment after lunch. Do NOT schedule at 0745, 0815 or 1245.       For patients 85 or older we recommend having an adult stay w/ them for the remainder of the day.       Does the patient have any questions?  NO  Chandrika Lee  [facility]

## 2019-02-28 NOTE — TELEPHONE ENCOUNTER
Received 2- at 1:35 PM    Order received at [facility] [place] [facility].      Incoming fax from Dr. Jacinto Adams with [facility] for a left L4-5 transforaminal FERCHO for left hip pain. Dx: left leg radicular pain. Per order, to be done by Dr. Gia Chin and to be scheduled at [facility].                [facility]'s phone: [phone]             [facility]'s fax: [phone]    Routing to  for scheduling coordinators to schedule injection.    Kristina Montclair  Patient Representative  [facility]

## 2019-03-06 ENCOUNTER — THERAPY VISIT (OUTPATIENT)
Dept: PHYSICAL THERAPY | Facility: CLINIC | Age: 84
End: 2019-03-06
Payer: MEDICARE

## 2019-03-06 DIAGNOSIS — M25.552 HIP PAIN, LEFT: ICD-10-CM

## 2019-03-06 DIAGNOSIS — R53.81 PHYSICAL DECONDITIONING: ICD-10-CM

## 2019-03-06 DIAGNOSIS — M62.81 GENERALIZED MUSCLE WEAKNESS: ICD-10-CM

## 2019-03-06 PROCEDURE — 97140 MANUAL THERAPY 1/> REGIONS: CPT | Mod: GP | Performed by: PHYSICAL THERAPIST

## 2019-03-06 PROCEDURE — 97112 NEUROMUSCULAR REEDUCATION: CPT | Mod: GP | Performed by: PHYSICAL THERAPIST

## 2019-03-06 PROCEDURE — 97110 THERAPEUTIC EXERCISES: CPT | Mod: GP | Performed by: PHYSICAL THERAPIST

## 2019-03-07 ENCOUNTER — HOSPITAL ENCOUNTER (OUTPATIENT)
Dept: RADIOLOGY | Facility: CLINIC | Age: 84
Discharge: HOME | End: 2019-03-07
Attending: ANESTHESIOLOGY | Admitting: ANESTHESIOLOGY
Payer: MEDICARE

## 2019-03-07 ENCOUNTER — RADIOLOGY INJECTION OFFICE VISIT (OUTPATIENT)
Dept: PALLIATIVE MEDICINE | Facility: CLINIC | Age: 84
End: 2019-03-07
Payer: MEDICARE

## 2019-03-07 VITALS — RESPIRATION RATE: 15 BRPM | SYSTOLIC BLOOD PRESSURE: 153 MMHG | HEART RATE: 69 BPM | DIASTOLIC BLOOD PRESSURE: 74 MMHG

## 2019-03-07 DIAGNOSIS — M54.16 LUMBAR RADICULOPATHY: Primary | ICD-10-CM

## 2019-03-07 DIAGNOSIS — M51.369 DDD (DEGENERATIVE DISC DISEASE), LUMBAR: ICD-10-CM

## 2019-03-07 DIAGNOSIS — M54.16 LUMBAR RADICULOPATHY: ICD-10-CM

## 2019-03-07 PROCEDURE — 25000128 H RX IP 250 OP 636: Performed by: ANESTHESIOLOGY

## 2019-03-07 PROCEDURE — 27210202 XR LUMBAR SACRAL TRANSFORMINAL INJ LEFT: Mod: TC

## 2019-03-07 PROCEDURE — 64483 NJX AA&/STRD TFRM EPI L/S 1: CPT | Mod: LT | Performed by: ANESTHESIOLOGY

## 2019-03-07 PROCEDURE — 25000125 ZZHC RX 250: Performed by: ANESTHESIOLOGY

## 2019-03-07 RX ORDER — LIDOCAINE HYDROCHLORIDE 10 MG/ML
5 INJECTION, SOLUTION EPIDURAL; INFILTRATION; INTRACAUDAL; PERINEURAL ONCE
Status: COMPLETED | OUTPATIENT
Start: 2019-03-07 | End: 2019-03-07

## 2019-03-07 RX ORDER — DEXAMETHASONE SODIUM PHOSPHATE 10 MG/ML
20 INJECTION, SOLUTION INTRAMUSCULAR; INTRAVENOUS ONCE
Status: COMPLETED | OUTPATIENT
Start: 2019-03-07 | End: 2019-03-07

## 2019-03-07 RX ORDER — BUPIVACAINE HYDROCHLORIDE 5 MG/ML
10 INJECTION, SOLUTION PERINEURAL ONCE
Status: COMPLETED | OUTPATIENT
Start: 2019-03-07 | End: 2019-03-07

## 2019-03-07 RX ORDER — METHYLPREDNISOLONE ACETATE 40 MG/ML
40 INJECTION, SUSPENSION INTRA-ARTICULAR; INTRALESIONAL; INTRAMUSCULAR; SOFT TISSUE ONCE
Status: COMPLETED | OUTPATIENT
Start: 2019-03-07 | End: 2019-03-07

## 2019-03-07 RX ORDER — IOPAMIDOL 612 MG/ML
15 INJECTION, SOLUTION INTRATHECAL ONCE
Status: COMPLETED | OUTPATIENT
Start: 2019-03-07 | End: 2019-03-07

## 2019-03-07 RX ADMIN — DEXAMETHASONE SODIUM PHOSPHATE 5 MG: 10 INJECTION, SOLUTION INTRAMUSCULAR; INTRAVENOUS at 09:53

## 2019-03-07 RX ADMIN — METHYLPREDNISOLONE ACETATE 40 MG: 40 INJECTION, SUSPENSION INTRA-ARTICULAR; INTRALESIONAL; INTRAMUSCULAR; SOFT TISSUE at 09:53

## 2019-03-07 RX ADMIN — BUPIVACAINE HYDROCHLORIDE 1 ML: 5 INJECTION, SOLUTION EPIDURAL; INTRACAUDAL; PERINEURAL at 09:54

## 2019-03-07 RX ADMIN — LIDOCAINE HYDROCHLORIDE 1.5 ML: 10 INJECTION, SOLUTION EPIDURAL; INFILTRATION; INTRACAUDAL; PERINEURAL at 09:56

## 2019-03-07 RX ADMIN — IOPAMIDOL 1 ML: 612 INJECTION, SOLUTION INTRATHECAL at 09:55

## 2019-03-07 ASSESSMENT — PAIN SCALES - GENERAL
PAINLEVEL: MODERATE PAIN (5)
PAINLEVEL: NO PAIN (1)

## 2019-03-07 NOTE — PROGRESS NOTES
Pre-procedure Intake    Have you been fasting? No     If yes, for how long?     Are you taking a prescribed blood thinner such as coumadin, Plavix, Xarelto?    NO    If yes, when did you take your last dose?     Do you take aspirin?  Yes -   ASA    If cervical procedure, have you held aspirin for 6 days?   NA    Do you have any allergies to contrast dye, iodine, steroid and/or numbing medications?  NO    Are you currently taking antibiotics or have an active infection?  NO    Have you had a fever/elevated temperature within the past week? NO    Are you currently taking oral steroids? NO    Do you have a ? Yes       Are you pregnant or breastfeeding?  Not Applicable    Are the vital signs normal?  Yes

## 2019-03-07 NOTE — IP AVS SNAPSHOT
[facility]  [address]  [place] [address]  Phone:  [phone]  Fax:  [phone]                                    After Visit Summary   3/7/2019    Christiana Sal    MRN: [id number]           After Visit Summary Signature Page    I have received my discharge instructions, and my questions have been answered. I have discussed any challenges I see with this plan with the nurse or doctor.    ..........................................................................................................................................  Patient/Patient Representative Signature      ..........................................................................................................................................  Patient Representative Print Name and Relationship to Patient    ..................................................               ................................................  Date                                   Time    ..........................................................................................................................................  Reviewed by Signature/Title    ...................................................              ..............................................  Date                                               Time          22EPIC Rev 08/18

## 2019-03-07 NOTE — PROGRESS NOTES
Pre procedure Diagnosis: lumbar radiculopathy, lumbar degenerative disc disease   Post procedure Diagnosis: Same  Procedure performed: lumbar transforaminal epidural steroid injection at L4-5 on the left, fluoroscopically guided, contrast controlled  Anesthesia: none  Complications: none  Operators: Joshua Chin MD     Indications:   Christiana Sal is a 87 year old female was sent by Dr. Jacinto Adams for lumbar epidural steroid injection.  They have a history of chronic lower back pain with pain radiating into the left hip, buttock and thigh.  Exam shows antalgic gait with a single based cane, lumbar tenderness, and equivocal SLR and they have tried conservative treatment including physical therapy, medications.    MRI was done on 4/24/18 which showed   Findings: Regarding numbering convention, there are 5 lumbar-type  vertebrae assumed for the purposes of this dictation.  The tip of the  conus medullaris is at T12-L1. There is a 6 mm anterolisthesis of L2  on L3, 5 mm anterolisthesis of L3 on L4, and 3 mm retrolisthesis of L4  on L5. There is severe loss of disc height at L2-3 and L4-5. Inferior  endplate compression deformity at L2. Right scoliotic curvature of the  lumbar spine with apex at L2. Degenerative endplate and vertebral body  signal at L2-3, greater on the left.     On a level by level basis:     T12-L1: Mild bulging disc. No spinal canal or neuroforaminal stenosis.      L1-2: Moderate circumferential disc bulge. Moderate narrowing of the  spinal canal and mild-to-moderate bilateral neuroforaminal stenosis.  In addition, there is mild to moderate narrowing of the lateral  recesses bilaterally, with disc material contacting, but not clearly  impinging upon the descending L2 nerve roots bilaterally.     L2-3: Left central disc extrusion which abuts the descending left L3  nerve root in the lateral recess. In addition, a bulging disc contacts  the extraforaminal left L2 nerve root with  moderate to severe left  neuroforaminal stenosis. Right foraminal disc protrusion with moderate  right neuroforaminal stenosis. Moderate to severe spinal canal  narrowing.     L3-4: Mild to moderate disc bulge which abuts the bilateral L3 nerve  roots. Moderate bilateral neuroforaminal stenosis. Moderate to severe  spinal canal narrowing. Bilateral facet arthropathy. Moderate left and  mild/moderate right narrowing of the lateral recesses, possibly  impinging upon the left descending L4 nerve root.      L4-5: Mild to moderate disc bulge with right foraminal disc extrusion  which abuts the right L4 nerve root. Moderate to severe right neural  foraminal stenosis and mild left neural foraminal stenosis. Moderate  spinal canal narrowing, with mild left greater than right lateral  recess narrowing, with disc material contacting the descending left L5  nerve root. Bilateral facet arthropathy.     L5-S1: Disc protrusion extending from the left central zone into the  right neural foramen, mildly narrowing the right lateral recess, with  disc material contacting, but not clearly impinging upon the  descending right S1 nerve root. Moderate right and mild left neural  foraminal stenosis. Mild spinal canal narrowing. Bilateral facet  arthropathy.     Atrophy of the paraspinous and psoas musculature, right more than  left. 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                                                                   Impression:   1.  Moderate lateral recess narrowing with possible impingement upon  the descending left L3 nerve root at L2-3 and the descending left L4  nerve root at L3-4.  2.  Moderate to severe left neural foraminal narrowing at L2-3 and on  the right at L3-4 and L4-5.     I have personally reviewed the examination and initial interpretation  and I agree with the findings.     DIANE RIOS MD    Options/alternatives, benefits and risks were discussed with the patient  including bleeding, infection, tissue trauma, numbness, weakness, paralysis, spinal cord injury, radiation exposure, headache and reaction to medications. Questions were answered to her satisfaction and she agrees to proceed. Voluntary informed consent was obtained and signed.     Vitals were reviewed: Yes  /72   Pulse 67   Resp 16   Allergies were reviewed:  Yes   Medications were reviewed:  Yes   Pre-procedure pain score: 5/10    Procedure:  After getting informed consent, patient was brought into the procedure suite and was placed in a prone position on the procedure table.   A Pause for the Cause was performed.  Patient was prepped and draped in sterile fashion.     After identifying the left L4-5 neuroforamen, the C-arm was rotated to a left lateral oblique angle.  A total of 1.5 ml of Lidocaine 1% was used to anesthetize the skin and the needle track at a skin entry site coaxial with the fluoroscopy beam, and overriding the superior aspect of the neuroforamen.  A 25 gauge 5 inch spinal needle was advanced under intermittent fluoroscopy until it entered the foramen superiorly.    The needle position was then inspected from anteroposterior and lateral views, and the needle adjusted appropriately.  A total of 1 ml of Isovue-300 was injected, confirming appropriate position, with spread into the nerve root sheath and the epidural space, with no intravascular uptake. 14 ml was wasted    Then, after repeated negative aspiration, 2.5 ml of a combination of Depomedrol 40 mg, Decadron 5 mg, 0.5% bupivacaine 1 ml was injected and the needle was flushed with lidocaine and removed.    During the procedure, there was not a paresthesia.  Hemostasis was achieved, the area was cleaned, and bandaids were placed when appropriate.  The patient tolerated the procedure well, and was taken to the recovery room.    Images were saved to PACS.    Post-procedure pain score: 1/10  Follow-up includes:   -f/u phone call in one  week  -f/u with referring provider    Joshua Chin MD  [facility]

## 2019-03-07 NOTE — DISCHARGE INSTRUCTIONS
[facility]   Procedure Discharge Instructions    Today you saw:    Dr. Joshua Chin      You had an:  Lumbar Epidural steroid injection      Medications used:  Lidocaine   Bupivacaine   Dexamethasone Depo-medrol  IsoVue          Be cautious when walking. Numbness and/or weakness in the lower extremities may occur for up to 6-8 hours after the procedure due to effect of the local anesthetic    Do not drive for 6 hours. The effect of the local anesthetic could slow your reflexes.     You may resume your regular activities after 24 hours    Avoid strenuous activity for the first 24 hours    You may shower, however avoid swimming, tub baths or hot tubs for 24 hours following your procedure    You may have a mild to moderate increase in pain for several days following the injection.    It may take up to 14 days for the steroid medication to start working although you may feel the effect as early as a few days after the procedure.       You may use ice packs for 10-15 minutes, 3 to 4 times a day at the injection site for comfort    Do not use heat to painful areas for 6 to 8 hours. This will give the local anesthetic time to wear off and prevent you from accidentally burning your skin.     You may use anti-inflammatory medications (such as Ibuprofen or Aleve or Advil) or Tylenol for pain control if necessary    Possible side effects of steroids that you may experience include flushing, elevated blood pressure, increased appetite, mild headaches and restlessness.  All of these symptoms will get better with time.    If you experience any of the following, call the Pain Clinic during work hours at [phone] or the Provider Line after hours at [phone]:  -Fever over 100 degree F  -Swelling, bleeding, redness, drainage, warmth at the injection site  -Progressive weakness or numbness in your legs or arms  -Loss of bowel or bladder function  -Unusual headache that is not relieved by Tylenol or  other pain reliever  -Unusual new onset of pain that is not improving

## 2019-03-07 NOTE — IP AVS SNAPSHOT
MRN:[id number]                      After Visit Summary   3/7/2019    Christiana Sal    MRN: [id number]           Visit Information        Provider Department      3/7/2019  9:45 AM [facility]           Review of your medicines      UNREVIEWED medicines. Ask your doctor about these medicines       Dose / Directions   acetaminophen 500 MG tablet  Commonly known as:  TYLENOL      Dose:  1000 mg  Take 2 tablets (1,000 mg) by mouth every 8 hours as needed for mild pain  Quantity:  30 tablet  Refills:  0     albuterol 108 (90 Base) MCG/ACT inhaler  Commonly known as:  PROAIR HFA/PROVENTIL HFA/VENTOLIN HFA  Used for:  Acute bronchitis with symptoms > 10 days      Dose:  2 puff  Inhale 2 puffs into the lungs every 6 hours as needed for shortness of breath / dyspnea or wheezing  Quantity:  1 Inhaler  Refills:  0     aspirin 81 MG tablet  Commonly known as:  ASA      Dose:  2 tablet  Take 2 tablets by mouth every evening  Refills:  0     atorvastatin 40 MG tablet  Commonly known as:  LIPITOR  Used for:  CAD S/P percutaneous coronary angioplasty      TAKE ONE TABLET BY MOUTH EVERY DAY  Quantity:  90 tablet  Refills:  3     CALCIUM 600 + D PO      1 tablet by mouth daily  Refills:  0     calcium carbonate 500 MG chewable tablet  Commonly known as:  TUMS      Dose:  1 chew tab  Take 1 chew tab by mouth 3 times daily as needed for heartburn  Refills:  0     citalopram 20 MG tablet  Commonly known as:  celeXA  Used for:  Major depressive disorder, recurrent episode, mild (H)      Dose:  20 mg  Take 1 tablet (20 mg) by mouth daily  Quantity:  90 tablet  Refills:  0     doxycycline hyclate 100 MG capsule  Commonly known as:  VIBRAMYCIN  Used for:  Pneumonia of left lower lobe due to infectious organism (H)  Ask about: Should I take this medication?      Dose:  100 mg  Take 1 capsule (100 mg) by mouth 2 times daily for 10 days  Quantity:  20 capsule  Refills:  0     levothyroxine  75 MCG tablet  Commonly known as:  SYNTHROID/LEVOTHROID  Used for:  Hypothyroidism, unspecified type      TAKE 1 TABLET BY MOUTH ONCE DAILY (NEEDS  LAB  WORK)  Quantity:  90 tablet  Refills:  0     losartan 25 MG tablet  Commonly known as:  COZAAR  Used for:  Essential hypertension with goal blood pressure less than 140/90      Dose:  25 mg  Take 1 tablet (25 mg) by mouth daily  Quantity:  90 tablet  Refills:  1     meclizine 25 MG tablet  Commonly known as:  ANTIVERT  Used for:  Vertigo      TAKE ONE TABLET BY MOUTH EVERY 6 HOURS AS NEEDED FOR DIZZINESS  Quantity:  30 tablet  Refills:  3     metoprolol succinate ER 25 MG 24 hr tablet  Commonly known as:  TOPROL-XL  Used for:  Atherosclerosis of native coronary artery of native heart without angina pectoris      Dose:  12.5 mg  Take 0.5 tablets (12.5 mg) by mouth daily  Quantity:  45 tablet  Refills:  3     nitroGLYcerin 0.4 MG sublingual tablet  Commonly known as:  NITROSTAT  Used for:  Atherosclerosis of native coronary artery of native heart without angina pectoris      Dose:  0.4 mg  Place 1 tablet (0.4 mg) under the tongue every 5 minutes as needed for chest pain (call your doctor if you take a third dose)  Quantity:  25 tablet  Refills:  3     OCUVITE PO      Dose:  1 tablet  Take 1 tablet by mouth daily.  Refills:  0     OMEPRAZOLE PO      Dose:  20 mg  Take 20 mg by mouth daily  Refills:  0     oxyCODONE 5 MG tablet  Commonly known as:  ROXICODONE  Used for:  Closed torus fracture of distal end of right fibula, initial encounter      Dose:  5 mg  Take 1 tablet (5 mg) by mouth every 6 hours as needed  Quantity:  14 tablet  Refills:  0     pramipexole 0.5 MG tablet  Commonly known as:  MIRAPEX  Used for:  Restless leg syndrome      TAKE ONE TABLET BY MOUTH AT BEDTIME  Quantity:  90 tablet  Refills:  2     ranitidine 300 MG tablet  Commonly known as:  ZANTAC  Used for:  Gastroesophageal reflux disease, esophagitis presence not specified      Dose:  300 mg  Take  1 tablet (300 mg) by mouth At Bedtime  Quantity:  90 tablet  Refills:  0     senna-docusate 8.6-50 MG tablet  Commonly known as:  SENOKOT-S/PERICOLACE      Dose:  2 tablet  Take 2 tablets by mouth 2 times daily  Refills:  0     tolterodine 2 MG tablet  Commonly known as:  DETROL  Used for:  Overactive bladder      Dose:  2 mg  Take 1 tablet (2 mg) by mouth 2 times daily  Quantity:  60 tablet  Refills:  3     Vitamin D (Cholecalciferol) 1000 units Tabs      Dose:  2000 Units  Take 2,000 Units by mouth daily  Refills:  0              Protect others around you: Learn how to safely use, store and throw away your medicines at www.disposemymeds.org.       Follow-ups after your visit       Your next 10 appointments already scheduled    Mar 07, 2019  9:45 AM CST  Radiology Injections with Joshua Chin MD  [facility] ([facility]) [address]  [address]  [facility] [place] [address]  [phone]   Mar 07, 2019  9:45 AM CST  XR LUMBAR EPIDURAL INJECTION with WYPADELMIS  [facility] ([facility]) [address] [address]  [phone]   How do I prepare for my exam? (Food and drink instructions) No Food and Drink Restrictions.  How do I prepare for my exam? (Other instructions) * If you take Coumadin (or any other blood thinners) you may need to stop taking them up to 5 days prior to the exam. Talk to your doctor before stopping. * If you take Plavix, Ticlid, Pletal or Persantine, please ask your doctor if you should stop these medicines. You may need extra tests on the morning of your scan. * If you take Xarelto (Rivaroxaban), you may need to stop taking it 24 hours before treatment. Talk to your doctor before stopping this medicine.  What should I wear: Wear comfortable clothes.  How long does the exam take: Injections take about 30 to 45 minutes. Most people spend up to 2 hours in the clinic or hospital.   What should I bring: Please bring any scans or X-rays taken at other hospitals, if similar tests were done. Also bring a list of your medicines, including vitamins, minerals and over-the-counter drugs. It is safest to leave personal items at home. You will need a  : Plan to have someone drive you home afterward.  What do I need to tell my doctor: Tell your doctor in advance: * If you are allergic to X-ray dye (contrast fluid). * If you may be pregnant.  What should I do after the exam: You may have slight cramping during this exam. The cramps should go away afterward. You may have some spotting after the exam.  What is this test: A spinal shot is done in or near the spine. You may receive steroid medicine (to reduce swelling) or contrast fluid (dye that makes the area show up more clearly on X-rays). A nerve root shot is a shot into the nerve near the spine. It may reduce inflammation near the nerve root or spinal cord and reduce pain in the arm or leg.  Who should I call with questions: If you have any questions, please call the Imaging Department where you will have your exam. Directions, parking instructions, and other information is available on our website, [email or web address].   Mar 14, 2019  2:25 PM CDT  MARY KAY Extremity with Queenie Meehan [facility] ([facility]) [address] [address]  [phone]   Mar 22, 2019  8:10 AM CDT  MARY KAY Extremity with Helen Alvarez, [facility] ([facility]) [address] [address]  [phone]   May 20, 2019 12:00 AM CDT  CARDIAC DEVICE CHECK - REMOTE with MONK TECH1  [facility] ([facility]) [address]  [place] [id number] [phone]      Care Instructions       Further instructions from your care team       [facility]   Procedure Discharge Instructions    Today you saw:    Dr. Joshua Chin       You had an:  Lumbar Epidural steroid injection      Medications used:  Lidocaine   Bupivacaine   Dexamethasone Depo-medrol  IsoVue          Be cautious when walking. Numbness and/or weakness in the lower extremities may occur for up to 6-8 hours after the procedure due to effect of the local anesthetic    Do not drive for 6 hours. The effect of the local anesthetic could slow your reflexes.     You may resume your regular activities after 24 hours    Avoid strenuous activity for the first 24 hours    You may shower, however avoid swimming, tub baths or hot tubs for 24 hours following your procedure    You may have a mild to moderate increase in pain for several days following the injection.    It may take up to 14 days for the steroid medication to start working although you may feel the effect as early as a few days after the procedure.       You may use ice packs for 10-15 minutes, 3 to 4 times a day at the injection site for comfort    Do not use heat to painful areas for 6 to 8 hours. This will give the local anesthetic time to wear off and prevent you from accidentally burning your skin.     You may use anti-inflammatory medications (such as Ibuprofen or Aleve or Advil) or Tylenol for pain control if necessary    Possible side effects of steroids that you may experience include flushing, elevated blood pressure, increased appetite, mild headaches and restlessness.  All of these symptoms will get better with time.    If you experience any of the following, call the Pain Clinic during work hours at [phone] or the Provider Line after hours at [phone]:  -Fever over 100 degree F  -Swelling, bleeding, redness, drainage, warmth at the injection site  -Progressive weakness or numbness in your legs or arms  -Loss of bowel or bladder function  -Unusual headache that is not relieved by Tylenol or other pain reliever  -Unusual new onset of pain that is not improving        Additional Information About Your  Visit       [facility] Information    [facility] gives you secure access to your electronic health record. If you see a primary care provider, you can also send messages to your care team and make appointments. If you have questions, please call your primary care clinic.  If you do not have a primary care provider, please call [phone] and they will assist you.       Care EveryWhere ID    This is your Care EveryWhere ID. This could be used by other organizations to access your [facility] medical records  [id number]        Primary Care Provider Office Phone # Fax #    Kelly Matthew PA-C [phone]      Equal Access to Services    [facility]: Hadii shailesh delong hadasho Soalvino, waaxda luqadaha, qaybta kaalmada polina, danya brian . So [facility] [phone].    ATENCIÓN: Si habla español, tiene a ozuna disposición servicios gratuitos de asistencia lingüística. [facility] [phone].    We comply with applicable federal civil rights laws and [place] laws. We do not discriminate on the basis of race, color, national origin, age, disability, sex, sexual orientation, or gender identity.           Thank you!    Thank you for choosing [facility] for your care. Our goal is always to provide you with excellent care. Hearing back from our patients is one way we can continue to improve our services. Please take a few minutes to complete the written survey that you may receive in the mail after you visit with us. Thank you!            Medication List      ASK your doctor about these medications          Morning Afternoon Evening Bedtime As Needed    acetaminophen 500 MG tablet  Also known as:  TYLENOL  INSTRUCTIONS:  Take 2 tablets (1,000 mg) by mouth every 8 hours as needed for mild pain                     albuterol 108 (90 Base) MCG/ACT inhaler  Also known as:  PROAIR HFA/PROVENTIL HFA/VENTOLIN HFA  INSTRUCTIONS:  Inhale 2 puffs into the lungs every 6 hours as needed for  shortness of breath / dyspnea or wheezing                     aspirin 81 MG tablet  Also known as:  ASA  INSTRUCTIONS:  Take 2 tablets by mouth every evening                     atorvastatin 40 MG tablet  Also known as:  LIPITOR  INSTRUCTIONS:  TAKE ONE TABLET BY MOUTH EVERY DAY                     CALCIUM 600 + D PO  INSTRUCTIONS:  1 tablet by mouth daily                     calcium carbonate 500 MG chewable tablet  Also known as:  TUMS  INSTRUCTIONS:  Take 1 chew tab by mouth 3 times daily as needed for heartburn                     citalopram 20 MG tablet  Also known as:  celeXA  INSTRUCTIONS:  Take 1 tablet (20 mg) by mouth daily                     doxycycline hyclate 100 MG capsule  Also known as:  VIBRAMYCIN  INSTRUCTIONS:  Take 1 capsule (100 mg) by mouth 2 times daily for 10 days  Ask about: Should I take this medication?                     levothyroxine 75 MCG tablet  Also known as:  SYNTHROID/LEVOTHROID  INSTRUCTIONS:  TAKE 1 TABLET BY MOUTH ONCE DAILY (NEEDS  LAB  WORK)  Doctor's comments:  Please consider 90 day supplies to promote better adherence                     losartan 25 MG tablet  Also known as:  COZAAR  INSTRUCTIONS:  Take 1 tablet (25 mg) by mouth daily                     meclizine 25 MG tablet  Also known as:  ANTIVERT  INSTRUCTIONS:  TAKE ONE TABLET BY MOUTH EVERY 6 HOURS AS NEEDED FOR DIZZINESS                     metoprolol succinate ER 25 MG 24 hr tablet  Also known as:  TOPROL-XL  INSTRUCTIONS:  Take 0.5 tablets (12.5 mg) by mouth daily                     nitroGLYcerin 0.4 MG sublingual tablet  Also known as:  NITROSTAT  INSTRUCTIONS:  Place 1 tablet (0.4 mg) under the tongue every 5 minutes as needed for chest pain (call your doctor if you take a third dose)                     OCUVITE PO  INSTRUCTIONS:  Take 1 tablet by mouth daily.                     OMEPRAZOLE PO  INSTRUCTIONS:  Take 20 mg by mouth daily                     oxyCODONE 5 MG tablet  Also known as:   ROXICODONE  INSTRUCTIONS:  Take 1 tablet (5 mg) by mouth every 6 hours as needed                     pramipexole 0.5 MG tablet  Also known as:  MIRAPEX  INSTRUCTIONS:  TAKE ONE TABLET BY MOUTH AT BEDTIME                     ranitidine 300 MG tablet  Also known as:  ZANTAC  INSTRUCTIONS:  Take 1 tablet (300 mg) by mouth At Bedtime                     senna-docusate 8.6-50 MG tablet  Also known as:  SENOKOT-S/PERICOLACE  INSTRUCTIONS:  Take 2 tablets by mouth 2 times daily                     tolterodine 2 MG tablet  Also known as:  DETROL  INSTRUCTIONS:  Take 1 tablet (2 mg) by mouth 2 times daily                     Vitamin D (Cholecalciferol) 1000 units Tabs  INSTRUCTIONS:  Take 2,000 Units by mouth daily

## 2019-03-20 DIAGNOSIS — E03.9 HYPOTHYROIDISM, UNSPECIFIED TYPE: ICD-10-CM

## 2019-03-20 RX ORDER — LEVOTHYROXINE SODIUM 75 UG/1
TABLET ORAL
Qty: 30 TABLET | Refills: 0 | Status: SHIPPED | OUTPATIENT
Start: 2019-03-20 | End: 2019-04-06

## 2019-03-20 NOTE — TELEPHONE ENCOUNTER
Medication is being filled for 1 time refill only due to:  Patient needs labs bmp, thyroid. Future labs ordered yes.   Roel Garza RN

## 2019-03-20 NOTE — TELEPHONE ENCOUNTER
levothyroxine      Last Written Prescription Date:  11/29/18  Last Fill Quantity: 90,   # refills: 0  Last Office Visit: 2/1/19  Future Office visit:       Routing refill request to provider for review/approval because:  Drug not on the G, P or [place] refill protocol or controlled substance

## 2019-03-22 ENCOUNTER — THERAPY VISIT (OUTPATIENT)
Dept: PHYSICAL THERAPY | Facility: CLINIC | Age: 84
End: 2019-03-22
Payer: MEDICARE

## 2019-03-22 DIAGNOSIS — M25.552 HIP PAIN, LEFT: ICD-10-CM

## 2019-03-22 DIAGNOSIS — M62.81 GENERALIZED MUSCLE WEAKNESS: ICD-10-CM

## 2019-03-22 DIAGNOSIS — R53.81 PHYSICAL DECONDITIONING: ICD-10-CM

## 2019-03-22 PROCEDURE — 97110 THERAPEUTIC EXERCISES: CPT | Mod: GP | Performed by: PHYSICAL THERAPIST

## 2019-03-22 PROCEDURE — 97140 MANUAL THERAPY 1/> REGIONS: CPT | Mod: GP | Performed by: PHYSICAL THERAPIST

## 2019-03-22 NOTE — PROGRESS NOTES
Subjective:  HPI                    Objective:  System    Physical Exam    General     ROS    Assessment/Plan:    PROGRESS  REPORT    Progress reporting period is from 1/23/19 to 3/22/19.       SUBJECTIVE  Subjective changes noted by patient:  Pt notices improvements in pain level and function following treatments, however does not have lasting effects. Is planning on calling surgeon to schedule her VERONICA and foot surgery. Would like to continue PT for pain control until surgery.  Current pain level is 5/10 Current Pain level: 5/10.     Previous pain level was  7/10  .   Changes in function:  Yes (See Goal flowsheet attached for changes in current functional level)  Adverse reaction to treatment or activity: None    OBJECTIVE  Changes noted in objective findings:  Patient has not made significant changes in strength and movement quality due to pain preventing her from advancing to higher level exercises and activities.  Objective: LILI BALDERRAMA gave out on her during step ups due to pain, was unable to continue exercise at that point. New tenderness in L LE at distal vastus lateralis,      ASSESSMENT/PLAN  Updated problem list and treatment plan: Diagnosis 1:  L hip pain and B LE weakness  Pain -  hot/cold therapy, manual therapy, self management, education and home program  Decreased ROM/flexibility - manual therapy, therapeutic exercise, therapeutic activity and home program  Decreased strength - therapeutic exercise, therapeutic activities and home program  Impaired balance - neuro re-education, therapeutic activities and home program  Decreased proprioception - neuro re-education, therapeutic activities and home program  Impaired gait - gait training, assistive devices and home program  Impaired muscle performance - electric stimulation, neuro re-education and home program  Decreased function - therapeutic activities, home program and functional performance testing  STG/LTGs have been met or progress has been made towards  goals:  Yes (See Goal flow sheet completed today.) and Patient has made small progress towards short term goals, and has yet to advance to work on long term goals.  Assessment of Progress: The patient's condition has potential to improve, however the amount may be limited due to recurring symptoms. Patient has decided she is ready to have a hip replacement and hopes this will allow her to progress more in therapy and independent functioning.   Self Management Plans:  Patient has been instructed in a home treatment program.  Patient  has been instructed in self management of symptoms.  I have re-evaluated this patient and find that the nature, scope, duration and intensity of the therapy is appropriate for the medical condition of the patient.  Christiana continues to require the following intervention to meet STG and LTG's:  PT    Recommendations:  This patient would benefit from continued therapy.     Frequency:  1 X week, once daily  Duration:  for 6 weeks        Please refer to the daily flowsheet for treatment today, total treatment time and time spent performing 1:1 timed codes.

## 2019-03-27 ENCOUNTER — THERAPY VISIT (OUTPATIENT)
Dept: PHYSICAL THERAPY | Facility: CLINIC | Age: 84
End: 2019-03-27
Payer: MEDICARE

## 2019-03-27 DIAGNOSIS — M25.552 HIP PAIN, LEFT: ICD-10-CM

## 2019-03-27 DIAGNOSIS — R53.81 PHYSICAL DECONDITIONING: ICD-10-CM

## 2019-03-27 DIAGNOSIS — M62.81 GENERALIZED MUSCLE WEAKNESS: ICD-10-CM

## 2019-03-27 PROCEDURE — 97140 MANUAL THERAPY 1/> REGIONS: CPT | Mod: GP | Performed by: PHYSICAL THERAPIST

## 2019-03-27 PROCEDURE — 97110 THERAPEUTIC EXERCISES: CPT | Mod: GP | Performed by: PHYSICAL THERAPIST

## 2019-03-27 PROCEDURE — 97112 NEUROMUSCULAR REEDUCATION: CPT | Mod: GP | Performed by: PHYSICAL THERAPIST

## 2019-04-01 ENCOUNTER — TELEPHONE (OUTPATIENT)
Dept: FAMILY MEDICINE | Facility: CLINIC | Age: 84
End: 2019-04-01

## 2019-04-01 ENCOUNTER — HOSPITAL ENCOUNTER (EMERGENCY)
Facility: CLINIC | Age: 84
Discharge: HOME | End: 2019-04-01
Attending: NURSE PRACTITIONER | Admitting: NURSE PRACTITIONER
Payer: MEDICARE

## 2019-04-01 VITALS
RESPIRATION RATE: 13 BRPM | HEART RATE: 71 BPM | OXYGEN SATURATION: 95 % | WEIGHT: 150 LBS | SYSTOLIC BLOOD PRESSURE: 167 MMHG | HEIGHT: 66 IN | BODY MASS INDEX: 24.11 KG/M2 | TEMPERATURE: 97.7 F | DIASTOLIC BLOOD PRESSURE: 92 MMHG

## 2019-04-01 DIAGNOSIS — M79.622 PAIN OF LEFT UPPER ARM: ICD-10-CM

## 2019-04-01 LAB
ALBUMIN SERPL-MCNC: 3.6 G/DL (ref 3.4–5)
ALBUMIN UR-MCNC: NEGATIVE MG/DL
ALP SERPL-CCNC: 90 U/L (ref 40–150)
ALT SERPL W P-5'-P-CCNC: 25 U/L (ref 0–50)
ANION GAP SERPL CALCULATED.3IONS-SCNC: 6 MMOL/L (ref 3–14)
APPEARANCE UR: CLEAR
AST SERPL W P-5'-P-CCNC: 25 U/L (ref 0–45)
BASOPHILS # BLD AUTO: 0.1 10E9/L (ref 0–0.2)
BASOPHILS NFR BLD AUTO: 0.6 %
BILIRUB SERPL-MCNC: 0.6 MG/DL (ref 0.2–1.3)
BILIRUB UR QL STRIP: NEGATIVE
BUN SERPL-MCNC: 23 MG/DL (ref 7–30)
CALCIUM SERPL-MCNC: 9.5 MG/DL (ref 8.5–10.1)
CHLORIDE SERPL-SCNC: 105 MMOL/L (ref 94–109)
CO2 SERPL-SCNC: 28 MMOL/L (ref 20–32)
COLOR UR AUTO: YELLOW
CREAT SERPL-MCNC: 0.88 MG/DL (ref 0.52–1.04)
DIFFERENTIAL METHOD BLD: NORMAL
EOSINOPHIL # BLD AUTO: 0.3 10E9/L (ref 0–0.7)
EOSINOPHIL NFR BLD AUTO: 3.6 %
ERYTHROCYTE [DISTWIDTH] IN BLOOD BY AUTOMATED COUNT: 14 % (ref 10–15)
GFR SERPL CREATININE-BSD FRML MDRD: 59 ML/MIN/{1.73_M2}
GLUCOSE SERPL-MCNC: 92 MG/DL (ref 70–99)
GLUCOSE UR STRIP-MCNC: NEGATIVE MG/DL
HCT VFR BLD AUTO: 39.8 % (ref 35–47)
HGB BLD-MCNC: 13.2 G/DL (ref 11.7–15.7)
HGB UR QL STRIP: NEGATIVE
HYALINE CASTS #/AREA URNS LPF: 1 /LPF (ref 0–2)
IMM GRANULOCYTES # BLD: 0 10E9/L (ref 0–0.4)
IMM GRANULOCYTES NFR BLD: 0.3 %
KETONES UR STRIP-MCNC: NEGATIVE MG/DL
LEUKOCYTE ESTERASE UR QL STRIP: ABNORMAL
LYMPHOCYTES # BLD AUTO: 1.4 10E9/L (ref 0.8–5.3)
LYMPHOCYTES NFR BLD AUTO: 18.4 %
MCH RBC QN AUTO: 30.9 PG (ref 26.5–33)
MCHC RBC AUTO-ENTMCNC: 33.2 G/DL (ref 31.5–36.5)
MCV RBC AUTO: 93 FL (ref 78–100)
MONOCYTES # BLD AUTO: 0.8 10E9/L (ref 0–1.3)
MONOCYTES NFR BLD AUTO: 10.5 %
MUCOUS THREADS #/AREA URNS LPF: PRESENT /LPF
NEUTROPHILS # BLD AUTO: 5.2 10E9/L (ref 1.6–8.3)
NEUTROPHILS NFR BLD AUTO: 66.6 %
NITRATE UR QL: NEGATIVE
NRBC # BLD AUTO: 0 10*3/UL
NRBC BLD AUTO-RTO: 0 /100
PH UR STRIP: 8 PH (ref 5–7)
PLATELET # BLD AUTO: 239 10E9/L (ref 150–450)
POTASSIUM SERPL-SCNC: 4.7 MMOL/L (ref 3.4–5.3)
PROT SERPL-MCNC: 6.6 G/DL (ref 6.8–8.8)
RBC # BLD AUTO: 4.27 10E12/L (ref 3.8–5.2)
RBC #/AREA URNS AUTO: 3 /HPF (ref 0–2)
SODIUM SERPL-SCNC: 139 MMOL/L (ref 133–144)
SOURCE: ABNORMAL
SP GR UR STRIP: 1.01 (ref 1–1.03)
SQUAMOUS #/AREA URNS AUTO: 1 /HPF (ref 0–1)
TROPONIN I SERPL-MCNC: <0.015 UG/L (ref 0–0.04)
UROBILINOGEN UR STRIP-MCNC: 0 MG/DL (ref 0–2)
WBC # BLD AUTO: 7.8 10E9/L (ref 4–11)
WBC #/AREA URNS AUTO: 3 /HPF (ref 0–5)

## 2019-04-01 PROCEDURE — 93010 ELECTROCARDIOGRAM REPORT: CPT | Mod: Z6 | Performed by: NURSE PRACTITIONER

## 2019-04-01 PROCEDURE — 87086 URINE CULTURE/COLONY COUNT: CPT | Performed by: NURSE PRACTITIONER

## 2019-04-01 PROCEDURE — 99284 EMERGENCY DEPT VISIT MOD MDM: CPT | Mod: 25 | Performed by: NURSE PRACTITIONER

## 2019-04-01 PROCEDURE — 81001 URINALYSIS AUTO W/SCOPE: CPT | Performed by: NURSE PRACTITIONER

## 2019-04-01 PROCEDURE — 85025 COMPLETE CBC W/AUTO DIFF WBC: CPT | Performed by: NURSE PRACTITIONER

## 2019-04-01 PROCEDURE — 84484 ASSAY OF TROPONIN QUANT: CPT | Performed by: NURSE PRACTITIONER

## 2019-04-01 PROCEDURE — 80053 COMPREHEN METABOLIC PANEL: CPT | Performed by: NURSE PRACTITIONER

## 2019-04-01 PROCEDURE — 99284 EMERGENCY DEPT VISIT MOD MDM: CPT | Mod: 25

## 2019-04-01 PROCEDURE — 93005 ELECTROCARDIOGRAM TRACING: CPT

## 2019-04-01 ASSESSMENT — ENCOUNTER SYMPTOMS
NUMBNESS: 0
CHEST TIGHTNESS: 0
SHORTNESS OF BREATH: 0
CONSTIPATION: 0
SPEECH DIFFICULTY: 0
CHILLS: 0
VOMITING: 0
WEAKNESS: 1
NAUSEA: 0
ABDOMINAL PAIN: 0
FEVER: 0
DYSURIA: 0
COUGH: 0
DIAPHORESIS: 0
DIZZINESS: 0
WHEEZING: 0
DIARRHEA: 0
DIFFICULTY URINATING: 0
HEADACHES: 0
ARTHRALGIAS: 1
FACIAL ASYMMETRY: 0
CONFUSION: 0
EYE REDNESS: 0
PALPITATIONS: 0

## 2019-04-01 ASSESSMENT — MIFFLIN-ST. JEOR: SCORE: 1132.15

## 2019-04-01 NOTE — ED PROVIDER NOTES
History     Chief Complaint   Patient presents with     Arm Pain     generalized weakness started yesterday, left arm pain yesterday, resolved then started again today. denies CP     Generalized Weakness     HPI  Christiana Sal is a 87 year old female who admits to past medical history of coronary atherosclerosis, TIAs, hyperlipidemia, hypothyroidism, peptic ulcer disease, hypertension, depression, and per patient report 8 stent placement presenting to the emergency department with left arm pain with onset of symptoms yesterday morning with associated symptoms of generalized weakness when ambulatory.  Patient states when she is sitting she is just not week but does admit that the pain in the left arm is still persistent and is located in the upper deltoid region and described as an aching type of the pain.  Patient denies any trauma to this region and any recent physical activity such as pushing or pulling.  Patient denies any swelling of the arm.  Patient denies any left or right sided facial weakness or body weakness.  Patient does report that when she was ambulating yesterday it felt like her both legs were weak but that resolved when she sat down and rested.  Patient denies any medication changes in the past 2 weeks.  Patient denies any chest pain, fever, aches, chills, sweats or recent illness, dysuria, hematuria, urinary frequency, urgency, or foul-smelling urine.  Patient states this morning the generalized weakness seems to be improving but she still has left arm aching and pain.    Allergies:  Allergies   Allergen Reactions     Demerol Nausea     Lisinopril Cough     Meperidine Unknown     Sulfa Drugs Other (See Comments) and Unknown     Questionable itching reported by patient       Problem List:    Patient Active Problem List    Diagnosis Date Noted     Hip pain, left 01/23/2019     Priority: Medium     Generalized muscle weakness 01/23/2019     Priority: Medium     Physical deconditioning  01/23/2019     Priority: Medium     Closed fracture of shaft of fibula 12/02/2018     Priority: Medium     Fall 12/02/2018     Priority: Medium     Primary osteoarthritis of left hip 09/21/2018     Priority: Medium     Insect bite 06/28/2017     Priority: Medium     Cardiac pacemaker - [employer] dual lead - Not Dependent 03/02/2017     Priority: Medium     Sinoatrial node dysfunction (H) 11/10/2016     Priority: Medium     S/P cardiac pacemaker procedure 11/09/2016     Priority: Medium     CAD S/P percutaneous coronary angioplasty 05/11/2016     Priority: Medium     JENIFER (obstructive sleep apnea) 11/17/2014     Priority: Medium     Lichen planus 11/19/2013     Priority: Medium     Vulvar pruritus 11/12/2013     Priority: Medium     Angina pectoris (H) 02/08/2012     Priority: Medium     Advanced directives, counseling/discussion 06/16/2011     Priority: Medium     Patient will bring a copy. Chart r/q to make sure no copy.    GUSTAVO Cunningham MA    Advance Directive Problem List Overview:   Name Relationship Phone    Primary Health Care Agent lAbaro Sal  [phone]         Alternative Health Care Agent Esther Mulligan  [phone]     Reviewed Health Care Directive dated 05/13/2005, scanned into EPIC 01/2012.  Marivel Villarreal CMA                Major depressive disorder, recurrent episode, mild (H) 11/02/2010     Priority: Medium     Family history of colon cancer 05/04/2010     Priority: Medium     Coronary atherosclerosis of native coronary artery      Priority: Medium     s/p MI ([facility])       Hyperlipidemia LDL goal <100      Priority: Medium     Essential hypertension, benign      Priority: Medium     Hypothyroidism      Priority: Medium     GERD (gastroesophageal reflux disease)      Priority: Medium        Past Medical History:    Past Medical History:   Diagnosis Date     Coronary atherosclerosis of native coronary artery      Essential hypertension, benign      GERD (gastroesophageal reflux  disease)      History of transient ischemic attack (TIA)      Hyperlipidemia LDL goal <100      Hypothyroidism      Major depressive disorder, recurrent episode, mild (H) 2011     Peptic ulcer disease with hemorrhage        Past Surgical History:    Past Surgical History:   Procedure Laterality Date     APPENDECTOMY       CARDIAC CATHERIZATION  1/15/04    drug-eluding stent RCA, Dr. Pérez, [facility]     CARDIAC CATHERIZATION  12    diffuse mild/mod disease, no new stent     CATARACT IOL, RT/LT  12    RT, Dr. Wynne     CHOLECYSTECTOMY, OPEN       COLONOSCOPY      [facility]     FRACTURE TX, ANKLE RT/LT      LT, 2 screws remain     HC EXCISE HAND/FOOT NEUROMA      RT     HC LARYNGOSCOPY DIRECT W VOCAL CORD INJECTION      vocal cord polypectomy     HC REMOVAL OF OVARIAN CYST(S)       HC TRANSCATH STENT INIT VESSEL,PERCUT      x 2     OPEN REDUCTION INTERNAL FIXATION ANKLE Right 12/3/2018    Procedure: OPEN REDUCTION INTERNAL FIXATION Right ANKLE;  Surgeon: Jacinto Adams MD;  Location: [facility] OR     REPAIR HAMMER TOE  9/13/10    multiple + RT great toe tendon release, Dr. Sanchez DPM     STENT  -    Cardiac stents 6 placed.       Family History:    Family History   Problem Relation Age of Onset     C.A.D. Father      Hypertension Father      Myocardial Infarction Father 66         from MI     C.A.D. Brother      Myocardial Infarction Brother      Cancer Brother         skin ca     Breast Cancer Sister      Cancer - colorectal Sister      Heart Disease Sister      Neurologic Disorder Mother         migraine     C.A.D. Mother      Hypertension Mother      Heart Failure Mother      Thyroid Disease Son         cretinism     Hypertension Son      Heart Disease Son      Psychotic Disorder Sister      Hypertension Sister      Heart Disease Sister         CABG     Hypertension Brother      C.A.D. Brother      Myocardial Infarction Brother      Arthritis Daughter         RA      Hypertension Sister      Heart Disease Sister      Cancer Sister      Blood Disease Sister      Neurologic Disorder Child         migraine (2 children)       Social History:  Marital Status:   [2]  Social History     Tobacco Use     Smoking status: Never Smoker     Smokeless tobacco: Never Used     Tobacco comment: Never smoker; no secondhand smoke exposure   Substance Use Topics     Alcohol use: Yes     Comment: social     Drug use: No        Medications:      acetaminophen (TYLENOL) 500 MG tablet   albuterol (PROAIR HFA/PROVENTIL HFA/VENTOLIN HFA) 108 (90 BASE) MCG/ACT Inhaler   ASPIRIN 81 MG PO TABS   atorvastatin (LIPITOR) 40 MG tablet   CALCIUM 600 + D OR   calcium carbonate (TUMS) 500 MG chewable tablet   citalopram (CELEXA) 20 MG tablet   levothyroxine (SYNTHROID/LEVOTHROID) 75 MCG tablet   losartan (COZAAR) 25 MG tablet   meclizine (ANTIVERT) 25 MG tablet   metoprolol succinate ER (TOPROL-XL) 25 MG 24 hr tablet   Multiple Vitamins-Minerals (OCUVITE PO)   nitroglycerin (NITROSTAT) 0.4 MG SL tablet   OMEPRAZOLE PO   oxyCODONE (ROXICODONE) 5 MG tablet   pramipexole (MIRAPEX) 0.5 MG tablet   ranitidine (ZANTAC) 300 MG tablet   senna-docusate (SENOKOT-S/PERICOLACE) 8.6-50 MG tablet   tolterodine (DETROL) 2 MG tablet   Vitamin D, Cholecalciferol, 1000 units TABS         Review of Systems   Constitutional: Negative for chills, diaphoresis and fever.   HENT: Negative for congestion and ear pain.    Eyes: Negative for redness and visual disturbance.   Respiratory: Negative for cough, chest tightness, shortness of breath and wheezing.    Cardiovascular: Negative for chest pain, palpitations and leg swelling.   Gastrointestinal: Negative for abdominal pain, constipation, diarrhea, nausea and vomiting.   Genitourinary: Negative for difficulty urinating and dysuria.   Musculoskeletal: Positive for arthralgias (left arm pain).   Neurological: Positive for weakness (generalized when ambulatory). Negative for  "dizziness, syncope, facial asymmetry, speech difficulty, numbness and headaches.   Psychiatric/Behavioral: Negative for confusion.   All other systems reviewed and are negative.      Physical Exam   BP: 153/77  Pulse: 71  Heart Rate: 84  Temp: 97.7  F (36.5  C)  Resp: 16  Height: 167.6 cm (5' 6\")  Weight: 68 kg (150 lb)  SpO2: 97 %      Physical Exam   Constitutional: She is oriented to person, place, and time. She appears well-developed and well-nourished. No distress.   HENT:   Head: Normocephalic and atraumatic.   Right Ear: Tympanic membrane, external ear and ear canal normal.   Left Ear: Tympanic membrane, external ear and ear canal normal.   Nose: Nose normal.   Mouth/Throat: Uvula is midline and oropharynx is clear and moist. No oropharyngeal exudate.   Eyes: Conjunctivae are normal. Right eye exhibits no discharge. Left eye exhibits no discharge. No scleral icterus.   Neck: Normal range of motion. Neck supple.   Cardiovascular: Regular rhythm and normal heart sounds. Exam reveals no friction rub.   No murmur heard.  Pulmonary/Chest: Effort normal and breath sounds normal. No stridor. No respiratory distress. She has no wheezes. She has no rales. She exhibits no tenderness.   Abdominal: Soft. Bowel sounds are normal. She exhibits no distension and no mass. There is no tenderness. There is no rebound and no guarding. No hernia.   Musculoskeletal:        Left upper arm: She exhibits tenderness (with palpation of left mid arm  without crepitus, bruising, swelling, step-offs ntoed). She exhibits no bony tenderness, no swelling, no edema, no deformity and no laceration.   Neurological: She is alert and oriented to person, place, and time. She displays normal reflexes. Coordination normal.   Skin: Skin is warm and dry. No rash noted. She is not diaphoretic. No erythema. No pallor.   Psychiatric: She has a normal mood and affect.   Nursing note and vitals reviewed.      ED Course        Procedures         EKG " Interpretation:      Interpreted by Geovanny Harman MD  Time reviewed: 1130   Symptoms at time of EKG: None   Rhythm: sinus rhythm with 1 degree AV block  Rate: 75  Axis: Normal  Ectopy: none  Conduction: 1st degree AV block  ST Segments/ T Waves: No acute ischemic changes  Q Waves: none  Comparison to prior: Unchanged from 12/02/2018    Clinical Impression: no evidence of acute ischemia      Results for orders placed or performed during the hospital encounter of 04/01/19 (from the past 24 hour(s))   CBC with platelets, differential   Result Value Ref Range    WBC 7.8 4.0 - 11.0 10e9/L    RBC Count 4.27 3.8 - 5.2 10e12/L    Hemoglobin 13.2 11.7 - 15.7 g/dL    Hematocrit 39.8 35.0 - 47.0 %    MCV 93 78 - 100 fl    MCH 30.9 26.5 - 33.0 pg    MCHC 33.2 31.5 - 36.5 g/dL    RDW 14.0 10.0 - 15.0 %    Platelet Count 239 150 - 450 10e9/L    Diff Method Automated Method     % Neutrophils 66.6 %    % Lymphocytes 18.4 %    % Monocytes 10.5 %    % Eosinophils 3.6 %    % Basophils 0.6 %    % Immature Granulocytes 0.3 %    Nucleated RBCs 0 0 /100    Absolute Neutrophil 5.2 1.6 - 8.3 10e9/L    Absolute Lymphocytes 1.4 0.8 - 5.3 10e9/L    Absolute Monocytes 0.8 0.0 - 1.3 10e9/L    Absolute Eosinophils 0.3 0.0 - 0.7 10e9/L    Absolute Basophils 0.1 0.0 - 0.2 10e9/L    Abs Immature Granulocytes 0.0 0 - 0.4 10e9/L    Absolute Nucleated RBC 0.0    Comprehensive metabolic panel   Result Value Ref Range    Sodium 139 133 - 144 mmol/L    Potassium 4.7 3.4 - 5.3 mmol/L    Chloride 105 94 - 109 mmol/L    Carbon Dioxide 28 20 - 32 mmol/L    Anion Gap 6 3 - 14 mmol/L    Glucose 92 70 - 99 mg/dL    Urea Nitrogen 23 7 - 30 mg/dL    Creatinine 0.88 0.52 - 1.04 mg/dL    GFR Estimate 59 (L) >60 mL/min/[1.73_m2]    GFR Estimate If Black 68 >60 mL/min/[1.73_m2]    Calcium 9.5 8.5 - 10.1 mg/dL    Bilirubin Total 0.6 0.2 - 1.3 mg/dL    Albumin 3.6 3.4 - 5.0 g/dL    Protein Total 6.6 (L) 6.8 - 8.8 g/dL    Alkaline Phosphatase 90 40 -  150 U/L    ALT 25 0 - 50 U/L    AST 25 0 - 45 U/L   Troponin I   Result Value Ref Range    Troponin I ES <0.015 0.000 - 0.045 ug/L   UA reflex to Microscopic   Result Value Ref Range    Color Urine Yellow     Appearance Urine Clear     Glucose Urine Negative NEG^Negative mg/dL    Bilirubin Urine Negative NEG^Negative    Ketones Urine Negative NEG^Negative mg/dL    Specific Gravity Urine 1.014 1.003 - 1.035    Blood Urine Negative NEG^Negative    pH Urine 8.0 (H) 5.0 - 7.0 pH    Protein Albumin Urine Negative NEG^Negative mg/dL    Urobilinogen mg/dL 0.0 0.0 - 2.0 mg/dL    Nitrite Urine Negative NEG^Negative    Leukocyte Esterase Urine Trace (A) NEG^Negative    Source Midstream Urine     RBC Urine 3 (H) 0 - 2 /HPF    WBC Urine 3 0 - 5 /HPF    Squamous Epithelial /HPF Urine 1 0 - 1 /HPF    Mucous Urine Present (A) NEG^Negative /LPF    Hyaline Casts 1 0 - 2 /LPF       Medications - No data to display    Assessments & Plan (with Medical Decision Making)     I have reviewed the nursing notes.    I have reviewed the findings, diagnosis, plan and need for follow up with the patient.  Christiana Sal is a 87 year old female who admits to past medical history of coronary atherosclerosis, TIAs, hyperlipidemia, hypothyroidism, peptic ulcer disease, hypertension, depression, and per patient report 8 stent placement presenting to the emergency department with left arm pain with onset of symptoms yesterday morning with associated symptoms of generalized weakness when ambulatory.   Exam as noted above.  And EKG with no acute changes and no evidence of acute ischemia.  CBC ordered to evaluate for leukocytosis and is normal.  Comprehensive metabolic panel obtained to evaluate for electrolyte abnormalities and renal and liver function and again no acute changes.  Troponin completed and is no evidence of acute ischemia/no current elevation and this would likely be elevated with the onset of symptoms 24 hours ago if it was  cardiac related.  Urinalysis obtained and reveals mucus and leukocyte esterase present.  Patient denies any symptoms at this point in time.  Will culture the urine and expectantly manage this did discuss this with patient that if she should be some symptoms or confusion or fever or nausea or vomiting she should return immediately for reevaluation.  Also did discuss urine culture results and if it did become positive she would be notified and initiated on antibiotics.  Patient verbalizes understanding regarding all of this.  In regards to the left arm pain did discuss that this may be muscular skeletal in nature and may be responsive to warm packs.  Patient verbalizes understanding regarding this denies any questions at this point in time.  Patient discharged in stable condition.     Medication List      ASK your doctor about these medications    doxycycline hyclate 100 MG capsule  Commonly known as:  VIBRAMYCIN  100 mg, Oral, 2 TIMES DAILY  Ask about: Should I take this medication?            Final diagnoses:   Pain of left upper arm       4/1/2019   [facility] EMERGENCY DEPARTMENT     Cristina Goel APRN CNP  04/01/19 [id number]

## 2019-04-01 NOTE — ED NOTES
"Pt c/o weakness with walking/activity since yesterday. Yesterday \"both legs felt numb and didn't want to walk.  Denies weakness at rest. Lt arm \"achy.\"  Denies cp or soa.  Today pt denies numbness in legs and states the weakness is much better.  No recent illness or fevers.  Pt. A&O x3.  Obeys commands and has no trouble communicating with state. Pt placed on monitor and provider in room.  "

## 2019-04-01 NOTE — TELEPHONE ENCOUNTER
Reason for call:  Patient reporting a symptom    Symptom or request: Pt has been feeling very weak when she gets up and moves around. Pts left arm has been aching also. Like she lifted something to heavy. It did not bother her during the night and has been off and on today.    Duration (how long have symptoms been present): Started yesterday    Have you been treated for this before?     Additional comments:     Phone Number patient can be reached at:  Home number on file [phone] (home)    Best Time:  faith    Can we leave a detailed message on this number:      Call taken on 4/1/2019 at 10:18 AM by Angelic Ambriz

## 2019-04-01 NOTE — DISCHARGE INSTRUCTIONS
Try warm pack on neck, upper back, left shoulder, left arm  Follow up with primary if worsening symptoms or no improvement over the next week  A urine culture was ordered today due to the presence of leukocyte esterase in the urine.  You will be called if you need to start on antibiotics.  Please return if you develop painful urination, frequent urination, leaky bladder, mental confusion, or the return of weakness.

## 2019-04-01 NOTE — TELEPHONE ENCOUNTER
Patient states she was feeling weak yesterday with movement and having left arm acheyness between her shoulder and elbow. States she felt this way when she woke up this morning too. States she drank some water and eat breakfast and felt a little better. She denies any shortness of breath or chest pain or jaw pain. Advised patient go to the ER to be assessed and fluids. She agreed with this plan.    Angelic Tran RN

## 2019-04-02 ENCOUNTER — TRANSFERRED RECORDS (OUTPATIENT)
Dept: HEALTH INFORMATION MANAGEMENT | Facility: CLINIC | Age: 84
End: 2019-04-02

## 2019-04-02 LAB
BACTERIA SPEC CULT: NORMAL
BACTERIA SPEC CULT: NORMAL
Lab: NORMAL
SPECIMEN SOURCE: NORMAL

## 2019-04-06 DIAGNOSIS — E03.9 HYPOTHYROIDISM, UNSPECIFIED TYPE: ICD-10-CM

## 2019-04-08 NOTE — TELEPHONE ENCOUNTER
"LEVOTHYROXINE 0.075MG (75MCG) TABS      Last Written Prescription Date:  3/20/19  Last Fill Quantity: 30,   # refills: 0  Last Office Visit: 2/1/19  Future Office visit:    Next 5 appointments (look out 90 days)    Apr 23, 2019  9:00 AM CDT  Pre-Op physical with Kelly Matthew PA-C  [facility] ([facility] [address] [facility] [id number] [phone]           Requested Prescriptions   Pending Prescriptions Disp Refills     levothyroxine (SYNTHROID/LEVOTHROID) 75 MCG tablet [Pharmacy Med Name: LEVOTHYROXINE 0.075MG (75MCG) TABS] 30 tablet 0     Sig: TAKE 1 TABLET BY MOUTH ONCE DAILY.       Thyroid Protocol Failed - 4/6/2019  1:04 PM        Failed - Normal TSH on file in past 12 months     Recent Labs   Lab Test 03/08/17  1059   TSH 0.67              Passed - Patient is 12 years or older        Passed - Recent (12 mo) or future (30 days) visit within the authorizing provider's specialty     Patient had office visit in the last 12 months or has a visit in the next 30 days with authorizing provider or within the authorizing provider's specialty.  See \"Patient Info\" tab in inbasket, or \"Choose Columns\" in Meds & Orders section of the refill encounter.              Passed - Medication is active on med list        Passed - No active pregnancy on record     If patient is pregnant or has had a positive pregnancy test, please check TSH.          Passed - No positive pregnancy test in past 12 months     If patient is pregnant or has had a positive pregnancy test, please check TSH.            "

## 2019-04-09 ENCOUNTER — HOSPITAL ENCOUNTER (EMERGENCY)
Facility: CLINIC | Age: 84
Discharge: STILL ADMITTED OR ADMINISTRATIVE | End: 2019-04-09
Payer: MEDICARE

## 2019-04-09 RX ORDER — LEVOTHYROXINE SODIUM 75 UG/1
TABLET ORAL
Qty: 30 TABLET | Refills: 0 | Status: SHIPPED | OUTPATIENT
Start: 2019-04-09 | End: 2019-05-16

## 2019-04-09 NOTE — TELEPHONE ENCOUNTER
Prescription approved per [facility] Refill Protocol.  Patient has appointment scheduled 4/23/19.  Kirsten Decker RN

## 2019-04-09 NOTE — ED NOTES
Daughter called stated her mother was having increased nausea vomiting and weakness  Wanted to be treated over phone . Explained to daughter that it was 8 days ago and providers would want to see patient because it maybe something other than Urinary symptoms

## 2019-04-12 ENCOUNTER — OFFICE VISIT (OUTPATIENT)
Dept: FAMILY MEDICINE | Facility: CLINIC | Age: 84
End: 2019-04-12
Payer: MEDICARE

## 2019-04-12 VITALS
RESPIRATION RATE: 16 BRPM | BODY MASS INDEX: 24.75 KG/M2 | WEIGHT: 154 LBS | HEIGHT: 66 IN | SYSTOLIC BLOOD PRESSURE: 128 MMHG | TEMPERATURE: 98 F | HEART RATE: 69 BPM | DIASTOLIC BLOOD PRESSURE: 72 MMHG | OXYGEN SATURATION: 100 %

## 2019-04-12 DIAGNOSIS — Z98.61 CAD S/P PERCUTANEOUS CORONARY ANGIOPLASTY: ICD-10-CM

## 2019-04-12 DIAGNOSIS — I10 ESSENTIAL HYPERTENSION, BENIGN: ICD-10-CM

## 2019-04-12 DIAGNOSIS — Z95.0 CARDIAC PACEMAKER IN SITU: ICD-10-CM

## 2019-04-12 DIAGNOSIS — E03.9 HYPOTHYROIDISM, UNSPECIFIED TYPE: ICD-10-CM

## 2019-04-12 DIAGNOSIS — I49.5 SINOATRIAL NODE DYSFUNCTION (H): ICD-10-CM

## 2019-04-12 DIAGNOSIS — G25.81 RESTLESS LEG SYNDROME: ICD-10-CM

## 2019-04-12 DIAGNOSIS — Z01.818 PREOP GENERAL PHYSICAL EXAM: Primary | ICD-10-CM

## 2019-04-12 DIAGNOSIS — K21.9 GASTROESOPHAGEAL REFLUX DISEASE, ESOPHAGITIS PRESENCE NOT SPECIFIED: ICD-10-CM

## 2019-04-12 DIAGNOSIS — I25.10 CAD S/P PERCUTANEOUS CORONARY ANGIOPLASTY: ICD-10-CM

## 2019-04-12 DIAGNOSIS — M16.12 PRIMARY OSTEOARTHRITIS OF LEFT HIP: ICD-10-CM

## 2019-04-12 LAB
ANION GAP SERPL CALCULATED.3IONS-SCNC: 4 MMOL/L (ref 3–14)
BUN SERPL-MCNC: 29 MG/DL (ref 7–30)
CALCIUM SERPL-MCNC: 8.5 MG/DL (ref 8.5–10.1)
CHLORIDE SERPL-SCNC: 104 MMOL/L (ref 94–109)
CO2 SERPL-SCNC: 28 MMOL/L (ref 20–32)
CREAT SERPL-MCNC: 0.86 MG/DL (ref 0.52–1.04)
GFR SERPL CREATININE-BSD FRML MDRD: 60 ML/MIN/{1.73_M2}
GLUCOSE SERPL-MCNC: 98 MG/DL (ref 70–99)
HGB BLD-MCNC: 12 G/DL (ref 11.7–15.7)
POTASSIUM SERPL-SCNC: 4.3 MMOL/L (ref 3.4–5.3)
SODIUM SERPL-SCNC: 136 MMOL/L (ref 133–144)
TSH SERPL DL<=0.005 MIU/L-ACNC: 2.6 MU/L (ref 0.4–4)

## 2019-04-12 PROCEDURE — 84443 ASSAY THYROID STIM HORMONE: CPT | Performed by: PHYSICIAN ASSISTANT

## 2019-04-12 PROCEDURE — 80048 BASIC METABOLIC PNL TOTAL CA: CPT | Performed by: PHYSICIAN ASSISTANT

## 2019-04-12 PROCEDURE — 99214 OFFICE O/P EST MOD 30 MIN: CPT | Performed by: PHYSICIAN ASSISTANT

## 2019-04-12 PROCEDURE — 85018 HEMOGLOBIN: CPT | Performed by: PHYSICIAN ASSISTANT

## 2019-04-12 PROCEDURE — 36415 COLL VENOUS BLD VENIPUNCTURE: CPT | Performed by: PHYSICIAN ASSISTANT

## 2019-04-12 RX ORDER — PRAMIPEXOLE DIHYDROCHLORIDE 0.5 MG/1
0.5 TABLET ORAL
Qty: 90 TABLET | Refills: 2 | COMMUNITY
Start: 2019-04-12 | End: 2019-04-29

## 2019-04-12 ASSESSMENT — MIFFLIN-ST. JEOR: SCORE: 1150.29

## 2019-04-12 ASSESSMENT — PAIN SCALES - GENERAL: PAINLEVEL: MODERATE PAIN (5)

## 2019-04-12 NOTE — PROGRESS NOTES
[facility]  [address] [facility] [address]  [phone]  Dept: [phone]    PRE-OP EVALUATION:  Today's date: 2019    Christiana Sal (: 10/25/1931) presents for pre-operative evaluation assessment as requested by Dr. Jacinto Adams. He requires evaluation and anesthesia risk assessment prior to undergoing surgery/procedure for treatment of ostearthritis of left hip .    Proposed Surgery/ Procedure: LEFT ARTHROPLASTY HIP  Date of Surgery/ Procedure: 19  Time of Surgery/ Procedure: 2:00 pm   Hospital/Surgical Facility: [facility]   Primary Physician: Kelly Matthew  Type of Anesthesia Anticipated: Spinal    Patient has a Health Care Directive or Living Will:  YES    1. YES - Do you have a history of heart attack, stroke, stent, bypass or surgery on an artery in the head, neck, heart or legs? MI  2. YES - Do you ever have any pain or discomfort in your chest? Not at the moment, she does at times, unchanged overall  3. NO - Do you have a history of  Heart Failure?  4. YES - Are you troubled by shortness of breath when: walking on the level, up a slight hill or at night? Walking - she feels more deconditioning  5. NO - Do you currently have a cold, bronchitis or other respiratory infection?  6. YES - Do you have a cough, shortness of breath or wheezing? Has a mild cough, stable, unchanged  7. NO - Do you sometimes get pains in the calves of your legs when you walk?  8. NO - Do you or anyone in your family have previous history of blood clots?  9. NO - Do you or does anyone in your family have a serious bleeding problem such as prolonged bleeding following surgeries or cuts?  10. NO - Have you ever had problems with anemia or been told to take iron pills?  11. NO - Have you had any abnormal blood loss such as black, tarry or bloody stools, or abnormal vaginal bleeding?  12. YES - Have you ever had a blood transfusion? Many years ago  13. NO -  Have you or any of your relatives ever had problems with anesthesia?  14. NO - Do you have sleep apnea, excessive snoring or daytime drowsiness?  15. NO - Do you have any prosthetic heart valves?  16. NO - Do you have prosthetic joints?  17. NO - Is there any chance that you may be pregnant?      HPI:     HPI related to upcoming procedure: primary arthritis of hips failed conservative therapy with worsening pain       See problem list for active medical problems.  Problems all longstanding and stable, except as noted/documented.  See ROS for pertinent symptoms related to these conditions.                                                                                                                                                          .    MEDICAL HISTORY:     Patient Active Problem List    Diagnosis Date Noted     Hip pain, left 01/23/2019     Priority: Medium     Generalized muscle weakness 01/23/2019     Priority: Medium     Physical deconditioning 01/23/2019     Priority: Medium     Closed fracture of shaft of fibula 12/02/2018     Priority: Medium     Fall 12/02/2018     Priority: Medium     Primary osteoarthritis of left hip 09/21/2018     Priority: Medium     Insect bite 06/28/2017     Priority: Medium     Cardiac pacemaker - [employer] dual lead - Not Dependent 03/02/2017     Priority: Medium     Sinoatrial node dysfunction (H) 11/10/2016     Priority: Medium     S/P cardiac pacemaker procedure 11/09/2016     Priority: Medium     CAD S/P percutaneous coronary angioplasty 05/11/2016     Priority: Medium     JENIFER (obstructive sleep apnea) 11/17/2014     Priority: Medium     Lichen planus 11/19/2013     Priority: Medium     Vulvar pruritus 11/12/2013     Priority: Medium     Angina pectoris (H) 02/08/2012     Priority: Medium     Advanced directives, counseling/discussion 06/16/2011     Priority: Medium     Patient will bring a copy. Chart r/q to make sure no copy.    GUSTAVO Cunningham MA    Advance Directive  Problem List Overview:   Name Relationship Phone    Primary Health Care Agent Albaro Sal  [phone]         Alternative Health Care Agent Esther Mulligan  [phone]     Reviewed Health Care Directive dated 05/13/2005, scanned into EPIC 01/2012.  Marivel Villarreal, SISSY                Major depressive disorder, recurrent episode, mild (H) 11/02/2010     Priority: Medium     Family history of colon cancer 05/04/2010     Priority: Medium     Coronary atherosclerosis of native coronary artery      Priority: Medium     s/p MI ([facility])       Hyperlipidemia LDL goal <100      Priority: Medium     Essential hypertension, benign      Priority: Medium     Hypothyroidism      Priority: Medium     GERD (gastroesophageal reflux disease)      Priority: Medium      Past Medical History:   Diagnosis Date     Coronary atherosclerosis of native coronary artery     s/p MI ([facility])     Essential hypertension, benign      GERD (gastroesophageal reflux disease)      History of transient ischemic attack (TIA)      Hyperlipidemia LDL goal <100      Hypothyroidism      Major depressive disorder, recurrent episode, mild (H) 5/18/2011     Peptic ulcer disease with hemorrhage     Remote history of stomach ulcer, requiring transfusion after chronic bleeding     Past Surgical History:   Procedure Laterality Date     APPENDECTOMY       CARDIAC CATHERIZATION  1/15/04    drug-eluding stent RCA, Dr. Pérez, [facility]     CARDIAC CATHERIZATION  2/16/12    diffuse mild/mod disease, no new stent     CATARACT IOL, RT/LT  1/26/12    RT, Dr. Wynne     CHOLECYSTECTOMY, OPEN       COLONOSCOPY  2008    [facility]     FRACTURE TX, ANKLE RT/LT      LT, 2 screws remain     HC EXCISE HAND/FOOT NEUROMA      RT     HC LARYNGOSCOPY DIRECT W VOCAL CORD INJECTION      vocal cord polypectomy     HC REMOVAL OF OVARIAN CYST(S)       HC TRANSCATH STENT INIT VESSEL,PERCUT      x 2     OPEN REDUCTION INTERNAL FIXATION ANKLE Right 12/3/2018     Procedure: OPEN REDUCTION INTERNAL FIXATION Right ANKLE;  Surgeon: Jacinto Adams MD;  Location: [facility] OR     REPAIR HAMMER TOE  9/13/10    multiple + RT great toe tendon release, Dr. Daniel LOPEZ     STENT  5-2016    Cardiac stents 6 placed.     Current Outpatient Medications   Medication Sig Dispense Refill     acetaminophen (TYLENOL) 500 MG tablet Take 2 tablets (1,000 mg) by mouth every 8 hours as needed for mild pain 30 tablet 0     ASPIRIN 81 MG PO TABS Take 2 tablets by mouth every evening        atorvastatin (LIPITOR) 40 MG tablet TAKE ONE TABLET BY MOUTH EVERY DAY 90 tablet 3     CALCIUM 600 + D OR 1 tablet by mouth daily       calcium carbonate (TUMS) 500 MG chewable tablet Take 1 chew tab by mouth 3 times daily as needed for heartburn       citalopram (CELEXA) 20 MG tablet Take 1 tablet (20 mg) by mouth daily 90 tablet 0     esomeprazole (NEXIUM) 20 MG DR capsule Take 1 capsule (20 mg) by mouth every morning (before breakfast) Take 30-60 minutes before eating. 90 capsule 0     levothyroxine (SYNTHROID/LEVOTHROID) 75 MCG tablet TAKE 1 TABLET BY MOUTH ONCE DAILY. 30 tablet 0     losartan (COZAAR) 25 MG tablet Take 1 tablet (25 mg) by mouth daily 90 tablet 1     meclizine (ANTIVERT) 25 MG tablet TAKE ONE TABLET BY MOUTH EVERY 6 HOURS AS NEEDED FOR DIZZINESS 30 tablet 3     metoprolol succinate ER (TOPROL-XL) 25 MG 24 hr tablet Take 0.5 tablets (12.5 mg) by mouth daily 45 tablet 3     Multiple Vitamins-Minerals (OCUVITE PO) Take 1 tablet by mouth daily.       nitroglycerin (NITROSTAT) 0.4 MG SL tablet Place 1 tablet (0.4 mg) under the tongue every 5 minutes as needed for chest pain (call your doctor if you take a third dose) 25 tablet 3     oxyCODONE (ROXICODONE) 5 MG tablet Take 1 tablet (5 mg) by mouth every 6 hours as needed 14 tablet 0     pramipexole (MIRAPEX) 0.5 MG tablet Take 1 tablet (0.5 mg) by mouth nightly as needed (restless legs) 90 tablet 2     ranitidine (ZANTAC) 300 MG tablet Take 1  "tablet (300 mg) by mouth At Bedtime 90 tablet 0     senna-docusate (SENOKOT-S/PERICOLACE) 8.6-50 MG tablet Take 2 tablets by mouth 2 times daily       tolterodine (DETROL) 2 MG tablet Take 1 tablet (2 mg) by mouth 2 times daily 60 tablet 3     Vitamin D, Cholecalciferol, 1000 units TABS Take 2,000 Units by mouth daily       OTC products: None, except as noted above    Allergies   Allergen Reactions     Demerol Nausea     Lisinopril Cough     Meperidine Unknown     Sulfa Drugs Other (See Comments) and Unknown     Questionable itching reported by patient      Latex Allergy: NO    Social History     Tobacco Use     Smoking status: Never Smoker     Smokeless tobacco: Never Used     Tobacco comment: Never smoker; no secondhand smoke exposure   Substance Use Topics     Alcohol use: Yes     Comment: social     History   Drug Use No       REVIEW OF SYSTEMS:   CONSTITUTIONAL: NEGATIVE for fever, chills, change in weight  INTEGUMENTARY/SKIN: NEGATIVE for worrisome rashes, moles or lesions  EYES: NEGATIVE for vision changes or irritation  ENT/MOUTH: NEGATIVE for ear, mouth and throat problems  RESP: NEGATIVE for significant cough or SOB  BREAST: NEGATIVE for masses, tenderness or discharge  CV: NEGATIVE for chest pain, palpitations or peripheral edema  GI: NEGATIVE for nausea, abdominal pain, heartburn, or change in bowel habits  : NEGATIVE for frequency, dysuria, or hematuria  MUSCULOSKELETAL: NEGATIVE for significant arthralgias or myalgia  NEURO: NEGATIVE for weakness, dizziness or paresthesias  ENDOCRINE: NEGATIVE for temperature intolerance, skin/hair changes  HEME: NEGATIVE for bleeding problems  PSYCHIATRIC: NEGATIVE for changes in mood or affect    EXAM:   /72   Pulse 69   Temp 98  F (36.7  C) (Tympanic)   Resp 16   Ht 1.676 m (5' 6\")   Wt 69.9 kg (154 lb)   SpO2 100%   BMI 24.86 kg/m      GENERAL APPEARANCE: healthy, alert and no distress     EYES: EOMI, PERRL     HENT: ear canals and TM's normal and " nose and mouth without ulcers or lesions     NECK: no adenopathy, no asymmetry, masses, or scars and thyroid normal to palpation     RESP: lungs clear to auscultation - no rales, rhonchi or wheezes     CV: regular rates and rhythm, normal S1 S2, no S3 or S4 and no murmur, click or rub     ABDOMEN:  soft, nontender, no HSM or masses and bowel sounds normal     MS: extremities normal- no gross deformities noted, no evidence of inflammation in joints,      SKIN: no suspicious lesions or rashes     NEURO: Normal strength and tone, sensory exam grossly normal, mentation intact and speech normal     PSYCH: mentation appears normal. and affect normal/bright    DIAGNOSTICS:     HGB: 12.0    Last Comprehensive Metabolic Panel:  Sodium   Date Value Ref Range Status   04/12/2019 136 133 - 144 mmol/L Final     Potassium   Date Value Ref Range Status   04/12/2019 4.3 3.4 - 5.3 mmol/L Final     Chloride   Date Value Ref Range Status   04/12/2019 104 94 - 109 mmol/L Final     Carbon Dioxide   Date Value Ref Range Status   04/12/2019 28 20 - 32 mmol/L Final     Anion Gap   Date Value Ref Range Status   04/12/2019 4 3 - 14 mmol/L Final     Glucose   Date Value Ref Range Status   04/12/2019 98 70 - 99 mg/dL Final     Comment:     Non Fasting     Urea Nitrogen   Date Value Ref Range Status   04/12/2019 29 7 - 30 mg/dL Final     Creatinine   Date Value Ref Range Status   04/12/2019 0.86 0.52 - 1.04 mg/dL Final     GFR Estimate   Date Value Ref Range Status   04/12/2019 60 (L) >60 mL/min/[1.73_m2] Final     Comment:     Non  GFR Calc  Starting 12/18/2018, serum creatinine based estimated GFR (eGFR) will be   calculated using the Chronic Kidney Disease Epidemiology Collaboration   (CKD-EPI) equation.       Calcium   Date Value Ref Range Status   04/12/2019 8.5 8.5 - 10.1 mg/dL Final     Bilirubin Total   Date Value Ref Range Status   04/01/2019 0.6 0.2 - 1.3 mg/dL Final     Alkaline Phosphatase   Date Value Ref Range  Status   04/01/2019 90 40 - 150 U/L Final     ALT   Date Value Ref Range Status   04/01/2019 25 0 - 50 U/L Final     AST   Date Value Ref Range Status   04/01/2019 25 0 - 45 U/L Final           Recent Labs   Lab Test 04/01/19  1156 12/11/18  0836  05/10/16  1109  02/16/12  1010   HGB 13.2 11.4*   < > 12.5   < >  --     268   < > 284   < >  --    INR  --   --   --  0.90  --  0.98    136   < > 135   < >  --    POTASSIUM 4.7 3.8   < > 4.0   < >  --    CR 0.88 0.74   < > 0.88   < >  --     < > = values in this interval not displayed.        IMPRESSION:   Reason for surgery/procedure: primary osteoarthritis of hip  Diagnosis/reason for consult: pre op    The proposed surgical procedure is considered INTERMEDIATE risk.    REVISED CARDIAC RISK INDEX  The patient has the following serious cardiovascular risks for perioperative complications such as (MI, PE, VFib and 3  AV Block):  Coronary Artery Disease (MI, positive stress test, angina, Qs on EKG)  INTERPRETATION: 1 risks: Class II (low risk - 0.9% complication rate)    The patient has the following additional risks for perioperative complications:  No identified additional risks      ICD-10-CM    1. Preop general physical exam Z01.818 Hemoglobin     Basic metabolic panel  (Ca, Cl, CO2, Creat, Gluc, K, Na, BUN)   2. Primary osteoarthritis of left hip M16.12 Hemoglobin     Basic metabolic panel  (Ca, Cl, CO2, Creat, Gluc, K, Na, BUN)   3. Restless leg syndrome G25.81 pramipexole (MIRAPEX) 0.5 MG tablet   4. Essential hypertension, benign I10 Basic metabolic panel  (Ca, Cl, CO2, Creat, Gluc, K, Na, BUN)   5. Hypothyroidism, unspecified type E03.9 TSH with free T4 reflex   6. Sinoatrial node dysfunction (H) I49.5    7. CAD S/P percutaneous coronary angioplasty I25.10     Z98.61    8. Cardiac pacemaker - [employer] dual lead - Not Dependent Z95.0    9. Gastroesophageal reflux disease, esophagitis presence not specified K21.9 esomeprazole (NEXIUM) 20 MG DR  "capsule       RECOMMENDATIONS:       APPROVAL given to proceed with proposed procedure  Met with cardiology on 4/18/19 and was cleared for surgery. Per note \"low risk for any cardiac complications. Preferably she should stay on aspirin 81mg through surgery because of her 2 previous stents\"       Signed Electronically by: eKlly Matthew PA-C    Copy of this evaluation report is provided to requesting physician.    [place] Preop Guidelines    Revised Cardiac Risk Index  "

## 2019-04-12 NOTE — PATIENT INSTRUCTIONS
MEDICATION CHANGES:     STOP the prilosec (omeprazole)    START nexium (esomeprazole)    AFTER you have recovered from the hip surgery, let's talk more about the throat symptoms. I think an upper endoscopy to get a look at the throat and stomach would be a good idea        Scheduled with Dr. Osman at [facility] on Thursday, April 18th at 8:30am             Before Your Surgery      Call your surgeon if there is any change in your health. This includes signs of a cold or flu (such as a sore throat, runny nose, cough, rash or fever).    Do not smoke, drink alcohol or take over the counter medicine (unless your surgeon or primary care doctor tells you to) for the 24 hours before and after surgery.    If you take prescribed drugs: Follow your doctor s orders about which medicines to take and which to stop until after surgery.    Eating and drinking prior to surgery: follow the instructions from your surgeon    Take a shower or bath the night before surgery. Use the soap your surgeon gave you to gently clean your skin. If you do not have soap from your surgeon, use your regular soap. Do not shave or scrub the surgery site.  Wear clean pajamas and have clean sheets on your bed.

## 2019-04-12 NOTE — LETTER
April 12, 2019      Christiana Alvarez Jonelle  [address]   [place] [address]        Dear ,    We are writing to inform you of your test results.     Christiana,   Labs are all normal  Kelly Fay   Hemoglobin   Result Value Ref Range    Hemoglobin 12.0 11.7 - 15.7 g/dL   Basic metabolic panel  (Ca, Cl, CO2, Creat, Gluc, K, Na, BUN)   Result Value Ref Range    Sodium 136 133 - 144 mmol/L    Potassium 4.3 3.4 - 5.3 mmol/L    Chloride 104 94 - 109 mmol/L    Carbon Dioxide 28 20 - 32 mmol/L    Anion Gap 4 3 - 14 mmol/L    Glucose 98 70 - 99 mg/dL      Comment:      Non Fasting    Urea Nitrogen 29 7 - 30 mg/dL    Creatinine 0.86 0.52 - 1.04 mg/dL    GFR Estimate 60 (L) >60 mL/min/[1.73_m2]      Comment:      Non  GFR Calc  Starting 12/18/2018, serum creatinine based estimated GFR (eGFR) will be   calculated using the Chronic Kidney Disease Epidemiology Collaboration   (CKD-EPI) equation.      GFR Estimate If Black 70 >60 mL/min/[1.73_m2]      Comment:       GFR Calc  Starting 12/18/2018, serum creatinine based estimated GFR (eGFR) will be   calculated using the Chronic Kidney Disease Epidemiology Collaboration   (CKD-EPI) equation.      Calcium 8.5 8.5 - 10.1 mg/dL   TSH with free T4 reflex   Result Value Ref Range    TSH 2.60 0.40 - 4.00 mU/L       If you have any questions or concerns, please call the clinic at the number listed above.       Sincerely,        Kelly Matthew PA-C

## 2019-04-12 NOTE — H&P (VIEW-ONLY)
[facility]  [address] [facility] [address]  [phone]  Dept: [phone]    PRE-OP EVALUATION:  Today's date: 2019    Christiana Sal (: 10/25/1931) presents for pre-operative evaluation assessment as requested by Dr. Jacinto Adams. He requires evaluation and anesthesia risk assessment prior to undergoing surgery/procedure for treatment of ostearthritis of left hip .    Proposed Surgery/ Procedure: LEFT ARTHROPLASTY HIP  Date of Surgery/ Procedure: 19  Time of Surgery/ Procedure: 2:00 pm   Hospital/Surgical Facility: [facility]   Primary Physician: Kelly Matthew  Type of Anesthesia Anticipated: Spinal    Patient has a Health Care Directive or Living Will:  YES    1. YES - Do you have a history of heart attack, stroke, stent, bypass or surgery on an artery in the head, neck, heart or legs? MI  2. YES - Do you ever have any pain or discomfort in your chest? Not at the moment, she does at times, unchanged overall  3. NO - Do you have a history of  Heart Failure?  4. YES - Are you troubled by shortness of breath when: walking on the level, up a slight hill or at night? Walking - she feels more deconditioning  5. NO - Do you currently have a cold, bronchitis or other respiratory infection?  6. YES - Do you have a cough, shortness of breath or wheezing? Has a mild cough, stable, unchanged  7. NO - Do you sometimes get pains in the calves of your legs when you walk?  8. NO - Do you or anyone in your family have previous history of blood clots?  9. NO - Do you or does anyone in your family have a serious bleeding problem such as prolonged bleeding following surgeries or cuts?  10. NO - Have you ever had problems with anemia or been told to take iron pills?  11. NO - Have you had any abnormal blood loss such as black, tarry or bloody stools, or abnormal vaginal bleeding?  12. YES - Have you ever had a blood transfusion? Many years ago  13. NO -  Have you or any of your relatives ever had problems with anesthesia?  14. NO - Do you have sleep apnea, excessive snoring or daytime drowsiness?  15. NO - Do you have any prosthetic heart valves?  16. NO - Do you have prosthetic joints?  17. NO - Is there any chance that you may be pregnant?      HPI:     HPI related to upcoming procedure: primary arthritis of hips failed conservative therapy with worsening pain       See problem list for active medical problems.  Problems all longstanding and stable, except as noted/documented.  See ROS for pertinent symptoms related to these conditions.                                                                                                                                                          .    MEDICAL HISTORY:     Patient Active Problem List    Diagnosis Date Noted     Hip pain, left 01/23/2019     Priority: Medium     Generalized muscle weakness 01/23/2019     Priority: Medium     Physical deconditioning 01/23/2019     Priority: Medium     Closed fracture of shaft of fibula 12/02/2018     Priority: Medium     Fall 12/02/2018     Priority: Medium     Primary osteoarthritis of left hip 09/21/2018     Priority: Medium     Insect bite 06/28/2017     Priority: Medium     Cardiac pacemaker - [employer] dual lead - Not Dependent 03/02/2017     Priority: Medium     Sinoatrial node dysfunction (H) 11/10/2016     Priority: Medium     S/P cardiac pacemaker procedure 11/09/2016     Priority: Medium     CAD S/P percutaneous coronary angioplasty 05/11/2016     Priority: Medium     JENIFER (obstructive sleep apnea) 11/17/2014     Priority: Medium     Lichen planus 11/19/2013     Priority: Medium     Vulvar pruritus 11/12/2013     Priority: Medium     Angina pectoris (H) 02/08/2012     Priority: Medium     Advanced directives, counseling/discussion 06/16/2011     Priority: Medium     Patient will bring a copy. Chart r/q to make sure no copy.    GUSTAVO Cunningham MA    Advance Directive  Problem List Overview:   Name Relationship Phone    Primary Health Care Agent Albaro Sal  [phone]         Alternative Health Care Agent Esther Mulligan  [phone]     Reviewed Health Care Directive dated 05/13/2005, scanned into EPIC 01/2012.  Marivel Villarreal, SISSY                Major depressive disorder, recurrent episode, mild (H) 11/02/2010     Priority: Medium     Family history of colon cancer 05/04/2010     Priority: Medium     Coronary atherosclerosis of native coronary artery      Priority: Medium     s/p MI ([facility])       Hyperlipidemia LDL goal <100      Priority: Medium     Essential hypertension, benign      Priority: Medium     Hypothyroidism      Priority: Medium     GERD (gastroesophageal reflux disease)      Priority: Medium      Past Medical History:   Diagnosis Date     Coronary atherosclerosis of native coronary artery     s/p MI ([facility])     Essential hypertension, benign      GERD (gastroesophageal reflux disease)      History of transient ischemic attack (TIA)      Hyperlipidemia LDL goal <100      Hypothyroidism      Major depressive disorder, recurrent episode, mild (H) 5/18/2011     Peptic ulcer disease with hemorrhage     Remote history of stomach ulcer, requiring transfusion after chronic bleeding     Past Surgical History:   Procedure Laterality Date     APPENDECTOMY       CARDIAC CATHERIZATION  1/15/04    drug-eluding stent RCA, Dr. Pérez, [facility]     CARDIAC CATHERIZATION  2/16/12    diffuse mild/mod disease, no new stent     CATARACT IOL, RT/LT  1/26/12    RT, Dr. Wynne     CHOLECYSTECTOMY, OPEN       COLONOSCOPY  2008    [facility]     FRACTURE TX, ANKLE RT/LT      LT, 2 screws remain     HC EXCISE HAND/FOOT NEUROMA      RT     HC LARYNGOSCOPY DIRECT W VOCAL CORD INJECTION      vocal cord polypectomy     HC REMOVAL OF OVARIAN CYST(S)       HC TRANSCATH STENT INIT VESSEL,PERCUT      x 2     OPEN REDUCTION INTERNAL FIXATION ANKLE Right 12/3/2018     Procedure: OPEN REDUCTION INTERNAL FIXATION Right ANKLE;  Surgeon: Jacinto Adams MD;  Location: [place] OR     REPAIR HAMMER TOE  9/13/10    multiple + RT great toe tendon release, Dr. Daniel LOPEZ     STENT  5-2016    Cardiac stents 6 placed.     Current Outpatient Medications   Medication Sig Dispense Refill     acetaminophen (TYLENOL) 500 MG tablet Take 2 tablets (1,000 mg) by mouth every 8 hours as needed for mild pain 30 tablet 0     ASPIRIN 81 MG PO TABS Take 2 tablets by mouth every evening        atorvastatin (LIPITOR) 40 MG tablet TAKE ONE TABLET BY MOUTH EVERY DAY 90 tablet 3     CALCIUM 600 + D OR 1 tablet by mouth daily       calcium carbonate (TUMS) 500 MG chewable tablet Take 1 chew tab by mouth 3 times daily as needed for heartburn       citalopram (CELEXA) 20 MG tablet Take 1 tablet (20 mg) by mouth daily 90 tablet 0     esomeprazole (NEXIUM) 20 MG DR capsule Take 1 capsule (20 mg) by mouth every morning (before breakfast) Take 30-60 minutes before eating. 90 capsule 0     levothyroxine (SYNTHROID/LEVOTHROID) 75 MCG tablet TAKE 1 TABLET BY MOUTH ONCE DAILY. 30 tablet 0     losartan (COZAAR) 25 MG tablet Take 1 tablet (25 mg) by mouth daily 90 tablet 1     meclizine (ANTIVERT) 25 MG tablet TAKE ONE TABLET BY MOUTH EVERY 6 HOURS AS NEEDED FOR DIZZINESS 30 tablet 3     metoprolol succinate ER (TOPROL-XL) 25 MG 24 hr tablet Take 0.5 tablets (12.5 mg) by mouth daily 45 tablet 3     Multiple Vitamins-Minerals (OCUVITE PO) Take 1 tablet by mouth daily.       nitroglycerin (NITROSTAT) 0.4 MG SL tablet Place 1 tablet (0.4 mg) under the tongue every 5 minutes as needed for chest pain (call your doctor if you take a third dose) 25 tablet 3     oxyCODONE (ROXICODONE) 5 MG tablet Take 1 tablet (5 mg) by mouth every 6 hours as needed 14 tablet 0     pramipexole (MIRAPEX) 0.5 MG tablet Take 1 tablet (0.5 mg) by mouth nightly as needed (restless legs) 90 tablet 2     ranitidine (ZANTAC) 300 MG tablet Take 1  "tablet (300 mg) by mouth At Bedtime 90 tablet 0     senna-docusate (SENOKOT-S/PERICOLACE) 8.6-50 MG tablet Take 2 tablets by mouth 2 times daily       tolterodine (DETROL) 2 MG tablet Take 1 tablet (2 mg) by mouth 2 times daily 60 tablet 3     Vitamin D, Cholecalciferol, 1000 units TABS Take 2,000 Units by mouth daily       OTC products: None, except as noted above    Allergies   Allergen Reactions     Demerol Nausea     Lisinopril Cough     Meperidine Unknown     Sulfa Drugs Other (See Comments) and Unknown     Questionable itching reported by patient      Latex Allergy: NO    Social History     Tobacco Use     Smoking status: Never Smoker     Smokeless tobacco: Never Used     Tobacco comment: Never smoker; no secondhand smoke exposure   Substance Use Topics     Alcohol use: Yes     Comment: social     History   Drug Use No       REVIEW OF SYSTEMS:   CONSTITUTIONAL: NEGATIVE for fever, chills, change in weight  INTEGUMENTARY/SKIN: NEGATIVE for worrisome rashes, moles or lesions  EYES: NEGATIVE for vision changes or irritation  ENT/MOUTH: NEGATIVE for ear, mouth and throat problems  RESP: NEGATIVE for significant cough or SOB  BREAST: NEGATIVE for masses, tenderness or discharge  CV: NEGATIVE for chest pain, palpitations or peripheral edema  GI: NEGATIVE for nausea, abdominal pain, heartburn, or change in bowel habits  : NEGATIVE for frequency, dysuria, or hematuria  MUSCULOSKELETAL: NEGATIVE for significant arthralgias or myalgia  NEURO: NEGATIVE for weakness, dizziness or paresthesias  ENDOCRINE: NEGATIVE for temperature intolerance, skin/hair changes  HEME: NEGATIVE for bleeding problems  PSYCHIATRIC: NEGATIVE for changes in mood or affect    EXAM:   /72   Pulse 69   Temp 98  F (36.7  C) (Tympanic)   Resp 16   Ht 1.676 m (5' 6\")   Wt 69.9 kg (154 lb)   SpO2 100%   BMI 24.86 kg/m      GENERAL APPEARANCE: healthy, alert and no distress     EYES: EOMI, PERRL     HENT: ear canals and TM's normal and " nose and mouth without ulcers or lesions     NECK: no adenopathy, no asymmetry, masses, or scars and thyroid normal to palpation     RESP: lungs clear to auscultation - no rales, rhonchi or wheezes     CV: regular rates and rhythm, normal S1 S2, no S3 or S4 and no murmur, click or rub     ABDOMEN:  soft, nontender, no HSM or masses and bowel sounds normal     MS: extremities normal- no gross deformities noted, no evidence of inflammation in joints,      SKIN: no suspicious lesions or rashes     NEURO: Normal strength and tone, sensory exam grossly normal, mentation intact and speech normal     PSYCH: mentation appears normal. and affect normal/bright    DIAGNOSTICS:     HGB: 12.0    Last Comprehensive Metabolic Panel:  Sodium   Date Value Ref Range Status   04/12/2019 136 133 - 144 mmol/L Final     Potassium   Date Value Ref Range Status   04/12/2019 4.3 3.4 - 5.3 mmol/L Final     Chloride   Date Value Ref Range Status   04/12/2019 104 94 - 109 mmol/L Final     Carbon Dioxide   Date Value Ref Range Status   04/12/2019 28 20 - 32 mmol/L Final     Anion Gap   Date Value Ref Range Status   04/12/2019 4 3 - 14 mmol/L Final     Glucose   Date Value Ref Range Status   04/12/2019 98 70 - 99 mg/dL Final     Comment:     Non Fasting     Urea Nitrogen   Date Value Ref Range Status   04/12/2019 29 7 - 30 mg/dL Final     Creatinine   Date Value Ref Range Status   04/12/2019 0.86 0.52 - 1.04 mg/dL Final     GFR Estimate   Date Value Ref Range Status   04/12/2019 60 (L) >60 mL/min/[1.73_m2] Final     Comment:     Non  GFR Calc  Starting 12/18/2018, serum creatinine based estimated GFR (eGFR) will be   calculated using the Chronic Kidney Disease Epidemiology Collaboration   (CKD-EPI) equation.       Calcium   Date Value Ref Range Status   04/12/2019 8.5 8.5 - 10.1 mg/dL Final     Bilirubin Total   Date Value Ref Range Status   04/01/2019 0.6 0.2 - 1.3 mg/dL Final     Alkaline Phosphatase   Date Value Ref Range  Status   04/01/2019 90 40 - 150 U/L Final     ALT   Date Value Ref Range Status   04/01/2019 25 0 - 50 U/L Final     AST   Date Value Ref Range Status   04/01/2019 25 0 - 45 U/L Final           Recent Labs   Lab Test 04/01/19  1156 12/11/18  0836  05/10/16  1109  02/16/12  1010   HGB 13.2 11.4*   < > 12.5   < >  --     268   < > 284   < >  --    INR  --   --   --  0.90  --  0.98    136   < > 135   < >  --    POTASSIUM 4.7 3.8   < > 4.0   < >  --    CR 0.88 0.74   < > 0.88   < >  --     < > = values in this interval not displayed.        IMPRESSION:   Reason for surgery/procedure: primary osteoarthritis of hip  Diagnosis/reason for consult: pre op    The proposed surgical procedure is considered INTERMEDIATE risk.    REVISED CARDIAC RISK INDEX  The patient has the following serious cardiovascular risks for perioperative complications such as (MI, PE, VFib and 3  AV Block):  Coronary Artery Disease (MI, positive stress test, angina, Qs on EKG)  INTERPRETATION: 1 risks: Class II (low risk - 0.9% complication rate)    The patient has the following additional risks for perioperative complications:  No identified additional risks      ICD-10-CM    1. Preop general physical exam Z01.818 Hemoglobin     Basic metabolic panel  (Ca, Cl, CO2, Creat, Gluc, K, Na, BUN)   2. Primary osteoarthritis of left hip M16.12 Hemoglobin     Basic metabolic panel  (Ca, Cl, CO2, Creat, Gluc, K, Na, BUN)   3. Restless leg syndrome G25.81 pramipexole (MIRAPEX) 0.5 MG tablet   4. Essential hypertension, benign I10 Basic metabolic panel  (Ca, Cl, CO2, Creat, Gluc, K, Na, BUN)   5. Hypothyroidism, unspecified type E03.9 TSH with free T4 reflex   6. Sinoatrial node dysfunction (H) I49.5    7. CAD S/P percutaneous coronary angioplasty I25.10     Z98.61    8. Cardiac pacemaker - [employer] dual lead - Not Dependent Z95.0    9. Gastroesophageal reflux disease, esophagitis presence not specified K21.9 esomeprazole (NEXIUM) 20 MG DR  "capsule       RECOMMENDATIONS:       APPROVAL given to proceed with proposed procedure  Met with cardiology on 4/18/19 and was cleared for surgery. Per note \"low risk for any cardiac complications. Preferably she should stay on aspirin 81mg through surgery because of her 2 previous stents\"       Signed Electronically by: Kelly Matthew PA-C    Copy of this evaluation report is provided to requesting physician.    [place] Preop Guidelines    Revised Cardiac Risk Index  "

## 2019-04-17 ENCOUNTER — TELEPHONE (OUTPATIENT)
Dept: FAMILY MEDICINE | Facility: CLINIC | Age: 84
End: 2019-04-17

## 2019-04-17 DIAGNOSIS — K21.00 GASTROESOPHAGEAL REFLUX DISEASE WITH ESOPHAGITIS: Primary | ICD-10-CM

## 2019-04-17 NOTE — TELEPHONE ENCOUNTER
Reason for Call:  Other prescription    Detailed comments: Christiana was seen on 4/12/19 and her pantoprazole 40mg was stopped and esomeprazole was started.  She states that it's $70.00 for the esomeprazole.  She'd like to go back to the pantoprazole as that is less expensive.  Could you please review and order if appropriate.  Christiana is checking to see which pharmacy she like it to go.  Thank you..Consuelo Hawthorne    Phone Number Patient can be reached at: Home number on file [phone] (home)    Best Time: any time    Can we leave a detailed message on this number? YES    Call taken on 4/17/2019 at 10:30 AM by Consuelo Hawthorne

## 2019-04-18 ENCOUNTER — OFFICE VISIT (OUTPATIENT)
Dept: CARDIOLOGY | Facility: CLINIC | Age: 84
End: 2019-04-18
Payer: MEDICARE

## 2019-04-18 VITALS
BODY MASS INDEX: 24.92 KG/M2 | WEIGHT: 154.4 LBS | SYSTOLIC BLOOD PRESSURE: 111 MMHG | OXYGEN SATURATION: 96 % | HEART RATE: 72 BPM | DIASTOLIC BLOOD PRESSURE: 63 MMHG

## 2019-04-18 DIAGNOSIS — Z95.0 CARDIAC PACEMAKER IN SITU: Primary | ICD-10-CM

## 2019-04-18 DIAGNOSIS — I25.10 CORONARY ARTERY DISEASE INVOLVING NATIVE CORONARY ARTERY OF NATIVE HEART WITHOUT ANGINA PECTORIS: ICD-10-CM

## 2019-04-18 PROCEDURE — 99214 OFFICE O/P EST MOD 30 MIN: CPT | Performed by: INTERNAL MEDICINE

## 2019-04-18 RX ORDER — PANTOPRAZOLE SODIUM 40 MG/1
40 TABLET, DELAYED RELEASE ORAL DAILY
Qty: 90 TABLET | Refills: 1 | Status: SHIPPED | OUTPATIENT
Start: 2019-04-18 | End: 2019-12-12

## 2019-04-18 NOTE — LETTER
4/18/2019    Kelly Matthew PA-C  [address] [facility] [address]    RE: Christiana Sal       Dear Colleague,    I had the pleasure of seeing Christiana Sal in the [facility].    CARDIOLOGY VISIT    REASON FOR VISIT: f/u CAD, pacemaker    SUBJECTIVE:  87-year-old female seen for follow-up of pacemaker and coronary artery disease.  She also had a TIA in 2002 and reflux.     She underwent stent to the RCA in 2004.  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  Nuclear stress in September 2017 showed fixed septal defect, suspect from left bundle branch block, EF 76%.     She has a history of sinus node dysfunction, dual-chamber pacemaker was implanted in 2016 ([email or web address] L331 [employer] MRI). Echo then showed EF 60%, 1+ MR/AI, trace TR.    Device interrogation February 2019 showed 2% atrial paced, 3% ventricular paced, underlying rhythm is sinus, battery 12 years, one mode switch for paroxysmal atrial tachycardia lasting 2 minutes.     Overall she has been doing well recently.  She has bilateral hip arthritis and is scheduled for left total hip arthroplasty on May 2.  She gets around slowly with a walker.  Recently she had a few episodes of lightheadedness.  She was seen in urgent care and workup was unremarkable.  She denies any chest pain, dyspnea, or lower extremity edema.  She thinks blood pressure runs around 130.    MEDICATIONS:  Current Outpatient Medications   Medication     acetaminophen (TYLENOL) 500 MG tablet     ASPIRIN 81 MG PO TABS     atorvastatin (LIPITOR) 40 MG tablet     CALCIUM 600 + D OR     calcium carbonate (TUMS) 500 MG chewable tablet     citalopram (CELEXA) 20 MG tablet     esomeprazole (NEXIUM) 20 MG DR capsule     levothyroxine (SYNTHROID/LEVOTHROID) 75 MCG tablet     losartan (COZAAR) 25 MG tablet     meclizine (ANTIVERT) 25 MG tablet     metoprolol succinate ER (TOPROL-XL) 25 MG 24 hr tablet      Multiple Vitamins-Minerals (OCUVITE PO)     nitroglycerin (NITROSTAT) 0.4 MG SL tablet     oxyCODONE (ROXICODONE) 5 MG tablet     pramipexole (MIRAPEX) 0.5 MG tablet     ranitidine (ZANTAC) 300 MG tablet     senna-docusate (SENOKOT-S/PERICOLACE) 8.6-50 MG tablet     tolterodine (DETROL) 2 MG tablet     Vitamin D, Cholecalciferol, 1000 units TABS     Current Facility-Administered Medications   Medication     2 mL bupivacaine (MARCAINE) preservative free injection 0.5% (20 mL vial)     betamethasone acet & sod phos (CELESTONE) injection 6 mg     lidocaine 1 % injection 2 mL     triamcinolone (KENALOG-40) injection 40 mg       ALLERGIES:  Allergies   Allergen Reactions     Demerol Nausea     Lisinopril Cough     Meperidine Unknown     Sulfa Drugs Other (See Comments) and Unknown     Questionable itching reported by patient       REVIEW OF SYSTEMS:  Constitutional:  No weight loss, fever, chills, weakness or fatigue.  HEENT:  Eyes:  No visual loss, blurred vision, double vision or yellow sclerae. No hearing loss, sneezing, congestion, runny nose or sore throat.  Skin:  No rash or itching.  Cardiovascular: per HPI  Respiratory: per HPI  GI:  No anorexia, nausea, vomiting or diarrhea. No abdominal pain or blood.  :  No dysurea, hematuria  Neurologic:  No headache, dizziness, syncope, paralysis, ataxia, numbness or tingling in the extremities. No change in bowel or bladder control.  Musculoskeletal:  No muscle, back pain, joint pain or stiffness.  Hematologic:  No anemia, bleeding or bruising.  Lymphatics:  No enlarged nodes. No history of splenectomy.  Psychiatric:  No history of depression or anxiety.  Endocrine:  No reports of sweating, cold or heat intolerance. No polyuria or polydipsia.  Allergies:  No history of asthma, hives, eczema or rhinitis.    PHYSICAL EXAM:  /63 (BP Location: Right arm, Patient Position: Sitting, Cuff Size: Adult Regular)   Pulse 72   Wt 70 kg (154 lb 6.4 oz)   SpO2 96%   BMI 24.92  kg/m     Constitutional: awake, alert, no distress  Eyes: PERRL, sclera nonicteric  ENT: trachea midline  Respiratory: Lungs clear  Cardiovascular: Regular rate and rhythm, no murmurs, no lower extremity edema  GI: nondistended, nontender, bowel sounds present  Lymph/Hematologic: no lymphadenopathy  Skin: dry, no rash  Musculoskeletal: good muscle tone, strength 5/5 in upper and lower extremities  Neurologic: no focal deficits  Neuropsychiatric: appropriate affact    DATA:  Lab: April 2019: Potassium 4.3, creatinine 0.9, hemoglobin 12.0   September 2017: Cholesterol 126, HDL 44, LDL 57, triglycerides 123     ASSESSMENT:  87-year-old female seen for follow-up of coronary disease and pacemaker.  Overall her cardiac issues are stable.  She has no anginal type symptoms.  She is euvolemic on exam.  Pacemaker is functioning normally.    From a cardiac perspective, she should be lower risk for her upcoming total hip arthroplasty.  Preferably she should stay on the low-dose aspirin because of her 2 previous stents.    She is due for an echocardiogram to follow-up on her tricuspid valve with the pacemaker wire.  This could be done before or after surgery.    RECOMMENDATIONS:  1.  Coronary artery disease, previous LAD and RCA stent  -Continue current medications     2. Pacemaker  -Continue routine device checks  -Echocardiogram to assess EF and tricuspid valve    3.  Preop cardiovascular evaluation  -She is low risk for any cardiac complications.  Preferably she should stay on aspirin 81 mg through surgery because of her 2 previous stents    Follow-up in 6 months with TACHO.      Darrell Osman MD  Cardiology - [facility]  Pager:  [phone]  Text Page  April 18, 2019      Thank you for allowing me to participate in the care of your patient.    Sincerely,     Darrell Osman MD     [facility]

## 2019-04-18 NOTE — TELEPHONE ENCOUNTER
Call placed to patient.  Relayed R Edmund message.  Patient requests returning to Pantoprazole.  Clarified preferred pharmacy, order sent.  Kirsten Decker RN

## 2019-04-18 NOTE — TELEPHONE ENCOUNTER
Pended pantoprazole. She can also look, nexium is OTC and may be cheaper, not sure.  Cristina Shaffer PA-C

## 2019-04-18 NOTE — PATIENT INSTRUCTIONS
Echocardiogram  Will schedule an echocardiogram, or ultrasound of the heart.  This looks at the size and function of the heart and valves.  It generally takes about 20-30 minutes.  This will tell if there is any reduced heart function or problem with any of the valves.

## 2019-04-18 NOTE — PROGRESS NOTES
CARDIOLOGY VISIT    REASON FOR VISIT: f/u CAD, pacemaker    SUBJECTIVE:  87-year-old female seen for follow-up of pacemaker and coronary artery disease.  She also had a TIA in 2002 and reflux.     She underwent stent to the RCA in 2004.  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  Nuclear stress in September 2017 showed fixed septal defect, suspect from left bundle branch block, EF 76%.     She has a history of sinus node dysfunction, dual-chamber pacemaker was implanted in 2016 ([employer] L[employer] MRI). Echo then showed EF 60%, 1+ MR/AI, trace TR.    Device interrogation February 2019 showed 2% atrial paced, 3% ventricular paced, underlying rhythm is sinus, battery 12 years, one mode switch for paroxysmal atrial tachycardia lasting 2 minutes.     Overall she has been doing well recently.  She has bilateral hip arthritis and is scheduled for left total hip arthroplasty on May 2.  She gets around slowly with a walker.  Recently she had a few episodes of lightheadedness.  She was seen in urgent care and workup was unremarkable.  She denies any chest pain, dyspnea, or lower extremity edema.  She thinks blood pressure runs around 130.    MEDICATIONS:  Current Outpatient Medications   Medication     acetaminophen (TYLENOL) 500 MG tablet     ASPIRIN 81 MG PO TABS     atorvastatin (LIPITOR) 40 MG tablet     CALCIUM 600 + D OR     calcium carbonate (TUMS) 500 MG chewable tablet     citalopram (CELEXA) 20 MG tablet     esomeprazole (NEXIUM) 20 MG DR capsule     levothyroxine (SYNTHROID/LEVOTHROID) 75 MCG tablet     losartan (COZAAR) 25 MG tablet     meclizine (ANTIVERT) 25 MG tablet     metoprolol succinate ER (TOPROL-XL) 25 MG 24 hr tablet     Multiple Vitamins-Minerals (OCUVITE PO)     nitroglycerin (NITROSTAT) 0.4 MG SL tablet     oxyCODONE (ROXICODONE) 5 MG tablet     pramipexole (MIRAPEX) 0.5 MG tablet     ranitidine (ZANTAC) 300 MG tablet     senna-docusate  (SENOKOT-S/PERICOLACE) 8.6-50 MG tablet     tolterodine (DETROL) 2 MG tablet     Vitamin D, Cholecalciferol, 1000 units TABS     Current Facility-Administered Medications   Medication     2 mL bupivacaine (MARCAINE) preservative free injection 0.5% (20 mL vial)     betamethasone acet & sod phos (CELESTONE) injection 6 mg     lidocaine 1 % injection 2 mL     triamcinolone (KENALOG-40) injection 40 mg       ALLERGIES:  Allergies   Allergen Reactions     Demerol Nausea     Lisinopril Cough     Meperidine Unknown     Sulfa Drugs Other (See Comments) and Unknown     Questionable itching reported by patient       REVIEW OF SYSTEMS:  Constitutional:  No weight loss, fever, chills, weakness or fatigue.  HEENT:  Eyes:  No visual loss, blurred vision, double vision or yellow sclerae. No hearing loss, sneezing, congestion, runny nose or sore throat.  Skin:  No rash or itching.  Cardiovascular: per HPI  Respiratory: per HPI  GI:  No anorexia, nausea, vomiting or diarrhea. No abdominal pain or blood.  :  No dysurea, hematuria  Neurologic:  No headache, dizziness, syncope, paralysis, ataxia, numbness or tingling in the extremities. No change in bowel or bladder control.  Musculoskeletal:  No muscle, back pain, joint pain or stiffness.  Hematologic:  No anemia, bleeding or bruising.  Lymphatics:  No enlarged nodes. No history of splenectomy.  Psychiatric:  No history of depression or anxiety.  Endocrine:  No reports of sweating, cold or heat intolerance. No polyuria or polydipsia.  Allergies:  No history of asthma, hives, eczema or rhinitis.    PHYSICAL EXAM:  /63 (BP Location: Right arm, Patient Position: Sitting, Cuff Size: Adult Regular)   Pulse 72   Wt 70 kg (154 lb 6.4 oz)   SpO2 96%   BMI 24.92 kg/m    Constitutional: awake, alert, no distress  Eyes: PERRL, sclera nonicteric  ENT: trachea midline  Respiratory: Lungs clear  Cardiovascular: Regular rate and rhythm, no murmurs, no lower extremity edema  GI:  nondistended, nontender, bowel sounds present  Lymph/Hematologic: no lymphadenopathy  Skin: dry, no rash  Musculoskeletal: good muscle tone, strength 5/5 in upper and lower extremities  Neurologic: no focal deficits  Neuropsychiatric: appropriate affact    DATA:  Lab: April 2019: Potassium 4.3, creatinine 0.9, hemoglobin 12.0   September 2017: Cholesterol 126, HDL 44, LDL 57, triglycerides 123     ASSESSMENT:  87-year-old female seen for follow-up of coronary disease and pacemaker.  Overall her cardiac issues are stable.  She has no anginal type symptoms.  She is euvolemic on exam.  Pacemaker is functioning normally.    From a cardiac perspective, she should be lower risk for her upcoming total hip arthroplasty.  Preferably she should stay on the low-dose aspirin because of her 2 previous stents.    She is due for an echocardiogram to follow-up on her tricuspid valve with the pacemaker wire.  This could be done before or after surgery.    RECOMMENDATIONS:  1.  Coronary artery disease, previous LAD and RCA stent  -Continue current medications     2. Pacemaker  -Continue routine device checks  -Echocardiogram to assess EF and tricuspid valve    3.  Preop cardiovascular evaluation  -She is low risk for any cardiac complications.  Preferably she should stay on aspirin 81 mg through surgery because of her 2 previous stents    Follow-up in 6 months with TACHO.      Darrell Osman MD  Cardiology - [facility]  Pager:  [phone]  Text Page  April 18, 2019

## 2019-04-19 ENCOUNTER — THERAPY VISIT (OUTPATIENT)
Dept: PHYSICAL THERAPY | Facility: CLINIC | Age: 84
End: 2019-04-19
Payer: MEDICARE

## 2019-04-19 DIAGNOSIS — M25.552 HIP PAIN, LEFT: ICD-10-CM

## 2019-04-19 DIAGNOSIS — R53.81 PHYSICAL DECONDITIONING: ICD-10-CM

## 2019-04-19 DIAGNOSIS — M62.81 GENERALIZED MUSCLE WEAKNESS: ICD-10-CM

## 2019-04-19 PROCEDURE — 97110 THERAPEUTIC EXERCISES: CPT | Mod: GP | Performed by: PHYSICAL THERAPIST

## 2019-04-19 PROCEDURE — 97140 MANUAL THERAPY 1/> REGIONS: CPT | Mod: GP | Performed by: PHYSICAL THERAPIST

## 2019-04-19 NOTE — LETTER
DEPARTMENT OF HEALTH AND HUMAN SERVICES  CENTERS FOR MEDICARE & MEDICAID SERVICES    PLAN/UPDATED PLAN OF PROGRESS FOR OUTPATIENT REHABILITATION    PATIENTS NAME:  Christiana Sal   : 10/25/1931  PROVIDER NUMBER:    [id number]  HICN:  [id number]   PROVIDER NAME: [facility]  MEDICAL RECORD NUMBER: [id number]     START OF CARE DATE:  SOC Date: 19   TYPE:  PT    PRIMARY/TREATMENT DIAGNOSIS: (Pertinent Medical Diagnosis)     Hip pain, left  Generalized muscle weakness  Physical deconditioning    VISITS FROM START OF CARE:  Rxs Used: 8        PROGRESS  REPORT    Progress reporting period is from 3/22/19 to 19.       SUBJECTIVE  Subjective changes noted by patient:  Pt returns today after not being seen for the past 3 weeks due to several issues. Notes that she's having a lot more bad days with feeling like the leg is going to give out and a lot of pain. Has also started to have some lower leg sx's on the L calf. Notes it's aching and has tried some massage on it which relieves it a bit. Has been sleeping a little better at night but still can't lay on her L side because of the pain. Has not been doing a lot of exercises due to having the flu and coming back from that and her energy levels were down. Is scheduled for VERONICA on May 5th.    Current pain level is 5/10  .     Previous pain level was  5/10  .   Changes in function:  Yes, but has been limited due to the severity of her OA in the hip.  Adverse reaction to treatment or activity: None    OBJECTIVE  Changes noted in objective findings:  Yes, continues to have significant tension and trigger points throughout ITB and piriformis and glut muscles. Pt does have good ROM in L hip however is painful. Ambulates with single point cane and notes on occasion it feels like it will buckle. Mildly antalgic gait pattern noted.         ASSESSMENT/PLAN  Updated problem list and treatment plan: Diagnosis 1:  L hip pain  Pain -  manual therapy,  "self management, education and home program  Decreased ROM/flexibility - manual therapy, therapeutic exercise, therapeutic activity and home program  Decreased strength - therapeutic exercise, therapeutic activities and home program  Impaired balance - neuro re-education, therapeutic activities and home program  PATIENTS NAME:  Christiana Sal   : 10/25/1931        Decreased proprioception - neuro re-education, therapeutic activities and home program  Impaired gait - gait training and home program  Impaired muscle performance - neuro re-education and home program  Decreased function - therapeutic activities and home program  STG/LTGs have been met or progress has been made towards goals:  Yes, very limited progress  Assessment of Progress: The patient's progress has plateaued.  Self Management Plans:  Patient is independent in a home treatment program.  Patient is independent in self management of symptoms.    Christiana continues to require the following intervention to meet STG and LTG's:  PT intervention is no longer required to meet STG/LTG.    Recommendations:  Pt is going to have VERONICA on May 2nd. discontinue from PT at this time. May return following surgery if needed.          Caregiver Signature/Credentials _____________________________ Date ________       Treating Provider: Helen Alvarez, PT   I have reviewed and certified the need for these services and plan of treatment while under my care.        PHYSICIAN'S SIGNATURE:   _________________________________________  Date___________   Ricardo Palafox MD    Certification period:  Beginning of Cert date period: 19 to  End of Cert period date: 19     Functional Level Progress Report: Please see attached \"Goal Flow sheet for Functional level.\"    ____X____ Continue Services or       ________ DC Services                Service dates: From  SOC Date: 19 date to present                         "

## 2019-04-19 NOTE — PROGRESS NOTES
Subjective:  HPI                    Objective:  System    Physical Exam    General     ROS    Assessment/Plan:    PROGRESS  REPORT    Progress reporting period is from 3/22/19 to 4/19/19.       SUBJECTIVE  Subjective changes noted by patient:  Pt returns today after not being seen for the past 3 weeks due to several issues. Notes that she's having a lot more bad days with feeling like the leg is going to give out and a lot of pain. Has also started to have some lower leg sx's on the L calf. Notes it's aching and has tried some massage on it which relieves it a bit. Has been sleeping a little better at night but still can't lay on her L side because of the pain. Has not been doing a lot of exercises due to having the flu and coming back from that and her energy levels were down. Is scheduled for VERONICA on May 5th.    Current pain level is 5/10  .     Previous pain level was  5/10  .   Changes in function:  Yes, but has been limited due to the severity of her OA in the hip.  Adverse reaction to treatment or activity: None    OBJECTIVE  Changes noted in objective findings:  Yes, continues to have significant tension and trigger points throughout ITB and piriformis and glut muscles. Pt does have good ROM in L hip however is painful. Ambulates with single point cane and notes on occasion it feels like it will buckle. Mildly antalgic gait pattern noted.         ASSESSMENT/PLAN  Updated problem list and treatment plan: Diagnosis 1:  L hip pain  Pain -  manual therapy, self management, education and home program  Decreased ROM/flexibility - manual therapy, therapeutic exercise, therapeutic activity and home program  Decreased strength - therapeutic exercise, therapeutic activities and home program  Impaired balance - neuro re-education, therapeutic activities and home program  Decreased proprioception - neuro re-education, therapeutic activities and home program  Impaired gait - gait training and home program  Impaired muscle  performance - neuro re-education and home program  Decreased function - therapeutic activities and home program  STG/LTGs have been met or progress has been made towards goals:  Yes, very limited progress  Assessment of Progress: The patient's progress has plateaued.  Self Management Plans:  Patient is independent in a home treatment program.  Patient is independent in self management of symptoms.    Christiana continues to require the following intervention to meet STG and LTG's:  PT intervention is no longer required to meet STG/LTG.    Recommendations:  Pt is going to have VERONICA on May 2nd. discontinue from PT at this time. May return following surgery if needed.    Please refer to the daily flowsheet for treatment today, total treatment time and time spent performing 1:1 timed codes.

## 2019-04-22 ENCOUNTER — ALLIED HEALTH/NURSE VISIT (OUTPATIENT)
Dept: FAMILY MEDICINE | Facility: CLINIC | Age: 84
End: 2019-04-22
Payer: MEDICARE

## 2019-04-22 ENCOUNTER — OFFICE VISIT (OUTPATIENT)
Dept: FAMILY MEDICINE | Facility: CLINIC | Age: 84
End: 2019-04-22
Payer: MEDICARE

## 2019-04-22 VITALS
HEIGHT: 66 IN | TEMPERATURE: 97.4 F | HEART RATE: 68 BPM | BODY MASS INDEX: 25.23 KG/M2 | SYSTOLIC BLOOD PRESSURE: 136 MMHG | DIASTOLIC BLOOD PRESSURE: 64 MMHG | WEIGHT: 157 LBS

## 2019-04-22 DIAGNOSIS — S69.90XA FINGER INJURY: Primary | ICD-10-CM

## 2019-04-22 DIAGNOSIS — L03.012 PARONYCHIA OF LEFT LITTLE FINGER: Primary | ICD-10-CM

## 2019-04-22 PROCEDURE — 99214 OFFICE O/P EST MOD 30 MIN: CPT | Performed by: PHYSICIAN ASSISTANT

## 2019-04-22 PROCEDURE — 99207 ZZC NO CHARGE NURSE ONLY: CPT

## 2019-04-22 RX ORDER — CEPHALEXIN 500 MG/1
500 CAPSULE ORAL 2 TIMES DAILY
Qty: 20 CAPSULE | Refills: 0 | Status: ON HOLD | OUTPATIENT
Start: 2019-04-22 | End: 2019-05-02

## 2019-04-22 ASSESSMENT — MIFFLIN-ST. JEOR: SCORE: 1163.9

## 2019-04-22 NOTE — PATIENT INSTRUCTIONS
I will treat for infection of the finger with an antibiotic and have you continue to do soaks. Follow up in clinic if spreading redness or other worsening symptoms.

## 2019-04-22 NOTE — PROGRESS NOTES
"  SUBJECTIVE:   Christiana Sal is a 87 year old female who presents to clinic today for the following   health issues:    Chief Complaint   Patient presents with     Finger     right index finger, states she got it caught in the back of her blow dryer last week. still painful, doesnt look to be healing.      Patient was using her blow dryer  Over a week ago and got her right index finger caught on a hook on the blow dryer causing a cut to the inner side of the finger nail, she has done epsom salt soaks and kept the area covered but has had thickening of the skin as well as redness and some yellow crusty drainage to the area.   -------------------------------------    Additional history: as documented    Reviewed  and updated as needed this visit by clinical staff         Reviewed and updated as needed this visit by Provider         BP Readings from Last 3 Encounters:   04/22/19 136/64   04/18/19 111/63   04/12/19 128/72    Wt Readings from Last 3 Encounters:   04/22/19 71.2 kg (157 lb)   04/18/19 70 kg (154 lb 6.4 oz)   04/12/19 69.9 kg (154 lb)                    ROS:  Constitutional, HEENT, cardiovascular, pulmonary, gi and gu systems are negative, except as otherwise noted.    OBJECTIVE:     /64   Pulse 68   Temp 97.4  F (36.3  C) (Tympanic)   Ht 1.676 m (5' 6\")   Wt 71.2 kg (157 lb)   BMI 25.34 kg/m    Body mass index is 25.34 kg/m .  GENERAL: alert and no distress  MS: no gross musculoskeletal defects noted, no edema  SKIN: proud flesh, erythema and yellow crusting to the medial edge of the nail of the pointer finger on the right. No streaking redness, no active drainage    Diagnostic Test Results:  none     ASSESSMENT/PLAN:       ICD-10-CM    1. Paronychia of left little finger L03.012 cephALEXin (KEFLEX) 500 MG capsule       I will treat for infection with keflex and have her follow up if spreading redness or not having improvement with treatment.   See Patient Instructions    Sophia BANUELOS " JESSICA Dorman  [facility]

## 2019-04-22 NOTE — NURSING NOTE
Patient walked into clinic to have her right index finger looked at. She states she got it caught in the back of her blow dryer last week. It is still very painful and raw. Advised she should have it looked at. She will see Sophia Dorman this afternoon.    Angelic Tran RN

## 2019-04-29 ENCOUNTER — HOSPITAL ENCOUNTER (OUTPATIENT)
Dept: CARDIOLOGY | Facility: CLINIC | Age: 84
Discharge: HOME | DRG: 470 | End: 2019-04-29
Attending: INTERNAL MEDICINE | Admitting: INTERNAL MEDICINE
Payer: MEDICARE

## 2019-04-29 DIAGNOSIS — I25.10 CORONARY ARTERY DISEASE INVOLVING NATIVE CORONARY ARTERY OF NATIVE HEART WITHOUT ANGINA PECTORIS: ICD-10-CM

## 2019-04-29 DIAGNOSIS — Z95.0 CARDIAC PACEMAKER IN SITU: ICD-10-CM

## 2019-04-29 PROCEDURE — 93306 TTE W/DOPPLER COMPLETE: CPT

## 2019-04-29 PROCEDURE — 93306 TTE W/DOPPLER COMPLETE: CPT | Mod: 26 | Performed by: INTERNAL MEDICINE

## 2019-04-29 RX ORDER — BIOTIN 10000 MCG
1 CAPSULE ORAL
COMMUNITY
End: 2019-05-21

## 2019-04-29 RX ORDER — MULTIVITAMIN WITH IRON
1 TABLET ORAL EVERY EVENING
COMMUNITY

## 2019-05-01 ENCOUNTER — ANESTHESIA EVENT (OUTPATIENT)
Dept: SURGERY | Facility: CLINIC | Age: 84
DRG: 470 | End: 2019-05-01
Payer: MEDICARE

## 2019-05-01 ENCOUNTER — TELEPHONE (OUTPATIENT)
Dept: FAMILY MEDICINE | Facility: CLINIC | Age: 84
End: 2019-05-01

## 2019-05-01 NOTE — ANESTHESIA PREPROCEDURE EVALUATION
Anesthesia Pre-Procedure Evaluation    Patient: Christiana Sal   MRN: [id number] : 10/25/1931          Preoperative Diagnosis: Left hip arthritis    Procedure(s):  ARTHROPLASTY, HIP    Past Medical History:   Diagnosis Date     Coronary atherosclerosis of native coronary artery     s/p MI ([facility])     Essential hypertension, benign      GERD (gastroesophageal reflux disease)      History of transient ischemic attack (TIA)      Hyperlipidemia LDL goal <100      Hypothyroidism      Major depressive disorder, recurrent episode, mild (H) 2011     Peptic ulcer disease with hemorrhage     Remote history of stomach ulcer, requiring transfusion after chronic bleeding     Past Surgical History:   Procedure Laterality Date     APPENDECTOMY       CARDIAC CATHERIZATION  1/15/04    drug-eluding stent RCA, Dr. Pérez, [facility]     CARDIAC CATHERIZATION  12    diffuse mild/mod disease, no new stent     CATARACT IOL, RT/LT  12    RT, Dr. Wynne     CHOLECYSTECTOMY, OPEN       COLONOSCOPY      [facility]     FRACTURE TX, ANKLE RT/LT      LT, 2 screws remain     HC EXCISE HAND/FOOT NEUROMA      RT     HC LARYNGOSCOPY DIRECT W VOCAL CORD INJECTION      vocal cord polypectomy     HC REMOVAL OF OVARIAN CYST(S)       HC TRANSCATH STENT INIT VESSEL,PERCUT      x 2     OPEN REDUCTION INTERNAL FIXATION ANKLE Right 12/3/2018    Procedure: OPEN REDUCTION INTERNAL FIXATION Right ANKLE;  Surgeon: Jacinto Adams MD;  Location: [facility] OR     REPAIR HAMMER TOE  9/13/10    multiple + RT great toe tendon release, Dr. Sanchez DPLOLY     STENT  -    Cardiac stents 6 placed.       Anesthesia Evaluation     . Pt has had prior anesthetic. Type: General and MAC    No history of anesthetic complications          ROS/MED HX    ENT/Pulmonary:     (+)sleep apnea, doesn't use CPAP , . .    Neurologic:     (+)TIA     Cardiovascular: Comment: Hx cardiac catheterization - neg cardiovascular ROS   (+)  Dyslipidemia, hypertension--CAD, angina--stent,6 . : . . LEIGH, . pacemaker Reason placed: SA node dysfunction:type: [employer] dual lead . . Previous cardiac testing Echodate:4/29/19results:Interpretation Summary     The visual ejection fraction is estimated at 55-60%.  Left ventricular systolic function is normal.  There is mild (1+) aortic regurgitation.  There is moderate concentric left ventricular hypertrophy.  _____________________________________________________________________________  __        Left Ventricle  The left ventricle is normal in size. There is moderate concentric left  ventricular hypertrophy. Diastolic Doppler findings (E/E' ratio and/or other  parameters) suggest left ventricular filling pressures are increased. The  visual ejection fraction is estimated at 55-60%. Left ventricular systolic  function is normal. Septal wall motion abnormality may reflect pacemaker  activation.     Right Ventricle  There is a catheter/pacemaker lead seen in the right ventricle. The right  ventricle is normal in size and function.     Atria  Normal left atrial size. Right atrial size is normal. There is no color  Doppler evidence of an atrial shunt.     Mitral Valve  There is mild mitral annular calcification. There is trace mitral  regurgitation.        Tricuspid Valve  There is trace tricuspid regurgitation. The right ventricular systolic  pressure is approximated at 20.1 mmHg plus the right atrial pressure.     Aortic Valve  The aortic valve is trileaflet. There is trivial trileaflet aortic sclerosis.  There is mild (1+) aortic regurgitation. No hemodynamically significant  valvular aortic stenosis.     Pulmonic Valve  There is trace pulmonic valvular regurgitation. Normal pulmonic valve  velocity.     Vessels  The aortic root is normal size. Normal size ascending aorta. The inferior vena  cava was normal in size with preserved respiratory variability.     Pericardium  There is no pericardial  effusion.        Rhythm  Sinus rhythm was noted.date: results:ECG reviewed date:4/1/19 results:Sinus Rhythm -First degree A-V block   Yang = 244  -Left bundle branch block.     ABNORMAL date: results:          METS/Exercise Tolerance:  1 - Eating, dressing   Hematologic:     (+) History of blood clots pt is not anticoagulated, History of Transfusion -      Musculoskeletal:   (+) arthritis,  -       GI/Hepatic:     (+) GERD Asymptomatic on medication, Other GI/Hepatic Hx peptic ulcer      Renal/Genitourinary:  - ROS Renal section negative       Endo:     (+) thyroid problem hypothyroidism, .      Psychiatric:     (+) psychiatric history depression      Infectious Disease:  - neg infectious disease ROS       Malignancy:      - no malignancy   Other:    (+) No chance of pregnancy C-spine cleared: N/A, H/O Chronic Pain,no other significant disability                         Physical Exam  Normal systems: cardiovascular and dental    Airway   Mallampati: II  TM distance: >3 FB  Neck ROM: full    Dental     Cardiovascular       Pulmonary             Lab Results   Component Value Date    WBC 7.8 04/01/2019    HGB 12.0 04/12/2019    HCT 39.8 04/01/2019     04/01/2019    SED 4 03/16/2009     04/12/2019    POTASSIUM 4.3 04/12/2019    CHLORIDE 104 04/12/2019    CO2 28 04/12/2019    BUN 29 04/12/2019    CR 0.86 04/12/2019    GLC 98 04/12/2019    YVONNE 8.5 04/12/2019    ALBUMIN 3.6 04/01/2019    PROTTOTAL 6.6 (L) 04/01/2019    ALT 25 04/01/2019    AST 25 04/01/2019    ALKPHOS 90 04/01/2019    BILITOTAL 0.6 04/01/2019    AMYLASE 66 03/17/2014    INR 0.90 05/10/2016    TSH 2.60 04/12/2019       Preop Vitals  BP Readings from Last 3 Encounters:   04/22/19 136/64   04/18/19 111/63   04/12/19 128/72    Pulse Readings from Last 3 Encounters:   04/22/19 68   04/18/19 72   04/12/19 69      Resp Readings from Last 3 Encounters:   04/12/19 16   04/01/19 13   03/07/19 15    SpO2 Readings from Last 3 Encounters:   04/18/19 96%  "  04/12/19 100%   04/01/19 95%      Temp Readings from Last 1 Encounters:   04/22/19 36.3  C (97.4  F) (Tympanic)    Ht Readings from Last 1 Encounters:   04/22/19 1.676 m (5' 6\")      Wt Readings from Last 1 Encounters:   04/22/19 71.2 kg (157 lb)    Estimated body mass index is 25.34 kg/m  as calculated from the following:    Height as of 4/22/19: 1.676 m (5' 6\").    Weight as of 4/22/19: 71.2 kg (157 lb).       Anesthesia Plan      History & Physical Review  History and physical reviewed and following examination; no interval change.    ASA Status:  3 .    NPO Status:  > 8 hours    Plan for Spinal Maintenance will be Balanced.    PONV prophylaxis:  Ondansetron (or other 5HT-3) and Dexamethasone or Solumedrol       Postoperative Care  Postoperative pain management:  IV analgesics and Oral pain medications.      Consents  Anesthetic plan, risks, benefits and alternatives discussed with:  Patient..                 SHARMIN Mendoza CRNA  "

## 2019-05-02 ENCOUNTER — HOSPITAL ENCOUNTER (INPATIENT)
Facility: CLINIC | Age: 84
LOS: 4 days | Discharge: NURSING, REHAB OR LONG-TERM CARE | DRG: 470 | End: 2019-05-06
Attending: ORTHOPAEDIC SURGERY | Admitting: ORTHOPAEDIC SURGERY
Payer: MEDICARE

## 2019-05-02 ENCOUNTER — ANESTHESIA (OUTPATIENT)
Dept: SURGERY | Facility: CLINIC | Age: 84
DRG: 470 | End: 2019-05-02
Payer: MEDICARE

## 2019-05-02 ENCOUNTER — APPOINTMENT (OUTPATIENT)
Dept: GENERAL RADIOLOGY | Facility: CLINIC | Age: 84
DRG: 470 | End: 2019-05-02
Attending: ORTHOPAEDIC SURGERY
Payer: MEDICARE

## 2019-05-02 DIAGNOSIS — Z96.642 STATUS POST TOTAL REPLACEMENT OF LEFT HIP: Primary | ICD-10-CM

## 2019-05-02 DIAGNOSIS — I10 ESSENTIAL HYPERTENSION WITH GOAL BLOOD PRESSURE LESS THAN 140/90: ICD-10-CM

## 2019-05-02 PROBLEM — N32.81 OVERACTIVE BLADDER: Status: ACTIVE | Noted: 2019-05-02

## 2019-05-02 PROBLEM — Z96.649 S/P TOTAL HIP ARTHROPLASTY: Status: ACTIVE | Noted: 2019-05-02

## 2019-05-02 PROCEDURE — A9270 NON-COVERED ITEM OR SERVICE: HCPCS | Performed by: ORTHOPAEDIC SURGERY

## 2019-05-02 PROCEDURE — 25800030 ZZH RX IP 258 OP 636: Performed by: ORTHOPAEDIC SURGERY

## 2019-05-02 PROCEDURE — 25800030 ZZH RX IP 258 OP 636: Performed by: NURSE ANESTHETIST, CERTIFIED REGISTERED

## 2019-05-02 PROCEDURE — 25000125 ZZHC RX 250: Performed by: NURSE ANESTHETIST, CERTIFIED REGISTERED

## 2019-05-02 PROCEDURE — 25800030 ZZH RX IP 258 OP 636

## 2019-05-02 PROCEDURE — 25000128 H RX IP 250 OP 636

## 2019-05-02 PROCEDURE — A9270 NON-COVERED ITEM OR SERVICE: HCPCS | Performed by: PHYSICIAN ASSISTANT

## 2019-05-02 PROCEDURE — 25000128 H RX IP 250 OP 636: Performed by: ORTHOPAEDIC SURGERY

## 2019-05-02 PROCEDURE — 37000009 ZZH ANESTHESIA TECHNICAL FEE, EACH ADDTL 15 MIN: Performed by: ORTHOPAEDIC SURGERY

## 2019-05-02 PROCEDURE — 25000132 ZZH RX MED GY IP 250 OP 250 PS 637: Performed by: PHYSICIAN ASSISTANT

## 2019-05-02 PROCEDURE — 40000305 ZZH STATISTIC PRE PROC ASSESS I: Performed by: ORTHOPAEDIC SURGERY

## 2019-05-02 PROCEDURE — 40000986 XR PELVIS PORT 1/2 VW

## 2019-05-02 PROCEDURE — 25000125 ZZHC RX 250: Performed by: ORTHOPAEDIC SURGERY

## 2019-05-02 PROCEDURE — 71000013 ZZH RECOVERY PHASE 1 LEVEL 1 EA ADDTL HR: Performed by: ORTHOPAEDIC SURGERY

## 2019-05-02 PROCEDURE — 37000008 ZZH ANESTHESIA TECHNICAL FEE, 1ST 30 MIN: Performed by: ORTHOPAEDIC SURGERY

## 2019-05-02 PROCEDURE — 0SRB02A REPLACEMENT OF LEFT HIP JOINT WITH METAL ON POLYETHYLENE SYNTHETIC SUBSTITUTE, UNCEMENTED, OPEN APPROACH: ICD-10-PCS | Performed by: ORTHOPAEDIC SURGERY

## 2019-05-02 PROCEDURE — 71000012 ZZH RECOVERY PHASE 1 LEVEL 1 FIRST HR: Performed by: ORTHOPAEDIC SURGERY

## 2019-05-02 PROCEDURE — 25000128 H RX IP 250 OP 636: Performed by: PHYSICIAN ASSISTANT

## 2019-05-02 PROCEDURE — 27210794 ZZH OR GENERAL SUPPLY STERILE: Performed by: ORTHOPAEDIC SURGERY

## 2019-05-02 PROCEDURE — 12000000 ZZH R&B MED SURG/OB

## 2019-05-02 PROCEDURE — C1776 JOINT DEVICE (IMPLANTABLE): HCPCS | Performed by: ORTHOPAEDIC SURGERY

## 2019-05-02 PROCEDURE — 25000132 ZZH RX MED GY IP 250 OP 250 PS 637: Performed by: ORTHOPAEDIC SURGERY

## 2019-05-02 PROCEDURE — 36000063 ZZH SURGERY LEVEL 4 EA 15 ADDTL MIN: Performed by: ORTHOPAEDIC SURGERY

## 2019-05-02 PROCEDURE — 36000093 ZZH SURGERY LEVEL 4 1ST 30 MIN: Performed by: ORTHOPAEDIC SURGERY

## 2019-05-02 PROCEDURE — 25000125 ZZHC RX 250

## 2019-05-02 PROCEDURE — A9270 NON-COVERED ITEM OR SERVICE: HCPCS | Performed by: NURSE ANESTHETIST, CERTIFIED REGISTERED

## 2019-05-02 PROCEDURE — C1713 ANCHOR/SCREW BN/BN,TIS/BN: HCPCS | Performed by: ORTHOPAEDIC SURGERY

## 2019-05-02 PROCEDURE — 25000132 ZZH RX MED GY IP 250 OP 250 PS 637: Performed by: NURSE ANESTHETIST, CERTIFIED REGISTERED

## 2019-05-02 PROCEDURE — 25000128 H RX IP 250 OP 636: Performed by: NURSE ANESTHETIST, CERTIFIED REGISTERED

## 2019-05-02 DEVICE — IMP SCR BONE CAN ACE 6.5X20MM 1217-20-500: Type: IMPLANTABLE DEVICE | Site: HIP | Status: FUNCTIONAL

## 2019-05-02 DEVICE — IMPLANTABLE DEVICE: Type: IMPLANTABLE DEVICE | Site: HIP | Status: FUNCTIONAL

## 2019-05-02 DEVICE — IMP HEAD FEMORAL DEPUY 36MM +5 1365-52-000: Type: IMPLANTABLE DEVICE | Site: HIP | Status: FUNCTIONAL

## 2019-05-02 DEVICE — IMP CUP ACE PINNACLE 58MM 1217-22-058: Type: IMPLANTABLE DEVICE | Site: HIP | Status: FUNCTIONAL

## 2019-05-02 RX ORDER — ATORVASTATIN CALCIUM 20 MG/1
40 TABLET, FILM COATED ORAL DAILY
Status: DISCONTINUED | OUTPATIENT
Start: 2019-05-03 | End: 2019-05-06 | Stop reason: HOSPADM

## 2019-05-02 RX ORDER — CEFAZOLIN SODIUM 1 G/50ML
1 INJECTION, SOLUTION INTRAVENOUS EVERY 8 HOURS
Status: COMPLETED | OUTPATIENT
Start: 2019-05-02 | End: 2019-05-03

## 2019-05-02 RX ORDER — PROPOFOL 10 MG/ML
INJECTION, EMULSION INTRAVENOUS PRN
Status: DISCONTINUED | OUTPATIENT
Start: 2019-05-02 | End: 2019-05-02

## 2019-05-02 RX ORDER — LIDOCAINE 40 MG/G
CREAM TOPICAL
Status: DISCONTINUED | OUTPATIENT
Start: 2019-05-02 | End: 2019-05-06 | Stop reason: HOSPADM

## 2019-05-02 RX ORDER — DEXAMETHASONE SODIUM PHOSPHATE 10 MG/ML
10 INJECTION, SOLUTION INTRAMUSCULAR; INTRAVENOUS
Status: DISCONTINUED | OUTPATIENT
Start: 2019-05-02 | End: 2019-05-02 | Stop reason: HOSPADM

## 2019-05-02 RX ORDER — CEFAZOLIN SODIUM 2 G/100ML
2 INJECTION, SOLUTION INTRAVENOUS
Status: COMPLETED | OUTPATIENT
Start: 2019-05-02 | End: 2019-05-02

## 2019-05-02 RX ORDER — ONDANSETRON 2 MG/ML
4 INJECTION INTRAMUSCULAR; INTRAVENOUS EVERY 30 MIN PRN
Status: DISCONTINUED | OUTPATIENT
Start: 2019-05-02 | End: 2019-05-02 | Stop reason: HOSPADM

## 2019-05-02 RX ORDER — HYDROMORPHONE HYDROCHLORIDE 1 MG/ML
.2-.4 INJECTION, SOLUTION INTRAMUSCULAR; INTRAVENOUS; SUBCUTANEOUS
Status: DISCONTINUED | OUTPATIENT
Start: 2019-05-02 | End: 2019-05-06 | Stop reason: HOSPADM

## 2019-05-02 RX ORDER — CALCIUM CARBONATE 500 MG/1
500 TABLET, CHEWABLE ORAL 3 TIMES DAILY PRN
Status: DISCONTINUED | OUTPATIENT
Start: 2019-05-02 | End: 2019-05-06 | Stop reason: HOSPADM

## 2019-05-02 RX ORDER — KETAMINE HYDROCHLORIDE 10 MG/ML
INJECTION, SOLUTION INTRAMUSCULAR; INTRAVENOUS PRN
Status: DISCONTINUED | OUTPATIENT
Start: 2019-05-02 | End: 2019-05-02

## 2019-05-02 RX ORDER — OXYCODONE HYDROCHLORIDE 5 MG/1
5-10 TABLET ORAL
Status: DISCONTINUED | OUTPATIENT
Start: 2019-05-02 | End: 2019-05-03

## 2019-05-02 RX ORDER — ALBUTEROL SULFATE 0.83 MG/ML
2.5 SOLUTION RESPIRATORY (INHALATION) EVERY 4 HOURS PRN
Status: DISCONTINUED | OUTPATIENT
Start: 2019-05-02 | End: 2019-05-02 | Stop reason: HOSPADM

## 2019-05-02 RX ORDER — ONDANSETRON 4 MG/1
4 TABLET, ORALLY DISINTEGRATING ORAL EVERY 30 MIN PRN
Status: DISCONTINUED | OUTPATIENT
Start: 2019-05-02 | End: 2019-05-02 | Stop reason: HOSPADM

## 2019-05-02 RX ORDER — GABAPENTIN 300 MG/1
300 CAPSULE ORAL ONCE
Status: COMPLETED | OUTPATIENT
Start: 2019-05-02 | End: 2019-05-02

## 2019-05-02 RX ORDER — ONDANSETRON 2 MG/ML
4 INJECTION INTRAMUSCULAR; INTRAVENOUS EVERY 6 HOURS PRN
Status: DISCONTINUED | OUTPATIENT
Start: 2019-05-02 | End: 2019-05-06 | Stop reason: HOSPADM

## 2019-05-02 RX ORDER — DEXAMETHASONE SODIUM PHOSPHATE 4 MG/ML
INJECTION, SOLUTION INTRA-ARTICULAR; INTRALESIONAL; INTRAMUSCULAR; INTRAVENOUS; SOFT TISSUE PRN
Status: DISCONTINUED | OUTPATIENT
Start: 2019-05-02 | End: 2019-05-02

## 2019-05-02 RX ORDER — BUPIVACAINE HYDROCHLORIDE 7.5 MG/ML
INJECTION, SOLUTION INTRASPINAL PRN
Status: DISCONTINUED | OUTPATIENT
Start: 2019-05-02 | End: 2019-05-02

## 2019-05-02 RX ORDER — PANTOPRAZOLE SODIUM 40 MG/1
40 TABLET, DELAYED RELEASE ORAL DAILY
Status: DISCONTINUED | OUTPATIENT
Start: 2019-05-03 | End: 2019-05-06 | Stop reason: HOSPADM

## 2019-05-02 RX ORDER — LEVOTHYROXINE SODIUM 75 UG/1
75 TABLET ORAL
Status: DISCONTINUED | OUTPATIENT
Start: 2019-05-02 | End: 2019-05-06 | Stop reason: HOSPADM

## 2019-05-02 RX ORDER — FENTANYL CITRATE 50 UG/ML
INJECTION, SOLUTION INTRAMUSCULAR; INTRAVENOUS PRN
Status: DISCONTINUED | OUTPATIENT
Start: 2019-05-02 | End: 2019-05-02

## 2019-05-02 RX ORDER — LOSARTAN POTASSIUM 25 MG/1
25 TABLET ORAL DAILY
Status: DISCONTINUED | OUTPATIENT
Start: 2019-05-03 | End: 2019-05-05

## 2019-05-02 RX ORDER — MAGNESIUM OXIDE 400 MG/1
400 TABLET ORAL DAILY
Status: DISCONTINUED | OUTPATIENT
Start: 2019-05-03 | End: 2019-05-06 | Stop reason: HOSPADM

## 2019-05-02 RX ORDER — TOLTERODINE TARTRATE 2 MG/1
2 TABLET, EXTENDED RELEASE ORAL AT BEDTIME
Status: DISCONTINUED | OUTPATIENT
Start: 2019-05-03 | End: 2019-05-02

## 2019-05-02 RX ORDER — ONDANSETRON 4 MG/1
4 TABLET, ORALLY DISINTEGRATING ORAL EVERY 6 HOURS PRN
Status: DISCONTINUED | OUTPATIENT
Start: 2019-05-02 | End: 2019-05-06 | Stop reason: HOSPADM

## 2019-05-02 RX ORDER — LIDOCAINE 40 MG/G
CREAM TOPICAL
Status: DISCONTINUED | OUTPATIENT
Start: 2019-05-02 | End: 2019-05-02 | Stop reason: HOSPADM

## 2019-05-02 RX ORDER — CEFAZOLIN SODIUM 1 G/50ML
1 INJECTION, SOLUTION INTRAVENOUS SEE ADMIN INSTRUCTIONS
Status: DISCONTINUED | OUTPATIENT
Start: 2019-05-02 | End: 2019-05-02 | Stop reason: HOSPADM

## 2019-05-02 RX ORDER — MECLIZINE HYDROCHLORIDE 25 MG/1
25 TABLET ORAL 3 TIMES DAILY PRN
Status: DISCONTINUED | OUTPATIENT
Start: 2019-05-02 | End: 2019-05-06 | Stop reason: HOSPADM

## 2019-05-02 RX ORDER — DIMENHYDRINATE 50 MG/ML
25 INJECTION, SOLUTION INTRAMUSCULAR; INTRAVENOUS
Status: DISCONTINUED | OUTPATIENT
Start: 2019-05-02 | End: 2019-05-02 | Stop reason: HOSPADM

## 2019-05-02 RX ORDER — SODIUM CHLORIDE, SODIUM LACTATE, POTASSIUM CHLORIDE, CALCIUM CHLORIDE 600; 310; 30; 20 MG/100ML; MG/100ML; MG/100ML; MG/100ML
INJECTION, SOLUTION INTRAVENOUS CONTINUOUS
Status: DISCONTINUED | OUTPATIENT
Start: 2019-05-02 | End: 2019-05-02 | Stop reason: HOSPADM

## 2019-05-02 RX ORDER — PROPOFOL 10 MG/ML
INJECTION, EMULSION INTRAVENOUS CONTINUOUS PRN
Status: DISCONTINUED | OUTPATIENT
Start: 2019-05-02 | End: 2019-05-02

## 2019-05-02 RX ORDER — NALOXONE HYDROCHLORIDE 0.4 MG/ML
.1-.4 INJECTION, SOLUTION INTRAMUSCULAR; INTRAVENOUS; SUBCUTANEOUS
Status: DISCONTINUED | OUTPATIENT
Start: 2019-05-02 | End: 2019-05-06 | Stop reason: HOSPADM

## 2019-05-02 RX ORDER — ACETAMINOPHEN 325 MG/1
650 TABLET ORAL EVERY 4 HOURS PRN
Status: DISCONTINUED | OUTPATIENT
Start: 2019-05-05 | End: 2019-05-06 | Stop reason: HOSPADM

## 2019-05-02 RX ORDER — ONDANSETRON 2 MG/ML
INJECTION INTRAMUSCULAR; INTRAVENOUS PRN
Status: DISCONTINUED | OUTPATIENT
Start: 2019-05-02 | End: 2019-05-02

## 2019-05-02 RX ORDER — AMOXICILLIN 250 MG
1 CAPSULE ORAL 2 TIMES DAILY
Status: DISCONTINUED | OUTPATIENT
Start: 2019-05-02 | End: 2019-05-06 | Stop reason: HOSPADM

## 2019-05-02 RX ORDER — CITALOPRAM HYDROBROMIDE 20 MG/1
20 TABLET ORAL DAILY
Status: DISCONTINUED | OUTPATIENT
Start: 2019-05-02 | End: 2019-05-06 | Stop reason: HOSPADM

## 2019-05-02 RX ORDER — FENTANYL CITRATE 50 UG/ML
25-50 INJECTION, SOLUTION INTRAMUSCULAR; INTRAVENOUS
Status: DISCONTINUED | OUTPATIENT
Start: 2019-05-02 | End: 2019-05-02 | Stop reason: HOSPADM

## 2019-05-02 RX ORDER — TOLTERODINE TARTRATE 2 MG/1
2 TABLET, EXTENDED RELEASE ORAL AT BEDTIME
Status: DISCONTINUED | OUTPATIENT
Start: 2019-05-02 | End: 2019-05-06 | Stop reason: HOSPADM

## 2019-05-02 RX ORDER — EPINEPHRINE 1 MG/ML
INJECTION, SOLUTION, CONCENTRATE INTRAVENOUS PRN
Status: DISCONTINUED | OUTPATIENT
Start: 2019-05-02 | End: 2019-05-02

## 2019-05-02 RX ORDER — HYDROXYZINE HYDROCHLORIDE 25 MG/1
25-50 TABLET, FILM COATED ORAL EVERY 6 HOURS PRN
Status: DISCONTINUED | OUTPATIENT
Start: 2019-05-02 | End: 2019-05-06 | Stop reason: HOSPADM

## 2019-05-02 RX ORDER — GLYCOPYRROLATE 0.2 MG/ML
INJECTION, SOLUTION INTRAMUSCULAR; INTRAVENOUS PRN
Status: DISCONTINUED | OUTPATIENT
Start: 2019-05-02 | End: 2019-05-02

## 2019-05-02 RX ORDER — ACETAMINOPHEN 325 MG/1
975 TABLET ORAL EVERY 8 HOURS
Status: COMPLETED | OUTPATIENT
Start: 2019-05-02 | End: 2019-05-05

## 2019-05-02 RX ORDER — SODIUM CHLORIDE, SODIUM LACTATE, POTASSIUM CHLORIDE, CALCIUM CHLORIDE 600; 310; 30; 20 MG/100ML; MG/100ML; MG/100ML; MG/100ML
INJECTION, SOLUTION INTRAVENOUS CONTINUOUS
Status: DISCONTINUED | OUTPATIENT
Start: 2019-05-02 | End: 2019-05-04 | Stop reason: CLARIF

## 2019-05-02 RX ORDER — NEOSTIGMINE METHYLSULFATE 1 MG/ML
VIAL (ML) INJECTION PRN
Status: DISCONTINUED | OUTPATIENT
Start: 2019-05-02 | End: 2019-05-02

## 2019-05-02 RX ORDER — LIDOCAINE HYDROCHLORIDE 10 MG/ML
INJECTION, SOLUTION INFILTRATION; PERINEURAL PRN
Status: DISCONTINUED | OUTPATIENT
Start: 2019-05-02 | End: 2019-05-02

## 2019-05-02 RX ORDER — AMOXICILLIN 250 MG
2 CAPSULE ORAL 2 TIMES DAILY
Status: DISCONTINUED | OUTPATIENT
Start: 2019-05-02 | End: 2019-05-06 | Stop reason: HOSPADM

## 2019-05-02 RX ORDER — NALOXONE HYDROCHLORIDE 0.4 MG/ML
.1-.4 INJECTION, SOLUTION INTRAMUSCULAR; INTRAVENOUS; SUBCUTANEOUS
Status: DISCONTINUED | OUTPATIENT
Start: 2019-05-02 | End: 2019-05-02

## 2019-05-02 RX ORDER — EPHEDRINE SULFATE 50 MG/ML
INJECTION, SOLUTION INTRAMUSCULAR; INTRAVENOUS; SUBCUTANEOUS PRN
Status: DISCONTINUED | OUTPATIENT
Start: 2019-05-02 | End: 2019-05-02

## 2019-05-02 RX ORDER — ACETAMINOPHEN 325 MG/1
975 TABLET ORAL ONCE
Status: COMPLETED | OUTPATIENT
Start: 2019-05-02 | End: 2019-05-02

## 2019-05-02 RX ADMIN — CITALOPRAM HYDROBROMIDE 20 MG: 20 TABLET ORAL at 18:50

## 2019-05-02 RX ADMIN — MIDAZOLAM 1 MG: 1 INJECTION INTRAMUSCULAR; INTRAVENOUS at 14:15

## 2019-05-02 RX ADMIN — MIDAZOLAM 1 MG: 1 INJECTION INTRAMUSCULAR; INTRAVENOUS at 15:22

## 2019-05-02 RX ADMIN — CEFAZOLIN SODIUM 1 G: 1 INJECTION, SOLUTION INTRAVENOUS at 21:59

## 2019-05-02 RX ADMIN — SODIUM CHLORIDE, POTASSIUM CHLORIDE, SODIUM LACTATE AND CALCIUM CHLORIDE: 600; 310; 30; 20 INJECTION, SOLUTION INTRAVENOUS at 18:49

## 2019-05-02 RX ADMIN — OXYCODONE HYDROCHLORIDE 5 MG: 5 TABLET ORAL at 23:47

## 2019-05-02 RX ADMIN — Medication 3 MG: at 16:05

## 2019-05-02 RX ADMIN — ACETAMINOPHEN 975 MG: 325 TABLET, FILM COATED ORAL at 18:50

## 2019-05-02 RX ADMIN — Medication 10 MG: at 15:06

## 2019-05-02 RX ADMIN — SODIUM CHLORIDE, POTASSIUM CHLORIDE, SODIUM LACTATE AND CALCIUM CHLORIDE: 600; 310; 30; 20 INJECTION, SOLUTION INTRAVENOUS at 13:13

## 2019-05-02 RX ADMIN — Medication 10 MG: at 15:07

## 2019-05-02 RX ADMIN — PHENYLEPHRINE HYDROCHLORIDE 100 MCG: 10 INJECTION, SOLUTION INTRAMUSCULAR; INTRAVENOUS; SUBCUTANEOUS at 15:39

## 2019-05-02 RX ADMIN — ACETAMINOPHEN 975 MG: 325 TABLET, FILM COATED ORAL at 13:20

## 2019-05-02 RX ADMIN — GABAPENTIN 300 MG: 300 CAPSULE ORAL at 13:20

## 2019-05-02 RX ADMIN — Medication 6.25 MG: at 20:19

## 2019-05-02 RX ADMIN — FENTANYL CITRATE 50 MCG: 50 INJECTION, SOLUTION INTRAMUSCULAR; INTRAVENOUS at 14:45

## 2019-05-02 RX ADMIN — LIDOCAINE HYDROCHLORIDE 3 MG: 10 INJECTION, SOLUTION INFILTRATION; PERINEURAL at 14:20

## 2019-05-02 RX ADMIN — CEFAZOLIN SODIUM 2 G: 2 INJECTION, SOLUTION INTRAVENOUS at 14:13

## 2019-05-02 RX ADMIN — KETAMINE HYDROCHLORIDE 50 MG: 10 INJECTION INTRAMUSCULAR; INTRAVENOUS at 14:38

## 2019-05-02 RX ADMIN — TRANEXAMIC ACID 1 G: 1 INJECTION, SOLUTION INTRAVENOUS at 14:43

## 2019-05-02 RX ADMIN — TRANEXAMIC ACID 1 G: 1 INJECTION, SOLUTION INTRAVENOUS at 15:57

## 2019-05-02 RX ADMIN — EPINEPHRINE 0.2 MG: 1 INJECTION, SOLUTION INTRAMUSCULAR; SUBCUTANEOUS at 14:24

## 2019-05-02 RX ADMIN — LIDOCAINE HYDROCHLORIDE 1 ML: 10 INJECTION, SOLUTION EPIDURAL; INFILTRATION; INTRACAUDAL; PERINEURAL at 13:21

## 2019-05-02 RX ADMIN — RANITIDINE 300 MG: 150 TABLET ORAL at 21:58

## 2019-05-02 RX ADMIN — PROPOFOL 150 MG: 10 INJECTION, EMULSION INTRAVENOUS at 14:45

## 2019-05-02 RX ADMIN — OXYCODONE HYDROCHLORIDE 5 MG: 5 TABLET ORAL at 21:58

## 2019-05-02 RX ADMIN — PHENYLEPHRINE HYDROCHLORIDE 100 MCG: 10 INJECTION, SOLUTION INTRAMUSCULAR; INTRAVENOUS; SUBCUTANEOUS at 15:08

## 2019-05-02 RX ADMIN — LEVOTHYROXINE SODIUM 75 MCG: 75 TABLET ORAL at 18:50

## 2019-05-02 RX ADMIN — DEXAMETHASONE SODIUM PHOSPHATE 4 MG: 4 INJECTION, SOLUTION INTRA-ARTICULAR; INTRALESIONAL; INTRAMUSCULAR; INTRAVENOUS; SOFT TISSUE at 14:40

## 2019-05-02 RX ADMIN — ONDANSETRON 4 MG: 2 INJECTION INTRAMUSCULAR; INTRAVENOUS at 14:40

## 2019-05-02 RX ADMIN — PHENYLEPHRINE HYDROCHLORIDE 100 MCG: 10 INJECTION, SOLUTION INTRAMUSCULAR; INTRAVENOUS; SUBCUTANEOUS at 15:19

## 2019-05-02 RX ADMIN — SODIUM CHLORIDE, POTASSIUM CHLORIDE, SODIUM LACTATE AND CALCIUM CHLORIDE: 600; 310; 30; 20 INJECTION, SOLUTION INTRAVENOUS at 15:28

## 2019-05-02 RX ADMIN — LIDOCAINE HYDROCHLORIDE 3 MG: 10 INJECTION, SOLUTION INFILTRATION; PERINEURAL at 14:16

## 2019-05-02 RX ADMIN — PROPOFOL 50 MCG/KG/MIN: 10 INJECTION, EMULSION INTRAVENOUS at 14:38

## 2019-05-02 RX ADMIN — FENTANYL CITRATE 50 MCG: 50 INJECTION, SOLUTION INTRAMUSCULAR; INTRAVENOUS at 14:15

## 2019-05-02 RX ADMIN — SENNOSIDES AND DOCUSATE SODIUM 1 TABLET: 8.6; 5 TABLET ORAL at 20:19

## 2019-05-02 RX ADMIN — GLYCOPYRROLATE 0.4 MG: 0.2 INJECTION, SOLUTION INTRAMUSCULAR; INTRAVENOUS at 16:05

## 2019-05-02 RX ADMIN — PHENYLEPHRINE HYDROCHLORIDE 100 MCG: 10 INJECTION, SOLUTION INTRAMUSCULAR; INTRAVENOUS; SUBCUTANEOUS at 15:54

## 2019-05-02 RX ADMIN — TOLTERODINE TARTRATE 2 MG: 2 TABLET, FILM COATED ORAL at 21:59

## 2019-05-02 RX ADMIN — BUPIVACAINE HYDROCHLORIDE IN DEXTROSE 1.6 ML: 7.5 INJECTION, SOLUTION SUBARACHNOID at 14:24

## 2019-05-02 ASSESSMENT — ACTIVITIES OF DAILY LIVING (ADL)
TRANSFERRING: 0-->INDEPENDENT
NUMBER_OF_TIMES_PATIENT_HAS_FALLEN_WITHIN_LAST_SIX_MONTHS: 1
RETIRED_COMMUNICATION: 0-->UNDERSTANDS/COMMUNICATES WITHOUT DIFFICULTY
RETIRED_EATING: 0-->INDEPENDENT
TOILETING: 0-->INDEPENDENT
ADLS_ACUITY_SCORE: 15
AMBULATION: 1-->ASSISTIVE EQUIPMENT
DRESS: 0-->INDEPENDENT
COGNITION: 0 - NO COGNITION ISSUES REPORTED
SWALLOWING: 0-->SWALLOWS FOODS/LIQUIDS WITHOUT DIFFICULTY
WHICH_OF_THE_ABOVE_FUNCTIONAL_RISKS_HAD_A_RECENT_ONSET_OR_CHANGE?: AMBULATION;TRANSFERRING
FALL_HISTORY_WITHIN_LAST_SIX_MONTHS: YES
BATHING: 0-->INDEPENDENT

## 2019-05-02 ASSESSMENT — MIFFLIN-ST. JEOR: SCORE: 1163.9

## 2019-05-02 ASSESSMENT — PAIN DESCRIPTION - DESCRIPTORS: DESCRIPTORS: ACHING

## 2019-05-02 NOTE — ANESTHESIA POSTPROCEDURE EVALUATION
Patient: Christiana Sal    Procedure(s):  ARTHROPLASTY, HIP    Diagnosis:Left hip arthritis  Diagnosis Additional Information: No value filed.    Anesthesia Type:  Spinal    Note:  Anesthesia Post Evaluation    Patient location during evaluation: Bedside  Patient participation: Able to fully participate in evaluation  Level of consciousness: awake and alert  Pain management: adequate  Airway patency: patent  Cardiovascular status: acceptable  Respiratory status: acceptable  Hydration status: acceptable  PONV: none     Anesthetic complications: None          Last vitals:  Vitals:    05/02/19 1741 05/02/19 1806 05/02/19 1830   BP:  124/53 135/56   Pulse:      Resp:  14    Temp:      SpO2: 95% 96% 95%         Electronically Signed By: Jory Smith CRNA, APRN CRNA  May 2, 2019  6:35 PM

## 2019-05-02 NOTE — PROGRESS NOTES
Skin affirmation note    Admitting nurse completed full skin assessment, Dillon score and Dillon interventions. This writer agrees with the initial skin assessment findings.

## 2019-05-02 NOTE — ANESTHESIA PROCEDURE NOTES
Peripheral nerve/Neuraxial procedure note : lumbar puncture  Pre-Procedure  Performed by  Keo Palacios   Location: OR    Procedure Times:5/2/2019 2:17 PM and 5/2/2019 2:26 PM  Pre-Anesthestic Checklist: patient identified, IV checked, risks and benefits discussed, informed consent, monitors and equipment checked, pre-op evaluation, at physician/surgeon's request and post-op pain management    Timeout  Correct Patient: Yes   Correct Procedure: Yes   Correct Site: Yes   Correct Laterality: N/A   Correct Position: Yes   Site Marked: N/A   .   Procedure Documentation  ASA 3  Diagnosis:L hip DJD.    Procedure:    Lumbar puncture.  Insertion Site:L2-3  (midline approach)      Patient Prep;povidone-iodine 7.5% surgical scrub.  .  Needle: Moose tip Spinal Needle (gauge): 25  Spinal/LP Needle Length (inches): 3.5 # of attempts: 3 and  # of redirects:  3 Introducer used Introducer: 20 G .       Assessment/Narrative  Paresthesias: No.  .  .  clear CSF fluid removed while lateral   . Time Injected: 14:24

## 2019-05-02 NOTE — PROCEDURES
05/02/19    PREOP DIAGNOSIS: Left hip primary degenerative joint disease  POSTOP DIAGNOSIS: Left hip primary degenerative joint disease  PROCEDURE: Left total hip arthroplasty  SURGEON: Jacinto Adams   ASSISTANT: Hai Rubio.  The presence of a PA was necessary for positioning, retraction, and safe progression of the case  ANESTHESIA: General (spinal failed)  EBL: 200  COMPLICATIONS: None apparent   DISPO: Stable to PACU     IMPLANTS: DePuy Corail size 12 standard offset femur, 36mm x +5mm CoCr femoral head, 36mm x 58mm neutral polyethylene liner, and 58mm [place] Cup with 6.5mm screws x 2    INDICATIONS: Christiana is an 87 year old female with a history of progressive left hip pain due to end stage arthritis.  After failing nonoperative management, she elected to proceed with a left VERONICA, understanding the risks of infection, damage to vessels or nerves, blood clots, instability, leg length discrepancy, ongoing pain and need for revision surgery.     PROCEDURE: Patient was met in the preoperative holding area where consent was reviewed and the left hip was marked.  She was then transferred to the operating theater. After a spinal anesthetic was placed, she was placed in a right lateral decubitus position with her left side up. All bony prominences were padded, she was secured with a Stulberg hip positioner, and an axillary roll was placed.  A timeout was performed verifying the correct patient, surgery, and location. She received preoperative antibiotics as well as transexamic acid. The left lower extremity was then prepped and draped in a standard fashion.     We then made an incision in line with a posterior approach to the hip. Dissection was sharply carried down through skin and subcutaneous tissue.  Upon making the incision, it became clear she was still having sensation despite her spinal and therefore the anesthesia team converted her to a general.  The gluteal fascia and IT band were incised in line  with the incision. After palpating and protecting the sciatic nerve, an east west retractor was placed. We then released the piriformis and short external rotators and then performed a T Capsulotomy. The hip was dislocated and a neck cut made, approximately 4 mm proximal to the lesser trochanter. The femoral head measured 52mm and showed end stage arthritic change.    We turned our attention to the acetabulum. After placing anterior and posterior retractors, foveal and labral tissue were removed.  She had significant acetabular wear with a large defect of her posterior wall, with most of the posterior wall from about 12:00 to 4:00 eroded away.  Fortunately she had adequate anterior wall with good bone stock inferiorly and superiorly.  We started with a 52mm reamer and sequentially reamed to a 58, in approximately 40 deg of abduction and 30 deg of anteversion. At that point, we had good bleeding bone circumferentially.  A trial cup was placed with good pressfit followed by our final 58mm cup, again with good press fit.  Fixation was supplemented with two 6.5mm screws into the ilium given her large posterior wall defect.  We then placed a neutral liner. We then turned our attention to the femur. After using a cookie cutter and canal finder, we broached to a size 12, keeping the stem in approximatley 10-15 deg of anteversion. At that point, we had excellent rotational stability.  We then placed a standard offset neck with and a variety of different length femoral heads.  We felt the +5mm head most appropriate which showed leg lengths felt appropriate, the hip was stable in full extension and maximal external rotation, in the sleeping position, and with flexion to 90 degrees and internal rotation to 70 degrees.     We then removed all trial components and thoroughly irrigated the wound. We placed our final size 12 stem.  We then retrialed with a +5mm head.  Findings as above with flexion to 90 and IR to 70.  We impacted  our final +5mm head. The wound was instilled with a dilute betadine solution. Capsule and short external rotators were repaired with 0-ethibond, gluteus fascia with #1 stratafix, subdermal sutures with 2-0 vicryl, and a running 3-0 subcuticular monocryl. A sterile dressing followed by an abductor pillow was placed and the patient was transferred to the PACU in stable condition.       POSTOPERATIVE PLAN:   1. WBAT left LE with posterior hip precautions   2. PT for mobilization   3. DVT prophylaxis: ASA 325mg daily x 42d  4. Perioperative antibiotics     Jacinto Adams MD

## 2019-05-02 NOTE — PROGRESS NOTES
"[place] [facility] [place] ADMISSION NOTE    Patient admitted to room 2300 at approximately 1800 via cart from surgery. Patient was accompanied by transport tech.     Verbal SBAR report received from Keturah YEPEZ prior to patient arrival.     Patient trasferred to bed via air gabriel. Patient alert and oriented X 3. The patient is not having any pain.  . Admission vital signs: Blood pressure 124/53, pulse 62, temperature 98  F (36.7  C), temperature source Oral, resp. rate 14, height 1.676 m (5' 6\"), weight 71.2 kg (157 lb), SpO2 96 %, not currently breastfeeding. Patient and daughter were oriented to plan of care, call light, bed controls, tv, telephone, bathroom and visiting hours.     Risk Assessment    The following safety risks were identified during admission: fall. Yellow risk band applied: YES.     Skin Initial Assessment    This writer admitted this patient and completed a full skin assessment and Dillon score in the Adult PCS flowsheet. Appropriate interventions initiated as needed.     Secondary skin check completed by Mary YEPEZ.         Ita Abdi"

## 2019-05-02 NOTE — ANESTHESIA CARE TRANSFER NOTE
Patient: Christiana Sal    Procedure(s):  ARTHROPLASTY, HIP    Diagnosis: Left hip arthritis  Diagnosis Additional Information: No value filed.    Anesthesia Type:   Spinal     Note:  Airway :Face Mask  Patient transferred to:PACU  Handoff Report: Identifed the Patient, Identified the Reponsible Provider, Reviewed the pertinent medical history, Discussed the surgical course, Reviewed Intra-OP anesthesia mangement and issues during anesthesia, Set expectations for post-procedure period and Allowed opportunity for questions and acknowledgement of understanding      Vitals: (Last set prior to Anesthesia Care Transfer)    CRNA VITALS  5/2/2019 1550 - 5/2/2019 1629      5/2/2019             Pulse:  77    SpO2:  96 %    Resp Rate (observed):  5  (Abnormal)                 Electronically Signed By: SHARMIN Adams CRNA  May 2, 2019  4:29 PM

## 2019-05-03 ENCOUNTER — APPOINTMENT (OUTPATIENT)
Dept: PHYSICAL THERAPY | Facility: CLINIC | Age: 84
DRG: 470 | End: 2019-05-03
Attending: ORTHOPAEDIC SURGERY
Payer: MEDICARE

## 2019-05-03 ENCOUNTER — APPOINTMENT (OUTPATIENT)
Dept: OCCUPATIONAL THERAPY | Facility: CLINIC | Age: 84
DRG: 470 | End: 2019-05-03
Attending: ORTHOPAEDIC SURGERY
Payer: MEDICARE

## 2019-05-03 PROBLEM — R55 NEAR SYNCOPE: Status: ACTIVE | Noted: 2019-05-03

## 2019-05-03 LAB
ANION GAP SERPL CALCULATED.3IONS-SCNC: 5 MMOL/L (ref 3–14)
BUN SERPL-MCNC: 20 MG/DL (ref 7–30)
CALCIUM SERPL-MCNC: 8.2 MG/DL (ref 8.5–10.1)
CHLORIDE SERPL-SCNC: 104 MMOL/L (ref 94–109)
CO2 SERPL-SCNC: 29 MMOL/L (ref 20–32)
CREAT SERPL-MCNC: 0.8 MG/DL (ref 0.52–1.04)
GFR SERPL CREATININE-BSD FRML MDRD: 66 ML/MIN/{1.73_M2}
GLUCOSE BLDC GLUCOMTR-MCNC: 129 MG/DL (ref 70–99)
GLUCOSE SERPL-MCNC: 138 MG/DL (ref 70–99)
HGB BLD-MCNC: 9.3 G/DL (ref 11.7–15.7)
POTASSIUM SERPL-SCNC: 4.8 MMOL/L (ref 3.4–5.3)
SODIUM SERPL-SCNC: 138 MMOL/L (ref 133–144)

## 2019-05-03 PROCEDURE — 25000128 H RX IP 250 OP 636: Performed by: ORTHOPAEDIC SURGERY

## 2019-05-03 PROCEDURE — 97116 GAIT TRAINING THERAPY: CPT | Mod: GP | Performed by: PHYSICAL THERAPIST

## 2019-05-03 PROCEDURE — 40000193 ZZH STATISTIC PT WARD VISIT: Performed by: PHYSICAL THERAPIST

## 2019-05-03 PROCEDURE — 97535 SELF CARE MNGMENT TRAINING: CPT | Mod: GO

## 2019-05-03 PROCEDURE — A9270 NON-COVERED ITEM OR SERVICE: HCPCS | Performed by: PHYSICIAN ASSISTANT

## 2019-05-03 PROCEDURE — 97110 THERAPEUTIC EXERCISES: CPT | Mod: GP | Performed by: PHYSICAL THERAPIST

## 2019-05-03 PROCEDURE — 85018 HEMOGLOBIN: CPT | Performed by: ORTHOPAEDIC SURGERY

## 2019-05-03 PROCEDURE — A9270 NON-COVERED ITEM OR SERVICE: HCPCS | Performed by: ORTHOPAEDIC SURGERY

## 2019-05-03 PROCEDURE — 25000132 ZZH RX MED GY IP 250 OP 250 PS 637: Performed by: ORTHOPAEDIC SURGERY

## 2019-05-03 PROCEDURE — 36415 COLL VENOUS BLD VENIPUNCTURE: CPT | Performed by: ORTHOPAEDIC SURGERY

## 2019-05-03 PROCEDURE — 12000000 ZZH R&B MED SURG/OB

## 2019-05-03 PROCEDURE — 99232 SBSQ HOSP IP/OBS MODERATE 35: CPT | Performed by: PHYSICIAN ASSISTANT

## 2019-05-03 PROCEDURE — 80048 BASIC METABOLIC PNL TOTAL CA: CPT | Performed by: ORTHOPAEDIC SURGERY

## 2019-05-03 PROCEDURE — 97165 OT EVAL LOW COMPLEX 30 MIN: CPT | Mod: GO

## 2019-05-03 PROCEDURE — 00000146 ZZHCL STATISTIC GLUCOSE BY METER IP

## 2019-05-03 PROCEDURE — 25000132 ZZH RX MED GY IP 250 OP 250 PS 637: Performed by: PHYSICIAN ASSISTANT

## 2019-05-03 RX ADMIN — ACETAMINOPHEN 975 MG: 325 TABLET, FILM COATED ORAL at 17:47

## 2019-05-03 RX ADMIN — Medication 5 MG: at 17:47

## 2019-05-03 RX ADMIN — ACETAMINOPHEN 975 MG: 325 TABLET, FILM COATED ORAL at 11:47

## 2019-05-03 RX ADMIN — CEFAZOLIN SODIUM 1 G: 1 INJECTION, SOLUTION INTRAVENOUS at 06:20

## 2019-05-03 RX ADMIN — ASPIRIN 325 MG: 325 TABLET, COATED ORAL at 09:25

## 2019-05-03 RX ADMIN — SENNOSIDES AND DOCUSATE SODIUM 2 TABLET: 8.6; 5 TABLET ORAL at 19:52

## 2019-05-03 RX ADMIN — SENNOSIDES AND DOCUSATE SODIUM 1 TABLET: 8.6; 5 TABLET ORAL at 09:26

## 2019-05-03 RX ADMIN — MAGNESIUM OXIDE TAB 400 MG (241.3 MG ELEMENTAL MG) 400 MG: 400 (241.3 MG) TAB at 09:27

## 2019-05-03 RX ADMIN — PANTOPRAZOLE SODIUM 40 MG: 40 TABLET, DELAYED RELEASE ORAL at 09:25

## 2019-05-03 RX ADMIN — RANITIDINE 300 MG: 150 TABLET ORAL at 19:52

## 2019-05-03 RX ADMIN — CITALOPRAM HYDROBROMIDE 20 MG: 20 TABLET ORAL at 09:26

## 2019-05-03 RX ADMIN — OXYCODONE HYDROCHLORIDE 10 MG: 5 TABLET ORAL at 03:52

## 2019-05-03 RX ADMIN — TOLTERODINE TARTRATE 2 MG: 2 TABLET, FILM COATED ORAL at 19:53

## 2019-05-03 RX ADMIN — Medication 5 MG: at 13:02

## 2019-05-03 RX ADMIN — ACETAMINOPHEN 975 MG: 325 TABLET, FILM COATED ORAL at 02:28

## 2019-05-03 RX ADMIN — OXYCODONE HYDROCHLORIDE 5 MG: 5 TABLET ORAL at 09:26

## 2019-05-03 RX ADMIN — Medication 5 MG: at 22:11

## 2019-05-03 RX ADMIN — Medication 12.5 MG: at 09:25

## 2019-05-03 RX ADMIN — LEVOTHYROXINE SODIUM 75 MCG: 75 TABLET ORAL at 06:20

## 2019-05-03 RX ADMIN — LOSARTAN POTASSIUM 25 MG: 25 TABLET ORAL at 09:26

## 2019-05-03 RX ADMIN — ATORVASTATIN CALCIUM 40 MG: 20 TABLET, FILM COATED ORAL at 09:28

## 2019-05-03 ASSESSMENT — ACTIVITIES OF DAILY LIVING (ADL)
ADLS_ACUITY_SCORE: 15

## 2019-05-03 NOTE — PROGRESS NOTES
05/03/19 1400   Quick Adds   Type of Visit Initial Occupational Therapy Evaluation   Living Environment   Lives With alone   Living Arrangements independent living facility   Home Accessibility no concerns   Living Environment Comment wakl-in shower with grab bars in shower and near toilet.    Self-Care   Usual Activity Tolerance good   Current Activity Tolerance moderate   Regular Exercise Yes   Equipment Currently Used at Home grab bar, toilet;grab bar, tub/shower;shower chair;walker, rolling  (reacher)   Functional Level   Ambulation 1-->assistive equipment   Transferring 1-->assistive equipment   Toileting 0-->independent   Bathing 0-->independent   Dressing 0-->independent   Eating 0-->independent   Communication 0-->understands/communicates without difficulty   Swallowing 0-->swallows foods/liquids without difficulty   Cognition 0 - no cognition issues reported   Fall history within last six months yes   Number of times patient has fallen within last six months 1   Which of the above functional risks had a recent onset or change? ambulation;transferring;toileting;bathing;dressing   Prior Functional Level Comment hx of R ankle fx. Went to pre-op class and has been doing UE exercises.    General Information   Onset of Illness/Injury or Date of Surgery - Date 05/02/19   Referring Physician Jacinto Adams MD   Patient/Family Goals Statement to go to TCU prior to return home.    Additional Occupational Profile Info/Pertinent History of Current Problem s/p L VERONICA   Precautions/Limitations left hip precautions   Weight-Bearing Status - LLE weight-bearing as tolerated   Cognitive Status Examination   Orientation orientation to person, place and time   Level of Consciousness alert   Pain Assessment   Patient Currently in Pain Yes, see Vital Sign flowsheet   Transfer Skill: Sit to Stand   Level of [place]: Sit/Stand contact guard   Physical Assist/Nonphysical Assist: Sit/Stand 1 person assist   Transfer  "Skill: Sit to Stand weight-bearing as tolerated   Assistive Device for Transfer: Sit/Stand standard walker   Transfer Skill: Toilet Transfer   Level of [place]: Toilet contact guard   Physical Assist/Nonphysical Assist: Toilet 1 person assist   Weight-Bearing Restrictions: Toilet weight-bearing as tolerated   Assistive Device standard walker   Upper Body Dressing   Level of [place]: Dress Upper Body independent   Lower Body Dressing   Level of [place]: Dress Lower Body stand-by assist   Physical Assist/Nonphysical Assist: Dress Lower Body 1 person assist   Assistive Device sock-aid;reacher   Activities of Daily Living Analysis   Impairments Contributing to Impaired Activities of Daily Living pain;post surgical precautions   General Therapy Interventions   Planned Therapy Interventions ADL retraining   Clinical Impression   Criteria for Skilled Therapeutic Interventions Met yes, treatment indicated   OT Diagnosis decreased independence with ADLs and functional mobility    Influenced by the following impairments hip precautions, increased pain   Assessment of Occupational Performance 3-5 Performance Deficits   Identified Performance Deficits dressing, toileting, showering, functional mobility    Clinical Decision Making (Complexity) Low complexity   Therapy Frequency daily   Predicted Duration of Therapy Intervention (days/wks) 2-3 days   Anticipated Discharge Disposition Transitional Care Facility   Risks and Benefits of Treatment have been explained. Yes   Patient, Family & other staff in agreement with plan of care Yes   [facility] AM-PAC  \"6 Clicks\" Daily Activity Inpatient Short Form   1. Putting on and taking off regular lower body clothing? 3 - A Little   2. Bathing (including washing, rinsing, drying)? 3 - A Little   3. Toileting, which includes using toilet, bedpan or urinal? 3 - A Little   4. Putting on and taking off regular upper body clothing? 4 - None   5. Taking care of personal " grooming such as brushing teeth? 4 - None   6. Eating meals? 4 - None   Daily Activity Raw Score (Score out of 24.Lower scores equate to lower levels of function) 21   Total Evaluation Time   Total Evaluation Time (Minutes) 6

## 2019-05-03 NOTE — PLAN OF CARE
Discharge Planner PT   Patient plan for discharge: TCU  Current status: amb in room 10ft RW CGA, in/out of bathroom, sit to stand min to no asst  Barriers to return to prior living situation: lives alone in Sr apt, does not receive assistance  Recommendations for discharge: TCU  Rationale for recommendations: as stated       Entered by: Kris Hoenk 05/03/2019 10:50 AM

## 2019-05-03 NOTE — PROGRESS NOTES
Christiana Sal   PHYSICAL THERAPY EVALUATION    05/03/19 1000   Quick Adds   Type of Visit Initial PT Evaluation   Living Environment   Lives With alone   Living Arrangements independent living facility   Home Accessibility no concerns   Self-Care   Usual Activity Tolerance good   Current Activity Tolerance moderate   Regular Exercise Yes   Activity/Exercise Type strength training   Equipment Currently Used at Home walker, rolling  (4 wheels)   Activity/Exercise/Self-Care Comment indep living   Functional Level Prior   Ambulation 1-->assistive equipment   Toileting 0-->independent   Bathing 0-->independent   Communication 0-->understands/communicates without difficulty   Cognition 0 - no cognition issues reported   Fall history within last six months yes   Number of times patient has fallen within last six months 1   Which of the above functional risks had a recent onset or change? ambulation;transferring   Prior Functional Level Comment has been doing rehab from prior R ankle fx, did total joint class and doing UE exercises   General Information   Onset of Illness/Injury or Date of Surgery - Date 05/02/19   Referring Physician DR Adams   Patient/Family Goals Statement walk, drive, go back home   Pertinent History of Current Problem (include personal factors and/or comorbidities that impact the POC) VERONICA L 5/2/19, prior R ankle fx and ORIF Dec 2018 - healed and doind well, has 4WW at home   Weight-Bearing Status - LLE weight-bearing as tolerated   Strength   Strength Comments not assessed, at least 3/5   Bed Mobility   Bed Mobility Comments sup to sit mod A for L LE, pt needing bed rails andPT to grab   Transfer Skills   Transfer Comments sit to stand SBA from bed and commode, stand to sit, indep - lowered nice and slow, good strength   Gait   Gait Comments amb RW in room to bathroom and half way to door and back to chair   Balance   Balance Comments fair, reporting little lightheaded   General Therapy  "Interventions   Planned Therapy Interventions strengthening;ROM;gait training;bed mobility training   Clinical Impression   Criteria for Skilled Therapeutic Intervention yes, treatment indicated   PT Diagnosis decreased strength and ROM, dependent bed mobility and gait   Functional limitations due to impairments walking, transferring   Clinical Presentation Stable/Uncomplicated   Clinical Decision Making (Complexity) Low complexity   Therapy Frequency` 2 times/day   Predicted Duration of Therapy Intervention (days/wks) 3days   Anticipated Discharge Disposition Transitional Care Facility   Risk & Benefits of therapy have been explained Yes   Patient, Family & other staff in agreement with plan of care Yes   [employer] \"6 Clicks\"   2016, Trustees of [facility], under license to [employer].  All rights reserved.   6 Clicks Short Forms Basic Mobility Inpatient Short Form   [facility]  \"6 Clicks\" V.2 Basic Mobility Inpatient Short Form   1. Turning from your back to your side while in a flat bed without using bedrails? 1 - Total   2. Moving from lying on your back to sitting on the side of a flat bed without using bedrails? 2 - A Lot   3. Moving to and from a bed to a chair (including a wheelchair)? 3 - A Little   4. Standing up from a chair using your arms (e.g., wheelchair, or bedside chair)? 4 - None   5. To walk in hospital room? 3 - A Little   6. Climbing 3-5 steps with a railing? 2 - A Lot   Basic Mobility Raw Score (Score out of 24.Lower scores equate to lower levels of function) 15   Total Evaluation Time   Total Evaluation Time (Minutes) 15   Kris Hoenk, PT #[id number]  [facility]    "

## 2019-05-03 NOTE — CONSULTS
Care Transition Initial Assessment - RN      Met with: Patient.    DATA  Active Problems:    Coronary atherosclerosis of native coronary artery    Hyperlipidemia LDL goal <100    Essential hypertension, benign    Hypothyroidism    GERD (gastroesophageal reflux disease)    Major depressive disorder, recurrent episode, mild (H)    Angina pectoris (H)    JENIFER (obstructive sleep apnea)    CAD S/P percutaneous coronary angioplasty    S/P cardiac pacemaker procedure    Sinoatrial node dysfunction (H)    Cardiac pacemaker - [employer] dual lead - Not Dependent    Overactive bladder    S/P total hip arthroplasty       Cognitive Status: awake, alert and oriented.        Contact information and PCP information verified: Yes  Lives With: alone   Living Arrangements: independent living facility                 Insurance concerns: No Insurance issues identified        This writer met with pt, introduced self and role.  Patient lives alone in at [facility] facility with no current services.  Patient states family unable to assist her to discharge home.  She plans to discharge to TCU.  Discharge planning iniatied by [facility] Discussed discharge planning and Medicare guidelines in regards to TCU.  Patient was provided with Medicare certified nursing home list. Pts choices are as follows [facility] (Phone: [phone] Fax: [phone]) and [facility] (Admissions phone: [phone] Main phone: [phone] Fax: [phone]).  Patient accepted at [facility].  Patient will update her daughter, Esther.  Esther will transport when ready for discharge.         PLAN    [facility]    Mary Dhaliwal RN  Inpatient Care Coordinator  [facility] [phone]  [facility] [phone]

## 2019-05-03 NOTE — PROGRESS NOTES
[facility] Medicine Progress Note  Date of Service: 05/03/2019    Assessment & Plan   Christiana Sal is a 87 year old female who presented on 5/2/2019 for scheduled Procedure(s):  ARTHROPLASTY, HIP by Jacinto Adams MD and is being followed by the hospital medicine service for co-management of acute and/or chronic perioperative medical problems.    Left hip primary degenerative joint disease  S/p Procedure(s):  ARTHROPLASTY, HIP on 5/2/19  1 Day Post-Op    - pain control, wound cares, physical therapy, occupational therapy and DVT prophylaxis per orthopedic surgery service    Syncope vs near syncope  Had event in the middle of the night while sitting on the commode. No fall or trauma. Orders had been placed for 10 mg oxycodone q 3 hours. Blood pressure was soft but now improved. Suspect this is multifactorial secondary to medications, post-anesthesia, and some hypovolemia.   - Narcotic dose decreased per ortho (now oxycodone 2.5 - 5 mg prn q 3 hours)  - Holding parameters placed on antihypertensive medications  - Continue IV fluids  - Monitor for recurrence  - Fall precautions  - Consider stopping Detrol if recurring     Coronary atherosclerosis of native coronary artery  Angina pectoris (H)  Hyperlipidemia LDL goal <100  CAD S/P percutaneous coronary angioplasty  Denies chest pain. Managed prior to admission with aspirin 161 mg q pm, Lipitor 40 mg daily, losartan 25 mg daily, metoprolol XL 12.5 mg daily, and prn nitroglycerine.  - Hold prior to admission aspirin and nitroglycerine  - Continue prior to admission Lipitor, losartan, and metoprolol (holding parameters in place)    Essential hypertension, benign  Pressures reviewed, stable. Managed prior to admission with losartan 25 mg daily and metoprolol XL 12.5 mg daily.  - Continue losartan and metoprolol with holding parameters as above    Sinoatrial node dysfunction (H)  S/P cardiac pacemaker  procedure  Cardiac pacemaker - [employer] dual lead - Not Dependent  No intervention    Overactive bladder  Managed prior to admission with Detrol 2 mg q hs.  - Continue Detrol, consider stopping if dizziness persists    Hypothyroidism  Managed prior to admission with levothyroxine 75 mcg daily, continue.    GERD (gastroesophageal reflux disease)  Having some reflux symptoms. Managed prior to admission with Protonix 40 mg daily, Zantac 300 mg q hs, and prn Tums, continue.    Major depressive disorder, recurrent episode, mild  Stable mood. Managed prior to admission with Celexa 20 mg daily, continue.    JENIFER (obstructive sleep apnea)  OK to use CPAP with home settings.    DVT Prophylaxis: as per orthopedic surgery service - Pneumatic Compression Devices and aspirin 325 mg daily  Code Status: Full Code    Lines: Peripheral   Contreras catheter: No    Discussion: Medically, the patient appears stable and well.    Disposition: Anticipate discharge 5/5/19 to TCU . No barriers to discharge from internal medicine standpoint as long as dizziness resolves and patient clears therapies.    Attestation:  I have reviewed today's vital signs, notes, medications, labs and imaging.    Raine Hodgson PA-C  [facility] Hospitalist Service  Pager: [phone]       Interval History   Patient feeling well after surgery. Pain rated 0/10 at rest, but increases to 4/10 with ambulation. Has some pain in her groin / medial thigh since surgery. Denies numbness or tingling.     Has been feeling slightly lightheaded after surgery. Had one episode of syncope/near syncope overnight while on the commode. No fall or injury, just briefly went limp. Was witnessed by RN. No additional episodes, but occasionally lightheaded when she's ambulating.     Tolerating oral intake, denies abdominal pain, nausea, vomiting. No bowel movement since surgery, but passing flatus. Urinating without difficulty.    She has a slight cough, stable compared to  baseline. Denies chest pain, palpitations, wheeze, shortness of breath, headache, vision changes, dizziness, or other complaints.    Physical Exam   Temp:  [95.9  F (35.5  C)-98.6  F (37  C)] 98.6  F (37  C)  Pulse:  [62-69] 69  Heart Rate:  [60-71] 63  Resp:  [11-18] 18  BP: (100-138)/(52-70) 104/53  SpO2:  [94 %-100 %] 94 %    Weights:   Vitals:    05/02/19 1305   Weight: 71.2 kg (157 lb)    Body mass index is 25.34 kg/m .    General appearance: Awake, alert, and in no apparent distress. Pleasant and conversational, speaking in full sentences.  CV: Regular rhythm & rate, no murmurs. No edema. Peripheral pulses intact.  Respiratory: Moving air well bilaterally, no wheezing, crackles, or rhonchi.  GI: Non-distended, soft, nontender to palpation. No rebound or guarding. Normoactive bowel sounds.  Skin: Warm, dry, no rashes or ecchymoses. No mottling of skin. Aquacel dressing present over left hip, clean and dry.  Musculoskeletal / extremities: Moves all extremities equally, no obvious abnormalities. Distal CMS intact.  Neurologic: No focal deficits.    Data   Recent Labs   Lab 05/03/19  0553   HGB 9.3*      POTASSIUM 4.8   CHLORIDE 104   CO2 29   BUN 20   CR 0.80   ANIONGAP 5   YVONNE 8.2*   *       Recent Labs   Lab 05/03/19  0726 05/03/19  0553   GLC  --  138*   *  --         Unresulted Labs Ordered in the Past 30 Days of this Admission     No orders found for last 61 day(s).           Imaging  Recent Results (from the past 24 hour(s))   XR Pelvis Port 1/2 Views    Narrative    XR PELVIS PORT 1/2 VW 5/2/2019 4:47 PM    HISTORY: Total hip arthroplasty.     COMPARISON: September 13, 2018.       Impression    IMPRESSION: The patient has had a left total hip arthroplasty.  Hardware is intact. Alignment is anatomic. Marked degenerative changes  of the right hip as before. There is an irregular linear lucency  through the left inferior pubic ramus, likely artifact from overlying  bowel gas although a  minimally displaced fracture cannot be excluded.     TORI CASTAÑEDA MD        I reviewed all new labs and imaging results over the last 24 hours. I personally reviewed no images or EKG's today.    Medications     lactated ringers 75 mL/hr at 05/02/19 1849       acetaminophen  975 mg Oral Q8H     aspirin  325 mg Oral Daily     atorvastatin  40 mg Oral Daily     citalopram  20 mg Oral Daily     levothyroxine  75 mcg Oral QAM AC     losartan  25 mg Oral Daily     magnesium oxide  400 mg Oral Daily     metoprolol succinate ER  12.5 mg Oral Daily     pantoprazole  40 mg Oral Daily     ranitidine  300 mg Oral At Bedtime     senna-docusate  1 tablet Oral BID    Or     senna-docusate  2 tablet Oral BID     sodium chloride (PF)  3 mL Intracatheter Q8H     tolterodine  2 mg Oral At Bedtime       Raine Hodgson PA-C  [facility] Hospitalist Service  Pager: [phone]

## 2019-05-03 NOTE — PROGRESS NOTES
"[facility] Orthopaedics Progress Note      Post-operative Day: 1 Day Post-Op    Procedure(s):  ARTHROPLASTY, HIP      Subjective:    Pain: moderate  aching to L hip. Denies shooting pain, parasthesias, numbness and weakness.   denies Chest pain, SOB, O2 required: None  denies nausea/ emesis  denies lightheadedness, dizziness and weakness  BM -, passing gas +. Urinating well, Contreras in place: no.       Objective:  Blood pressure 104/53, pulse 69, temperature 98.6  F (37  C), temperature source Oral, resp. rate 18, height 1.676 m (5' 6\"), weight 71.2 kg (157 lb), SpO2 94 %, not currently breastfeeding.    Patient Vitals for the past 24 hrs:   BP Temp Temp src Pulse Heart Rate Resp SpO2 Height Weight   05/03/19 0925 104/53 -- -- 69 -- -- -- -- --   05/03/19 0728 100/52 98.6  F (37  C) Oral 62 -- 18 94 % -- --   05/03/19 0500 131/59 98.1  F (36.7  C) Oral 62 -- 16 95 % -- --   05/02/19 2309 138/65 98.1  F (36.7  C) Oral 69 -- 16 96 % -- --   05/02/19 2032 116/55 95.9  F (35.5  C) Oral -- -- 16 95 % -- --   05/02/19 1830 135/56 -- -- -- 63 -- 95 % -- --   05/02/19 1806 124/53 -- -- -- 60 14 96 % -- --   05/02/19 1741 -- -- -- -- -- -- 95 % -- --   05/02/19 1730 122/70 98  F (36.7  C) Oral -- 60 12 95 % -- --   05/02/19 1715 122/64 -- -- 62 64 16 96 % -- --   05/02/19 1700 121/63 -- -- 67 66 13 99 % -- --   05/02/19 1645 119/62 -- -- 66 63 16 100 % -- --   05/02/19 1630 114/58 -- -- 67 67 17 99 % -- --   05/02/19 1626 131/61 97.6  F (36.4  C) Axillary -- 71 11 99 % -- --   05/02/19 1305 (!) 174/91 97.7  F (36.5  C) Oral -- 71 16 97 % 1.676 m (5' 6\") 71.2 kg (157 lb)       Wt Readings from Last 4 Encounters:   05/02/19 71.2 kg (157 lb)   04/22/19 71.2 kg (157 lb)   04/18/19 70 kg (154 lb 6.4 oz)   04/12/19 69.9 kg (154 lb)     General: Alert and orientated. No apparent distress. Non-labored breathing. Appropriate affect.   MSK:  L hip: dressing Clean, dry, and intact. Skin intact, no ecchymosis, no erythema. nontender to " palpation to incision site. ROM appropriate for post op. Distal neurovascularly intact. Compartments soft and non-tender. No calf pain. Homans negative. PF/DF and EHL intact.       Pertinent Labs   Lab Results: personally reviewed.     Recent Labs   Lab Test 05/03/19  0553 04/12/19  1031 04/01/19  1156 12/11/18  0836 12/04/18  0544  05/10/16  1109  02/16/12  1010   INR  --   --   --   --   --   --  0.90  --  0.98   HGB 9.3* 12.0 13.2 11.4* 10.6*   < > 12.5   < >  --    HCT  --   --  39.8 35.4 32.0*   < > 37.1   < >  --    MCV  --   --  93 95 94   < > 91   < >  --    PLT  --   --  239 268 237   < > 284   < >  --     136 139 136 138   < > 135   < >  --     < > = values in this interval not displayed.         Procedure(s):  ARTHROPLASTY, HIP  Plan: Anticoagulation protocol:  mg daily  x 42  days            Pain medications:  oxycodone and tylenol            Weight bearing status:  WBAT            Dressing Change: Aquacel dressing to be left intact until follow up.            Disposition:  TCU in 1-2 days             Follow up: 2 week with JESSICA            Continue cares and rehabilitation     Report completed by:  Shelbie Juarez PA-C  Date: 5/3/2019  Time: 11:57 AM

## 2019-05-03 NOTE — PLAN OF CARE
Discharge Planner OT   Patient plan for discharge: TCU    Current status: Eval complete. Pt educated on hip precautions and implications for ADLs. Participated in AE training for LB dressing- able to complete with SBA using sock aid and reacher. Transfers from chair to bed with SBA. With gait belt to assist L LE pt able to complete sit to supine with SBA.      Barriers to return to prior living situation: lives alone, assist level    Recommendations for discharge: TCU    Rationale for recommendations: to increase independence with ADLs/IADLs and functional mobility        Entered by: Sally Mulligan 05/03/2019 2:55 PM

## 2019-05-03 NOTE — PROGRESS NOTES
"SPIRITUAL HEALTH SERVICES  SPIRITUAL ASSESSMENT Progress Note  [facility] [place] - Med/Surg    Referral Source:   Pt request during pre-op admission call.      Illness Circumstances:   Reviewed documentation. Reflective conversation shared with patient which integrated elements of illness and family narratives.     Context of Serious Illness/Symptom(s) - VERONICA    Resources for Support - Daughter    Distress:     Emotional/Existential/Relational Distress - Christiana mentioned that she is \"taking it easy\" after her surgery, not wanting to love in ways that she shouldn't.  I assured her that her therapists would help her to know what she can and can't do.  She also stated that her  had  a year ago, after a aguayo with Alzheimers.  They were living at [place] in [place].  After his passing she wanted a change of scenery so has recently moved to [facility].      Spiritual/Islam Distress - She is not able to get to [employer] as often as she would like but has been pleased that  [facility] sends out a  to the new facility where she lives.    Social/Cultural/Economic Distress - She stated that her new place is just a \"tiny apartment\" but that it works fine for her.    Spiritual / Islam Coping:     [employer]/Virgie - [place]    Spiritual Practice(s) - Likes to attend [employer] and appreciates the ministry that her [employer] has, coming out to where the people live.    Goals of Care:    Goals of Care - Returning to her home when she is ready    Meaning/Sense-Making - Family and virgie are significant for Christiana.    Plan: No follow up visit planned but patient is aware of the availability of spiritual support on request.    Jhoan Rey M.A., [facility]  Staff   [facility]  Office: [phone]  Cell: [phone]  Pager [phone]    "

## 2019-05-03 NOTE — PLAN OF CARE
Pain controlled with 10 mg oxycodone Q3h PRN. Patient stood at bedside and used bedside commode with assist x 2 and walker. While sitting on commode, she turned pale, shuddered, and went limp briefly. Was able to answer questions, said she was dizzy. Oxygen sats and BP ok. Pt had taken oxy on an empty stomach. Gave her chicken broth and ice cream when she got back in bed. Pt states she is feeling better.

## 2019-05-03 NOTE — PROGRESS NOTES
PAS-RR    Per DHS regulation, CTS team completed and submitted PAS-RR to [place] Board on Aging Direct Connect via the Senior LinkAge Line. CTS team advised SNF and they are aware a PAS-RR has been submitted.     CTS team reviewed with pt or health care agent that they may be contacted for a follow up appointment within 10 days of hospital discharge if SNF stay is <30 days. Contact information for Senior [employer]Age Line was also provided.     Pt or health care agent verbalized understanding.     PAS-RR # [id number]    Hafsa Waddell RN Care Coordinator  [facility] [phone]  [facility] [place] [phone]

## 2019-05-04 ENCOUNTER — APPOINTMENT (OUTPATIENT)
Dept: PHYSICAL THERAPY | Facility: CLINIC | Age: 84
DRG: 470 | End: 2019-05-04
Attending: ORTHOPAEDIC SURGERY
Payer: MEDICARE

## 2019-05-04 ENCOUNTER — APPOINTMENT (OUTPATIENT)
Dept: OCCUPATIONAL THERAPY | Facility: CLINIC | Age: 84
DRG: 470 | End: 2019-05-04
Attending: ORTHOPAEDIC SURGERY
Payer: MEDICARE

## 2019-05-04 LAB
GLUCOSE SERPL-MCNC: 96 MG/DL (ref 70–99)
HGB BLD-MCNC: 8 G/DL (ref 11.7–15.7)

## 2019-05-04 PROCEDURE — A9270 NON-COVERED ITEM OR SERVICE: HCPCS | Performed by: ORTHOPAEDIC SURGERY

## 2019-05-04 PROCEDURE — 97116 GAIT TRAINING THERAPY: CPT | Mod: GP

## 2019-05-04 PROCEDURE — 25000132 ZZH RX MED GY IP 250 OP 250 PS 637: Performed by: PHYSICIAN ASSISTANT

## 2019-05-04 PROCEDURE — 97110 THERAPEUTIC EXERCISES: CPT | Mod: GP

## 2019-05-04 PROCEDURE — 97535 SELF CARE MNGMENT TRAINING: CPT | Mod: GO

## 2019-05-04 PROCEDURE — 36415 COLL VENOUS BLD VENIPUNCTURE: CPT | Performed by: ORTHOPAEDIC SURGERY

## 2019-05-04 PROCEDURE — 25000132 ZZH RX MED GY IP 250 OP 250 PS 637: Performed by: ORTHOPAEDIC SURGERY

## 2019-05-04 PROCEDURE — 82947 ASSAY GLUCOSE BLOOD QUANT: CPT | Performed by: ORTHOPAEDIC SURGERY

## 2019-05-04 PROCEDURE — 99231 SBSQ HOSP IP/OBS SF/LOW 25: CPT | Performed by: INTERNAL MEDICINE

## 2019-05-04 PROCEDURE — 12000000 ZZH R&B MED SURG/OB

## 2019-05-04 PROCEDURE — 85018 HEMOGLOBIN: CPT | Performed by: ORTHOPAEDIC SURGERY

## 2019-05-04 PROCEDURE — A9270 NON-COVERED ITEM OR SERVICE: HCPCS | Performed by: PHYSICIAN ASSISTANT

## 2019-05-04 RX ADMIN — MAGNESIUM OXIDE TAB 400 MG (241.3 MG ELEMENTAL MG) 400 MG: 400 (241.3 MG) TAB at 08:23

## 2019-05-04 RX ADMIN — Medication 5 MG: at 14:05

## 2019-05-04 RX ADMIN — Medication 5 MG: at 01:13

## 2019-05-04 RX ADMIN — ASPIRIN 325 MG: 325 TABLET, COATED ORAL at 08:22

## 2019-05-04 RX ADMIN — ACETAMINOPHEN 975 MG: 325 TABLET, FILM COATED ORAL at 18:17

## 2019-05-04 RX ADMIN — Medication 5 MG: at 09:30

## 2019-05-04 RX ADMIN — Medication 5 MG: at 20:54

## 2019-05-04 RX ADMIN — SENNOSIDES AND DOCUSATE SODIUM 2 TABLET: 8.6; 5 TABLET ORAL at 08:26

## 2019-05-04 RX ADMIN — PANTOPRAZOLE SODIUM 40 MG: 40 TABLET, DELAYED RELEASE ORAL at 08:22

## 2019-05-04 RX ADMIN — SENNOSIDES AND DOCUSATE SODIUM 2 TABLET: 8.6; 5 TABLET ORAL at 19:40

## 2019-05-04 RX ADMIN — CITALOPRAM HYDROBROMIDE 20 MG: 20 TABLET ORAL at 08:23

## 2019-05-04 RX ADMIN — LEVOTHYROXINE SODIUM 75 MCG: 75 TABLET ORAL at 06:14

## 2019-05-04 RX ADMIN — TOLTERODINE TARTRATE 2 MG: 2 TABLET, FILM COATED ORAL at 19:40

## 2019-05-04 RX ADMIN — ACETAMINOPHEN 975 MG: 325 TABLET, FILM COATED ORAL at 09:30

## 2019-05-04 RX ADMIN — Medication 12.5 MG: at 08:23

## 2019-05-04 RX ADMIN — ACETAMINOPHEN 975 MG: 325 TABLET, FILM COATED ORAL at 02:15

## 2019-05-04 RX ADMIN — HYDROXYZINE HYDROCHLORIDE 25 MG: 25 TABLET ORAL at 22:02

## 2019-05-04 RX ADMIN — RANITIDINE 300 MG: 150 TABLET ORAL at 19:40

## 2019-05-04 RX ADMIN — ATORVASTATIN CALCIUM 40 MG: 20 TABLET, FILM COATED ORAL at 08:22

## 2019-05-04 RX ADMIN — LOSARTAN POTASSIUM 25 MG: 25 TABLET ORAL at 08:22

## 2019-05-04 ASSESSMENT — ACTIVITIES OF DAILY LIVING (ADL)
ADLS_ACUITY_SCORE: 17

## 2019-05-04 NOTE — PROGRESS NOTES
[facility] Medicine Progress Note  Date of Service: 05/04/2019    Assessment & Plan   Christiana Sal is a 87 year old female who presented on 5/2/2019 for scheduled Procedure(s):  ARTHROPLASTY, HIP by Jacinto Adams MD and is being followed by the hospital medicine service for co-management of acute and/or chronic perioperative medical problems.    Left hip primary degenerative joint disease  S/p Procedure(s):  ARTHROPLASTY, HIP on 5/2/19  2 Days Post-Op    - pain control, wound cares, physical therapy, occupational therapy and DVT prophylaxis per orthopedic surgery service    Anemia due to post-op blood loss  Hg 8.0  Today from 9.3. Hemodynamically stable  -watch. F/u in AM    Syncope vs near syncope  Had event 5/2  in the middle of the night while sitting on the commode. No fall or trauma. Orders had been placed for 10 mg oxycodone q 3 hours. Blood pressure was soft but now improved. Suspect this is multifactorial secondary to medications, post-anesthesia, and some hypovolemia.   - Narcotic dose decreased per ortho (now oxycodone 2.5 - 5 mg prn q 3 hours)  - Holding parameters placed on antihypertensive medications  - Continue IV fluids  - Monitor for recurrence  - Fall precautions  - Consider stopping Detrol if recurring     Coronary atherosclerosis of native coronary artery  Angina pectoris (H)  Hyperlipidemia LDL goal <100  CAD S/P percutaneous coronary angioplasty  Denies chest pain. Managed prior to admission with aspirin 161 mg q pm, Lipitor 40 mg daily, losartan 25 mg daily, metoprolol XL 12.5 mg daily, and prn nitroglycerine.  - Hold prior to admission aspirin and nitroglycerine  - Continue prior to admission Lipitor, losartan, and metoprolol (holding parameters in place)    Essential hypertension, benign  Pressures reviewed, stable. Managed prior to admission with losartan 25 mg daily and metoprolol XL 12.5 mg daily.  - Continue losartan and  metoprolol with holding parameters as above    Sinoatrial node dysfunction (H)  S/P cardiac pacemaker procedure  Cardiac pacemaker - [employer] dual lead - Not Dependent  No intervention    Overactive bladder  Managed prior to admission with Detrol 2 mg q hs.  - Continue Detrol, consider stopping if dizziness persists    Hypothyroidism  Managed prior to admission with levothyroxine 75 mcg daily, continue.    GERD (gastroesophageal reflux disease)  Having some reflux symptoms. Managed prior to admission with Protonix 40 mg daily, Zantac 300 mg q hs, and prn Tums, continue.    Major depressive disorder, recurrent episode, mild  Stable mood. Managed prior to admission with Celexa 20 mg daily, continue.    JENIFER (obstructive sleep apnea)  OK to use CPAP with home settings.    DVT Prophylaxis: as per orthopedic surgery service - Pneumatic Compression Devices and aspirin 325 mg daily  Code Status: Full Code    Lines: Peripheral   Contreras catheter: No    Discussion: Medically, the patient appears stable and well.    Disposition: Anticipate discharge 5/5/19 to TCU . No barriers to discharge from internal medicine standpoint as long as dizziness resolves and patient clears therapies.          Interval History   No new complaints. No more syncopal episodes. No cp/sob.    Physical Exam   Temp:  [97.9  F (36.6  C)-98.3  F (36.8  C)] 97.9  F (36.6  C)  Pulse:  [67-79] 67  Resp:  [16-18] 16  BP: (116-145)/(49-58) 116/49  SpO2:  [93 %-96 %] 93 %    Weights:   Vitals:    05/02/19 1305   Weight: 71.2 kg (157 lb)    Body mass index is 25.34 kg/m .    General appearance: sleeping/ arousable. NAD  CV: Regular rhythm & rate, no murmurs. No edema. Peripheral pulses intact.  Respiratory: Moving air well bilaterally, no wheezing, crackles, or rhonchi.  GI: Non-distended, soft, nontender to palpation. No rebound or guarding. Normoactive bowel sounds.  Musculoskeletal / extremities: Moves all extremities equally, no obvious abnormalities.  Distal CMS intact.  Neurologic: No focal deficits.    Data   Recent Labs   Lab 05/04/19  0610 05/03/19  0553   HGB 8.0* 9.3*   NA  --  138   POTASSIUM  --  4.8   CHLORIDE  --  104   CO2  --  29   BUN  --  20   CR  --  0.80   ANIONGAP  --  5   YVONNE  --  8.2*   GLC 96 138*       Recent Labs   Lab 05/04/19  0610 05/03/19  0726 05/03/19  0553   GLC 96  --  138*   BGM  --  129*  --         Unresulted Labs Ordered in the Past 30 Days of this Admission     No orders found from 3/3/2019 to 5/3/2019.           Imaging  No results found for this or any previous visit (from the past 24 hour(s)).     I reviewed all new labs and imaging results over the last 24 hours. I personally reviewed no images or EKG's today.    Medications     lactated ringers 75 mL/hr at 05/02/19 1849       acetaminophen  975 mg Oral Q8H     aspirin  325 mg Oral Daily     atorvastatin  40 mg Oral Daily     citalopram  20 mg Oral Daily     levothyroxine  75 mcg Oral QAM AC     losartan  25 mg Oral Daily     magnesium oxide  400 mg Oral Daily     metoprolol succinate ER  12.5 mg Oral Daily     pantoprazole  40 mg Oral Daily     ranitidine  300 mg Oral At Bedtime     senna-docusate  1 tablet Oral BID    Or     senna-docusate  2 tablet Oral BID     sodium chloride (PF)  3 mL Intracatheter Q8H     tolterodine  2 mg Oral At Bedtime

## 2019-05-04 NOTE — PLAN OF CARE
Patient moving well with stand by assist and walker.  Pain controlled with scheduled Tylenol and PRN oxycodone.  Aquacel C,D,I.  CMS intact.  Voiding without difficulty.  Passing flatus.  No BM since surgery.

## 2019-05-04 NOTE — PLAN OF CARE
Left hip with aqua cell in place, cms intact.  VSS and afebrile.  Not impulsive, calls with needs.  Up to bathroom with walker and minimal assist of 1.  Taking scheduled Tylenol and PRN oxycodone for comfort.  Increased hip pain with activity. Has been able to sleep well overnight.

## 2019-05-04 NOTE — PLAN OF CARE
Discharge Planner PT   Patient plan for discharge: TCU     Current status: Transfers with SBA. Amb with RW and '. Performs LE exs x 10.     Barriers to return to prior living situation: hip precautions, lives alone     Recommendations for discharge: TCU     Rationale for recommendations: for L LE strengthening and gait training.       Entered by: Kenya Queen 05/04/2019 1:52 PM

## 2019-05-04 NOTE — PLAN OF CARE
Discharge Planner OT   Patient plan for discharge: TCU    Current status: completes supine to sit, toilet transfer and x5 min standing task with SBA and FWW. Follows hip precautions throughout, however when reviewing verbally only able to state 1/3 hip precautions. Provided education and handout for patient.     Barriers to return to prior living situation: hip precautions, lives alone    Recommendations for discharge: TCU    Rationale for recommendations: To increase independence with ADLs/IADLs.     Occupational Therapy Discharge Summary    Reason for therapy discharge:    All IP OT goals met. Appropriate to continue OT in TCU setting to advance ADL and IADL independence     Progress towards therapy goal(s). See goals on [facility] Plan [facility] health record for goal details.  Goals met    Therapy recommendation(s):    Continued therapy is recommended.  Rationale/Recommendations:  TCU to increase independence with ADLs/IADLs within hip precautions.           Entered by: Sally Mulligan 05/04/2019 8:43 AM

## 2019-05-04 NOTE — PROGRESS NOTES
"[facility] Orthopaedics Progress Note      Post-operative Day: 2 Days Post-Op    Procedure(s):  ARTHROPLASTY, HIP      Subjective:    Pain: minimal in groin  Chest pain, SOB:  No      Objective:  Blood pressure 116/49, pulse 67, temperature 97.9  F (36.6  C), temperature source Oral, resp. rate 16, height 1.676 m (5' 6\"), weight 71.2 kg (157 lb), SpO2 93 %, not currently breastfeeding.    Patient Vitals for the past 24 hrs:   BP Temp Temp src Pulse Resp SpO2   05/04/19 0759 116/49 97.9  F (36.6  C) Oral 67 16 93 %   05/03/19 2215 145/57 98.3  F (36.8  C) Oral 79 18 96 %   05/03/19 1534 119/58 98.2  F (36.8  C) Oral 79 16 95 %       Wt Readings from Last 4 Encounters:   05/02/19 71.2 kg (157 lb)   04/22/19 71.2 kg (157 lb)   04/18/19 70 kg (154 lb 6.4 oz)   04/12/19 69.9 kg (154 lb)         Motor function, sensation, and circulation intact   Yes  Wound status: Dressing is clean dry and intact. Yes  Calf tenderness: Bilateral  No    Pertinent Labs   Lab Results: personally reviewed.     Recent Labs   Lab Test 05/04/19  0610 05/03/19  0553 04/12/19  1031 04/01/19  1156 12/11/18  0836 12/04/18  0544  05/10/16  1109  02/16/12  1010   INR  --   --   --   --   --   --   --  0.90  --  0.98   HGB 8.0* 9.3* 12.0 13.2 11.4* 10.6*   < > 12.5   < >  --    HCT  --   --   --  39.8 35.4 32.0*   < > 37.1   < >  --    MCV  --   --   --  93 95 94   < > 91   < >  --    PLT  --   --   --  239 268 237   < > 284   < >  --    NA  --  138 136 139 136 138   < > 135   < >  --     < > = values in this interval not displayed.       Plan: Anticoagulation protocol:  mg daily  x 42  Days.  HGB slightly low.  Will observe for symptoms.            Pain medications:  oxycodone and tylenol            Weight bearing status:  WBAT            Disposition:  To TCU tomorrow             Continue cares and rehabilitation     Report completed by:  Antione Hernandez MD  Date: 5/4/2019  Time: 9:44 AM  "

## 2019-05-05 ENCOUNTER — APPOINTMENT (OUTPATIENT)
Dept: PHYSICAL THERAPY | Facility: CLINIC | Age: 84
DRG: 470 | End: 2019-05-05
Attending: ORTHOPAEDIC SURGERY
Payer: MEDICARE

## 2019-05-05 LAB — HGB BLD-MCNC: 7.9 G/DL (ref 11.7–15.7)

## 2019-05-05 PROCEDURE — 25000132 ZZH RX MED GY IP 250 OP 250 PS 637: Performed by: PHYSICIAN ASSISTANT

## 2019-05-05 PROCEDURE — 99232 SBSQ HOSP IP/OBS MODERATE 35: CPT | Performed by: INTERNAL MEDICINE

## 2019-05-05 PROCEDURE — 25000132 ZZH RX MED GY IP 250 OP 250 PS 637: Performed by: ORTHOPAEDIC SURGERY

## 2019-05-05 PROCEDURE — A9270 NON-COVERED ITEM OR SERVICE: HCPCS | Performed by: ORTHOPAEDIC SURGERY

## 2019-05-05 PROCEDURE — 97110 THERAPEUTIC EXERCISES: CPT | Mod: GP

## 2019-05-05 PROCEDURE — 85018 HEMOGLOBIN: CPT | Performed by: INTERNAL MEDICINE

## 2019-05-05 PROCEDURE — A9270 NON-COVERED ITEM OR SERVICE: HCPCS | Performed by: PHYSICIAN ASSISTANT

## 2019-05-05 PROCEDURE — 36415 COLL VENOUS BLD VENIPUNCTURE: CPT | Performed by: INTERNAL MEDICINE

## 2019-05-05 PROCEDURE — 97116 GAIT TRAINING THERAPY: CPT | Mod: GP

## 2019-05-05 PROCEDURE — 99207 ZZC CDG-MDM COMPONENT: MEETS LOW - DOWN CODED: CPT | Performed by: INTERNAL MEDICINE

## 2019-05-05 PROCEDURE — 12000000 ZZH R&B MED SURG/OB

## 2019-05-05 RX ADMIN — TOLTERODINE TARTRATE 2 MG: 2 TABLET, FILM COATED ORAL at 20:02

## 2019-05-05 RX ADMIN — SENNOSIDES AND DOCUSATE SODIUM 2 TABLET: 8.6; 5 TABLET ORAL at 07:48

## 2019-05-05 RX ADMIN — MAGNESIUM OXIDE TAB 400 MG (241.3 MG ELEMENTAL MG) 400 MG: 400 (241.3 MG) TAB at 07:49

## 2019-05-05 RX ADMIN — RANITIDINE 300 MG: 150 TABLET ORAL at 20:02

## 2019-05-05 RX ADMIN — ACETAMINOPHEN 650 MG: 325 TABLET, FILM COATED ORAL at 22:42

## 2019-05-05 RX ADMIN — ACETAMINOPHEN 975 MG: 325 TABLET, FILM COATED ORAL at 10:42

## 2019-05-05 RX ADMIN — ASPIRIN 325 MG: 325 TABLET, COATED ORAL at 07:49

## 2019-05-05 RX ADMIN — ACETAMINOPHEN 975 MG: 325 TABLET, FILM COATED ORAL at 01:32

## 2019-05-05 RX ADMIN — PANTOPRAZOLE SODIUM 40 MG: 40 TABLET, DELAYED RELEASE ORAL at 07:49

## 2019-05-05 RX ADMIN — CITALOPRAM HYDROBROMIDE 20 MG: 20 TABLET ORAL at 07:49

## 2019-05-05 RX ADMIN — Medication 5 MG: at 04:20

## 2019-05-05 RX ADMIN — LEVOTHYROXINE SODIUM 75 MCG: 75 TABLET ORAL at 06:32

## 2019-05-05 RX ADMIN — Medication 12.5 MG: at 16:54

## 2019-05-05 RX ADMIN — ATORVASTATIN CALCIUM 40 MG: 20 TABLET, FILM COATED ORAL at 07:49

## 2019-05-05 ASSESSMENT — ACTIVITIES OF DAILY LIVING (ADL)
ADLS_ACUITY_SCORE: 17

## 2019-05-05 NOTE — PROGRESS NOTES
"[facility] Orthopaedics Progress Note      Post-operative Day: 3 Days Post-Op    Procedure(s):  ARTHROPLASTY, HIP      Subjective:    Pain: minimal  Chest pain, SOB:  No- she is having continued dizziness and lightheadedness when she gets up. No symptoms while laying or at rest in her bed. She has been generally fatigued which she feels is worsening since yesterday.      Objective:  Blood pressure 99/43, pulse 75, temperature 97.5  F (36.4  C), temperature source Oral, resp. rate 18, height 1.676 m (5' 6\"), weight 71.2 kg (157 lb), SpO2 95 %, not currently breastfeeding.    Patient Vitals for the past 24 hrs:   BP Temp Temp src Pulse Resp SpO2   05/05/19 0746 99/43 97.5  F (36.4  C) Oral 75 18 95 %   05/04/19 2300 114/51 98.7  F (37.1  C) Oral 73 18 92 %   05/04/19 1535 120/49 99.2  F (37.3  C) Oral 68 16 93 %       Wt Readings from Last 4 Encounters:   05/02/19 71.2 kg (157 lb)   04/22/19 71.2 kg (157 lb)   04/18/19 70 kg (154 lb 6.4 oz)   04/12/19 69.9 kg (154 lb)         Motor function, sensation, and circulation intact   Yes  Wound status: incisions are clean dry and intact. Yes  Calf tenderness: Bilateral  No    Pertinent Labs   Lab Results: personally reviewed.     Recent Labs   Lab Test 05/05/19  0544 05/04/19  0610 05/03/19  0553 04/12/19  1031 04/01/19  1156 12/11/18  0836 12/04/18  0544  05/10/16  1109  02/16/12  1010   INR  --   --   --   --   --   --   --   --  0.90  --  0.98   HGB 7.9* 8.0* 9.3* 12.0 13.2 11.4* 10.6*   < > 12.5   < >  --    HCT  --   --   --   --  39.8 35.4 32.0*   < > 37.1   < >  --    MCV  --   --   --   --  93 95 94   < > 91   < >  --    PLT  --   --   --   --  239 268 237   < > 284   < >  --    NA  --   --  138 136 139 136 138   < > 135   < >  --     < > = values in this interval not displayed.       Plan: Anticoagulation protocol:  mg daily  x 42  days            Pain medications:  oxycodone and tylenol            Weight bearing status:  WBAT            Disposition:  TCU " likely Monday. She will stay today due to decreased hgb and low pressures. She will be monitored closely by hospitalist service.              Continue cares and rehabilitation     Report completed by:  Nicole Joy Siegler, PA-C  Date: 5/5/2019  Time: 10:19 AM

## 2019-05-05 NOTE — PROGRESS NOTES
Denies pain, ice to incision. Has had 5 formed bowel movements this afternoon. No appetite at supper. Resting in bed, hip abduction maintained.

## 2019-05-05 NOTE — PLAN OF CARE
Patient denies pain, medicated with scheduled tylenol only today and ice to incision. States left hip is stiff. Moves very well in room and dickerson with stand by assist and walker. Requires minimal help to lift left leg in and out of bed. Dressing clean, dry and intact. Bruising present to left hip with some swelling. Good CMS. Patient reports feeling slightly lightheaded when up. Cozaar and metoprolol held this morning for BP of 99/43 P 75. Eating fair, not much appetite at lunch. Good oral fluid intake.  Voids without difficulty. Passing flatus, no BM yet. Prune juice given again today in addition to scheduled senna.

## 2019-05-05 NOTE — PLAN OF CARE
Physical Therapy Discharge Summary    Reason for therapy discharge:    Discharged to transitional care facility.    Progress towards therapy goal(s). See goals on Care Plan in [facility] electronic health record for goal details.  Goals met    Therapy recommendation(s):    Continued therapy is recommended.  Rationale/Recommendations:  strengthening and gait training.    Kenya Queen PT

## 2019-05-05 NOTE — PROGRESS NOTES
[facility] Medicine Progress Note  Date of Service: 05/05/2019    Assessment & Plan   Christiana Sal is a 87 year old female who presented on 5/2/2019 for scheduled Procedure(s):  ARTHROPLASTY, HIP by Jacinto Adams MD and is being followed by the hospital medicine service for co-management of acute and/or chronic perioperative medical problems.  Feels lightheaded when walking.    Left hip primary degenerative joint disease  S/p Procedure(s):  ARTHROPLASTY, HIP on 5/2/19  3 Days Post-Op    - pain control, wound cares, physical therapy, occupational therapy and DVT prophylaxis per orthopedic surgery service    Anemia due to post-op blood loss  Hg 7.9  Today from 9.3 pre-op. BP was <100 this AM and meds held.  -discontinue losartan, continue metoprolol. Watch     Syncope vs near syncope  Had event 5/2  in the middle of the night while sitting on the commode. No fall or trauma. Orders had been placed for 10 mg oxycodone q 3 hours.  Suspect this is multifactorial secondary to medications, post-anesthesia, and some hypovolemia.  Narcotic dose decreased per ortho (now oxycodone 2.5 - 5 mg prn q 3 hours)  No more episodes since.     Coronary atherosclerosis of native coronary artery  Angina pectoris (H)  Hyperlipidemia LDL goal <100  CAD S/P percutaneous coronary angioplasty  Denies chest pain. Managed prior to admission with aspirin 161 mg q pm, Lipitor 40 mg daily, losartan 25 mg daily, metoprolol XL 12.5 mg daily, and prn nitroglycerine.  - Hold prior to admission aspirin and nitroglycerine  - Continue prior to admission Lipitor, metoprolol-discontinue losartan.    Essential hypertension, benign  Pressures reviewed, stable. Managed prior to admission with losartan 25 mg daily and metoprolol XL 12.5 mg daily.  - dis Continue losartan and continue metoprolol with holding parameters as above    Sinoatrial node dysfunction (H)  S/P cardiac pacemaker procedure  Cardiac  pacemaker - [employer] dual lead - Not Dependent  No intervention    Overactive bladder  Managed prior to admission with Detrol 2 mg q hs.  - Continue Detrol, consider stopping if dizziness persists    Hypothyroidism  Managed prior to admission with levothyroxine 75 mcg daily, continue.    GERD (gastroesophageal reflux disease)  Having some reflux symptoms. Managed prior to admission with Protonix 40 mg daily, Zantac 300 mg q hs, and prn Tums, continue.    Major depressive disorder, recurrent episode, mild  Stable mood. Managed prior to admission with Celexa 20 mg daily, continue.    JENIFER (obstructive sleep apnea)  OK to use CPAP with home settings.    DVT Prophylaxis: as per orthopedic surgery service - Pneumatic Compression Devices and aspirin 325 mg daily  Code Status: Full Code    Lines: Peripheral   Contreras catheter: No    dispo  Will watch for one more day. If stable BP, pt feelink ok-could discharge to TCU in AM.          Interval History   Lightheaded on walking. No more syncopal episodes. No cp/sob.    Physical Exam   Temp:  [97.5  F (36.4  C)-99.2  F (37.3  C)] 97.5  F (36.4  C)  Pulse:  [68-75] 75  Resp:  [16-18] 18  BP: ()/(43-51) 99/43  SpO2:  [92 %-95 %] 95 %    Weights:   Vitals:    05/02/19 1305   Weight: 71.2 kg (157 lb)    Body mass index is 25.34 kg/m .    General appearance: sleeping/ arousable. NAD  CV: Regular rhythm & rate, no murmurs. No edema. Peripheral pulses intact.  Respiratory: Moving air well bilaterally, no wheezing, crackles, or rhonchi.  GI: Non-distended, soft, nontender to palpation. No rebound or guarding. Normoactive bowel sounds.  Musculoskeletal / extremities: Moves all extremities equally, no obvious abnormalities. Distal CMS intact.  Neurologic: No focal deficits.    Data   Recent Labs   Lab 05/05/19  0544 05/04/19  0610 05/03/19  0553   HGB 7.9* 8.0* 9.3*   NA  --   --  138   POTASSIUM  --   --  4.8   CHLORIDE  --   --  104   CO2  --   --  29   BUN  --   --  20    CR  --   --  0.80   ANIONGAP  --   --  5   YVONNE  --   --  8.2*   GLC  --  96 138*       Recent Labs   Lab 05/04/19  0610 05/03/19  0726 05/03/19  0553   GLC 96  --  138*   BGM  --  129*  --         Unresulted Labs Ordered in the Past 30 Days of this Admission     No orders found from 3/3/2019 to 5/3/2019.           Imaging  No results found for this or any previous visit (from the past 24 hour(s)).     I reviewed all new labs and imaging results over the last 24 hours. I personally reviewed no images or EKG's today.    Medications       acetaminophen  975 mg Oral Q8H     aspirin  325 mg Oral Daily     atorvastatin  40 mg Oral Daily     citalopram  20 mg Oral Daily     levothyroxine  75 mcg Oral QAM AC     magnesium oxide  400 mg Oral Daily     metoprolol succinate ER  12.5 mg Oral Daily     pantoprazole  40 mg Oral Daily     ranitidine  300 mg Oral At Bedtime     senna-docusate  1 tablet Oral BID    Or     senna-docusate  2 tablet Oral BID     tolterodine  2 mg Oral At Bedtime

## 2019-05-05 NOTE — PLAN OF CARE
Left hip pain managed with scheduled Tylenol, PRN oxycodone and Atarax.  Calls with  needs.  Up to bathroom with walker and minimal assist of 1.  No BM, passing large amount of flatus.  [facility]

## 2019-05-06 VITALS
RESPIRATION RATE: 16 BRPM | BODY MASS INDEX: 25.23 KG/M2 | DIASTOLIC BLOOD PRESSURE: 52 MMHG | TEMPERATURE: 98.3 F | SYSTOLIC BLOOD PRESSURE: 121 MMHG | HEART RATE: 73 BPM | HEIGHT: 66 IN | OXYGEN SATURATION: 94 % | WEIGHT: 157 LBS

## 2019-05-06 PROBLEM — N32.81 OVERACTIVE BLADDER: Chronic | Status: ACTIVE | Noted: 2019-05-02

## 2019-05-06 PROBLEM — Z95.0 CARDIAC PACEMAKER IN SITU: Chronic | Status: ACTIVE | Noted: 2017-03-02

## 2019-05-06 PROCEDURE — 25000132 ZZH RX MED GY IP 250 OP 250 PS 637: Performed by: PHYSICIAN ASSISTANT

## 2019-05-06 PROCEDURE — A9270 NON-COVERED ITEM OR SERVICE: HCPCS | Performed by: ORTHOPAEDIC SURGERY

## 2019-05-06 PROCEDURE — A9270 NON-COVERED ITEM OR SERVICE: HCPCS | Performed by: PHYSICIAN ASSISTANT

## 2019-05-06 PROCEDURE — 25000132 ZZH RX MED GY IP 250 OP 250 PS 637: Performed by: ORTHOPAEDIC SURGERY

## 2019-05-06 PROCEDURE — 99232 SBSQ HOSP IP/OBS MODERATE 35: CPT | Performed by: PHYSICIAN ASSISTANT

## 2019-05-06 RX ORDER — ASPIRIN 325 MG
325 TABLET ORAL DAILY
Qty: 42 TABLET | Refills: 0 | COMMUNITY
Start: 2019-05-06 | End: 2019-06-17

## 2019-05-06 RX ORDER — OXYCODONE HYDROCHLORIDE 5 MG/1
2.5 TABLET ORAL EVERY 4 HOURS PRN
Qty: 6 TABLET | Refills: 0 | Status: SHIPPED | OUTPATIENT
Start: 2019-05-06 | End: 2019-05-07 | Stop reason: DRUGHIGH

## 2019-05-06 RX ADMIN — HYDROXYZINE HYDROCHLORIDE 25 MG: 25 TABLET ORAL at 00:34

## 2019-05-06 RX ADMIN — Medication 12.5 MG: at 09:24

## 2019-05-06 RX ADMIN — LEVOTHYROXINE SODIUM 75 MCG: 75 TABLET ORAL at 06:05

## 2019-05-06 RX ADMIN — ATORVASTATIN CALCIUM 40 MG: 20 TABLET, FILM COATED ORAL at 09:25

## 2019-05-06 RX ADMIN — PANTOPRAZOLE SODIUM 40 MG: 40 TABLET, DELAYED RELEASE ORAL at 09:25

## 2019-05-06 RX ADMIN — MAGNESIUM OXIDE TAB 400 MG (241.3 MG ELEMENTAL MG) 400 MG: 400 (241.3 MG) TAB at 09:23

## 2019-05-06 RX ADMIN — CITALOPRAM HYDROBROMIDE 20 MG: 20 TABLET ORAL at 09:24

## 2019-05-06 RX ADMIN — ACETAMINOPHEN 650 MG: 325 TABLET, FILM COATED ORAL at 09:23

## 2019-05-06 RX ADMIN — ASPIRIN 325 MG: 325 TABLET, COATED ORAL at 09:24

## 2019-05-06 RX ADMIN — Medication 5 MG: at 00:34

## 2019-05-06 ASSESSMENT — ACTIVITIES OF DAILY LIVING (ADL)
ADLS_ACUITY_SCORE: 17

## 2019-05-06 NOTE — PROGRESS NOTES
"Patient and son came walking out of room 2300 with patient stating \" I am ready to go\". Room checked for personal belongings, discharge packet for TCU provided to son. Left at 1230 pm. Primary nurse Maryjo informed.   "

## 2019-05-06 NOTE — PROGRESS NOTES
"[facility] Orthopaedics Progress Note      Post-operative Day: 4 Days Post-Op    Procedure(s):  ARTHROPLASTY, HIP      Subjective:    Pain: mild  aching to L hip. Denies shooting pain, parasthesias, numbness and weakness.   denies Chest pain, SOB, O2 required: None  denies nausea/ emesis  denies lightheadedness, dizziness and weakness  BM -, passing gas +. Urinating well, Contreras in place: no.   Objective:  Blood pressure 121/52, pulse 73, temperature 98.3  F (36.8  C), temperature source Oral, resp. rate 16, height 1.676 m (5' 6\"), weight 71.2 kg (157 lb), SpO2 94 %, not currently breastfeeding.    Patient Vitals for the past 24 hrs:   BP Temp Temp src Pulse Resp SpO2   05/06/19 0614 121/52 98.3  F (36.8  C) Oral 73 16 94 %   05/05/19 2233 127/50 98.9  F (37.2  C) Oral 84 18 95 %   05/05/19 1533 130/54 97.2  F (36.2  C) Oral 75 18 96 %       Wt Readings from Last 4 Encounters:   05/02/19 71.2 kg (157 lb)   04/22/19 71.2 kg (157 lb)   04/18/19 70 kg (154 lb 6.4 oz)   04/12/19 69.9 kg (154 lb)     General: Alert and orientated. No apparent distress. Non-labored breathing. Appropriate affect.   MSK: L hip: dressing Clean, dry, and intact. Skin intact, yes ecchymosis, no erythema. nontender to palpation to incision site. ROM appropriate for post op. Distal neurovascularly intact. Compartments soft and non-tender. No calf pain. Homans negative. PF/DF and EHL intact.       Pertinent Labs   Lab Results: personally reviewed.     Recent Labs   Lab Test 05/05/19  0544 05/04/19  0610 05/03/19  0553 04/12/19  1031 04/01/19  1156 12/11/18  0836 12/04/18  0544  05/10/16  1109  02/16/12  1010   INR  --   --   --   --   --   --   --   --  0.90  --  0.98   HGB 7.9* 8.0* 9.3* 12.0 13.2 11.4* 10.6*   < > 12.5   < >  --    HCT  --   --   --   --  39.8 35.4 32.0*   < > 37.1   < >  --    MCV  --   --   --   --  93 95 94   < > 91   < >  --    PLT  --   --   --   --  239 268 237   < > 284   < >  --    NA  --   --  138 136 139 136 138   < > " 135   < >  --     < > = values in this interval not displayed.         Procedure(s):  ARTHROPLASTY, HIP  Plan: Anticoagulation protocol:  mg daily  x 42  days            Pain medications:  oxycodone and tylenol            Weight bearing status:  WBAT            Dressing Change: Aquacel dressing to be left intact until follow up.            Disposition:  TCU today             Follow up: 2 week with JESSICA            Continue cares and rehabilitation     Report completed by:  Shelbie Juarez PA-C  Date: 5/6/2019  Time: 11:38 AM

## 2019-05-06 NOTE — PROGRESS NOTES
"SPIRITUAL HEALTH SERVICES  SPIRITUAL ASSESSMENT Progress Note  [facility] - Med/Surg    I provided follow up visit for Christiana.  She was dressed and waiting for her ride.  She stated she would be going to the [facility] for rehab.  She commented that she was there in December when she had a plate put in her ankle and she thought the rehab care was good.  Christiana said she doesn't feel like she's ready to [place] without some strengthening - and her children both work so they can't be with her.  She said her VERONICA was coming along well \"a little bruising but that's to be expected.\"  I offered blessing as visit concluded.    Jhoan eRy M.A., [facility]  Staff   [facility]  Office: [phone]  Cell: [phone]  Pager [phone]    "

## 2019-05-06 NOTE — PROGRESS NOTES
Name: Christiana Sal    MRN#: [id number]    Reason for Hospitalization: Left hip arthritis  S/P total hip arthroplasty    Discharge Date: 5/6/2019    Patient / Family response to discharge plan: in agreement    Other Providers (Care Coordinator, County Services, PCA services etc): No    CTS Hand Off Completed: Not needed    PAS #: [id number]        BLADIMIR Score: average    Future Appointments:   Future Appointments   Date Time Provider Department Center   5/7/2019 11:30 AM Tara Suárez MD [facility]   5/20/2019 12:00 AM [facility] TECH1 [facility]       Discharge Disposition: transitional care unit    Discharge Planner   Discharge Plans in progress: [facility]  Barriers to discharge plan: medical stability  Follow up plan: per orthopaedics       Entered by: Mary Dhaliwal 05/06/2019 10:58 AM

## 2019-05-06 NOTE — PROGRESS NOTES
[facility] Medicine Progress Note  Date of Service: 05/06/2019    Assessment & Plan   Christiana Sal is a 87 year old female who presented on 5/2/2019 for scheduled Procedure(s):  ARTHROPLASTY, HIP by Jacinto Adams MD and is being followed by the hospital medicine service for co-management of acute and/or chronic perioperative medical problems.    S/p Procedure(s):  ARTHROPLASTY, HIP   4 Days Post-Op   Appears to be recovering appropriately, plans for discharge to TCU for further rehab. Pain well managed on current regimen.  Hgb 7.9 on 5/5/19 which was stable compared to 5/4/19 check.    - pain control, wound cares, physical therapy, occupational therapy and DVT prophylaxis per orthopedic surgery service    Anemia due to post-op blood loss  Hemoglobin 12.0 pre-operatively on 4/12/19. Hgb trend post-operatively 9.3 --> 8.0 --> 7.9.  Today from 9.3 pre-op. Suspect this is due to recent surgery with estimated blood loss of 200, post-operative state and component of hemodilution. Hemoglobin stable from 5/4 to 5/5. On day of discharge blood pressure stable, no signs of blood loss, no further lightheadedness. Expect this will gradually improve post-operatively.   - consider repeat CBC on follow up in 1 - 2 weeks to follow     Syncope vs near syncope  Had event 5/2  in the middle of the night while sitting on the commode. No fall or trauma. Orders had been placed for 10 mg oxycodone q 3 hours.  Suspect this is multifactorial secondary to medications, post-anesthesia, and some hypovolemia.  Narcotic dose decreased per ortho (now oxycodone 2.5 - 5 mg prn q 3 hours)  No further episodes.     Essential hypertension, benign  Pressures reviewed, mildly low on 5/5/19 with associated lightheadedness. Losartan discontinued at that time, continued on home metoprolol. Blood pressures improved, no further symptoms. Outpatient blood pressures reviewed, variable/labile.  -  discontinued losartan 5/5/19 due to mildly low blood pressures and lightheadedness, plan to hold next 3-5 days, follow up with PCP or TCU provider for blood pressure recheck and to restart pending blood pressure  - continue metoprolol with holding parameters as above     Coronary atherosclerosis of native coronary artery s/p stenting  Hyperlipidemia LDL goal <100  Denies chest pain. 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 Managed prior to admission with aspirin 161 mg q pm, Lipitor 40 mg daily, losartan 25 mg daily, metoprolol XL 12.5 mg daily, and prn nitroglycerine.  - Hold prior to admission aspirin while on full dose per ortho  - Continue prior to admission Lipitor, metoprolol  - discontinued losartan 5/5/19 due to mildly low blood pressures and lightheadedness, plan to hold next 3-5 days, follow up with PCP or TCU provider for blood pressure recheck and to restart pending blood pressure     Sinoatrial node dysfunction s/p cardiac pacemaker 2016: [employer] dual lead, Not Dependent  No intervention.  - Continue home metoprolol     Overactive bladder  Chronic. Managed prior to admission with Detrol 2 mg q hs.  - Continuehome Detrol     Hypothyroidism  Chronic.  - continue home levothyroxine 75 mcg daily     GERD (gastroesophageal reflux disease)  Having some reflux symptoms including coughing. Chronic.  - continue home pantoprazole, ranitidine, and prn TUMS     Major depressive disorder, recurrent episode, mild  Stable mood.  - continue home citalopram     JENIFER (obstructive sleep apnea)  OK to use CPAP with home settings.    DVT Prophylaxis: as per orthopedic surgery service -  mg  Code Status: Full Code    Lines: peripheral   Contreras catheter: none    Discussion: Medically, the patient appears well, improved from yesterday, blood pressure stable, no symptoms of lightheadedness. Vital signs stable. No barriers to discharge from medical perspective.  Patient should follow up in next 3-5 days at TCU or outpatient for blood pressure re-check to help guide restarting losartan.    Disposition: Anticipate discharge today to TCU, ok to discharge from medicine team    Attestation:   I have reviewed today's vital signs, notes, medications, labs and imaging.  Total time: 25 minutes    This patient was discussed with Dr. Diallo Umanzor. Plan as above.    Cristina Randle PA-C  [facility] Medicine    Interval History   Patient was seen this morning. She feels well, no lightheadedness or dizziness when up this morning from bed to chair. Feels ready for discharge to TCU today. Pain present though well managed. Activity well tolerated, walked to nurses station yesterday, looking forward to continued rehab at the TCU facility. Fair appetite, not quite back to her normal. Slept well overnight.   Passing Gas. had BM. Voiding regularly.    Occasional cough when she does not sit up long enough following swallowing which she gets with her known GERD.    Denies HA, lightheadedness, dizziness, fever, chills, chest pain, palpitations, SOB, wheezes, abdominal pain, N/V, numbness or tingling in bilateral lower extremities.    Physical Exam   Temp:  [97.2  F (36.2  C)-98.9  F (37.2  C)] 98.3  F (36.8  C)  Pulse:  [73-84] 73  Resp:  [16-18] 16  BP: (121-130)/(50-54) 121/52  SpO2:  [94 %-96 %] 94 %    Weights:   Vitals:    05/02/19 1305   Weight: 71.2 kg (157 lb)    Body mass index is 25.34 kg/m .    General: Appears well, sitting up comfortably. Alert and oriented. Pleasant and cooperative. NAD. Non-toxic. Appears stated age.   CV: Regular rate, normal rhythm. Systolic murmur 2/6. Radial pulses are 2+ bilaterally. Pedal pulses 2+ bilaterally. Toes warm. Trace lower extremity edema bilaterally.  Respiratory: No accessory muscle usage. Clear to auscultation bilaterally. No wheezes, crackles or rhonchi.   GI: Bowel sounds normoactive in all quadrants. Soft, non-tender, non-distended.  Skin:  Warm, dry, intact. Did not assess incision, dressing appear clean and dry.  Musculoskeletal: Muscle tone is appropriate. Moves all extremities freely with limited range of motion left lower due to pain and bandaged.  Neuro: Sensation to light touch of bilateral lower extremities is grossly intact.     Data   Recent Labs   Lab 05/05/19  0544 05/04/19  0610 05/03/19  0553   HGB 7.9* 8.0* 9.3*   NA  --   --  138   POTASSIUM  --   --  4.8   CHLORIDE  --   --  104   CO2  --   --  29   BUN  --   --  20   CR  --   --  0.80   ANIONGAP  --   --  5   YVONNE  --   --  8.2*   GLC  --  96 138*     Recent Labs   Lab 05/04/19  0610 05/03/19  0726 05/03/19  0553   GLC 96  --  138*   BGM  --  129*  --       Unresulted Labs Ordered in the Past 30 Days of this Admission     No orders found from 3/3/2019 to 5/3/2019.        Imaging  No results found for this or any previous visit (from the past 24 hour(s)).     I reviewed all new labs and imaging results over the last 24 hours. I personally reviewed no images or EKG's today.    Medications       aspirin  325 mg Oral Daily     atorvastatin  40 mg Oral Daily     citalopram  20 mg Oral Daily     levothyroxine  75 mcg Oral QAM AC     magnesium oxide  400 mg Oral Daily     metoprolol succinate ER  12.5 mg Oral Daily     pantoprazole  40 mg Oral Daily     ranitidine  300 mg Oral At Bedtime     senna-docusate  1 tablet Oral BID    Or     senna-docusate  2 tablet Oral BID     tolterodine  2 mg Oral At Bedtime     I have discussed patient with Dr. Diallo Randle, [facility] Medicine

## 2019-05-06 NOTE — DISCHARGE SUMMARY
[facility] Orthopedics Discharge Summary                                  [facility]     SANDIE BERGER [id number]   Age: 87 year old  PCP: Kelly Matthew, [phone] 10/25/1931     Date of Admission:  5/2/2019  Date of Discharge::  5/6/2019  Discharge Physician:  Shelbie Juarez    Code status:  Full Code    Admission Information:  Admission Diagnosis:  Left hip arthritis  S/P total hip arthroplasty    Post-Operative Day: 4 Days Post-Op     Reason for admission:  The patient was admitted for the following:Procedure(s) (LRB):  ARTHROPLASTY, HIP (Left)    Principal Problem:    S/P total hip arthroplasty  Active Problems:    Hyperlipidemia LDL goal <100    Essential hypertension, benign    Hypothyroidism    GERD (gastroesophageal reflux disease)    Major depressive disorder, recurrent episode, mild (H)    Angina pectoris (H)    JENIFER (obstructive sleep apnea)    CAD S/P percutaneous coronary angioplasty    Sinoatrial node dysfunction (H)    Cardiac pacemaker - [employer] dual lead - Not Dependent    Overactive bladder    Near syncope      Allergies:  Demerol; Lisinopril; Meperidine; and Sulfa drugs    Following the procedure noted above the patient was transferred to the post-op floor and started on:    Therapy:  physical therapy and occupational therapy  Anticoagulation Plan:  mg daily  for 42 days  Pain Management: oxycodone and tylenol  Weight bearing status: Weight bearing as tolerated     The patient was followed and co-managed by the hospitalist service during the inpatient treatment course  Complications:  None  Consultations:  None     Pertinent Labs   Lab Results: personally reviewed.     Recent Labs   Lab Test 05/05/19  0544 05/04/19  0610 05/03/19  0553 04/12/19  1031 04/01/19  1156 12/11/18  0836 12/04/18  0544  05/10/16  1109  02/16/12  1010   INR  --   --   --   --   --   --   --   --  0.90  --  0.98   HGB 7.9* 8.0* 9.3* 12.0 13.2 11.4* 10.6*   < > 12.5    < >  --    HCT  --   --   --   --  39.8 35.4 32.0*   < > 37.1   < >  --    MCV  --   --   --   --  93 95 94   < > 91   < >  --    PLT  --   --   --   --  239 268 237   < > 284   < >  --    NA  --   --  138 136 139 136 138   < > 135   < >  --     < > = values in this interval not displayed.          Discharge Information:  Condition at discharge: Stable  Discharge destination:  Discharged to rehabilitation facility     Medications at discharge:  Current Discharge Medication List      START taking these medications    Details   oxyCODONE (ROXICODONE) 5 MG tablet Take 0.5 tablets (2.5 mg) by mouth every 4 hours as needed for pain  Qty: 6 tablet, Refills: 0    Associated Diagnoses: Status post total replacement of left hip         CONTINUE these medications which have CHANGED    Details   aspirin (ASA) 325 MG tablet Take 1 tablet (325 mg) by mouth daily  Qty: 42 tablet, Refills: 0    Associated Diagnoses: Status post total replacement of left hip         CONTINUE these medications which have NOT CHANGED    Details   acetaminophen (TYLENOL) 500 MG tablet Take 2 tablets (1,000 mg) by mouth every 8 hours as needed for mild pain  Qty: 30 tablet, Refills: 0      atorvastatin (LIPITOR) 40 MG tablet TAKE ONE TABLET BY MOUTH EVERY DAY  Qty: 90 tablet, Refills: 3    Associated Diagnoses: CAD S/P percutaneous coronary angioplasty      Biotin 10 MG CAPS Take 1 capsule by mouth daily (with lunch)       CALCIUM 600 + D OR 1 tablet by mouth daily      calcium carbonate (TUMS) 500 MG chewable tablet Take 1 chew tab by mouth 3 times daily as needed for heartburn      citalopram (CELEXA) 20 MG tablet Take 1 tablet (20 mg) by mouth daily  Qty: 90 tablet, Refills: 0    Associated Diagnoses: Major depressive disorder, recurrent episode, mild (H)      levothyroxine (SYNTHROID/LEVOTHROID) 75 MCG tablet TAKE 1 TABLET BY MOUTH ONCE DAILY.  Qty: 30 tablet, Refills: 0    Associated Diagnoses: Hypothyroidism, unspecified type      magnesium 250  MG tablet Take 1 tablet by mouth every evening       meclizine (ANTIVERT) 25 MG tablet TAKE ONE TABLET BY MOUTH EVERY 6 HOURS AS NEEDED FOR DIZZINESS  Qty: 30 tablet, Refills: 3    Associated Diagnoses: Vertigo      metoprolol succinate ER (TOPROL-XL) 25 MG 24 hr tablet Take 0.5 tablets (12.5 mg) by mouth daily  Qty: 45 tablet, Refills: 3    Associated Diagnoses: Atherosclerosis of native coronary artery of native heart without angina pectoris      Multiple Vitamins-Minerals (OCUVITE PO) Take 1 tablet by mouth daily.      pantoprazole (PROTONIX) 40 MG EC tablet Take 1 tablet (40 mg) by mouth daily  Qty: 90 tablet, Refills: 1    Associated Diagnoses: Gastroesophageal reflux disease with esophagitis      ranitidine (ZANTAC) 300 MG tablet Take 1 tablet (300 mg) by mouth At Bedtime  Qty: 90 tablet, Refills: 0    Associated Diagnoses: Gastroesophageal reflux disease, esophagitis presence not specified      tolterodine (DETROL) 2 MG tablet Take 1 tablet (2 mg) by mouth 2 times daily  Qty: 60 tablet, Refills: 3    Associated Diagnoses: Overactive bladder      Vitamin D, Cholecalciferol, 1000 units TABS Take 2,000 Units by mouth daily      nitroglycerin (NITROSTAT) 0.4 MG SL tablet Place 1 tablet (0.4 mg) under the tongue every 5 minutes as needed for chest pain (call your doctor if you take a third dose)  Qty: 25 tablet, Refills: 3    Associated Diagnoses: Atherosclerosis of native coronary artery of native heart without angina pectoris         STOP taking these medications       losartan (COZAAR) 25 MG tablet Comments:   Reason for Stopping:                          Follow-Up Care:  Patient should be seen in the office in 10-14 days by the Orthopedic Surgeon/Physician Assistant.  Call [phone] for appointment or questions.    Shelbie Juarez

## 2019-05-06 NOTE — PROGRESS NOTES
[place] [facility]  PRIMARY CARE PROVIDER AND CLINIC:  Kelly Matthew PA-C, [address] [facility] / [place] [address]  Chief Complaint   Patient presents with     Hospital F/U     [facility] Medical Record Number:  [id number]  Place of Service where encounter took place:  [facility] [facility] [place] [facility] ([facility]) [[id number]]    Christiana Sal  is a 87 year old  (10/25/1931), admitted to the above facility from  [facility]. Hospital stay 5/2/19 through 5/6/19..  Admitted to this facility for  rehab, medical management and nursing care.    HPI:    HPI information obtained from: facility chart records, facility staff, patient report and [facility] chart review.   Brief Summary of Hospital Course: Christiana Sal is an 87 year old female with PMH of HTN, CAD s/p stenting/PCI, GERD, depression, hypothyroidism, and previous right ankle fracture who underwent elective left hip arthroplasty with Dr Adams on 5/2/2019. Post-operative course was complicate with acute blood loss anemia, jody hgb of 7.9 and hypotension, resulting in an episode of near syncope while on the commode. Losartan was discontinued with improvement in BP.  Pain was controlled on oral medications. Hgb stabilized without transfusion. When medically stable she was discharged to TCU for further rehab and medical management.   Updates on Status Since Skilled nursing Admission: Reports she is feeling well today. Did have some pain overnight, but it is better this morning at taking pain medication. She denies any further lightheadedness or dizziness since her near syncope in the hospital. Reports having multiple BM's while in the hospital, most recent BM yesterday. She does report a chronic cough, thinks its related to GERD as worse when eating certain foods. Does have nocturia and urinary urgency, but these are her baseline symptoms given her overactive bladder, does not feel that her oxybutynin has helped much. Her goal  is to discharge home, but she lives alone and felt she need further therapy before going home.     CODE STATUS/ADVANCE DIRECTIVES DISCUSSION:   CPR/Full code   Patient's living condition: lives alone  ALLERGIES: Demerol; Lisinopril; Meperidine; and Sulfa drugs  PAST MEDICAL HISTORY:  has a past medical history of Coronary atherosclerosis of native coronary artery, Essential hypertension, benign, GERD (gastroesophageal reflux disease), History of transient ischemic attack (TIA), Hyperlipidemia LDL goal <100, Hypothyroidism, Major depressive disorder, recurrent episode, mild (H) (5/18/2011), and Peptic ulcer disease with hemorrhage. She also has no past medical history of Basal cell carcinoma, Malignant melanoma (H), or Squamous cell carcinoma.  PAST SURGICAL HISTORY:   has a past surgical history that includes TRANSCATH STENT INIT VESSEL,PERCUT; appendectomy; cholecystectomy, open; fracture tx, ankle rt/lt; LARYNGOSCOPY DIRECT W VOCAL CORD INJECTION; REMOVAL OF OVARIAN CYST(S); colonoscopy (2008); EXCISE HAND/FOOT NEUROMA; Repair hammer toe (9/13/10); cataract iol, rt/lt (1/26/12); cardiac catherization (1/15/04); cardiac catherization (2/16/12); Stent (5-2016); Open reduction internal fixation ankle (Right, 12/3/2018); and Arthroplasty hip (Left, 5/2/2019).  FAMILY HISTORY: family history includes Arthritis in her daughter; Blood Disease in her sister; Breast Cancer in her sister; C.A.D. in her brother, brother, father, and mother; Cancer in her brother and sister; Cancer - colorectal in her sister; Heart Disease in her sister, sister, sister, and son; Heart Failure in her mother; Hypertension in her brother, father, mother, sister, sister, and son; Myocardial Infarction in her brother and brother; Myocardial Infarction (age of onset: 66) in her father; Neurologic Disorder in her child and mother; Psychotic Disorder in her sister; Thyroid Disease in her son.  SOCIAL HISTORY:   reports that she has never smoked. She  has never used smokeless tobacco. She reports that she drinks alcohol. She reports that she does not use drugs.    Post Discharge Medication Reconciliation Status: discharge medications reconciled and changed, per note/orders (see AVS)    Current Outpatient Medications   Medication Sig Dispense Refill     acetaminophen (TYLENOL) 500 MG tablet Take 1,000 mg by mouth 3 times daily       acetaminophen (TYLENOL) 500 MG tablet Take 2 tablets (1,000 mg) by mouth every 8 hours as needed for mild pain 30 tablet 0     aspirin (ASA) 325 MG tablet Take 1 tablet (325 mg) by mouth daily 42 tablet 0     atorvastatin (LIPITOR) 40 MG tablet TAKE ONE TABLET BY MOUTH EVERY DAY 90 tablet 3     Biotin 10 MG CAPS Take 1 capsule by mouth daily (with lunch)        CALCIUM 600 + D OR 1 tablet by mouth daily       calcium carbonate (TUMS) 500 MG chewable tablet Take 1 chew tab by mouth 3 times daily as needed for heartburn       citalopram (CELEXA) 20 MG tablet Take 1 tablet (20 mg) by mouth daily 90 tablet 0     levothyroxine (SYNTHROID/LEVOTHROID) 75 MCG tablet TAKE 1 TABLET BY MOUTH ONCE DAILY. 30 tablet 0     magnesium 250 MG tablet Take 1 tablet by mouth every evening        meclizine (ANTIVERT) 25 MG tablet TAKE ONE TABLET BY MOUTH EVERY 6 HOURS AS NEEDED FOR DIZZINESS 30 tablet 3     metoprolol succinate ER (TOPROL-XL) 25 MG 24 hr tablet Take 0.5 tablets (12.5 mg) by mouth daily 45 tablet 3     Multiple Vitamins-Minerals (OCUVITE PO) Take 1 tablet by mouth daily.       nitroglycerin (NITROSTAT) 0.4 MG SL tablet Place 1 tablet (0.4 mg) under the tongue every 5 minutes as needed for chest pain (call your doctor if you take a third dose) 25 tablet 3     oxyCODONE (ROXICODONE) 5 MG tablet Take 0.5 tablets (2.5 mg) by mouth every 4 hours as needed for pain 6 tablet 0     oxyCODONE HCl (OXAYDO) 5 MG TABA Take 2.5 mg by mouth 2 times daily Give at 0800 & 1200. Continue 2.5 mg PO every 4 hrs PRN after 5/14/2019       pantoprazole (PROTONIX)  "40 MG EC tablet Take 1 tablet (40 mg) by mouth daily 90 tablet 1     ranitidine (ZANTAC) 300 MG tablet Take 1 tablet (300 mg) by mouth At Bedtime 90 tablet 0     sennosides (SENOKOT) 8.6 MG tablet Take 2 tablets by mouth 2 times daily as needed for constipation       tolterodine (DETROL) 2 MG tablet Take 1 tablet (2 mg) by mouth 2 times daily (Patient taking differently: Take 2 mg by mouth At Bedtime ) 60 tablet 3     Vitamin D, Cholecalciferol, 1000 units TABS Take 2,000 Units by mouth daily         ROS:  10 point ROS of systems including Constitutional, Eyes, Respiratory, Cardiovascular, Gastroenterology, Genitourinary, Integumentary, Musculoskeletal, Psychiatric were all negative except for pertinent positives noted in my HPI.    Vitals:  /78   Pulse 83   Temp 97.3  F (36.3  C)   Resp 17   Ht 1.676 m (5' 6\")   Wt 76.6 kg (168 lb 12.8 oz)   SpO2 92%   BMI 27.25 kg/m    Exam:  GENERAL APPEARANCE:  Alert, in no distress, oriented, cooperative  RESP:  respiratory effort and palpation of chest normal, lungs clear to auscultation , no respiratory distress  CV:  Palpation and auscultation of heart done , regular rate and rhythm, no murmur, rub, or gallop, no edema, +2 pedal pulses  ABDOMEN:  no guarding or rebound, bowel sounds normal  M/S:   decreaseed ROM to left hip, ambulates with walker  SKIN:  wound healing well, no signs of infection left hip incision aquacel in place, no erythema or edema, moderate amount of eccymosis distal to incision  NEURO:   Cranial nerves 2-12 are normal tested and grossly at patient's baseline  PSYCH:  oriented X 3, normal insight, judgement and memory, affect and mood normal    Lab/Diagnostic data:    Most Recent 3 CBC's:  Recent Labs   Lab Test 05/05/19  0544 05/04/19  0610 05/03/19  0553  04/01/19  1156 12/11/18  0836 12/04/18  0544   WBC  --   --   --   --  7.8 8.4 11.7*   HGB 7.9* 8.0* 9.3*   < > 13.2 11.4* 10.6*   MCV  --   --   --   --  93 95 94   PLT  --   --   --   " --  239 268 237    < > = values in this interval not displayed.       ASSESSMENT/PLAN:  Current [place] scheduled appointments:     (Z47.1,  Z96.642) Aftercare following left hip joint replacement surgery  (primary encounter diagnosis)  (M16.12) Primary osteoarthritis of left hip  (R53.81) Physical deconditioning  Comment: S/p elective right total hip arthoplasty on 5/2. No s/s of infection, healing well, pain controlled  Plan: Will add oxycodone 2.5mg BID in addition to 2.5mg q4h PRN to allow for better pain control with therapies, change APAP to 1000mg q8h from PRN. 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 Denies any recent chest pain or SOB.  Plan: Continue metoprolol 12.5mg daily, nitroglycerin PRN, ASA as above, atorvastatin 40mg daily, monitor and adjust    (I10) Essential hypertension, benign  Comment: labile while IP, had an episode of near syncope while on the commode - losartan discontinued  Plan: continue metoprolol XL 12.5m daily, monitor and resume losartan if consistently elevated    (E03.9) Hypothyroidism, unspecified type  Comment: TSH 2.60  Plan: Continue levothyroxine 75mcg daily    (K21.9) Gastroesophageal reflux disease, esophagitis presence not specified  Comment: Report coughing, denies any heartburn or indigestion  Plan: Continue protonix 40mg daily, ranitidine 300mg at bedtime, tums PRN    (F33.0) Major depressive disorder, recurrent episode, mild (H)  Comment: states lost spouse to Alzheimer's a year ago, feel citalopram has been helpful  Plan: continue citalopram 20mg daily     (D62) Anemia due to blood loss, acute  Comment: Hgb 12->9.3.->7.9->8, no s/s  of bleeding currently  Plan: Hgb to monitor for stability    (K59.01) Slow transit constipation  Comment: Reports having multiple BM's while IP, but is on narcotics  Plan: will add senna-s 2 tabs BID PRN    (G47.33) JENIFER (obstructive sleep apnea)  Comment: reports she is sleeping well  Plan: continue CPAP at home setting at night    (R42) Dizziness  Comment: chronic occurs rarely  Plan: continue meclizine 25mg q6h PRN, monitor    transcribed by : Casandra Thomas  Orders:  1. Change Tylenol to 1000 mg PO TID - Dx: Pain  2. Oxycodone 5 mg - give 2.5 mg PO BID @ 0800 & 1200 x 7 days - continue 2.5 mg PO every 4 hrs PRN - Dx: Pain - Quantity #30, 0 refills, send on faclity request.  3. Hgb on 5/9/19 - Only update on call if critical , otherwise Christi will address on 5/10/19. - Dx: anemia  4. Senna - S- 2 tabs PO BID PRN - Dx: Constipation  5. Hold Metoprolol for SBP <120    Total time spent with patient visit at the skilled nursing facility was 35 min including patient visit and review of past records. Greater than 50% of total time spent with counseling and coordinating care due to mediation review, discussion of medication history, and patient educatoin adn plan of care d/t left hip arhtoplasty, narcotic pain medication, HTN, GERD, CAD, anemia, adn JENIFER  Electronically signed by:  SHARMIN Pichardo CNP

## 2019-05-06 NOTE — PLAN OF CARE
[facility] DISCHARGE NOTE    Patient discharged to transitional care unit at 12:30 PM via wheel chair. Accompanied by son and staff. Discharge instructions reviewed with patient and caregiver, opportunity offered to ask questions. Prescriptions sent with patient to fill . All belongings sent with patient.    Maryjo Leon

## 2019-05-07 ENCOUNTER — NURSING HOME VISIT (OUTPATIENT)
Dept: GERIATRICS | Facility: CLINIC | Age: 84
End: 2019-05-07
Payer: MEDICARE

## 2019-05-07 VITALS
HEIGHT: 66 IN | BODY MASS INDEX: 27.13 KG/M2 | SYSTOLIC BLOOD PRESSURE: 150 MMHG | HEART RATE: 83 BPM | TEMPERATURE: 97.3 F | RESPIRATION RATE: 17 BRPM | OXYGEN SATURATION: 92 % | WEIGHT: 168.8 LBS | DIASTOLIC BLOOD PRESSURE: 78 MMHG

## 2019-05-07 DIAGNOSIS — R53.81 PHYSICAL DECONDITIONING: ICD-10-CM

## 2019-05-07 DIAGNOSIS — M16.12 PRIMARY OSTEOARTHRITIS OF LEFT HIP: ICD-10-CM

## 2019-05-07 DIAGNOSIS — R42 DIZZINESS: ICD-10-CM

## 2019-05-07 DIAGNOSIS — K59.01 SLOW TRANSIT CONSTIPATION: ICD-10-CM

## 2019-05-07 DIAGNOSIS — Z96.642 AFTERCARE FOLLOWING LEFT HIP JOINT REPLACEMENT SURGERY: Primary | ICD-10-CM

## 2019-05-07 DIAGNOSIS — G47.33 OSA (OBSTRUCTIVE SLEEP APNEA): ICD-10-CM

## 2019-05-07 DIAGNOSIS — I10 ESSENTIAL HYPERTENSION, BENIGN: ICD-10-CM

## 2019-05-07 DIAGNOSIS — F33.0 MAJOR DEPRESSIVE DISORDER, RECURRENT EPISODE, MILD (H): ICD-10-CM

## 2019-05-07 DIAGNOSIS — Z47.1 AFTERCARE FOLLOWING LEFT HIP JOINT REPLACEMENT SURGERY: Primary | ICD-10-CM

## 2019-05-07 DIAGNOSIS — E78.5 HYPERLIPIDEMIA LDL GOAL <100: ICD-10-CM

## 2019-05-07 DIAGNOSIS — K21.9 GASTROESOPHAGEAL REFLUX DISEASE, ESOPHAGITIS PRESENCE NOT SPECIFIED: ICD-10-CM

## 2019-05-07 DIAGNOSIS — E03.9 HYPOTHYROIDISM, UNSPECIFIED TYPE: ICD-10-CM

## 2019-05-07 DIAGNOSIS — D62 ANEMIA DUE TO BLOOD LOSS, ACUTE: ICD-10-CM

## 2019-05-07 DIAGNOSIS — Z95.0 CARDIAC PACEMAKER IN SITU: ICD-10-CM

## 2019-05-07 DIAGNOSIS — Z98.61 CAD S/P PERCUTANEOUS CORONARY ANGIOPLASTY: ICD-10-CM

## 2019-05-07 DIAGNOSIS — I25.10 CAD S/P PERCUTANEOUS CORONARY ANGIOPLASTY: ICD-10-CM

## 2019-05-07 PROCEDURE — 99309 SBSQ NF CARE MODERATE MDM 30: CPT | Performed by: NURSE PRACTITIONER

## 2019-05-07 PROCEDURE — 99207 ZZC CDG-CODE INCORRECT PER BILLING BASED ON TIME: CPT | Performed by: NURSE PRACTITIONER

## 2019-05-07 RX ORDER — SENNOSIDES 8.6 MG
2 TABLET ORAL 2 TIMES DAILY PRN
COMMUNITY
End: 2020-04-15 | Stop reason: ALTCHOICE

## 2019-05-07 RX ORDER — ACETAMINOPHEN 500 MG
1000 TABLET ORAL 3 TIMES DAILY
COMMUNITY

## 2019-05-07 ASSESSMENT — MIFFLIN-ST. JEOR: SCORE: 1217.42

## 2019-05-07 NOTE — LETTER
5/7/2019        RE: Christiana Sal  [address]  [place] [address]        [place] [facility]  PRIMARY CARE PROVIDER AND CLINIC:  Kelly Matthew PA-C, [address] / [place] [id number]  Chief Complaint   Patient presents with     Hospital F/U     [facility] Medical Record Number:  [id number]  Place of Service where encounter took place[facility] ([facility] [[id number]]    Christiana Sal  is a 87 year old  (10/25/1931), admitted to the above facility from  [facility]. Hospital stay 5/2/19 through 5/6/19..  Admitted to this facility for  rehab, medical management and nursing care.    HPI:    HPI information obtained from: facility chart records, facility staff, patient report and [facility] chart review.   Brief Summary of Hospital Course: Christiana Sal is an 87 year old female with PMH of HTN, CAD s/p stenting/PCI, GERD, depression, hypothyroidism, and previous right ankle fracture who underwent elective left hip arthroplasty with Dr Adams on 5/2/2019. Post-operative course was complicate with acute blood loss anemia, jody hgb of 7.9 and hypotension, resulting in an episode of near syncope while on the commode. Losartan was discontinued with improvement in BP.  Pain was controlled on oral medications. Hgb stabilized without transfusion. When medically stable she was discharged to TCU for further rehab and medical management.   Updates on Status Since Skilled nursing Admission: Reports she is feeling well today. Did have some pain overnight, but it is better this morning at taking pain medication. She denies any further lightheadedness or dizziness since her near syncope in the hospital. Reports having multiple BM's while in the hospital, most recent BM yesterday. She does report a chronic cough, thinks its related to GERD as worse when eating certain foods. Does have nocturia and urinary urgency, but these are her  baseline symptoms given her overactive bladder, does not feel that her oxybutynin has helped much. Her goal is to discharge home, but she lives alone and felt she need further therapy before going home.     CODE STATUS/ADVANCE DIRECTIVES DISCUSSION:   CPR/Full code   Patient's living condition: lives alone  ALLERGIES: Demerol; Lisinopril; Meperidine; and Sulfa drugs  PAST MEDICAL HISTORY:  has a past medical history of Coronary atherosclerosis of native coronary artery, Essential hypertension, benign, GERD (gastroesophageal reflux disease), History of transient ischemic attack (TIA), Hyperlipidemia LDL goal <100, Hypothyroidism, Major depressive disorder, recurrent episode, mild (H) (5/18/2011), and Peptic ulcer disease with hemorrhage. She also has no past medical history of Basal cell carcinoma, Malignant melanoma (H), or Squamous cell carcinoma.  PAST SURGICAL HISTORY:   has a past surgical history that includes TRANSCATH STENT INIT VESSEL,PERCUT; appendectomy; cholecystectomy, open; fracture tx, ankle rt/lt; LARYNGOSCOPY DIRECT W VOCAL CORD INJECTION; REMOVAL OF OVARIAN CYST(S); colonoscopy (2008); EXCISE HAND/FOOT NEUROMA; Repair hammer toe (9/13/10); cataract iol, rt/lt (1/26/12); cardiac catherization (1/15/04); cardiac catherization (2/16/12); Stent (5-2016); Open reduction internal fixation ankle (Right, 12/3/2018); and Arthroplasty hip (Left, 5/2/2019).  FAMILY HISTORY: family history includes Arthritis in her daughter; Blood Disease in her sister; Breast Cancer in her sister; C.A.D. in her brother, brother, father, and mother; Cancer in her brother and sister; Cancer - colorectal in her sister; Heart Disease in her sister, sister, sister, and son; Heart Failure in her mother; Hypertension in her brother, father, mother, sister, sister, and son; Myocardial Infarction in her brother and brother; Myocardial Infarction (age of onset: 66) in her father; Neurologic Disorder in her child and mother; Psychotic  Disorder in her sister; Thyroid Disease in her son.  SOCIAL HISTORY:   reports that she has never smoked. She has never used smokeless tobacco. She reports that she drinks alcohol. She reports that she does not use drugs.    Post Discharge Medication Reconciliation Status: discharge medications reconciled and changed, per note/orders (see AVS)    Current Outpatient Medications   Medication Sig Dispense Refill     acetaminophen (TYLENOL) 500 MG tablet Take 1,000 mg by mouth 3 times daily       acetaminophen (TYLENOL) 500 MG tablet Take 2 tablets (1,000 mg) by mouth every 8 hours as needed for mild pain 30 tablet 0     aspirin (ASA) 325 MG tablet Take 1 tablet (325 mg) by mouth daily 42 tablet 0     atorvastatin (LIPITOR) 40 MG tablet TAKE ONE TABLET BY MOUTH EVERY DAY 90 tablet 3     Biotin 10 MG CAPS Take 1 capsule by mouth daily (with lunch)        CALCIUM 600 + D OR 1 tablet by mouth daily       calcium carbonate (TUMS) 500 MG chewable tablet Take 1 chew tab by mouth 3 times daily as needed for heartburn       citalopram (CELEXA) 20 MG tablet Take 1 tablet (20 mg) by mouth daily 90 tablet 0     levothyroxine (SYNTHROID/LEVOTHROID) 75 MCG tablet TAKE 1 TABLET BY MOUTH ONCE DAILY. 30 tablet 0     magnesium 250 MG tablet Take 1 tablet by mouth every evening        meclizine (ANTIVERT) 25 MG tablet TAKE ONE TABLET BY MOUTH EVERY 6 HOURS AS NEEDED FOR DIZZINESS 30 tablet 3     metoprolol succinate ER (TOPROL-XL) 25 MG 24 hr tablet Take 0.5 tablets (12.5 mg) by mouth daily 45 tablet 3     Multiple Vitamins-Minerals (OCUVITE PO) Take 1 tablet by mouth daily.       nitroglycerin (NITROSTAT) 0.4 MG SL tablet Place 1 tablet (0.4 mg) under the tongue every 5 minutes as needed for chest pain (call your doctor if you take a third dose) 25 tablet 3     oxyCODONE (ROXICODONE) 5 MG tablet Take 0.5 tablets (2.5 mg) by mouth every 4 hours as needed for pain 6 tablet 0     oxyCODONE HCl (OXAYDO) 5 MG TABA Take 2.5 mg by mouth 2  "times daily Give at 0800 & 1200. Continue 2.5 mg PO every 4 hrs PRN after 5/14/2019       pantoprazole (PROTONIX) 40 MG EC tablet Take 1 tablet (40 mg) by mouth daily 90 tablet 1     ranitidine (ZANTAC) 300 MG tablet Take 1 tablet (300 mg) by mouth At Bedtime 90 tablet 0     sennosides (SENOKOT) 8.6 MG tablet Take 2 tablets by mouth 2 times daily as needed for constipation       tolterodine (DETROL) 2 MG tablet Take 1 tablet (2 mg) by mouth 2 times daily (Patient taking differently: Take 2 mg by mouth At Bedtime ) 60 tablet 3     Vitamin D, Cholecalciferol, 1000 units TABS Take 2,000 Units by mouth daily         ROS:  10 point ROS of systems including Constitutional, Eyes, Respiratory, Cardiovascular, Gastroenterology, Genitourinary, Integumentary, Musculoskeletal, Psychiatric were all negative except for pertinent positives noted in my HPI.    Vitals:  /78   Pulse 83   Temp 97.3  F (36.3  C)   Resp 17   Ht 1.676 m (5' 6\")   Wt 76.6 kg (168 lb 12.8 oz)   SpO2 92%   BMI 27.25 kg/m     Exam:  GENERAL APPEARANCE:  Alert, in no distress, oriented, cooperative  RESP:  respiratory effort and palpation of chest normal, lungs clear to auscultation , no respiratory distress  CV:  Palpation and auscultation of heart done , regular rate and rhythm, no murmur, rub, or gallop, no edema, +2 pedal pulses  ABDOMEN:  no guarding or rebound, bowel sounds normal  M/S:   decreaseed ROM to left hip, ambulates with walker  SKIN:  wound healing well, no signs of infection left hip incision aquacel in place, no erythema or edema, moderate amount of eccymosis distal to incision  NEURO:   Cranial nerves 2-12 are normal tested and grossly at patient's baseline  PSYCH:  oriented X 3, normal insight, judgement and memory, affect and mood normal    Lab/Diagnostic data:    Most Recent 3 CBC's:  Recent Labs   Lab Test 05/05/19  0544 05/04/19  0610 05/03/19  0553  04/01/19  1156 12/11/18  0836 12/04/18  0544   WBC  --   --   --   --  " 7.8 8.4 11.7*   HGB 7.9* 8.0* 9.3*   < > 13.2 11.4* 10.6*   MCV  --   --   --   --  93 95 94   PLT  --   --   --   --  239 268 237    < > = values in this interval not displayed.       ASSESSMENT/PLAN:  Current [place] scheduled appointments:     (Z47.1,  Z96.642) Aftercare following left hip joint replacement surgery  (primary encounter diagnosis)  (M16.12) Primary osteoarthritis of left hip  (R53.81) Physical deconditioning  Comment: S/p elective right total hip arthoplasty on 5/2. No s/s of infection, healing well, pain controlled  Plan: Will add oxycodone 2.5mg BID in addition to 2.5mg q4h PRN to allow for better pain control with therapies, change APAP to 1000mg q8h from PRN. Ice PRN, PT/OT, ASA 325mg daily x 42 days then resumed 162mg for DVT prophylaxis, f/u with ortho as scheduled    (I25.10, Z98.61) CAD S/P percutaneous coronary angioplasty  (Z95.0) Cardiac pacemaker - [employer] dual lead - Not Dependent  (E78.5) Hyperlipidemia LDL goal <100  Comment: S/p stent to RCA in 2004, PCI to LAD In 2016 which was complicated by coronary dissection. Denies any recent chest pain or SOB.  Plan: Continue metoprolol 12.5mg daily, nitroglycerin PRN, ASA as above, atorvastatin 40mg daily, monitor and adjust    (I10) Essential hypertension, benign  Comment: labile while IP, had an episode of near syncope while on the commode - losartan discontinued  Plan: continue metoprolol XL 12.5m daily, monitor and resume losartan if consistently elevated    (E03.9) Hypothyroidism, unspecified type  Comment: TSH 2.60  Plan: Continue levothyroxine 75mcg daily    (K21.9) Gastroesophageal reflux disease, esophagitis presence not specified  Comment: Report coughing, denies any heartburn or indigestion  Plan: Continue protonix 40mg daily, ranitidine 300mg at bedtime, tums PRN    (F33.0) Major depressive disorder, recurrent episode, mild (H)  Comment: states lost spouse to Alzheimer's a year ago, feel citalopram has been  helpful  Plan: continue citalopram 20mg daily     (D62) Anemia due to blood loss, acute  Comment: Hgb 12->9.3.->7.9->8, no s/s of bleeding currently  Plan: Hgb to monitor for stability    (K59.01) Slow transit constipation  Comment: Reports having multiple BM's while IP, but is on narcotics  Plan: will add senna-s 2 tabs BID PRN    (G47.33) JENIFER (obstructive sleep apnea)  Comment: reports she is sleeping well  Plan: continue CPAP at home setting at night    (R42) Dizziness  Comment: chronic occurs rarely  Plan: continue meclizine 25mg q6h PRN, monitor    transcribed by : Casandra Thomas  Orders:  1. Change Tylenol to 1000 mg PO TID - Dx: Pain  2. Oxycodone 5 mg - give 2.5 mg PO BID @ 0800 & 1200 x 7 days - continue 2.5 mg PO every 4 hrs PRN - Dx: Pain - Quantity #30, 0 refills, send on faclity request.  3. Hgb on 5/9/19 - Only update on call if critical , otherwise Christi will address on 5/10/19. - Dx: anemia  4. Senna - S- 2 tabs PO BID PRN - Dx: Constipation  5. Hold Metoprolol for SBP <120    Total time spent with patient visit at the skilled nursing facility was 35 min including patient visit and review of past records. Greater than 50% of total time spent with counseling and coordinating care due to mediation review, discussion of medication history, and patient educatoin adn plan of care d/t left hip arhtoplasty, narcotic pain medication, HTN, GERD, CAD, anemia, adn JENIFER  Electronically signed by:  SHARMIN Pichardo CNP                       Sincerely,        SHARMIN Pichardo CNP

## 2019-05-09 ENCOUNTER — HOSPITAL LABORATORY (OUTPATIENT)
Facility: OTHER | Age: 84
End: 2019-05-09

## 2019-05-09 LAB — HGB BLD-MCNC: 8.1 G/DL (ref 11.7–15.7)

## 2019-05-12 NOTE — PROGRESS NOTES
[place] [facility] DISCHARGE SUMMARY  PATIENT'S NAME: Christiana Sal  YOB: 1931  MEDICAL RECORD NUMBER:  [id number]  Place of Service where encounter took place:  [facility] ([facility]) [[id number]]    PRIMARY CARE PROVIDER AND CLINIC RESPONSIBLE AFTER TRANSFER:   Kelly Matthew PA-C, [address] / [place] [address]    [facility] [place] [facility]     Transferring providers: SHARMIN Pichardo CNP, Dr. Jose Armando MD  Recent Hospitalization/ED:  [facility] stay 5/02/2019 to 5/06/2019.  Date of SNF Admission: May / 07 / 2019  Date of SNF (anticipated) Discharge: May / 13 / 2019  Discharged to: with family home  Cognitive Scores: SLUMS: 27/30  Physical Function: Ambulating unlimited ft with 2WW  DME: Walker    CODE STATUS/ADVANCE DIRECTIVES DISCUSSION:  Full Code   ALLERGIES: Demerol; Lisinopril; Meperidine; and Sulfa drugs    DISCHARGE DIAGNOSIS/NURSING FACILITY COURSE:    Christiana Sal is an 87 year old female with PMH of HTN, CAD s/p stenting/PCI, GERD, depression, hypothyroidism, and previous right ankle fracture who underwent elective left hip arthroplasty with Dr Adams on 5/2/2019. Post-operative course was complicate with acute blood loss anemia, jody hgb of 7.9 and hypotension, resulting in an episode of near syncope while on the commode. Losartan was discontinued with improvement in BP.  Pain was controlled on oral medications. Hgb stabilized without transfusion. When medically stable she was discharged to TCU for further rehab and medical management.     Aftercare following left hip joint replacement surgery  Primary osteoarthritis of left hip  Physical deconditioning  Did with in therapy - able to ambulate with 2WW and transfer independently. Pain controlled on oxycodone 2.5mg, APAP 1000mg TID. Does have some swelling to LLE, encouraged to continue with ice and elevation upon discharge. She is on ASA 325mg daily x 42 days for DVT  "prophylaxis, then can resume ASA 162mg daily. Surgical site without s/s of infection.  Had f/u with ortho on 5/16. Will continue with home PT, OT, and HHA.     CAD S/P percutaneous coronary angioplasty  Cardiac pacemaker - [employer] dual lead - Not Dependent  Hyperlipidemia LDL goal <100  S/p stent to RCA in 2004, PCI to LAD In 2016 which was complicated by coronary dissection. Admits today to having \"mild\" pressure in her chest x2 after physical therapy over the past week. Pain resolved quickly after rest Did not report to staff, no discomfort with therapy today. Pain was different than pain she had prior to sent placement. Instructed patient to update staff if happen again. Will ensure she has  nitroglycerin SL filled on discharge as she does not know where it is at home. Instructed patient to follow up with PCP or cardiology after discharge, especially if pain occurs again at home, reviewed use of nitroglycerin tabs and when to call 911. She continues on metoprolol XL 12.5mg daily, atorvastatin 40mg daily, nitroglycerin PRN for chest pain.      Essential hypertension, benign  Labile BP while IP, so losartan discontinued, SBP soft in the 100's on TCU admission, improved to 110's-130's prior to discharge. Continues on Metoprolol XL 12.5mg daily.     Hypothyroidism, unspecified type  TSH 2.60, continues on levothyroxine 75mcg daily.     Gastroesophageal reflux disease, esophagitis presence not specified  Reports intermittent symptoms and cough. Continues on Protonix 40mg daiy, ranitidine 300mg at bedtime and Tums PRN.     Major depressive disorder, recurrent episode, mild (H)  Stable on celexa 20mg daily.     Anemia due to blood loss, acute  Hgb 12->9.3.->7.9->8, was not transfused. Hgb stable at 8.1 in TCU.     Slow transit constipation  Regular bowel movements, on senna-s PRN    JENIFER (obstructive sleep apnea)  Continues with CPAP at home settings.     Dizziness  Historical, did not use PRN meclizine while " IP.     Insomnia  Started on melatonin 3mg at bedtime in TCU with good results.     Overactive bladder  Continues on Detrol 2mg daily       Past Medical History:  has a past medical history of Coronary atherosclerosis of native coronary artery, Essential hypertension, benign, GERD (gastroesophageal reflux disease), History of transient ischemic attack (TIA), Hyperlipidemia LDL goal <100, Hypothyroidism, Major depressive disorder, recurrent episode, mild (H) (5/18/2011), and Peptic ulcer disease with hemorrhage. She also has no past medical history of Basal cell carcinoma, Malignant melanoma (H), or Squamous cell carcinoma.    Discharge Medications:  Current Outpatient Medications   Medication Sig Dispense Refill     acetaminophen (TYLENOL) 500 MG tablet Take 1,000 mg by mouth 3 times daily       aspirin (ASA) 325 MG tablet Take 1 tablet (325 mg) by mouth daily 42 tablet 0     [START ON 6/19/2019] aspirin 81 MG EC tablet Take 162 mg by mouth daily       atorvastatin (LIPITOR) 40 MG tablet TAKE ONE TABLET BY MOUTH EVERY DAY 90 tablet 3     Biotin 10 MG CAPS Take 1 capsule by mouth daily (with lunch)        CALCIUM 600 + D OR 1 tablet by mouth daily       calcium carbonate (TUMS) 500 MG chewable tablet Take 1 chew tab by mouth 3 times daily as needed for heartburn       citalopram (CELEXA) 20 MG tablet Take 1 tablet (20 mg) by mouth daily 90 tablet 0     levothyroxine (SYNTHROID/LEVOTHROID) 75 MCG tablet TAKE 1 TABLET BY MOUTH ONCE DAILY. 30 tablet 0     magnesium 250 MG tablet Take 1 tablet by mouth every evening        meclizine (ANTIVERT) 25 MG tablet TAKE ONE TABLET BY MOUTH EVERY 6 HOURS AS NEEDED FOR DIZZINESS 30 tablet 3     melatonin 3 MG tablet Take 3 mg by mouth nightly as needed for sleep       metoprolol succinate ER (TOPROL-XL) 25 MG 24 hr tablet Take 0.5 tablets (12.5 mg) by mouth daily 45 tablet 3     Multiple Vitamins-Minerals (OCUVITE PO) Take 1 tablet by mouth daily.       nitroglycerin (NITROSTAT)  "0.4 MG SL tablet Place 1 tablet (0.4 mg) under the tongue every 5 minutes as needed for chest pain (call your doctor if you take a third dose) 25 tablet 3     oxyCODONE HCl (OXAYDO) 5 MG TABA Take 2.5 mg by mouth 2 times daily Give at 0800 & 1200. Continue 2.5 mg PO every 4 hrs PRN after 5/14/2019       pantoprazole (PROTONIX) 40 MG EC tablet Take 1 tablet (40 mg) by mouth daily 90 tablet 1     ranitidine (ZANTAC) 300 MG tablet Take 1 tablet (300 mg) by mouth At Bedtime 90 tablet 0     sennosides (SENOKOT) 8.6 MG tablet Take 2 tablets by mouth 2 times daily as needed for constipation       tolterodine (DETROL) 2 MG tablet Take 2 mg by mouth At Bedtime       Vitamin D, Cholecalciferol, 1000 units TABS Take 2,000 Units by mouth daily       tolterodine (DETROL) 2 MG tablet Take 1 tablet (2 mg) by mouth 2 times daily (Patient taking differently: Take 2 mg by mouth At Bedtime ) 60 tablet 3       Medication Changes/Rationale:     Added melatonin for sleep    Changed APAP to TID for pain control    Added PRN senna for constipation    Controlled medications sent with patient:   Medication: oxycodone 2.5mg tabs , 50 tabs given to patient at the time of discharge to take home     ROS:   10 point ROS of systems including Constitutional, Eyes, Respiratory, Cardiovascular, Gastroenterology, Genitourinary, Integumentary, Musculoskeletal, Psychiatric were all negative except for pertinent positives noted in my HPI.    Physical Exam:   Vitals: /61   Pulse 68   Temp 97.9  F (36.6  C)   Resp 18   Ht 1.676 m (5' 6\")   Wt 76.6 kg (168 lb 12.8 oz)   SpO2 93%   BMI 27.25 kg/m    BMI= Body mass index is 27.25 kg/m .  GENERAL APPEARANCE:  Alert, in no distress, oriented, cooperative  RESP:  respiratory effort and palpation of chest normal, lungs clear to auscultation , no respiratory distress  CV:  Palpation and auscultation of heart done , regular rate and rhythm, no murmur, rub, or gallop, +2 pedal pulses, peripheral edema " 2+ in LLE, pacemaker palpable left upper chest  ABDOMEN:  no guarding or rebound, bowel sounds normal  M/S:   decreased ROM to left hip, ambulates with walker, steady gait  SKIN:  wound healing well, no signs of infection left hip incision C/D/I, aquacel in place  NEURO:   Cranial nerves 2-12 are normal tested and grossly at patient's baseline  PSYCH:  oriented X 3, normal insight, judgement and memory, affect and mood normal     SNF labs: Recent labs in [facility] reviewed by me today.  and   Most Recent 3 CBC's:  Recent Labs   Lab Test 05/09/19  0740 05/05/19  0544 05/04/19  0610  04/01/19  1156 12/11/18  0836 12/04/18  0544   WBC  --   --   --   --  7.8 8.4 11.7*   HGB 8.1* 7.9* 8.0*   < > 13.2 11.4* 10.6*   MCV  --   --   --   --  93 95 94   PLT  --   --   --   --  239 268 237    < > = values in this interval not displayed.     Most Recent 3 BMP's:  Recent Labs   Lab Test 05/04/19  0610 05/03/19  0553 04/12/19  1031 04/01/19  1156   NA  --  138 136 139   POTASSIUM  --  4.8 4.3 4.7   CHLORIDE  --  104 104 105   CO2  --  29 28 28   BUN  --  20 29 23   CR  --  0.80 0.86 0.88   ANIONGAP  --  5 4 6   YVONNE  --  8.2* 8.5 9.5   GLC 96 138* 98 92         DISCHARGE PLAN:    Follow up labs: No labs orders/due    Medical Follow Up:      Follow up with primary care provider in 1-2 weeks    [facility] referral needed and placed by this provider: No    Discharge Services: Home Care:  Occupational Therapy, Physical Therapy, Registered Nurse, Home Health Aide and [facility] [phone]    Discharge Instructions Verbalized to Patient at Discharge:     OK to shower but no bathing or soaking until approved by surgeon.     Leave aquacel in place until f/u with surgeon on 5/16    Notify your surgeon if you have increased redness, swelling, tenderness, or drainage at your incision site.     Notify surgeon if you have a fever greater than 100.5 degrees.     Follow hip precautions    Do not drive while taking narcotics    Orders:  1.  Discontinue scheduled Oxycodone - but continue Oxycodone 5 mg (2.5 mg) PO every 4 hours PRN - Dx: Pain. DO not send- has supply in TCU      TOTAL DISCHARGE TIME:   Greater than 30 minutes  Electronically signed by:  SHARMIN Pichardo CNP     Documentation of Face to Face and Certification for Home Health Services    I certify that patient: Christiana Sal is under my care and that I, or a nurse practitioner or physician's assistant working with me, had a face-to-face encounter that meets the physician face-to-face encounter requirements with this patient on: 5/13/2019.    This encounter with the patient was in whole, or in part, for the following medical condition, which is the primary reason for home health care: s/p left hip replacement, physical deconditioning.    I certify that, based on my findings, the following services are medically necessary home health services: Occupational Therapy, Physical Therapy and home health aide.    My clinical findings support the need for the above services because: Occupational Therapy Services are needed to assess and treat cognitive ability and address ADL safety due to impairment in needs to use adaptive equipment, needs continues rehab., Physical Therapy Services are needed to assess and treat the following functional impairments: need continued rehab s/p hip replacement and reinforcement of hip precautions. and home health aide to assist with bathing, dressing, and ADL's    Further, I certify that my clinical findings support that this patient is homebound (i.e. absences from home require considerable and taxing effort and are for medical reasons or [employer] services or infrequently or of short duration when for other reasons) because: Requires assistance of another person or specialized equipment to access medical services because patient: Range of motion limitations prevents ability to exit home safely...    Based on the above findings. I certify that this  patient is confined to the home and needs intermittent skilled nursing care, physical therapy and/or speech therapy.  The patient is under my care, and I have initiated the establishment of the plan of care.  This patient will be followed by a physician who will periodically review the plan of care.  Physician/Provider to provide follow up care: Kelly Matthew    Attending \[facility]\"" physician (the Medicare certified [facility] provider): SHARMIN Pichardo, Dr. Tara Suárez  Physician Signature: See electronic signature associated with these discharge orders.  Date: 5/13/2019    Electronically signed by Dr. Tara Suárez MD, and only signing for initial order. Please send all follow up questions and concerns or needed follow up signatures to the PCP Kelly Matthew.

## 2019-05-13 ENCOUNTER — DISCHARGE SUMMARY NURSING HOME (OUTPATIENT)
Dept: GERIATRICS | Facility: CLINIC | Age: 84
End: 2019-05-13
Payer: MEDICARE

## 2019-05-13 VITALS
RESPIRATION RATE: 18 BRPM | HEART RATE: 68 BPM | TEMPERATURE: 97.9 F | SYSTOLIC BLOOD PRESSURE: 119 MMHG | WEIGHT: 168.8 LBS | BODY MASS INDEX: 27.13 KG/M2 | HEIGHT: 66 IN | DIASTOLIC BLOOD PRESSURE: 61 MMHG | OXYGEN SATURATION: 93 %

## 2019-05-13 DIAGNOSIS — R53.81 PHYSICAL DECONDITIONING: ICD-10-CM

## 2019-05-13 DIAGNOSIS — R42 DIZZINESS: ICD-10-CM

## 2019-05-13 DIAGNOSIS — I10 ESSENTIAL HYPERTENSION, BENIGN: ICD-10-CM

## 2019-05-13 DIAGNOSIS — Z98.61 CAD S/P PERCUTANEOUS CORONARY ANGIOPLASTY: ICD-10-CM

## 2019-05-13 DIAGNOSIS — F33.0 MAJOR DEPRESSIVE DISORDER, RECURRENT EPISODE, MILD (H): ICD-10-CM

## 2019-05-13 DIAGNOSIS — D62 ANEMIA DUE TO BLOOD LOSS, ACUTE: ICD-10-CM

## 2019-05-13 DIAGNOSIS — M16.12 PRIMARY OSTEOARTHRITIS OF LEFT HIP: ICD-10-CM

## 2019-05-13 DIAGNOSIS — Z96.642 AFTERCARE FOLLOWING LEFT HIP JOINT REPLACEMENT SURGERY: Primary | ICD-10-CM

## 2019-05-13 DIAGNOSIS — K21.9 GASTROESOPHAGEAL REFLUX DISEASE, ESOPHAGITIS PRESENCE NOT SPECIFIED: ICD-10-CM

## 2019-05-13 DIAGNOSIS — Z95.0 CARDIAC PACEMAKER IN SITU: ICD-10-CM

## 2019-05-13 DIAGNOSIS — G47.33 OSA (OBSTRUCTIVE SLEEP APNEA): ICD-10-CM

## 2019-05-13 DIAGNOSIS — I25.10 CAD S/P PERCUTANEOUS CORONARY ANGIOPLASTY: ICD-10-CM

## 2019-05-13 DIAGNOSIS — E78.5 HYPERLIPIDEMIA LDL GOAL <100: ICD-10-CM

## 2019-05-13 DIAGNOSIS — K59.01 SLOW TRANSIT CONSTIPATION: ICD-10-CM

## 2019-05-13 DIAGNOSIS — F51.02 ADJUSTMENT INSOMNIA: ICD-10-CM

## 2019-05-13 DIAGNOSIS — E03.9 HYPOTHYROIDISM, UNSPECIFIED TYPE: ICD-10-CM

## 2019-05-13 DIAGNOSIS — N32.81 OVERACTIVE BLADDER: ICD-10-CM

## 2019-05-13 DIAGNOSIS — Z47.1 AFTERCARE FOLLOWING LEFT HIP JOINT REPLACEMENT SURGERY: Primary | ICD-10-CM

## 2019-05-13 PROCEDURE — 99316 NF DSCHRG MGMT 30 MIN+: CPT | Performed by: NURSE PRACTITIONER

## 2019-05-13 RX ORDER — LANOLIN ALCOHOL/MO/W.PET/CERES
3 CREAM (GRAM) TOPICAL
COMMUNITY
End: 2020-04-15

## 2019-05-13 RX ORDER — ASPIRIN 81 MG/1
162 TABLET ORAL DAILY
COMMUNITY
Start: 2019-06-19 | End: 2019-05-21

## 2019-05-13 RX ORDER — TOLTERODINE TARTRATE 2 MG/1
2 TABLET, EXTENDED RELEASE ORAL AT BEDTIME
COMMUNITY
End: 2020-10-20

## 2019-05-13 ASSESSMENT — MIFFLIN-ST. JEOR: SCORE: 1217.42

## 2019-05-13 NOTE — LETTER
5/13/2019        RE: Christiana Sal  [address]  [place] [address]        [facility] DISCHARGE SUMMARY  PATIENT'S NAME: Christiana Sal  YOB: 1931  MEDICAL RECORD NUMBER:  [id number]  Place of Service where encounter took place:  [facility] (SNF) [[id number]]    PRIMARY CARE PROVIDER AND CLINIC RESPONSIBLE AFTER TRANSFER:   Kelly Matthew PA-C, [address] / [place] [address]    [facility] Provider     Transferring providers: SHARMIN Pichardo CNP, Dr. Jose Armando MD  Recent Hospitalization/ED:  [facility] stay 5/02/2019 to 5/06/2019.  Date of SNF Admission: May / 07 / 2019  Date of SNF (anticipated) Discharge: May / 13 / 2019  Discharged to: with family home  Cognitive Scores: SLUMS: 27/30  Physical Function: Ambulating unlimited ft with 2WW  DME: Walker    CODE STATUS/ADVANCE DIRECTIVES DISCUSSION:  Full Code   ALLERGIES: Demerol; Lisinopril; Meperidine; and Sulfa drugs    DISCHARGE DIAGNOSIS/NURSING FACILITY COURSE:    Christiana Sal is an 87 year old female with PMH of HTN, CAD s/p stenting/PCI, GERD, depression, hypothyroidism, and previous right ankle fracture who underwent elective left hip arthroplasty with Dr Adams on 5/2/2019. Post-operative course was complicate with acute blood loss anemia, jody hgb of 7.9 and hypotension, resulting in an episode of near syncope while on the commode. Losartan was discontinued with improvement in BP.  Pain was controlled on oral medications. Hgb stabilized without transfusion. When medically stable she was discharged to TCU for further rehab and medical management.     Aftercare following left hip joint replacement surgery  Primary osteoarthritis of left hip  Physical deconditioning  Did with in therapy - able to ambulate with 2WW and transfer independently. Pain controlled on oxycodone 2.5mg, APAP 1000mg TID. Does have some swelling to LLE,  "encouraged to continue with ice and elevation upon discharge. She is on ASA 325mg daily x 42 days for DVT prophylaxis, then can resume ASA 162mg daily. Surgical site without s/s of infection.  Had f/u with ortho on 5/16. Will continue with home PT, OT, and HHA.     CAD S/P percutaneous coronary angioplasty  Cardiac pacemaker - [employer] [place] [employer] dual lead - Not Dependent  Hyperlipidemia LDL goal <100  S/p stent to RCA in 2004, PCI to LAD In 2016 which was complicated by coronary dissection. Admits today to having \"mild\" pressure in her chest x2 after physical therapy over the past week. Pain resolved quickly after rest Did not report to staff, no discomfort with therapy today. Pain was different than pain she had prior to sent placement. Instructed patient to update staff if happen again. Will ensure she has  nitroglycerin SL filled on discharge as she does not know where it is at home. Instructed patient to follow up with PCP or cardiology after discharge, especially if pain occurs again at home, reviewed use of nitroglycerin tabs and when to call 911. She continues on metoprolol XL 12.5mg daily, atorvastatin 40mg daily, nitroglycerin PRN for chest pain.      Essential hypertension, benign  Labile BP while IP, so losartan discontinued, SBP soft in the 100's on TCU admission, improved to 110's-130's prior to discharge. Continues on Metoprolol XL 12.5mg daily.     Hypothyroidism, unspecified type  TSH 2.60, continues on levothyroxine 75mcg daily.     Gastroesophageal reflux disease, esophagitis presence not specified  Reports intermittent symptoms and cough. Continues on Protonix 40mg daiy, ranitidine 300mg at bedtime and Tums PRN.     Major depressive disorder, recurrent episode, mild (H)  Stable on celexa 20mg daily.     Anemia due to blood loss, acute  Hgb 12->9.3.->7.9->8, was not transfused. Hgb stable at 8.1 in TCU.     Slow transit constipation  Regular bowel movements, on senna-s PRN    JENIFER (obstructive sleep " apnea)  Continues with CPAP at home settings.     Dizziness  Historical, did not use PRN meclizine while IP.     Insomnia  Started on melatonin 3mg at bedtime in TCU with good results.     Overactive bladder  Continues on Detrol 2mg daily       Past Medical History:  has a past medical history of Coronary atherosclerosis of native coronary artery, Essential hypertension, benign, GERD (gastroesophageal reflux disease), History of transient ischemic attack (TIA), Hyperlipidemia LDL goal <100, Hypothyroidism, Major depressive disorder, recurrent episode, mild (H) (5/18/2011), and Peptic ulcer disease with hemorrhage. She also has no past medical history of Basal cell carcinoma, Malignant melanoma (H), or Squamous cell carcinoma.    Discharge Medications:  Current Outpatient Medications   Medication Sig Dispense Refill     acetaminophen (TYLENOL) 500 MG tablet Take 1,000 mg by mouth 3 times daily       aspirin (ASA) 325 MG tablet Take 1 tablet (325 mg) by mouth daily 42 tablet 0     [START ON 6/19/2019] aspirin 81 MG EC tablet Take 162 mg by mouth daily       atorvastatin (LIPITOR) 40 MG tablet TAKE ONE TABLET BY MOUTH EVERY DAY 90 tablet 3     Biotin 10 MG CAPS Take 1 capsule by mouth daily (with lunch)        CALCIUM 600 + D OR 1 tablet by mouth daily       calcium carbonate (TUMS) 500 MG chewable tablet Take 1 chew tab by mouth 3 times daily as needed for heartburn       citalopram (CELEXA) 20 MG tablet Take 1 tablet (20 mg) by mouth daily 90 tablet 0     levothyroxine (SYNTHROID/LEVOTHROID) 75 MCG tablet TAKE 1 TABLET BY MOUTH ONCE DAILY. 30 tablet 0     magnesium 250 MG tablet Take 1 tablet by mouth every evening        meclizine (ANTIVERT) 25 MG tablet TAKE ONE TABLET BY MOUTH EVERY 6 HOURS AS NEEDED FOR DIZZINESS 30 tablet 3     melatonin 3 MG tablet Take 3 mg by mouth nightly as needed for sleep       metoprolol succinate ER (TOPROL-XL) 25 MG 24 hr tablet Take 0.5 tablets (12.5 mg) by mouth daily 45 tablet 3      "Multiple Vitamins-Minerals (OCUVITE PO) Take 1 tablet by mouth daily.       nitroglycerin (NITROSTAT) 0.4 MG SL tablet Place 1 tablet (0.4 mg) under the tongue every 5 minutes as needed for chest pain (call your doctor if you take a third dose) 25 tablet 3     oxyCODONE HCl (OXAYDO) 5 MG TABA Take 2.5 mg by mouth 2 times daily Give at 0800 & 1200. Continue 2.5 mg PO every 4 hrs PRN after 5/14/2019       pantoprazole (PROTONIX) 40 MG EC tablet Take 1 tablet (40 mg) by mouth daily 90 tablet 1     ranitidine (ZANTAC) 300 MG tablet Take 1 tablet (300 mg) by mouth At Bedtime 90 tablet 0     sennosides (SENOKOT) 8.6 MG tablet Take 2 tablets by mouth 2 times daily as needed for constipation       tolterodine (DETROL) 2 MG tablet Take 2 mg by mouth At Bedtime       Vitamin D, Cholecalciferol, 1000 units TABS Take 2,000 Units by mouth daily       tolterodine (DETROL) 2 MG tablet Take 1 tablet (2 mg) by mouth 2 times daily (Patient taking differently: Take 2 mg by mouth At Bedtime ) 60 tablet 3       Medication Changes/Rationale:     Added melatonin for sleep    Changed APAP to TID for pain control    Added PRN senna for constipation    Controlled medications sent with patient:   Medication: oxycodone 2.5mg tabs , 50 tabs given to patient at the time of discharge to take home     ROS:   10 point ROS of systems including Constitutional, Eyes, Respiratory, Cardiovascular, Gastroenterology, Genitourinary, Integumentary, Musculoskeletal, Psychiatric were all negative except for pertinent positives noted in my HPI.    Physical Exam:   Vitals: /61   Pulse 68   Temp 97.9  F (36.6  C)   Resp 18   Ht 1.676 m (5' 6\")   Wt 76.6 kg (168 lb 12.8 oz)   SpO2 93%   BMI 27.25 kg/m     BMI= Body mass index is 27.25 kg/m .  GENERAL APPEARANCE:  Alert, in no distress, oriented, cooperative  RESP:  respiratory effort and palpation of chest normal, lungs clear to auscultation , no respiratory distress  CV:  Palpation and auscultation " of heart done , regular rate and rhythm, no murmur, rub, or gallop, +2 pedal pulses, peripheral edema 2+ in LLE, pacemaker palpable left upper chest  ABDOMEN:  no guarding or rebound, bowel sounds normal  M/S:   decreased ROM to left hip, ambulates with walker, steady gait  SKIN:  wound healing well, no signs of infection left hip incision C/D/I, aquacel in place  NEURO:   Cranial nerves 2-12 are normal tested and grossly at patient's baseline  PSYCH:  oriented X 3, normal insight, judgement and memory, affect and mood normal     SNF labs: Recent labs in [facility] reviewed by me today.  and   Most Recent 3 CBC's:  Recent Labs   Lab Test 05/09/19  0740 05/05/19  0544 05/04/19  0610  04/01/19  1156 12/11/18  0836 12/04/18  0544   WBC  --   --   --   --  7.8 8.4 11.7*   HGB 8.1* 7.9* 8.0*   < > 13.2 11.4* 10.6*   MCV  --   --   --   --  93 95 94   PLT  --   --   --   --  239 268 237    < > = values in this interval not displayed.     Most Recent 3 BMP's:  Recent Labs   Lab Test 05/04/19  0610 05/03/19  0553 04/12/19  1031 04/01/19  1156   NA  --  138 136 139   POTASSIUM  --  4.8 4.3 4.7   CHLORIDE  --  104 104 105   CO2  --  29 28 28   BUN  --  20 29 23   CR  --  0.80 0.86 0.88   ANIONGAP  --  5 4 6   YVONNE  --  8.2* 8.5 9.5   GLC 96 138* 98 92         DISCHARGE PLAN:    Follow up labs: No labs orders/due    Medical Follow Up:      Follow up with primary care provider in 1-2 weeks    MTM referral needed and placed by this provider: No    Discharge Services: Home Care:  Occupational Therapy, Physical Therapy, Registered Nurse, Home Health Aide and [facility] [phone]    Discharge Instructions Verbalized to Patient at Discharge:     OK to shower but no bathing or soaking until approved by surgeon.     Leave aquacel in place until f/u with surgeon on 5/16    Notify your surgeon if you have increased redness, swelling, tenderness, or drainage at your incision site.     Notify surgeon if you have a fever greater than  100.5 degrees.     Follow hip precautions    Do not drive while taking narcotics    Orders:  1. Discontinue scheduled Oxycodone - but continue Oxycodone 5 mg (2.5 mg) PO every 4 hours PRN - Dx: Pain. DO not send- has supply in TCU      TOTAL DISCHARGE TIME:   Greater than 30 minutes  Electronically signed by:  SHARMIN Pichardo CNP     Documentation of Face to Face and Certification for Home Health Services    I certify that patient: Christiana Sal is under my care and that I, or a nurse practitioner or physician's assistant working with me, had a face-to-face encounter that meets the physician face-to-face encounter requirements with this patient on: 5/13/2019.    This encounter with the patient was in whole, or in part, for the following medical condition, which is the primary reason for home health care: s/p left hip replacement, physical deconditioning.    I certify that, based on my findings, the following services are medically necessary home health services: Occupational Therapy, Physical Therapy and home health aide.    My clinical findings support the need for the above services because: Occupational Therapy Services are needed to assess and treat cognitive ability and address ADL safety due to impairment in needs to use adaptive equipment, needs continues rehab., Physical Therapy Services are needed to assess and treat the following functional impairments: need continued rehab s/p hip replacement and reinforcement of hip precautions. and home health aide to assist with bathing, dressing, and ADL's    Further, I certify that my clinical findings support that this patient is homebound (i.e. absences from home require considerable and taxing effort and are for medical reasons or Sabianist services or infrequently or of short duration when for other reasons) because: Requires assistance of another person or specialized equipment to access medical services because patient: Range of motion  limitations prevents ability to exit home safely...    Based on the above findings. I certify that this patient is confined to the home and needs intermittent skilled nursing care, physical therapy and/or speech therapy.  The patient is under my care, and I have initiated the establishment of the plan of care.  This patient will be followed by a physician who will periodically review the plan of care.  Physician/Provider to provide follow up care: Kelly Matthew    Attending hospital physician (the Medicare certified P[facility] provider): SHARMIN Pichardo, Dr. Tara Suárez  Physician Signature: See electronic signature associated with these discharge orders.  Date: 5/13/2019    Electronically signed by Dr. Tara Suárez MD, and only signing for initial order. Please send all follow up questions and concerns or needed follow up signatures to the PCP Kelly Matthew.                  Sincerely,        SHARMIN Pichardo CNP

## 2019-05-14 ENCOUNTER — PATIENT OUTREACH (OUTPATIENT)
Dept: CARE COORDINATION | Facility: CLINIC | Age: 84
End: 2019-05-14

## 2019-05-14 NOTE — LETTER
[place] CARE COORDINATION  [facility]  [address]  [place] [address]  [phone]    May 14, 2019    Christiana Sal  [address]   [place] [address]      Dear Christiana,    I am a clinic care coordinator who works with Kelly Matthew PA-C at [facility]. I wanted to thank you for spending the time to talk with me.  I wanted to introduce myself and provide you with my contact information so that you can call me with questions or concerns about your health care. Below is a description of clinic care coordination and how I can further assist you.     The clinic care coordinator is a registered nurse and/or  who understand the health care system. The goal of clinic care coordination is to help you manage your health and improve access to the [facility] system in the most efficient manner. The registered nurse can assist you in meeting your health care goals by providing education, coordinating services, and strengthening the communication among your providers. The  can assist you with financial, behavioral, psychosocial, chemical dependency, counseling, and/or psychiatric resources.    Please feel free to contact me at [phone], [phone], with any questions or concerns. We at [facility] are focused on providing you with the highest-quality healthcare experience possible and that all starts with you.     Sincerely,     Diego Martinez RN  Clinic Care Coordinator    Enclosed: I have enclosed a copy of a 24 Hour Access Plan. This has helpful phone numbers for you to call when needed. Please keep this in an easy to access place to use as needed.

## 2019-05-14 NOTE — PROGRESS NOTES
Clinic Care Coordination Contact  Care Team Conversations    Patient was on her way home from TCU at time of call.    Plan:  Care Coordinator will follow up tomorrow.    Diego Martinez RN  Primary Care Clinic RN Care Coordinator  [facility]   [phone] or [phone]

## 2019-05-14 NOTE — LETTER
Health Care Home - Access Care Plan    About Me:    Patient Name:  Christiana Sal    YOB: 1931  Age:                      87 year old   [place] MRN:     [id number] Telephone Information:   Home Phone [phone]   Mobile [phone]       Address:  [address] [place] [address] [address]  [place] [address] Email address:  [email or web address]      Emergency Contact(s)   Name Relationship Lgl Grd Work Phone Home Phone Mobile Phone   1. RACHEL SAENZ Daughter  NONE [phone]             Health Maintenance:    Health Maintenance Due   Topic Date Due     LDL LESS THAN 100 RED WING USE ONLY  10/25/1931     URINE DRUG SCREEN Q1 YR  10/25/1946     MEDICARE ANNUAL WELLNESS VISIT  06/16/2012     ZOSTER IMMUNIZATION (2 of 3) 03/12/2013     ADVANCE DIRECTIVE PLANNING Q5 YRS  01/24/2017     MICROALBUMIN Q1 YEAR  09/05/2018     PHQ-9 Q6 MONTHS  03/06/2019       My Access Plan  Medical Emergency 911   Questions or concerns during clinic hours Primary Clinic Line, I will call the clinic directly: [facility] [phone]   24 Hour Appointment Line [phone] or  [phone]) (toll free)   24 Hour Nurse Line [phone] (toll free)   Questions or concerns outside clinic hours 24 Hour Appointment Line, I will call the after-hours on-call line:   [facility] [phone] or [phone] ([phone]) (toll-free)   Preferred Urgent Care     Preferred Hospital     Preferred Pharmacy [facility] [address] [place] [address] - [address]      Behavioral Health Crisis Line The National Suicide Prevention Lifeline at 1-940.884.4301 or 911                     My Care Team Members  Patient Care Team       Relationship Specialty Notifications Start End    Kelly Matthew PA-C PCP - General Physician Assistant  5/5/17     Phone: [phone] Fax: [phone] [address] [facility] [address]    Kelly Matthew PA-C Assigned PCP    8/14/16     Phone: [phone] Fax: [phone] [address] [place] [address]    Jas Martinez, RN Clinic Care Coordinator Primary Care - CC  5/14/19     Phone: [phone] Fax: [phone]         [address] [place] [address]           My Medical and Care Information  Problem List   Patient Active Problem List   Diagnosis     Hyperlipidemia LDL goal <100     Essential hypertension, benign     Hypothyroidism     GERD (gastroesophageal reflux disease)     Family history of colon cancer     Major depressive disorder, recurrent episode, mild (H)     Advanced directives, counseling/discussion     Angina pectoris (H)     Vulvar pruritus     Lichen planus     JENIFER (obstructive sleep apnea)     CAD S/P percutaneous coronary angioplasty     Sinoatrial node dysfunction (H)     Cardiac pacemaker - [place] [employer] dual lead - Not Dependent     Insect bite     Primary osteoarthritis of left hip     Closed fracture of shaft of fibula     Fall     Hip pain, left     Generalized muscle weakness     Physical deconditioning     Overactive bladder     S/P total hip arthroplasty     Near syncope      Current Medications and Allergies:  See printed Medication Report

## 2019-05-15 ASSESSMENT — ACTIVITIES OF DAILY LIVING (ADL): DEPENDENT_IADLS:: INDEPENDENT

## 2019-05-15 NOTE — PROGRESS NOTES
Clinic Care Coordination Contact    Clinic Care Coordination Contact  OUTREACH    Referral Information:  Referral Source: IP Report    Primary Diagnosis: Orthopedic    Chief Complaint   Patient presents with     Clinic Care Coordination - Post Hospital     Care Team        [place] Utilization:   Clinic Utilization  Difficulty keeping appointments:: No  Compliance Concerns: No  No-Show Concerns: No  No PCP office visit in Past Year: No  Utilization    Last refreshed: 5/15/2019  7:21 AM:  Hospital Admissions 2           Last refreshed: 5/15/2019  7:21 AM:  ED Visits 2           Last refreshed: 5/15/2019  7:21 AM:  No Show Count (past year) 2              Current as of: 5/15/2019  7:21 AM              Clinical Concerns:  Current Medical Concerns:  Patient reports that she is doing well at home currently. Patient was able to make herself breakfast this morning and PT was out to see her already. No other concerns reported.     Current Behavioral Concerns: none    Education Provided to patient: Encouraged follow up appointment with PCP.      Medication Management:  Patient is knowledgeable on medications and is adherent. No financial concerns reported at this time.        Functional Status:  Dependent ADLs:: Ambulation-walker  Dependent IADLs:: Independent  Bed or wheelchair confined:: No  Mobility Status: Independent w/Device    Living Situation:  Current living arrangement:: I live alone  Type of residence:: Independent Senior Living    Diet/Exercise/Sleep:  Diet:: Regular    Transportation:  Transportation concerns (GOAL):: No  Transportation means:: Regular car     Psychosocial:  Congregational or spiritual beliefs that impact treatment:: No  Mental health DX:: No  Mental health management concern (GOAL):: No  Informal Support system:: Family, Friends     Financial/Insurance:   Financial/Insurance concerns (GOAL):: No       Resources and Interventions:  Current Resources:   List of home care services:: Physicial Therapy;    Community Resources: None  Supplies used at home:: None  Equipment Currently Used at Home: grab bar, toilet, grab bar, tub/shower, shower chair, walker, rolling(reacher)         Referrals Placed: None          Future Appointments              In 5 days [facility]          Plan: 1) Patient will continue to follow treatment plan as directed and follow up with PCP with concerns ongoing.   2) Care Coordination to remain available for future needs.     Diego Martinez RN  Clinic Care Coordinator  [phone] or [phone]

## 2019-05-16 ENCOUNTER — TRANSFERRED RECORDS (OUTPATIENT)
Dept: HEALTH INFORMATION MANAGEMENT | Facility: CLINIC | Age: 84
End: 2019-05-16

## 2019-05-16 DIAGNOSIS — E03.9 HYPOTHYROIDISM, UNSPECIFIED TYPE: ICD-10-CM

## 2019-05-17 RX ORDER — LEVOTHYROXINE SODIUM 75 UG/1
TABLET ORAL
Qty: 90 TABLET | Refills: 2 | Status: SHIPPED | OUTPATIENT
Start: 2019-05-17 | End: 2020-03-14

## 2019-05-17 NOTE — TELEPHONE ENCOUNTER
"Requested Prescriptions   Pending Prescriptions Disp Refills     levothyroxine (SYNTHROID/LEVOTHROID) 75 MCG tablet [Pharmacy Med Name: LEVOTHYROXINE 0.075MG (75MCG) TABS]  Last Written Prescription Date:  4/9/19  Last Fill Quantity: 30,  # refills: 0   Last office visit: 4/22/2019 with prescribing provider:  marito   Future Office Visit:     30 tablet 0     Sig: TAKE 1 TABLET BY MOUTH ONCE DAILY.       Thyroid Protocol Passed - 5/16/2019  6:12 PM        Passed - Patient is 12 years or older        Passed - Recent (12 mo) or future (30 days) visit within the authorizing provider's specialty     Patient had office visit in the last 12 months or has a visit in the next 30 days with authorizing provider or within the authorizing provider's specialty.  See \"Patient Info\" tab in inbasket, or \"Choose Columns\" in Meds & Orders section of the refill encounter.              Passed - Medication is active on med list        Passed - Normal TSH on file in past 12 months     Recent Labs   Lab Test 04/12/19  1031   TSH 2.60              Passed - No active pregnancy on record     If patient is pregnant or has had a positive pregnancy test, please check TSH.          Passed - No positive pregnancy test in past 12 months     If patient is pregnant or has had a positive pregnancy test, please check TSH.            "

## 2019-05-20 ENCOUNTER — ANCILLARY PROCEDURE (OUTPATIENT)
Dept: CARDIOLOGY | Facility: CLINIC | Age: 84
End: 2019-05-20
Payer: MEDICARE

## 2019-05-20 DIAGNOSIS — I49.5 SICK SINUS SYNDROME (H): ICD-10-CM

## 2019-05-20 PROCEDURE — 93294 REM INTERROG EVL PM/LDLS PM: CPT | Performed by: INTERNAL MEDICINE

## 2019-05-20 PROCEDURE — 93296 REM INTERROG EVL PM/IDS: CPT | Performed by: INTERNAL MEDICINE

## 2019-05-21 ENCOUNTER — OFFICE VISIT (OUTPATIENT)
Dept: FAMILY MEDICINE | Facility: CLINIC | Age: 84
End: 2019-05-21
Payer: MEDICARE

## 2019-05-21 VITALS
HEIGHT: 66 IN | BODY MASS INDEX: 26.03 KG/M2 | HEART RATE: 66 BPM | RESPIRATION RATE: 16 BRPM | DIASTOLIC BLOOD PRESSURE: 74 MMHG | SYSTOLIC BLOOD PRESSURE: 130 MMHG | OXYGEN SATURATION: 96 % | WEIGHT: 162 LBS | TEMPERATURE: 97 F

## 2019-05-21 DIAGNOSIS — R11.2 NAUSEA AND VOMITING, INTRACTABILITY OF VOMITING NOT SPECIFIED, UNSPECIFIED VOMITING TYPE: Primary | ICD-10-CM

## 2019-05-21 DIAGNOSIS — K21.9 GASTROESOPHAGEAL REFLUX DISEASE, ESOPHAGITIS PRESENCE NOT SPECIFIED: Chronic | ICD-10-CM

## 2019-05-21 DIAGNOSIS — D64.9 ANEMIA, UNSPECIFIED TYPE: ICD-10-CM

## 2019-05-21 LAB
BASOPHILS # BLD AUTO: 0 10E9/L (ref 0–0.2)
BASOPHILS NFR BLD AUTO: 0.4 %
DIFFERENTIAL METHOD BLD: ABNORMAL
EOSINOPHIL # BLD AUTO: 0.4 10E9/L (ref 0–0.7)
EOSINOPHIL NFR BLD AUTO: 5.5 %
ERYTHROCYTE [DISTWIDTH] IN BLOOD BY AUTOMATED COUNT: 14.9 % (ref 10–15)
FERRITIN SERPL-MCNC: 141 NG/ML (ref 8–252)
HCT VFR BLD AUTO: 29.5 % (ref 35–47)
HGB BLD-MCNC: 9.4 G/DL (ref 11.7–15.7)
IRON SATN MFR SERPL: 12 % (ref 15–46)
IRON SERPL-MCNC: 32 UG/DL (ref 35–180)
LYMPHOCYTES # BLD AUTO: 1.2 10E9/L (ref 0.8–5.3)
LYMPHOCYTES NFR BLD AUTO: 16.2 %
MCH RBC QN AUTO: 31.6 PG (ref 26.5–33)
MCHC RBC AUTO-ENTMCNC: 31.9 G/DL (ref 31.5–36.5)
MCV RBC AUTO: 99 FL (ref 78–100)
MONOCYTES # BLD AUTO: 1 10E9/L (ref 0–1.3)
MONOCYTES NFR BLD AUTO: 14.1 %
NEUTROPHILS # BLD AUTO: 4.6 10E9/L (ref 1.6–8.3)
NEUTROPHILS NFR BLD AUTO: 63.8 %
PLATELET # BLD AUTO: 440 10E9/L (ref 150–450)
RBC # BLD AUTO: 2.97 10E12/L (ref 3.8–5.2)
TIBC SERPL-MCNC: 261 UG/DL (ref 240–430)
WBC # BLD AUTO: 7.2 10E9/L (ref 4–11)

## 2019-05-21 PROCEDURE — 83540 ASSAY OF IRON: CPT | Performed by: PHYSICIAN ASSISTANT

## 2019-05-21 PROCEDURE — 36415 COLL VENOUS BLD VENIPUNCTURE: CPT | Performed by: PHYSICIAN ASSISTANT

## 2019-05-21 PROCEDURE — 99213 OFFICE O/P EST LOW 20 MIN: CPT | Performed by: PHYSICIAN ASSISTANT

## 2019-05-21 PROCEDURE — 85025 COMPLETE CBC W/AUTO DIFF WBC: CPT | Performed by: PHYSICIAN ASSISTANT

## 2019-05-21 PROCEDURE — 83550 IRON BINDING TEST: CPT | Performed by: PHYSICIAN ASSISTANT

## 2019-05-21 PROCEDURE — 82728 ASSAY OF FERRITIN: CPT | Performed by: PHYSICIAN ASSISTANT

## 2019-05-21 ASSESSMENT — PAIN SCALES - GENERAL: PAINLEVEL: MILD PAIN (2)

## 2019-05-21 ASSESSMENT — MIFFLIN-ST. JEOR: SCORE: 1186.58

## 2019-05-21 NOTE — LETTER
May 22, 2019      Christiana Sal  [address]   [place] [address]        Dear Christiana,    We are writing to inform you of your test results.    Hemoglobin has improved as we discussed. Iron stores look okay       Resulted Orders   Ferritin   Result Value Ref Range    Ferritin 141 8 - 252 ng/mL   Iron and iron binding capacity   Result Value Ref Range    Iron 32 (L) 35 - 180 ug/dL    Iron Binding Cap 261 240 - 430 ug/dL    Iron Saturation Index 12 (L) 15 - 46 %       If you have any questions or concerns, please call the clinic at the number listed above.       Sincerely,      Kelly Matthew PA-C/sc

## 2019-05-21 NOTE — PROGRESS NOTES
"SUBJECTIVE:                                                    Christiana Sal is a 87 year old female who presents to clinic today for the following health issues:    Patient is here today with a few concerns:     -She has been having intermittent flu symptoms since January. In the late afternoon her stomach will feel uncomfortable, then she will start throwing up and be up all night. Feeling tired and weak. She is wanting to figure out what is going on and why this is happening.     -She also would like to talk about her mediations and if any of those are causing the symptoms.     Problem list and histories reviewed & adjusted, as indicated.  Additional history:     Wants to figure out what is wrong  She describes the episodes as follows:   She will usually start to feel ill in the afternoon with nausea and abdominal cramping  Then she will start vomiting and will be up all night vomiting  She will feel very tired and weak and her whole body will hurt  She \"recovers\" within 24 hours    Her first episode was in January but has had 4 almost exactly similar episodes since  During one of the initial episodes she went to the ED because she felt so weak and was told it was the stomach bug  Now that she has had more of the same she doesn't think it is the flu    Feels well otherwise or in between episodes  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 and is recovering from that but is very happy with how much less pain she has in her left leg    She is wondering if any of her medications are a factor or what else can be done to figure it out        Current Outpatient Medications   Medication Sig Dispense Refill     acetaminophen (TYLENOL) 500 MG tablet Take 1,000 mg by mouth 3 times daily       aspirin (ASA) 325 MG tablet Take 1 tablet (325 mg) by mouth daily 42 tablet 0     atorvastatin (LIPITOR) 40 MG tablet TAKE ONE TABLET BY MOUTH EVERY DAY 90 tablet 3     CALCIUM 600 + D OR 1 tablet by mouth daily       calcium " "carbonate (TUMS) 500 MG chewable tablet Take 1 chew tab by mouth 3 times daily as needed for heartburn       levothyroxine (SYNTHROID/LEVOTHROID) 75 MCG tablet TAKE 1 TABLET BY MOUTH ONCE DAILY. 90 tablet 2     magnesium 250 MG tablet Take 1 tablet by mouth every evening        meclizine (ANTIVERT) 25 MG tablet TAKE ONE TABLET BY MOUTH EVERY 6 HOURS AS NEEDED FOR DIZZINESS 30 tablet 3     melatonin 3 MG tablet Take 3 mg by mouth nightly as needed for sleep       metoprolol succinate ER (TOPROL-XL) 25 MG 24 hr tablet Take 0.5 tablets (12.5 mg) by mouth daily 45 tablet 3     Multiple Vitamins-Minerals (OCUVITE PO) Take 1 tablet by mouth daily.       nitroglycerin (NITROSTAT) 0.4 MG SL tablet Place 1 tablet (0.4 mg) under the tongue every 5 minutes as needed for chest pain (call your doctor if you take a third dose) 25 tablet 3     oxyCODONE HCl (OXAYDO) 5 MG TABA Take 2.5 mg by mouth every 4 hours as needed       pantoprazole (PROTONIX) 40 MG EC tablet Take 1 tablet (40 mg) by mouth daily 90 tablet 1     ranitidine (ZANTAC) 300 MG tablet Take 1 tablet (300 mg) by mouth At Bedtime 90 tablet 0     sennosides (SENOKOT) 8.6 MG tablet Take 2 tablets by mouth 2 times daily as needed for constipation       tolterodine (DETROL) 2 MG tablet Take 2 mg by mouth At Bedtime       Vitamin D, Cholecalciferol, 1000 units TABS Take 2,000 Units by mouth daily       citalopram (CELEXA) 20 MG tablet TAKE 1 TABLET(20 MG) BY MOUTH DAILY 90 tablet 1     BP Readings from Last 3 Encounters:   05/21/19 130/74   05/13/19 119/61   05/07/19 150/78    Wt Readings from Last 3 Encounters:   05/21/19 73.5 kg (162 lb)   05/13/19 76.6 kg (168 lb 12.8 oz)   05/07/19 76.6 kg (168 lb 12.8 oz)                    ROS:  Remainder of ROS obtained and found to be negative other than that which was documented above      OBJECTIVE:                                                    /74   Pulse 66   Temp 97  F (36.1  C)   Resp 16   Ht 1.676 m (5' 6\")   " Wt 73.5 kg (162 lb)   SpO2 96%   BMI 26.15 kg/m   Body mass index is 26.15 kg/m .   GENERAL: healthy, alert, well nourished, well hydrated, no distress  RESP: lungs clear to auscultation - no rales, no rhonchi, no wheezes  CV: regular rates and rhythm, normal S1 S2, no S3 or S4 and no murmur, no click or rub -  ABDOMEN: soft, no tenderness, no  hepatosplenomegaly, no masses, normal bowel sounds       ASSESSMENT/PLAN:                                                      (R11.2) Nausea and vomiting, intractability of vomiting not specified, unspecified vomiting type  (primary encounter diagnosis)  Comment: 5 episodes with recovery in between. This is in a setting of GERD history as well which in the past has not been well controlled and we have manipulated meds a few times - at one point we tried to decrease but her symptoms worsened. I had mentioned to patient in the past that a upper endoscopy would be a good idea given the persistence of her symptoms but with her back and leg/hip issues the focus was on this and not the reflux. With her recurrent episodes, I discussed that my concern is still with her stomach and either poor management or over management of GERD symptoms. While I think an upper endscopy is warranted, given her sx I would like her to actually see a gatroenterologist for a consult as well. She is in agreement with this.   We did review her medications today. Cardiac meds left alone. Reflux meds may need to be changed but will defer to GI. She mentions several over the counter vitamins she takes which I suggested at this time she hold off on   Plan: GASTROENTEROLOGY ADULT REF CONSULT ONLY            (D64.9) Anemia, unspecified type  Comment: last hgb post surgery was low. Very likely related to surgery but would like to recheck to verify it is rebounding  Plan: CBC with platelets and differential, Ferritin,         Iron and iron binding capacity            (K21.9) Gastroesophageal reflux disease,  esophagitis presence not specified  Comment: see comment above  Plan: GASTROENTEROLOGY ADULT REF CONSULT ONLY                      Appointment made for [facility] on Tuesday June 4th at 12:55pm  (appointment was available for this Thursday but patient stated she could not make it on Thursday)    Kelly Matthew PA-C    [facility]

## 2019-05-28 PROBLEM — R53.81 PHYSICAL DECONDITIONING: Status: RESOLVED | Noted: 2019-01-23 | Resolved: 2019-05-28

## 2019-05-28 PROBLEM — M25.552 HIP PAIN, LEFT: Status: RESOLVED | Noted: 2019-01-23 | Resolved: 2019-05-28

## 2019-05-28 PROBLEM — M62.81 GENERALIZED MUSCLE WEAKNESS: Status: RESOLVED | Noted: 2019-01-23 | Resolved: 2019-05-28

## 2019-05-31 LAB
MDC_IDC_EPISODE_DTM: NORMAL
MDC_IDC_EPISODE_DURATION: 1 S
MDC_IDC_EPISODE_DURATION: 1 S
MDC_IDC_EPISODE_DURATION: 12 S
MDC_IDC_EPISODE_DURATION: 123 S
MDC_IDC_EPISODE_DURATION: 124 S
MDC_IDC_EPISODE_DURATION: 125 S
MDC_IDC_EPISODE_DURATION: 14 S
MDC_IDC_EPISODE_DURATION: 2 S
MDC_IDC_EPISODE_DURATION: 3 S
MDC_IDC_EPISODE_DURATION: 4 S
MDC_IDC_EPISODE_DURATION: 7 S
MDC_IDC_EPISODE_ID: NORMAL
MDC_IDC_EPISODE_TYPE: NORMAL
MDC_IDC_LEAD_IMPLANT_DT: NORMAL
MDC_IDC_LEAD_IMPLANT_DT: NORMAL
MDC_IDC_LEAD_LOCATION: NORMAL
MDC_IDC_LEAD_LOCATION: NORMAL
MDC_IDC_LEAD_LOCATION_DETAIL_1: NORMAL
MDC_IDC_LEAD_LOCATION_DETAIL_1: NORMAL
MDC_IDC_LEAD_MFG: NORMAL
MDC_IDC_LEAD_MFG: NORMAL
MDC_IDC_LEAD_MODEL: NORMAL
MDC_IDC_LEAD_MODEL: NORMAL
MDC_IDC_LEAD_POLARITY_TYPE: NORMAL
MDC_IDC_LEAD_POLARITY_TYPE: NORMAL
MDC_IDC_LEAD_SERIAL: NORMAL
MDC_IDC_LEAD_SERIAL: NORMAL
MDC_IDC_MSMT_BATTERY_DTM: NORMAL
MDC_IDC_MSMT_BATTERY_REMAINING_LONGEVITY: 150 MO
MDC_IDC_MSMT_BATTERY_REMAINING_PERCENTAGE: 100 %
MDC_IDC_MSMT_BATTERY_STATUS: NORMAL
MDC_IDC_MSMT_LEADCHNL_RA_IMPEDANCE_VALUE: 667 OHM
MDC_IDC_MSMT_LEADCHNL_RA_PACING_THRESHOLD_AMPLITUDE: 1 V
MDC_IDC_MSMT_LEADCHNL_RA_PACING_THRESHOLD_PULSEWIDTH: 0.4 MS
MDC_IDC_MSMT_LEADCHNL_RV_IMPEDANCE_VALUE: 652 OHM
MDC_IDC_MSMT_LEADCHNL_RV_PACING_THRESHOLD_AMPLITUDE: 1.7 V
MDC_IDC_MSMT_LEADCHNL_RV_PACING_THRESHOLD_PULSEWIDTH: 0.4 MS
MDC_IDC_PG_IMPLANT_DTM: NORMAL
MDC_IDC_PG_MFG: NORMAL
MDC_IDC_PG_MODEL: NORMAL
MDC_IDC_PG_SERIAL: NORMAL
MDC_IDC_PG_TYPE: NORMAL
MDC_IDC_SESS_CLINIC_NAME: NORMAL
MDC_IDC_SESS_DTM: NORMAL
MDC_IDC_SESS_TYPE: NORMAL
MDC_IDC_SET_BRADY_AT_MODE_SWITCH_MODE: NORMAL
MDC_IDC_SET_BRADY_AT_MODE_SWITCH_RATE: 170 {BEATS}/MIN
MDC_IDC_SET_BRADY_LOWRATE: 50 {BEATS}/MIN
MDC_IDC_SET_BRADY_MAX_SENSOR_RATE: 120 {BEATS}/MIN
MDC_IDC_SET_BRADY_MAX_TRACKING_RATE: 120 {BEATS}/MIN
MDC_IDC_SET_BRADY_MODE: NORMAL
MDC_IDC_SET_BRADY_PAV_DELAY_HIGH: 100 MS
MDC_IDC_SET_BRADY_PAV_DELAY_LOW: 200 MS
MDC_IDC_SET_BRADY_SAV_DELAY_HIGH: 85 MS
MDC_IDC_SET_BRADY_SAV_DELAY_LOW: 170 MS
MDC_IDC_SET_LEADCHNL_RA_PACING_AMPLITUDE: 2.2 V
MDC_IDC_SET_LEADCHNL_RA_PACING_CAPTURE_MODE: NORMAL
MDC_IDC_SET_LEADCHNL_RA_PACING_POLARITY: NORMAL
MDC_IDC_SET_LEADCHNL_RA_PACING_PULSEWIDTH: 0.4 MS
MDC_IDC_SET_LEADCHNL_RA_SENSING_ADAPTATION_MODE: NORMAL
MDC_IDC_SET_LEADCHNL_RA_SENSING_POLARITY: NORMAL
MDC_IDC_SET_LEADCHNL_RA_SENSING_SENSITIVITY: 0.25 MV
MDC_IDC_SET_LEADCHNL_RV_PACING_AMPLITUDE: 2.1 V
MDC_IDC_SET_LEADCHNL_RV_PACING_CAPTURE_MODE: NORMAL
MDC_IDC_SET_LEADCHNL_RV_PACING_POLARITY: NORMAL
MDC_IDC_SET_LEADCHNL_RV_PACING_PULSEWIDTH: 0.4 MS
MDC_IDC_SET_LEADCHNL_RV_SENSING_ADAPTATION_MODE: NORMAL
MDC_IDC_SET_LEADCHNL_RV_SENSING_POLARITY: NORMAL
MDC_IDC_SET_LEADCHNL_RV_SENSING_SENSITIVITY: 1.5 MV
MDC_IDC_SET_ZONE_DETECTION_INTERVAL: 375 MS
MDC_IDC_SET_ZONE_TYPE: NORMAL
MDC_IDC_SET_ZONE_VENDOR_TYPE: NORMAL
MDC_IDC_STAT_AT_BURDEN_PERCENT: 1 %
MDC_IDC_STAT_AT_DTM_END: NORMAL
MDC_IDC_STAT_AT_DTM_START: NORMAL
MDC_IDC_STAT_BRADY_DTM_END: NORMAL
MDC_IDC_STAT_BRADY_DTM_START: NORMAL
MDC_IDC_STAT_BRADY_RA_PERCENT_PACED: 1 %
MDC_IDC_STAT_BRADY_RV_PERCENT_PACED: 2 %
MDC_IDC_STAT_EPISODE_RECENT_COUNT: 0
MDC_IDC_STAT_EPISODE_RECENT_COUNT: 13
MDC_IDC_STAT_EPISODE_RECENT_COUNT: 2
MDC_IDC_STAT_EPISODE_RECENT_COUNT_DTM_END: NORMAL
MDC_IDC_STAT_EPISODE_RECENT_COUNT_DTM_START: NORMAL
MDC_IDC_STAT_EPISODE_TYPE: NORMAL
MDC_IDC_STAT_EPISODE_VENDOR_TYPE: NORMAL

## 2019-06-04 ENCOUNTER — TRANSFERRED RECORDS (OUTPATIENT)
Dept: HEALTH INFORMATION MANAGEMENT | Facility: CLINIC | Age: 84
End: 2019-06-04

## 2019-06-06 ENCOUNTER — TRANSFERRED RECORDS (OUTPATIENT)
Dept: HEALTH INFORMATION MANAGEMENT | Facility: CLINIC | Age: 84
End: 2019-06-06

## 2019-06-10 DIAGNOSIS — K21.9 GASTROESOPHAGEAL REFLUX DISEASE, ESOPHAGITIS PRESENCE NOT SPECIFIED: ICD-10-CM

## 2019-06-10 NOTE — TELEPHONE ENCOUNTER
Routing refill request to provider for review/approval because:  Continue or stop med?  5/21/19 OV note: Reflux meds may need to be changed but will defer to GI.  Return in about 2 weeks (around 6/4/2019) for With specialist.     Not sure if pt saw specialist.    Elisha TONEY RN

## 2019-06-10 NOTE — TELEPHONE ENCOUNTER
"RANITIDINE 300MG TABLETS      Last Written Prescription Date:  2/18/19  Last Fill Quantity: 90,   # refills: 0  Last Office Visit: 5/21/19  Future Office visit:       Requested Prescriptions   Pending Prescriptions Disp Refills     ranitidine (ZANTAC) 300 MG tablet [Pharmacy Med Name: RANITIDINE 300MG TABLETS] 90 tablet 0     Sig: TAKE 1 TABLET(300 MG) BY MOUTH AT BEDTIME       H2 Blockers Protocol Passed - 6/10/2019 12:05 PM        Passed - Patient is age 12 or older        Passed - Recent (12 mo) or future (30 days) visit within the authorizing provider's specialty     Patient had office visit in the last 12 months or has a visit in the next 30 days with authorizing provider or within the authorizing provider's specialty.  See \"Patient Info\" tab in inbasket, or \"Choose Columns\" in Meds & Orders section of the refill encounter.              Passed - Medication is active on med list          "

## 2019-06-14 ENCOUNTER — TRANSFERRED RECORDS (OUTPATIENT)
Dept: HEALTH INFORMATION MANAGEMENT | Facility: CLINIC | Age: 84
End: 2019-06-14

## 2019-06-20 ENCOUNTER — TELEPHONE (OUTPATIENT)
Dept: FAMILY MEDICINE | Facility: CLINIC | Age: 84
End: 2019-06-20

## 2019-06-20 DIAGNOSIS — F33.0 MAJOR DEPRESSIVE DISORDER, RECURRENT EPISODE, MILD (H): Primary | Chronic | ICD-10-CM

## 2019-06-20 DIAGNOSIS — I25.10 ATHEROSCLEROSIS OF NATIVE CORONARY ARTERY OF NATIVE HEART WITHOUT ANGINA PECTORIS: ICD-10-CM

## 2019-06-20 ASSESSMENT — PATIENT HEALTH QUESTIONNAIRE - PHQ9: SUM OF ALL RESPONSES TO PHQ QUESTIONS 1-9: 4

## 2019-06-20 NOTE — TELEPHONE ENCOUNTER
Reason for call:  Patient reporting a symptom    Symptom or request: Christiana states that ever since her hip surgery she is feeling more depressed.  Wondering if she needs to increase her current medications or if a medication switch would be needed.  Also needs nitroglycerin refill, which I will put in separate encounter.  Please call and assess.  Thank you..Consuelo Hawthorne    Duration (how long have symptoms been present): since hip surgery    Have you been treated for this before? Yes currently on medication    Phone Number patient can be reached at:  Home number on file [phone] (home)    Best Time:  Any time    Can we leave a detailed message on this number:  YES    Call taken on 6/20/2019 at 1:36 PM by Consuelo Hawthorne

## 2019-06-20 NOTE — TELEPHONE ENCOUNTER
Christiana states that her bottle was in her pants and she washed them and pills no longer good.      Nitroglycerin  Last Written Prescription Date:  5/25/16  Last Fill Quantity: 25,   # refills: 3  Last Office Visit: 5/21/19  Future Office visit:       Routing refill request to provider for review/approval because:  Drug not on the [facility], [facility] or [facility] refill protocol or controlled substance

## 2019-06-20 NOTE — TELEPHONE ENCOUNTER
"Call placed to patient.  Reports feeling down since hip surgery 5/2/19.  Patient has been on Citalopram for awhile.  Patient unsure if symptoms will resolve without intervention, wants PCP to be aware of symptoms.    Reports feeling like she is not enjoying being around people like she used to.  Patient avoids going to dining for meals, has been cooking her meals in apt.  Approximately 1x week, patient sleeps most of the day or reads all day, this is not her typical pattern.  Sleeping ok, no concerns.No change in appetite, ok with this.  Denies thoughts of self harm.  Patient prefers her PCP to review this \"she knows me so well\". PCP returns to clinic tomorrow.  Will forward to PCP and will call patient when recommendations are given.  Patient verbalizes understanding, agrees with plan.  Kirsten Decker RN     "

## 2019-06-21 RX ORDER — NITROGLYCERIN 0.4 MG/1
0.4 TABLET SUBLINGUAL EVERY 5 MIN PRN
Qty: 25 TABLET | Refills: 3 | Status: SHIPPED | OUTPATIENT
Start: 2019-06-21 | End: 2020-07-27

## 2019-06-21 RX ORDER — BUPROPION HYDROCHLORIDE 75 MG/1
75 TABLET ORAL DAILY
Qty: 30 TABLET | Refills: 0 | Status: SHIPPED | OUTPATIENT
Start: 2019-06-21 | End: 2019-07-05

## 2019-06-21 NOTE — TELEPHONE ENCOUNTER
Call placed to patient.  Reports feeling good today, baking cookies.  Relayed LOLY Matthew's message.  Patient states she is open to either option for medication adjustment.  When asked her preference, she thought adding Wellbutrin would be a good place to start.  Clarified preferred pharmacy.  Kirsten Decker RN

## 2019-06-21 NOTE — TELEPHONE ENCOUNTER
Call place to [employer], call got disconnected mid conversation.  Called back, left voicemail message per Kelly's note.  Kirsten Decker RN

## 2019-06-21 NOTE — TELEPHONE ENCOUNTER
Script for wellbutrin sent to the pharmacy.   I started at a very low dose so we may need to increase if she is tolerating but not noticing any improvement.   I would like to have her check back with me in a week or 2 (a call is okay) and let me know if there is any change.     Kelly

## 2019-06-21 NOTE — TELEPHONE ENCOUNTER
"Please call her and let her know that I do think a medication change/adjustment could be helpful - she's had a lot going on and I'm concerned about how she says she is feeling and her decreased enjoyment in things she did once enjoy.     I really don't want to increase the celexa further so would recommend one of two things -   1. Keep celexa as is and try adding a wellbutrin which can be a \"pick me up\" to help when celexa isn't enough  2. Try changing off celexa onto something different that would hopefully be more effective for her    Kelly  "

## 2019-07-05 ENCOUNTER — OFFICE VISIT (OUTPATIENT)
Dept: FAMILY MEDICINE | Facility: CLINIC | Age: 84
End: 2019-07-05
Payer: MEDICARE

## 2019-07-05 VITALS
DIASTOLIC BLOOD PRESSURE: 75 MMHG | HEART RATE: 56 BPM | TEMPERATURE: 98 F | HEIGHT: 66 IN | SYSTOLIC BLOOD PRESSURE: 138 MMHG | WEIGHT: 152 LBS | BODY MASS INDEX: 24.43 KG/M2

## 2019-07-05 DIAGNOSIS — F33.0 MAJOR DEPRESSIVE DISORDER, RECURRENT EPISODE, MILD (H): Chronic | ICD-10-CM

## 2019-07-05 DIAGNOSIS — M79.671 RIGHT FOOT PAIN: Primary | ICD-10-CM

## 2019-07-05 DIAGNOSIS — G25.81 RESTLESS LEGS SYNDROME: ICD-10-CM

## 2019-07-05 PROCEDURE — 99214 OFFICE O/P EST MOD 30 MIN: CPT | Performed by: PHYSICIAN ASSISTANT

## 2019-07-05 RX ORDER — BUPROPION HYDROCHLORIDE 75 MG/1
75 TABLET ORAL DAILY
Qty: 30 TABLET | Refills: 0 | Status: SHIPPED | OUTPATIENT
Start: 2019-07-05 | End: 2019-07-05

## 2019-07-05 RX ORDER — PRAMIPEXOLE DIHYDROCHLORIDE 0.12 MG/1
0.12 TABLET ORAL
Qty: 30 TABLET | Refills: 1 | Status: SHIPPED | OUTPATIENT
Start: 2019-07-05 | End: 2020-04-15 | Stop reason: ALTCHOICE

## 2019-07-05 RX ORDER — BUPROPION HYDROCHLORIDE 75 MG/1
75 TABLET ORAL DAILY
Qty: 90 TABLET | Refills: 1 | Status: SHIPPED | OUTPATIENT
Start: 2019-07-05 | End: 2020-01-14

## 2019-07-05 ASSESSMENT — PAIN SCALES - GENERAL: PAINLEVEL: NO PAIN (0)

## 2019-07-05 ASSESSMENT — MIFFLIN-ST. JEOR: SCORE: 1141.22

## 2019-07-05 NOTE — PROGRESS NOTES
"Subjective     Christiana Sal is a 87 year old female who presents to clinic today for the following health issues:    HPI   Medication Followup of Wellbutrin 75 mg     Taking Medication as prescribed: yes    Side Effects:  None    Medication Helping Symptoms:  yes     Feels like wellbutrin has helped a little  Feeling better - not sure if it's the wellbutrin or just farther along in recovery from hip surgery  Didn't like when she was feeling like she didn't want to be social - says that is not like her      -Has some questions about her foot  Numbness over hte top of her right foot  Not painful  Normal sensation on the bottom  Has inserts that she will put in some of her shoes which does seem to help  Feels like it makes her a little off balance at times so still feels better walking with her cane    Had seen podiatry before but about her left foot  Had wanted to have both feet done at the same time but medicare would not approve  Then focus was more on her left hip which she ended up having replaced - that feels so much better    - would like to get mirapex for restless legs again  Doesn't use often - last script lasted forever but some nights she says she could use it      Reviewed and updated as needed this visit by Provider         Review of Systems   Remainder of ROS obtained and found to be negative other than that which was documented above        Objective    /75   Pulse 56   Temp 98  F (36.7  C)   Ht 1.676 m (5' 6\")   Wt 68.9 kg (152 lb)   BMI 24.53 kg/m    Body mass index is 24.53 kg/m .  Physical Exam   GENERAL: healthy, alert and no distress  PSYCH: mentation appears normal, affect normal/bright  FEET:   Normal pulses  Sensation normal b/l  Significant deformity in 2nd digit, some in 3rd so that 2nd digit is resting on great toe    Diagnostic Test Results:  none        Assessment & Plan     (M79.674) Right foot pain  (primary encounter diagnosis)  Comment: still with some gait " instability, ? Due to foot. 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   Plan: pramipexole (MIRAPEX) 0.125 MG tablet              Return in about 3 months (around 10/5/2019) for follow up.    Kelly Matthew PA-C  [facility]

## 2019-07-31 ENCOUNTER — TRANSFERRED RECORDS (OUTPATIENT)
Dept: HEALTH INFORMATION MANAGEMENT | Facility: CLINIC | Age: 84
End: 2019-07-31

## 2019-08-05 ENCOUNTER — OFFICE VISIT (OUTPATIENT)
Dept: FAMILY MEDICINE | Facility: CLINIC | Age: 84
End: 2019-08-05
Payer: MEDICARE

## 2019-08-05 VITALS
BODY MASS INDEX: 24.27 KG/M2 | WEIGHT: 151 LBS | TEMPERATURE: 97 F | DIASTOLIC BLOOD PRESSURE: 75 MMHG | HEIGHT: 66 IN | SYSTOLIC BLOOD PRESSURE: 143 MMHG | HEART RATE: 59 BPM

## 2019-08-05 DIAGNOSIS — Z87.81: ICD-10-CM

## 2019-08-05 DIAGNOSIS — M25.571 PAIN IN JOINT, ANKLE AND FOOT, RIGHT: Primary | ICD-10-CM

## 2019-08-05 PROCEDURE — 99213 OFFICE O/P EST LOW 20 MIN: CPT | Performed by: PHYSICIAN ASSISTANT

## 2019-08-05 ASSESSMENT — MIFFLIN-ST. JEOR: SCORE: 1136.68

## 2019-08-05 ASSESSMENT — PAIN SCALES - GENERAL: PAINLEVEL: NO PAIN (0)

## 2019-08-05 NOTE — PATIENT INSTRUCTIONS
Follow up with Dr. Adams    Make sure he answers the questions you have - specifically,     1. Do the screws/plate need to be removed? Will that impact the cyst or the pain I am having along the edge of my foot?     2. Do the toes need to be fixed? Will that change the pain in my foot?

## 2019-08-05 NOTE — PROGRESS NOTES
"Subjective     Christiana Sal is a 87 year old female who presents to clinic today for the following health issues:    HPI   Concern - Cyst   Onset: 1-2 weeks     Description:   Cyst on her right ankle     Intensity: moderate    Progression of Symptoms:  Improving, not as painful     Accompanying Signs & Symptoms:  Painful at night, swelling     Previous history of similar problem:   None    Precipitating factors:   Worsened by: None    Alleviating factors:  Improved by: None    Therapies Tried and outcome: None      Met with foot surgeon regarding foot pain  Was advised that cyst could be removed and pins/plates in foot could be removed at the same time as well as her toes could be corrected  She left confused and not sure if that was all necessary and if it would ultimately help her arch/foot pain which she feels wasn't really addressed  Also was unclear as to why the pins needed to be removed and if they did, wants to go back to the surgeon who put them in in the first place  Reviewed and updated as needed this visit by Provider         Review of Systems   Remainder of ROS obtained and found to be negative other than that which was documented above        Objective    BP (!) 143/75   Pulse 59   Temp 97  F (36.1  C) (Tympanic)   Ht 1.676 m (5' 6\")   Wt 68.5 kg (151 lb)   BMI 24.37 kg/m    Body mass index is 24.37 kg/m .  Physical Exam   GENERAL: healthy, alert and no distress  FOOT, right:   Swelling over lateral malleolus, tender with firm pressure  2nd toe deformity causing pressure into first toe  Still with pain along medial arch although nontender to touch and no swelling in this area  Pulses intact    Diagnostic Test Results:  Reviewed imaging in EPIC        Assessment & Plan       ICD-10-CM    1. Pain in joint, ankle and foot, right M25.571    2. H/O fracture of fibula Z87.81      Reviewed last imaging on ankle in 12/2018. Also discussed current concerns/issues and answered her questions to the " best of my ability. Plans ot f/u with Dr. Adams    Return in about 3 months (around 11/5/2019) for Physical Exam.    Kelly Matthew PA-C  [facility]

## 2019-08-26 ENCOUNTER — ANCILLARY PROCEDURE (OUTPATIENT)
Dept: CARDIOLOGY | Facility: CLINIC | Age: 84
End: 2019-08-26
Attending: INTERNAL MEDICINE
Payer: MEDICARE

## 2019-08-26 DIAGNOSIS — I49.5 SICK SINUS SYNDROME (H): ICD-10-CM

## 2019-08-26 PROCEDURE — 93294 REM INTERROG EVL PM/LDLS PM: CPT | Performed by: INTERNAL MEDICINE

## 2019-08-28 LAB
MDC_IDC_EPISODE_DTM: NORMAL
MDC_IDC_EPISODE_DURATION: 123 S
MDC_IDC_EPISODE_DURATION: 123 S
MDC_IDC_EPISODE_DURATION: 124 S
MDC_IDC_EPISODE_DURATION: 126 S
MDC_IDC_EPISODE_ID: NORMAL
MDC_IDC_EPISODE_TYPE: NORMAL
MDC_IDC_LEAD_IMPLANT_DT: NORMAL
MDC_IDC_LEAD_IMPLANT_DT: NORMAL
MDC_IDC_LEAD_LOCATION: NORMAL
MDC_IDC_LEAD_LOCATION: NORMAL
MDC_IDC_LEAD_LOCATION_DETAIL_1: NORMAL
MDC_IDC_LEAD_LOCATION_DETAIL_1: NORMAL
MDC_IDC_LEAD_MFG: NORMAL
MDC_IDC_LEAD_MFG: NORMAL
MDC_IDC_LEAD_MODEL: NORMAL
MDC_IDC_LEAD_MODEL: NORMAL
MDC_IDC_LEAD_POLARITY_TYPE: NORMAL
MDC_IDC_LEAD_POLARITY_TYPE: NORMAL
MDC_IDC_LEAD_SERIAL: NORMAL
MDC_IDC_LEAD_SERIAL: NORMAL
MDC_IDC_MSMT_BATTERY_DTM: NORMAL
MDC_IDC_MSMT_BATTERY_REMAINING_LONGEVITY: 150 MO
MDC_IDC_MSMT_BATTERY_REMAINING_PERCENTAGE: 100 %
MDC_IDC_MSMT_BATTERY_STATUS: NORMAL
MDC_IDC_MSMT_LEADCHNL_RA_IMPEDANCE_VALUE: 740 OHM
MDC_IDC_MSMT_LEADCHNL_RA_PACING_THRESHOLD_AMPLITUDE: 0.8 V
MDC_IDC_MSMT_LEADCHNL_RA_PACING_THRESHOLD_PULSEWIDTH: 0.4 MS
MDC_IDC_MSMT_LEADCHNL_RV_IMPEDANCE_VALUE: 690 OHM
MDC_IDC_MSMT_LEADCHNL_RV_PACING_THRESHOLD_AMPLITUDE: 1.4 V
MDC_IDC_MSMT_LEADCHNL_RV_PACING_THRESHOLD_PULSEWIDTH: 0.4 MS
MDC_IDC_PG_IMPLANT_DTM: NORMAL
MDC_IDC_PG_MFG: NORMAL
MDC_IDC_PG_MODEL: NORMAL
MDC_IDC_PG_SERIAL: NORMAL
MDC_IDC_PG_TYPE: NORMAL
MDC_IDC_SESS_CLINIC_NAME: NORMAL
MDC_IDC_SESS_DTM: NORMAL
MDC_IDC_SESS_TYPE: NORMAL
MDC_IDC_SET_BRADY_AT_MODE_SWITCH_MODE: NORMAL
MDC_IDC_SET_BRADY_AT_MODE_SWITCH_RATE: 170 {BEATS}/MIN
MDC_IDC_SET_BRADY_LOWRATE: 50 {BEATS}/MIN
MDC_IDC_SET_BRADY_MAX_SENSOR_RATE: 120 {BEATS}/MIN
MDC_IDC_SET_BRADY_MAX_TRACKING_RATE: 120 {BEATS}/MIN
MDC_IDC_SET_BRADY_MODE: NORMAL
MDC_IDC_SET_BRADY_PAV_DELAY_HIGH: 100 MS
MDC_IDC_SET_BRADY_PAV_DELAY_LOW: 200 MS
MDC_IDC_SET_BRADY_SAV_DELAY_HIGH: 85 MS
MDC_IDC_SET_BRADY_SAV_DELAY_LOW: 170 MS
MDC_IDC_SET_LEADCHNL_RA_PACING_AMPLITUDE: 2 V
MDC_IDC_SET_LEADCHNL_RA_PACING_CAPTURE_MODE: NORMAL
MDC_IDC_SET_LEADCHNL_RA_PACING_POLARITY: NORMAL
MDC_IDC_SET_LEADCHNL_RA_PACING_PULSEWIDTH: 0.4 MS
MDC_IDC_SET_LEADCHNL_RA_SENSING_ADAPTATION_MODE: NORMAL
MDC_IDC_SET_LEADCHNL_RA_SENSING_POLARITY: NORMAL
MDC_IDC_SET_LEADCHNL_RA_SENSING_SENSITIVITY: 0.25 MV
MDC_IDC_SET_LEADCHNL_RV_PACING_AMPLITUDE: 1.6 V
MDC_IDC_SET_LEADCHNL_RV_PACING_CAPTURE_MODE: NORMAL
MDC_IDC_SET_LEADCHNL_RV_PACING_POLARITY: NORMAL
MDC_IDC_SET_LEADCHNL_RV_PACING_PULSEWIDTH: 0.4 MS
MDC_IDC_SET_LEADCHNL_RV_SENSING_ADAPTATION_MODE: NORMAL
MDC_IDC_SET_LEADCHNL_RV_SENSING_POLARITY: NORMAL
MDC_IDC_SET_LEADCHNL_RV_SENSING_SENSITIVITY: 1.5 MV
MDC_IDC_SET_ZONE_DETECTION_INTERVAL: 375 MS
MDC_IDC_SET_ZONE_TYPE: NORMAL
MDC_IDC_SET_ZONE_VENDOR_TYPE: NORMAL
MDC_IDC_STAT_AT_BURDEN_PERCENT: 1 %
MDC_IDC_STAT_AT_DTM_END: NORMAL
MDC_IDC_STAT_AT_DTM_START: NORMAL
MDC_IDC_STAT_BRADY_DTM_END: NORMAL
MDC_IDC_STAT_BRADY_DTM_START: NORMAL
MDC_IDC_STAT_BRADY_RA_PERCENT_PACED: 2 %
MDC_IDC_STAT_BRADY_RV_PERCENT_PACED: 2 %
MDC_IDC_STAT_EPISODE_RECENT_COUNT: 0
MDC_IDC_STAT_EPISODE_RECENT_COUNT: 13
MDC_IDC_STAT_EPISODE_RECENT_COUNT: 2
MDC_IDC_STAT_EPISODE_RECENT_COUNT_DTM_END: NORMAL
MDC_IDC_STAT_EPISODE_RECENT_COUNT_DTM_START: NORMAL
MDC_IDC_STAT_EPISODE_TYPE: NORMAL
MDC_IDC_STAT_EPISODE_VENDOR_TYPE: NORMAL

## 2019-08-29 ENCOUNTER — TRANSFERRED RECORDS (OUTPATIENT)
Dept: HEALTH INFORMATION MANAGEMENT | Facility: CLINIC | Age: 84
End: 2019-08-29

## 2019-09-05 DIAGNOSIS — K21.9 GASTROESOPHAGEAL REFLUX DISEASE, ESOPHAGITIS PRESENCE NOT SPECIFIED: ICD-10-CM

## 2019-09-05 NOTE — TELEPHONE ENCOUNTER
"RANITIDINE 300MG TABLETS      Last Written Prescription Date:  6/10/19  Last Fill Quantity: 90,   # refills: 0  Last Office Visit: 8/5/19  Future Office visit:       Requested Prescriptions   Pending Prescriptions Disp Refills     ranitidine (ZANTAC) 300 MG tablet [Pharmacy Med Name: RANITIDINE 300MG TABLETS] 90 tablet 0     Sig: TAKE 1 TABLET(300 MG) BY MOUTH AT BEDTIME       H2 Blockers Protocol Passed - 9/5/2019  8:54 AM        Passed - Patient is age 12 or older        Passed - Recent (12 mo) or future (30 days) visit within the authorizing provider's specialty     Patient had office visit in the last 12 months or has a visit in the next 30 days with authorizing provider or within the authorizing provider's specialty.  See \"Patient Info\" tab in inbasket, or \"Choose Columns\" in Meds & Orders section of the refill encounter.              Passed - Medication is active on med list          "

## 2019-09-06 NOTE — TELEPHONE ENCOUNTER
Prescription approved per [facility] Refill Protocol.    Elisha TONEY RN

## 2019-09-20 ENCOUNTER — HOSPITAL ENCOUNTER (OUTPATIENT)
Dept: GENERAL RADIOLOGY | Facility: CLINIC | Age: 84
Discharge: HOME | End: 2019-09-20
Attending: PHYSICIAN ASSISTANT | Admitting: PHYSICIAN ASSISTANT
Payer: MEDICARE

## 2019-09-20 ENCOUNTER — HOSPITAL ENCOUNTER (OUTPATIENT)
Dept: SPEECH THERAPY | Facility: CLINIC | Age: 84
Setting detail: THERAPIES SERIES
End: 2019-09-20
Attending: PHYSICIAN ASSISTANT
Payer: MEDICARE

## 2019-09-20 DIAGNOSIS — I10 ESSENTIAL (PRIMARY) HYPERTENSION: ICD-10-CM

## 2019-09-20 DIAGNOSIS — R12 HEARTBURN: ICD-10-CM

## 2019-09-20 DIAGNOSIS — R05.9 COUGH: ICD-10-CM

## 2019-09-20 DIAGNOSIS — R13.10 DYSPHAGIA, UNSPECIFIED TYPE: ICD-10-CM

## 2019-09-20 PROCEDURE — 74230 X-RAY XM SWLNG FUNCJ C+: CPT

## 2019-09-20 PROCEDURE — 92611 MOTION FLUOROSCOPY/SWALLOW: CPT | Mod: GN | Performed by: SPEECH-LANGUAGE PATHOLOGIST

## 2019-09-20 NOTE — PROGRESS NOTES
Imrpession:  Oral and pharyngeal stage of swallow are WNL.  Swallow response is timely and no strategies or extra time needed for a regular diet.  No penetration or aspiration across consistencies or deficits noted. Pt reported no symptoms during exam.  No treatment indicated.       Video Swallow Study  09/20/19    General Information   Type Of Visit Initial   Start Of Care Date 09/20/19   Referring Physician Cyndee Vargas PA-C   Orders Comment Video Swallow Study   Medical Diagnosis Dysphagia, Cough   Onset Of Illness/injury Or Date Of Surgery 08/29/19  (order date)   Precautions/limitations No Known Precautions/limitations   Hearing WFL for exam   Pertinent History of Current Problem/OT: Additional Occupational Profile Info Pt is referred for a video swallow study.  She states she has had coughing when drinking liquids and also reports some feeling of lodged food in he upper esophagus region that at needs extra time to clear.  This symptoms do not occur daily but have been present a few months.  Pt takes medications for GERD 2x/day.   No PMH with neurological deficits per patient.   Respiratory Status Room air   Prior Level Of Function Comment regular diet   Patient Role/employment History Retired   Living Environment [place]   Patient/family Goals Assess swallow function due to cough and feeling of sticking at times.   Pain Assessment   Pain Reported No   Fall Risk Screen   Fall screen completed by SLP   Have you fallen 2 or more times in the past year? No   Have you fallen and had an injury in the past year? No   Is patient a fall risk? No   Fall screen comments uses a cane   Clinical Swallow Evaluation   Oral Musculature generally intact   Structural Abnormalities none present   Dentition present and adequate   Mucosal Quality good   Mandibular Strength and Mobility intact   Oral Labial Strength and Mobility WFL   Lingual Strength and Mobility WFL   Velar Elevation intact   Buccal Strength and Mobility  intact   Laryngeal Function Cough;Swallow;Voicing initiated   VFSS Evaluation   VFSS Additional Documentation Yes   VFSS Eval: Radiology   Radiologist Dr. Haynes   Views Taken left lateral   Physical Location of Procedure [facility]   VFSS Eval: Thin Liquid Texture Trial   Mode of Presentation, Thin Liquid cup;straw;self-fed   Order of Presentation 1,2,5   Preparatory Phase WFL   Oral Phase, Thin Liquid WFL   Pharyngeal Phase, Thin Liquid WFL   Rosenbek's Penetration Aspiration Scale: Thin Liquid Trial Results 1 - no aspiration, contrast does not enter airway   Diagnostic Statement WNL   VFSS Eval: Pudding Thick Liquid Texture Trial   Mode of Presentation, Pudding spoon;fed by clinician   Order of Presentation 3   Preparatory Phase WFL   Oral Phase, Pudding WFL   Pharyngeal Phase, Pudding WFL   Rosenbek's Penetration Aspiration Scale: Pudding-Thick Liquid Trial Results 1 - no aspiration, contrast does not enter airway   Diagnostic Statement WNL   VFSS Eval: Solid Food Texture Trial   Mode of Presentation, Solid self-fed   Order of Presentation 4   Preparatory Phase WFL   Oral Phase, Solid WFL   Pharyngeal Phase, Solid WFL   Rosenbek's Penetration Aspiration Scale: Solid Food Trial Results 1 - no aspiration, contrast does not enter airway   Diagnostic Statement WNL   Swallow Compensations   Swallow Compensations No compensations were used   Educational Assessment   Barriers to Learning No barriers   Esophageal Phase of Swallow   Patient reports or presents with symptoms of esophageal dysphagia No   Esophageal sweep performed during today s vidofluoroscopic exam  No   Swallow Eval: Clinical Impressions   Skilled Criteria for Therapy Intervention No problems identified which require skilled intervention   Functional Assessment Scale (FAS) 7   Dysphagia Outcome Severity Scale (FARIDA) Level 7 - FARIDA   Diet texture recommendations Regular diet   Clinical Impression Comments Oral and pharyngeal stage of swallow  are WNL.  Swallow response is timely and no strategies or extra time needed for a regular diet.  No penetration or aspiration across consistencies or deficits noted. Pt reported no symptoms during exam.  No treatment indicated.   Total Session Time   SLP Eval: VideoFluoroscopic Swallow function Minutes ([id number]) 25   Total Evaluation Time 25

## 2019-09-23 ENCOUNTER — TRANSFERRED RECORDS (OUTPATIENT)
Dept: HEALTH INFORMATION MANAGEMENT | Facility: CLINIC | Age: 84
End: 2019-09-23

## 2019-10-17 ENCOUNTER — OFFICE VISIT (OUTPATIENT)
Dept: FAMILY MEDICINE | Facility: CLINIC | Age: 84
End: 2019-10-17
Payer: MEDICARE

## 2019-10-17 VITALS
RESPIRATION RATE: 18 BRPM | DIASTOLIC BLOOD PRESSURE: 68 MMHG | OXYGEN SATURATION: 96 % | SYSTOLIC BLOOD PRESSURE: 136 MMHG | WEIGHT: 154.8 LBS | HEIGHT: 66 IN | BODY MASS INDEX: 24.88 KG/M2 | HEART RATE: 58 BPM | TEMPERATURE: 98 F

## 2019-10-17 DIAGNOSIS — H92.02 LEFT EAR PAIN: Primary | ICD-10-CM

## 2019-10-17 PROCEDURE — 99214 OFFICE O/P EST MOD 30 MIN: CPT | Performed by: PHYSICIAN ASSISTANT

## 2019-10-17 ASSESSMENT — MIFFLIN-ST. JEOR: SCORE: 1153.92

## 2019-10-17 NOTE — PATIENT INSTRUCTIONS
I will have you use heat packs to the ear as needed to relieve pressure. If you are having increased symptoms or persisting symptoms follow up next week for a recheck.

## 2019-10-17 NOTE — PROGRESS NOTES
"Subjective     Christiana Sal is a 87 year old female who presents to clinic today for the following health issues:    HPI   ENT Symptoms             Symptoms: cc Present Absent Comment   Fever/Chills   x    Fatigue   x    Muscle Aches   x    Eye Irritation   x    Sneezing   x    Nasal Trav/Drg   x    Sinus Pressure/Pain   x    Loss of smell   x    Dental pain   x    Sore Throat   x    Swollen Glands   x    Ear Pain/Fullness x x     Cough   x    Wheeze   x    Chest Pain   x    Shortness of breath   x    Rash   x    Other   x      Symptom duration:  1 day   Symptom severity:  severe ear pain   Treatments tried:  ibuprofen   Contacts:  no known     Patient has been having pain behind the left ear for the last day. She denies any injury to the area, she has not noted any swelling to the area, she reports that she has sudden sharp pains to the area and is only able to relieve them by pressing on the ear. She denies any fevers, cold symptoms, headaches, skin changes or other associated symptoms. She has not had similar symptoms in the past.   -------------------------------------    BP Readings from Last 3 Encounters:   10/17/19 136/68   08/05/19 (!) 143/75   07/05/19 138/75    Wt Readings from Last 3 Encounters:   10/17/19 70.2 kg (154 lb 12.8 oz)   08/05/19 68.5 kg (151 lb)   07/05/19 68.9 kg (152 lb)                    -------------------------------------  Reviewed and updated as needed this visit by Provider  Tobacco  Allergies  Meds  Problems  Med Hx  Surg Hx  Fam Hx  Soc Hx          Review of Systems   ROS COMP: Constitutional, HEENT, cardiovascular, pulmonary, GI, , musculoskeletal, neuro, skin, endocrine and psych systems are negative, except as otherwise noted.      Objective    /68   Pulse 58   Temp 98  F (36.7  C) (Tympanic)   Resp 18   Ht 1.676 m (5' 6\")   Wt 70.2 kg (154 lb 12.8 oz)   SpO2 96%   BMI 24.99 kg/m    Body mass index is 24.99 kg/m .  Physical Exam   GENERAL: alert " "and no distress  EYES: Eyes grossly normal to inspection  HENT: normal cephalic/atraumatic, right ear: mild congestion, moderate cerumen in the canal, left ear: normal: no effusions, no erythema, normal landmarks, nose and mouth without ulcers or lesions, oropharynx clear and oral mucous membranes moist  NECK: no adenopathy, no asymmetry, masses, or scars and thyroid normal to palpation  RESP: lungs clear to auscultation - no rales, rhonchi or wheezes  CV: regular rate and rhythm, normal S1 S2, no S3 or S4, no murmur, click or rub, no peripheral edema and peripheral pulses strong  MS: no gross musculoskeletal defects noted, no edema  Neuro: normal gait and speech, no neurologic deficits noted on exam  SKIN: no suspicious lesions or rashes    Diagnostic Test Results:  none         Assessment & Plan       ICD-10-CM    1. Left ear pain H92.02         BMI:   Estimated body mass index is 24.99 kg/m  as calculated from the following:    Height as of this encounter: 1.676 m (5' 6\").    Weight as of this encounter: 70.2 kg (154 lb 12.8 oz).           Ear was irrigated and I will have patient use heat packs for symptom relief and if symptoms are persisting or worsening would have her follow up in clinic for a recheck next week.   See Patient Instructions    Return in about 1 week (around 10/24/2019), or if symptoms worsen or fail to improve.    Sophia Dorman PA-C  [facility]      "

## 2019-11-13 ENCOUNTER — TELEPHONE (OUTPATIENT)
Dept: FAMILY MEDICINE | Facility: CLINIC | Age: 84
End: 2019-11-13

## 2019-11-14 NOTE — TELEPHONE ENCOUNTER
Central Prior Authorization Team  Phone: [phone]    PA Initiation    Medication: Tolterodine  Insurance Company: [employer] - Phone [phone] Fax [phone]  Pharm[facility] the Rx: [id number] - [place], [place] - [address] [place] [address] E AT [place] [address] & [facility]  Filling Pharmacy Phone: [phone]  Filling Pharmacy Fax:    Start Date: 11/14/2019

## 2019-11-15 NOTE — TELEPHONE ENCOUNTER
Prior Authorization Approval    Authorization Effective Date: 12/30/2018  Authorization Expiration Date: 12/31/2019  Medication: Tolterodine- APPROVED  Approved Dose/Quantity:   Reference #:     Insurance Company: [employer] - Phone [phone] Fax [phone]  Expected CoPay:       CoPay Card Available:      Foundation Assistance Needed:    Which Pharmacy is filling the prescription (Not needed for infusion/clinic administered): [id number] - [place] [address] [address] AT [place] [address] & [facility]  Pharmacy Notified: Yes  Patient Notified: Comment:  **Instructed pharmacy to notify patient when script is ready to /ship.**

## 2019-12-02 ENCOUNTER — SURGERY - HEALTHEAST (OUTPATIENT)
Dept: GASTROENTEROLOGY | Facility: HOSPITAL | Age: 84
End: 2019-12-02

## 2019-12-02 DIAGNOSIS — K21.9 GASTROESOPHAGEAL REFLUX DISEASE, ESOPHAGITIS PRESENCE NOT SPECIFIED: ICD-10-CM

## 2019-12-02 RX ORDER — FAMOTIDINE 20 MG/1
20 TABLET, FILM COATED ORAL AT BEDTIME
Qty: 180 TABLET | Refills: 1 | Status: SHIPPED | OUTPATIENT
Start: 2019-12-02

## 2019-12-02 ASSESSMENT — MIFFLIN-ST. JEOR: SCORE: 1122.15

## 2019-12-02 NOTE — TELEPHONE ENCOUNTER
Please call patient and let her know because zantac (ranitidine) has been recalled and is no longer available, I am switching her to famotidine (pepcid) - she will take similarly (at bedtime) and it should work the same as zantac    Kelly

## 2019-12-02 NOTE — TELEPHONE ENCOUNTER
"RANITIDINE 300MG TABLETS      Last Written Prescription Date:  9/6/19  Last Fill Quantity: 90,   # refills: 0  Last Office Visit: 10/17/19  Future Office visit:       Requested Prescriptions   Pending Prescriptions Disp Refills     ranitidine (ZANTAC) 300 MG tablet [Pharmacy Med Name: RANITIDINE 300MG TABLETS] 90 tablet 0     Sig: TAKE 1 TABLET BY MOUTH EVERY NIGHT AT BEDTIME       H2 Blockers Protocol Passed - 12/2/2019  3:24 AM        Passed - Patient is age 12 or older        Passed - Recent (12 mo) or future (30 days) visit within the authorizing provider's specialty     Patient has had an office visit with the authorizing provider or a provider within the authorizing providers department within the previous 12 mos or has a future within next 30 days. See \"Patient Info\" tab in inbasket, or \"Choose Columns\" in Meds & Orders section of the refill encounter.              Passed - Medication is active on med list          "

## 2019-12-02 NOTE — TELEPHONE ENCOUNTER
Routing refill request to provider for review/approval because:  Zantac has been recalled.   Roel Garza RN

## 2019-12-02 NOTE — TELEPHONE ENCOUNTER
Christiana notified of pepcid order instead of zantac. Christiana agreed with plan.  Roel Garza RN

## 2019-12-06 ENCOUNTER — TELEPHONE (OUTPATIENT)
Dept: FAMILY MEDICINE | Facility: CLINIC | Age: 84
End: 2019-12-06

## 2019-12-06 DIAGNOSIS — F33.0 MAJOR DEPRESSIVE DISORDER, RECURRENT EPISODE, MILD (H): ICD-10-CM

## 2019-12-06 RX ORDER — CITALOPRAM HYDROBROMIDE 20 MG/1
20 TABLET ORAL DAILY
Qty: 90 TABLET | Refills: 0 | Status: SHIPPED | OUTPATIENT
Start: 2019-12-06 | End: 2020-03-14

## 2019-12-06 NOTE — TELEPHONE ENCOUNTER
Reason for call:  Patient reporting a symptom    Symptom or request: Patient left message on CSS phone stating that she has been having terrible knee pain and she can not bear much weight and more it certain ways.  She is asking what she should do for it.      Duration (how long have symptoms been present): 3 days    Have you been treated for this before? Yes    Phone Number patient can be reached at:  Home number on file [phone] (home)    Best Time:  Any    Can we leave a detailed message on this number:  YES    Call taken on 12/6/2019 at 8:54 AM by Lisa Camargo

## 2019-12-06 NOTE — TELEPHONE ENCOUNTER
"Requested Prescriptions   Pending Prescriptions Disp Refills     citalopram (CELEXA) 20 MG tablet [Pharmacy Med Name: CITALOPRAM 20MG TABLETS]  Last Written Prescription Date:  5/21/19  Last Fill Quantity: 90,  # refills: 1   Last office visit: 10/17/2019 with prescribing provider:  valerie   Future Office Visit:     90 tablet 0     Sig: TAKE 1 TABLET(20 MG) BY MOUTH DAILY       SSRIs Protocol Passed - 12/6/2019  3:24 AM        Passed - PHQ-9 score less than 5 in past 6 months     Please review last PHQ-9 score.           Passed - Medication is active on med list        Passed - Patient is age 18 or older        Passed - No active pregnancy on record        Passed - No positive pregnancy test in last 12 months        Passed - Recent (6 mo) or future (30 days) visit within the authorizing provider's specialty     Patient had office visit in the last 6 months or has a visit in the next 30 days with authorizing provider or within the authorizing provider's specialty.  See \"Patient Info\" tab in inbasket, or \"Choose Columns\" in Meds & Orders section of the refill encounter.              "

## 2019-12-06 NOTE — TELEPHONE ENCOUNTER
Medication is being filled for 1 time refill only due to:  Patient needs to be seen because due to review medication in Oct 19.  Charmaine Kerr RN

## 2019-12-06 NOTE — TELEPHONE ENCOUNTER
Call placed to patient.  Day 4 of left knee pain, anterior and posterior.  Patient tried ice, heat, tylenol yesterday with minimal relief.  Pain better with weight bearing, does have increased pain with bending.    Slight swelling, denies redness/streaking or increased warmth.  Denies fall or trauma.  Patient had L hip replacement 5/2019.  Patient reports discomfort does not interfere with mobility, using walker, feels steady on feet is able to weight bear.  Advised alternating ice/warm pack 20-30 min 4 times daily.  Elevation, rest periods.  If redness, heat, increased pain or swelling-patient should be seen.  If pain affecting ambulation, unsteady, then should be seen as risk of falls increased.  Reviewed symptoms that would need ED/UC over the weekend. [facility] is preferred site.  Patient verbalizes understanding, feels she can manage with plan discussed.  Kirsten Decker RN

## 2019-12-08 ENCOUNTER — HOSPITAL ENCOUNTER (EMERGENCY)
Facility: CLINIC | Age: 84
Discharge: HOME | End: 2019-12-08
Attending: NURSE PRACTITIONER | Admitting: NURSE PRACTITIONER
Payer: MEDICARE

## 2019-12-08 VITALS
DIASTOLIC BLOOD PRESSURE: 72 MMHG | SYSTOLIC BLOOD PRESSURE: 135 MMHG | WEIGHT: 150 LBS | OXYGEN SATURATION: 96 % | HEIGHT: 67 IN | TEMPERATURE: 98 F | BODY MASS INDEX: 23.54 KG/M2

## 2019-12-08 DIAGNOSIS — J06.9 VIRAL URI WITH COUGH: ICD-10-CM

## 2019-12-08 PROCEDURE — 99214 OFFICE O/P EST MOD 30 MIN: CPT | Mod: Z6 | Performed by: NURSE PRACTITIONER

## 2019-12-08 PROCEDURE — G0463 HOSPITAL OUTPT CLINIC VISIT: HCPCS | Performed by: NURSE PRACTITIONER

## 2019-12-08 ASSESSMENT — ENCOUNTER SYMPTOMS
NUMBNESS: 0
MYALGIAS: 0
VOMITING: 0
ARTHRALGIAS: 0
JOINT SWELLING: 0
RHINORRHEA: 1
NAUSEA: 0
TROUBLE SWALLOWING: 0
WEAKNESS: 0
CHILLS: 0
DIZZINESS: 0
ABDOMINAL PAIN: 0
HEADACHES: 0
FEVER: 0
LIGHT-HEADEDNESS: 0
SINUS PAIN: 0
SORE THROAT: 0
EYE DISCHARGE: 0
SINUS PRESSURE: 1
COUGH: 1
SHORTNESS OF BREATH: 0
FATIGUE: 1
EYE REDNESS: 0

## 2019-12-08 ASSESSMENT — MIFFLIN-ST. JEOR: SCORE: 1135.09

## 2019-12-08 NOTE — ED PROVIDER NOTES
History     Chief Complaint   Patient presents with     Sinusitis     for 1 week     HPI  Christiana Sal is a 88 year old female who presents to the urgent care for evaluation of cough for one week. Patient has accompanying congestion and fatigue. States improved sinus pressure. Has been using Mucinex with relief. Denies headache, fever, dizziness and shortness of breath. No history of asthma or COPD, patient is a nonsmoker.     Allergies:  Allergies   Allergen Reactions     Demerol Nausea     Lisinopril Cough     Meperidine Unknown     Sulfa Drugs Other (See Comments) and Unknown     Questionable itching reported by patient       Problem List:    Patient Active Problem List    Diagnosis Date Noted     Near syncope 05/03/2019     Priority: Medium     Overactive bladder 05/02/2019     Priority: Medium     S/P total hip arthroplasty 05/02/2019     Priority: Medium     Closed fracture of shaft of fibula 12/02/2018     Priority: Medium     Fall 12/02/2018     Priority: Medium     Primary osteoarthritis of left hip 09/21/2018     Priority: Medium     Insect bite 06/28/2017     Priority: Medium     Cardiac pacemaker - [employer] dual lead - Not Dependent 03/02/2017     Priority: Medium     Sinoatrial node dysfunction (H) 11/10/2016     Priority: Medium     CAD S/P percutaneous coronary angioplasty 05/11/2016     Priority: Medium     JENIFER (obstructive sleep apnea) 11/17/2014     Priority: Medium     Lichen planus 11/19/2013     Priority: Medium     Vulvar pruritus 11/12/2013     Priority: Medium     Angina pectoris (H) 02/08/2012     Priority: Medium     Advanced directives, counseling/discussion 06/16/2011     Priority: Medium     Patient will bring a copy. Chart r/q to make sure no copy.    GUSTAVO Cunnignham MA    Advance Directive Problem List Overview:   Name Relationship Phone    Primary Health Care Agent Albaro Sal  [phone]         Alternative Health Care Agent Esther Mulligan  [phone]      Reviewed Health Care Directive dated 05/13/2005, scanned into EPIC 01/2012.  Marivel Villarreal, SISSY                Major depressive disorder, recurrent episode, mild (H) 11/02/2010     Priority: Medium     Family history of colon cancer 05/04/2010     Priority: Medium     Hyperlipidemia LDL goal <100      Priority: Medium     Essential hypertension, benign      Priority: Medium     Hypothyroidism      Priority: Medium     GERD (gastroesophageal reflux disease)      Priority: Medium        Past Medical History:    Past Medical History:   Diagnosis Date     Coronary atherosclerosis of native coronary artery      Essential hypertension, benign      GERD (gastroesophageal reflux disease)      History of transient ischemic attack (TIA)      Hyperlipidemia LDL goal <100      Hypothyroidism      Major depressive disorder, recurrent episode, mild (H) 5/18/2011     Peptic ulcer disease with hemorrhage        Past Surgical History:    Past Surgical History:   Procedure Laterality Date     APPENDECTOMY       ARTHROPLASTY HIP Left 5/2/2019    Procedure: ARTHROPLASTY, HIP;  Surgeon: Jacinto Adams MD;  Location: [facility] OR     CARDIAC CATHERIZATION  1/15/04    drug-eluding stent RCA, Dr. Pérez, [facility]     CARDIAC CATHERIZATION  2/16/12    diffuse mild/mod disease, no new stent     CATARACT IOL, RT/LT  1/26/12    RT, Dr. Wynne     CHOLECYSTECTOMY, OPEN       COLONOSCOPY  2008    [facility] [place]     FRACTURE TX, ANKLE RT/LT      LT, 2 screws remain     HC EXCISE HAND/FOOT NEUROMA      RT     HC LARYNGOSCOPY DIRECT W VOCAL CORD INJECTION      vocal cord polypectomy     HC REMOVAL OF OVARIAN CYST(S)       HC TRANSCATH STENT INIT VESSEL,PERCUT      x 2     OPEN REDUCTION INTERNAL FIXATION ANKLE Right 12/3/2018    Procedure: OPEN REDUCTION INTERNAL FIXATION Right ANKLE;  Surgeon: Jacinto Adams MD;  Location: [facility] OR     REPAIR HAMMER TOE  9/13/10    multiple + RT great toe tendon release, Dr. Sanchez DPM     STENT   -2016    Cardiac stents 6 placed.       Family History:    Family History   Problem Relation Age of Onset     C.A.D. Father      Hypertension Father      Myocardial Infarction Father 66         from MI     C.A.D. Brother      Myocardial Infarction Brother      Cancer Brother         skin ca     Breast Cancer Sister      Cancer - colorectal Sister      Heart Disease Sister      Neurologic Disorder Mother         migraine     C.A.D. Mother      Hypertension Mother      Heart Failure Mother      Thyroid Disease Son         cretinism     Hypertension Son      Heart Disease Son      Psychotic Disorder Sister      Hypertension Sister      Heart Disease Sister         CABG     Hypertension Brother      C.A.D. Brother      Myocardial Infarction Brother      Arthritis Daughter         RA     Hypertension Sister      Heart Disease Sister      Cancer Sister      Blood Disease Sister      Neurologic Disorder Child         migraine (2 children)       Social History:  Marital Status:   [2]  Social History     Tobacco Use     Smoking status: Never Smoker     Smokeless tobacco: Never Used     Tobacco comment: Never smoker; no secondhand smoke exposure   Substance Use Topics     Alcohol use: Yes     Comment: social     Drug use: No        Medications:    acetaminophen (TYLENOL) 500 MG tablet  atorvastatin (LIPITOR) 40 MG tablet  buPROPion (WELLBUTRIN) 75 MG tablet  CALCIUM 600 + D OR  calcium carbonate (TUMS) 500 MG chewable tablet  citalopram (CELEXA) 20 MG tablet  famotidine (PEPCID) 20 MG tablet  levothyroxine (SYNTHROID/LEVOTHROID) 75 MCG tablet  magnesium 250 MG tablet  meclizine (ANTIVERT) 25 MG tablet  melatonin 3 MG tablet  metoprolol succinate ER (TOPROL-XL) 25 MG 24 hr tablet  Multiple Vitamins-Minerals (OCUVITE PO)  nitroGLYcerin (NITROSTAT) 0.4 MG sublingual tablet  oxyCODONE HCl (OXAYDO) 5 MG TABA  pantoprazole (PROTONIX) 40 MG EC tablet  pramipexole (MIRAPEX) 0.125 MG tablet  ranitidine (ZANTAC) 300 MG  "tablet  sennosides (SENOKOT) 8.6 MG tablet  tolterodine (DETROL) 2 MG tablet  Vitamin D, Cholecalciferol, 1000 units TABS          Review of Systems   Constitutional: Positive for fatigue. Negative for chills and fever.   HENT: Positive for congestion, rhinorrhea and sinus pressure. Negative for ear discharge, ear pain, sinus pain, sore throat and trouble swallowing.    Eyes: Negative for discharge and redness.   Respiratory: Positive for cough. Negative for shortness of breath.    Cardiovascular: Negative for chest pain.   Gastrointestinal: Negative for abdominal pain, nausea and vomiting.   Musculoskeletal: Negative for arthralgias, joint swelling and myalgias.   Skin: Negative for rash.   Neurological: Negative for dizziness, weakness, light-headedness, numbness and headaches.       Physical Exam   BP: 135/72  Heart Rate: 70  Temp: 98  F (36.7  C)  Height: 168.9 cm (5' 6.5\")  Weight: 68 kg (150 lb)  SpO2: 96 %      Physical Exam  Constitutional:       General: She is not in acute distress.     Appearance: She is well-developed. She is not diaphoretic.   HENT:      Right Ear: Tympanic membrane and ear canal normal.      Left Ear: Tympanic membrane and ear canal normal.      Nose: Nose normal.      Mouth/Throat:      Mouth: Mucous membranes are moist.      Pharynx: Oropharynx is clear. No oropharyngeal exudate.   Eyes:      Conjunctiva/sclera: Conjunctivae normal.      Pupils: Pupils are equal, round, and reactive to light.   Neck:      Musculoskeletal: Normal range of motion and neck supple.   Cardiovascular:      Rate and Rhythm: Normal rate and regular rhythm.   Pulmonary:      Effort: Pulmonary effort is normal. No respiratory distress.      Breath sounds: Normal breath sounds. No stridor. No wheezing.   Chest:      Chest wall: No tenderness.   Abdominal:      General: There is no distension.      Palpations: Abdomen is soft.      Tenderness: There is no abdominal tenderness.   Musculoskeletal: Normal range of " motion.   Skin:     General: Skin is warm.      Capillary Refill: Capillary refill takes less than 2 seconds.   Neurological:      Mental Status: She is alert and oriented to person, place, and time.         ED Course        Procedures      No results found for this or any previous visit (from the past 24 hour(s)).    Medications - No data to display    Assessments & Plan (with Medical Decision Making)   Patient is an 88 year old female who presents to the urgent care for evaluation of cough and other URI symptoms. Patient with normal physical exam. Chest xray not indicated at this time as I have low suspicion for pneumonia or PE. Encouraged to increase rest and hydration. Discussed average course of viral illness and worrisome reasons to seek reevaluation. Pay continue to use OTC medications as needed, continue using humidifier. Patient is agreeable to plan of care, all questions answered. Patient is discharged in stable condition.   I have reviewed the nursing notes.    I have reviewed the findings, diagnosis, plan and need for follow up with the patient.      Discharge Medication List as of 12/8/2019 12:19 PM          Final diagnoses:   Viral URI with cough       12/8/2019   [facility] EMERGENCY DEPARTMENT     Lisa Cole, APRN CNP  12/08/19 1519

## 2019-12-08 NOTE — ED AVS SNAPSHOT
[facility] Emergency Department  [address] [address]  Phone:  [phone]  Fax:  [phone]                                    Christiana Sal   MRN: [id number]    Department:  [facility] Emergency Department   Date of Visit:  12/8/2019           After Visit Summary Signature Page    I have received my discharge instructions, and my questions have been answered. I have discussed any challenges I see with this plan with the nurse or doctor.    ..........................................................................................................................................  Patient/Patient Representative Signature      ..........................................................................................................................................  Patient Representative Print Name and Relationship to Patient    ..................................................               ................................................  Date                                   Time    ..........................................................................................................................................  Reviewed by Signature/Title    ...................................................              ..............................................  Date                                               Time          22EPIC Rev 08/18

## 2019-12-08 NOTE — DISCHARGE INSTRUCTIONS
Please continue taking Mucinex every 12 hours  Increase your water intake  Continue to run your humidifier at home  Apply warm washcloth across the nose/cheeks to help the sinuses drain

## 2019-12-08 NOTE — ED TRIAGE NOTES
Cough, productive, nasal congestion x1 week . No associated fever. Elli Sinha LPN on 12/8/2019 at 12:04 PM

## 2019-12-12 DIAGNOSIS — K21.00 GASTROESOPHAGEAL REFLUX DISEASE WITH ESOPHAGITIS: ICD-10-CM

## 2019-12-12 RX ORDER — PANTOPRAZOLE SODIUM 40 MG/1
40 TABLET, DELAYED RELEASE ORAL DAILY
Qty: 30 TABLET | Refills: 0 | Status: SHIPPED | OUTPATIENT
Start: 2019-12-12 | End: 2020-01-10

## 2019-12-12 NOTE — TELEPHONE ENCOUNTER
"Medication is being filled for 1 time refill only due to:  Patient needs to be seen because Last OV with PCP: . Return in about 3 months (around 11/5/2019) for Physical Exam.  Letter mailed to pt.    Requested Prescriptions   Pending Prescriptions Disp Refills     pantoprazole (PROTONIX) 40 MG EC tablet 90 tablet 1     Sig: Take 1 tablet (40 mg) by mouth daily       PPI Protocol Passed - 12/12/2019  8:55 AM        Passed - Not on Clopidogrel (unless Pantoprazole ordered)        Passed - No diagnosis of osteoporosis on record        Passed - Recent (12 mo) or future (30 days) visit within the authorizing provider's specialty     Patient has had an office visit with the authorizing provider or a provider within the authorizing providers department within the previous 12 mos or has a future within next 30 days. See \"Patient Info\" tab in inbasket, or \"Choose Columns\" in Meds & Orders section of the refill encounter.              Passed - Medication is active on med list        Passed - Patient is age 18 or older        Passed - No active pregnacy on record        Passed - No positive pregnancy test in past 12 months          "

## 2019-12-12 NOTE — TELEPHONE ENCOUNTER
pantoprazole      Last Written Prescription Date:  4/18/19  Last Fill Quantity: 90,   # refills: 1  Last Office Visit: 10/17/19  Future Office visit:       Routing refill request to provider for review/approval because:  Drug not on the [facility], [facility] or [facility] refill protocol or controlled substance

## 2019-12-12 NOTE — LETTER
[facility]  [address]  [place] [facility] [address] [phone]        December 12, 2019  Christiana Sal  [address]   [place] [address]    Dear Christiana,    I care about your health and have reviewed your health plan. I have reviewed your medical conditions, medication list, and lab results and am making recommendations based on this review, to better manage your health.    You are in particular need of attention regarding:  -Wellness (Physical) Visit     I am recommending that you:  -schedule a WELLNESS (Physical) APPOINTMENT with me.        Please call us at [phone] (or use [employer]) to address the above recommendations.     Thank you for trusting [facility] and we appreciate the opportunity to serve you.  We look forward to supporting your healthcare needs in the future.    Healthy Regards,    Kelly Matthew PA-C/Elisha TONEY RN

## 2019-12-13 ENCOUNTER — TRANSFERRED RECORDS (OUTPATIENT)
Dept: HEALTH INFORMATION MANAGEMENT | Facility: CLINIC | Age: 84
End: 2019-12-13

## 2019-12-28 DIAGNOSIS — I25.10 ATHEROSCLEROSIS OF NATIVE CORONARY ARTERY OF NATIVE HEART WITHOUT ANGINA PECTORIS: ICD-10-CM

## 2019-12-30 RX ORDER — METOPROLOL SUCCINATE 25 MG/1
TABLET, EXTENDED RELEASE ORAL
Qty: 45 TABLET | Refills: 3 | Status: SHIPPED | OUTPATIENT
Start: 2019-12-30 | End: 2020-04-15

## 2019-12-30 NOTE — TELEPHONE ENCOUNTER
Routing refill request to provider for review/approval because:  Please advise refill request at her 12/31/19 appointment . Thank you.  Roel Garza RN

## 2019-12-30 NOTE — TELEPHONE ENCOUNTER
"METOPROLOL ER SUCCINATE 25MG TABS      Last Written Prescription Date:  12/24/18  Last Fill Quantity: 45,   # refills: 3  Last Office Visit: 10/17/19  Future Office visit:    Next 5 appointments (look out 90 days)    Dec 31, 2019 10:20 AM CST  PHYSICAL with Kelly Matthew PA-C  [facility] [address] [facility] [address] [phone]           Requested Prescriptions   Pending Prescriptions Disp Refills     metoprolol succinate ER (TOPROL-XL) 25 MG 24 hr tablet [Pharmacy Med Name: METOPROLOL ER SUCCINATE 25MG TABS] 45 tablet 3     Sig: TAKE 1/2 TABLET(12.5 MG) BY MOUTH DAILY       Beta-Blockers Protocol Passed - 12/28/2019  3:23 AM        Passed - Blood pressure under 140/90 in past 12 months     BP Readings from Last 3 Encounters:   12/08/19 135/72   10/17/19 136/68   08/05/19 (!) 143/75                 Passed - Patient is age 6 or older        Passed - Recent (12 mo) or future (30 days) visit within the authorizing provider's specialty     Patient has had an office visit with the authorizing provider or a provider within the authorizing providers department within the previous 12 mos or has a future within next 30 days. See \"Patient Info\" tab in inbasket, or \"Choose Columns\" in Meds & Orders section of the refill encounter.              Passed - Medication is active on med list          "

## 2019-12-31 ENCOUNTER — OFFICE VISIT (OUTPATIENT)
Dept: FAMILY MEDICINE | Facility: CLINIC | Age: 84
End: 2019-12-31
Payer: MEDICARE

## 2019-12-31 VITALS
DIASTOLIC BLOOD PRESSURE: 72 MMHG | BODY MASS INDEX: 23.39 KG/M2 | HEIGHT: 67 IN | TEMPERATURE: 97 F | HEART RATE: 70 BPM | SYSTOLIC BLOOD PRESSURE: 120 MMHG | WEIGHT: 149 LBS

## 2019-12-31 DIAGNOSIS — M17.12 PRIMARY OSTEOARTHRITIS OF LEFT KNEE: ICD-10-CM

## 2019-12-31 DIAGNOSIS — Z00.00 ENCOUNTER FOR MEDICARE ANNUAL WELLNESS EXAM: Primary | ICD-10-CM

## 2019-12-31 PROCEDURE — G0439 PPPS, SUBSEQ VISIT: HCPCS | Performed by: PHYSICIAN ASSISTANT

## 2019-12-31 ASSESSMENT — MIFFLIN-ST. JEOR: SCORE: 1130.55

## 2019-12-31 ASSESSMENT — PATIENT HEALTH QUESTIONNAIRE - PHQ9: SUM OF ALL RESPONSES TO PHQ QUESTIONS 1-9: 0

## 2019-12-31 ASSESSMENT — PAIN SCALES - GENERAL: PAINLEVEL: MILD PAIN (3)

## 2019-12-31 NOTE — PATIENT INSTRUCTIONS
Patient Education   Personalized Prevention Plan  You are due for the preventive services outlined below.  Your care team is available to assist you in scheduling these services.  If you have already completed any of these items, please share that information with your care team to update in your medical record.  Health Maintenance Due   Topic Date Due     URINE DRUG SCREEN  10/25/1931     Annual Wellness Visit  06/16/2012     Zoster (Shingles) Vaccine (2 of 3) 03/12/2013     Discuss Advance Care Planning  01/24/2017     Kidney Microalbumin Urine Test  09/05/2018     FALL RISK ASSESSMENT  12/05/2019     Depression Assessment  12/20/2019

## 2019-12-31 NOTE — PROGRESS NOTES
"  SUBJECTIVE:   Christiana Sal is a 88 year old female who presents for Preventive Visit.      Are you in the first 12 months of your Medicare Part B coverage?  No    Physical Health:    In general, how would you rate your overall physical health? good    Outside of work, how many days during the week do you exercise? Walking as much as she can     Outside of work, approximately how many minutes a day do you exercise?less than 15 minutes    If you drink alcohol do you typically have >3 drinks per day or >7 drinks per week? No    Do you usually eat at least 4 servings of fruit and vegetables a day, include whole grains & fiber and avoid regularly eating high fat or \"junk\" foods? Yes    Do you have any problems taking medications regularly?  No    Do you have any side effects from medications? none    Needs assistance for the following daily activities: no assistance needed    Which of the following safety concerns are present in your home?  none identified     Hearing impairment: Yes, wears hearing aids     In the past 6 months, have you been bothered by leaking of urine? Yes, once in a while     Mental Health:    In general, how would you rate your overall mental or emotional health? good  PHQ-2 Score:      Do you feel safe in your environment? Yes    Have you ever done Advance Care Planning? (For example, a Health Directive, POLST, or a discussion with a medical provider or your loved ones about your wishes): Yes, advance care planning is on file.    Additional concerns to address?  YES, her left knee is bothering her recently. She has a cyst on her left ankle that she wants you to check out today.     Fall risk:  Fallen 2 or more times in the past year?: Yes  Any fall with injury in the past year?: Yes    Cognitive Screenin) Repeat 3 items (Leader, Season, Table)    2) Clock draw: NORMAL  3) 3 item recall: Recalls 2 objects   Results: NORMAL clock, 1-2 items recalled: COGNITIVE IMPAIRMENT LESS " LIKELY    Mini-CogTM Copyright AAYUSH Bey. Licensed by the author for use in [facility]; reprinted with permission (tay@.[facility]). All rights reserved.      Do you have sleep apnea, excessive snoring or daytime drowsiness?: no            Reviewed and updated as needed this visit by clinical staff  Tobacco  Allergies  Meds  Med Hx  Surg Hx  Fam Hx  Soc Hx        Reviewed and updated as needed this visit by Provider        Social History     Tobacco Use     Smoking status: Never Smoker     Smokeless tobacco: Never Used     Tobacco comment: Never smoker; no secondhand smoke exposure   Substance Use Topics     Alcohol use: Yes     Comment: social                           Current providers sharing in care for this patient include:   Patient Care Team:  Kelly Matthew PA-C as PCP - General (Physician Assistant)  Kelly Matthew PA-C as Assigned PCP    The following health maintenance items are reviewed in Epic and correct as of today:  Health Maintenance   Topic Date Due     URINE DRUG SCREEN  10/25/1931     MEDICARE ANNUAL WELLNESS VISIT  06/16/2012     ZOSTER IMMUNIZATION (2 of 3) 03/12/2013     ADVANCE CARE PLANNING  01/24/2017     MICROALBUMIN  09/05/2018     FALL RISK ASSESSMENT  12/05/2019     PHQ-9  12/20/2019     TSH W/FREE T4 REFLEX  04/12/2020     BMP  05/03/2020     DTAP/TDAP/TD IMMUNIZATION (2 - Td) 05/04/2020     DEPRESSION ACTION PLAN  Completed     INFLUENZA VACCINE  Completed     PNEUMOCOCCAL IMMUNIZATION 65+ LOW/MEDIUM RISK  Completed     IPV IMMUNIZATION  Aged Out     MENINGITIS IMMUNIZATION  Aged Out     BP Readings from Last 3 Encounters:   12/31/19 120/72   12/08/19 135/72   10/17/19 136/68    Wt Readings from Last 3 Encounters:   12/31/19 67.6 kg (149 lb)   12/08/19 68 kg (150 lb)   10/17/19 70.2 kg (154 lb 12.8 oz)                      ROS:  Constitutional, HEENT, cardiovascular, pulmonary, gi and gu systems are negative, except as otherwise  "noted.    OBJECTIVE:   /72   Pulse 70   Temp 97  F (36.1  C) (Tympanic)   Ht 1.689 m (5' 6.5\")   Wt 67.6 kg (149 lb)   BMI 23.69 kg/m   Estimated body mass index is 23.69 kg/m  as calculated from the following:    Height as of this encounter: 1.689 m (5' 6.5\").    Weight as of this encounter: 67.6 kg (149 lb).  EXAM:   GENERAL: healthy, alert and no distress  EYES: Eyes grossly normal to inspection, PERRL and conjunctivae and sclerae normal  HENT: ear canals and TM's normal, nose and mouth without ulcers or lesions  NECK: no adenopathy, no asymmetry, masses, or scars and thyroid normal to palpation  RESP: lungs clear to auscultation - no rales, rhonchi or wheezes  CV: regular rate and rhythm, normal S1 S2, no S3 or S4, no murmur, click or rub, no peripheral edema and peripheral pulses strong  ABDOMEN: soft, nontender, no hepatosplenomegaly, no masses and bowel sounds normal  MS: no gross musculoskeletal defects noted, no edema  KNEE, left: no significant swelling. No redness. Tender just inferior to patella, more on medial side  SKIN: no suspicious lesions or rashes  NEURO: Normal strength and tone, mentation intact and speech normal  PSYCH: mentation appears normal, affect normal/bright    Diagnostic Test Results:  none     ASSESSMENT / PLAN:   (Z00.00) Encounter for Medicare annual wellness exam  (primary encounter diagnosis)  Comment:   Plan: no labs needed today  No med refills needed    (M17.12) Primary osteoarthritis of left knee  Comment: tender over medial side of knee just inferior to patella. Reviewed xrays. Likely arthritis, bone on bone as it does improve once she walks around a little. Discussed options for treatment starting with conservative therapy to seeing ortho  Plan: patient would like to treat symptomatically. Will let me know if pain worsens or starts to impact her on a more daily basis    COUNSELING:  Reviewed preventive health counseling, as reflected in patient instructions       " "Regular exercise       Healthy diet/nutrition    Estimated body mass index is 23.69 kg/m  as calculated from the following:    Height as of this encounter: 1.689 m (5' 6.5\").    Weight as of this encounter: 67.6 kg (149 lb).         reports that she has never smoked. She has never used smokeless tobacco.      Appropriate preventive services were discussed with this patient, including applicable screening as appropriate for cardiovascular disease, diabetes, osteopenia/osteoporosis, and glaucoma.  As appropriate for age/gender, discussed screening for colorectal cancer, prostate cancer, breast cancer, and cervical cancer. Checklist reviewing preventive services available has been given to the patient.    Reviewed patients plan of care and provided an AVS. The Basic Care Plan (routine screening as documented in Health Maintenance) for Christiana meets the Care Plan requirement. This Care Plan has been established and reviewed with the Patient.    Counseling Resources:  ATP IV Guidelines  Pooled Cohorts Equation Calculator  Breast Cancer Risk Calculator  FRAX Risk Assessment  ICSI Preventive Guidelines  Dietary Guidelines for Americans, 2010  USDA's MyPlate  ASA Prophylaxis  Lung CA Screening    Kelly Matthew PA-C  [facility]  "

## 2020-01-10 DIAGNOSIS — K21.00 GASTROESOPHAGEAL REFLUX DISEASE WITH ESOPHAGITIS: ICD-10-CM

## 2020-01-10 RX ORDER — PANTOPRAZOLE SODIUM 40 MG/1
40 TABLET, DELAYED RELEASE ORAL DAILY
Qty: 90 TABLET | Refills: 3 | Status: SHIPPED | OUTPATIENT
Start: 2020-01-10 | End: 2020-04-29

## 2020-01-10 NOTE — TELEPHONE ENCOUNTER
Prescription approved per [facility] Refill Protocol.    Elisha TONEY RN

## 2020-01-10 NOTE — TELEPHONE ENCOUNTER
"Requested Prescriptions   Pending Prescriptions Disp Refills     pantoprazole (PROTONIX) 40 MG EC tablet [Pharmacy Med Name: PANTOPRAZOLE 40MG TABLETS]  Last Written Prescription Date:  12/12/19  Last Fill Quantity: 30,  # refills: 0   Last office visit: 12/31/2019 with prescribing provider:  natali   Future Office Visit:     30 tablet 0     Sig: TAKE 1 TABLET(40 MG) BY MOUTH DAILY       PPI Protocol Passed - 1/10/2020  6:51 AM        Passed - Not on Clopidogrel (unless Pantoprazole ordered)        Passed - No diagnosis of osteoporosis on record        Passed - Recent (12 mo) or future (30 days) visit within the authorizing provider's specialty     Patient has had an office visit with the authorizing provider or a provider within the authorizing providers department within the previous 12 mos or has a future within next 30 days. See \"Patient Info\" tab in inbasket, or \"Choose Columns\" in Meds & Orders section of the refill encounter.              Passed - Medication is active on med list        Passed - Patient is age 18 or older        Passed - No active pregnacy on record        Passed - No positive pregnancy test in past 12 months          "

## 2020-01-13 DIAGNOSIS — F33.0 MAJOR DEPRESSIVE DISORDER, RECURRENT EPISODE, MILD (H): Chronic | ICD-10-CM

## 2020-01-14 RX ORDER — BUPROPION HYDROCHLORIDE 75 MG/1
TABLET ORAL
Qty: 90 TABLET | Refills: 3 | Status: SHIPPED | OUTPATIENT
Start: 2020-01-14

## 2020-01-14 NOTE — TELEPHONE ENCOUNTER
Prescription approved per [facility] Refill Protocol.  Roel Garza RN

## 2020-02-06 DIAGNOSIS — Z98.61 CAD S/P PERCUTANEOUS CORONARY ANGIOPLASTY: ICD-10-CM

## 2020-02-06 DIAGNOSIS — I25.10 CAD S/P PERCUTANEOUS CORONARY ANGIOPLASTY: ICD-10-CM

## 2020-02-06 NOTE — TELEPHONE ENCOUNTER
"ATORVASTATIN 40MG TABLETS      Last Written Prescription Date:  1/17/19  Last Fill Quantity: 90,   # refills: 3  Last Office Visit: 12/31/19  Future Office visit:       Requested Prescriptions   Pending Prescriptions Disp Refills     atorvastatin (LIPITOR) 40 MG tablet [Pharmacy Med Name: ATORVASTATIN 40MG TABLETS] 90 tablet 3     Sig: TAKE 1 TABLET BY MOUTH EVERY DAY       Statins Protocol Failed - 2/6/2020  3:24 AM        Failed - LDL on file in past 12 months     Recent Labs   Lab Test 09/05/17  0835   LDL 57             Passed - No abnormal creatine kinase in past 12 months     No lab results found.             Passed - Recent (12 mo) or future (30 days) visit within the authorizing provider's specialty     Patient has had an office visit with the authorizing provider or a provider within the authorizing providers department within the previous 12 mos or has a future within next 30 days. See \"Patient Info\" tab in inbasket, or \"Choose Columns\" in Meds & Orders section of the refill encounter.              Passed - Medication is active on med list        Passed - Patient is age 18 or older        Passed - No active pregnancy on record        Passed - No positive pregnancy test in past 12 months          "

## 2020-02-07 RX ORDER — ATORVASTATIN CALCIUM 40 MG/1
40 TABLET, FILM COATED ORAL DAILY
Qty: 30 TABLET | Refills: 0 | Status: SHIPPED | OUTPATIENT
Start: 2020-02-07 | End: 2020-03-14

## 2020-02-20 ENCOUNTER — TELEPHONE (OUTPATIENT)
Dept: FAMILY MEDICINE | Facility: CLINIC | Age: 85
End: 2020-02-20

## 2020-02-20 NOTE — TELEPHONE ENCOUNTER
Call placed to patient.  Voicemail message left with recommendations per Dr Martinez, clinic RN call back number given if questions.  Kirsten Decker RN

## 2020-02-20 NOTE — TELEPHONE ENCOUNTER
"Received call from [facility].  Patient left voicemail message C/O side pain.    Call placed to patient.  Reports 2 week history of intermittent left upper abdominal pain.  Starts at left side, near ribs extends across her abdomen towards right side.  This has been happening 1x daily, today has lasted longer.  Describes as sharp/achy, resolves when patient presses firmly to site. Pain is better if she is standing.  Left to umbilicus is the most discomfort.  Denies trauma or fall.  Reports daily BM \"maybe a bit firm\" denies blood or black in stool.  Denies abdominal distention or N/V.  No change to diet.  Denies ill contact, afebrile.  Scheduled visit with PCP tomorrow.  Will route to provider in clinic to review.  Reviewed, if pain becomes severe, unable to resolve, N/V fever then ED.  Patient verbalizes understanding, agrees with plan.  Kirsten Decker RN     "

## 2020-02-21 ENCOUNTER — OFFICE VISIT (OUTPATIENT)
Dept: FAMILY MEDICINE | Facility: CLINIC | Age: 85
End: 2020-02-21
Payer: MEDICARE

## 2020-02-21 ENCOUNTER — ANCILLARY PROCEDURE (OUTPATIENT)
Dept: GENERAL RADIOLOGY | Facility: CLINIC | Age: 85
End: 2020-02-21
Attending: PHYSICIAN ASSISTANT
Payer: MEDICARE

## 2020-02-21 VITALS
BODY MASS INDEX: 23.7 KG/M2 | HEART RATE: 70 BPM | HEIGHT: 67 IN | DIASTOLIC BLOOD PRESSURE: 64 MMHG | TEMPERATURE: 97 F | WEIGHT: 151 LBS | SYSTOLIC BLOOD PRESSURE: 128 MMHG

## 2020-02-21 DIAGNOSIS — R10.12 LUQ ABDOMINAL PAIN: Primary | ICD-10-CM

## 2020-02-21 DIAGNOSIS — R10.12 LUQ ABDOMINAL PAIN: ICD-10-CM

## 2020-02-21 LAB
ALBUMIN SERPL-MCNC: 3.4 G/DL (ref 3.4–5)
ALP SERPL-CCNC: 115 U/L (ref 40–150)
ALT SERPL W P-5'-P-CCNC: 20 U/L (ref 0–50)
ANION GAP SERPL CALCULATED.3IONS-SCNC: 4 MMOL/L (ref 3–14)
AST SERPL W P-5'-P-CCNC: 22 U/L (ref 0–45)
BASOPHILS # BLD AUTO: 0.1 10E9/L (ref 0–0.2)
BASOPHILS NFR BLD AUTO: 0.6 %
BILIRUB SERPL-MCNC: 0.5 MG/DL (ref 0.2–1.3)
BUN SERPL-MCNC: 22 MG/DL (ref 7–30)
CALCIUM SERPL-MCNC: 8.6 MG/DL (ref 8.5–10.1)
CHLORIDE SERPL-SCNC: 108 MMOL/L (ref 94–109)
CO2 SERPL-SCNC: 26 MMOL/L (ref 20–32)
CREAT SERPL-MCNC: 0.97 MG/DL (ref 0.52–1.04)
DIFFERENTIAL METHOD BLD: NORMAL
EOSINOPHIL # BLD AUTO: 0.4 10E9/L (ref 0–0.7)
EOSINOPHIL NFR BLD AUTO: 5.1 %
ERYTHROCYTE [DISTWIDTH] IN BLOOD BY AUTOMATED COUNT: 13.9 % (ref 10–15)
GFR SERPL CREATININE-BSD FRML MDRD: 52 ML/MIN/{1.73_M2}
GLUCOSE SERPL-MCNC: 97 MG/DL (ref 70–99)
HCT VFR BLD AUTO: 37.8 % (ref 35–47)
HGB BLD-MCNC: 12.5 G/DL (ref 11.7–15.7)
LYMPHOCYTES # BLD AUTO: 1.6 10E9/L (ref 0.8–5.3)
LYMPHOCYTES NFR BLD AUTO: 19.8 %
MCH RBC QN AUTO: 30.8 PG (ref 26.5–33)
MCHC RBC AUTO-ENTMCNC: 33.1 G/DL (ref 31.5–36.5)
MCV RBC AUTO: 93 FL (ref 78–100)
MONOCYTES # BLD AUTO: 1 10E9/L (ref 0–1.3)
MONOCYTES NFR BLD AUTO: 11.9 %
NEUTROPHILS # BLD AUTO: 5.2 10E9/L (ref 1.6–8.3)
NEUTROPHILS NFR BLD AUTO: 62.6 %
PLATELET # BLD AUTO: 271 10E9/L (ref 150–450)
POTASSIUM SERPL-SCNC: 4.4 MMOL/L (ref 3.4–5.3)
PROT SERPL-MCNC: 6.8 G/DL (ref 6.8–8.8)
RBC # BLD AUTO: 4.06 10E12/L (ref 3.8–5.2)
SODIUM SERPL-SCNC: 138 MMOL/L (ref 133–144)
WBC # BLD AUTO: 8.3 10E9/L (ref 4–11)

## 2020-02-21 PROCEDURE — 99213 OFFICE O/P EST LOW 20 MIN: CPT | Performed by: PHYSICIAN ASSISTANT

## 2020-02-21 PROCEDURE — 87086 URINE CULTURE/COLONY COUNT: CPT | Performed by: PHYSICIAN ASSISTANT

## 2020-02-21 PROCEDURE — 80053 COMPREHEN METABOLIC PANEL: CPT | Performed by: PHYSICIAN ASSISTANT

## 2020-02-21 PROCEDURE — 36415 COLL VENOUS BLD VENIPUNCTURE: CPT | Performed by: PHYSICIAN ASSISTANT

## 2020-02-21 PROCEDURE — 74019 RADEX ABDOMEN 2 VIEWS: CPT | Mod: FY

## 2020-02-21 PROCEDURE — 85025 COMPLETE CBC W/AUTO DIFF WBC: CPT | Performed by: PHYSICIAN ASSISTANT

## 2020-02-21 ASSESSMENT — PAIN SCALES - GENERAL: PAINLEVEL: NO PAIN (0)

## 2020-02-21 ASSESSMENT — MIFFLIN-ST. JEOR: SCORE: 1139.62

## 2020-02-21 NOTE — LETTER
February 26, 2020      Christiana Sal  [address]   [place] [address]        Dear Christiana,    We are writing to inform you of your test results. All labs came back within normal limits.        Resulted Orders   CBC with platelets and differential   Result Value Ref Range    WBC 8.3 4.0 - 11.0 10e9/L    RBC Count 4.06 3.8 - 5.2 10e12/L    Hemoglobin 12.5 11.7 - 15.7 g/dL    Hematocrit 37.8 35.0 - 47.0 %    MCV 93 78 - 100 fl    MCH 30.8 26.5 - 33.0 pg    MCHC 33.1 31.5 - 36.5 g/dL    RDW 13.9 10.0 - 15.0 %    Platelet Count 271 150 - 450 10e9/L    % Neutrophils 62.6 %    % Lymphocytes 19.8 %    % Monocytes 11.9 %    % Eosinophils 5.1 %    % Basophils 0.6 %    Absolute Neutrophil 5.2 1.6 - 8.3 10e9/L    Absolute Lymphocytes 1.6 0.8 - 5.3 10e9/L    Absolute Monocytes 1.0 0.0 - 1.3 10e9/L    Absolute Eosinophils 0.4 0.0 - 0.7 10e9/L    Absolute Basophils 0.1 0.0 - 0.2 10e9/L    Diff Method Automated Method    Comprehensive metabolic panel (BMP + Alb, Alk Phos, ALT, AST, Total. Bili, TP)   Result Value Ref Range    Sodium 138 133 - 144 mmol/L    Potassium 4.4 3.4 - 5.3 mmol/L    Chloride 108 94 - 109 mmol/L    Carbon Dioxide 26 20 - 32 mmol/L    Anion Gap 4 3 - 14 mmol/L    Glucose 97 70 - 99 mg/dL    Urea Nitrogen 22 7 - 30 mg/dL    Creatinine 0.97 0.52 - 1.04 mg/dL    GFR Estimate 52 (L) >60 mL/min/[1.73_m2]      Comment:      Non  GFR Calc  Starting 12/18/2018, serum creatinine based estimated GFR (eGFR) will be   calculated using the Chronic Kidney Disease Epidemiology Collaboration   (CKD-EPI) equation.      GFR Estimate If Black 60 (L) >60 mL/min/[1.73_m2]      Comment:       GFR Calc  Starting 12/18/2018, serum creatinine based estimated GFR (eGFR) will be   calculated using the Chronic Kidney Disease Epidemiology Collaboration   (CKD-EPI) equation.      Calcium 8.6 8.5 - 10.1 mg/dL    Bilirubin Total 0.5 0.2 - 1.3 mg/dL    Albumin 3.4 3.4 - 5.0 g/dL     Protein Total 6.8 6.8 - 8.8 g/dL    Alkaline Phosphatase 115 40 - 150 U/L    ALT 20 0 - 50 U/L    AST 22 0 - 45 U/L       If you have any questions or concerns, please call the clinic at the number listed above.       Sincerely,        Kelly Matthew PA-C

## 2020-02-21 NOTE — PROGRESS NOTES
Subjective     Christiana Sal is a 88 year old female who presents to clinic today for the following health issues:    HPI   ABDOMINAL   PAIN     Onset: 2 weeks     Description:   Character: Sharp at times   Location: left upper quadrant  Radiation: None    Intensity: moderate to severe    Progression of Symptoms:  intermittent    Accompanying Signs & Symptoms:  Fever/Chills?: no   Gas/Bloating: no   Nausea: no   Vomitting: no   Diarrhea?: no   Constipation:no   Dysuria or Hematuria: no    History:   Trauma: no   Previous similar pain: no    Previous tests done: none    Precipitating factors:   Does the pain change with:     Food: no      BM: no     Urination: no     Alleviating factors:  Pressure on the area helps    Therapies Tried and outcome: Putting pressure on the area    LMP:  Post menopausal          BP Readings from Last 3 Encounters:   02/21/20 128/64   12/31/19 120/72   12/08/19 135/72    Wt Readings from Last 3 Encounters:   02/21/20 68.5 kg (151 lb)   12/31/19 67.6 kg (149 lb)   12/08/19 68 kg (150 lb)            Feeling good other than the intermittent/random pain  Always starts in her left upper abdomen and then gradually fades as it gets towards the center of the upper abdomen  No lower abdominal pain  Lasts for 10min to 30 min - usually more on there shorter side  Relieved by applying pressure to the area  Not associated with eating, bowel movements, urinary symptoms, position or activity  Bowels fairly normal - usually goes a couple times a day although notes she doesn't feel she is always completely emptying when she does go  No blood in the stool. No black stools  No burping or reflux symptoms - she feels those have been well controlled       Reviewed and updated as needed this visit by Provider         Review of Systems   Remainder of ROS obtained and found to be negative other than that which was documented above        Objective    /64   Pulse 70   Temp 97  F (36.1  C)  "(Tympanic)   Ht 1.689 m (5' 6.5\")   Wt 68.5 kg (151 lb)   BMI 24.01 kg/m    Body mass index is 24.01 kg/m .  Physical Exam   GENERAL: healthy, alert and no distress  CV: regular rates and rhythm, normal S1 S2, no S3 or S4 and no murmur, click or rub  ABDOMEN: normal bowel sounds, nontender across abdomen other than some mild discomfort with palpation in the RUQ    Diagnostic Test Results:  Labs pending  Xray, abdomen:   ABDOMEN TWO-THREE VIEW  2/21/2020 11:43 AM      HISTORY: LUQ abdominal pain.     COMPARISON: None.     FINDINGS: Small to moderate amount of stool. No free air. There are no  air filled distended loops of small bowel. The colon is not distended.  The lung bases are unremarkable.                                                                      IMPRESSION: Nonobstructed bowel gas pattern.     NATHAN GONZALEZ MD        Assessment & Plan     (R10.12) LUQ abdominal pain  (primary encounter diagnosis)  Comment: not getting worse but still coming and going. No red flags on exam. Possible some constipation - will have her continue with daily miralax (just restarted last night) and see if symptoms improve. If no change will proceed with abdominal US  Plan: XR Abdomen 2 Views, CBC with platelets and         differential, Comprehensive metabolic panel         (BMP + Alb, Alk Phos, ALT, AST, Total. Bili,         TP), UA with Microscopic reflex to Culture              Return in about 1 week (around 2/28/2020) for ultrasound if symptoms still persist.    Kelly Matthew PA-C  [facility]        "

## 2020-02-21 NOTE — PATIENT INSTRUCTIONS
Make sure stools are soft by adding a stool softener such as colace and either a little miralax or prune juice as I want to exclude a back up of stool as the problem      If symptoms persist into next week, schedule the abdominal ultrasound by calling [phone]

## 2020-02-22 LAB
BACTERIA SPEC CULT: NORMAL
SPECIMEN SOURCE: NORMAL

## 2020-02-26 ENCOUNTER — HOSPITAL ENCOUNTER (OUTPATIENT)
Facility: CLINIC | Age: 85
End: 2020-02-26
Attending: PODIATRIST | Admitting: PODIATRIST
Payer: MEDICARE

## 2020-02-26 ENCOUNTER — TRANSFERRED RECORDS (OUTPATIENT)
Dept: HEALTH INFORMATION MANAGEMENT | Facility: CLINIC | Age: 85
End: 2020-02-26

## 2020-03-09 DIAGNOSIS — Z98.61 CAD S/P PERCUTANEOUS CORONARY ANGIOPLASTY: ICD-10-CM

## 2020-03-09 DIAGNOSIS — F33.0 MAJOR DEPRESSIVE DISORDER, RECURRENT EPISODE, MILD (H): ICD-10-CM

## 2020-03-09 DIAGNOSIS — I25.10 CAD S/P PERCUTANEOUS CORONARY ANGIOPLASTY: ICD-10-CM

## 2020-03-09 DIAGNOSIS — E03.9 HYPOTHYROIDISM, UNSPECIFIED TYPE: ICD-10-CM

## 2020-03-09 NOTE — TELEPHONE ENCOUNTER
CITALOPRAM 20MG TABLETS      Last Written Prescription Date:  12/6/19  Last Fill Quantity: 90,   # refills: 0  Last Office Visit: 2/21/20  Future Office visit:    Next 5 appointments (look out 90 days)    Mar 10, 2020  9:00 AM CDT  Return Visit with Daja Angeles PA-C  [facility] [address] [address]  [phone]           atorvastatin (LIPITOR) 40 MG tablet      Last Written Prescription Date:  2/7/20  Last Fill Quantity: 30,   # refills: 0  Last Office Visit: 2/21/20  Future Office visit:    Next 5 appointments (look out 90 days)    Mar 10, 2020  9:00 AM CDT  Return Visit with Daja Angeles PA-C  [facility]) [address] [address]  [phone]           levothyroxine (SYNTHROID/LEVOTHROID) 75 MCG tablet      Last Written Prescription Date:  5/17/19  Last Fill Quantity: 90,   # refills: 2  Last Office Visit: 2/21/20  Future Office visit:    Next 5 appointments (look out 90 days)    Mar 10, 2020  9:00 AM CDT  Return Visit with Daja Angeles PA-C  [facility] ([facility]) [address] [place] [address]  [phone]           Requested Prescriptions   Pending Prescriptions Disp Refills     citalopram (CELEXA) 20 MG tablet [Pharmacy Med Name: CITALOPRAM 20MG TABLETS] 90 tablet 0     Sig: TAKE 1 TABLET(20 MG) BY MOUTH DAILY       SSRIs Protocol Passed - 3/9/2020  3:25 AM        Passed - PHQ-9 score less than 5 in past 6 months     Please review last PHQ-9 score.           Passed - Medication is active on med list        Passed - Patient is age 18 or older        Passed - No active pregnancy on record        Passed - No positive pregnancy test in last 12 months        Passed - Recent (6 mo) or future (30 days) visit within the authorizing provider's specialty      "Patient had office visit in the last 6 months or has a visit in the next 30 days with authorizing provider or within the authorizing provider's specialty.  See \"Patient Info\" tab in inbasket, or \"Choose Columns\" in Meds & Orders section of the refill encounter.               atorvastatin (LIPITOR) 40 MG tablet [Pharmacy Med Name: ATORVASTATIN 40MG TABLETS] 30 tablet 0     Sig: TAKE 1 TABLET(40 MG) BY MOUTH DAILY       Statins Protocol Failed - 3/9/2020  3:25 AM        Failed - LDL on file in past 12 months     Recent Labs   Lab Test 09/05/17  0835   LDL 57             Passed - No abnormal creatine kinase in past 12 months     No lab results found.             Passed - Recent (12 mo) or future (30 days) visit within the authorizing provider's specialty     Patient has had an office visit with the authorizing provider or a provider within the authorizing providers department within the previous 12 mos or has a future within next 30 days. See \"Patient Info\" tab in inYourMechanicsket, or \"Choose Columns\" in Meds & Orders section of the refill encounter.              Passed - Medication is active on med list        Passed - Patient is age 18 or older        Passed - No active pregnancy on record        Passed - No positive pregnancy test in past 12 months           levothyroxine (SYNTHROID/LEVOTHROID) 75 MCG tablet [Pharmacy Med Name: LEVOTHYROXINE 0.075MG (75MCG) TABS] 90 tablet 2     Sig: TAKE 1 TABLET BY MOUTH EVERY DAY       Thyroid Protocol Passed - 3/9/2020  3:25 AM        Passed - Patient is 12 years or older        Passed - Recent (12 mo) or future (30 days) visit within the authorizing provider's specialty     Patient has had an office visit with the authorizing provider or a provider within the authorizing providers department within the previous 12 mos or has a future within next 30 days. See \"Patient Info\" tab in inbasket, or \"Choose Columns\" in Meds & Orders section of the refill encounter.              Passed - " Medication is active on med list        Passed - Normal TSH on file in past 12 months     Recent Labs   Lab Test 04/12/19  1031   TSH 2.60              Passed - No active pregnancy on record     If patient is pregnant or has had a positive pregnancy test, please check TSH.          Passed - No positive pregnancy test in past 12 months     If patient is pregnant or has had a positive pregnancy test, please check TSH.

## 2020-03-13 DIAGNOSIS — F33.0 MAJOR DEPRESSIVE DISORDER, RECURRENT EPISODE, MILD (H): ICD-10-CM

## 2020-03-13 DIAGNOSIS — Z98.61 CAD S/P PERCUTANEOUS CORONARY ANGIOPLASTY: ICD-10-CM

## 2020-03-13 DIAGNOSIS — E03.9 HYPOTHYROIDISM, UNSPECIFIED TYPE: ICD-10-CM

## 2020-03-13 DIAGNOSIS — I25.10 CAD S/P PERCUTANEOUS CORONARY ANGIOPLASTY: ICD-10-CM

## 2020-03-13 RX ORDER — CITALOPRAM HYDROBROMIDE 20 MG/1
20 TABLET ORAL DAILY
Qty: 90 TABLET | Refills: 0 | Status: CANCELLED | OUTPATIENT
Start: 2020-03-13

## 2020-03-13 RX ORDER — LEVOTHYROXINE SODIUM 75 UG/1
75 TABLET ORAL DAILY
Qty: 90 TABLET | Refills: 2 | Status: CANCELLED | OUTPATIENT
Start: 2020-03-13

## 2020-03-13 RX ORDER — ATORVASTATIN CALCIUM 40 MG/1
40 TABLET, FILM COATED ORAL DAILY
Qty: 30 TABLET | Refills: 0 | Status: CANCELLED | OUTPATIENT
Start: 2020-03-13

## 2020-03-14 RX ORDER — LEVOTHYROXINE SODIUM 75 UG/1
TABLET ORAL
Qty: 90 TABLET | Refills: 0 | Status: SHIPPED | OUTPATIENT
Start: 2020-03-14 | End: 2020-06-12

## 2020-03-14 RX ORDER — ATORVASTATIN CALCIUM 40 MG/1
TABLET, FILM COATED ORAL
Qty: 90 TABLET | Refills: 0 | Status: SHIPPED | OUTPATIENT
Start: 2020-03-14 | End: 2020-06-12

## 2020-03-14 RX ORDER — CITALOPRAM HYDROBROMIDE 20 MG/1
TABLET ORAL
Qty: 90 TABLET | Refills: 1 | Status: SHIPPED | OUTPATIENT
Start: 2020-03-14 | End: 2020-06-12

## 2020-03-21 ENCOUNTER — NURSE TRIAGE (OUTPATIENT)
Dept: NURSING | Facility: CLINIC | Age: 85
End: 2020-03-21

## 2020-03-21 NOTE — TELEPHONE ENCOUNTER
"\"I am coughing(mild/wet) and weak. I realized I hadn't eaten breakfast. So I ate and now I feel fine. But what should I do if it happens again?\" patient denies any sx at this time. Gave home care advice and to call back if needed or sx develop.   Deana Contreras RN [facility]        Additional Information    Negative: Choked on object of food that could be caught in the throat    Negative: Chest pain is main symptom    Negative: [1] Previous asthma attacks AND [2] this feels like asthma attack    Negative: Cough lasts > 3 weeks    Negative: Wet (productive) cough (i.e., white-yellow, yellow, green, or dipak colored sputum)    Negative: Chest pain  (Exception: MILD central chest pain, present only when coughing)    Negative: Difficulty breathing    Negative: Patient sounds very sick or weak to the triager    Negative: [1] Coughed up blood AND [2] > 1 tablespoon (15 ml) (Exception: blood-tinged sputum)    Negative: Fever > 103 F (39.4 C)    Negative: [1] Fever > 101 F (38.3 C) AND [2] age > 60    Negative: [1] Fever > 100.0 F (37.8 C) AND [2] bedridden (e.g., nursing home patient, CVA, chronic illness, recovering from surgery)    Negative: [1] Fever > 100.0 F (37.8 C) AND [2] diabetes mellitus or weak immune system (e.g., HIV positive, cancer chemo, splenectomy, chronic steroids)    Negative: Wheezing is present    Negative: [1] Ankle swelling AND [2] swelling is increasing    Negative: SEVERE coughing spells (e.g., whooping sound after coughing, vomiting after coughing)    Negative: [1] Continuous (nonstop) coughing interferes with work or school AND [2] no improvement using cough treatment per protocol    Negative: Fever present > 3 days (72 hours)    Negative: [1] Fever returns after gone for over 24 hours AND [2] symptoms worse or not improved    Negative: [1] Using nasal washes and pain medicine > 24 hours AND [2] sinus pain (around cheekbone or eye) persists    Negative: Earache is present    " Negative: Cough has been present for > 3 weeks    Negative: [1] Nasal discharge AND [2] present > 10 days    Negative: [1] Coughed up blood-tinged sputum AND [2] more than once    Negative: [1] Patient also has allergy symptoms (e.g., itchy eyes, clear nasal discharge, postnasal drip) AND [2] they are acting up    Negative: Taking an ACE Inhibitor medication  (e.g., benazepril/LOTENSIN, captopril/CAPOTEN, enalapril/VASOTEC, lisinopril/ZESTRIL)    Negative: Exposure to TB (Tuberculosis)    Cough    Protocols used: COUGH - ACUTE NON-PRODUCTIVE-A-AH

## 2020-04-01 ENCOUNTER — ANCILLARY PROCEDURE (OUTPATIENT)
Dept: CARDIOLOGY | Facility: CLINIC | Age: 85
End: 2020-04-01
Attending: INTERNAL MEDICINE
Payer: MEDICARE

## 2020-04-01 DIAGNOSIS — Z95.0 CARDIAC PACEMAKER IN SITU: ICD-10-CM

## 2020-04-01 PROCEDURE — 93294 REM INTERROG EVL PM/LDLS PM: CPT | Performed by: INTERNAL MEDICINE

## 2020-04-01 PROCEDURE — 93296 REM INTERROG EVL PM/IDS: CPT | Performed by: INTERNAL MEDICINE

## 2020-04-03 LAB
MDC_IDC_EPISODE_DTM: NORMAL
MDC_IDC_EPISODE_DURATION: 123 S
MDC_IDC_EPISODE_DURATION: 124 S
MDC_IDC_EPISODE_DURATION: 125 S
MDC_IDC_EPISODE_ID: NORMAL
MDC_IDC_EPISODE_TYPE: NORMAL
MDC_IDC_LEAD_IMPLANT_DT: NORMAL
MDC_IDC_LEAD_IMPLANT_DT: NORMAL
MDC_IDC_LEAD_LOCATION: NORMAL
MDC_IDC_LEAD_LOCATION: NORMAL
MDC_IDC_LEAD_LOCATION_DETAIL_1: NORMAL
MDC_IDC_LEAD_LOCATION_DETAIL_1: NORMAL
MDC_IDC_LEAD_MFG: NORMAL
MDC_IDC_LEAD_MFG: NORMAL
MDC_IDC_LEAD_MODEL: NORMAL
MDC_IDC_LEAD_MODEL: NORMAL
MDC_IDC_LEAD_POLARITY_TYPE: NORMAL
MDC_IDC_LEAD_POLARITY_TYPE: NORMAL
MDC_IDC_LEAD_SERIAL: NORMAL
MDC_IDC_LEAD_SERIAL: NORMAL
MDC_IDC_MSMT_BATTERY_DTM: NORMAL
MDC_IDC_MSMT_BATTERY_REMAINING_LONGEVITY: 144 MO
MDC_IDC_MSMT_BATTERY_REMAINING_PERCENTAGE: 100 %
MDC_IDC_MSMT_BATTERY_STATUS: NORMAL
MDC_IDC_MSMT_LEADCHNL_RA_IMPEDANCE_VALUE: 678 OHM
MDC_IDC_MSMT_LEADCHNL_RA_PACING_THRESHOLD_AMPLITUDE: 0.8 V
MDC_IDC_MSMT_LEADCHNL_RA_PACING_THRESHOLD_PULSEWIDTH: 0.4 MS
MDC_IDC_MSMT_LEADCHNL_RV_IMPEDANCE_VALUE: 711 OHM
MDC_IDC_MSMT_LEADCHNL_RV_PACING_THRESHOLD_AMPLITUDE: 1.4 V
MDC_IDC_MSMT_LEADCHNL_RV_PACING_THRESHOLD_PULSEWIDTH: 0.4 MS
MDC_IDC_PG_IMPLANT_DTM: NORMAL
MDC_IDC_PG_MFG: NORMAL
MDC_IDC_PG_MODEL: NORMAL
MDC_IDC_PG_SERIAL: NORMAL
MDC_IDC_PG_TYPE: NORMAL
MDC_IDC_SESS_CLINIC_NAME: NORMAL
MDC_IDC_SESS_DTM: NORMAL
MDC_IDC_SESS_TYPE: NORMAL
MDC_IDC_SET_BRADY_AT_MODE_SWITCH_MODE: NORMAL
MDC_IDC_SET_BRADY_AT_MODE_SWITCH_RATE: 170 {BEATS}/MIN
MDC_IDC_SET_BRADY_LOWRATE: 50 {BEATS}/MIN
MDC_IDC_SET_BRADY_MAX_SENSOR_RATE: 120 {BEATS}/MIN
MDC_IDC_SET_BRADY_MAX_TRACKING_RATE: 120 {BEATS}/MIN
MDC_IDC_SET_BRADY_MODE: NORMAL
MDC_IDC_SET_BRADY_PAV_DELAY_HIGH: 100 MS
MDC_IDC_SET_BRADY_PAV_DELAY_LOW: 200 MS
MDC_IDC_SET_BRADY_SAV_DELAY_HIGH: 85 MS
MDC_IDC_SET_BRADY_SAV_DELAY_LOW: 170 MS
MDC_IDC_SET_LEADCHNL_RA_PACING_AMPLITUDE: 2 V
MDC_IDC_SET_LEADCHNL_RA_PACING_CAPTURE_MODE: NORMAL
MDC_IDC_SET_LEADCHNL_RA_PACING_POLARITY: NORMAL
MDC_IDC_SET_LEADCHNL_RA_PACING_PULSEWIDTH: 0.4 MS
MDC_IDC_SET_LEADCHNL_RA_SENSING_ADAPTATION_MODE: NORMAL
MDC_IDC_SET_LEADCHNL_RA_SENSING_POLARITY: NORMAL
MDC_IDC_SET_LEADCHNL_RA_SENSING_SENSITIVITY: 0.25 MV
MDC_IDC_SET_LEADCHNL_RV_PACING_AMPLITUDE: 2.1 V
MDC_IDC_SET_LEADCHNL_RV_PACING_CAPTURE_MODE: NORMAL
MDC_IDC_SET_LEADCHNL_RV_PACING_POLARITY: NORMAL
MDC_IDC_SET_LEADCHNL_RV_PACING_PULSEWIDTH: 0.4 MS
MDC_IDC_SET_LEADCHNL_RV_SENSING_ADAPTATION_MODE: NORMAL
MDC_IDC_SET_LEADCHNL_RV_SENSING_POLARITY: NORMAL
MDC_IDC_SET_LEADCHNL_RV_SENSING_SENSITIVITY: 1.5 MV
MDC_IDC_SET_ZONE_DETECTION_INTERVAL: 375 MS
MDC_IDC_SET_ZONE_TYPE: NORMAL
MDC_IDC_SET_ZONE_VENDOR_TYPE: NORMAL
MDC_IDC_STAT_AT_BURDEN_PERCENT: 0 %
MDC_IDC_STAT_AT_DTM_END: NORMAL
MDC_IDC_STAT_AT_DTM_START: NORMAL
MDC_IDC_STAT_BRADY_DTM_END: NORMAL
MDC_IDC_STAT_BRADY_DTM_START: NORMAL
MDC_IDC_STAT_BRADY_RA_PERCENT_PACED: 1 %
MDC_IDC_STAT_BRADY_RV_PERCENT_PACED: 2 %
MDC_IDC_STAT_EPISODE_RECENT_COUNT: 0
MDC_IDC_STAT_EPISODE_RECENT_COUNT: 13
MDC_IDC_STAT_EPISODE_RECENT_COUNT: 2
MDC_IDC_STAT_EPISODE_RECENT_COUNT_DTM_END: NORMAL
MDC_IDC_STAT_EPISODE_RECENT_COUNT_DTM_START: NORMAL
MDC_IDC_STAT_EPISODE_TYPE: NORMAL
MDC_IDC_STAT_EPISODE_VENDOR_TYPE: NORMAL

## 2020-04-15 ENCOUNTER — VIRTUAL VISIT (OUTPATIENT)
Dept: CARDIOLOGY | Facility: CLINIC | Age: 85
End: 2020-04-15
Payer: MEDICARE

## 2020-04-15 VITALS
DIASTOLIC BLOOD PRESSURE: 71 MMHG | HEART RATE: 76 BPM | WEIGHT: 148 LBS | BODY MASS INDEX: 23.53 KG/M2 | SYSTOLIC BLOOD PRESSURE: 141 MMHG

## 2020-04-15 DIAGNOSIS — I10 BENIGN ESSENTIAL HYPERTENSION: ICD-10-CM

## 2020-04-15 DIAGNOSIS — Z95.0 CARDIAC PACEMAKER IN SITU: ICD-10-CM

## 2020-04-15 DIAGNOSIS — I25.118 CORONARY ARTERY DISEASE OF NATIVE ARTERY OF NATIVE HEART WITH STABLE ANGINA PECTORIS (H): Primary | ICD-10-CM

## 2020-04-15 PROCEDURE — 99442 ZZC PHYSICIAN TELEPHONE EVALUATION 11-20 MIN: CPT | Performed by: NURSE PRACTITIONER

## 2020-04-15 RX ORDER — POLYETHYLENE GLYCOL 3350 17 G/17G
1 POWDER, FOR SOLUTION ORAL DAILY
COMMUNITY

## 2020-04-15 RX ORDER — METOPROLOL SUCCINATE 25 MG/1
25 TABLET, EXTENDED RELEASE ORAL DAILY
Qty: 90 TABLET | Refills: 3 | Status: SHIPPED | OUTPATIENT
Start: 2020-04-15 | End: 2020-06-26 | Stop reason: DRUGHIGH

## 2020-04-15 NOTE — PATIENT INSTRUCTIONS
Medication Changes:  INCREASE metoprolol xl to 25 mg daily (1 tablet)    Recommendations:  1. Check blood pressure at least 1 hour after medications. Call the clinic if your blood pressure is consistently greater than 130/80.  2. Call if blood pressure is less than 90 on top or less than 100 with lightheadedness.    3. Try nitroglycerin for chest pressure   4. Call if continuing to have more chest pressure episodes or shortness of breath, after increasing metoprolol.    Follow-up:  See Saundra CABELLO for cardiology follow up at [facility]: 2 weeks with telephone visit. Call to schedule.     Cardiology Scheduling~[phone]  Cardiology Clinic RNs~[phone] (Chandrika Paul RN and Danelle Price RN)

## 2020-04-15 NOTE — PROGRESS NOTES
"Christiana Sal is a 88 year old female who is being evaluated via a billable telephone visit.      The patient has been notified of following:     \"This telephone visit will be conducted via a call between you and your physician/provider. We have found that certain health care needs can be provided without the need for a physical exam.  This service lets us provide the care you need with a short phone conversation.  If a prescription is necessary we can send it directly to your pharmacy.  If lab work is needed we can place an order for that and you can then stop by our lab to have the test done at a later time.    Telephone visits are billed at different rates depending on your insurance coverage. During this emergency period, for some insurers they may be billed the same as an in-person visit.  Please reach out to your insurance provider with any questions.    If during the course of the call the physician/provider feels a telephone visit is not appropriate, you will not be charged for this service.\"    Patient has given verbal consent for Telephone visit?  Yes    How would you like to obtain your AVS? Mail a copy    Additional provider notes:  See dictatin    Phone call duration: 17 minutes    SHARMIN Meade [facility]        Cardiology Clinic Progress Note  Christiana Sal MRN# [id number]   YOB: 1931 Age: 88 year old      Primary Cardiologist:   Dr. Osman/ Dr. Beverly           History of Presenting Illness:      Christiana Sal is a pleasant 88 year old patient with a past cardiac history significant for   1. CAD with PCI to the RCA 2004, PCI to LAD 2016 complicated by dissection  2. Sinus node dysfunction s/p dual-chamber PPM 2016  3. Hypertension  Past medical history significant for TIA 2002 and GERD.    Nuclear stress test 2017 showed a fixed septal defect, suspect did from LBBB, EF 76%.    Pt was last seen by Dr. Osman in April 2019.  Her device check " showed 1 mode switch for paroxysmal atrial tachycardia lasting 2 minutes.  She had been seen in urgent care for lightheadedness with negative work-up.  She was scheduled to undergo hip replacement and echocardiogram was recommended. Echocardiogram 4/29/2019 showed EF 55 to 60%, mild aortic regurgitation, only trace TR, and moderate concentric LVH.    Pt presents today for late six-month follow-up which is now an annual follow-up.  Device check 4/1/2020 showing 1% atrial paced, 2% ventricular paced, adequate rates per histogram, and no atrial or ventricular arrhythmias.     Over the last year, she had her hip replaced but then unfortunately broke her ankle.  This has limited her activity.  Blood pressure today is mildly elevated.  She describes recent anginal symptoms.  Her prior anginal symptom was chest pressure associated with chest pain.  Approximately 1 month ago she began having an occasional dry cough.  She has a history of a chronic cough with negative work-up other than GERD.  However, her cough returned 1 month ago and has also noted intermittent dyspnea on exertion.  She experienced LEIGH approximately 4 times over the last month.  This occurs while walking in the kitchen and if out on a walk she will need to stop and rest.  She also experienced 3 episodes of chest pressure in the last 2 weeks.  She believes the chest pressure was relieved with nitroglycerin on the first occasion.  The most recent occurred last night and lasted for 30 minutes, but she did not try nitroglycerin.  Her chest pressure has been occurring at rest. Patient reports no PND, orthopnea, presyncope, syncope, edema, heart racing, or palpitations.      She is agreeable to increasing metoprolol to see if this improves her anginal symptoms.  I have also asked her to try her nitroglycerin again, if she has further episodes of chest pressure.  Given coronavirus concerns, we will first try medical management and if needed, can increase  antianginal versus sending for stress testing.  I reviewed calling if she continues with more anginal symptoms before our next visit.     Current Cardiac Medications    mg daily   Atorvastatin 40 mg daily  Metoprolol XL 12.5 mg daily  Nitroglycerin PRN                   Assessment and Plan:       Plan  INCREASE metoprolol xl to 25 mg daily (1 tablet) for angina and HTN    Recommendations:  1. Check blood pressure at least 1 hour after medications. Call the clinic if your blood pressure is consistently greater than 130/80.  2. Call if blood pressure is less than 90 on top or less than 100 with lightheadedness.    3. Try nitroglycerin for chest pressure     Follow-up:  See Saundra CABELLO for cardiology follow up at [facility]: 2 weeks with telephone visit to reassess HTN and angina - may need stress test       1. CAD with angina     PCI to the RCA 2004,     PCI to LAD 2016 complicated by dissection    Nuclear stress test 2017 showed a fixed septal defect    Chest pressure and intermittent LEIGH     Continue statin, ASA, beta-blocker    Consider increasing antianginal vs stress test       2. Hypertension    Not well controlled    Continue metoprolol increased       3. Sinus node dysfunction s/p PPM    Follows with device clinic         Thank you for allowing me to participate in this delightful patient's care.      This note was completed in part using Dragon voice recognition software. Although reviewed after completion, some word and grammatical errors may occur.    Saundra Block, SHARMIN CNP, APRN, CNP           Data:   All laboratory data reviewed        HPI and Plan:   See dictation    Orders Placed This Encounter   Procedures     Follow-Up with Cardiac Advanced Practice Provider       Orders Placed This Encounter   Medications     Homeopathic Products (LEG CRAMPS PO)     polyethylene glycol (MIRALAX) 17 GM/SCOOP powder     Sig: Take 1 capful by mouth daily     metoprolol succinate ER  (TOPROL-XL) 25 MG 24 hr tablet     Sig: Take 1 tablet (25 mg) by mouth daily     Dispense:  90 tablet     Refill:  3     aspirin (ASA) 81 MG tablet     Sig: Take 2 tablets (162 mg) by mouth daily       Medications Discontinued During This Encounter   Medication Reason     melatonin 3 MG tablet Stopped by Patient     oxyCODONE HCl (OXAYDO) 5 MG TABA Stopped by Patient     pramipexole (MIRAPEX) 0.125 MG tablet Alternate therapy     sennosides (SENOKOT) 8.6 MG tablet Alternate therapy     metoprolol succinate ER (TOPROL-XL) 25 MG 24 hr tablet          Encounter Diagnoses   Name Primary?     Cardiac pacemaker in situ      Coronary artery disease of native artery of native heart with stable angina pectoris (H) Yes     Benign essential hypertension        CURRENT MEDICATIONS:  Current Outpatient Medications   Medication Sig Dispense Refill     acetaminophen (TYLENOL) 500 MG tablet Take 1,000 mg by mouth 3 times daily       aspirin (ASA) 81 MG tablet Take 2 tablets (162 mg) by mouth daily       atorvastatin (LIPITOR) 40 MG tablet TAKE 1 TABLET(40 MG) BY MOUTH DAILY 90 tablet 0     buPROPion (WELLBUTRIN) 75 MG tablet TAKE 1 TABLET(75 MG) BY MOUTH DAILY 90 tablet 3     CALCIUM 600 + D OR 1 tablet by mouth daily       calcium carbonate (TUMS) 500 MG chewable tablet Take 1 chew tab by mouth 3 times daily as needed for heartburn       citalopram (CELEXA) 20 MG tablet TAKE 1 TABLET(20 MG) BY MOUTH DAILY 90 tablet 1     famotidine (PEPCID) 20 MG tablet Take 1 tablet (20 mg) by mouth At Bedtime 180 tablet 1     Homeopathic Products (LEG CRAMPS PO)        levothyroxine (SYNTHROID/LEVOTHROID) 75 MCG tablet TAKE 1 TABLET BY MOUTH EVERY DAY 90 tablet 0     magnesium 250 MG tablet Take 1 tablet by mouth every evening        meclizine (ANTIVERT) 25 MG tablet TAKE ONE TABLET BY MOUTH EVERY 6 HOURS AS NEEDED FOR DIZZINESS 30 tablet 3     metoprolol succinate ER (TOPROL-XL) 25 MG 24 hr tablet Take 1 tablet (25 mg) by mouth daily 90 tablet  3     Multiple Vitamins-Minerals (OCUVITE PO) Take 1 tablet by mouth daily.       nitroGLYcerin (NITROSTAT) 0.4 MG sublingual tablet Place 1 tablet (0.4 mg) under the tongue every 5 minutes as needed for chest pain (call your doctor if you take a third dose) 25 tablet 3     pantoprazole (PROTONIX) 40 MG EC tablet Take 1 tablet (40 mg) by mouth daily DO NOT CRUSH. 90 tablet 3     polyethylene glycol (MIRALAX) 17 GM/SCOOP powder Take 1 capful by mouth daily       tolterodine (DETROL) 2 MG tablet Take 2 mg by mouth At Bedtime       Vitamin D, Cholecalciferol, 1000 units TABS Take 2,000 Units by mouth daily         ALLERGIES     Allergies   Allergen Reactions     Demerol Nausea     Lisinopril Cough     Meperidine Unknown     Sulfa Drugs Other (See Comments) and Unknown     Questionable itching reported by patient       PAST MEDICAL HISTORY:  Past Medical History:   Diagnosis Date     Coronary atherosclerosis of native coronary artery     s/p MI ([facility])     Essential hypertension, benign      GERD (gastroesophageal reflux disease)      History of transient ischemic attack (TIA)      Hyperlipidemia LDL goal <100      Hypothyroidism      Major depressive disorder, recurrent episode, mild (H) 5/18/2011     Peptic ulcer disease with hemorrhage     Remote history of stomach ulcer, requiring transfusion after chronic bleeding       PAST SURGICAL HISTORY:  Past Surgical History:   Procedure Laterality Date     APPENDECTOMY       ARTHROPLASTY HIP Left 5/2/2019    Procedure: ARTHROPLASTY, HIP;  Surgeon: Jacinto Adams MD;  Location: [facility] OR     CARDIAC CATHERIZATION  1/15/04    drug-eluding stent RCA, Dr. Pérez, [facility]     CARDIAC CATHERIZATION  2/16/12    diffuse mild/mod disease, no new stent     CATARACT IOL, RT/LT  1/26/12    RT, Dr. Wynne     CHOLECYSTECTOMY, OPEN       COLONOSCOPY  2008    [facility]     FRACTURE TX, ANKLE RT/LT      LT, 2 screws remain     HC EXCISE HAND/FOOT NEUROMA       RT     HC LARYNGOSCOPY DIRECT W VOCAL CORD INJECTION      vocal cord polypectomy     HC REMOVAL OF OVARIAN CYST(S)       HC TRANSCATH STENT INIT VESSEL,PERCUT      x 2     OPEN REDUCTION INTERNAL FIXATION ANKLE Right 12/3/2018    Procedure: OPEN REDUCTION INTERNAL FIXATION Right ANKLE;  Surgeon: Jacinto Adams MD;  Location: [facility] OR     REPAIR HAMMER TOE  9/13/10    multiple + RT great toe tendon release, Dr. Sanchez DPM     STENT  -    Cardiac stents 6 placed.       FAMILY HISTORY:  Family History   Problem Relation Age of Onset     C.A.D. Father      Hypertension Father      Myocardial Infarction Father 66         from MI     C.A.D. Brother      Myocardial Infarction Brother      Cancer Brother         skin ca     Breast Cancer Sister      Cancer - colorectal Sister      Heart Disease Sister      Neurologic Disorder Mother         migraine     C.A.D. Mother      Hypertension Mother      Heart Failure Mother      Thyroid Disease Son         cretinism     Hypertension Son      Heart Disease Son      Psychotic Disorder Sister      Hypertension Sister      Heart Disease Sister         CABG     Hypertension Brother      C.A.D. Brother      Myocardial Infarction Brother      Arthritis Daughter         RA     Hypertension Sister      Heart Disease Sister      Cancer Sister      Blood Disease Sister      Neurologic Disorder Child         migraine (2 children)       SOCIAL HISTORY:  Social History     Socioeconomic History     Marital status:      Spouse name: None     Number of children: 4     Years of education: some adry     Highest education level: None   Occupational History     Employer: RETIRED   Social Needs     Financial resource strain: None     Food insecurity     Worry: None     Inability: None     Transportation needs     Medical: None     Non-medical: None   Tobacco Use     Smoking status: Never Smoker     Smokeless tobacco: Never Used     Tobacco comment: Never smoker; no secondhand smoke  exposure   Substance and Sexual Activity     Alcohol use: Yes     Comment: social     Drug use: No     Sexual activity: Not Currently   Lifestyle     Physical activity     Days per week: None     Minutes per session: None     Stress: None   Relationships     Social connections     Talks on phone: None     Gets together: None     Attends Synagogue service: None     Active member of club or organization: None     Attends meetings of clubs or organizations: None     Relationship status: None     Intimate partner violence     Fear of current or ex partner: None     Emotionally abused: None     Physically abused: None     Forced sexual activity: None   Other Topics Concern     Parent/sibling w/ CABG, MI or angioplasty before 65F 55M? No   Social History Narrative    She just moved in to [place] in a assisted center.        Review of Systems:  Skin:          Eyes:         ENT:         Respiratory:          Cardiovascular:         Gastroenterology:        Genitourinary:         Musculoskeletal:         Neurologic:         Psychiatric:         Heme/Lymph/Imm:         Endocrine:           Physical Exam:  Vitals: BP (!) 141/71   Pulse 76   Wt 67.1 kg (148 lb)   BMI 23.53 kg/m      Constitutional:           Skin:             Head:           Eyes:           Lymph:      ENT:           Neck:           Respiratory:            Cardiac:                                                           GI:           Extremities and Muscular Skeletal:                 Neurological:           Psych:           CC  Darrell Osman MD  [facility]  [id number] [id number]

## 2020-04-21 ENCOUNTER — TELEPHONE (OUTPATIENT)
Dept: FAMILY MEDICINE | Facility: CLINIC | Age: 85
End: 2020-04-21

## 2020-04-21 NOTE — TELEPHONE ENCOUNTER
It doesn't sound infectious or anything to be overly concerned about. Definitely not something that can't wait to be addressed when all of the COVID craziness has passed.   If it is a fungal cause, she could try over the counter topicals but honestly having fungus in our toenails is not uncommon and again is not bad other than the cosmetic appearance so I'm not sure it is worth her effort right now    Kelly

## 2020-04-21 NOTE — TELEPHONE ENCOUNTER
Reason for call:  Patient reporting a symptom    Symptom or request: Christiana notified that her right foot toenails are all during white.  Doesn't think that is normal.  Left foot seems fine.  No pain, but wondering what it is and what she can do about it.  Please call and assess. Thank you..Consuelo Hawthorne    Duration (how long have symptoms been present): a few weeks or more    Have you been treated for this before? No    Additional comments: [facility]    Phone Number patient can be reached at:  Home number on file [phone] (home)    Best Time:  Any time    Can we leave a detailed message on this number:  YES    Call taken on 4/21/2020 at 2:12 PM by Consuelo Hawthorne

## 2020-04-21 NOTE — TELEPHONE ENCOUNTER
"S-(situation): Looking for recommendations, if any, for white toenails on one foot.    B-(background): Said she's noticed if for \"a couple weeks or more.\"     A-(assessment):   \"Toenails on the one foot are turning white.\"  She Googled it and \"it could be a toenail infection.\"  Denies pain, \"There's no problem with it all.\"  Pt does not use MyChart.   \"Wondering if it is anything that should be checked on.\"    R-(recommendations): Have pt try something OTC for now?    Thanks,     Elisha TONEY, RN, BSN        "

## 2020-04-21 NOTE — TELEPHONE ENCOUNTER
Message below relayed to; this RN and pt had essentially discussed the same plan. Thanks for the input,    Elisha TONEY RN, BSN

## 2020-04-29 ENCOUNTER — VIRTUAL VISIT (OUTPATIENT)
Dept: CARDIOLOGY | Facility: CLINIC | Age: 85
End: 2020-04-29
Attending: NURSE PRACTITIONER
Payer: MEDICARE

## 2020-04-29 VITALS
DIASTOLIC BLOOD PRESSURE: 59 MMHG | SYSTOLIC BLOOD PRESSURE: 121 MMHG | HEART RATE: 68 BPM | WEIGHT: 148 LBS | BODY MASS INDEX: 23.53 KG/M2

## 2020-04-29 DIAGNOSIS — I25.10 CORONARY ARTERY DISEASE INVOLVING NATIVE CORONARY ARTERY OF NATIVE HEART WITHOUT ANGINA PECTORIS: Primary | ICD-10-CM

## 2020-04-29 DIAGNOSIS — I10 BENIGN ESSENTIAL HYPERTENSION: ICD-10-CM

## 2020-04-29 PROCEDURE — 99441 ZZC PHYSICIAN TELEPHONE EVALUATION 5-10 MIN: CPT | Performed by: NURSE PRACTITIONER

## 2020-04-29 NOTE — LETTER
4/29/2020      RE: Christiana Sal  [address]  [place] [address]       Dear Colleague,    Thank you for the opportunity to participate in the care of your patient, Christiana Sal, at the [facility]. Please see a copy of my visit note below.    Cardiology Clinic Progress Note  Christiana Sal MRN# [id number]   YOB: 1931 Age: 88 year old      Primary Cardiologist:   Dr. Osman/ Dr. Beverly           History of Presenting Illness:      Christiana Sal is a pleasant 88 year old patient with a past cardiac history significant for   1. CAD with PCI to the RCA 2004, PCI to LAD 2016 complicated by dissection  2. Sinus node dysfunction s/p dual-chamber PPM 2016  3. Hypertension  Past medical history significant for TIA 2002 and GERD.    Nuclear stress test 2017 showed a fixed septal defect, suspect did from LBBB, EF 76%.    Pt saw Dr. Osman in April 2019.  Her device check showed 1 mode switch for paroxysmal atrial tachycardia lasting 2 minutes.  She had been seen in urgent care for lightheadedness with negative work-up.  She was scheduled to undergo hip replacement and echocardiogram was recommended. Echocardiogram 4/29/2019 showed EF 55 to 60%, mild aortic regurgitation, only trace TR, and moderate concentric LVH.    She was last seen by me on 4/15/2020 in a virtual visit, for annual follow-up. Device check 4/1/2020 showing 1% atrial paced, 2% ventricular paced, adequate rates per histogram, and no atrial or ventricular arrhythmias.  Her activity was limited due to prior hip replacement and broken ankle.  1 month prior she began having intermittent dyspnea on exertion which occurred 4 times.  She also had 3 episodes of chest pressure in the prior 2 weeks relieved with nitroglycerin.  She had an episode the night prior of chest pressure lasting for 30 minutes, at  rest, but did not try nitroglycerin.  BPs were mildly elevated and metoprolol was increased for antianginal and hypertension.    Pt presents today for 2-week follow-up.  She denies any side effects after after increasing metoprolol.  She has not had any further chest discomfort or dyspnea on exertion.  She has been walking a bit more slowly so that she does not need to use a walking aid.  She denies any weakness.  She does continue with some fatigue and states that she has not been sleeping well.  This has been going on for some time and is actually improved over the last couple weeks.  We did discuss taking metoprolol at bedtime to see if this helps a bit more.  Her blood pressures have been 120s to 140 systolic.  She will continue to monitor this. Patient reports no chest pain, shortness of breath, PND, orthopnea, presyncope, syncope, edema, heart racing, or palpitations.       Current Cardiac Medications    mg daily   Atorvastatin 40 mg daily  Metoprolol XL 25 mg daily  Nitroglycerin PRN                   Assessment and Plan:       Plan  Can try taking metoprolol at bedtime for fatigue     Recommendations:  1. Check blood pressure at least 1 hour after medications. Call the clinic if your blood pressure is consistently greater than 130/80.     Follow-up:  See Dr. Baker for cardiology follow up at [facility]: July 2020.        1. CAD     PCI to the RCA 2004,     PCI to LAD 2016 complicated by dissection    Nuclear stress test 2017 showed a fixed septal defect    No angina     Continue statin, ASA, beta-blocker    Consider increasing antianginal vs stress test if needed       2. Hypertension    controlled    Continue metoprolol     Consider increasing metoprolol if needed       3. Sinus node dysfunction s/p PPM    Follows with device clinic         Thank you for allowing me to participate in this delightful patient's care.      This note was completed in part using Dragon voice recognition software.  Although reviewed after completion, some word and grammatical errors may occur.      Please do not hesitate to contact me if you have any questions/concerns.     Sincerely,     SHARMIN Meade CNP

## 2020-04-29 NOTE — PROGRESS NOTES
"Christiana Sal is a 88 year old female who is being evaluated via a billable telephone visit.      The patient has been notified of following:     \"This telephone visit will be conducted via a call between you and your physician/provider. We have found that certain health care needs can be provided without the need for a physical exam.  This service lets us provide the care you need with a short phone conversation.  If a prescription is necessary we can send it directly to your pharmacy.  If lab work is needed we can place an order for that and you can then stop by our lab to have the test done at a later time.    Telephone visits are billed at different rates depending on your insurance coverage. During this emergency period, for some insurers they may be billed the same as an in-person visit.  Please reach out to your insurance provider with any questions.    If during the course of the call the physician/provider feels a telephone visit is not appropriate, you will not be charged for this service.\"    Patient has given verbal consent for Telephone visit?  Yes    How would you like to obtain your AVS? Mail a copy  8:14 AM 04/29/20 VIN Fontana CMA    Phone call duration: 5 minutes    SHARMIN Meade CNP              Cardiology Clinic Progress Note  Christiana Sal MRN# [id number]   YOB: 1931 Age: 88 year old      Primary Cardiologist:   Dr. Osman/ Dr. Beverly           History of Presenting Illness:      Christiana Sal is a pleasant 88 year old patient with a past cardiac history significant for   1. CAD with PCI to the RCA 2004, PCI to LAD 2016 complicated by dissection  2. Sinus node dysfunction s/p dual-chamber PPM 2016  3. Hypertension  Past medical history significant for TIA 2002 and GERD.    Nuclear stress test 2017 showed a fixed septal defect, suspect did from LBBB, EF 76%.    Pt saw Dr. Osman in April 2019.  Her device check showed 1 " mode switch for paroxysmal atrial tachycardia lasting 2 minutes.  She had been seen in urgent care for lightheadedness with negative work-up.  She was scheduled to undergo hip replacement and echocardiogram was recommended. Echocardiogram 4/29/2019 showed EF 55 to 60%, mild aortic regurgitation, only trace TR, and moderate concentric LVH.    She was last seen by me on 4/15/2020 in a virtual visit, for annual follow-up. Device check 4/1/2020 showing 1% atrial paced, 2% ventricular paced, adequate rates per histogram, and no atrial or ventricular arrhythmias.  Her activity was limited due to prior hip replacement and broken ankle.  1 month prior she began having intermittent dyspnea on exertion which occurred 4 times.  She also had 3 episodes of chest pressure in the prior 2 weeks relieved with nitroglycerin.  She had an episode the night prior of chest pressure lasting for 30 minutes, at rest, but did not try nitroglycerin.  BPs were mildly elevated and metoprolol was increased for antianginal and hypertension.    Pt presents today for 2-week follow-up.  She denies any side effects after after increasing metoprolol.  She has not had any further chest discomfort or dyspnea on exertion.  She has been walking a bit more slowly so that she does not need to use a walking aid.  She denies any weakness.  She does continue with some fatigue and states that she has not been sleeping well.  This has been going on for some time and is actually improved over the last couple weeks.  We did discuss taking metoprolol at bedtime to see if this helps a bit more.  Her blood pressures have been 120s to 140 systolic.  She will continue to monitor this. Patient reports no chest pain, shortness of breath, PND, orthopnea, presyncope, syncope, edema, heart racing, or palpitations.       Current Cardiac Medications    mg daily   Atorvastatin 40 mg daily  Metoprolol XL 25 mg daily  Nitroglycerin PRN                   Assessment and  Plan:       Plan  Can try taking metoprolol at bedtime for fatigue     Recommendations:  1. Check blood pressure at least 1 hour after medications. Call the clinic if your blood pressure is consistently greater than 130/80.     Follow-up:  See Dr. Baker for cardiology follow up at [facility], [place]: July 2020.        1. CAD     PCI to the RCA 2004,     PCI to LAD 2016 complicated by dissection    Nuclear stress test 2017 showed a fixed septal defect    No angina     Continue statin, ASA, beta-blocker    Consider increasing antianginal vs stress test if needed       2. Hypertension    controlled    Continue metoprolol     Consider increasing metoprolol if needed       3. Sinus node dysfunction s/p PPM    Follows with device clinic         Thank you for allowing me to participate in this delightful patient's care.      This note was completed in part using Dragon voice recognition software. Although reviewed after completion, some word and grammatical errors may occur.    Saundra Block, APRN CNP, APRN, CNP           Data:   All laboratory data reviewed        HPI and Plan:   See dictation    Orders Placed This Encounter   Procedures     Follow-Up with Cardiologist       No orders of the defined types were placed in this encounter.      Medications Discontinued During This Encounter   Medication Reason     pantoprazole (PROTONIX) 40 MG EC tablet          Encounter Diagnoses   Name Primary?     Benign essential hypertension      Coronary artery disease involving native coronary artery of native heart without angina pectoris Yes       CURRENT MEDICATIONS:  Current Outpatient Medications   Medication Sig Dispense Refill     acetaminophen (TYLENOL) 500 MG tablet Take 1,000 mg by mouth 3 times daily       aspirin (ASA) 81 MG tablet Take 2 tablets (162 mg) by mouth daily       atorvastatin (LIPITOR) 40 MG tablet TAKE 1 TABLET(40 MG) BY MOUTH DAILY 90 tablet 0     CALCIUM 600 + D OR 1 tablet by mouth daily        calcium carbonate (TUMS) 500 MG chewable tablet Take 1 chew tab by mouth 3 times daily as needed for heartburn       citalopram (CELEXA) 20 MG tablet TAKE 1 TABLET(20 MG) BY MOUTH DAILY 90 tablet 1     famotidine (PEPCID) 20 MG tablet Take 1 tablet (20 mg) by mouth At Bedtime 180 tablet 1     levothyroxine (SYNTHROID/LEVOTHROID) 75 MCG tablet TAKE 1 TABLET BY MOUTH EVERY DAY 90 tablet 0     magnesium 250 MG tablet Take 1 tablet by mouth every evening        meclizine (ANTIVERT) 25 MG tablet TAKE ONE TABLET BY MOUTH EVERY 6 HOURS AS NEEDED FOR DIZZINESS 30 tablet 3     metoprolol succinate ER (TOPROL-XL) 25 MG 24 hr tablet Take 1 tablet (25 mg) by mouth daily 90 tablet 3     Multiple Vitamins-Minerals (OCUVITE PO) Take 1 tablet by mouth daily.       nitroGLYcerin (NITROSTAT) 0.4 MG sublingual tablet Place 1 tablet (0.4 mg) under the tongue every 5 minutes as needed for chest pain (call your doctor if you take a third dose) 25 tablet 3     polyethylene glycol (MIRALAX) 17 GM/SCOOP powder Take 1 capful by mouth daily       Vitamin D, Cholecalciferol, 1000 units TABS Take 2,000 Units by mouth daily       buPROPion (WELLBUTRIN) 75 MG tablet TAKE 1 TABLET(75 MG) BY MOUTH DAILY (Patient not taking: Reported on 4/29/2020) 90 tablet 3     Homeopathic Products (LEG CRAMPS PO)        tolterodine (DETROL) 2 MG tablet Take 2 mg by mouth At Bedtime         ALLERGIES     Allergies   Allergen Reactions     Demerol Nausea     Lisinopril Cough     Meperidine Unknown     Sulfa Drugs Other (See Comments) and Unknown     Questionable itching reported by patient       PAST MEDICAL HISTORY:  Past Medical History:   Diagnosis Date     Coronary atherosclerosis of native coronary artery     s/p MI ([facility])     Essential hypertension, benign      GERD (gastroesophageal reflux disease)      History of transient ischemic attack (TIA)      Hyperlipidemia LDL goal <100      Hypothyroidism      Major depressive disorder, recurrent  episode, mild (H) 2011     Peptic ulcer disease with hemorrhage     Remote history of stomach ulcer, requiring transfusion after chronic bleeding       PAST SURGICAL HISTORY:  Past Surgical History:   Procedure Laterality Date     APPENDECTOMY       ARTHROPLASTY HIP Left 2019    Procedure: ARTHROPLASTY, HIP;  Surgeon: Jacinto Adams MD;  Location: [facility] OR     CARDIAC CATHERIZATION  1/15/04    drug-eluding stent RCA, Dr. Préez, [facility]     CARDIAC CATHERIZATION  12    diffuse mild/mod disease, no new stent     CATARACT IOL, RT/LT  12    RT, Dr. Wynne     CHOLECYSTECTOMY, OPEN       COLONOSCOPY      [facility]     FRACTURE TX, ANKLE RT/LT      LT, 2 screws remain     HC EXCISE HAND/FOOT NEUROMA      RT     HC LARYNGOSCOPY DIRECT W VOCAL CORD INJECTION      vocal cord polypectomy     HC REMOVAL OF OVARIAN CYST(S)       HC TRANSCATH STENT INIT VESSEL,PERCUT      x 2     OPEN REDUCTION INTERNAL FIXATION ANKLE Right 12/3/2018    Procedure: OPEN REDUCTION INTERNAL FIXATION Right ANKLE;  Surgeon: Jacinto Adams MD;  Location: [facility] OR     REPAIR HAMMER TOE  9/13/10    multiple + RT great toe tendon release, Dr. Sanchez DPM     STENT  -    Cardiac stents 6 placed.       FAMILY HISTORY:  Family History   Problem Relation Age of Onset     C.A.D. Father      Hypertension Father      Myocardial Infarction Father 66         from MI     C.A.D. Brother      Myocardial Infarction Brother      Cancer Brother         skin ca     Breast Cancer Sister      Cancer - colorectal Sister      Heart Disease Sister      Neurologic Disorder Mother         migraine     C.A.D. Mother      Hypertension Mother      Heart Failure Mother      Thyroid Disease Son         cretinism     Hypertension Son      Heart Disease Son      Psychotic Disorder Sister      Hypertension Sister      Heart Disease Sister         CABG     Hypertension Brother      C.A.D. Brother      Myocardial Infarction Brother       Arthritis Daughter         RA     Hypertension Sister      Heart Disease Sister      Cancer Sister      Blood Disease Sister      Neurologic Disorder Child         migraine (2 children)       SOCIAL HISTORY:  Social History     Socioeconomic History     Marital status:      Spouse name: None     Number of children: 4     Years of education: some adry     Highest education level: None   Occupational History     Employer: RETIRED   Social Needs     Financial resource strain: None     Food insecurity     Worry: None     Inability: None     Transportation needs     Medical: None     Non-medical: None   Tobacco Use     Smoking status: Never Smoker     Smokeless tobacco: Never Used     Tobacco comment: Never smoker; no secondhand smoke exposure   Substance and Sexual Activity     Alcohol use: Yes     Comment: social     Drug use: No     Sexual activity: Not Currently   Lifestyle     Physical activity     Days per week: None     Minutes per session: None     Stress: None   Relationships     Social connections     Talks on phone: None     Gets together: None     Attends Buddhist service: None     Active member of club or organization: None     Attends meetings of clubs or organizations: None     Relationship status: None     Intimate partner violence     Fear of current or ex partner: None     Emotionally abused: None     Physically abused: None     Forced sexual activity: None   Other Topics Concern     Parent/sibling w/ CABG, MI or angioplasty before 65F 55M? No   Social History Narrative    She just moved in to [place] in a [employer] center.        Review of Systems:  Skin:  Positive for bruising     Eyes:  Positive for glasses    ENT:  Negative      Respiratory:  Positive for cough     Cardiovascular:    Positive for;fatigue    Gastroenterology: Positive for reflux    Genitourinary:  Negative      Musculoskeletal:  Positive for arthritis;joint pain;joint swelling;joint stiffness;back pain     Neurologic:  Positive for numbness or tingling of feet    Psychiatric:  Positive for depression;anxiety    Heme/Lymph/Imm:  Negative      Endocrine:  Negative        Physical Exam:  Vitals: /59   Pulse 68   Wt 67.1 kg (148 lb)   BMI 23.53 kg/m      Constitutional:           Skin:             Head:           Eyes:           Lymph:      ENT:           Neck:           Respiratory:            Cardiac:                                                           GI:           Extremities and Muscular Skeletal:                 Neurological:           Psych:           CC  Darrell Osman MD  [facility]  [address] [place] [address]

## 2020-04-29 NOTE — PATIENT INSTRUCTIONS
Medication Changes:  Can try taking metoprolol at bedtime for fatigue     Recommendations:  1. Check blood pressure at least 1 hour after medications. Call the clinic if your blood pressure is consistently greater than 130/80.     Follow-up:  See Dr. Baker for cardiology follow up at [facility]: July 2020. Call to schedule.     Cardiology Scheduling~[phone]  Cardiology Clinic RNs~[phone] (Chandrika Paul RN and Danelle Pirce RN)

## 2020-05-04 ENCOUNTER — TELEPHONE (OUTPATIENT)
Dept: CARDIOLOGY | Facility: CLINIC | Age: 85
End: 2020-05-04

## 2020-05-04 NOTE — TELEPHONE ENCOUNTER
Pt called stating her BP has been high since changing metoprolol to bedtime.    4/29/20 Last visit: Can try taking metoprolol at bedtime for fatigue   Check blood pressure at least 1 hour after medications. Call the clinic if your blood pressure is consistently greater than 130/80.     Pt has been taking med at bedtime and checking BPs mostly in the morning.  Discussed why she changed med to evening.  Pt is very willing to try to go back to taking med in AM and checking BP at least 1 hour post med.  Will check back at end of week to see if BP improved.    Checking  BP in AM  4/30/20 /81 AM  5/1/20 /85 AM  5/2/20 /83 PM before medication  5/3/20 /71 AM  5/3/20 /97 PM   5/4/20 /94  183/83 AM    BAYLEE GIBBONS RN on 5/4/2020 at 10:30 AM    ADDENDUM:  Spoke with pt.  She states her BPs are much better.  123/76  136/78  129/68  130/70  Advised to continue with med in morning and do not need to check BP daily.  PT did mention she has trouble sleeping and often has nightmares.   She is feeling lonely and isolated during this time. Discussed possibilities for improving sleep.  Advised to talk with PCP if not able to get enough rest.  Pt verbalized understanding.  BAYLEE GIBBONS RN on 5/8/2020 at 8:46 AM

## 2020-06-02 ENCOUNTER — HOSPITAL ENCOUNTER (OUTPATIENT)
Facility: CLINIC | Age: 85
End: 2020-06-02
Attending: PODIATRIST | Admitting: PODIATRIST
Payer: MEDICARE

## 2020-06-02 DIAGNOSIS — Z11.59 ENCOUNTER FOR SCREENING FOR OTHER VIRAL DISEASES: Primary | ICD-10-CM

## 2020-06-03 ENCOUNTER — TELEPHONE (OUTPATIENT)
Dept: CARDIOLOGY | Facility: CLINIC | Age: 85
End: 2020-06-03

## 2020-06-03 DIAGNOSIS — I10 BENIGN ESSENTIAL HYPERTENSION: Primary | ICD-10-CM

## 2020-06-03 DIAGNOSIS — I25.118 CORONARY ARTERY DISEASE OF NATIVE ARTERY OF NATIVE HEART WITH STABLE ANGINA PECTORIS (H): ICD-10-CM

## 2020-06-03 NOTE — TELEPHONE ENCOUNTER
Pt called and LM c/o high BPs.  Also having chest pressure and SOB the last three mornings.    Attempted to call back - LM to seek emergency care if CP not resolved with NTG.  Will attempt to call back later.  BAYLEE GIBBONS RN on 6/3/2020 at 10:26 AM    ADDENDUM:   Attempted to call again.  Went to .  BAYLEE GIBBONS RN on 6/3/2020 at 10:45 AM    ADDENDUM:  Attempted to call - pt had called and LM again.  Phone went to .  BAYLEE GIBBONS RN on 6/3/2020 at 12:06 PM    ADDENDUM:  Attempted to call again. Went to .  LM to call back, I will try again before the end of the day.  BAYLEE GIBBONS RN on 6/3/2020 at 2:13 PM    ADDENDUM:  Pt feels better this afternoon.  Had to take NTG last evening that helped with chest pressure.  She also has had some neck pain.    BP has been higher the last few days as well.   154/79, 143/77, 171/81 (one hour after meds)  She has been taking metoprolol in the morning as it had been controlling BP better than at night.  Wt - up only one pound from baseline  Nothing has changed for her recently.  She is confined [facility] room at the [facility].  She is having surgery 6/15/20 to remove ankle hardware.  She feels like chest pressure and SOB worse over the last 3 days.  Will route to Saundra Michael NP for recommendations.  BAYLEE GIBBONS RN on 6/3/2020 at 3:07 PM      ADDENDUM:   She was seen in April for the same symptoms which were relieved after increasing antianginal (metoprolol). If having significant chest pressure and SOB would recommend going to ED to r/o ACS.     She also needs lexiscan nuclear stress test and follow-up with Dr. Osman prior to her surgery for cardiac clearance.     I would also like her to start imdur 15 mg daily if she has not been hypotensive. Tylenol for headache in the 1st week.     SHARMIN Meade CNP    ADDENDUM:  Pt states she missed several doses of metoprolol and she thinks that is why her BP was so high.  She would like to wait to start new  med.  She feels ok today, SOB last night - she is going to try her CPAP tonight.  Will have scheduling call to set up appts.  BAYLEE GIBBONS RN on 6/4/2020 at 1:18 PM    ADDENDUM:  Unfortunately, unable to schedule testing prior to surgery date.  Spoke with pt - she is ok with postponing surgery.  Scheduled for stress and follow up.  BAYLEE GIBBONS RN on 6/4/2020 at 3:00 PM    ADDENDUM:  Called to check in re: BPs.  LM for pt to call back.  BAYLEE GIBBONS RN on 6/8/2020 at 11:14 AM     ADDENDUM:  Yesterday in clinic SBP was 154  Has been using CPAP at night  Started new med for depression/sleep. She does not want to start Imdur yet.  She will monitor BP this week and call back with numbers.  BAYLEE GIBBONS RN on 6/9/2020 at 10:22 AM

## 2020-06-08 ENCOUNTER — OFFICE VISIT (OUTPATIENT)
Dept: FAMILY MEDICINE | Facility: CLINIC | Age: 85
End: 2020-06-08
Payer: MEDICARE

## 2020-06-08 VITALS
DIASTOLIC BLOOD PRESSURE: 75 MMHG | SYSTOLIC BLOOD PRESSURE: 154 MMHG | HEIGHT: 67 IN | OXYGEN SATURATION: 95 % | WEIGHT: 152 LBS | HEART RATE: 58 BPM | TEMPERATURE: 98 F | BODY MASS INDEX: 23.86 KG/M2

## 2020-06-08 DIAGNOSIS — I25.10 CORONARY ARTERY DISEASE INVOLVING NATIVE CORONARY ARTERY OF NATIVE HEART WITHOUT ANGINA PECTORIS: ICD-10-CM

## 2020-06-08 DIAGNOSIS — T84.84XA PAINFUL ORTHOPAEDIC HARDWARE (H): ICD-10-CM

## 2020-06-08 DIAGNOSIS — G47.00 INSOMNIA, UNSPECIFIED TYPE: ICD-10-CM

## 2020-06-08 DIAGNOSIS — R07.9 CHEST PAIN, UNSPECIFIED TYPE: ICD-10-CM

## 2020-06-08 DIAGNOSIS — Z01.818 PREOP GENERAL PHYSICAL EXAM: Primary | ICD-10-CM

## 2020-06-08 LAB
ANION GAP SERPL CALCULATED.3IONS-SCNC: 8 MMOL/L (ref 3–14)
BASOPHILS # BLD AUTO: 0 10E9/L (ref 0–0.2)
BASOPHILS NFR BLD AUTO: 0.3 %
BUN SERPL-MCNC: 21 MG/DL (ref 7–30)
CALCIUM SERPL-MCNC: 8.8 MG/DL (ref 8.5–10.1)
CHLORIDE SERPL-SCNC: 106 MMOL/L (ref 94–109)
CO2 SERPL-SCNC: 26 MMOL/L (ref 20–32)
CREAT SERPL-MCNC: 0.74 MG/DL (ref 0.52–1.04)
DIFFERENTIAL METHOD BLD: NORMAL
EOSINOPHIL # BLD AUTO: 0.3 10E9/L (ref 0–0.7)
EOSINOPHIL NFR BLD AUTO: 4.3 %
ERYTHROCYTE [DISTWIDTH] IN BLOOD BY AUTOMATED COUNT: 13.2 % (ref 10–15)
GFR SERPL CREATININE-BSD FRML MDRD: 72 ML/MIN/{1.73_M2}
GLUCOSE SERPL-MCNC: 96 MG/DL (ref 70–99)
HCT VFR BLD AUTO: 38.7 % (ref 35–47)
HGB BLD-MCNC: 12.6 G/DL (ref 11.7–15.7)
LYMPHOCYTES # BLD AUTO: 1.6 10E9/L (ref 0.8–5.3)
LYMPHOCYTES NFR BLD AUTO: 22.4 %
MCH RBC QN AUTO: 30.8 PG (ref 26.5–33)
MCHC RBC AUTO-ENTMCNC: 32.6 G/DL (ref 31.5–36.5)
MCV RBC AUTO: 95 FL (ref 78–100)
MONOCYTES # BLD AUTO: 0.9 10E9/L (ref 0–1.3)
MONOCYTES NFR BLD AUTO: 12.2 %
NEUTROPHILS # BLD AUTO: 4.4 10E9/L (ref 1.6–8.3)
NEUTROPHILS NFR BLD AUTO: 60.8 %
PLATELET # BLD AUTO: 237 10E9/L (ref 150–450)
POTASSIUM SERPL-SCNC: 4.8 MMOL/L (ref 3.4–5.3)
RBC # BLD AUTO: 4.09 10E12/L (ref 3.8–5.2)
SODIUM SERPL-SCNC: 140 MMOL/L (ref 133–144)
WBC # BLD AUTO: 7.2 10E9/L (ref 4–11)

## 2020-06-08 PROCEDURE — 99214 OFFICE O/P EST MOD 30 MIN: CPT | Performed by: PHYSICIAN ASSISTANT

## 2020-06-08 PROCEDURE — 80048 BASIC METABOLIC PNL TOTAL CA: CPT | Performed by: PHYSICIAN ASSISTANT

## 2020-06-08 PROCEDURE — 85025 COMPLETE CBC W/AUTO DIFF WBC: CPT | Performed by: PHYSICIAN ASSISTANT

## 2020-06-08 PROCEDURE — 93000 ELECTROCARDIOGRAM COMPLETE: CPT | Performed by: PHYSICIAN ASSISTANT

## 2020-06-08 PROCEDURE — 36415 COLL VENOUS BLD VENIPUNCTURE: CPT | Performed by: PHYSICIAN ASSISTANT

## 2020-06-08 RX ORDER — TRAZODONE HYDROCHLORIDE 50 MG/1
25-50 TABLET, FILM COATED ORAL AT BEDTIME
Qty: 30 TABLET | Refills: 0 | Status: SHIPPED | OUTPATIENT
Start: 2020-06-08 | End: 2020-06-15

## 2020-06-08 ASSESSMENT — MIFFLIN-ST. JEOR: SCORE: 1144.16

## 2020-06-08 ASSESSMENT — PAIN SCALES - GENERAL: PAINLEVEL: NO PAIN (0)

## 2020-06-08 NOTE — PROGRESS NOTES
[facility]  [address] [address]  [place] [address]  [phone]  Dept: [phone]    PRE-OP EVALUATION:  Today's date: 2020    Christiana Sal (: 10/25/1931) presents for pre-operative evaluation assessment as requested by Dr. Chávez. He requires evaluation and anesthesia risk assessment prior to undergoing surgery/procedure for treatment of hardware removal .    Proposed Surgery/ Procedure: Right distal fibula deep hardware removal   Date of Surgery/ Procedure: 6/15/20  Time of Surgery/ Procedure: 1:00pm   Hospital/Surgical Facility: [facility]   Primary Physician: Kelly Matthew  Type of Anesthesia Anticipated: Local with MAC    Patient has a Health Care Directive or Living Will:  YES     1. YES - Do you have a history of heart attack, stroke, stent, bypass or surgery on an artery in the head, neck, heart or legs? H/o MI, followed by cardiology  2. YES - Do you ever have any pain or discomfort in your chest? None at the moment, she does at times, unchanged overall   3. NO - Do you have a history of  Heart Failure?  4. YES - Are you troubled by shortness of breath when: walking on the level, up a slight hill or at night? Walking- she feels more deconditioning   5. NO - Do you currently have a cold, bronchitis or other respiratory infection?  6. NO - Do you have a cough, shortness of breath or wheezing?  7. NO - Do you sometimes get pains in the calves of your legs when you walk?  8. NO - Do you or anyone in your family have previous history of blood clots?  9. NO - Do you or does anyone in your family have a serious bleeding problem such as prolonged bleeding following surgeries or cuts?  10. NO - Have you ever had problems with anemia or been told to take iron pills?  11. NO - Have you had any abnormal blood loss such as black, tarry or bloody stools, or abnormal vaginal bleeding?  12. YES - Have you ever had a blood transfusion? Many years  ago   13. NO - Have you or any of your relatives ever had problems with anesthesia?  14. NO - Do you have sleep apnea, excessive snoring or daytime drowsiness?  15. NO - Do you have any prosthetic heart valves?  16. NO - Do you have prosthetic joints?  17. NO - Is there any chance that you may be pregnant?      HPI:     HPI related to upcoming procedure: Right distal fibular hardware pain with associated cystic lesion in distal fibula      See problem list for active medical problems.  Problems all longstanding and stable, except as noted/documented.  See ROS for pertinent symptoms related to these conditions.      MEDICAL HISTORY:     Patient Active Problem List    Diagnosis Date Noted     Near syncope 05/03/2019     Priority: Medium     Overactive bladder 05/02/2019     Priority: Medium     S/P total hip arthroplasty 05/02/2019     Priority: Medium     Closed fracture of shaft of fibula 12/02/2018     Priority: Medium     Fall 12/02/2018     Priority: Medium     Primary osteoarthritis of left hip 09/21/2018     Priority: Medium     Insect bite 06/28/2017     Priority: Medium     Cardiac pacemaker - [employer] dual lead - Not Dependent 03/02/2017     Priority: Medium     Sinoatrial node dysfunction (H) 11/10/2016     Priority: Medium     CAD S/P percutaneous coronary angioplasty 05/11/2016     Priority: Medium     JENIFER (obstructive sleep apnea) 11/17/2014     Priority: Medium     Lichen planus 11/19/2013     Priority: Medium     Vulvar pruritus 11/12/2013     Priority: Medium     Angina pectoris (H) 02/08/2012     Priority: Medium     Advanced directives, counseling/discussion 06/16/2011     Priority: Medium     Patient will bring a copy. Chart r/q to make sure no copy.    GUSTAVO Cunningham MA    Advance Directive Problem List Overview:   Name Relationship Phone    Primary Health Care Agent Albaro Sal  [phone]         Alternative Health Care Agent Esther Aragonon  [phone]     Reviewed Health Care  Directive dated 05/13/2005, scanned into EPIC 01/2012.  Marivel Cherelle, SISSY                Major depressive disorder, recurrent episode, mild (H) 11/02/2010     Priority: Medium     Family history of colon cancer 05/04/2010     Priority: Medium     Coronary artery disease involving native coronary artery of native heart without angina pectoris      Priority: Medium     s/p MI ([facility])       Hyperlipidemia LDL goal <100      Priority: Medium     Benign essential hypertension      Priority: Medium     Hypothyroidism      Priority: Medium     GERD (gastroesophageal reflux disease)      Priority: Medium      Past Medical History:   Diagnosis Date     Coronary atherosclerosis of native coronary artery     s/p MI ([facility])     Essential hypertension, benign      GERD (gastroesophageal reflux disease)      History of transient ischemic attack (TIA)      Hyperlipidemia LDL goal <100      Hypothyroidism      Major depressive disorder, recurrent episode, mild (H) 5/18/2011     Peptic ulcer disease with hemorrhage     Remote history of stomach ulcer, requiring transfusion after chronic bleeding     Past Surgical History:   Procedure Laterality Date     APPENDECTOMY       ARTHROPLASTY HIP Left 5/2/2019    Procedure: ARTHROPLASTY, HIP;  Surgeon: Jacinto Adams MD;  Location: [facility] OR     CARDIAC CATHERIZATION  1/15/04    drug-eluding stent RCA, Dr. Pérez, [facility]     CARDIAC CATHERIZATION  2/16/12    diffuse mild/mod disease, no new stent     CATARACT IOL, RT/LT  1/26/12    RT, Dr. Wynne     CHOLECYSTECTOMY, OPEN       COLONOSCOPY  2008    [facility]     FRACTURE TX, ANKLE RT/LT      LT, 2 screws remain     HC EXCISE HAND/FOOT NEUROMA      RT     HC LARYNGOSCOPY DIRECT W VOCAL CORD INJECTION      vocal cord polypectomy     HC REMOVAL OF OVARIAN CYST(S)       HC TRANSCATH STENT INIT VESSEL,PERCUT      x 2     OPEN REDUCTION INTERNAL FIXATION ANKLE Right 12/3/2018    Procedure: OPEN REDUCTION  INTERNAL FIXATION Right ANKLE;  Surgeon: Jacinto Adams MD;  Location: [facility] OR     REPAIR HAMMER TOE  9/13/10    multiple + RT great toe tendon release, Dr. Sanchez DPLLOY     STENT  5-2016    Cardiac stents 6 placed.     Current Outpatient Medications   Medication Sig Dispense Refill     acetaminophen (TYLENOL) 500 MG tablet Take 1,000 mg by mouth 3 times daily       aspirin (ASA) 81 MG tablet Take 2 tablets (162 mg) by mouth daily       atorvastatin (LIPITOR) 40 MG tablet TAKE 1 TABLET(40 MG) BY MOUTH DAILY 90 tablet 0     CALCIUM 600 + D OR 1 tablet by mouth daily       calcium carbonate (TUMS) 500 MG chewable tablet Take 1 chew tab by mouth 3 times daily as needed for heartburn       citalopram (CELEXA) 20 MG tablet TAKE 1 TABLET(20 MG) BY MOUTH DAILY 90 tablet 1     famotidine (PEPCID) 20 MG tablet Take 1 tablet (20 mg) by mouth At Bedtime 180 tablet 1     Homeopathic Products (LEG CRAMPS PO)        levothyroxine (SYNTHROID/LEVOTHROID) 75 MCG tablet TAKE 1 TABLET BY MOUTH EVERY DAY 90 tablet 0     magnesium 250 MG tablet Take 1 tablet by mouth every evening        meclizine (ANTIVERT) 25 MG tablet TAKE ONE TABLET BY MOUTH EVERY 6 HOURS AS NEEDED FOR DIZZINESS 30 tablet 3     metoprolol succinate ER (TOPROL-XL) 25 MG 24 hr tablet Take 1 tablet (25 mg) by mouth daily 90 tablet 3     Multiple Vitamins-Minerals (OCUVITE PO) Take 1 tablet by mouth daily.       nitroGLYcerin (NITROSTAT) 0.4 MG sublingual tablet Place 1 tablet (0.4 mg) under the tongue every 5 minutes as needed for chest pain (call your doctor if you take a third dose) 25 tablet 3     polyethylene glycol (MIRALAX) 17 GM/SCOOP powder Take 1 capful by mouth daily       tolterodine (DETROL) 2 MG tablet Take 2 mg by mouth At Bedtime       Vitamin D, Cholecalciferol, 1000 units TABS Take 2,000 Units by mouth daily       buPROPion (WELLBUTRIN) 75 MG tablet TAKE 1 TABLET(75 MG) BY MOUTH DAILY (Patient not taking: Reported on 4/29/2020) 90 tablet 3     OTC  "products: None, except as noted above    Allergies   Allergen Reactions     Demerol Nausea     Lisinopril Cough     Meperidine Unknown     Sulfa Drugs Other (See Comments) and Unknown     Questionable itching reported by patient      Latex Allergy: NO    Social History     Tobacco Use     Smoking status: Never Smoker     Smokeless tobacco: Never Used     Tobacco comment: Never smoker; no secondhand smoke exposure   Substance Use Topics     Alcohol use: Yes     Comment: social     History   Drug Use No       REVIEW OF SYSTEMS:   CONSTITUTIONAL: NEGATIVE for fever, chills, change in weight  INTEGUMENTARY/SKIN: NEGATIVE for worrisome rashes, moles or lesions  EYES: NEGATIVE for vision changes or irritation  ENT/MOUTH: NEGATIVE for ear, mouth and throat problems  RESP: NEGATIVE for significant cough or SOB  BREAST: NEGATIVE for masses, tenderness or discharge  CV: NEGATIVE for chest pain, palpitations or peripheral edema  GI: NEGATIVE for nausea, abdominal pain, heartburn, or change in bowel habits  : NEGATIVE for frequency, dysuria, or hematuria  MUSCULOSKELETAL: NEGATIVE for significant arthralgias or myalgia  NEURO: NEGATIVE for weakness, dizziness or paresthesias  ENDOCRINE: NEGATIVE for temperature intolerance, skin/hair changes  HEME: NEGATIVE for bleeding problems  PSYCHIATRIC: NEGATIVE for changes in mood or affect    EXAM:   BP (!) 154/75   Pulse 58   Temp 98  F (36.7  C)   Ht 1.689 m (5' 6.5\")   Wt 68.9 kg (152 lb)   SpO2 95%   BMI 24.17 kg/m      GENERAL APPEARANCE: healthy, alert and no distress     EYES: EOMI, PERRL     HENT: ear canals and TM's normal and nose and mouth without ulcers or lesions     NECK: no adenopathy, no asymmetry, masses, or scars and thyroid normal to palpation     RESP: lungs clear to auscultation - no rales, rhonchi or wheezes     CV: regular rates and rhythm, normal S1 S2, no S3 or S4 and no murmur, click or rub     ABDOMEN:  soft, nontender, no HSM or masses and bowel " sounds normal     MS: extremities normal- no gross deformities noted, no evidence of inflammation in joints, FROM in all extremities.     SKIN: no suspicious lesions or rashes     NEURO: Normal strength and tone, sensory exam grossly normal, mentation intact and speech normal     PSYCH: mentation appears normal. and affect normal/bright     LYMPHATICS: No cervical adenopathy    DIAGNOSTICS:     NM Stress Test (6/17/2020)    The nuclear stress test is probably negative for inducible myocardial ischemia or infarction.     Left ventricular function is hyperdynamic.     The left ventricular ejection fraction at rest is greater than 70%. The left ventricular ejection fraction at stress is greater than 70%.     There is a small sized, mild intensity, fixed, myocardioal perfusion defect noted in basal-mid anterior wall and anteroseptum most likely consistent with brest attenuatuation artifact vs. pacing induced LBBB.     A prior study was conducted on 9/6/2017.  This study has no change when compared with the prior study.    CBC RESULTS:   Recent Labs   Lab Test 06/08/20  1211   WBC 7.2   RBC 4.09   HGB 12.6   HCT 38.7   MCV 95   MCH 30.8   MCHC 32.6   RDW 13.2        Last Comprehensive Metabolic Panel:  Sodium   Date Value Ref Range Status   06/08/2020 140 133 - 144 mmol/L Final     Potassium   Date Value Ref Range Status   06/08/2020 4.8 3.4 - 5.3 mmol/L Final     Chloride   Date Value Ref Range Status   06/08/2020 106 94 - 109 mmol/L Final     Carbon Dioxide   Date Value Ref Range Status   06/08/2020 26 20 - 32 mmol/L Final     Anion Gap   Date Value Ref Range Status   06/08/2020 8 3 - 14 mmol/L Final     Glucose   Date Value Ref Range Status   06/08/2020 96 70 - 99 mg/dL Final     Urea Nitrogen   Date Value Ref Range Status   06/08/2020 21 7 - 30 mg/dL Final     Creatinine   Date Value Ref Range Status   06/08/2020 0.74 0.52 - 1.04 mg/dL Final     GFR Estimate   Date Value Ref Range Status   06/08/2020 72 >60  mL/min/[1.73_m2] Final     Comment:     Non  GFR Calc  Starting 12/18/2018, serum creatinine based estimated GFR (eGFR) will be   calculated using the Chronic Kidney Disease Epidemiology Collaboration   (CKD-EPI) equation.       Calcium   Date Value Ref Range Status   06/08/2020 8.8 8.5 - 10.1 mg/dL Final         Recent Labs   Lab Test 02/21/20  1142 05/21/19  1031  05/03/19  0553  05/10/16  1109   HGB 12.5 9.4*   < > 9.3*   < > 12.5    440  --   --    < > 284   INR  --   --   --   --   --  0.90     --   --  138   < > 135   POTASSIUM 4.4  --   --  4.8   < > 4.0   CR 0.97  --   --  0.80   < > 0.88    < > = values in this interval not displayed.        IMPRESSION:   Reason for surgery/procedure: hardware removal  Diagnosis/reason for consult: pre op    The proposed surgical procedure is considered INTERMEDIATE risk.    REVISED CARDIAC RISK INDEX  The patient has the following serious cardiovascular risks for perioperative complications such as (MI, PE, VFib and 3  AV Block):  Coronary Artery Disease (MI, positive stress test, angina, Qs on EKG)  INTERPRETATION: 1 risks: Class II (low risk - 0.9% complication rate)    The patient has the following additional risks for perioperative complications:  No identified additional risks      ICD-10-CM    1. Preop general physical exam  Z01.818 EKG 12-lead complete w/read - Clinics     Basic metabolic panel  (Ca, Cl, CO2, Creat, Gluc, K, Na, BUN)     CBC with platelets and differential   2. Painful orthopaedic hardware (H)  T84.84XA EKG 12-lead complete w/read - Clinics     Basic metabolic panel  (Ca, Cl, CO2, Creat, Gluc, K, Na, BUN)     CBC with platelets and differential   3. Insomnia, unspecified type  G47.00 DISCONTINUED: traZODone (DESYREL) 50 MG tablet   4. Chest pain, unspecified type  R07.9 EKG 12-lead complete w/read - Clinics   5. Coronary artery disease involving native coronary artery of native heart without angina pectoris  I25.10 EKG  12-lead complete w/read - Clinics       RECOMMENDATIONS:       APPROVAL PENDING until Stress test and cardiology clearance    *As of 6/23/2020, NM Stress test is complete and unchanged/stable. Pending final clearance by cardiology     Signed Electronically by: Kelly Matthew PA-C    Copy of this evaluation report is provided to requesting physician.    [place] Preop Guidelines    Revised Cardiac Risk Index

## 2020-06-08 NOTE — PATIENT INSTRUCTIONS
TRY the trazodone at bedtime    START with 1/2 tab (25mg) for the first few nights. If no side effects (not too groggy) can increase to 1 full tablet if needed          Before Your Surgery      Call your surgeon if there is any change in your health. This includes signs of a cold or flu (such as a sore throat, runny nose, cough, rash or fever).    Do not smoke, drink alcohol or take over the counter medicine (unless your surgeon or primary care doctor tells you to) for the 24 hours before and after surgery.    If you take prescribed drugs: Follow your doctor s orders about which medicines to take and which to stop until after surgery.    Eating and drinking prior to surgery: follow the instructions from your surgeon    Take a shower or bath the night before surgery. Use the soap your surgeon gave you to gently clean your skin. If you do not have soap from your surgeon, use your regular soap. Do not shave or scrub the surgery site.  Wear clean pajamas and have clean sheets on your bed.

## 2020-06-09 ENCOUNTER — AMBULATORY - HEALTHEAST (OUTPATIENT)
Dept: FAMILY MEDICINE | Facility: CLINIC | Age: 85
End: 2020-06-09

## 2020-06-09 ENCOUNTER — ANESTHESIA EVENT (OUTPATIENT)
Dept: SURGERY | Facility: CLINIC | Age: 85
End: 2020-06-09

## 2020-06-09 DIAGNOSIS — I25.10 CAD S/P PERCUTANEOUS CORONARY ANGIOPLASTY: ICD-10-CM

## 2020-06-09 DIAGNOSIS — Z11.59 ENCOUNTER FOR SCREENING FOR OTHER VIRAL DISEASES: ICD-10-CM

## 2020-06-09 DIAGNOSIS — E03.9 HYPOTHYROIDISM, UNSPECIFIED TYPE: ICD-10-CM

## 2020-06-09 DIAGNOSIS — Z98.61 CAD S/P PERCUTANEOUS CORONARY ANGIOPLASTY: ICD-10-CM

## 2020-06-09 NOTE — TELEPHONE ENCOUNTER
Routing refill request to provider for review/approval because:  PAtient was seen yesterday and notes are not finalized.  Roel Garza RN

## 2020-06-12 RX ORDER — ATORVASTATIN CALCIUM 40 MG/1
TABLET, FILM COATED ORAL
Qty: 90 TABLET | Refills: 0 | Status: SHIPPED | OUTPATIENT
Start: 2020-06-12 | End: 2020-09-09

## 2020-06-12 RX ORDER — LEVOTHYROXINE SODIUM 75 UG/1
TABLET ORAL
Qty: 90 TABLET | Refills: 0 | Status: SHIPPED | OUTPATIENT
Start: 2020-06-12 | End: 2020-09-09

## 2020-06-13 ENCOUNTER — AMBULATORY - HEALTHEAST (OUTPATIENT)
Dept: FAMILY MEDICINE | Facility: CLINIC | Age: 85
End: 2020-06-13

## 2020-06-13 DIAGNOSIS — Z11.59 ENCOUNTER FOR SCREENING FOR OTHER VIRAL DISEASES: ICD-10-CM

## 2020-06-14 ENCOUNTER — COMMUNICATION - HEALTHEAST (OUTPATIENT)
Dept: FAMILY MEDICINE | Facility: CLINIC | Age: 85
End: 2020-06-14

## 2020-06-15 ENCOUNTER — ANESTHESIA (OUTPATIENT)
Dept: SURGERY | Facility: CLINIC | Age: 85
End: 2020-06-15

## 2020-06-15 ENCOUNTER — TELEPHONE (OUTPATIENT)
Dept: FAMILY MEDICINE | Facility: CLINIC | Age: 85
End: 2020-06-15

## 2020-06-15 NOTE — TELEPHONE ENCOUNTER
Pt is calling on the following medication and she is letting us know that she is not going to be taking this medication.     Disp  Refills  Start  End  MANNY    traZODone (DESYREL) 50 MG tablet  30 tablet  0  6/8/2020   --    Sig - Route: Take 0.5-1 tablets (25-50 mg) by mouth At Bedtime - Oral    Sent to pharmacy as: traZODone HCl 50 MG Oral Tablet (DESYREL)    Class: E-Prescribe    Order: [id number]    E-Prescribing Status: Receipt confirmed by pharmacy (6/8/2020 11:35 AM CDT)      Geri Kenyon  [facility]

## 2020-06-16 NOTE — TELEPHONE ENCOUNTER
She stating she did not like it and will not take this.    Geri Kenyon  \[facility] [place] [facility]\"" Float

## 2020-06-17 ENCOUNTER — HOSPITAL ENCOUNTER (OUTPATIENT)
Dept: NUCLEAR MEDICINE | Facility: CLINIC | Age: 85
Setting detail: NUCLEAR MEDICINE
Discharge: HOME | End: 2020-06-17
Attending: NURSE PRACTITIONER | Admitting: NURSE PRACTITIONER
Payer: MEDICARE

## 2020-06-17 VITALS — BODY MASS INDEX: 23.86 KG/M2 | WEIGHT: 152 LBS | HEIGHT: 67 IN

## 2020-06-17 DIAGNOSIS — I25.118 CORONARY ARTERY DISEASE OF NATIVE ARTERY OF NATIVE HEART WITH STABLE ANGINA PECTORIS (H): ICD-10-CM

## 2020-06-17 LAB
CV STRESS MAX HR HE: 71
RATE PRESSURE PRODUCT: 8804
STRESS ECHO BASELINE DIASTOLIC HE: 68
STRESS ECHO BASELINE HR: 57
STRESS ECHO BASELINE SYSTOLIC BP: 158
STRESS ECHO CALCULATED PERCENT HR: 54 %
STRESS ECHO LAST STRESS DIASTOLIC BP: 60
STRESS ECHO LAST STRESS SYSTOLIC BP: 124
STRESS ECHO TARGET HR: 132

## 2020-06-17 PROCEDURE — 93017 CV STRESS TEST TRACING ONLY: CPT

## 2020-06-17 PROCEDURE — 78452 HT MUSCLE IMAGE SPECT MULT: CPT | Mod: 26 | Performed by: INTERNAL MEDICINE

## 2020-06-17 PROCEDURE — 93018 CV STRESS TEST I&R ONLY: CPT | Performed by: INTERNAL MEDICINE

## 2020-06-17 PROCEDURE — 25000128 H RX IP 250 OP 636: Performed by: NURSE PRACTITIONER

## 2020-06-17 PROCEDURE — A9502 TC99M TETROFOSMIN: HCPCS | Performed by: INTERNAL MEDICINE

## 2020-06-17 PROCEDURE — 93016 CV STRESS TEST SUPVJ ONLY: CPT | Performed by: INTERNAL MEDICINE

## 2020-06-17 PROCEDURE — 34300033 ZZH RX 343: Performed by: INTERNAL MEDICINE

## 2020-06-17 PROCEDURE — 78452 HT MUSCLE IMAGE SPECT MULT: CPT

## 2020-06-17 RX ORDER — REGADENOSON 0.08 MG/ML
0.4 INJECTION, SOLUTION INTRAVENOUS ONCE
Status: COMPLETED | OUTPATIENT
Start: 2020-06-17 | End: 2020-06-17

## 2020-06-17 RX ADMIN — TETROFOSMIN 10.9 MCI.: 1.38 INJECTION, POWDER, LYOPHILIZED, FOR SOLUTION INTRAVENOUS at 08:15

## 2020-06-17 RX ADMIN — TETROFOSMIN 32.5 MCI.: 1.38 INJECTION, POWDER, LYOPHILIZED, FOR SOLUTION INTRAVENOUS at 09:50

## 2020-06-17 RX ADMIN — REGADENOSON 0.4 MG: 0.08 INJECTION, SOLUTION INTRAVENOUS at 09:48

## 2020-06-17 ASSESSMENT — MIFFLIN-ST. JEOR: SCORE: 1152.1

## 2020-06-19 DIAGNOSIS — Z95.0 CARDIAC PACEMAKER IN SITU: ICD-10-CM

## 2020-06-19 DIAGNOSIS — I49.5 SSS (SICK SINUS SYNDROME) (H): Primary | ICD-10-CM

## 2020-06-26 ENCOUNTER — VIRTUAL VISIT (OUTPATIENT)
Dept: CARDIOLOGY | Facility: CLINIC | Age: 85
End: 2020-06-26
Attending: NURSE PRACTITIONER
Payer: MEDICARE

## 2020-06-26 VITALS — SYSTOLIC BLOOD PRESSURE: 159 MMHG | WEIGHT: 148 LBS | DIASTOLIC BLOOD PRESSURE: 74 MMHG | BODY MASS INDEX: 23.18 KG/M2

## 2020-06-26 DIAGNOSIS — I10 BENIGN ESSENTIAL HYPERTENSION: Primary | ICD-10-CM

## 2020-06-26 DIAGNOSIS — I25.118 CORONARY ARTERY DISEASE OF NATIVE ARTERY OF NATIVE HEART WITH STABLE ANGINA PECTORIS (H): ICD-10-CM

## 2020-06-26 PROCEDURE — 99441 ZZC PHYSICIAN TELEPHONE EVALUATION 5-10 MIN: CPT | Performed by: INTERNAL MEDICINE

## 2020-06-26 RX ORDER — METOPROLOL SUCCINATE 50 MG/1
50 TABLET, EXTENDED RELEASE ORAL DAILY
Qty: 90 TABLET | Refills: 3 | Status: SHIPPED | OUTPATIENT
Start: 2020-06-26

## 2020-06-26 NOTE — PROGRESS NOTES
"Christiana Sal is a 88 year old female who is being evaluated via a billable telephone visit.      The patient has been notified of following:     \"This telephone visit will be conducted via a call between you and your physician/provider. We have found that certain health care needs can be provided without the need for a physical exam.  This service lets us provide the care you need with a short phone conversation.  If a prescription is necessary we can send it directly to your pharmacy.  If lab work is needed we can place an order for that and you can then stop by our lab to have the test done at a later time.    Telephone visits are billed at different rates depending on your insurance coverage. During this emergency period, for some insurers they may be billed the same as an in-person visit.  Please reach out to your insurance provider with any questions.    If during the course of the call the physician/provider feels a telephone visit is not appropriate, you will not be charged for this service.\"    Patient has given verbal consent for Telephone visit?  Yes    What phone number would you like to be contacted at? [phone]    How would you like to obtain your AVS? Mail a copy    Additional provider notes:  88-year-old female seen for follow-up of pacemaker and coronary artery disease.  She also had a TIA in 2002 and reflux.      She underwent stent to the RCA in 2004.  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  Nuclear stress in September 2017 showed fixed septal defect, suspect from left bundle branch block, EF 76%.      She has a history of sinus node dysfunction, dual-chamber pacemaker was implanted in 2016 ([employer] L331 [employer]olade MRI). Echo then showed EF 60%, 1+ MR/AI, trace TR.     Echo April 2019 showed EF 60%, normal RV, moderate LVH.     Device check April 2020 showed 1% atrial pacing, 2% ventricular pacing, no arrhythmias, battery 12 years.     Nuclear " stress June 2020 was probably normal, anteroseptal defect likely from breast or pacemaker activation, EF 70%.    In April she had some intermittent episodes of dyspnea and chest pain.  This would come on randomly, it would get better with nitro.  She was asked to increase her metoprolol, however she is still taking 25 mg daily.  Imdur was discussed, however this was never started.    In the past 3 weeks that she has had no symptoms.  Blood pressure has typically been in the 150s with a few readings in the 130s.  She denies any lower extremity edema.    She is considering having surgery near her ankle to have some rods or a cyst removed.  She is unsure if she wants to have the surgery.    Data:  June 2020: Potassium 4.8, creatinine 0.7, hemoglobin 12.6    Assessment:  88-year-old female seen for follow-up of pacemaker and coronary artery disease.  Her blood pressure is running a little high.  Her chest pain could be cardiac, it is reassuring that recent stress test was normal.  She may be having some chest pain from uncontrolled hypertension.  Metoprolol will be increased.  Amlodipine could be added if needed.    Once her blood pressure is optimized, she would be lower cardiac risk for any cardiac complications if she were to undergo the intermediate risk orthopedic ankle surgery.    Recommendations:  1.  Hypertension, uncontrolled  -Increase Toprol to 50 mg daily  -RN phone visit in 1 week to check blood pressure, if blood pressure running over 135 on average, start amlodipine 5 mg daily    2.  Coronary artery disease  -Optimize blood pressure as outlined above    3.  Pacemaker  -Normal function, continue routine device checks    4.  Cardiovascular preop evaluation  -Once blood pressure is optimized, patient would be lower risk for any cardiac complications.  Recommend continuing all cardiac medications throughout surgery    Follow-up with TACHO in 3 months.  RN phone visit in 1 week to follow-up on blood pressure as  above.    Phone call duration: 10 minutes    A total of 20 minutes was spent with clinic visit, including chart review and coordinating care, >50% of time was spent talking with patient.    Darrell Osman MD  Cardiology - [facility]  Pager: [phone]  Text Page  June 26, 2020    Addendum:  Device interrogation showed several episodes of A. fib up to 1 hour 30 minutes.  No previous diagnosis of A. fib.  Will arrange for TACHO follow-up visit within 1 to 2 weeks to discuss the risk and benefit of anticoagulation.  Would recommend at least 3-6 months of anticoagulation.  If no more A. fib on future device checks, will discuss how long she may need to stay on it.    Also, she likely will be moving to [place] this fall.    Darrell Osman MD  Cardiology - [facility]  Pager: [phone]  Text Page  July 2, 2020

## 2020-06-26 NOTE — LETTER
6/26/2020    Kelly Matthew PA-C  [id number] [facility] [address]    RE: Christiana Sal       Dear Colleague,    I had the pleasure of seeing Christiana Sal in the [facility].    Christiana Sal is a 88 year old female who is being evaluated via a billable telephone visit.      88-year-old female seen for follow-up of pacemaker and coronary artery disease.  She also had a TIA in 2002 and reflux.      She underwent stent to the RCA in 2004.  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  Nuclear stress in September 2017 showed fixed septal defect, suspect from left bundle branch block, EF 76%.      She has a history of sinus node dysfunction, dual-chamber pacemaker was implanted in 2016 (Zeolife L331 Accolade MRI). Echo then showed EF 60%, 1+ MR/AI, trace TR.     Echo April 2019 showed EF 60%, normal RV, moderate LVH.     Device check April 2020 showed 1% atrial pacing, 2% ventricular pacing, no arrhythmias, battery 12 years.     Nuclear stress June 2020 was probably normal, anteroseptal defect likely from breast or pacemaker activation, EF 70%.    In April she had some intermittent episodes of dyspnea and chest pain.  This would come on randomly, it would get better with nitro.  She was asked to increase her metoprolol, however she is still taking 25 mg daily.  Imdur was discussed, however this was never started.    In the past 3 weeks that she has had no symptoms.  Blood pressure has typically been in the 150s with a few readings in the 130s.  She denies any lower extremity edema.    She is considering having surgery near her ankle to have some rods or a cyst removed.  She is unsure if she wants to have the surgery.    Data:  June 2020: Potassium 4.8, creatinine 0.7, hemoglobin 12.6    Assessment:  88-year-old female seen for follow-up of pacemaker and coronary artery disease.  Her blood pressure is running a little  high.  Her chest pain could be cardiac, it is reassuring that recent stress test was normal.  She may be having some chest pain from uncontrolled hypertension.  Metoprolol will be increased.  Amlodipine could be added if needed.    Once her blood pressure is optimized, she would be lower cardiac risk for any cardiac complications if she were to undergo the intermediate risk orthopedic ankle surgery.    Recommendations:  1.  Hypertension, uncontrolled  -Increase Toprol to 50 mg daily  -RN phone visit in 1 week to check blood pressure, if blood pressure running over 135 on average, start amlodipine 5 mg daily    2.  Coronary artery disease  -Optimize blood pressure as outlined above    3.  Pacemaker  -Normal function, continue routine device checks    4.  Cardiovascular preop evaluation  -Once blood pressure is optimized, patient would be lower risk for any cardiac complications.  Recommend continuing all cardiac medications throughout surgery    Follow-up with TACHO in 3 months.  RN phone visit in 1 week to follow-up on blood pressure as above.    Phone call duration: 10 minutes    A total of 20 minutes was spent with clinic visit, including chart review and coordinating care, >50% of time was spent talking with patient.    Darrell Osman MD  Cardiology - [facility] [place] [facility]  Pager: [phone]  Text Page  June 26, 2020    Thank you for allowing me to participate in the care of your patient.    Sincerely,     Darrell Osman MD     [facility]

## 2020-06-26 NOTE — PATIENT INSTRUCTIONS
It was a pleasure talking to you during your recent phone visit.  I think the main issue is getting your blood pressure better controlled.  Some of the chest pain you are having may be from uncontrolled blood pressure.    Please increase your metoprolol to 50 mg daily.  One of our nurses will contact you within a week or so to follow-up on your blood pressure.  You may need additional medication to get it controlled.    If you want to have your ankle surgery, it should be okay to do so from a heart perspective.  However it would be good to get your blood pressure under better control first.    We would like to have a formal follow-up visit with you in about 3 months.  Our schedulers will reach out to you to arrange this appointment.

## 2020-07-02 ENCOUNTER — ANCILLARY PROCEDURE (OUTPATIENT)
Dept: CARDIOLOGY | Facility: CLINIC | Age: 85
End: 2020-07-02
Attending: INTERNAL MEDICINE
Payer: MEDICARE

## 2020-07-02 ENCOUNTER — TELEPHONE (OUTPATIENT)
Dept: CARDIOLOGY | Facility: CLINIC | Age: 85
End: 2020-07-02

## 2020-07-02 DIAGNOSIS — Z95.0 CARDIAC PACEMAKER IN SITU: ICD-10-CM

## 2020-07-02 DIAGNOSIS — I49.5 SSS (SICK SINUS SYNDROME) (H): ICD-10-CM

## 2020-07-02 LAB
MDC_IDC_EPISODE_DTM: NORMAL
MDC_IDC_EPISODE_DURATION: 123 S
MDC_IDC_EPISODE_DURATION: 123 S
MDC_IDC_EPISODE_DURATION: 124 S
MDC_IDC_EPISODE_DURATION: 125 S
MDC_IDC_EPISODE_DURATION: 125 S
MDC_IDC_EPISODE_DURATION: 4 S
MDC_IDC_EPISODE_DURATION: 5408 S
MDC_IDC_EPISODE_DURATION: 9 S
MDC_IDC_EPISODE_ID: NORMAL
MDC_IDC_EPISODE_TYPE: NORMAL
MDC_IDC_LEAD_IMPLANT_DT: NORMAL
MDC_IDC_LEAD_IMPLANT_DT: NORMAL
MDC_IDC_LEAD_LOCATION: NORMAL
MDC_IDC_LEAD_LOCATION: NORMAL
MDC_IDC_LEAD_LOCATION_DETAIL_1: NORMAL
MDC_IDC_LEAD_LOCATION_DETAIL_1: NORMAL
MDC_IDC_LEAD_MFG: NORMAL
MDC_IDC_LEAD_MFG: NORMAL
MDC_IDC_LEAD_MODEL: NORMAL
MDC_IDC_LEAD_MODEL: NORMAL
MDC_IDC_LEAD_POLARITY_TYPE: NORMAL
MDC_IDC_LEAD_POLARITY_TYPE: NORMAL
MDC_IDC_LEAD_SERIAL: NORMAL
MDC_IDC_LEAD_SERIAL: NORMAL
MDC_IDC_MSMT_BATTERY_DTM: NORMAL
MDC_IDC_MSMT_BATTERY_REMAINING_LONGEVITY: 138 MO
MDC_IDC_MSMT_BATTERY_REMAINING_PERCENTAGE: 100 %
MDC_IDC_MSMT_BATTERY_STATUS: NORMAL
MDC_IDC_MSMT_LEADCHNL_RA_IMPEDANCE_VALUE: 695 OHM
MDC_IDC_MSMT_LEADCHNL_RA_PACING_THRESHOLD_AMPLITUDE: 0.9 V
MDC_IDC_MSMT_LEADCHNL_RA_PACING_THRESHOLD_PULSEWIDTH: 0.4 MS
MDC_IDC_MSMT_LEADCHNL_RV_IMPEDANCE_VALUE: 686 OHM
MDC_IDC_MSMT_LEADCHNL_RV_PACING_THRESHOLD_AMPLITUDE: 1.3 V
MDC_IDC_MSMT_LEADCHNL_RV_PACING_THRESHOLD_PULSEWIDTH: 0.4 MS
MDC_IDC_PG_IMPLANT_DTM: NORMAL
MDC_IDC_PG_MFG: NORMAL
MDC_IDC_PG_MODEL: NORMAL
MDC_IDC_PG_SERIAL: NORMAL
MDC_IDC_PG_TYPE: NORMAL
MDC_IDC_SESS_CLINIC_NAME: NORMAL
MDC_IDC_SESS_DTM: NORMAL
MDC_IDC_SESS_TYPE: NORMAL
MDC_IDC_SET_BRADY_AT_MODE_SWITCH_MODE: NORMAL
MDC_IDC_SET_BRADY_AT_MODE_SWITCH_RATE: 170 {BEATS}/MIN
MDC_IDC_SET_BRADY_LOWRATE: 50 {BEATS}/MIN
MDC_IDC_SET_BRADY_MAX_SENSOR_RATE: 120 {BEATS}/MIN
MDC_IDC_SET_BRADY_MAX_TRACKING_RATE: 120 {BEATS}/MIN
MDC_IDC_SET_BRADY_MODE: NORMAL
MDC_IDC_SET_BRADY_PAV_DELAY_HIGH: 100 MS
MDC_IDC_SET_BRADY_PAV_DELAY_LOW: 200 MS
MDC_IDC_SET_BRADY_SAV_DELAY_HIGH: 85 MS
MDC_IDC_SET_BRADY_SAV_DELAY_LOW: 170 MS
MDC_IDC_SET_LEADCHNL_RA_PACING_AMPLITUDE: 2 V
MDC_IDC_SET_LEADCHNL_RA_PACING_CAPTURE_MODE: NORMAL
MDC_IDC_SET_LEADCHNL_RA_PACING_POLARITY: NORMAL
MDC_IDC_SET_LEADCHNL_RA_PACING_PULSEWIDTH: 0.4 MS
MDC_IDC_SET_LEADCHNL_RA_SENSING_ADAPTATION_MODE: NORMAL
MDC_IDC_SET_LEADCHNL_RA_SENSING_POLARITY: NORMAL
MDC_IDC_SET_LEADCHNL_RA_SENSING_SENSITIVITY: 0.25 MV
MDC_IDC_SET_LEADCHNL_RV_PACING_AMPLITUDE: 2 V
MDC_IDC_SET_LEADCHNL_RV_PACING_CAPTURE_MODE: NORMAL
MDC_IDC_SET_LEADCHNL_RV_PACING_POLARITY: NORMAL
MDC_IDC_SET_LEADCHNL_RV_PACING_PULSEWIDTH: 0.4 MS
MDC_IDC_SET_LEADCHNL_RV_SENSING_ADAPTATION_MODE: NORMAL
MDC_IDC_SET_LEADCHNL_RV_SENSING_POLARITY: NORMAL
MDC_IDC_SET_LEADCHNL_RV_SENSING_SENSITIVITY: 1.5 MV
MDC_IDC_SET_ZONE_DETECTION_INTERVAL: 375 MS
MDC_IDC_SET_ZONE_TYPE: NORMAL
MDC_IDC_SET_ZONE_VENDOR_TYPE: NORMAL
MDC_IDC_STAT_AT_BURDEN_PERCENT: 1 %
MDC_IDC_STAT_AT_DTM_END: NORMAL
MDC_IDC_STAT_AT_DTM_START: NORMAL
MDC_IDC_STAT_BRADY_DTM_END: NORMAL
MDC_IDC_STAT_BRADY_DTM_START: NORMAL
MDC_IDC_STAT_BRADY_RA_PERCENT_PACED: 2 %
MDC_IDC_STAT_BRADY_RV_PERCENT_PACED: 3 %
MDC_IDC_STAT_EPISODE_RECENT_COUNT: 0
MDC_IDC_STAT_EPISODE_RECENT_COUNT: 16
MDC_IDC_STAT_EPISODE_RECENT_COUNT: 2
MDC_IDC_STAT_EPISODE_RECENT_COUNT_DTM_END: NORMAL
MDC_IDC_STAT_EPISODE_RECENT_COUNT_DTM_START: NORMAL
MDC_IDC_STAT_EPISODE_TYPE: NORMAL
MDC_IDC_STAT_EPISODE_VENDOR_TYPE: NORMAL

## 2020-07-02 PROCEDURE — 93294 REM INTERROG EVL PM/LDLS PM: CPT | Performed by: INTERNAL MEDICINE

## 2020-07-02 PROCEDURE — 93296 REM INTERROG EVL PM/IDS: CPT | Performed by: INTERNAL MEDICINE

## 2020-07-02 NOTE — TELEPHONE ENCOUNTER
On patient's pacemaker device check today patient had 3 mode switch episodes logged. EGM's suggestive of Afib with controlled V rates in the 60-80's. Longest episode logged lasting 1h3m8s. Will send FYI to Dr. Osman as this is not documented in any previous patient visits and patient is not on anticoagulation. Patient had some documented chest pain the days prior and past the logged episode events on 6/7/20, otherwise patient unaware if symptomatic.     Informed patient someone would reach out if changes needed to be made. Additionally patient stated that in August/September patient will be moving to live with her daughter in [place] and will have to establish cardiac/device cares somewhere else.

## 2020-07-02 NOTE — TELEPHONE ENCOUNTER
Danelle,    This patient has some afib on pacemaker check, no previous history of afib.  She was just seen by me for a pre-op last week.  Can you schedule a follow up with TACHO in the next few weeks to discuss anticoagulation?      Thanks,  Louis    ADDENDUM: Called pt to discuss. Scheduled for phone visit with Saundra Michael NP on 7/13/20 to discuss AC. Her surgery is tentatively scheduled for 7/30/20. Danelle Price RN Cardiology July 2, 2020, 11:42 AM

## 2020-07-06 ENCOUNTER — TELEPHONE (OUTPATIENT)
Dept: CARDIOLOGY | Facility: CLINIC | Age: 85
End: 2020-07-06

## 2020-07-06 DIAGNOSIS — I10 ESSENTIAL HYPERTENSION WITH GOAL BLOOD PRESSURE LESS THAN 140/90: Primary | ICD-10-CM

## 2020-07-06 RX ORDER — AMLODIPINE BESYLATE 5 MG/1
5 TABLET ORAL DAILY
Qty: 30 TABLET | Refills: 1 | Status: SHIPPED | OUTPATIENT
Start: 2020-07-06 | End: 2020-07-15

## 2020-07-06 NOTE — TELEPHONE ENCOUNTER
Yes, I would like her to start amlodipine 5mg daily.    Louis    ADDENDUM:  Spoke with pt. Rx sent.  Pt has appt 7/13/20 with Saundra Michael.  BAYLEE GIBBONS RN on 7/6/2020 at 2:02 PM    ADDENDUM: Pt missed phone visit with Saundra Michael NP yesterday as she forgot about it. Rescheduled appt for tomorrow. She gave BPs for the past few days:  7/7/20  134/65  59  7/8/20  151/70  54  7/9/20  137/59  61  7/11/20 143/68  68  Pt states she is tolerating amlodipine well--no ankle swelling or other symptoms. Pt will be ready for phone appointment tomorrow. Danelle Price RN Cardiology July 14, 2020, 10:03 AM

## 2020-07-06 NOTE — TELEPHONE ENCOUNTER
Last OV Recommendations:  1.  Hypertension, uncontrolled  -Increase Toprol to 50 mg daily  -RN phone visit in 1 week to check blood pressure, if blood pressure running over 135 on average, start amlodipine 5 mg daily    Spoke with pt. She has been taking the increased dose of Toprol in the morning and checking BP 1-2 hours after taking med.    7/2/20 /65   7/3/20 /76  7/4/20 /61  7/5/20 /63  Has not checked BP today.    Dr Osman - do you want pt to take amlodipine in addition to the Toprol?    BAYLEE GIBBONS, RN on 7/6/2020 at 10:41 AM

## 2020-07-13 PROBLEM — I48.0 PAROXYSMAL ATRIAL FIBRILLATION (H): Status: ACTIVE | Noted: 2020-07-13

## 2020-07-15 ENCOUNTER — VIRTUAL VISIT (OUTPATIENT)
Dept: CARDIOLOGY | Facility: CLINIC | Age: 85
End: 2020-07-15
Payer: MEDICARE

## 2020-07-15 DIAGNOSIS — I10 BENIGN ESSENTIAL HYPERTENSION: ICD-10-CM

## 2020-07-15 DIAGNOSIS — I48.0 PAROXYSMAL ATRIAL FIBRILLATION (H): Primary | ICD-10-CM

## 2020-07-15 DIAGNOSIS — I25.10 CORONARY ARTERY DISEASE INVOLVING NATIVE CORONARY ARTERY OF NATIVE HEART WITHOUT ANGINA PECTORIS: ICD-10-CM

## 2020-07-15 DIAGNOSIS — Z95.0 CARDIAC PACEMAKER IN SITU: ICD-10-CM

## 2020-07-15 DIAGNOSIS — I10 ESSENTIAL HYPERTENSION WITH GOAL BLOOD PRESSURE LESS THAN 140/90: ICD-10-CM

## 2020-07-15 PROCEDURE — 99443 ZZC PHYSICIAN TELEPHONE EVALUATION 21-30 MIN: CPT | Performed by: NURSE PRACTITIONER

## 2020-07-15 RX ORDER — ASPIRIN 81 MG/1
81 TABLET ORAL DAILY
COMMUNITY
Start: 2020-07-15

## 2020-07-15 RX ORDER — AMLODIPINE BESYLATE 10 MG/1
10 TABLET ORAL DAILY
Qty: 30 TABLET | Refills: 11 | Status: SHIPPED | OUTPATIENT
Start: 2020-07-15 | End: 2020-08-31

## 2020-07-15 NOTE — PROGRESS NOTES
"Christiana Sal is a 88 year old female who is being evaluated via a billable telephone visit.      The patient has been notified of following:     \"This telephone visit will be conducted via a call between you and your physician/provider. We have found that certain health care needs can be provided without the need for a physical exam.  This service lets us provide the care you need with a short phone conversation.  If a prescription is necessary we can send it directly to your pharmacy.  If lab work is needed we can place an order for that and you can then stop by our lab to have the test done at a later time.    Telephone visits are billed at different rates depending on your insurance coverage. During this emergency period, for some insurers they may be billed the same as an in-person visit.  Please reach out to your insurance provider with any questions.    If during the course of the call the physician/provider feels a telephone visit is not appropriate, you will not be charged for this service.\"    Patient has given verbal consent for Telephone visit?  Yes    What phone number would you like to be contacted at? [phone]    How would you like to obtain your AVS? Mail a copy    Phone call duration: 25 minutes    SHARMIN Meade CNP          Cardiology Clinic Progress Note  Christiana Sal MRN# [id number]   YOB: 1931 Age: 88 year old      Primary Cardiologist:   Dr. Osmna/ Dr. Beverly           History of Presenting Illness:      Chirstiana Sal is a pleasant 88 year old patient with a past cardiac history significant for   1. CAD with PCI to the RCA 2004, PCI to LAD 2016 complicated by dissection  2. Sinus node dysfunction s/p dual-chamber PPM 2016  3. Hypertension  4. Atrial fibrillation  Past medical history significant for TIA 2002 and GERD.    Nuclear stress test 2017 showed a fixed septal defect, suspect did from LBBB, EF 76%.    Pt saw  " Dawson in April 2019.  Her device check showed 1 mode switch for paroxysmal atrial tachycardia lasting 2 minutes.  She had been seen in urgent care for lightheadedness with negative work-up.  She was scheduled to undergo hip replacement. Echocardiogram 4/29/2019 showed EF 55 to 60%, mild aortic regurgitation, only trace TR, and moderate concentric LVH.    She was seen April 2020 for annual follow-up. Device check 4/1/2020 showing 1% atrial paced, 2% ventricular paced, adequate rates per histogram, and no atrial or ventricular arrhythmias.  Her activity was limited due to prior hip replacement and broken ankle.  1 month prior she began having intermittent dyspnea on exertion which occurred 4 times.  She also had 3 episodes of chest pressure in the prior 2 weeks relieved with nitroglycerin.  She had an episode the night prior of chest pressure lasting for 30 minutes, at rest, but did not try nitroglycerin.  BPs were mildly elevated and metoprolol was increased for antianginal and hypertension. Chest pressure was relieved at follow-up.     Patient was last seen by Dr. Osman in June 2020.  She continued without chest discomfort.  Blood pressures remained elevated at 150 systolic.  Metoprolol was increased.  Follow-up call she noted continued increased blood pressures and was started on amlodipine.  She was considering ankle surgery and Dr. Osman recommended improved blood pressure control to lower cardiac risk.    Device interrogation 6/26/2020 showed several episodes of atrial fibrillation up to 1 hour 30 minutes without any prior diagnosis of atrial fibrillation.  Dr. Osman recommended follow-up to discuss starting anticoagulation for at least 3 to 6 months and longer depending on how much atrial fibrillation is seen on future device checks.    Pt presents today to discuss anticoagulation.  She denies any side effects after starting amlodipine.  Blood pressures have ranged from 120s to 150, an hour after  taking medications.  She is agreeable to further increase amlodipine.  She denies any further chest discomfort or fatigue.  She denies any symptoms with her atrial fibrillation.  We have discussed her elevated chads VASC score with a risk of 11% stroke each year.  She does mention that she has a history of a couple TIAs approximately 10 years ago with no etiology found.  She has never been on anticoagulation but is familiar with Coumadin as her  was on this previously.  She is agreeable to seeing if DOAC is affordable and if not we will start Coumadin.  She denies any bleeding difficulty and most recent hemoglobin from June 2020 was WNL.  She again mentions that she will be moving in September 2020, out of state, and would like to see Dr. Osman for follow-up prior to that, if possible. Patient reports no chest pain, shortness of breath, PND, orthopnea, presyncope, syncope, edema, heart racing, or palpitations.       Current Cardiac Medications    mg daily  Amlodipine 5 mg daily  Atorvastatin 40 mg daily  Metoprolol XL 50 mg daily  Nitroglycerin PRN                   Assessment and Plan:       Plan  1. START eliquis 5 mg twice a day for afib   2. INCREASE amlodipine to 10 mg daily for HTN  3. DECREASE aspirin to 81 mg daily (1 tablet)     Recommendations:  1. Check with pharmacy on cost of eliquis. If not affordable check to see if xarelto 20 mg daily or pradaxa 150 mg twice a day are cheaper. If none are affordable then call my nurse and we can start coumadin.   2. Check blood pressure at least 1 hour after medications. Call the clinic if your blood pressure is consistently greater than 130/80.     Follow-up:  1.  Call to schedule Nonfasting lab in 1month (Hgb)  2. See Dr. Osman for cardiology follow up at [facility]: Aug 2020.        1. CAD     PCI to the RCA 2004,     PCI to LAD 2016 complicated by dissection    Nuclear stress test 2017 showed a fixed septal defect    No angina     Continue  statin, ASA, beta-blocker    Consider increasing antianginal vs stress test if needed       2. Hypertension    Not Controlled    Continue metoprolol, amlodipine        3. Sinus node dysfunction s/p PPM    Follows with device clinic      4.   Paroxysmal atrial fibrillation    Asymptomatic    Found on device check    Elevated [id number] score 11%    Continue metoprolol for rate control    Start eliquis for anticoagulation           Thank you for allowing me to participate in this delightful patient's care.      This note was completed in part using Dragon voice recognition software. Although reviewed after completion, some word and grammatical errors may occur.    Saundra Block, APRN CNP, APRN, CNP           Data:   All laboratory data reviewed        HPI and Plan:   See dictation    Orders Placed This Encounter   Procedures     Hemoglobin     Follow-Up with Cardiologist       Orders Placed This Encounter   Medications     apixaban ANTICOAGULANT (ELIQUIS ANTICOAGULANT) 5 MG tablet     Sig: Take 1 tablet (5 mg) by mouth 2 times daily     Dispense:  60 tablet     Refill:  11     amLODIPine (NORVASC) 10 MG tablet     Sig: Take 1 tablet (10 mg) by mouth daily     Dispense:  30 tablet     Refill:  11     aspirin 81 MG EC tablet     Sig: Take 1 tablet (81 mg) by mouth daily     Dispense:          Medications Discontinued During This Encounter   Medication Reason     amLODIPine (NORVASC) 5 MG tablet      aspirin (ASA) 81 MG tablet Dose adjustment         Encounter Diagnoses   Name Primary?     Paroxysmal atrial fibrillation (H) Yes     Coronary artery disease involving native coronary artery of native heart without angina pectoris      Benign essential hypertension      Cardiac pacemaker in situ      Essential hypertension with goal blood pressure less than 140/90        CURRENT MEDICATIONS:  Current Outpatient Medications   Medication Sig Dispense Refill     acetaminophen (TYLENOL) 500 MG tablet Take 1,000  mg by mouth 3 times daily       amLODIPine (NORVASC) 10 MG tablet Take 1 tablet (10 mg) by mouth daily 30 tablet 11     apixaban ANTICOAGULANT (ELIQUIS ANTICOAGULANT) 5 MG tablet Take 1 tablet (5 mg) by mouth 2 times daily 60 tablet 11     aspirin 81 MG EC tablet Take 1 tablet (81 mg) by mouth daily       atorvastatin (LIPITOR) 40 MG tablet TAKE 1 TABLET(40 MG) BY MOUTH DAILY 90 tablet 0     buPROPion (WELLBUTRIN) 75 MG tablet TAKE 1 TABLET(75 MG) BY MOUTH DAILY 90 tablet 3     CALCIUM 600 + D OR 1 tablet by mouth daily       calcium carbonate (TUMS) 500 MG chewable tablet Take 1 chew tab by mouth 3 times daily as needed for heartburn       citalopram (CELEXA) 20 MG tablet Take 1 tablet (20 mg) by mouth daily Overdue for appointment - before next refill 90 tablet 0     famotidine (PEPCID) 20 MG tablet Take 1 tablet (20 mg) by mouth At Bedtime 180 tablet 1     Homeopathic Products (LEG CRAMPS PO)        levothyroxine (SYNTHROID/LEVOTHROID) 75 MCG tablet TAKE 1 TABLET BY MOUTH EVERY DAY 90 tablet 0     magnesium 250 MG tablet Take 1 tablet by mouth every evening        meclizine (ANTIVERT) 25 MG tablet TAKE ONE TABLET BY MOUTH EVERY 6 HOURS AS NEEDED FOR DIZZINESS 30 tablet 3     metoprolol succinate ER (TOPROL-XL) 50 MG 24 hr tablet Take 1 tablet (50 mg) by mouth daily 90 tablet 3     Multiple Vitamins-Minerals (OCUVITE PO) Take 1 tablet by mouth daily.       nitroGLYcerin (NITROSTAT) 0.4 MG sublingual tablet Place 1 tablet (0.4 mg) under the tongue every 5 minutes as needed for chest pain (call your doctor if you take a third dose) 25 tablet 3     polyethylene glycol (MIRALAX) 17 GM/SCOOP powder Take 1 capful by mouth daily       tolterodine (DETROL) 2 MG tablet Take 2 mg by mouth At Bedtime       Vitamin D, Cholecalciferol, 1000 units TABS Take 2,000 Units by mouth daily         ALLERGIES     Allergies   Allergen Reactions     Demerol Nausea     Lisinopril Cough     Meperidine Unknown     Sulfa Drugs Other (See  Comments) and Unknown     Questionable itching reported by patient       PAST MEDICAL HISTORY:  Past Medical History:   Diagnosis Date     Coronary atherosclerosis of native coronary artery     s/p MI ([facility])     Essential hypertension, benign      GERD (gastroesophageal reflux disease)      History of transient ischemic attack (TIA)      Hyperlipidemia LDL goal <100      Hypothyroidism      Major depressive disorder, recurrent episode, mild (H) 2011     Peptic ulcer disease with hemorrhage     Remote history of stomach ulcer, requiring transfusion after chronic bleeding       PAST SURGICAL HISTORY:  Past Surgical History:   Procedure Laterality Date     APPENDECTOMY       ARTHROPLASTY HIP Left 2019    Procedure: ARTHROPLASTY, HIP;  Surgeon: Jacinto Adams MD;  Location: [facility] OR     CARDIAC CATHERIZATION  1/15/04    drug-eluding stent RCA, Dr. Pérez, [facility]     CARDIAC CATHERIZATION  12    diffuse mild/mod disease, no new stent     CATARACT IOL, RT/LT  12    RT, Dr. Wynne     CHOLECYSTECTOMY, OPEN       COLONOSCOPY      [facility]     FRACTURE TX, ANKLE RT/LT      LT, 2 screws remain     HC EXCISE HAND/FOOT NEUROMA      RT     HC LARYNGOSCOPY DIRECT W VOCAL CORD INJECTION      vocal cord polypectomy     HC REMOVAL OF OVARIAN CYST(S)       HC TRANSCATH STENT INIT VESSEL,PERCUT      x 2     OPEN REDUCTION INTERNAL FIXATION ANKLE Right 12/3/2018    Procedure: OPEN REDUCTION INTERNAL FIXATION Right ANKLE;  Surgeon: Jacinto Adams MD;  Location: [facility] OR     REPAIR HAMMER TOE  9/13/10    multiple + RT great toe tendon release, Dr. Sanchez DPM     STENT  -    Cardiac stents 6 placed.       FAMILY HISTORY:  Family History   Problem Relation Age of Onset     C.A.D. Father      Hypertension Father      Myocardial Infarction Father 66         from MI     C.A.D. Brother      Myocardial Infarction Brother      Cancer Brother         skin ca     Breast Cancer  Sister      Cancer - colorectal Sister      Heart Disease Sister      Neurologic Disorder Mother         migraine     C.A.D. Mother      Hypertension Mother      Heart Failure Mother      Thyroid Disease Son         cretinism     Hypertension Son      Heart Disease Son      Psychotic Disorder Sister      Hypertension Sister      Heart Disease Sister         CABG     Hypertension Brother      C.A.D. Brother      Myocardial Infarction Brother      Arthritis Daughter         RA     Hypertension Sister      Heart Disease Sister      Cancer Sister      Blood Disease Sister      Neurologic Disorder Child         migraine (2 children)       SOCIAL HISTORY:  Social History     Socioeconomic History     Marital status:      Spouse name: None     Number of children: 4     Years of education: some adry     Highest education level: None   Occupational History     Employer: RETIRED   Social Needs     Financial resource strain: None     Food insecurity     Worry: None     Inability: None     Transportation needs     Medical: None     Non-medical: None   Tobacco Use     Smoking status: Never Smoker     Smokeless tobacco: Never Used     Tobacco comment: Never smoker; no secondhand smoke exposure   Substance and Sexual Activity     Alcohol use: Yes     Comment: social     Drug use: No     Sexual activity: Not Currently   Lifestyle     Physical activity     Days per week: None     Minutes per session: None     Stress: None   Relationships     Social connections     Talks on phone: None     Gets together: None     Attends [employer] service: None     Active member of club or organization: None     Attends meetings of clubs or organizations: None     Relationship status: None     Intimate partner violence     Fear of current or ex partner: None     Emotionally abused: None     Physically abused: None     Forced sexual activity: None   Other Topics Concern     Parent/sibling w/ CABG, MI or angioplasty before 65F 55M? No   Social  History Narrative    She just moved in to [place] in a retirement center.        Review of Systems:  Skin:  Positive for bruising     Eyes:  Positive for glasses    ENT:  Negative      Respiratory:  Positive for cough     Cardiovascular:  Negative      Gastroenterology: Positive for reflux    Genitourinary:  Negative      Musculoskeletal:  Positive for arthritis;joint pain;joint swelling;joint stiffness;back pain    Neurologic:  Positive for numbness or tingling of feet    Psychiatric:  Positive for depression;anxiety    Heme/Lymph/Imm:  Negative      Endocrine:  Negative        Physical Exam:  Vitals: There were no vitals taken for this visit.    Constitutional:           Skin:             Head:           Eyes:           Lymph:      ENT:           Neck:           Respiratory:            Cardiac:                                                           GI:           Extremities and Muscular Skeletal:                 Neurological:           Psych:           CC  No referring provider defined for this encounter.

## 2020-07-15 NOTE — LETTER
7/15/2020    Kelly Matthew PA-C  [address] [address] [facility] [address]    RE: Christiana Sal       Dear Colleague,    I had the pleasure of seeing Christiana Sal in the [facility].    Christiana Sal is a 88 year old female who is being evaluated via a billable telephone visit.      Cardiology Clinic Progress Note  Christiana Sal MRN# [id number]   YOB: 1931 Age: 88 year old      Primary Cardiologist:   Dr. Osman/ Dr. Beverly           History of Presenting Illness:      Christiana Sal is a pleasant 88 year old patient with a past cardiac history significant for   1. CAD with PCI to the RCA 2004, PCI to LAD 2016 complicated by dissection  2. Sinus node dysfunction s/p dual-chamber PPM 2016  3. Hypertension  4. Atrial fibrillation  Past medical history significant for TIA 2002 and GERD.    Nuclear stress test 2017 showed a fixed septal defect, suspect did from LBBB, EF 76%.    Pt saw Dr. Osman in April 2019.  Her device check showed 1 mode switch for paroxysmal atrial tachycardia lasting 2 minutes.  She had been seen in urgent care for lightheadedness with negative work-up.  She was scheduled to undergo hip replacement. Echocardiogram 4/29/2019 showed EF 55 to 60%, mild aortic regurgitation, only trace TR, and moderate concentric LVH.    She was seen April 2020 for annual follow-up. Device check 4/1/2020 showing 1% atrial paced, 2% ventricular paced, adequate rates per histogram, and no atrial or ventricular arrhythmias.  Her activity was limited due to prior hip replacement and broken ankle.  1 month prior she began having intermittent dyspnea on exertion which occurred 4 times.  She also had 3 episodes of chest pressure in the prior 2 weeks relieved with nitroglycerin.  She had an episode the night prior of chest pressure lasting for 30 minutes, at rest, but did not try nitroglycerin.  BPs were mildly elevated and  metoprolol was increased for antianginal and hypertension. Chest pressure was relieved at follow-up.     Patient was last seen by Dr. Osman in June 2020.  She continued without chest discomfort.  Blood pressures remained elevated at 150 systolic.  Metoprolol was increased.  Follow-up call she noted continued increased blood pressures and was started on amlodipine.  She was considering ankle surgery and Dr. Osman recommended improved blood pressure control to lower cardiac risk.    Device interrogation 6/26/2020 showed several episodes of atrial fibrillation up to 1 hour 30 minutes without any prior diagnosis of atrial fibrillation.  Dr. Osman recommended follow-up to discuss starting anticoagulation for at least 3 to 6 months and longer depending on how much atrial fibrillation is seen on future device checks.    Pt presents today to discuss anticoagulation.  She denies any side effects after starting amlodipine.  Blood pressures have ranged from 120s to 150, an hour after taking medications.  She is agreeable to further increase amlodipine.  She denies any further chest discomfort or fatigue.  She denies any symptoms with her atrial fibrillation.  We have discussed her elevated chads VASC score with a risk of 11% stroke each year.  She does mention that she has a history of a couple TIAs approximately 10 years ago with no etiology found.  She has never been on anticoagulation but is familiar with Coumadin as her  was on this previously.  She is agreeable to seeing if DOAC is affordable and if not we will start Coumadin.  She denies any bleeding difficulty and most recent hemoglobin from June 2020 was WNL.  She again mentions that she [place] be [place], [place] of [place], and would like to see Dr. Osman for follow-up prior to that, if possible. Patient reports no chest pain, shortness of breath, PND, orthopnea, presyncope, syncope, edema, heart racing, or palpitations.       Current  Cardiac Medications    mg daily  Amlodipine 5 mg daily  Atorvastatin 40 mg daily  Metoprolol XL 50 mg daily  Nitroglycerin PRN                   Assessment and Plan:       Plan  1. START eliquis 5 mg twice a day for afib   2. INCREASE amlodipine to 10 mg daily for HTN  3. DECREASE aspirin to 81 mg daily (1 tablet)     Recommendations:  1. Check with pharmacy on cost of eliquis. If not affordable check to see if xarelto 20 mg daily or pradaxa 150 mg twice a day are cheaper. If none are affordable then call my nurse and we can start coumadin.   2. Check blood pressure at least 1 hour after medications. Call the clinic if your blood pressure is consistently greater than 130/80.     Follow-up:  1.  Call to schedule Nonfasting lab in 1month (Hgb)  2. See Dr. Osman for cardiology follow up at [facility]: Aug 2020.        1. CAD     PCI to the RCA 2004,     PCI to LAD 2016 complicated by dissection    Nuclear stress test 2017 showed a fixed septal defect    No angina     Continue statin, ASA, beta-blocker    Consider increasing antianginal vs stress test if needed       2. Hypertension    Not Controlled    Continue metoprolol, amlodipine        3. Sinus node dysfunction s/p PPM    Follows with device clinic      4.   Paroxysmal atrial fibrillation    Asymptomatic    Found on device check    Elevated [id number] score 11%    Continue metoprolol for rate control    Start eliquis for anticoagulation           Thank you for allowing me to participate in this delightful patient's care.      This note was completed in part using Dragon voice recognition software. Although reviewed after completion, some word and grammatical errors may occur.    Saundra Block, APRN CNP, APRN, CNP           Data:   All laboratory data reviewed        HPI and Plan:   See dictation    Orders Placed This Encounter   Procedures     Hemoglobin     Follow-Up with Cardiologist       Orders Placed This Encounter    Medications     apixaban ANTICOAGULANT (ELIQUIS ANTICOAGULANT) 5 MG tablet     Sig: Take 1 tablet (5 mg) by mouth 2 times daily     Dispense:  60 tablet     Refill:  11     amLODIPine (NORVASC) 10 MG tablet     Sig: Take 1 tablet (10 mg) by mouth daily     Dispense:  30 tablet     Refill:  11     aspirin 81 MG EC tablet     Sig: Take 1 tablet (81 mg) by mouth daily     Dispense:          Medications Discontinued During This Encounter   Medication Reason     amLODIPine (NORVASC) 5 MG tablet      aspirin (ASA) 81 MG tablet Dose adjustment         Encounter Diagnoses   Name Primary?     Paroxysmal atrial fibrillation (H) Yes     Coronary artery disease involving native coronary artery of native heart without angina pectoris      Benign essential hypertension      Cardiac pacemaker in situ      Essential hypertension with goal blood pressure less than 140/90        CURRENT MEDICATIONS:  Current Outpatient Medications   Medication Sig Dispense Refill     acetaminophen (TYLENOL) 500 MG tablet Take 1,000 mg by mouth 3 times daily       amLODIPine (NORVASC) 10 MG tablet Take 1 tablet (10 mg) by mouth daily 30 tablet 11     apixaban ANTICOAGULANT (ELIQUIS ANTICOAGULANT) 5 MG tablet Take 1 tablet (5 mg) by mouth 2 times daily 60 tablet 11     aspirin 81 MG EC tablet Take 1 tablet (81 mg) by mouth daily       atorvastatin (LIPITOR) 40 MG tablet TAKE 1 TABLET(40 MG) BY MOUTH DAILY 90 tablet 0     buPROPion (WELLBUTRIN) 75 MG tablet TAKE 1 TABLET(75 MG) BY MOUTH DAILY 90 tablet 3     CALCIUM 600 + D OR 1 tablet by mouth daily       calcium carbonate (TUMS) 500 MG chewable tablet Take 1 chew tab by mouth 3 times daily as needed for heartburn       citalopram (CELEXA) 20 MG tablet Take 1 tablet (20 mg) by mouth daily Overdue for appointment - before next refill 90 tablet 0     famotidine (PEPCID) 20 MG tablet Take 1 tablet (20 mg) by mouth At Bedtime 180 tablet 1     Homeopathic Products (LEG CRAMPS PO)        levothyroxine  (SYNTHROID/LEVOTHROID) 75 MCG tablet TAKE 1 TABLET BY MOUTH EVERY DAY 90 tablet 0     magnesium 250 MG tablet Take 1 tablet by mouth every evening        meclizine (ANTIVERT) 25 MG tablet TAKE ONE TABLET BY MOUTH EVERY 6 HOURS AS NEEDED FOR DIZZINESS 30 tablet 3     metoprolol succinate ER (TOPROL-XL) 50 MG 24 hr tablet Take 1 tablet (50 mg) by mouth daily 90 tablet 3     Multiple Vitamins-Minerals (OCUVITE PO) Take 1 tablet by mouth daily.       nitroGLYcerin (NITROSTAT) 0.4 MG sublingual tablet Place 1 tablet (0.4 mg) under the tongue every 5 minutes as needed for chest pain (call your doctor if you take a third dose) 25 tablet 3     polyethylene glycol (MIRALAX) 17 GM/SCOOP powder Take 1 capful by mouth daily       tolterodine (DETROL) 2 MG tablet Take 2 mg by mouth At Bedtime       Vitamin D, Cholecalciferol, 1000 units TABS Take 2,000 Units by mouth daily         ALLERGIES     Allergies   Allergen Reactions     Demerol Nausea     Lisinopril Cough     Meperidine Unknown     Sulfa Drugs Other (See Comments) and Unknown     Questionable itching reported by patient       PAST MEDICAL HISTORY:  Past Medical History:   Diagnosis Date     Coronary atherosclerosis of native coronary artery     s/p MI ([facility])     Essential hypertension, benign      GERD (gastroesophageal reflux disease)      History of transient ischemic attack (TIA)      Hyperlipidemia LDL goal <100      Hypothyroidism      Major depressive disorder, recurrent episode, mild (H) 5/18/2011     Peptic ulcer disease with hemorrhage     Remote history of stomach ulcer, requiring transfusion after chronic bleeding       PAST SURGICAL HISTORY:  Past Surgical History:   Procedure Laterality Date     APPENDECTOMY       ARTHROPLASTY HIP Left 5/2/2019    Procedure: ARTHROPLASTY, HIP;  Surgeon: Jacinto Adams MD;  Location: [facility] OR     CARDIAC CATHERIZATION  1/15/04    drug-eluding stent RCA, Dr. Pérez, [facility]     CARDIAC CATHERIZATION   12    diffuse mild/mod disease, no new stent     CATARACT IOL, RT/LT  12    RT, Dr. Wynne     CHOLECYSTECTOMY, OPEN       COLONOSCOPY      hospitals     FRACTURE TX, ANKLE RT/LT      LT, 2 screws remain     HC EXCISE HAND/FOOT NEUROMA      RT     HC LARYNGOSCOPY DIRECT W VOCAL CORD INJECTION      vocal cord polypectomy     HC REMOVAL OF OVARIAN CYST(S)       HC TRANSCATH STENT INIT VESSEL,PERCUT      x 2     OPEN REDUCTION INTERNAL FIXATION ANKLE Right 12/3/2018    Procedure: OPEN REDUCTION INTERNAL FIXATION Right ANKLE;  Surgeon: Jacinto Adams MD;  Location: [facility] OR     REPAIR HAMMER TOE  9/13/10    multiple + RT great toe tendon release, Dr. Sanchez DPM     STENT  -    Cardiac stents 6 placed.       FAMILY HISTORY:  Family History   Problem Relation Age of Onset     C.A.D. Father      Hypertension Father      Myocardial Infarction Father 66         from MI     C.A.D. Brother      Myocardial Infarction Brother      Cancer Brother         skin ca     Breast Cancer Sister      Cancer - colorectal Sister      Heart Disease Sister      Neurologic Disorder Mother         migraine     C.A.D. Mother      Hypertension Mother      Heart Failure Mother      Thyroid Disease Son         cretinism     Hypertension Son      Heart Disease Son      Psychotic Disorder Sister      Hypertension Sister      Heart Disease Sister         CABG     Hypertension Brother      C.A.D. Brother      Myocardial Infarction Brother      Arthritis Daughter         RA     Hypertension Sister      Heart Disease Sister      Cancer Sister      Blood Disease Sister      Neurologic Disorder Child         migraine (2 children)       SOCIAL HISTORY:  Social History     Socioeconomic History     Marital status:      Spouse name: None     Number of children: 4     Years of education: some adry     Highest education level: None   Occupational History     Employer: RETIRED   Social Needs     Financial resource strain:  None     Food insecurity     Worry: None     Inability: None     Transportation needs     Medical: None     Non-medical: None   Tobacco Use     Smoking status: Never Smoker     Smokeless tobacco: Never Used     Tobacco comment: Never smoker; no secondhand smoke exposure   Substance and Sexual Activity     Alcohol use: Yes     Comment: social     Drug use: No     Sexual activity: Not Currently   Lifestyle     Physical activity     Days per week: None     Minutes per session: None     Stress: None   Relationships     Social connections     Talks on phone: None     Gets together: None     Attends Jain service: None     Active member of club or organization: None     Attends meetings of clubs or organizations: None     Relationship status: None     Intimate partner violence     Fear of current or ex partner: None     Emotionally abused: None     Physically abused: None     Forced sexual activity: None   Other Topics Concern     Parent/sibling w/ CABG, MI or angioplasty before 65F 55M? No   Social History Narrative    She just moved in to [place] in a snf center.        Review of Systems:  Skin:  Positive for bruising     Eyes:  Positive for glasses    ENT:  Negative      Respiratory:  Positive for cough     Cardiovascular:  Negative      Gastroenterology: Positive for reflux    Genitourinary:  Negative      Musculoskeletal:  Positive for arthritis;joint pain;joint swelling;joint stiffness;back pain    Neurologic:  Positive for numbness or tingling of feet    Psychiatric:  Positive for depression;anxiety    Heme/Lymph/Imm:  Negative      Endocrine:  Negative        Physical Exam:  Vitals: There were no vitals taken for this visit.    Constitutional:           Skin:             Head:           Eyes:           Lymph:      ENT:           Neck:           Respiratory:            Cardiac:                                                           GI:           Extremities and Muscular Skeletal:                  Neurological:           Psych:             Thank you for allowing me to participate in the care of your patient.    Sincerely,     SHARMIN [facility]

## 2020-07-15 NOTE — PATIENT INSTRUCTIONS
Medication Changes:  1. START eliquis 5 mg twice a day   2. INCREASE amlodipine to 10 mg daily   3. DECREASE aspirin to 81 mg daily (1 tablet)    Recommendations:  1. Check with pharmacy on cost of eliquis. If not affordable check to see if xarelto 20 mg daily or pradaxa 150 mg twice a day are cheaper. If none are affordable then call my nurse and we can start coumadin.   2. Check blood pressure at least 1 hour after medications. Call the clinic if your blood pressure is consistently greater than 130/80.     Follow-up:  1.  Call to schedule Nonfasting lab in 1month (Hgb)  2. See Dr. Osman for cardiology follow up at [facility]: Aug 2020. Call to schedule.     Cardiology Scheduling~[phone]  Cardiology Clinic RNs~[phone] (Chandrika Paul RN and Danelle Price RN)

## 2020-07-20 ENCOUNTER — OFFICE VISIT (OUTPATIENT)
Dept: FAMILY MEDICINE | Facility: CLINIC | Age: 85
End: 2020-07-20
Payer: MEDICARE

## 2020-07-20 VITALS
OXYGEN SATURATION: 96 % | BODY MASS INDEX: 23.68 KG/M2 | WEIGHT: 151.2 LBS | DIASTOLIC BLOOD PRESSURE: 58 MMHG | HEART RATE: 50 BPM | SYSTOLIC BLOOD PRESSURE: 108 MMHG | TEMPERATURE: 98.2 F

## 2020-07-20 DIAGNOSIS — S82.401D CLOSED FRACTURE OF SHAFT OF RIGHT FIBULA WITH ROUTINE HEALING, UNSPECIFIED FRACTURE MORPHOLOGY, SUBSEQUENT ENCOUNTER: ICD-10-CM

## 2020-07-20 DIAGNOSIS — Z01.818 PREOP GENERAL PHYSICAL EXAM: Primary | ICD-10-CM

## 2020-07-20 LAB — HGB BLD-MCNC: 12.5 G/DL (ref 11.7–15.7)

## 2020-07-20 PROCEDURE — 99214 OFFICE O/P EST MOD 30 MIN: CPT | Performed by: PHYSICIAN ASSISTANT

## 2020-07-20 PROCEDURE — 85018 HEMOGLOBIN: CPT | Performed by: PHYSICIAN ASSISTANT

## 2020-07-20 PROCEDURE — 36415 COLL VENOUS BLD VENIPUNCTURE: CPT | Performed by: PHYSICIAN ASSISTANT

## 2020-07-20 RX ORDER — AMLODIPINE BESYLATE 5 MG/1
TABLET ORAL
COMMUNITY
Start: 2020-07-06 | End: 2020-07-20

## 2020-07-20 NOTE — H&P (VIEW-ONLY)
[facility] [place]  [address] [facility]  [place] [address]  [phone]  Dept: [phone]    PRE-OP EVALUATION:  Today's date: 2020    Christiana Sal (: 10/25/1931) presents for pre-operative evaluation assessment as requested by Dr. Chávez.  She requires evaluation and anesthesia risk assessment prior to undergoing surgery/procedure for treatment of hardware removal .    Proposed Surgery/ Procedure: Hardware removal from right leg and cyst on ankle  Date of Surgery/ Procedure: 2020  Time of Surgery/ Procedure: Unknown  Hospital/Surgical Facility: [facility]  Fax number for surgical facility:    Primary Physician: Kelly Matthew  Type of Anesthesia Anticipated: General    Patient has a Health Care Directive or Living Will:  YES     1. YES - DO YOU HAVE A HISTORY OF HEART ATTACK, STROKE, STENT, BYPASS OR SURGERY ON AN ARTERY IN THE HEAD, NECK, HEART OR LEG? H/o TIA  2. YES - DO YOU EVER HAVE ANY PAIN OR DISCOMFORT IN YOUR CHEST? Unchanged, stable. Had recent f/u with cardiology  3. NO - Do you have a history of  Heart Failure?  4. YES - ARE YOUR TROUBLED BY SHORTNESS OF BREATH WHEN WALKING ON THE LEVEL, UP A SLIGHT HILL OR AT NIGHT? stable  5. YES - DO YOU CURRENTLY HAVE A COLD, BRONCHITIS OR OTHER RESPIRATORY INFECTION? Ongoing cough  6. YES - DO YOU HAVE A COUGH, SHORTNESS OF BREATH OR WHEEZING? Ongoing cough - chronic, months  7. YES - DO YOU SOMETIMES GET PAINS IN THE CALVES OF YOUR LEGS WHEN YOU WALK? stable  8. YES - DO YOU OR ANYONE IN YOUR FAMILY HAVE PREVIOUS HISTORY OF BLOOD CLOTS?   9. NO - Do you or does anyone in your family have a serious bleeding problem such as prolonged bleeding following surgeries or cuts?  10. NO - Have you ever had problems with anemia or been told to take iron pills?  11. NO - Have you had any abnormal blood loss such as black, tarry or bloody stools, or abnormal vaginal bleeding?  12. NO - Have you ever had a  blood transfusion?  13. NO - Have you or any of your relatives ever had problems with anesthesia?  14. NO - Do you have sleep apnea, excessive snoring or daytime drowsiness?  15. NO - Do you have any prosthetic heart valves?  16. NO - Do you have prosthetic joints?  17. NO - Is there any chance that you may be pregnant?      HPI:     HPI related to upcoming procedure: Planned removal of hardware from her right ankle      See problem list for active medical problems.  Problems all longstanding and stable, except as noted/documented.  See ROS for pertinent symptoms related to these conditions.      MEDICAL HISTORY:     Patient Active Problem List    Diagnosis Date Noted     Paroxysmal atrial fibrillation (H) 07/13/2020     Priority: Medium     Near syncope 05/03/2019     Priority: Medium     Overactive bladder 05/02/2019     Priority: Medium     S/P total hip arthroplasty 05/02/2019     Priority: Medium     Closed fracture of shaft of fibula 12/02/2018     Priority: Medium     Fall 12/02/2018     Priority: Medium     Primary osteoarthritis of left hip 09/21/2018     Priority: Medium     Insect bite 06/28/2017     Priority: Medium     Cardiac pacemaker - [employer] dual lead - Not Dependent 03/02/2017     Priority: Medium     Sinoatrial node dysfunction (H) 11/10/2016     Priority: Medium     CAD S/P percutaneous coronary angioplasty 05/11/2016     Priority: Medium     JENIFER (obstructive sleep apnea) 11/17/2014     Priority: Medium     Lichen planus 11/19/2013     Priority: Medium     Vulvar pruritus 11/12/2013     Priority: Medium     Angina pectoris (H) 02/08/2012     Priority: Medium     Advanced directives, counseling/discussion 06/16/2011     Priority: Medium     Patient will bring a copy. Chart r/q to make sure no copy.    GUSTAVO Cunningham MA    Advance Directive Problem List Overview:   Name Relationship Phone    Primary Health Care Agent Albaro Jonelle  [phone]         Alternative Health Care Agent Esther  Mulligan  [phone]     Reviewed Health Care Directive dated 05/13/2005, scanned into EPIC 01/2012.  Marivel Villarreal CMA                Major depressive disorder, recurrent episode, mild (H) 11/02/2010     Priority: Medium     Family history of colon cancer 05/04/2010     Priority: Medium     Coronary artery disease involving native coronary artery of native heart without angina pectoris      Priority: Medium     s/p MI ([facility])       Hyperlipidemia LDL goal <100      Priority: Medium     Benign essential hypertension      Priority: Medium     Hypothyroidism      Priority: Medium     GERD (gastroesophageal reflux disease)      Priority: Medium      Past Medical History:   Diagnosis Date     Coronary atherosclerosis of native coronary artery     s/p MI ([facility])     Essential hypertension, benign      GERD (gastroesophageal reflux disease)      History of transient ischemic attack (TIA)      Hyperlipidemia LDL goal <100      Hypothyroidism      Major depressive disorder, recurrent episode, mild (H) 5/18/2011     Peptic ulcer disease with hemorrhage     Remote history of stomach ulcer, requiring transfusion after chronic bleeding     Past Surgical History:   Procedure Laterality Date     APPENDECTOMY       ARTHROPLASTY HIP Left 5/2/2019    Procedure: ARTHROPLASTY, HIP;  Surgeon: Jacinto Adams MD;  Location: [facility] OR     CARDIAC CATHERIZATION  1/15/04    drug-eluding stent RCA, Dr. Pérez, [facility]     CARDIAC CATHERIZATION  2/16/12    diffuse mild/mod disease, no new stent     CATARACT IOL, RT/LT  1/26/12    RT, Dr. Wynne     CHOLECYSTECTOMY, OPEN       COLONOSCOPY  2008    [facility]     FRACTURE TX, ANKLE RT/LT      LT, 2 screws remain     HC EXCISE HAND/FOOT NEUROMA      RT     HC LARYNGOSCOPY DIRECT W VOCAL CORD INJECTION      vocal cord polypectomy     HC REMOVAL OF OVARIAN CYST(S)       HC TRANSCATH STENT INIT VESSEL,PERCUT      x 2     OPEN REDUCTION INTERNAL FIXATION ANKLE  Right 12/3/2018    Procedure: OPEN REDUCTION INTERNAL FIXATION Right ANKLE;  Surgeon: Jacinto Adams MD;  Location: [facility] OR     REPAIR HAMMER TOE  9/13/10    multiple + RT great toe tendon release, Dr. Sanchez DPLOLY     STENT  5-2016    Cardiac stents 6 placed.     Current Outpatient Medications   Medication Sig Dispense Refill     acetaminophen (TYLENOL) 500 MG tablet Take 1,000 mg by mouth 3 times daily       amLODIPine (NORVASC) 10 MG tablet Take 1 tablet (10 mg) by mouth daily 30 tablet 11     apixaban ANTICOAGULANT (ELIQUIS ANTICOAGULANT) 5 MG tablet Take 1 tablet (5 mg) by mouth 2 times daily 60 tablet 11     aspirin 81 MG EC tablet Take 1 tablet (81 mg) by mouth daily       atorvastatin (LIPITOR) 40 MG tablet TAKE 1 TABLET(40 MG) BY MOUTH DAILY 90 tablet 0     CALCIUM 600 + D OR 1 tablet by mouth daily       citalopram (CELEXA) 20 MG tablet Take 1 tablet (20 mg) by mouth daily Overdue for appointment - before next refill 90 tablet 0     famotidine (PEPCID) 20 MG tablet Take 1 tablet (20 mg) by mouth At Bedtime 180 tablet 1     Homeopathic Products (LEG CRAMPS PO)        levothyroxine (SYNTHROID/LEVOTHROID) 75 MCG tablet TAKE 1 TABLET BY MOUTH EVERY DAY 90 tablet 0     magnesium 250 MG tablet Take 1 tablet by mouth every evening        metoprolol succinate ER (TOPROL-XL) 50 MG 24 hr tablet Take 1 tablet (50 mg) by mouth daily 90 tablet 3     Multiple Vitamins-Minerals (OCUVITE PO) Take 1 tablet by mouth daily.       polyethylene glycol (MIRALAX) 17 GM/SCOOP powder Take 1 capful by mouth daily       Vitamin D, Cholecalciferol, 1000 units TABS Take 2,000 Units by mouth daily       buPROPion (WELLBUTRIN) 75 MG tablet TAKE 1 TABLET(75 MG) BY MOUTH DAILY (Patient not taking: Reported on 7/20/2020) 90 tablet 3     calcium carbonate (TUMS) 500 MG chewable tablet Take 1 chew tab by mouth 3 times daily as needed for heartburn       meclizine (ANTIVERT) 25 MG tablet TAKE ONE TABLET BY MOUTH EVERY 6 HOURS AS NEEDED FOR  DIZZINESS (Patient not taking: Reported on 7/20/2020) 30 tablet 3     nitroGLYcerin (NITROSTAT) 0.4 MG sublingual tablet Place 1 tablet (0.4 mg) under the tongue every 5 minutes as needed for chest pain (call your doctor if you take a third dose) (Patient not taking: Reported on 7/20/2020) 25 tablet 3     tolterodine (DETROL) 2 MG tablet Take 2 mg by mouth At Bedtime       OTC products: None, except as noted above    Allergies   Allergen Reactions     Demerol Nausea     Lisinopril Cough     Meperidine Unknown     Sulfa Drugs Other (See Comments) and Unknown     Questionable itching reported by patient      Latex Allergy: NO    Social History     Tobacco Use     Smoking status: Never Smoker     Smokeless tobacco: Never Used     Tobacco comment: Never smoker; no secondhand smoke exposure   Substance Use Topics     Alcohol use: Yes     Comment: social     History   Drug Use No       REVIEW OF SYSTEMS:   CONSTITUTIONAL: NEGATIVE for fever, chills, change in weight  INTEGUMENTARY/SKIN: NEGATIVE for worrisome rashes, moles or lesions  EYES: NEGATIVE for vision changes or irritation  ENT/MOUTH: NEGATIVE for ear, mouth and throat problems  RESP: NEGATIVE for significant cough or SOB  BREAST: NEGATIVE for masses, tenderness or discharge  CV: NEGATIVE for chest pain, palpitations or peripheral edema  GI: NEGATIVE for nausea, abdominal pain, heartburn, or change in bowel habits  : NEGATIVE for frequency, dysuria, or hematuria  MUSCULOSKELETAL: NEGATIVE for significant arthralgias or myalgia  NEURO: NEGATIVE for weakness, dizziness or paresthesias  ENDOCRINE: NEGATIVE for temperature intolerance, skin/hair changes  HEME: NEGATIVE for bleeding problems  PSYCHIATRIC: NEGATIVE for changes in mood or affect    EXAM:   /58 (BP Location: Left arm, Patient Position: Sitting, Cuff Size: Adult Large)   Pulse 50   Temp 98.2  F (36.8  C) (Tympanic)   Wt 68.6 kg (151 lb 3.2 oz)   SpO2 96%   BMI 23.68 kg/m      GENERAL  APPEARANCE: healthy, alert and no distress     EYES: EOMI, PERRL     HENT: ear canals and TM's normal and nose and mouth without ulcers or lesions     NECK: no adenopathy, no asymmetry, masses, or scars and thyroid normal to palpation     RESP: lungs clear to auscultation - no rales, rhonchi or wheezes     CV: regular rates and rhythm, normal S1 S2, no S3 or S4 and no murmur, click or rub     ABDOMEN:  soft, nontender, no HSM or masses and bowel sounds normal     MS: extremities normal- no gross deformities noted, no evidence of inflammation in joints, FROM in all extremities.     SKIN: no suspicious lesions or rashes     NEURO: Normal strength and tone, sensory exam grossly normal, mentation intact and speech normal     PSYCH: mentation appears normal. and affect normal/bright    DIAGNOSTICS:   Recent visit with cardiology. Recent device check   NM lexiscan (6/17/20)  - no change when compared to prior study in 2017    Labs Resulted Today:   Results for orders placed or performed in visit on 07/20/20   Hemoglobin     Status: None   Result Value Ref Range    Hemoglobin 12.5 11.7 - 15.7 g/dL       Recent Labs   Lab Test 06/08/20  1211 02/21/20  1142  05/10/16  1109   HGB 12.6 12.5   < > 12.5    271   < > 284   INR  --   --   --  0.90    138   < > 135   POTASSIUM 4.8 4.4   < > 4.0   CR 0.74 0.97   < > 0.88    < > = values in this interval not displayed.        IMPRESSION:   Reason for surgery/procedure: removal of hardware  Diagnosis/reason for consult: preop     The proposed surgical procedure is considered INTERMEDIATE risk.    REVISED CARDIAC RISK INDEX  The patient has the following serious cardiovascular risks for perioperative complications such as (MI, PE, VFib and 3  AV Block):  Coronary Artery Disease (MI, positive stress test, angina, Qs on EKG)  Cerebrovascular Disease (TIA or CVA)  INTERPRETATION: 2 risks: Class III (moderate risk - 6.6% complication rate)    The patient has the following  additional risks for perioperative complications:  No identified additional risks      ICD-10-CM    1. Preop general physical exam  Z01.818 Hemoglobin   2. Closed fracture of shaft of right fibula with routine healing, unspecified fracture morphology, subsequent encounter  S82.401D        RECOMMENDATIONS:     --Patient is to take all scheduled medications on the day of surgery EXCEPT for modifications listed below.    Anticoagulant or Antiplatelet Medication Use  ELIQUIS: Bleeding risk is low for this procedure and apixaban (Eliquis) should be stopped at least 24 hours prior to procedure based on a creatinine clearance of  greater than or equal to 30.     **Spoke with surgeon. Given low bleeding risk, okay with stopping 24 hours prior to surgery and resuming 24 hours post surgery. Per cardiology, no bridging needed if only stopping 24 hours prior to surgery      APPROVAL GIVEN to proceed with proposed procedure, without further diagnostic evaluation       Signed Electronically by: Kelly Matthew PA-C    Copy of this evaluation report is provided to requesting physician.    [place] Preop Guidelines    Revised Cardiac Risk Index

## 2020-07-20 NOTE — PATIENT INSTRUCTIONS
HOLD the Eliquis 24 hours prior to surgery      Before Your Surgery      Call your surgeon if there is any change in your health. This includes signs of a cold or flu (such as a sore throat, runny nose, cough, rash or fever).    Do not smoke, drink alcohol or take over the counter medicine (unless your surgeon or primary care doctor tells you to) for the 24 hours before and after surgery.    If you take prescribed drugs: Follow your doctor s orders about which medicines to take and which to stop until after surgery.    Eating and drinking prior to surgery: follow the instructions from your surgeon    Take a shower or bath the night before surgery. Use the soap your surgeon gave you to gently clean your skin. If you do not have soap from your surgeon, use your regular soap. Do not shave or scrub the surgery site.  Wear clean pajamas and have clean sheets on your bed.

## 2020-07-20 NOTE — PROGRESS NOTES
[facility] [place]  [address]  [place] [address]  [phone]  Dept: [phone]    PRE-OP EVALUATION:  Today's date: 2020    Christiana Sal (: 10/25/1931) presents for pre-operative evaluation assessment as requested by Dr. Chávez.  She requires evaluation and anesthesia risk assessment prior to undergoing surgery/procedure for treatment of hardware removal .    Proposed Surgery/ Procedure: Hardware removal from right leg and cyst on ankle  Date of Surgery/ Procedure: 2020  Time of Surgery/ Procedure: Unknown  Hospital/Surgical Facility: [facility]  Fax number for surgical facility:    Primary Physician: Kelly Matthew  Type of Anesthesia Anticipated: General    Patient has a Health Care Directive or Living Will:  YES     1. YES - DO YOU HAVE A HISTORY OF HEART ATTACK, STROKE, STENT, BYPASS OR SURGERY ON AN ARTERY IN THE HEAD, NECK, HEART OR LEG? H/o TIA  2. YES - DO YOU EVER HAVE ANY PAIN OR DISCOMFORT IN YOUR CHEST? Unchanged, stable. Had recent f/u with cardiology  3. NO - Do you have a history of  Heart Failure?  4. YES - ARE YOUR TROUBLED BY SHORTNESS OF BREATH WHEN WALKING ON THE LEVEL, UP A SLIGHT HILL OR AT NIGHT? stable  5. YES - DO YOU CURRENTLY HAVE A COLD, BRONCHITIS OR OTHER RESPIRATORY INFECTION? Ongoing cough  6. YES - DO YOU HAVE A COUGH, SHORTNESS OF BREATH OR WHEEZING? Ongoing cough - chronic, months  7. YES - DO YOU SOMETIMES GET PAINS IN THE CALVES OF YOUR LEGS WHEN YOU WALK? stable  8. YES - DO YOU OR ANYONE IN YOUR FAMILY HAVE PREVIOUS HISTORY OF BLOOD CLOTS?   9. NO - Do you or does anyone in your family have a serious bleeding problem such as prolonged bleeding following surgeries or cuts?  10. NO - Have you ever had problems with anemia or been told to take iron pills?  11. NO - Have you had any abnormal blood loss such as black, tarry or bloody stools, or abnormal vaginal bleeding?  12. NO - Have you ever had a  blood transfusion?  13. NO - Have you or any of your relatives ever had problems with anesthesia?  14. NO - Do you have sleep apnea, excessive snoring or daytime drowsiness?  15. NO - Do you have any prosthetic heart valves?  16. NO - Do you have prosthetic joints?  17. NO - Is there any chance that you may be pregnant?      HPI:     HPI related to upcoming procedure: Planned removal of hardware from her right ankle      See problem list for active medical problems.  Problems all longstanding and stable, except as noted/documented.  See ROS for pertinent symptoms related to these conditions.      MEDICAL HISTORY:     Patient Active Problem List    Diagnosis Date Noted     Paroxysmal atrial fibrillation (H) 07/13/2020     Priority: Medium     Near syncope 05/03/2019     Priority: Medium     Overactive bladder 05/02/2019     Priority: Medium     S/P total hip arthroplasty 05/02/2019     Priority: Medium     Closed fracture of shaft of fibula 12/02/2018     Priority: Medium     Fall 12/02/2018     Priority: Medium     Primary osteoarthritis of left hip 09/21/2018     Priority: Medium     Insect bite 06/28/2017     Priority: Medium     Cardiac pacemaker - [employer] dual lead - Not Dependent 03/02/2017     Priority: Medium     Sinoatrial node dysfunction (H) 11/10/2016     Priority: Medium     CAD S/P percutaneous coronary angioplasty 05/11/2016     Priority: Medium     JENIFER (obstructive sleep apnea) 11/17/2014     Priority: Medium     Lichen planus 11/19/2013     Priority: Medium     Vulvar pruritus 11/12/2013     Priority: Medium     Angina pectoris (H) 02/08/2012     Priority: Medium     Advanced directives, counseling/discussion 06/16/2011     Priority: Medium     Patient will bring a copy. Chart r/q to make sure no copy.    GUSTAVO Cunningham MA    Advance Directive Problem List Overview:   Name Relationship Phone    Primary Health Care Agent Albaro Jonelle  [phone]         Alternative Health Care Agent Esther  Mulligan  [phone]     Reviewed Health Care Directive dated 05/13/2005, scanned into EPIC 01/2012.  Marivel Villarreal CMA                Major depressive disorder, recurrent episode, mild (H) 11/02/2010     Priority: Medium     Family history of colon cancer 05/04/2010     Priority: Medium     Coronary artery disease involving native coronary artery of native heart without angina pectoris      Priority: Medium     s/p MI ([facility])       Hyperlipidemia LDL goal <100      Priority: Medium     Benign essential hypertension      Priority: Medium     Hypothyroidism      Priority: Medium     GERD (gastroesophageal reflux disease)      Priority: Medium      Past Medical History:   Diagnosis Date     Coronary atherosclerosis of native coronary artery     s/p MI ([facility])     Essential hypertension, benign      GERD (gastroesophageal reflux disease)      History of transient ischemic attack (TIA)      Hyperlipidemia LDL goal <100      Hypothyroidism      Major depressive disorder, recurrent episode, mild (H) 5/18/2011     Peptic ulcer disease with hemorrhage     Remote history of stomach ulcer, requiring transfusion after chronic bleeding     Past Surgical History:   Procedure Laterality Date     APPENDECTOMY       ARTHROPLASTY HIP Left 5/2/2019    Procedure: ARTHROPLASTY, HIP;  Surgeon: Jacinto Adams MD;  Location: [facility] OR     CARDIAC CATHERIZATION  1/15/04    drug-eluding stent RCA, Dr. Pérez, [facility]     CARDIAC CATHERIZATION  2/16/12    diffuse mild/mod disease, no new stent     CATARACT IOL, RT/LT  1/26/12    RT, Dr. Wynne     CHOLECYSTECTOMY, OPEN       COLONOSCOPY  2008    [facility]     FRACTURE TX, ANKLE RT/LT      LT, 2 screws remain     HC EXCISE HAND/FOOT NEUROMA      RT     HC LARYNGOSCOPY DIRECT W VOCAL CORD INJECTION      vocal cord polypectomy     HC REMOVAL OF OVARIAN CYST(S)       HC TRANSCATH STENT INIT VESSEL,PERCUT      x 2     OPEN REDUCTION INTERNAL FIXATION ANKLE  Right 12/3/2018    Procedure: OPEN REDUCTION INTERNAL FIXATION Right ANKLE;  Surgeon: Jacinto Adams MD;  Location: [facility] OR     REPAIR HAMMER TOE  9/13/10    multiple + RT great toe tendon release, Dr. Sanchez DPLOLY     STENT  5-2016    Cardiac stents 6 placed.     Current Outpatient Medications   Medication Sig Dispense Refill     acetaminophen (TYLENOL) 500 MG tablet Take 1,000 mg by mouth 3 times daily       amLODIPine (NORVASC) 10 MG tablet Take 1 tablet (10 mg) by mouth daily 30 tablet 11     apixaban ANTICOAGULANT (ELIQUIS ANTICOAGULANT) 5 MG tablet Take 1 tablet (5 mg) by mouth 2 times daily 60 tablet 11     aspirin 81 MG EC tablet Take 1 tablet (81 mg) by mouth daily       atorvastatin (LIPITOR) 40 MG tablet TAKE 1 TABLET(40 MG) BY MOUTH DAILY 90 tablet 0     CALCIUM 600 + D OR 1 tablet by mouth daily       citalopram (CELEXA) 20 MG tablet Take 1 tablet (20 mg) by mouth daily Overdue for appointment - before next refill 90 tablet 0     famotidine (PEPCID) 20 MG tablet Take 1 tablet (20 mg) by mouth At Bedtime 180 tablet 1     Homeopathic Products (LEG CRAMPS PO)        levothyroxine (SYNTHROID/LEVOTHROID) 75 MCG tablet TAKE 1 TABLET BY MOUTH EVERY DAY 90 tablet 0     magnesium 250 MG tablet Take 1 tablet by mouth every evening        metoprolol succinate ER (TOPROL-XL) 50 MG 24 hr tablet Take 1 tablet (50 mg) by mouth daily 90 tablet 3     Multiple Vitamins-Minerals (OCUVITE PO) Take 1 tablet by mouth daily.       polyethylene glycol (MIRALAX) 17 GM/SCOOP powder Take 1 capful by mouth daily       Vitamin D, Cholecalciferol, 1000 units TABS Take 2,000 Units by mouth daily       buPROPion (WELLBUTRIN) 75 MG tablet TAKE 1 TABLET(75 MG) BY MOUTH DAILY (Patient not taking: Reported on 7/20/2020) 90 tablet 3     calcium carbonate (TUMS) 500 MG chewable tablet Take 1 chew tab by mouth 3 times daily as needed for heartburn       meclizine (ANTIVERT) 25 MG tablet TAKE ONE TABLET BY MOUTH EVERY 6 HOURS AS NEEDED FOR  DIZZINESS (Patient not taking: Reported on 7/20/2020) 30 tablet 3     nitroGLYcerin (NITROSTAT) 0.4 MG sublingual tablet Place 1 tablet (0.4 mg) under the tongue every 5 minutes as needed for chest pain (call your doctor if you take a third dose) (Patient not taking: Reported on 7/20/2020) 25 tablet 3     tolterodine (DETROL) 2 MG tablet Take 2 mg by mouth At Bedtime       OTC products: None, except as noted above    Allergies   Allergen Reactions     Demerol Nausea     Lisinopril Cough     Meperidine Unknown     Sulfa Drugs Other (See Comments) and Unknown     Questionable itching reported by patient      Latex Allergy: NO    Social History     Tobacco Use     Smoking status: Never Smoker     Smokeless tobacco: Never Used     Tobacco comment: Never smoker; no secondhand smoke exposure   Substance Use Topics     Alcohol use: Yes     Comment: social     History   Drug Use No       REVIEW OF SYSTEMS:   CONSTITUTIONAL: NEGATIVE for fever, chills, change in weight  INTEGUMENTARY/SKIN: NEGATIVE for worrisome rashes, moles or lesions  EYES: NEGATIVE for vision changes or irritation  ENT/MOUTH: NEGATIVE for ear, mouth and throat problems  RESP: NEGATIVE for significant cough or SOB  BREAST: NEGATIVE for masses, tenderness or discharge  CV: NEGATIVE for chest pain, palpitations or peripheral edema  GI: NEGATIVE for nausea, abdominal pain, heartburn, or change in bowel habits  : NEGATIVE for frequency, dysuria, or hematuria  MUSCULOSKELETAL: NEGATIVE for significant arthralgias or myalgia  NEURO: NEGATIVE for weakness, dizziness or paresthesias  ENDOCRINE: NEGATIVE for temperature intolerance, skin/hair changes  HEME: NEGATIVE for bleeding problems  PSYCHIATRIC: NEGATIVE for changes in mood or affect    EXAM:   /58 (BP Location: Left arm, Patient Position: Sitting, Cuff Size: Adult Large)   Pulse 50   Temp 98.2  F (36.8  C) (Tympanic)   Wt 68.6 kg (151 lb 3.2 oz)   SpO2 96%   BMI 23.68 kg/m      GENERAL  APPEARANCE: healthy, alert and no distress     EYES: EOMI, PERRL     HENT: ear canals and TM's normal and nose and mouth without ulcers or lesions     NECK: no adenopathy, no asymmetry, masses, or scars and thyroid normal to palpation     RESP: lungs clear to auscultation - no rales, rhonchi or wheezes     CV: regular rates and rhythm, normal S1 S2, no S3 or S4 and no murmur, click or rub     ABDOMEN:  soft, nontender, no HSM or masses and bowel sounds normal     MS: extremities normal- no gross deformities noted, no evidence of inflammation in joints, FROM in all extremities.     SKIN: no suspicious lesions or rashes     NEURO: Normal strength and tone, sensory exam grossly normal, mentation intact and speech normal     PSYCH: mentation appears normal. and affect normal/bright    DIAGNOSTICS:   Recent visit with cardiology. Recent device check   NM lexiscan (6/17/20)  - no change when compared to prior study in 2017    Labs Resulted Today:   Results for orders placed or performed in visit on 07/20/20   Hemoglobin     Status: None   Result Value Ref Range    Hemoglobin 12.5 11.7 - 15.7 g/dL       Recent Labs   Lab Test 06/08/20  1211 02/21/20  1142  05/10/16  1109   HGB 12.6 12.5   < > 12.5    271   < > 284   INR  --   --   --  0.90    138   < > 135   POTASSIUM 4.8 4.4   < > 4.0   CR 0.74 0.97   < > 0.88    < > = values in this interval not displayed.        IMPRESSION:   Reason for surgery/procedure: removal of hardware  Diagnosis/reason for consult: preop     The proposed surgical procedure is considered INTERMEDIATE risk.    REVISED CARDIAC RISK INDEX  The patient has the following serious cardiovascular risks for perioperative complications such as (MI, PE, VFib and 3  AV Block):  Coronary Artery Disease (MI, positive stress test, angina, Qs on EKG)  Cerebrovascular Disease (TIA or CVA)  INTERPRETATION: 2 risks: Class III (moderate risk - 6.6% complication rate)    The patient has the following  additional risks for perioperative complications:  No identified additional risks      ICD-10-CM    1. Preop general physical exam  Z01.818 Hemoglobin   2. Closed fracture of shaft of right fibula with routine healing, unspecified fracture morphology, subsequent encounter  S82.401D        RECOMMENDATIONS:     --Patient is to take all scheduled medications on the day of surgery EXCEPT for modifications listed below.    Anticoagulant or Antiplatelet Medication Use  ELIQUIS: Bleeding risk is low for this procedure and apixaban (Eliquis) should be stopped at least 24 hours prior to procedure based on a creatinine clearance of  greater than or equal to 30.     **Spoke with surgeon. Given low bleeding risk, okay with stopping 24 hours prior to surgery and resuming 24 hours post surgery. Per cardiology, no bridging needed if only stopping 24 hours prior to surgery      APPROVAL GIVEN to proceed with proposed procedure, without further diagnostic evaluation       Signed Electronically by: Kelly Matthew PA-C    Copy of this evaluation report is provided to requesting physician.    [place] Preop Guidelines    Revised Cardiac Risk Index

## 2020-07-26 ENCOUNTER — ANESTHESIA EVENT (OUTPATIENT)
Dept: SURGERY | Facility: CLINIC | Age: 85
End: 2020-07-26
Payer: MEDICARE

## 2020-07-26 DIAGNOSIS — I25.10 ATHEROSCLEROSIS OF NATIVE CORONARY ARTERY OF NATIVE HEART WITHOUT ANGINA PECTORIS: ICD-10-CM

## 2020-07-26 NOTE — ANESTHESIA PREPROCEDURE EVALUATION
Anesthesia Pre-Procedure Evaluation    Patient: Christiana Sal   MRN: [id number] : 10/25/1931          Preoperative Diagnosis: Painful orthopaedic hardware (H) [T84.84XA]    Procedure(s):  Distal Fibula Deep Hardware Removal    Past Medical History:   Diagnosis Date     Coronary atherosclerosis of native coronary artery     s/p MI ([facility])     Essential hypertension, benign      GERD (gastroesophageal reflux disease)      History of transient ischemic attack (TIA)      Hyperlipidemia LDL goal <100      Hypothyroidism      Major depressive disorder, recurrent episode, mild (H) 2011     Peptic ulcer disease with hemorrhage     Remote history of stomach ulcer, requiring transfusion after chronic bleeding     Past Surgical History:   Procedure Laterality Date     APPENDECTOMY       ARTHROPLASTY HIP Left 2019    Procedure: ARTHROPLASTY, HIP;  Surgeon: Jacinto Adams MD;  Location: [facility] OR     CARDIAC CATHERIZATION  1/15/04    drug-eluding stent RCA, Dr. Pérez, [facility]     CARDIAC CATHERIZATION  12    diffuse mild/mod disease, no new stent     CATARACT IOL, RT/LT  12    RT, Dr. Wynne     CHOLECYSTECTOMY, OPEN       COLONOSCOPY      [facility]     FRACTURE TX, ANKLE RT/LT      LT, 2 screws remain     HC EXCISE HAND/FOOT NEUROMA      RT     HC LARYNGOSCOPY DIRECT W VOCAL CORD INJECTION      vocal cord polypectomy     HC REMOVAL OF OVARIAN CYST(S)       HC TRANSCATH STENT INIT VESSEL,PERCUT      x 2     OPEN REDUCTION INTERNAL FIXATION ANKLE Right 12/3/2018    Procedure: OPEN REDUCTION INTERNAL FIXATION Right ANKLE;  Surgeon: Jacinto Adams MD;  Location: [facility] OR     REPAIR HAMMER TOE  9/13/10    multiple + RT great toe tendon release, Dr. Sanchez DPM     STENT  5-    Cardiac stents 6 placed.       Anesthesia Evaluation     . Pt has had prior anesthetic. Type: General, Regional and MAC    No history of anesthetic complications          ROS/MED  HX    ENT/Pulmonary:     (+)sleep apnea, doesn't use CPAP , . .    Neurologic:     (+)TIA date: 2002 other neuro Hx falls    Cardiovascular: Comment: S/P cardiac catheterization    (+) Dyslipidemia, hypertension--CAD, angina (uses Nitro)-change in symptoms, -stent,. : . . LEIGH, fainting (syncope). pacemaker (placed 2016) Reason placed: SA node dysfucntion:type: [employer] - Patientt is not dependent on pacemaker . a-fib, valvular problems/murmurs type: [place] . Previous cardiac testing Echodate:4/29/19results:Interpretation Summary     The visual ejection fraction is estimated at 55-60%.  Left ventricular systolic function is normal.  There is mild (1+) aortic regurgitation.  There is moderate concentric left ventricular hypertrophy.  _____________________________________________________________________________  __        Left Ventricle  The left ventricle is normal in size. There is moderate concentric left  ventricular hypertrophy. Diastolic Doppler findings (E/E' ratio and/or other  parameters) suggest left ventricular filling pressures are increased. The  visual ejection fraction is estimated at 55-60%. Left ventricular systolic  function is normal. Septal wall motion abnormality may reflect pacemaker  activation.     Right Ventricle  There is a catheter/pacemaker lead seen in the right ventricle. The right  ventricle is normal in size and function.     Atria  Normal left atrial size. Right atrial size is normal. There is no color  Doppler evidence of an atrial shunt.     Mitral Valve  There is mild mitral annular calcification. There is trace mitral  regurgitation.        Tricuspid Valve  There is trace tricuspid regurgitation. The right ventricular systolic  pressure is approximated at 20.1 mmHg plus the right atrial pressure.     Aortic Valve  The aortic valve is trileaflet. There is trivial trileaflet aortic sclerosis.  There is mild (1+) aortic regurgitation. No hemodynamically significant  valvular  aortic stenosis.     Pulmonic Valve  There is trace pulmonic valvular regurgitation. Normal pulmonic valve  velocity.     Vessels  The aortic root is normal size. Normal size ascending aorta. The inferior vena  cava was normal in size with preserved respiratory variability.     Pericardium  There is no pericardial effusion.        Rhythm  Sinus rhythm was noted.date: results:ECG reviewed date:6/8/2020 results:Electronic ventricular pacemaker   Pacemaker ECG, No further analysis   INSUFFICIENT DATA date: results:          METS/Exercise Tolerance:  3 - Able to walk 1-2 blocks without stopping   Hematologic:  - neg hematologic  ROS       Musculoskeletal:   (+) arthritis,  -       GI/Hepatic:     (+) GERD Asymptomatic on medication, Other GI/Hepatic PUD      Renal/Genitourinary:         Endo:     (+) thyroid problem hypothyroidism, .      Psychiatric:     (+) psychiatric history depression      Infectious Disease:  - neg infectious disease ROS       Malignancy:      - no malignancy   Other:    - neg other ROS                      Physical Exam  Normal systems: cardiovascular, pulmonary and dental    Airway   Mallampati: II  TM distance: >3 FB  Neck ROM: full    Dental     Cardiovascular   Rhythm and rate: regular and normal      Pulmonary    breath sounds clear to auscultation            Lab Results   Component Value Date    WBC 7.2 06/08/2020    HGB 12.5 07/20/2020    HCT 38.7 06/08/2020     06/08/2020    SED 4 03/16/2009     06/08/2020    POTASSIUM 4.8 06/08/2020    CHLORIDE 106 06/08/2020    CO2 26 06/08/2020    BUN 21 06/08/2020    CR 0.74 06/08/2020    GLC 96 06/08/2020    YVONNE 8.8 06/08/2020    ALBUMIN 3.4 02/21/2020    PROTTOTAL 6.8 02/21/2020    ALT 20 02/21/2020    AST 22 02/21/2020    ALKPHOS 115 02/21/2020    BILITOTAL 0.5 02/21/2020    AMYLASE 66 03/17/2014    INR 0.90 05/10/2016    TSH 2.60 04/12/2019       Preop Vitals  BP Readings from Last 3 Encounters:   07/20/20 108/58   06/26/20 (!) 159/74  "  06/08/20 (!) 154/75    Pulse Readings from Last 3 Encounters:   07/20/20 50   06/08/20 58   04/29/20 68      Resp Readings from Last 3 Encounters:   10/17/19 18   05/21/19 16   05/13/19 18    SpO2 Readings from Last 3 Encounters:   07/20/20 96%   06/08/20 95%   12/08/19 96%      Temp Readings from Last 1 Encounters:   07/20/20 36.8  C (98.2  F) (Tympanic)    Ht Readings from Last 1 Encounters:   06/17/20 1.702 m (5' 7\")      Wt Readings from Last 1 Encounters:   07/20/20 68.6 kg (151 lb 3.2 oz)    Estimated body mass index is 23.68 kg/m  as calculated from the following:    Height as of 6/17/20: 1.702 m (5' 7\").    Weight as of 7/20/20: 68.6 kg (151 lb 3.2 oz).       Anesthesia Plan      History & Physical Review  History and physical reviewed and following examination; no interval change.    ASA Status:  3 .    NPO Status:  > 8 hours    Plan for MAC Reason for MAC:  Chronic cardiopulmonary disease (G9) and Deep or markedly invasive procedure (G8)  PONV prophylaxis:  Ondansetron (or other 5HT-3) and Dexamethasone or Solumedrol         Postoperative Care  Postoperative pain management:  IV analgesics and Oral pain medications.      Consents  Anesthetic plan, risks, benefits and alternatives discussed with:  Patient..                 Lin Pope APRN CRNA  "

## 2020-07-27 ENCOUNTER — TELEPHONE (OUTPATIENT)
Dept: FAMILY MEDICINE | Facility: CLINIC | Age: 85
End: 2020-07-27

## 2020-07-27 DIAGNOSIS — Z11.59 ENCOUNTER FOR SCREENING FOR OTHER VIRAL DISEASES: ICD-10-CM

## 2020-07-27 PROCEDURE — U0003 INFECTIOUS AGENT DETECTION BY NUCLEIC ACID (DNA OR RNA); SEVERE ACUTE RESPIRATORY SYNDROME CORONAVIRUS 2 (SARS-COV-2) (CORONAVIRUS DISEASE [COVID-19]), AMPLIFIED PROBE TECHNIQUE, MAKING USE OF HIGH THROUGHPUT TECHNOLOGIES AS DESCRIBED BY CMS-2020-01-R: HCPCS | Performed by: PODIATRIST

## 2020-07-27 RX ORDER — NITROGLYCERIN 0.4 MG/1
TABLET SUBLINGUAL
Qty: 25 TABLET | Refills: 3 | Status: SHIPPED | OUTPATIENT
Start: 2020-07-27

## 2020-07-27 NOTE — TELEPHONE ENCOUNTER
Message left on patient's answering machine to call the [facility] RN back.  Roel Garza RN

## 2020-07-27 NOTE — TELEPHONE ENCOUNTER
Please call patient and remind her to hold her Eliquis 24 hours prior to her scheduled surgery and then to resume 24 hours post surgery unless directed otherwise by surgeon.     Kelly

## 2020-07-28 LAB
SARS-COV-2 RNA SPEC QL NAA+PROBE: NOT DETECTED
SPECIMEN SOURCE: NORMAL

## 2020-07-29 ENCOUNTER — TELEPHONE (OUTPATIENT)
Dept: CARDIOLOGY | Facility: CLINIC | Age: 85
End: 2020-07-29

## 2020-07-29 NOTE — TELEPHONE ENCOUNTER
Pt increased amlodipine to 10 mg every day on 7/21/20. She reports the following BPs:  7/21 113/61  7/22 147/63  7/23 126/65  7/24 147/69  7/26 144/62  7/28 124/65  7/29 139/67  Pt states all BPs are taken 60-90 minutes after her morning medications. She is having surgery tomorrow. RN will call pt on Friday 8/7/20 to get a further update to see if BPs have come down any. Pt verbalized understanding and agreed with plan. Danelle Price RN Cardiology July 29, 2020, 10:24 AM    ADDENDUM: LM for patient to call back to discuss. Danelle Price RN Cardiology August 7, 2020, 9:49 AM    ADDENDUM: LM for patient to call back to discuss BPs. Danelle Price RN Cardiology August 12, 2020, 9:31 AM    ADDENDUM: Pt has not returned calls. Will close encounter. Danelle Price RN Cardiology August 25, 2020, 1:39 PM

## 2020-07-30 ENCOUNTER — ANESTHESIA (OUTPATIENT)
Dept: SURGERY | Facility: CLINIC | Age: 85
End: 2020-07-30
Payer: MEDICARE

## 2020-07-30 ENCOUNTER — HOSPITAL ENCOUNTER (OUTPATIENT)
Facility: CLINIC | Age: 85
Discharge: HOME | End: 2020-07-30
Attending: PODIATRIST | Admitting: PODIATRIST
Payer: MEDICARE

## 2020-07-30 ENCOUNTER — APPOINTMENT (OUTPATIENT)
Dept: GENERAL RADIOLOGY | Facility: CLINIC | Age: 85
End: 2020-07-30
Attending: PODIATRIST
Payer: MEDICARE

## 2020-07-30 VITALS
TEMPERATURE: 98.2 F | HEART RATE: 56 BPM | HEIGHT: 66 IN | WEIGHT: 151.2 LBS | OXYGEN SATURATION: 98 % | BODY MASS INDEX: 24.3 KG/M2 | DIASTOLIC BLOOD PRESSURE: 62 MMHG | RESPIRATION RATE: 16 BRPM | SYSTOLIC BLOOD PRESSURE: 148 MMHG

## 2020-07-30 DIAGNOSIS — G89.18 POSTOPERATIVE PAIN: Primary | ICD-10-CM

## 2020-07-30 PROCEDURE — 25000132 ZZH RX MED GY IP 250 OP 250 PS 637: Mod: GY | Performed by: NURSE ANESTHETIST, CERTIFIED REGISTERED

## 2020-07-30 PROCEDURE — 88304 TISSUE EXAM BY PATHOLOGIST: CPT | Mod: 26 | Performed by: PODIATRIST

## 2020-07-30 PROCEDURE — 36000054 ZZH SURGERY LEVEL 2 W FLUORO 1ST 30 MIN: Performed by: PODIATRIST

## 2020-07-30 PROCEDURE — 25000125 ZZHC RX 250: Performed by: NURSE ANESTHETIST, CERTIFIED REGISTERED

## 2020-07-30 PROCEDURE — 25000125 ZZHC RX 250: Performed by: PODIATRIST

## 2020-07-30 PROCEDURE — 37000008 ZZH ANESTHESIA TECHNICAL FEE, 1ST 30 MIN: Performed by: PODIATRIST

## 2020-07-30 PROCEDURE — 25000128 H RX IP 250 OP 636: Performed by: PODIATRIST

## 2020-07-30 PROCEDURE — 40000277 XR SURGERY CARM FLUORO LESS THAN 5 MIN W STILLS: Mod: TC

## 2020-07-30 PROCEDURE — 36000052 ZZH SURGERY LEVEL 2 EA 15 ADDTL MIN: Performed by: PODIATRIST

## 2020-07-30 PROCEDURE — 71000027 ZZH RECOVERY PHASE 2 EACH 15 MINS: Performed by: PODIATRIST

## 2020-07-30 PROCEDURE — 40000305 ZZH STATISTIC PRE PROC ASSESS I: Performed by: PODIATRIST

## 2020-07-30 PROCEDURE — 88304 TISSUE EXAM BY PATHOLOGIST: CPT | Performed by: PODIATRIST

## 2020-07-30 PROCEDURE — 37000009 ZZH ANESTHESIA TECHNICAL FEE, EACH ADDTL 15 MIN: Performed by: PODIATRIST

## 2020-07-30 PROCEDURE — 27210794 ZZH OR GENERAL SUPPLY STERILE: Performed by: PODIATRIST

## 2020-07-30 PROCEDURE — 25000128 H RX IP 250 OP 636: Performed by: NURSE ANESTHETIST, CERTIFIED REGISTERED

## 2020-07-30 PROCEDURE — 25800030 ZZH RX IP 258 OP 636: Performed by: NURSE ANESTHETIST, CERTIFIED REGISTERED

## 2020-07-30 RX ORDER — KETAMINE HYDROCHLORIDE 10 MG/ML
INJECTION, SOLUTION INTRAMUSCULAR; INTRAVENOUS PRN
Status: DISCONTINUED | OUTPATIENT
Start: 2020-07-30 | End: 2020-07-30

## 2020-07-30 RX ORDER — ONDANSETRON 2 MG/ML
4 INJECTION INTRAMUSCULAR; INTRAVENOUS EVERY 30 MIN PRN
Status: DISCONTINUED | OUTPATIENT
Start: 2020-07-30 | End: 2020-07-30 | Stop reason: HOSPADM

## 2020-07-30 RX ORDER — LIDOCAINE HYDROCHLORIDE 10 MG/ML
INJECTION, SOLUTION INFILTRATION; PERINEURAL PRN
Status: DISCONTINUED | OUTPATIENT
Start: 2020-07-30 | End: 2020-07-30

## 2020-07-30 RX ORDER — FENTANYL CITRATE 50 UG/ML
25-50 INJECTION, SOLUTION INTRAMUSCULAR; INTRAVENOUS
Status: DISCONTINUED | OUTPATIENT
Start: 2020-07-30 | End: 2020-07-30 | Stop reason: HOSPADM

## 2020-07-30 RX ORDER — LIDOCAINE 40 MG/G
CREAM TOPICAL
Status: DISCONTINUED | OUTPATIENT
Start: 2020-07-30 | End: 2020-07-30 | Stop reason: HOSPADM

## 2020-07-30 RX ORDER — MAGNESIUM SULFATE HEPTAHYDRATE 40 MG/ML
2 INJECTION, SOLUTION INTRAVENOUS ONCE
Status: DISCONTINUED | OUTPATIENT
Start: 2020-07-30 | End: 2020-07-30 | Stop reason: CLARIF

## 2020-07-30 RX ORDER — ONDANSETRON 2 MG/ML
INJECTION INTRAMUSCULAR; INTRAVENOUS PRN
Status: DISCONTINUED | OUTPATIENT
Start: 2020-07-30 | End: 2020-07-30

## 2020-07-30 RX ORDER — ONDANSETRON 4 MG/1
4 TABLET, ORALLY DISINTEGRATING ORAL EVERY 30 MIN PRN
Status: DISCONTINUED | OUTPATIENT
Start: 2020-07-30 | End: 2020-07-30 | Stop reason: HOSPADM

## 2020-07-30 RX ORDER — HYDROMORPHONE HYDROCHLORIDE 1 MG/ML
.3-.5 INJECTION, SOLUTION INTRAMUSCULAR; INTRAVENOUS; SUBCUTANEOUS EVERY 10 MIN PRN
Status: DISCONTINUED | OUTPATIENT
Start: 2020-07-30 | End: 2020-07-30 | Stop reason: HOSPADM

## 2020-07-30 RX ORDER — CEFAZOLIN SODIUM 2 G/100ML
2 INJECTION, SOLUTION INTRAVENOUS
Status: COMPLETED | OUTPATIENT
Start: 2020-07-30 | End: 2020-07-30

## 2020-07-30 RX ORDER — DEXAMETHASONE SODIUM PHOSPHATE 4 MG/ML
INJECTION, SOLUTION INTRA-ARTICULAR; INTRALESIONAL; INTRAMUSCULAR; INTRAVENOUS; SOFT TISSUE PRN
Status: DISCONTINUED | OUTPATIENT
Start: 2020-07-30 | End: 2020-07-30

## 2020-07-30 RX ORDER — CEFAZOLIN SODIUM 1 G/3ML
1 INJECTION, POWDER, FOR SOLUTION INTRAMUSCULAR; INTRAVENOUS SEE ADMIN INSTRUCTIONS
Status: DISCONTINUED | OUTPATIENT
Start: 2020-07-30 | End: 2020-07-30 | Stop reason: HOSPADM

## 2020-07-30 RX ORDER — LIDOCAINE HYDROCHLORIDE 10 MG/ML
INJECTION, SOLUTION INFILTRATION; PERINEURAL PRN
Status: DISCONTINUED | OUTPATIENT
Start: 2020-07-30 | End: 2020-07-30 | Stop reason: HOSPADM

## 2020-07-30 RX ORDER — ACETAMINOPHEN 325 MG/1
975 TABLET ORAL ONCE
Status: COMPLETED | OUTPATIENT
Start: 2020-07-30 | End: 2020-07-30

## 2020-07-30 RX ORDER — MAGNESIUM HYDROXIDE 1200 MG/15ML
LIQUID ORAL PRN
Status: DISCONTINUED | OUTPATIENT
Start: 2020-07-30 | End: 2020-07-30 | Stop reason: HOSPADM

## 2020-07-30 RX ORDER — OXYCODONE AND ACETAMINOPHEN 5; 325 MG/1; MG/1
1-2 TABLET ORAL EVERY 4 HOURS PRN
Qty: 16 TABLET | Refills: 0 | Status: SHIPPED | OUTPATIENT
Start: 2020-07-30 | End: 2020-10-08

## 2020-07-30 RX ORDER — SODIUM CHLORIDE, SODIUM LACTATE, POTASSIUM CHLORIDE, CALCIUM CHLORIDE 600; 310; 30; 20 MG/100ML; MG/100ML; MG/100ML; MG/100ML
INJECTION, SOLUTION INTRAVENOUS CONTINUOUS
Status: DISCONTINUED | OUTPATIENT
Start: 2020-07-30 | End: 2020-07-30 | Stop reason: HOSPADM

## 2020-07-30 RX ORDER — BUPIVACAINE HYDROCHLORIDE 5 MG/ML
INJECTION, SOLUTION PERINEURAL PRN
Status: DISCONTINUED | OUTPATIENT
Start: 2020-07-30 | End: 2020-07-30 | Stop reason: HOSPADM

## 2020-07-30 RX ORDER — NALOXONE HYDROCHLORIDE 0.4 MG/ML
.1-.4 INJECTION, SOLUTION INTRAMUSCULAR; INTRAVENOUS; SUBCUTANEOUS
Status: DISCONTINUED | OUTPATIENT
Start: 2020-07-30 | End: 2020-07-30 | Stop reason: HOSPADM

## 2020-07-30 RX ORDER — OXYCODONE AND ACETAMINOPHEN 5; 325 MG/1; MG/1
1 TABLET ORAL
Status: DISCONTINUED | OUTPATIENT
Start: 2020-07-30 | End: 2020-07-30 | Stop reason: HOSPADM

## 2020-07-30 RX ORDER — PROPOFOL 10 MG/ML
INJECTION, EMULSION INTRAVENOUS CONTINUOUS PRN
Status: DISCONTINUED | OUTPATIENT
Start: 2020-07-30 | End: 2020-07-30

## 2020-07-30 RX ORDER — FENTANYL CITRATE 50 UG/ML
INJECTION, SOLUTION INTRAMUSCULAR; INTRAVENOUS PRN
Status: DISCONTINUED | OUTPATIENT
Start: 2020-07-30 | End: 2020-07-30

## 2020-07-30 RX ADMIN — KETAMINE HYDROCHLORIDE 25 MG: 10 INJECTION INTRAMUSCULAR; INTRAVENOUS at 14:40

## 2020-07-30 RX ADMIN — FENTANYL CITRATE 100 MCG: 50 INJECTION, SOLUTION INTRAMUSCULAR; INTRAVENOUS at 14:21

## 2020-07-30 RX ADMIN — LIDOCAINE HYDROCHLORIDE 50 MG: 10 INJECTION, SOLUTION INFILTRATION; PERINEURAL at 14:23

## 2020-07-30 RX ADMIN — DEXAMETHASONE SODIUM PHOSPHATE 4 MG: 4 INJECTION, SOLUTION INTRA-ARTICULAR; INTRALESIONAL; INTRAMUSCULAR; INTRAVENOUS; SOFT TISSUE at 14:21

## 2020-07-30 RX ADMIN — PROPOFOL 75 MCG/KG/MIN: 10 INJECTION, EMULSION INTRAVENOUS at 14:25

## 2020-07-30 RX ADMIN — KETAMINE HYDROCHLORIDE 25 MG: 10 INJECTION INTRAMUSCULAR; INTRAVENOUS at 14:35

## 2020-07-30 RX ADMIN — ACETAMINOPHEN 975 MG: 325 TABLET, FILM COATED ORAL at 13:52

## 2020-07-30 RX ADMIN — ONDANSETRON 4 MG: 2 INJECTION INTRAMUSCULAR; INTRAVENOUS at 14:21

## 2020-07-30 RX ADMIN — CEFAZOLIN SODIUM 2 G: 2 INJECTION, SOLUTION INTRAVENOUS at 14:20

## 2020-07-30 RX ADMIN — LIDOCAINE HYDROCHLORIDE: 10 INJECTION, SOLUTION EPIDURAL; INFILTRATION; INTRACAUDAL; PERINEURAL at 13:57

## 2020-07-30 RX ADMIN — SODIUM CHLORIDE, POTASSIUM CHLORIDE, SODIUM LACTATE AND CALCIUM CHLORIDE: 600; 310; 30; 20 INJECTION, SOLUTION INTRAVENOUS at 13:57

## 2020-07-30 ASSESSMENT — MIFFLIN-ST. JEOR: SCORE: 1132.59

## 2020-07-30 NOTE — ANESTHESIA CARE TRANSFER NOTE
Patient: Christiana Sal    Procedure(s):  Distal Fibula Deep Hardware Removal, Cystic Lesion Exision    Diagnosis: Painful orthopaedic hardware (H) [T84.84XA]  Diagnosis Additional Information: No value filed.    Anesthesia Type:   MAC     Note:  Airway :Face Mask  Patient transferred to:Phase II  Handoff Report: Identifed the Patient, Identified the Reponsible Provider, Reviewed the pertinent medical history, Discussed the surgical course, Reviewed Intra-OP anesthesia mangement and issues during anesthesia, Set expectations for post-procedure period and Allowed opportunity for questions and acknowledgement of understanding      Vitals: (Last set prior to Anesthesia Care Transfer)    CRNA VITALS  7/30/2020 1446 - 7/30/2020 1522      7/30/2020             NIBP:  94/58    Pulse:  50    NIBP Mean:  74    SpO2:  94 %                Electronically Signed By: Hank Seth CRNA, APRN CRNA  July 30, 2020  3:22 PM

## 2020-07-30 NOTE — OP NOTE
[facility] ORTHOPEDICS OPERATIVE REPORT  Operative Report - Orthopedics  Christiana Sal,  10/25/1931, MRN [id number]    Surgery Date: 20    PCP: Kelly Matthew, [phone]   Code status:  Prior       OPERATION SITE:  [facility] Operating Room       OPERATIVE REPORT  DR. ANDRES CHÁVEZ  FOOT & ANKLE SURGEON  [facility] ORTHOPEDICS    DATE OF PROCEDURE: 20    SITE: [facility] Operating [facility]    SURGEON: Dr. Andres Chávez - [facility]    ASSISTANT: None      Pre-Operative Diagnosis:  1.  Painful hardware, right ankle, distal fibular site  2.  Post prior right ankle fibular fracture ORIF  3.  Distal lateral cystic lesion of ankle, 2-3 cm subdermal, distal plate/screw associated    Post-Operative Diagnosis:  1.  Painful hardware, right ankle, distal fibular site  2.  Post prior right ankle fibular fracture ORIF  3.  Distal lateral cystic lesion of ankle, 2-3 cm subdermal, distal plate/screw associated    Procedures Performed:  1.  Right ankle distal fibular deep hardware removal  2.  Right ankle distal cystic lesion excision and biopsy    Anesthesia: Monitored Anesthesia Care with Local/Regional  Hemostasis: Ankle Tourniquet at 250mmHg  EBL: < 10 mL  Findings:  Well healed prior right fibular fracture.  Retained hardware, prominent with inferior distal cystic ganglionic lesion appreciated - 2-3 cm subdermal.  Implants:  None  Specimens: Cystic lesion sent to pathology per protocol post excision for routine evaluation, gross and microscopic/cellular level.    Indications for the Operation:  The patient has been seen and evaluated in clinic for the above-mentioned diagnoses.  They have failed to respond to nonoperative care measures and/or surgical care for condition was indicated.  They have elected to proceed as recommended/indicated with surgical care after a thorough discussion of the associated pros, cons, risks and benefits of the  operations as well as the postoperative course and details.  All associated questions were answered.  Verbal and written form informed consent was obtained.  Please see additional information within the clinical notes.    Description of the Procedure:  Patient was seen and evaluated in the preoperative holding area.  The surgical site was marked.  The consent was signed.  The H&P was updated/reviewed.  Patient was transported from the preoperative holding area to the surgical suite.  The patient was placed on the operative table.  Anesthesia was obtained.  Antibiotics were administered via IV.  Tourniquet was applied.  The operative extremity was prepped and draped sterilely.  Then, a timeout was performed to identify the proper patient, surgical site and the procedures to be performed.  Local anesthetic was infiltrated about the operative margins for regional blockade utilizing a one-to-one mixture of 2% lidocaine plain and 0.5% Marcaine plain approximately 30 cc of the mixture was utilized.  The foot/ankle was exsanguinated, and the tourniquet was inflated.    Attention was then directed to the right ankle.  Site of prior incision was utilized to make incision with a surgical #15 blade.  This is then carefully dissected down in layers with neurovascular notification and retraction.  The distal cystic lesion was isolated and removed.  It was a 2.6 cm subdermal cystic lesion.  This was sent to pathology for routine gross microscopic analysis.  No evidence of infection or concerning fluid collection.  There was underlying prominence of distal screw.  The plate was freed up above periosteal and bone overgrowth tissues.  The 2 distal locking screws then the proximal nonlocking screws were removed.  The plate was able to be removed in total.  The screw holes were curettaged and the lateral margins were rasped off for smooth contour.  The compression screw was isolated and removed in total.  Confirmatory intraoperative  fluoroscopy to confirm complete hardware removal and adequate healing of the bone was appreciated both fluoroscopically and intraoperatively grossly.  Additional irrigation and curettage was conducted.  Final fluoroscopic images obtained and sent to the imaging software and to be also forwarded to [facility] orth[facility]ics imaging system as well.    Following this, thorough irrigation of the surgical sites was conducted.  Layered, anatomic closure completed with 2-0 Vicryl, 3-0 Vicryl and 3-0 nylon with careful apposition of the skin and surfaces for primary healing.  A compressive sterile splint/dressing was applied.  Vascular status was intact after deflation of the tourniquet.    COMPLICATIONS: No direct complications encountered throughout the case.    The patient tolerated the procedure & anesthesia well.  They were transported from the operative suite to the postoperative holding area.  The patient was given postoperative orders as well as specific postoperative instructions which were reviewed by the nursing staff.  Orders were placed for weightbearing status/activity, postoperative oral pain management, DVT prophylaxis measures both with mechanical and medicinal measures reviewed.  Splint/dressing care measures were reviewed as well as appropriate cryotherapy measures and nutrition.  Postoperative follow-up to be conducted in the next 10-14 days for outpatient clinical follow-up in the Orthopedic clinic at [facility].  If concerns or questions arise or develop they will contact our clinic and postoperative contact numbers provided.  Case details and post-operative care requirements reviewed with family/support present today.  Additionally, a detailed postoperative instruction sheet was provided to the patient and family.  All additional questions were answered postoperatively.    Post Operative Plan:  1. Activity: WBAT in protective surgical shoe/CAM boot as directed.  2. Assistive Devices:  Crutches as directed, for comfort, Knee Scooter as directed, walker as needed.  3. Pain Management: PO pain management prescribed as reviewed in pre-op with patient.  4. Dressing Care: patient and family present instructed on dressing change in 7-10 days vs. Leaving splintage dressing in place until post op appointment.  Clinical contact direct information provided.  5. DVT prevention: knee exercises and ankle pump exercises reviewed.  ASA as prescribed.  Return to prior blood thinner as arranged by PMD.  6. Infection prevention: pre-op IV antibiotics completed.  PO Rx as indicated.  7. Disposition: to home self care with assistance and assistive devices.  8. Clinical Follow-up: as arranged from clinic in 10-14 days post op.    Please note that this report was completed with the assistance of voice recognition and transcription services.  Although every effort has been made to correct and avoid errors, errors may remain.    Dr. Darrell Chávez, DPM, FACFAS  Foot & Ankle Surgeon/Specialist  [facility]          CC: [facility], Dr. Chávez's Clinical Team

## 2020-07-30 NOTE — DISCHARGE INSTRUCTIONS
Same Day Surgery Discharge Instructions  Special Precautions After Surgery - Adult    1. It is not unusual to feel lightheaded or faint, up to 24 hours after surgery or while taking pain medication.  If you have these symptoms; sit for a few minutes before standing and have someone assist you when getting up.  2. You should rest and relax for the next 24 hours and must have someone stay with you for at least 24 hours after your discharge.  3. DO NOT DRIVE any vehicle or operate mechanical equipment for 24 hours following the end of your surgery.  DO NOT DRIVE while taking narcotic pain medications that have been prescribed by your physician.  If you had a limb operated on, you must be able to use it fully to drive.  4. DO NOT drink alcoholic beverages for 24 hours following surgery or while taking prescription pain medication.  5. Drink clear liquids (apple juice, ginger ale, broth, 7-Up, etc.).  Progress to your regular diet as you feel able.  6. Any questions call your physician and do not make important decisions for 24 hours.      [facility]:  [phone]       Same Day Surgery [phone], Monday thru Friday 6am-9pm.    Break through Bleeding  As instructed per Surgeon or Nurse.    Post Op Infection  Be alert for signs of infection: redness, swelling, heat, drainage of pus, and/or elevated temperature.  Contact your surgeon if these occur.    Nausea   If post op nausea occurs, at first rest your stomach for a few hours by eating nothing solid and sipping only clear liquids.  Call your Surgeon if nausea does not resolve in 24 hours.

## 2020-07-30 NOTE — ANESTHESIA POSTPROCEDURE EVALUATION
Patient: Christiana Sal    Procedure(s):  Distal Fibula Deep Hardware Removal, Cystic Lesion Exision    Diagnosis:Painful orthopaedic hardware (H) [T84.84XA]  Diagnosis Additional Information: No value filed.    Anesthesia Type:  MAC    Note:  Anesthesia Post Evaluation    Patient location during evaluation: Bedside  Patient participation: Able to fully participate in evaluation  Level of consciousness: awake and alert  Pain management: adequate  Airway patency: patent  Cardiovascular status: acceptable  Respiratory status: acceptable  Hydration status: acceptable  PONV: none     Anesthetic complications: None          Last vitals:  Vitals:    07/30/20 1530 07/30/20 1545 07/30/20 1600   BP: 122/58 137/65 133/65   Pulse: 56 52 56   Resp: 16 16 16   Temp:      SpO2: 98% 98% 100%         Electronically Signed By: SHARMIN Barron CRNA  July 30, 2020  4:09 PM

## 2020-08-03 LAB — COPATH REPORT: NORMAL

## 2020-08-12 ENCOUNTER — TRANSFERRED RECORDS (OUTPATIENT)
Dept: HEALTH INFORMATION MANAGEMENT | Facility: CLINIC | Age: 85
End: 2020-08-12

## 2020-08-31 DIAGNOSIS — I10 ESSENTIAL HYPERTENSION WITH GOAL BLOOD PRESSURE LESS THAN 140/90: ICD-10-CM

## 2020-08-31 RX ORDER — AMLODIPINE BESYLATE 10 MG/1
10 TABLET ORAL DAILY
Qty: 90 TABLET | Refills: 3 | Status: SHIPPED | OUTPATIENT
Start: 2020-08-31

## 2020-09-03 ENCOUNTER — TRANSFERRED RECORDS (OUTPATIENT)
Dept: HEALTH INFORMATION MANAGEMENT | Facility: CLINIC | Age: 85
End: 2020-09-03

## 2020-09-08 DIAGNOSIS — E03.9 HYPOTHYROIDISM, UNSPECIFIED TYPE: ICD-10-CM

## 2020-09-08 DIAGNOSIS — I25.10 CAD S/P PERCUTANEOUS CORONARY ANGIOPLASTY: ICD-10-CM

## 2020-09-08 DIAGNOSIS — Z98.61 CAD S/P PERCUTANEOUS CORONARY ANGIOPLASTY: ICD-10-CM

## 2020-09-08 DIAGNOSIS — I25.10 CORONARY ARTERY DISEASE INVOLVING NATIVE CORONARY ARTERY OF NATIVE HEART WITHOUT ANGINA PECTORIS: Primary | ICD-10-CM

## 2020-09-09 RX ORDER — LEVOTHYROXINE SODIUM 75 UG/1
TABLET ORAL
Qty: 90 TABLET | Refills: 0 | Status: SHIPPED | OUTPATIENT
Start: 2020-09-09 | End: 2020-12-10

## 2020-09-09 RX ORDER — ATORVASTATIN CALCIUM 40 MG/1
TABLET, FILM COATED ORAL
Qty: 90 TABLET | Refills: 0 | Status: SHIPPED | OUTPATIENT
Start: 2020-09-09

## 2020-09-09 NOTE — TELEPHONE ENCOUNTER
Medication is being filled for 1 time refill only due to:  Patient needs labs lipids,thyroid, microalbumin. Future labs ordered yes.   Roel Garza RN

## 2020-10-01 ENCOUNTER — HOSPITAL ENCOUNTER (EMERGENCY)
Facility: CLINIC | Age: 85
Discharge: HOME | End: 2020-10-01
Attending: NURSE PRACTITIONER | Admitting: NURSE PRACTITIONER
Payer: MEDICARE

## 2020-10-01 ENCOUNTER — APPOINTMENT (OUTPATIENT)
Dept: GENERAL RADIOLOGY | Facility: CLINIC | Age: 85
End: 2020-10-01
Attending: NURSE PRACTITIONER
Payer: MEDICARE

## 2020-10-01 VITALS
HEART RATE: 60 BPM | WEIGHT: 152 LBS | BODY MASS INDEX: 24.53 KG/M2 | RESPIRATION RATE: 16 BRPM | TEMPERATURE: 97.9 F | SYSTOLIC BLOOD PRESSURE: 133 MMHG | DIASTOLIC BLOOD PRESSURE: 68 MMHG | OXYGEN SATURATION: 96 %

## 2020-10-01 DIAGNOSIS — M16.11 PRIMARY OSTEOARTHRITIS OF RIGHT HIP: ICD-10-CM

## 2020-10-01 PROCEDURE — G0463 HOSPITAL OUTPT CLINIC VISIT: HCPCS | Performed by: NURSE PRACTITIONER

## 2020-10-01 PROCEDURE — 250N000013 HC RX MED GY IP 250 OP 250 PS 637: Performed by: NURSE PRACTITIONER

## 2020-10-01 PROCEDURE — 73502 X-RAY EXAM HIP UNI 2-3 VIEWS: CPT

## 2020-10-01 PROCEDURE — 99214 OFFICE O/P EST MOD 30 MIN: CPT | Performed by: NURSE PRACTITIONER

## 2020-10-01 RX ORDER — HYDROCODONE BITARTRATE AND ACETAMINOPHEN 5; 325 MG/1; MG/1
1 TABLET ORAL ONCE
Status: COMPLETED | OUTPATIENT
Start: 2020-10-01 | End: 2020-10-01

## 2020-10-01 RX ORDER — HYDROCODONE BITARTRATE AND ACETAMINOPHEN 5; 325 MG/1; MG/1
1 TABLET ORAL EVERY 8 HOURS PRN
Qty: 10 TABLET | Refills: 0 | Status: SHIPPED | OUTPATIENT
Start: 2020-10-01 | End: 2020-10-04

## 2020-10-01 RX ADMIN — HYDROCODONE BITARTRATE AND ACETAMINOPHEN 1 TABLET: 5; 325 TABLET ORAL at 19:02

## 2020-10-01 ASSESSMENT — ENCOUNTER SYMPTOMS
JOINT SWELLING: 0
CHILLS: 0
COUGH: 0
ARTHRALGIAS: 1
ABDOMINAL PAIN: 0
VOMITING: 0
FEVER: 0
SHORTNESS OF BREATH: 0
NEUROLOGICAL NEGATIVE: 1
NECK PAIN: 0
BACK PAIN: 1
NAUSEA: 0

## 2020-10-01 NOTE — ED AVS SNAPSHOT
[facility] Emergency Dept  [address] [address]  Phone: [phone]  Fax: [phone]                                    Christiana Sal   MRN: [id number]    Department: [facility] Emergency Dept   Date of Visit: 10/1/2020           After Visit Summary Signature Page    I have received my discharge instructions, and my questions have been answered. I have discussed any challenges I see with this plan with the nurse or doctor.    ..........................................................................................................................................  Patient/Patient Representative Signature      ..........................................................................................................................................  Patient Representative Print Name and Relationship to Patient    ..................................................               ................................................  Date                                   Time    ..........................................................................................................................................  Reviewed by Signature/Title    ...................................................              ..............................................  Date                                               Time          22EPIC Rev 08/18

## 2020-10-01 NOTE — ED NOTES
Pain to the inner groin area of the right hip. Also complains of lateral hip pain and pain down to the right knee. Also complains of low back pain. The patient has been packing, getting ready to move.No injury.

## 2020-10-02 NOTE — ED PROVIDER NOTES
History     Chief Complaint   Patient presents with     Leg Pain     right leg pain, right hip pain  no fall      HPI  Christiana Sal is a 88 year old female with history of CAD, hypertension, hypothyroidism, hyperlipidemia, GERD, JENIFER, cardiac pacemaker, primary osteoarthritis of the left hip, and depression who presents to the urgent care for evaluation of right hip pain.  Symptoms started this morning.  Pain radiates from her hip into her groin.  Reports low back pain which is chronic.  Patient denies any fall or known injury.  Patient did move from her home today and has been on her feet quite a bit the last couple days.  Denies any numbness in her right leg.  Denies fever or chills.    Allergies:  Allergies   Allergen Reactions     Demerol Nausea     Lisinopril Cough     Meperidine Unknown     Sulfa Drugs Other (See Comments) and Unknown     Questionable itching reported by patient       Problem List:    Patient Active Problem List    Diagnosis Date Noted     Paroxysmal atrial fibrillation (H) 07/13/2020     Priority: Medium     Near syncope 05/03/2019     Priority: Medium     Overactive bladder 05/02/2019     Priority: Medium     S/P total hip arthroplasty 05/02/2019     Priority: Medium     Closed fracture of shaft of fibula 12/02/2018     Priority: Medium     Fall 12/02/2018     Priority: Medium     Primary osteoarthritis of left hip 09/21/2018     Priority: Medium     Insect bite 06/28/2017     Priority: Medium     Cardiac pacemaker - [employer] dual lead - Not Dependent 03/02/2017     Priority: Medium     Sinoatrial node dysfunction (H) 11/10/2016     Priority: Medium     CAD S/P percutaneous coronary angioplasty 05/11/2016     Priority: Medium     JENIFER (obstructive sleep apnea) 11/17/2014     Priority: Medium     Lichen planus 11/19/2013     Priority: Medium     Vulvar pruritus 11/12/2013     Priority: Medium     Angina pectoris (H) 02/08/2012     Priority: Medium     Advanced directives,  counseling/discussion 06/16/2011     Priority: Medium     Patient will bring a copy. Chart r/q to make sure no copy.    GUSTAVO Cunningham MA    Advance Directive Problem List Overview:   Name Relationship Phone    Primary Health Care Agent Albaro Sal  [phone]         Alternative Health Care Agent Esther Mulligan  [phone]     Reviewed Health Care Directive dated 05/13/2005, scanned into EPIC 01/2012.  Marivel Villarreal CMA                Major depressive disorder, recurrent episode, mild (H) 11/02/2010     Priority: Medium     Family history of colon cancer 05/04/2010     Priority: Medium     Coronary artery disease involving native coronary artery of native heart without angina pectoris      Priority: Medium     s/p MI ([facility])       Hyperlipidemia LDL goal <100      Priority: Medium     Benign essential hypertension      Priority: Medium     Hypothyroidism      Priority: Medium     GERD (gastroesophageal reflux disease)      Priority: Medium        Past Medical History:    Past Medical History:   Diagnosis Date     Coronary atherosclerosis of native coronary artery      Essential hypertension, benign      GERD (gastroesophageal reflux disease)      History of transient ischemic attack (TIA)      Hyperlipidemia LDL goal <100      Hypothyroidism      Major depressive disorder, recurrent episode, mild (H) 5/18/2011     Peptic ulcer disease with hemorrhage      Sleep apnea        Past Surgical History:    Past Surgical History:   Procedure Laterality Date     APPENDECTOMY       ARTHROPLASTY HIP Left 5/2/2019    Procedure: ARTHROPLASTY, HIP;  Surgeon: Jacinto Adams MD;  Location: [facility] OR     CARDIAC CATHERIZATION  1/15/04    drug-eluding stent RCA, Dr. Pérez, [facility]     CARDIAC CATHERIZATION  2/16/12    diffuse mild/mod disease, no new stent     CATARACT IOL, RT/LT  1/26/12    RT, Dr. Wynne     CHOLECYSTECTOMY, OPEN       COLONOSCOPY  2008    [facility]     FRACTURE TX, ANKLE RT/LT       LT, 2 screws remain     HC EXCISE HAND/FOOT NEUROMA      RT     HC LARYNGOSCOPY DIRECT W VOCAL CORD INJECTION      vocal cord polypectomy     HC REMOVAL OF OVARIAN CYST(S)       HC TRANSCATH STENT INIT VESSEL,PERCUT      x 2     OPEN REDUCTION INTERNAL FIXATION ANKLE Right 12/3/2018    Procedure: OPEN REDUCTION INTERNAL FIXATION Right ANKLE;  Surgeon: Jacinto Adams MD;  Location: [facility] OR     REMOVE HARDWARE ANKLE Right 2020    Procedure: Distal Fibula Deep Hardware Removal, Cystic Lesion Exision;  Surgeon: Darrell Chávez DPM;  Location: [facility] OR     REPAIR HAMMER TOE  9/13/10    multiple + RT great toe tendon release, Dr. Daniel LOPEZ     STENT  -    Cardiac stents 6 placed.       Family History:    Family History   Problem Relation Age of Onset     C.A.D. Father      Hypertension Father      Myocardial Infarction Father 66         from MI     C.A.D. Brother      Myocardial Infarction Brother      Cancer Brother         skin ca     Breast Cancer Sister      Cancer - colorectal Sister      Heart Disease Sister      Neurologic Disorder Mother         migraine     C.A.D. Mother      Hypertension Mother      Heart Failure Mother      Thyroid Disease Son         cretinism     Hypertension Son      Heart Disease Son      Psychotic Disorder Sister      Hypertension Sister      Heart Disease Sister         CABG     Hypertension Brother      C.A.D. Brother      Myocardial Infarction Brother      Arthritis Daughter         RA     Hypertension Sister      Heart Disease Sister      Cancer Sister      Blood Disease Sister      Neurologic Disorder Child         migraine (2 children)       Social History:  Marital Status:   [2]  Social History     Tobacco Use     Smoking status: Never Smoker     Smokeless tobacco: Never Used     Tobacco comment: Never smoker; no secondhand smoke exposure   Substance Use Topics     Alcohol use: Yes     Comment: social     Drug use: No        Medications:          HYDROcodone-acetaminophen (NORCO) 5-325 MG tablet       acetaminophen (TYLENOL) 500 MG tablet       amLODIPine (NORVASC) 10 MG tablet       apixaban ANTICOAGULANT (ELIQUIS ANTICOAGULANT) 5 MG tablet       aspirin 81 MG EC tablet       atorvastatin (LIPITOR) 40 MG tablet       buPROPion (WELLBUTRIN) 75 MG tablet       CALCIUM 600 + D OR       calcium carbonate (TUMS) 500 MG chewable tablet       citalopram (CELEXA) 20 MG tablet       famotidine (PEPCID) 20 MG tablet       Homeopathic Products (LEG CRAMPS PO)       levothyroxine (SYNTHROID/LEVOTHROID) 75 MCG tablet       magnesium 250 MG tablet       meclizine (ANTIVERT) 25 MG tablet       metoprolol succinate ER (TOPROL-XL) 50 MG 24 hr tablet       Multiple Vitamins-Minerals (OCUVITE PO)       nitroGLYcerin (NITROSTAT) 0.4 MG sublingual tablet       oxyCODONE-acetaminophen (PERCOCET) 5-325 MG tablet       polyethylene glycol (MIRALAX) 17 GM/SCOOP powder       tolterodine (DETROL) 2 MG tablet       Vitamin D, Cholecalciferol, 1000 units TABS          Review of Systems   Constitutional: Negative for chills and fever.   HENT: Negative for congestion.    Respiratory: Negative for cough and shortness of breath.    Cardiovascular: Negative for chest pain.   Gastrointestinal: Negative for abdominal pain, nausea and vomiting.   Genitourinary: Negative.    Musculoskeletal: Positive for arthralgias and back pain. Negative for joint swelling and neck pain.   Neurological: Negative.    All other systems reviewed and are negative.      Physical Exam   BP: 133/68  Pulse: 60  Temp: 97.9  F (36.6  C)  Resp: 16  Weight: 68.9 kg (152 lb)  SpO2: 96 %      Physical Exam  Constitutional:       General: She is not in acute distress.     Appearance: Normal appearance. She is not ill-appearing.   Cardiovascular:      Rate and Rhythm: Normal rate.   Pulmonary:      Effort: Pulmonary effort is normal.   Musculoskeletal:      Right hip: She exhibits decreased range of motion and tenderness.  She exhibits no swelling and no deformity.        Legs:       Comments: Right hip:  No overlying skin changes or rash.  Tenderness with palpation to the anterior hip and groin.   Normal pedal pulses. Foot is warm and pink with normal sensation.  Ambulatory with walker.      Skin:     General: Skin is warm and dry.   Neurological:      General: No focal deficit present.      Mental Status: She is alert and oriented to person, place, and time.         ED Course        Procedures       Results for orders placed or performed during the hospital encounter of 10/01/20 (from the past 24 hour(s))   Pelvis XR w/ unilateral hip right    Narrative    EXAM: XR PELVIS AND HIP RIGHT 1 VIEW  LOCATION: [facility]  DATE/TIME: 10/1/2020 7:07 PM    INDICATION: Right hip and groin pain  COMPARISON: 05/02/2019      Impression    IMPRESSION: Very severe osteoarthritis of the right hip. No fracture. No dislocation. No pelvic fracture. Severe degenerative change in the lower lumbar spine. Prior left hip replacement.       Medications   HYDROcodone-acetaminophen (NORCO) 5-325 MG per tablet 1 tablet (1 tablet Oral Given 10/1/20 1902)       Assessments & Plan (with Medical Decision Making)   88-year-old female with history of osteoarthritis who presents to urgent care with complaints of right hip pain.  Patient reports pain started this morning.  She did move from her home today and has been on her feet for the past couple days.  Sleeping at her daughter's house.  X-ray of the right hip reveals no acute fracture or dislocation.  There is very severe osteoarthritis of the right hip.  Patient has history of significant osteoarthritis in the left hip with replacement.  I discussed the x-ray findings with patient.  Likely her pain is due to osteoarthritis.  She is requesting something stronger for pain stating Tylenol is not helping.  Patient was given 1 tablet of Norco here which she said was helpful.  I cautioned her on the use of  opioid medications and risk of falls.  Patient will be given a small number of Norco for the next day or 2 but she was instructed to make an appointment with her primary care provider or with orthopedics for further recommendations and evaluation.      I have reviewed the nursing notes.    I have reviewed the findings, diagnosis, plan and need for follow up with the patient.      Discharge Medication List as of 10/1/2020  7:42 PM      START taking these medications    Details   HYDROcodone-acetaminophen (NORCO) 5-325 MG tablet Take 1 tablet by mouth every 8 hours as needed for moderate to severe pain, Disp-10 tablet, R-0, Local Print             Final diagnoses:   Primary osteoarthritis of right hip       10/1/2020   [facility] EMERGENCY DEPT     Cory, SHARMIN De Paz CNP  10/01/20 [id number]

## 2020-10-02 NOTE — DISCHARGE INSTRUCTIONS
Extra strenght Tylenol 2 tablets analy 8 hours as needed for pain.  ---OR Norco 5-325 (hydrocodone-acetaminophen) 1 tablet every 8 hours if needed for moderate/severe pain. Do not use alcohol, operate machinery, drive, or climb on ladders for 8 hours after taking Norco.   Use walker all the time and make position changes slowly if you are taking the narcotic medicaiton.  Make an appointment with your regular doctor and orthopedics for a recheck.

## 2020-10-05 NOTE — TELEPHONE ENCOUNTER
Patient was seen 10/1/20 in ED, advised f/u with PCP or Ortho within 2 days.  Do you want patient to schedule visit with you or Ortho?  Pended refill request.  Kirsten Decker RN

## 2020-10-05 NOTE — TELEPHONE ENCOUNTER
Reason for Call:  Other prescription    Detailed comments: Pt was recently seen in UC and left a message that she would like a refill for medication for Hip Pain.    Phone Number Patient can be reached at: Home number on file [phone] (home)    Best Time: any    Can we leave a detailed message on this number? YES    Call taken on 10/5/2020 at 10:49 AM by Geri zarco

## 2020-10-08 ENCOUNTER — OFFICE VISIT (OUTPATIENT)
Dept: FAMILY MEDICINE | Facility: CLINIC | Age: 85
End: 2020-10-08
Payer: MEDICARE

## 2020-10-08 VITALS
HEART RATE: 76 BPM | DIASTOLIC BLOOD PRESSURE: 66 MMHG | HEIGHT: 66 IN | SYSTOLIC BLOOD PRESSURE: 122 MMHG | WEIGHT: 155 LBS | BODY MASS INDEX: 24.91 KG/M2 | TEMPERATURE: 97 F

## 2020-10-08 DIAGNOSIS — M16.11 PRIMARY OSTEOARTHRITIS OF RIGHT HIP: Primary | ICD-10-CM

## 2020-10-08 DIAGNOSIS — R21 RASH AND NONSPECIFIC SKIN ERUPTION: ICD-10-CM

## 2020-10-08 DIAGNOSIS — F33.0 MAJOR DEPRESSIVE DISORDER, RECURRENT EPISODE, MILD (H): ICD-10-CM

## 2020-10-08 LAB
KOH PREP SPEC: ABNORMAL
SPECIMEN SOURCE: ABNORMAL

## 2020-10-08 PROCEDURE — 99214 OFFICE O/P EST MOD 30 MIN: CPT | Performed by: PHYSICIAN ASSISTANT

## 2020-10-08 PROCEDURE — 87220 TISSUE EXAM FOR FUNGI: CPT | Performed by: PHYSICIAN ASSISTANT

## 2020-10-08 RX ORDER — OXYCODONE AND ACETAMINOPHEN 5; 325 MG/1; MG/1
1-2 TABLET ORAL EVERY 6 HOURS PRN
Qty: 30 TABLET | Refills: 0 | Status: SHIPPED | OUTPATIENT
Start: 2020-10-08 | End: 2020-10-16

## 2020-10-08 RX ORDER — CLOTRIMAZOLE 1 %
CREAM (GRAM) TOPICAL 2 TIMES DAILY
Qty: 15 G | Refills: 1 | Status: SHIPPED | OUTPATIENT
Start: 2020-10-08 | End: 2020-10-20

## 2020-10-08 RX ORDER — HYDROCODONE BITARTRATE AND ACETAMINOPHEN 5; 325 MG/1; MG/1
1 TABLET ORAL EVERY 8 HOURS PRN
Qty: 10 TABLET | Refills: 0 | OUTPATIENT
Start: 2020-10-08

## 2020-10-08 ASSESSMENT — PAIN SCALES - GENERAL: PAINLEVEL: MILD PAIN (3)

## 2020-10-08 ASSESSMENT — MIFFLIN-ST. JEOR: SCORE: 1149.83

## 2020-10-08 NOTE — PATIENT INSTRUCTIONS
I am going to miss you but am so glad you get to be close to family    If you need anything from us (medication refills, etc..) until you get established out there, please let us know      For the spot on the hand - apply the lotrimin two times daily for minimum 2 weeks or until resolved

## 2020-10-08 NOTE — PROGRESS NOTES
"Subjective     Christiana Sal is a 88 year old female who presents to clinic today for the following health issues:    HPI         ED/UC Followup:    Facility:  [facility] Emergency Department   Date of visit: 10/1/2020  Reason for visit: Right Hip Pain   Current Status: At night it really bothers her. Sometimes throughout the day it will get bad but mostly just stay mild.  Wanting more Percocet.        -She is moving to [place]. Wondering how she can transfer care to over there.   Will be living with her daughter. Moving on 10/21 and her daughter already has appointments scheduled for the following week    Hip pain is better than it was  Was told she has severe arthritis - pain had gotten so bad she couldn't sleep  Now managing with tylenol during the day but will take a norco at bedtime.   She used percocet after her previous surgery and felt it worked better - lasted longer. Norco only works for a few hours so by morning she is already pretty uncomfortable    Also notes a patch of dry skin on her right hand   Not itchy or painful  Not spreading    Review of Systems   Remainder of ROS obtained and found to be negative other than that which was documented above        Objective    /66   Pulse 76   Temp 97  F (36.1  C) (Tympanic)   Ht 1.676 m (5' 6\")   Wt 70.3 kg (155 lb)   BMI 25.02 kg/m    Body mass index is 25.02 kg/m .  Physical Exam   GENERAL: healthy, alert and no distress  RESP: lungs clear to auscultation - no rales, rhonchi or wheezes  CV: regular rates and rhythm, normal S1 S2, no S3 or S4 and no murmur, click or rub  SKIN: raised slightly scaly and hypopigmented patch of skin on the top of her right hand    Diagnostic Tests:  KOH: fungal elements noted        Assessment & Plan   (M16.11) Primary osteoarthritis of right hip  (primary encounter diagnosis)  Comment: currently using sparingly - just at night. Xray from urgent care reviewed - no fracture identified but severe arthritis " noted. 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 Doing well. Looking forward to the move        Return in about 4 weeks (around 11/5/2020) for follow, establish care with new providers in [place].    JESSICA Diaz [facility]

## 2020-10-12 ENCOUNTER — TELEPHONE (OUTPATIENT)
Dept: FAMILY MEDICINE | Facility: CLINIC | Age: 85
End: 2020-10-12

## 2020-10-12 DIAGNOSIS — M16.11 PRIMARY OSTEOARTHRITIS OF RIGHT HIP: Primary | ICD-10-CM

## 2020-10-12 NOTE — TELEPHONE ENCOUNTER
"Christiana reports that she is having increased right hip pain. Daughter , Esther also was on the the phone. Daughter says, \" It will be awhile before she can have surgery.\" Says that she is taking 2 oxycodone every 6 hours. Pain relief is for 4 hours. Pain is worse at night. Pain score of 9 during the night. Christiana reports that ice helps at night. She takes 2 tylenol occasionally instead of the oxycodone.   Denies fever. She said that she is moving to [place] on October 21,2020. She has appointment with a primary care and surgeon the last week of October. Plane ride is 2.5 hours. She is concerned about the plane trip as sometimes her hip and leg can get really painful and \"lock up.\" She is also worried about taking too much oxycodone.   How do you advise pain management until the last week of October, including the plane ride?  Thank you.   Roel Garza RN    "

## 2020-10-12 NOTE — TELEPHONE ENCOUNTER
patient was seen by Kelly and was given pain pills for her pain and wants to know what direction she is going with her pain.    Kenya Martinez [facility]

## 2020-10-12 NOTE — TELEPHONE ENCOUNTER
Message left on patient's answering machine to call the [facility] RN back.  Roel Garza RN

## 2020-10-13 NOTE — TELEPHONE ENCOUNTER
I placed a referral to have her see one of our NON surgical ortho specialists. I don't know if they will be able to do anything but if they think that an injection might provide some relief until she can get in to see the surgeon then I would like her to see them. I don't want her having to take that much pain medication - it is really not safe and if she is already taking 2 at a time that is a high dose.   They should be calling to get her scheduled in the next few days  Kelly

## 2020-10-13 NOTE — TELEPHONE ENCOUNTER
Call placed to patient.  Voicemail message identified patient.  Relayed message per LOLY Matthew.  Call back clinic RN number given if additional questions.  Kirsten Decker RN

## 2020-10-16 ENCOUNTER — ANCILLARY PROCEDURE (OUTPATIENT)
Dept: CARDIOLOGY | Facility: CLINIC | Age: 85
End: 2020-10-16
Attending: INTERNAL MEDICINE
Payer: MEDICARE

## 2020-10-16 DIAGNOSIS — I49.5 SSS (SICK SINUS SYNDROME) (H): ICD-10-CM

## 2020-10-16 DIAGNOSIS — Z95.0 CARDIAC PACEMAKER IN SITU: ICD-10-CM

## 2020-10-16 PROCEDURE — 93296 REM INTERROG EVL PM/IDS: CPT | Performed by: INTERNAL MEDICINE

## 2020-10-16 PROCEDURE — 93294 REM INTERROG EVL PM/LDLS PM: CPT | Performed by: INTERNAL MEDICINE

## 2020-10-17 ENCOUNTER — NURSE TRIAGE (OUTPATIENT)
Dept: NURSING | Facility: CLINIC | Age: 85
End: 2020-10-17

## 2020-10-17 ENCOUNTER — HOSPITAL ENCOUNTER (EMERGENCY)
Facility: CLINIC | Age: 85
Discharge: HOME | End: 2020-10-17
Attending: NURSE PRACTITIONER | Admitting: NURSE PRACTITIONER
Payer: MEDICARE

## 2020-10-17 VITALS
TEMPERATURE: 96.3 F | RESPIRATION RATE: 20 BRPM | DIASTOLIC BLOOD PRESSURE: 57 MMHG | HEIGHT: 66 IN | SYSTOLIC BLOOD PRESSURE: 109 MMHG | WEIGHT: 149 LBS | OXYGEN SATURATION: 96 % | HEART RATE: 63 BPM | BODY MASS INDEX: 23.95 KG/M2

## 2020-10-17 DIAGNOSIS — M16.11 PRIMARY OSTEOARTHRITIS OF RIGHT HIP: ICD-10-CM

## 2020-10-17 DIAGNOSIS — M25.551 HIP PAIN, RIGHT: ICD-10-CM

## 2020-10-17 PROCEDURE — G0463 HOSPITAL OUTPT CLINIC VISIT: HCPCS | Performed by: NURSE PRACTITIONER

## 2020-10-17 PROCEDURE — 99213 OFFICE O/P EST LOW 20 MIN: CPT | Performed by: NURSE PRACTITIONER

## 2020-10-17 RX ORDER — OXYCODONE AND ACETAMINOPHEN 5; 325 MG/1; MG/1
1 TABLET ORAL EVERY 4 HOURS PRN
Qty: 15 TABLET | Refills: 0 | Status: SHIPPED | OUTPATIENT
Start: 2020-10-17 | End: 2020-10-20

## 2020-10-17 ASSESSMENT — ENCOUNTER SYMPTOMS
ARTHRALGIAS: 1
NEUROLOGICAL NEGATIVE: 1
COUGH: 0
SHORTNESS OF BREATH: 0
GASTROINTESTINAL NEGATIVE: 1
CHILLS: 0
FEVER: 0

## 2020-10-17 ASSESSMENT — MIFFLIN-ST. JEOR: SCORE: 1122.61

## 2020-10-17 NOTE — DISCHARGE INSTRUCTIONS
Percocet 1 tablet every 4 hours as needed for pain, ok to take this with 1 Tylenol 325 mg tablet.  Call your regular provider on Monday for additional pain medication to get you through until your move/orthopedic appointment.

## 2020-10-17 NOTE — TELEPHONE ENCOUNTER
Daughter, Esther calling: Seen several days ago and percocet was ordered for pain in her hip/ she is out of the medication and did not sleep last night due to the pain/ she had called the clinic and there is an order there as of 10/16 sent  to [facility] but that pharmacy is closed/ she thought it was going to a community pharmacy/ recommend she be re seen to get the mediation/she will go to [place] urgent care no triage completed  [place] RN [phone]

## 2020-10-17 NOTE — ED PROVIDER NOTES
History     Chief Complaint   Patient presents with     Medication Refill     HPI  Christiana Sal is a 88 year old female who is with her daughter requesting a refill of Percocet for right hip pain.  Patient had a prescription sent to [facility] yestrerday but it was closed by the time she arrived there.  Patient has Subacute right hip pain- evaluated here 2 weeks ago with imaging and has seen her PCP for follow-up. No fracture noted on imaging. She is supposed to be following up with orthopedics but she is moving to [place] to be near her other daughter. She has an appt for orthopedics already schedule there but is concerned about pain control and travel on plane. She is taking percocet every 4-6 hours (2 tabs).  Both patient and daughter think she could tolerate pain with 1 tablet percocet and 1 tablet of tylenol.   No new injury or fall.     Allergies:  Allergies   Allergen Reactions     Demerol Nausea     Lisinopril Cough     Meperidine Unknown     Sulfa Drugs Other (See Comments) and Unknown     Questionable itching reported by patient       Problem List:    Patient Active Problem List    Diagnosis Date Noted     Paroxysmal atrial fibrillation (H) 07/13/2020     Priority: Medium     Near syncope 05/03/2019     Priority: Medium     Overactive bladder 05/02/2019     Priority: Medium     S/P total hip arthroplasty 05/02/2019     Priority: Medium     Closed fracture of shaft of fibula 12/02/2018     Priority: Medium     Fall 12/02/2018     Priority: Medium     Primary osteoarthritis of left hip 09/21/2018     Priority: Medium     Insect bite 06/28/2017     Priority: Medium     Cardiac pacemaker - [employer] dual lead - Not Dependent 03/02/2017     Priority: Medium     Sinoatrial node dysfunction (H) 11/10/2016     Priority: Medium     CAD S/P percutaneous coronary angioplasty 05/11/2016     Priority: Medium     JENIFER (obstructive sleep apnea) 11/17/2014     Priority: Medium     Lichen planus  11/19/2013     Priority: Medium     Vulvar pruritus 11/12/2013     Priority: Medium     Angina pectoris (H) 02/08/2012     Priority: Medium     Advanced directives, counseling/discussion 06/16/2011     Priority: Medium     Patient will bring a copy. Chart r/q to make sure no copy.    GUSTAVO Cunningham MA    Advance Directive Problem List Overview:   Name Relationship Phone    Primary Health Care Agent Albaro Sal  [phone]         Alternative Health Care Agent Esther Mulligan  [phone]     Reviewed Health Care Directive dated 05/13/2005, scanned into EPIC 01/2012.  Marivel Villarreal CMA                Major depressive disorder, recurrent episode, mild (H) 11/02/2010     Priority: Medium     Family history of colon cancer 05/04/2010     Priority: Medium     Coronary artery disease involving native coronary artery of native heart without angina pectoris      Priority: Medium     s/p MI ([facility])       Hyperlipidemia LDL goal <100      Priority: Medium     Benign essential hypertension      Priority: Medium     Hypothyroidism      Priority: Medium     GERD (gastroesophageal reflux disease)      Priority: Medium        Past Medical History:    Past Medical History:   Diagnosis Date     Coronary atherosclerosis of native coronary artery      Essential hypertension, benign      GERD (gastroesophageal reflux disease)      History of transient ischemic attack (TIA)      Hyperlipidemia LDL goal <100      Hypothyroidism      Major depressive disorder, recurrent episode, mild (H) 5/18/2011     Peptic ulcer disease with hemorrhage      Sleep apnea        Past Surgical History:    Past Surgical History:   Procedure Laterality Date     APPENDECTOMY       ARTHROPLASTY HIP Left 5/2/2019    Procedure: ARTHROPLASTY, HIP;  Surgeon: Jacinto Adams MD;  Location: [facility] OR     CARDIAC CATHERIZATION  1/15/04    drug-eluding stent RCA, Dr. Pérez, [facility]     CARDIAC CATHERIZATION  2/16/12    diffuse mild/mod disease, no  new stent     CATARACT IOL, RT/LT  12    RT, Dr. Wynne     CHOLECYSTECTOMY, OPEN       COLONOSCOPY      [facility]     FRACTURE TX, ANKLE RT/LT      LT, 2 screws remain     HC EXCISE HAND/FOOT NEUROMA      RT     HC LARYNGOSCOPY DIRECT W VOCAL CORD INJECTION      vocal cord polypectomy     HC REMOVAL OF OVARIAN CYST(S)       HC TRANSCATH STENT INIT VESSEL,PERCUT      x 2     OPEN REDUCTION INTERNAL FIXATION ANKLE Right 12/3/2018    Procedure: OPEN REDUCTION INTERNAL FIXATION Right ANKLE;  Surgeon: Jacinto Adams MD;  Location: [facility] OR     REMOVE HARDWARE ANKLE Right 2020    Procedure: Distal Fibula Deep Hardware Removal, Cystic Lesion Exision;  Surgeon: Darrell Chávez DPM;  Location: [facility] OR     REPAIR HAMMER TOE  9/13/10    multiple + RT great toe tendon release, Dr. Sanchez DPLOLY     STENT  -    Cardiac stents 6 placed.       Family History:    Family History   Problem Relation Age of Onset     C.A.D. Father      Hypertension Father      Myocardial Infarction Father 66         from MI     C.A.D. Brother      Myocardial Infarction Brother      Cancer Brother         skin ca     Breast Cancer Sister      Cancer - colorectal Sister      Heart Disease Sister      Neurologic Disorder Mother         migraine     C.A.D. Mother      Hypertension Mother      Heart Failure Mother      Thyroid Disease Son         cretinism     Hypertension Son      Heart Disease Son      Psychotic Disorder Sister      Hypertension Sister      Heart Disease Sister         CABG     Hypertension Brother      C.A.D. Brother      Myocardial Infarction Brother      Arthritis Daughter         RA     Hypertension Sister      Heart Disease Sister      Cancer Sister      Blood Disease Sister      Neurologic Disorder Child         migraine (2 children)       Social History:  Marital Status:   [2]  Social History     Tobacco Use     Smoking status: Never Smoker     Smokeless tobacco: Never Used     Tobacco  "comment: Never smoker; no secondhand smoke exposure   Substance Use Topics     Alcohol use: Yes     Comment: social     Drug use: No        Medications:         oxyCODONE-acetaminophen (PERCOCET) 5-325 MG tablet       acetaminophen (TYLENOL) 500 MG tablet       amLODIPine (NORVASC) 10 MG tablet       apixaban ANTICOAGULANT (ELIQUIS ANTICOAGULANT) 5 MG tablet       aspirin 81 MG EC tablet       atorvastatin (LIPITOR) 40 MG tablet       buPROPion (WELLBUTRIN) 75 MG tablet       CALCIUM 600 + D OR       calcium carbonate (TUMS) 500 MG chewable tablet       citalopram (CELEXA) 20 MG tablet       clotrimazole (LOTRIMIN) 1 % external cream       famotidine (PEPCID) 20 MG tablet       Homeopathic Products (LEG CRAMPS PO)       levothyroxine (SYNTHROID/LEVOTHROID) 75 MCG tablet       magnesium 250 MG tablet       meclizine (ANTIVERT) 25 MG tablet       metoprolol succinate ER (TOPROL-XL) 50 MG 24 hr tablet       Multiple Vitamins-Minerals (OCUVITE PO)       nitroGLYcerin (NITROSTAT) 0.4 MG sublingual tablet       polyethylene glycol (MIRALAX) 17 GM/SCOOP powder       tolterodine (DETROL) 2 MG tablet       Vitamin D, Cholecalciferol, 1000 units TABS          Review of Systems   Constitutional: Negative for chills and fever.   HENT: Negative for congestion.    Respiratory: Negative for cough and shortness of breath.    Cardiovascular: Negative for chest pain.   Gastrointestinal: Negative.    Genitourinary: Negative.    Musculoskeletal: Positive for arthralgias.   Neurological: Negative.    All other systems reviewed and are negative.      Physical Exam   BP: 109/57  Pulse: 63  Temp: 96.3  F (35.7  C)  Resp: 20  Height: 167.6 cm (5' 6\")  Weight: 67.6 kg (149 lb)  SpO2: 96 %      Physical Exam    GENERAL APPEARANCE: alert and oriented. NAD.   RESP: lungs clear to auscultation - no rales, rhonchi or wheezes  CV: regular rates and rhythm, normal S1 S2, no murmur noted  mentation  SKIN: no suspicious lesions or rashes    ED Course "        Procedures               No results found for this or any previous visit (from the past 24 hour(s)).    Medications - No data to display    Assessments & Plan (with Medical Decision Making)   I agreed to give patient refill of Percocet but modified to 1 tablet dosing instead of 2. Patient will follow-up with her PCP on Monday.  Plan:    Percocet 1 tablet every 4 hours as needed for pain, ok to take this with 1 Tylenol 325 mg tablet.  Call your regular provider on Monday for additional pain medication to get you through until your move/orthopedic appointment.  I have reviewed the nursing notes.    I have reviewed the findings, diagnosis, plan and need for follow up with the patient.      Discharge Medication List as of 10/17/2020 12:33 PM          Final diagnoses:   Hip pain, right       10/17/2020   [facility] EMERGENCY DEPT     Cory, Natalie Rapp, SHARMIN CNP  10/17/20 1323

## 2020-10-17 NOTE — ED AVS SNAPSHOT
[facility] Emergency Dept  [address] [address]  Phone: [phone]  Fax: [phone]                                    Christiana Sal   MRN: [id number]    Department: [facility] Emergency Dept   Date of Visit: 10/17/2020           After Visit Summary Signature Page    I have received my discharge instructions, and my questions have been answered. I have discussed any challenges I see with this plan with the nurse or doctor.    ..........................................................................................................................................  Patient/Patient Representative Signature      ..........................................................................................................................................  Patient Representative Print Name and Relationship to Patient    ..................................................               ................................................  Date                                   Time    ..........................................................................................................................................  Reviewed by Signature/Title    ...................................................              ..............................................  Date                                               Time          22EPIC Rev 08/18

## 2020-10-20 ENCOUNTER — VIRTUAL VISIT (OUTPATIENT)
Dept: FAMILY MEDICINE | Facility: CLINIC | Age: 85
End: 2020-10-20
Payer: MEDICARE

## 2020-10-20 DIAGNOSIS — M16.11 PRIMARY OSTEOARTHRITIS OF RIGHT HIP: Primary | ICD-10-CM

## 2020-10-20 DIAGNOSIS — K59.03 DRUG-INDUCED CONSTIPATION: ICD-10-CM

## 2020-10-20 PROCEDURE — 99441 PR PHYSICIAN TELEPHONE EVALUATION 5-10 MIN: CPT | Mod: 95 | Performed by: PHYSICIAN ASSISTANT

## 2020-10-20 RX ORDER — OXYCODONE AND ACETAMINOPHEN 5; 325 MG/1; MG/1
1 TABLET ORAL EVERY 4 HOURS PRN
Qty: 50 TABLET | Refills: 0 | Status: SHIPPED | OUTPATIENT
Start: 2020-10-20 | End: 2021-07-16

## 2020-10-20 ASSESSMENT — PATIENT HEALTH QUESTIONNAIRE - PHQ9: SUM OF ALL RESPONSES TO PHQ QUESTIONS 1-9: 2

## 2020-10-20 NOTE — PROGRESS NOTES
"Christiana Sal is a 88 year old female who is being evaluated via a billable telephone visit.      The patient has been notified of following:     \"This telephone visit will be conducted via a call between you and your physician/provider. We have found that certain health care needs can be provided without the need for a physical exam.  This service lets us provide the care you need with a short phone conversation.  If a prescription is necessary we can send it directly to your pharmacy.  If lab work is needed we can place an order for that and you can then stop by our lab to have the test done at a later time.    Telephone visits are billed at different rates depending on your insurance coverage. During this emergency period, for some insurers they may be billed the same as an in-person visit.  Please reach out to your insurance provider with any questions.    If during the course of the call the physician/provider feels a telephone visit is not appropriate, you will not be charged for this service.\"    Patient has given verbal consent for Telephone visit?  Yes    What phone number would you like to be contacted at? [phone]    How would you like to obtain your AVS? Mail a copy    Subjective     Christiana Sal is a 88 year old female who presents via phone visit today for the following health issues:    HPI     Musculoskeletal problem/pain  Onset/Duration: 3 months/ worse x 2.5 weeks   Description  Location: hip  - right  Joint Swelling: no  Redness: no  Pain: YES  Warmth: no  Pain radiating to to groin   Intensity:  moderate  Progression of Symptoms:  worsening  Accompanying signs and symptoms:   Fevers: no  Numbness/tingling/weakness: YES down right leg   History  Trauma to the area: no  Recent illness:  no  Previous similar problem: no   Previous evaluation:  YES was seen in ED 10/17/2020  Precipitating or alleviating factors:  Aggravating factors include: overuse  Therapies tried and " outcome: Percocet is helping  Patient states she is moving back to [place] tomorrow. She will be seeing Ortho there.     Was in the urgent care on 10/17 for increasing pain   Was given qty#15 and told to use one with 1/2 tab of tylenol every 4 hours    She states that does seem to help but she has to take it every 4 hours or the pain comes back and then she can't get it under control  Will wake overnight because the pain is there so is taking 6/day    Leaves (flies out) tomorrow to [place] where she will be living with her daughter  She does have an appointment in [place] next week with new PCP and ortho     Needs enough to get to that appointment    Stools are okay - a little harder than normal  Has not been taking miralax as she packed it up      Review of Systems   Remainder of ROS obtained and found to be negative other than that which was documented above         Objective          Vitals:  No vitals were obtained today due to virtual visit.    healthy, alert and no distress  PSYCH: Alert and oriented times 3; coherent speech, normal   rate and volume, able to articulate logical thoughts, able   to abstract reason, no tangential thoughts, no hallucinations   or delusions  Her affect is normal  RESP: No cough, no audible wheezing, able to talk in full sentences  Remainder of exam unable to be completed due to telephone visits    Diagnostic tests:   none        Assessment/Plan:      ICD-10-CM    1. Primary osteoarthritis of right hip  M16.11 oxyCODONE-acetaminophen (PERCOCET) 5-325 MG tablet   2. Drug-induced constipation  K59.03      Increasing pain in right hip  Xrays done which do show severe osteoarthritis. Would like MRI given intensity of pain but patient moving and unable to get before she moves.   Currently managing pain with fairly scheduled percocet - she has not been on chronic opioids before. No h/o chronic use. Aware (both her and her daughter) of risks and safety  Discussed today that she needs  to be taking medication for constipation as these medications can make constipation worse. Advised stool softeners and miralax      Phone call duration:  5 minutes

## 2020-11-10 LAB
MDC_IDC_EPISODE_DTM: NORMAL
MDC_IDC_EPISODE_DURATION: 123 S
MDC_IDC_EPISODE_DURATION: 124 S
MDC_IDC_EPISODE_DURATION: 125 S
MDC_IDC_EPISODE_ID: NORMAL
MDC_IDC_EPISODE_TYPE: NORMAL
MDC_IDC_LEAD_IMPLANT_DT: NORMAL
MDC_IDC_LEAD_IMPLANT_DT: NORMAL
MDC_IDC_LEAD_LOCATION: NORMAL
MDC_IDC_LEAD_LOCATION: NORMAL
MDC_IDC_LEAD_LOCATION_DETAIL_1: NORMAL
MDC_IDC_LEAD_LOCATION_DETAIL_1: NORMAL
MDC_IDC_LEAD_MFG: NORMAL
MDC_IDC_LEAD_MFG: NORMAL
MDC_IDC_LEAD_MODEL: NORMAL
MDC_IDC_LEAD_MODEL: NORMAL
MDC_IDC_LEAD_POLARITY_TYPE: NORMAL
MDC_IDC_LEAD_POLARITY_TYPE: NORMAL
MDC_IDC_LEAD_SERIAL: NORMAL
MDC_IDC_LEAD_SERIAL: NORMAL
MDC_IDC_MSMT_BATTERY_DTM: NORMAL
MDC_IDC_MSMT_BATTERY_REMAINING_LONGEVITY: 138 MO
MDC_IDC_MSMT_BATTERY_REMAINING_PERCENTAGE: 100 %
MDC_IDC_MSMT_BATTERY_STATUS: NORMAL
MDC_IDC_MSMT_LEADCHNL_RA_IMPEDANCE_VALUE: 693 OHM
MDC_IDC_MSMT_LEADCHNL_RA_PACING_THRESHOLD_AMPLITUDE: 0.9 V
MDC_IDC_MSMT_LEADCHNL_RA_PACING_THRESHOLD_PULSEWIDTH: 0.4 MS
MDC_IDC_MSMT_LEADCHNL_RV_IMPEDANCE_VALUE: 686 OHM
MDC_IDC_MSMT_LEADCHNL_RV_PACING_THRESHOLD_AMPLITUDE: 1.7 V
MDC_IDC_MSMT_LEADCHNL_RV_PACING_THRESHOLD_PULSEWIDTH: 0.4 MS
MDC_IDC_PG_IMPLANT_DTM: NORMAL
MDC_IDC_PG_MFG: NORMAL
MDC_IDC_PG_MODEL: NORMAL
MDC_IDC_PG_SERIAL: NORMAL
MDC_IDC_PG_TYPE: NORMAL
MDC_IDC_SESS_CLINIC_NAME: NORMAL
MDC_IDC_SESS_DTM: NORMAL
MDC_IDC_SESS_TYPE: NORMAL
MDC_IDC_SET_BRADY_AT_MODE_SWITCH_MODE: NORMAL
MDC_IDC_SET_BRADY_AT_MODE_SWITCH_RATE: 170 {BEATS}/MIN
MDC_IDC_SET_BRADY_LOWRATE: 50 {BEATS}/MIN
MDC_IDC_SET_BRADY_MAX_SENSOR_RATE: 120 {BEATS}/MIN
MDC_IDC_SET_BRADY_MAX_TRACKING_RATE: 120 {BEATS}/MIN
MDC_IDC_SET_BRADY_MODE: NORMAL
MDC_IDC_SET_BRADY_PAV_DELAY_HIGH: 100 MS
MDC_IDC_SET_BRADY_PAV_DELAY_LOW: 200 MS
MDC_IDC_SET_BRADY_SAV_DELAY_HIGH: 85 MS
MDC_IDC_SET_BRADY_SAV_DELAY_LOW: 170 MS
MDC_IDC_SET_LEADCHNL_RA_PACING_AMPLITUDE: 2 V
MDC_IDC_SET_LEADCHNL_RA_PACING_CAPTURE_MODE: NORMAL
MDC_IDC_SET_LEADCHNL_RA_PACING_POLARITY: NORMAL
MDC_IDC_SET_LEADCHNL_RA_PACING_PULSEWIDTH: 0.4 MS
MDC_IDC_SET_LEADCHNL_RA_SENSING_ADAPTATION_MODE: NORMAL
MDC_IDC_SET_LEADCHNL_RA_SENSING_POLARITY: NORMAL
MDC_IDC_SET_LEADCHNL_RA_SENSING_SENSITIVITY: 0.25 MV
MDC_IDC_SET_LEADCHNL_RV_PACING_AMPLITUDE: 2.2 V
MDC_IDC_SET_LEADCHNL_RV_PACING_CAPTURE_MODE: NORMAL
MDC_IDC_SET_LEADCHNL_RV_PACING_POLARITY: NORMAL
MDC_IDC_SET_LEADCHNL_RV_PACING_PULSEWIDTH: 0.4 MS
MDC_IDC_SET_LEADCHNL_RV_SENSING_ADAPTATION_MODE: NORMAL
MDC_IDC_SET_LEADCHNL_RV_SENSING_POLARITY: NORMAL
MDC_IDC_SET_LEADCHNL_RV_SENSING_SENSITIVITY: 1.5 MV
MDC_IDC_SET_ZONE_DETECTION_INTERVAL: 375 MS
MDC_IDC_SET_ZONE_TYPE: NORMAL
MDC_IDC_SET_ZONE_VENDOR_TYPE: NORMAL
MDC_IDC_STAT_AT_BURDEN_PERCENT: 1 %
MDC_IDC_STAT_AT_DTM_END: NORMAL
MDC_IDC_STAT_AT_DTM_START: NORMAL
MDC_IDC_STAT_BRADY_DTM_END: NORMAL
MDC_IDC_STAT_BRADY_DTM_START: NORMAL
MDC_IDC_STAT_BRADY_RA_PERCENT_PACED: 4 %
MDC_IDC_STAT_BRADY_RV_PERCENT_PACED: 3 %
MDC_IDC_STAT_EPISODE_RECENT_COUNT: 0
MDC_IDC_STAT_EPISODE_RECENT_COUNT: 17
MDC_IDC_STAT_EPISODE_RECENT_COUNT: 2
MDC_IDC_STAT_EPISODE_RECENT_COUNT_DTM_END: NORMAL
MDC_IDC_STAT_EPISODE_RECENT_COUNT_DTM_START: NORMAL
MDC_IDC_STAT_EPISODE_TYPE: NORMAL
MDC_IDC_STAT_EPISODE_VENDOR_TYPE: NORMAL

## 2020-12-07 DIAGNOSIS — E03.9 HYPOTHYROIDISM, UNSPECIFIED TYPE: ICD-10-CM

## 2020-12-07 DIAGNOSIS — I25.10 CAD S/P PERCUTANEOUS CORONARY ANGIOPLASTY: ICD-10-CM

## 2020-12-07 DIAGNOSIS — Z98.61 CAD S/P PERCUTANEOUS CORONARY ANGIOPLASTY: ICD-10-CM

## 2020-12-09 NOTE — TELEPHONE ENCOUNTER
Patient was moving to [place] - not sure if this a real request or automated from pharmacy. Do not want to fill it if she is trying to fill it in [place]. Need to check with patient to verify she wants this?     Kelly

## 2020-12-10 RX ORDER — LEVOTHYROXINE SODIUM 75 UG/1
TABLET ORAL
Qty: 90 TABLET | Refills: 0 | Status: SHIPPED | OUTPATIENT
Start: 2020-12-10

## 2020-12-11 RX ORDER — LEVOTHYROXINE SODIUM 75 UG/1
75 TABLET ORAL DAILY
Qty: 90 TABLET | Refills: 0 | Status: CANCELLED | OUTPATIENT
Start: 2020-12-11

## 2020-12-11 NOTE — TELEPHONE ENCOUNTER
Call placed to patient.  Voicemail identified patient.  Left message requesting call back clinic RN, number given. Need to determine if she needs this refill sent to [facility] pharmacy or has she moved to [place].  Kirsten Decker RN

## 2020-12-14 DIAGNOSIS — F33.0 MAJOR DEPRESSIVE DISORDER, RECURRENT EPISODE, MILD (H): ICD-10-CM

## 2020-12-15 RX ORDER — ATORVASTATIN CALCIUM 40 MG/1
TABLET, FILM COATED ORAL
Qty: 90 TABLET | Refills: 0 | OUTPATIENT
Start: 2020-12-15

## 2020-12-16 NOTE — TELEPHONE ENCOUNTER
"Requested Prescriptions   Pending Prescriptions Disp Refills     citalopram (CELEXA) 20 MG tablet 90 tablet 0     Sig: Take 1 tablet (20 mg) by mouth daily Overdue for appointment - before next refill       SSRIs Protocol Passed - 12/15/2020 11:18 PM        Passed - PHQ-9 score less than 5 in past 6 months     Please review last PHQ-9 score.           Passed - Medication is active on med list        Passed - Patient is age 18 or older        Passed - No active pregnancy on record        Passed - No positive pregnancy test in last 12 months        Passed - Recent (6 mo) or future (30 days) visit within the authorizing provider's specialty     Patient had office visit in the last 6 months or has a visit in the next 30 days with authorizing provider or within the authorizing provider's specialty.  See \"Patient Info\" tab in inbasket, or \"Choose Columns\" in Meds & Orders section of the refill encounter.                 "

## 2020-12-17 RX ORDER — CITALOPRAM HYDROBROMIDE 20 MG/1
20 TABLET ORAL DAILY
Qty: 90 TABLET | Refills: 2 | Status: SHIPPED | OUTPATIENT
Start: 2020-12-17

## 2020-12-17 NOTE — TELEPHONE ENCOUNTER
Prescription approved per [facility] Refill Protocol.  Roel Garza RN

## 2021-04-30 NOTE — TELEPHONE ENCOUNTER
Kelly updated the preop note, this can be faxed now  Cristina Shaffer PA-C   
Sheila from [facility] requesting another provider approve Christiana's H & P.    It was completed on 4/12 but pended until she was cleared by cardiologist.  She had EKG was done along with ECHO.      H & P needs to have addendum stating above was done and she is now cleared for surgery.  She is schedule for surgery on 4/2/19.      Could you please review.  Thank you..Consuelo Hawthorne    
No

## 2021-06-04 VITALS — BODY MASS INDEX: 24.11 KG/M2 | WEIGHT: 150 LBS | HEIGHT: 66 IN

## 2021-06-20 NOTE — LETTER
Letter by Vivian Cunha RN at      Author: Vivian Cunha RN Service: -- Author Type: --    Filed:  Encounter Date: 6/14/2020 Status: (Other)       6/14/2020        Christiana Sal  [address]  [place] [address]    This letter provides a written record that you were tested for COVID-19 on 6/13/20.     Your result was negative.    This means that we didnt find the virus that causes COVID-19 in your sample. A test may show negative when you do actually have the virus. This can happen when the virus is in the early stages of infection, before you feel illness symptoms.    Even if you dont have symptoms, they may still appear. For safety, its very important to follow these rules.    Keep yourself away from others (self-isolation):      Stay home. Dont go to work, school or anywhere else.     Stay in your own room (and use your own bathroom), if you can.    Stay away from others in your home. No hugging, kissing or shaking hands. No visitors.    Clean high touch surfaces often (doorknobs, counters, handles, etc.). Use a household cleaning spray or wipes.    Cover your mouth and nose with a mask, tissue or washcloth to avoid spreading germs.    Wash your hands and face often with soap and water.    Stay in self-isolation until you meet ALL of the guidelines below:    1. You have had no fever for at least 72 hours (that is 3 full days of no fever without the use of medicine that reduces fevers), AND  2. other symptoms (such as cough, shortness of breath) have gotten better, AND  3. at least 10 days have passed since your symptoms first appeared.    Going back to work  Check with your employer for any guidelines to follow for going back to work.    Employers: This document serves as formal notice that your employee tested negative for COVID-19, as of the testing date shown above.    For questions regarding this letter or your Negative COVID-19 result, call [phone] between 8A to  6:30P (M-F) and 10A to 6:30P (weekends).

## 2021-07-16 ENCOUNTER — ALLIED HEALTH/NURSE VISIT (OUTPATIENT)
Dept: FAMILY MEDICINE | Facility: CLINIC | Age: 86
End: 2021-07-16
Payer: MEDICARE

## 2021-07-16 ENCOUNTER — OFFICE VISIT (OUTPATIENT)
Dept: FAMILY MEDICINE | Facility: CLINIC | Age: 86
End: 2021-07-16
Payer: MEDICARE

## 2021-07-16 VITALS
HEART RATE: 59 BPM | HEIGHT: 66 IN | OXYGEN SATURATION: 95 % | DIASTOLIC BLOOD PRESSURE: 70 MMHG | TEMPERATURE: 98.6 F | SYSTOLIC BLOOD PRESSURE: 124 MMHG | WEIGHT: 147 LBS | BODY MASS INDEX: 23.63 KG/M2

## 2021-07-16 DIAGNOSIS — M16.11 PRIMARY OSTEOARTHRITIS OF RIGHT HIP: ICD-10-CM

## 2021-07-16 DIAGNOSIS — M54.9 BACK PAIN: Primary | ICD-10-CM

## 2021-07-16 DIAGNOSIS — I48.0 PAROXYSMAL ATRIAL FIBRILLATION (H): ICD-10-CM

## 2021-07-16 DIAGNOSIS — I25.118 CORONARY ARTERY DISEASE OF NATIVE ARTERY OF NATIVE HEART WITH STABLE ANGINA PECTORIS (H): ICD-10-CM

## 2021-07-16 DIAGNOSIS — F33.0 MAJOR DEPRESSIVE DISORDER, RECURRENT EPISODE, MILD (H): ICD-10-CM

## 2021-07-16 DIAGNOSIS — M62.830 SPASM OF BACK MUSCLES: Primary | ICD-10-CM

## 2021-07-16 PROCEDURE — 99207 PR NO CHARGE NURSE ONLY: CPT

## 2021-07-16 PROCEDURE — 99213 OFFICE O/P EST LOW 20 MIN: CPT | Performed by: PHYSICIAN ASSISTANT

## 2021-07-16 RX ORDER — OXYCODONE AND ACETAMINOPHEN 5; 325 MG/1; MG/1
1 TABLET ORAL EVERY 6 HOURS PRN
Qty: 10 TABLET | Refills: 0 | Status: SHIPPED | OUTPATIENT
Start: 2021-07-16 | End: 2021-07-20

## 2021-07-16 RX ORDER — CYCLOBENZAPRINE HCL 10 MG
5-10 TABLET ORAL 3 TIMES DAILY PRN
Qty: 20 TABLET | Refills: 0 | Status: SHIPPED | OUTPATIENT
Start: 2021-07-16 | End: 2021-07-20

## 2021-07-16 ASSESSMENT — PAIN SCALES - GENERAL: PAINLEVEL: SEVERE PAIN (6)

## 2021-07-16 ASSESSMENT — MIFFLIN-ST. JEOR: SCORE: 1108.54

## 2021-07-16 NOTE — PROGRESS NOTES
Assessment & Plan     (M62.830) Spasm of back muscles  (primary encounter diagnosis)  Comment: acute spasm. No injury. No red flags on exam. Defer imaging today. Work on relaxing muscles, reserve pain medications for severe pain impacting sleep at night.   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 [place]    (I48.0) Paroxysmal atrial fibrillation (H)  Comment:   Plan: stable. 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 [place]      Return in about 4 weeks (around 8/13/2021) for If not improving or worsening with specialist at home.    JESSICA Diaz [facility] is a 89 year old who presents for the following health issues   HPI     Back Pain  Onset/Duration: Monday 7/12  Description:   Location of pain: low back bilateral  Character of pain: sharp and spasms   Pain radiation: none  New numbness or weakness in legs, not attributed to pain: no   Intensity: moderate to severe  Progression of Symptoms: worsening  History:   Specific cause: none, she was doing a puzzle and leaning over a lot so she isn't sure if that is what tweaked it   Pain interferes with job: not applicable  History of back problems: YES  Any previous MRI or X-rays: None  Sees a specialist for back pain: No  Alleviating factors:   Improved by: Chiropractor gave some mild relief    Precipitating factors:  Worsened by: Lying Flat and Walking  Therapies tried and outcome: acetaminophen (Tylenol), chiropractor, cold and NSAIDs    Accompanying Signs &  "Symptoms:  Risk of Fracture: None  Risk of Cauda Equina: None  Risk of Infection: None  Risk of Cancer: None  Risk of Ankylosing Spondylitis: Onset at age <35, male, AND morning back stiffness  no         Patient walked in to clinic asking to be seen  Was previously a patient under my care but moved to the [facility] to be closer to family  Back visiting other family in [place] for a few weeks  No specific injury - was just bending over to do a puzzle and felt muscles spasm  Having a hard time getting comfortable now  Worried about flying back with this pain  Does have an appointment to see her orthopedist back home when she returns  Back had been doing much better since her hip surgery    Review of Systems   Constitutional, HEENT, cardiovascular, pulmonary, gi and gu systems are negative, except as otherwise noted.      Objective    /70   Pulse 59   Temp 98.6  F (37  C) (Tympanic)   Ht 1.676 m (5' 6\")   Wt 66.7 kg (147 lb)   SpO2 95%   BMI 23.73 kg/m    Body mass index is 23.73 kg/m .  Physical Exam   GENERAL: healthy, alert and no distress  Comprehensive back pain exam:  Tenderness of muscles to right and left of spine, no tenderness direction of spine or vertebral processes, range of motion not limited overall - flexion/extension occurring but slowly/carefully, Lower extremity strength functional and equal on both sides, Lower extremity reflexes within normal limits bilaterally and Lower extremity sensation normal and equal on both sides    Diagnostic Tests:  None today - no red flags, no new injury  Reviewed previous imaging in EPIC        "

## 2021-07-16 NOTE — PROGRESS NOTES
Patient presents to the Clinic as recommended by her Chiropractor    Patient states she has been having generalized low back pain that started recently   Reports a lot of back spasms with certain movements   Has attempted OTC Tylenol with no relief in pain    Denies numbness/tingling to lower extremities  Denies pain radiating   Denies recent injury  Reports she was recently putting together a [employer] puzzle and was doing a lot of bending forward - otherwise no recent injury that she can recall     Reports mild relief from chiropractor but pain is still significant enough that she is requesting pain medications    Advised patient that she will need to be evaluated by a provider prior to pain medications being prescribed   Patient states she recently moved to [place] and had previously saw Cathy     Spoke with Cathy regarding patient  Cathy okay'd to place patient on her schedule for today at 1140  Appointment made and author notified patient of appointment     Patient verbalized understanding and is appreciative of Cathy working her into her schedule    Anders Ybarra RN

## 2021-07-16 NOTE — PATIENT INSTRUCTIONS
"I suspect this is all back muscles that are \"angry\" right now - it is reassuring that you aren't having any pain down the leg or butt    Try the muscle relaxer (flexeril/cyclobenzaprine) to help \"calm down\" the muscles. Can try 1/2 tablet first but can take up to a full tablet.   The main side effect is most commonly sedation or sleepiness     Consider trying heat at night as heat can sometimes relax the muscles a little more efficiently      Reserve the percocet for pain not well controlled with the above  "

## 2021-07-19 ENCOUNTER — TELEPHONE (OUTPATIENT)
Dept: FAMILY MEDICINE | Facility: CLINIC | Age: 86
End: 2021-07-19

## 2021-07-19 DIAGNOSIS — M62.830 SPASM OF BACK MUSCLES: ICD-10-CM

## 2021-07-19 NOTE — TELEPHONE ENCOUNTER
Reason for Call:  Other back pain    Detailed comments: pt was seen Friday. Pt is still having back pain. Pt is wondering if an injection is an option. Pt will be flying home next week. Would like to be comfortable on her flight.    Phone Number Patient can be reached at: Home number on file [phone] (home)    Best Time:     Can we leave a detailed message on this number? YES    Call taken on 7/19/2021 at 9:29 AM by Miri Chow

## 2021-07-19 NOTE — TELEPHONE ENCOUNTER
Pt called back, is back in [place] for a visit.  She does not have enough oxyCODONE-acetaminophen (PERCOCET)  to last until she goes home next Wednesday to [place].    Pharmacy she would like sent to:  [place], [place]  [phone]    She is taking two tablets per day. She has about three days that won't be covered.     Elisha TONEY RN, BSN

## 2021-07-19 NOTE — TELEPHONE ENCOUNTER
Call placed to patient.  Voicemail identified patient.  Left detailed message per LOLY Matthew's note.  Call back number given, option 2 for the care team if additional questions.  Kirsten Decker RN

## 2021-07-19 NOTE — TELEPHONE ENCOUNTER
I am not sure that that could be arranged within the week since she hasn't been seen recently here and [place]. I'm not sure anyone would be comfortable injecting into her back not knowing exactly what the situation was as the last MRI we have here is from 2018    Kelly

## 2021-07-20 DIAGNOSIS — M62.830 SPASM OF BACK MUSCLES: ICD-10-CM

## 2021-07-20 RX ORDER — CYCLOBENZAPRINE HCL 10 MG
5-10 TABLET ORAL 3 TIMES DAILY PRN
Qty: 20 TABLET | Refills: 0 | Status: SHIPPED | OUTPATIENT
Start: 2021-07-20

## 2021-07-20 RX ORDER — OXYCODONE AND ACETAMINOPHEN 5; 325 MG/1; MG/1
1 TABLET ORAL EVERY 6 HOURS PRN
Qty: 10 TABLET | Refills: 0 | Status: SHIPPED | OUTPATIENT
Start: 2021-07-20

## 2021-07-20 RX ORDER — OXYCODONE AND ACETAMINOPHEN 5; 325 MG/1; MG/1
1 TABLET ORAL EVERY 6 HOURS PRN
Qty: 10 TABLET | Refills: 0 | Status: CANCELLED | OUTPATIENT
Start: 2021-07-20

## 2021-07-20 NOTE — TELEPHONE ENCOUNTER
Patient called back as well daughter. Advised of provider's note below. Patient and family state understanding.     [facility] RN

## 2021-07-20 NOTE — TELEPHONE ENCOUNTER
Patient called for a refill on her percocet and flexeril and wants it sent to [facility] please.  pharmacy selected.      Kenya Martinez, JENNIFER Hodges

## 2021-07-20 NOTE — TELEPHONE ENCOUNTER
Refill sent to pharmacy requested -  I would make sure she schedules an appointment with her orthopedist now for when she returns so that if the back is still bothering her she doesn't have to wait too long to be seen    Kelly

## 2022-03-17 NOTE — ED NOTES
Patient ambulated well with her walker and maintained a heart rate of 80 beats per minute while staying at an oxygen level of 95%.   KOMAL SORENSEN ; 03/30/2022 ; [facility] Med Nephro [address] KOMAL Alvarez ; 05/23/2022 ; N[facility] Urology [address]   KOMAL SORENSEN ; 06/03/2022 ; NPP OrthoSurg [address] [facility]

## 2022-06-16 ENCOUNTER — TELEPHONE (OUTPATIENT)
Dept: CARDIOLOGY | Facility: CLINIC | Age: 87
End: 2022-06-16
Payer: MEDICARE

## 2022-06-16 NOTE — TELEPHONE ENCOUNTER
Received call from patient stating that she had her PPM implanted in [place], but moved to [place] a couple of years ago.  She needs to have an MRI completed and was wondering if we would send information on the device to the imaging center in [place].    Patient has been following with a cardiology and device clinic in [place].  Explained that they would have the most up-to-date information on her device and that she should reach out to her cardiology clinic with any questions if the imaging department is requesting them to obtain the information.  Patient and her daughter verbalized understanding and appreciation for information.    LUCHO Mazariegos

## 2022-08-01 NOTE — ED AVS SNAPSHOT
[facility]  [address] [address]  Phone:  [phone]  Fax:  [phone]                                    Christiana Sal   MRN: [id number]    Department:  [facility]   Date of Visit:  4/1/2019           After Visit Summary Signature Page    I have received my discharge instructions, and my questions have been answered. I have discussed any challenges I see with this plan with the nurse or doctor.    ..........................................................................................................................................  Patient/Patient Representative Signature      ..........................................................................................................................................  Patient Representative Print Name and Relationship to Patient    ..................................................               ................................................  Date                                   Time    ..........................................................................................................................................  Reviewed by Signature/Title    ...................................................              ..............................................  Date                                               Time          22EPIC Rev 08/18       
Patient

## 2023-01-24 NOTE — BRIEF OP NOTE
Impression: Nonexudative age-related macular degeneration, bilateral, early dry stage: H35.3131.  Plan: AMSLER, AREDS 2 explained [facility] Operative Note    Pre-operative diagnosis: Painful orthopaedic hardware (H) [T84.84XA]   Post-operative diagnosis * No post-op diagnosis entered *   Procedure: Procedure(s):  Distal Fibula Deep Hardware Removal   Surgeon: Darrell Chávez DPM   Anesthesia: Monitor Anesthesia Care    Estimated blood loss: 215 mL   Blood transfusion: No transfusion was given during surgery   Drains: None   Specimens: Cystic lesion sent to pathology per protocol post excision for routine evaluation, gross and microscopic/cellular level.   Findings: Well healed prior right fibular fracture.  Retained hardware, prominent with inferior distal cystic ganglionic lesion appreciated - 2-3 cm subdermal.   Complications: No direct complications encountered throughout procedures.   Condition: Stable  Transferred to post-anesthesia recovery   Comments: See dictated operative report for full details.           Darrell Chávez DPM, FACFAS  Foot & Ankle Surgeon/Specialist  [facility]

## 2023-03-24 NOTE — RESULT ENCOUNTER NOTE
Emergency Dept/Urgent Care discharge antibiotic (if prescribed): None  No changes in treatment per Urine culture protocol.  
Never smoker

## 2023-05-18 NOTE — TELEPHONE ENCOUNTER
A user error has taken place: encounter opened in error, closed for administrative reasons.   [facility] Physician Orders 01/16/19 Final Home PT DC visit, form sent to PCP.    Geri Kenyon  [facility]

## 2023-12-08 NOTE — TELEPHONE ENCOUNTER
**This refill requires provider completion and is not appropriate for RN review per RN refill guidelines.**  PH-Q9 needs to be less than 5 to approve medication on RN Refill protocol pt's score is 6.  Angelic Tran RN       Called patient to remind them of their procedure with Dr. Alexandra Gill at [facility]  on 12/13/23  to arrive at 800     RESPONSE:  CONFIRMED

## (undated) DEVICE — SOL NACL 0.9% IRRIG 3000ML BAG 07972-08

## (undated) DEVICE — GLOVE PROTEXIS W/NEU-THERA 8.0  2D73TE80

## (undated) DEVICE — GLOVE PROTEXIS POWDER FREE 8.0 ORTHOPEDIC 2D73ET80

## (undated) DEVICE — SU VICRYL 2-0 SH 27" UND J417H

## (undated) DEVICE — SU VICRYL 0 CT-1 36" J946H

## (undated) DEVICE — SOL NACL 0.9% IRRIG 1000ML BOTTLE 07138-09

## (undated) DEVICE — GLOVE PROTEXIS POWDER FREE 7.5 ORTHOPEDIC 2D73ET75

## (undated) DEVICE — DRAPE C-ARM 60X42" 1013

## (undated) DEVICE — DRSG ADAPTIC 3X8" 6113

## (undated) DEVICE — PREP CHLORAPREP 26ML TINTED ORANGE  260815

## (undated) DEVICE — NDL 18GA 1.5" 305196

## (undated) DEVICE — PREP DURAPREP 26ML APL 8630

## (undated) DEVICE — BNDG ELASTIC 4" DBL LENGTH UNSTERILE 6611-14

## (undated) DEVICE — GOWN IMPERVIOUS SPECIALTY XLG/XLONG 32474

## (undated) DEVICE — DRSG ADAPTIC 3X8" X3

## (undated) DEVICE — GLOVE PROTEXIS BLUE W/NEU-THERA 6.5  2D73EB65

## (undated) DEVICE — SYR 10ML LL W/O NDL

## (undated) DEVICE — DRAPE EXTREMITY W/ARMBOARD 29405

## (undated) DEVICE — SU MONOCRYL 2-0 CT-1 36" UND Y945H

## (undated) DEVICE — GOWN XLG DISP 9545

## (undated) DEVICE — ESU PENCIL SMOKE EVAC W/ROCKER SWITCH 0703-047-000

## (undated) DEVICE — SU ETHILON 3-0 FS-1 18" 669H

## (undated) DEVICE — SU PDO 1 STRATAFIX 36X36CM CTX TAPERPOINT SXPD2B405

## (undated) DEVICE — PACK TOTAL HIP W/POUCH RIVERSIDE LATEX FREE

## (undated) DEVICE — BLADE SAW SAGITTAL STRK 18X90X1.37MM HD SYS 6 6118-137-090

## (undated) DEVICE — DRAPE STERI TOWEL SM 1000

## (undated) DEVICE — CAST PADDING 4" STERILE 9044S

## (undated) DEVICE — SOL WATER IRRIG 1000ML BOTTLE 07139-09

## (undated) DEVICE — ESU ELEC BLADE 4" COATED

## (undated) DEVICE — DRAPE SHEET REV FOLD 3/4 9349

## (undated) DEVICE — SUCTION IRR SYSTEM W/TIP INTERPULSE

## (undated) DEVICE — NDL 25GA 1.5" 305127

## (undated) DEVICE — GLOVE PROTEXIS W/NEU-THERA 6.5  2D73TE65

## (undated) DEVICE — DRILL BIT QUICK COUPLING 2.0X125MM  310.21

## (undated) DEVICE — SU ETHIBOND 5 V-37 4X30" MB66G

## (undated) DEVICE — TAPE CLOTH ADHESIVE 3" ZONAS

## (undated) DEVICE — NDL 22GA 1.5"

## (undated) DEVICE — SU VICRYL 1 CT-1 36" UND J947H

## (undated) DEVICE — DEVICE RETRIEVER HEWSON 71111579

## (undated) DEVICE — PILLOW ABDUCT HIP LG

## (undated) DEVICE — CAST PADDING 4" WEBRIL UNSTERILE

## (undated) DEVICE — SYR BULB IRRIG 50ML LATEX FREE 0035280

## (undated) DEVICE — DRILL BIT SYN QUICK COUPLING 2.5X180MM GOLD  310.23

## (undated) DEVICE — DRSG GAUZE 4X4" TRAY

## (undated) DEVICE — DRSG AQUACEL AG 3.5X9.75" HYDROFIBER 412011

## (undated) DEVICE — PACK EXTREMITY LATEX FREE SOP32HFFCS

## (undated) DEVICE — BONE CLEANING TIP INTERPULSE  0210-010-000

## (undated) DEVICE — BNDG ELASTIC 4"X5YDS UNSTERILE 6611-40

## (undated) DEVICE — GOWN LG DISP 9515

## (undated) DEVICE — SU MONOCRYL 3-0 PS-2 18" UND Y497G

## (undated) RX ORDER — EPHEDRINE SULFATE 50 MG/ML
INJECTION, SOLUTION INTRAMUSCULAR; INTRAVENOUS; SUBCUTANEOUS
Status: DISPENSED
Start: 2019-05-02

## (undated) RX ORDER — PROPOFOL 10 MG/ML
INJECTION, EMULSION INTRAVENOUS
Status: DISPENSED
Start: 2018-12-03

## (undated) RX ORDER — NEOSTIGMINE METHYLSULFATE 1 MG/ML
VIAL (ML) INJECTION
Status: DISPENSED
Start: 2019-05-02

## (undated) RX ORDER — PHENYLEPHRINE HCL IN 0.9% NACL 1 MG/10 ML
SYRINGE (ML) INTRAVENOUS
Status: DISPENSED
Start: 2018-12-03

## (undated) RX ORDER — LIDOCAINE HYDROCHLORIDE 10 MG/ML
INJECTION, SOLUTION EPIDURAL; INFILTRATION; INTRACAUDAL; PERINEURAL
Status: DISPENSED
Start: 2020-07-30

## (undated) RX ORDER — CEFAZOLIN SODIUM 2 G/100ML
INJECTION, SOLUTION INTRAVENOUS
Status: DISPENSED
Start: 2020-07-30

## (undated) RX ORDER — PHENYLEPHRINE HCL IN 0.9% NACL 1 MG/10 ML
SYRINGE (ML) INTRAVENOUS
Status: DISPENSED
Start: 2019-05-02

## (undated) RX ORDER — EPINEPHRINE 1 MG/ML(1)
AMPUL (ML) INJECTION
Status: DISPENSED
Start: 2018-12-03

## (undated) RX ORDER — ACETAMINOPHEN 325 MG/1
TABLET ORAL
Status: DISPENSED
Start: 2019-05-02

## (undated) RX ORDER — BUPIVACAINE HYDROCHLORIDE 5 MG/ML
INJECTION, SOLUTION EPIDURAL; INTRACAUDAL
Status: DISPENSED
Start: 2019-03-07

## (undated) RX ORDER — KETAMINE HCL IN 0.9 % NACL 50 MG/5 ML
SYRINGE (ML) INTRAVENOUS
Status: DISPENSED
Start: 2019-05-02

## (undated) RX ORDER — REGADENOSON 0.08 MG/ML
INJECTION, SOLUTION INTRAVENOUS
Status: DISPENSED
Start: 2020-06-17

## (undated) RX ORDER — MAGNESIUM SULFATE HEPTAHYDRATE 40 MG/ML
INJECTION, SOLUTION INTRAVENOUS
Status: DISPENSED
Start: 2020-07-30

## (undated) RX ORDER — CEFAZOLIN SODIUM 1 G/3ML
INJECTION, POWDER, FOR SOLUTION INTRAMUSCULAR; INTRAVENOUS
Status: DISPENSED
Start: 2018-12-03

## (undated) RX ORDER — DEXAMETHASONE SODIUM PHOSPHATE 10 MG/ML
INJECTION, SOLUTION INTRAMUSCULAR; INTRAVENOUS
Status: DISPENSED
Start: 2019-03-07

## (undated) RX ORDER — BUPIVACAINE HYDROCHLORIDE 5 MG/ML
INJECTION, SOLUTION PERINEURAL
Status: DISPENSED
Start: 2020-07-30

## (undated) RX ORDER — BUPIVACAINE HYDROCHLORIDE 7.5 MG/ML
INJECTION, SOLUTION INTRASPINAL
Status: DISPENSED
Start: 2018-12-03

## (undated) RX ORDER — PROPOFOL 10 MG/ML
INJECTION, EMULSION INTRAVENOUS
Status: DISPENSED
Start: 2020-07-30

## (undated) RX ORDER — DEXAMETHASONE SODIUM PHOSPHATE 4 MG/ML
INJECTION, SOLUTION INTRA-ARTICULAR; INTRALESIONAL; INTRAMUSCULAR; INTRAVENOUS; SOFT TISSUE
Status: DISPENSED
Start: 2020-07-30

## (undated) RX ORDER — FENTANYL CITRATE 50 UG/ML
INJECTION, SOLUTION INTRAMUSCULAR; INTRAVENOUS
Status: DISPENSED
Start: 2020-07-30

## (undated) RX ORDER — METHYLPREDNISOLONE ACETATE 40 MG/ML
INJECTION, SUSPENSION INTRA-ARTICULAR; INTRALESIONAL; INTRAMUSCULAR; SOFT TISSUE
Status: DISPENSED
Start: 2019-03-07

## (undated) RX ORDER — LIDOCAINE HYDROCHLORIDE 10 MG/ML
INJECTION, SOLUTION EPIDURAL; INFILTRATION; INTRACAUDAL; PERINEURAL
Status: DISPENSED
Start: 2019-05-02

## (undated) RX ORDER — ACETAMINOPHEN 325 MG/1
TABLET ORAL
Status: DISPENSED
Start: 2020-07-30

## (undated) RX ORDER — CEFAZOLIN SODIUM 2 G/100ML
INJECTION, SOLUTION INTRAVENOUS
Status: DISPENSED
Start: 2019-05-02

## (undated) RX ORDER — FENTANYL CITRATE 50 UG/ML
INJECTION, SOLUTION INTRAMUSCULAR; INTRAVENOUS
Status: DISPENSED
Start: 2019-05-02

## (undated) RX ORDER — ONDANSETRON 2 MG/ML
INJECTION INTRAMUSCULAR; INTRAVENOUS
Status: DISPENSED
Start: 2020-07-30

## (undated) RX ORDER — GABAPENTIN 300 MG/1
CAPSULE ORAL
Status: DISPENSED
Start: 2019-05-02

## (undated) RX ORDER — LIDOCAINE HYDROCHLORIDE 10 MG/ML
INJECTION, SOLUTION INFILTRATION; PERINEURAL
Status: DISPENSED
Start: 2020-07-30